# Patient Record
Sex: FEMALE | Race: WHITE | Employment: OTHER | URBAN - NONMETROPOLITAN AREA
[De-identification: names, ages, dates, MRNs, and addresses within clinical notes are randomized per-mention and may not be internally consistent; named-entity substitution may affect disease eponyms.]

---

## 2017-04-25 ENCOUNTER — OFFICE VISIT (OUTPATIENT)
Dept: CARDIOLOGY | Age: 67
End: 2017-04-25
Payer: MEDICARE

## 2017-04-25 VITALS
HEART RATE: 68 BPM | SYSTOLIC BLOOD PRESSURE: 138 MMHG | DIASTOLIC BLOOD PRESSURE: 88 MMHG | BODY MASS INDEX: 28.32 KG/M2 | WEIGHT: 170 LBS | HEIGHT: 65 IN

## 2017-04-25 DIAGNOSIS — F17.200 TOBACCO USE DISORDER: ICD-10-CM

## 2017-04-25 DIAGNOSIS — I25.10 ASHD (ARTERIOSCLEROTIC HEART DISEASE): Primary | ICD-10-CM

## 2017-04-25 DIAGNOSIS — I10 ESSENTIAL HYPERTENSION: ICD-10-CM

## 2017-04-25 PROCEDURE — 3014F SCREEN MAMMO DOC REV: CPT | Performed by: INTERNAL MEDICINE

## 2017-04-25 PROCEDURE — 1036F TOBACCO NON-USER: CPT | Performed by: INTERNAL MEDICINE

## 2017-04-25 PROCEDURE — 4040F PNEUMOC VAC/ADMIN/RCVD: CPT | Performed by: INTERNAL MEDICINE

## 2017-04-25 PROCEDURE — G8598 ASA/ANTIPLAT THER USED: HCPCS | Performed by: INTERNAL MEDICINE

## 2017-04-25 PROCEDURE — G8400 PT W/DXA NO RESULTS DOC: HCPCS | Performed by: INTERNAL MEDICINE

## 2017-04-25 PROCEDURE — 99213 OFFICE O/P EST LOW 20 MIN: CPT | Performed by: INTERNAL MEDICINE

## 2017-04-25 PROCEDURE — 1123F ACP DISCUSS/DSCN MKR DOCD: CPT | Performed by: INTERNAL MEDICINE

## 2017-04-25 PROCEDURE — G8420 CALC BMI NORM PARAMETERS: HCPCS | Performed by: INTERNAL MEDICINE

## 2017-04-25 PROCEDURE — 3017F COLORECTAL CA SCREEN DOC REV: CPT | Performed by: INTERNAL MEDICINE

## 2017-04-25 PROCEDURE — 1090F PRES/ABSN URINE INCON ASSESS: CPT | Performed by: INTERNAL MEDICINE

## 2017-04-25 PROCEDURE — G8427 DOCREV CUR MEDS BY ELIG CLIN: HCPCS | Performed by: INTERNAL MEDICINE

## 2017-04-25 RX ORDER — METOPROLOL SUCCINATE 50 MG/1
50 TABLET, EXTENDED RELEASE ORAL EVERY EVENING
Qty: 90 TABLET | Refills: 3 | Status: SHIPPED | OUTPATIENT
Start: 2017-04-25 | End: 2018-05-11 | Stop reason: SDUPTHER

## 2017-04-25 RX ORDER — RAMIPRIL 2.5 MG/1
2.5 CAPSULE ORAL EVERY MORNING
Qty: 90 CAPSULE | Refills: 3 | Status: SHIPPED | OUTPATIENT
Start: 2017-04-25 | End: 2018-06-04 | Stop reason: DRUGHIGH

## 2017-05-12 ENCOUNTER — HOSPITAL ENCOUNTER (OUTPATIENT)
Dept: MRI IMAGING | Age: 67
Discharge: HOME | End: 2017-05-12
Payer: MEDICARE

## 2017-05-12 DIAGNOSIS — M50.10 CERVICAL DISC DISORDER WITH RADICULOPATHY, UNSPECIFIED CERVICAL REGION: ICD-10-CM

## 2017-05-12 PROCEDURE — 72141 MRI NECK SPINE W/O DYE: CPT

## 2017-11-10 ENCOUNTER — TELEPHONE (OUTPATIENT)
Dept: CARDIOLOGY | Age: 67
End: 2017-11-10

## 2017-11-10 DIAGNOSIS — E78.00 HYPERCHOLESTEREMIA: ICD-10-CM

## 2017-11-10 DIAGNOSIS — I25.10 ASHD (ARTERIOSCLEROTIC HEART DISEASE): Primary | ICD-10-CM

## 2017-11-10 DIAGNOSIS — I25.10 CORONARY ARTERY DISEASE INVOLVING NATIVE CORONARY ARTERY OF NATIVE HEART WITHOUT ANGINA PECTORIS: ICD-10-CM

## 2017-11-10 DIAGNOSIS — I25.10 CORONARY ARTERY DISEASE INVOLVING NATIVE CORONARY ARTERY OF NATIVE HEART WITHOUT ANGINA PECTORIS: Primary | ICD-10-CM

## 2017-11-10 DIAGNOSIS — I25.10 ASHD (ARTERIOSCLEROTIC HEART DISEASE): ICD-10-CM

## 2017-11-10 NOTE — TELEPHONE ENCOUNTER
Patient called asking if she is needing to have any lab work prior to her appointment with Dr. Elena Man on 11/13. Advised patient that if labs are needed, they will be given at appointment. Patient requested to see if she could get them prior if needed. Can you ask if he would like any labs. Thanks.

## 2017-11-13 ENCOUNTER — OFFICE VISIT (OUTPATIENT)
Dept: CARDIOLOGY | Age: 67
End: 2017-11-13
Payer: MEDICARE

## 2017-11-13 VITALS
HEIGHT: 67 IN | RESPIRATION RATE: 16 BRPM | HEART RATE: 108 BPM | BODY MASS INDEX: 27.31 KG/M2 | DIASTOLIC BLOOD PRESSURE: 82 MMHG | WEIGHT: 174 LBS | SYSTOLIC BLOOD PRESSURE: 142 MMHG

## 2017-11-13 DIAGNOSIS — I25.10 ASHD (ARTERIOSCLEROTIC HEART DISEASE): ICD-10-CM

## 2017-11-13 DIAGNOSIS — I25.10 CORONARY ARTERY DISEASE INVOLVING NATIVE CORONARY ARTERY OF NATIVE HEART WITHOUT ANGINA PECTORIS: ICD-10-CM

## 2017-11-13 DIAGNOSIS — I10 ESSENTIAL HYPERTENSION: ICD-10-CM

## 2017-11-13 DIAGNOSIS — E78.2 MIXED HYPERLIPIDEMIA: Primary | ICD-10-CM

## 2017-11-13 DIAGNOSIS — E78.00 HYPERCHOLESTEREMIA: ICD-10-CM

## 2017-11-13 LAB
ALT SERPL-CCNC: 12 U/L (ref 5–33)
AST SERPL-CCNC: 11 U/L (ref 5–32)
CHOLESTEROL, TOTAL: 206 MG/DL (ref 160–199)
HDLC SERPL-MCNC: 51 MG/DL (ref 65–121)
LDL CHOLESTEROL CALCULATED: 115 MG/DL
TRIGL SERPL-MCNC: 201 MG/DL (ref 0–149)

## 2017-11-13 PROCEDURE — 99213 OFFICE O/P EST LOW 20 MIN: CPT | Performed by: INTERNAL MEDICINE

## 2017-11-13 PROCEDURE — 3017F COLORECTAL CA SCREEN DOC REV: CPT | Performed by: INTERNAL MEDICINE

## 2017-11-13 PROCEDURE — G8400 PT W/DXA NO RESULTS DOC: HCPCS | Performed by: INTERNAL MEDICINE

## 2017-11-13 PROCEDURE — G8598 ASA/ANTIPLAT THER USED: HCPCS | Performed by: INTERNAL MEDICINE

## 2017-11-13 PROCEDURE — 1090F PRES/ABSN URINE INCON ASSESS: CPT | Performed by: INTERNAL MEDICINE

## 2017-11-13 PROCEDURE — 3014F SCREEN MAMMO DOC REV: CPT | Performed by: INTERNAL MEDICINE

## 2017-11-13 PROCEDURE — 1123F ACP DISCUSS/DSCN MKR DOCD: CPT | Performed by: INTERNAL MEDICINE

## 2017-11-13 PROCEDURE — G8484 FLU IMMUNIZE NO ADMIN: HCPCS | Performed by: INTERNAL MEDICINE

## 2017-11-13 PROCEDURE — G8427 DOCREV CUR MEDS BY ELIG CLIN: HCPCS | Performed by: INTERNAL MEDICINE

## 2017-11-13 PROCEDURE — G8419 CALC BMI OUT NRM PARAM NOF/U: HCPCS | Performed by: INTERNAL MEDICINE

## 2017-11-13 PROCEDURE — 1036F TOBACCO NON-USER: CPT | Performed by: INTERNAL MEDICINE

## 2017-11-13 PROCEDURE — 4040F PNEUMOC VAC/ADMIN/RCVD: CPT | Performed by: INTERNAL MEDICINE

## 2017-11-13 RX ORDER — ROSUVASTATIN CALCIUM 10 MG/1
10 TABLET, COATED ORAL DAILY
COMMUNITY
End: 2017-11-13 | Stop reason: SDUPTHER

## 2017-11-15 RX ORDER — ROSUVASTATIN CALCIUM 10 MG/1
10 TABLET, COATED ORAL DAILY
Qty: 90 TABLET | Refills: 3 | Status: SHIPPED | OUTPATIENT
Start: 2017-11-15 | End: 2019-06-19 | Stop reason: SDUPTHER

## 2018-05-11 DIAGNOSIS — I25.10 ASHD (ARTERIOSCLEROTIC HEART DISEASE): ICD-10-CM

## 2018-05-11 DIAGNOSIS — I10 ESSENTIAL HYPERTENSION: ICD-10-CM

## 2018-05-11 RX ORDER — METOPROLOL SUCCINATE 50 MG/1
50 TABLET, EXTENDED RELEASE ORAL EVERY EVENING
Qty: 90 TABLET | Refills: 3 | Status: SHIPPED | OUTPATIENT
Start: 2018-05-11 | End: 2018-06-04 | Stop reason: DRUGHIGH

## 2018-06-04 ENCOUNTER — OFFICE VISIT (OUTPATIENT)
Dept: CARDIOLOGY | Age: 68
End: 2018-06-04
Payer: MEDICARE

## 2018-06-04 VITALS
SYSTOLIC BLOOD PRESSURE: 190 MMHG | BODY MASS INDEX: 27.64 KG/M2 | DIASTOLIC BLOOD PRESSURE: 110 MMHG | HEART RATE: 92 BPM | HEIGHT: 66 IN | WEIGHT: 172 LBS

## 2018-06-04 DIAGNOSIS — I25.10 ASHD (ARTERIOSCLEROTIC HEART DISEASE): ICD-10-CM

## 2018-06-04 DIAGNOSIS — I10 ESSENTIAL HYPERTENSION: Primary | ICD-10-CM

## 2018-06-04 PROCEDURE — G8598 ASA/ANTIPLAT THER USED: HCPCS | Performed by: INTERNAL MEDICINE

## 2018-06-04 PROCEDURE — 1090F PRES/ABSN URINE INCON ASSESS: CPT | Performed by: INTERNAL MEDICINE

## 2018-06-04 PROCEDURE — G8400 PT W/DXA NO RESULTS DOC: HCPCS | Performed by: INTERNAL MEDICINE

## 2018-06-04 PROCEDURE — G8427 DOCREV CUR MEDS BY ELIG CLIN: HCPCS | Performed by: INTERNAL MEDICINE

## 2018-06-04 PROCEDURE — 4004F PT TOBACCO SCREEN RCVD TLK: CPT | Performed by: INTERNAL MEDICINE

## 2018-06-04 PROCEDURE — 1123F ACP DISCUSS/DSCN MKR DOCD: CPT | Performed by: INTERNAL MEDICINE

## 2018-06-04 PROCEDURE — 4040F PNEUMOC VAC/ADMIN/RCVD: CPT | Performed by: INTERNAL MEDICINE

## 2018-06-04 PROCEDURE — 99214 OFFICE O/P EST MOD 30 MIN: CPT | Performed by: INTERNAL MEDICINE

## 2018-06-04 PROCEDURE — G8419 CALC BMI OUT NRM PARAM NOF/U: HCPCS | Performed by: INTERNAL MEDICINE

## 2018-06-04 PROCEDURE — 3017F COLORECTAL CA SCREEN DOC REV: CPT | Performed by: INTERNAL MEDICINE

## 2018-06-04 RX ORDER — RAMIPRIL 5 MG/1
5 CAPSULE ORAL DAILY
Qty: 30 CAPSULE | Refills: 3 | Status: SHIPPED | OUTPATIENT
Start: 2018-06-04 | End: 2018-06-25 | Stop reason: SDUPTHER

## 2018-06-04 RX ORDER — METOPROLOL SUCCINATE 100 MG/1
100 TABLET, EXTENDED RELEASE ORAL DAILY
Qty: 30 TABLET | Refills: 3 | Status: SHIPPED | OUTPATIENT
Start: 2018-06-04 | End: 2018-06-25 | Stop reason: SDUPTHER

## 2018-06-25 ENCOUNTER — OFFICE VISIT (OUTPATIENT)
Dept: CARDIOLOGY | Age: 68
End: 2018-06-25
Payer: MEDICARE

## 2018-06-25 VITALS
WEIGHT: 172.2 LBS | HEIGHT: 66 IN | SYSTOLIC BLOOD PRESSURE: 138 MMHG | DIASTOLIC BLOOD PRESSURE: 88 MMHG | BODY MASS INDEX: 27.68 KG/M2 | HEART RATE: 76 BPM

## 2018-06-25 DIAGNOSIS — Z95.5 HISTORY OF CORONARY ARTERY STENT PLACEMENT: ICD-10-CM

## 2018-06-25 DIAGNOSIS — I10 ESSENTIAL HYPERTENSION: Primary | ICD-10-CM

## 2018-06-25 DIAGNOSIS — I25.10 CORONARY ARTERY DISEASE INVOLVING NATIVE CORONARY ARTERY OF NATIVE HEART WITHOUT ANGINA PECTORIS: ICD-10-CM

## 2018-06-25 DIAGNOSIS — Z95.1 S/P CABG X 4: ICD-10-CM

## 2018-06-25 DIAGNOSIS — E78.00 HYPERCHOLESTEREMIA: ICD-10-CM

## 2018-06-25 PROCEDURE — 99214 OFFICE O/P EST MOD 30 MIN: CPT | Performed by: NURSE PRACTITIONER

## 2018-06-25 PROCEDURE — 1090F PRES/ABSN URINE INCON ASSESS: CPT | Performed by: NURSE PRACTITIONER

## 2018-06-25 PROCEDURE — 3017F COLORECTAL CA SCREEN DOC REV: CPT | Performed by: NURSE PRACTITIONER

## 2018-06-25 PROCEDURE — G8427 DOCREV CUR MEDS BY ELIG CLIN: HCPCS | Performed by: NURSE PRACTITIONER

## 2018-06-25 PROCEDURE — G8419 CALC BMI OUT NRM PARAM NOF/U: HCPCS | Performed by: NURSE PRACTITIONER

## 2018-06-25 RX ORDER — METOPROLOL SUCCINATE 100 MG/1
100 TABLET, EXTENDED RELEASE ORAL DAILY
Qty: 90 TABLET | Refills: 3 | Status: SHIPPED | OUTPATIENT
Start: 2018-06-25 | End: 2019-06-19 | Stop reason: SDUPTHER

## 2018-06-25 RX ORDER — RAMIPRIL 5 MG/1
5 CAPSULE ORAL DAILY
Qty: 90 CAPSULE | Refills: 3 | Status: SHIPPED | OUTPATIENT
Start: 2018-06-25 | End: 2019-06-19 | Stop reason: SDUPTHER

## 2018-12-19 ENCOUNTER — OFFICE VISIT (OUTPATIENT)
Dept: CARDIOLOGY | Age: 68
End: 2018-12-19
Payer: MEDICARE

## 2018-12-19 VITALS
BODY MASS INDEX: 27 KG/M2 | HEIGHT: 66 IN | SYSTOLIC BLOOD PRESSURE: 136 MMHG | WEIGHT: 168 LBS | HEART RATE: 93 BPM | DIASTOLIC BLOOD PRESSURE: 88 MMHG

## 2018-12-19 DIAGNOSIS — I25.10 CORONARY ARTERY DISEASE INVOLVING NATIVE CORONARY ARTERY OF NATIVE HEART WITHOUT ANGINA PECTORIS: Primary | ICD-10-CM

## 2018-12-19 DIAGNOSIS — Z95.1 S/P CABG X 4: ICD-10-CM

## 2018-12-19 DIAGNOSIS — I10 ESSENTIAL HYPERTENSION: ICD-10-CM

## 2018-12-19 DIAGNOSIS — E78.2 MIXED HYPERLIPIDEMIA: ICD-10-CM

## 2018-12-19 PROCEDURE — 1101F PT FALLS ASSESS-DOCD LE1/YR: CPT | Performed by: NURSE PRACTITIONER

## 2018-12-19 PROCEDURE — 3017F COLORECTAL CA SCREEN DOC REV: CPT | Performed by: NURSE PRACTITIONER

## 2018-12-19 PROCEDURE — 4040F PNEUMOC VAC/ADMIN/RCVD: CPT | Performed by: NURSE PRACTITIONER

## 2018-12-19 PROCEDURE — G8598 ASA/ANTIPLAT THER USED: HCPCS | Performed by: NURSE PRACTITIONER

## 2018-12-19 PROCEDURE — G8400 PT W/DXA NO RESULTS DOC: HCPCS | Performed by: NURSE PRACTITIONER

## 2018-12-19 PROCEDURE — 1090F PRES/ABSN URINE INCON ASSESS: CPT | Performed by: NURSE PRACTITIONER

## 2018-12-19 PROCEDURE — 99213 OFFICE O/P EST LOW 20 MIN: CPT | Performed by: NURSE PRACTITIONER

## 2018-12-19 PROCEDURE — 93000 ELECTROCARDIOGRAM COMPLETE: CPT | Performed by: NURSE PRACTITIONER

## 2018-12-19 PROCEDURE — G8484 FLU IMMUNIZE NO ADMIN: HCPCS | Performed by: NURSE PRACTITIONER

## 2018-12-19 PROCEDURE — 1123F ACP DISCUSS/DSCN MKR DOCD: CPT | Performed by: NURSE PRACTITIONER

## 2018-12-19 PROCEDURE — 4004F PT TOBACCO SCREEN RCVD TLK: CPT | Performed by: NURSE PRACTITIONER

## 2018-12-19 PROCEDURE — G8427 DOCREV CUR MEDS BY ELIG CLIN: HCPCS | Performed by: NURSE PRACTITIONER

## 2018-12-19 PROCEDURE — G8419 CALC BMI OUT NRM PARAM NOF/U: HCPCS | Performed by: NURSE PRACTITIONER

## 2018-12-19 RX ORDER — CYANOCOBALAMIN 1000 UG/ML
1000 INJECTION INTRAMUSCULAR; SUBCUTANEOUS
COMMUNITY
End: 2021-01-05

## 2018-12-19 NOTE — PATIENT INSTRUCTIONS
can you learn more? Go to [email or web address] and sign in to your WappZapp account. Enter T687 in the Binfire box to learn more about \"A Healthy Heart: Care Instructions. \"     If you do not have an account, please click on the \"Sign Up Now\" link. Current as of: December 6, 2017  Content Version: 11.8  © 7887-3146 [employer]. Care instructions adapted under license by [facility] ([facility]). If you have questions about a medical condition or this instruction, always ask your healthcare professional. [place] [address] any warranty or liability for your use of this information. Patient Education        A Healthy Heart: Care Instructions  Your Care Instructions    Heart disease occurs when a substance called plaque builds up in the vessels that supply oxygen-rich blood to your heart. This can narrow the blood vessels and reduce blood flow. A heart attack happens when blood flow is completely blocked. A high-fat diet, smoking, and other factors increase the risk of heart disease. Your doctor has found that you have a chance of having heart disease. You can do lots of things to keep your heart healthy. It may not be easy, but you can change your diet, exercise more, and quit smoking. These steps really work to lower your chance of heart disease. Follow-up care is a key part of your treatment and safety. Be sure to make and go to all appointments, and call your doctor if you are having problems. It's also a good idea to know your test results and keep a list of the medicines you take. How can you care for yourself at home? Diet    · Use less salt when you cook and eat. This helps lower your blood pressure. Taste food before salting. Add only a little salt when you think you need it.  With time, your taste buds will adjust to less salt.     · Eat fewer snack items, fast foods, canned soups, and other high-salt, high-fat, processed foods.     · Read food you take aspirin and not another kind of pain reliever, such as acetaminophen (Tylenol). If you take ibuprofen (such as Advil or Motrin) for other problems, take aspirin at least 2 hours before taking ibuprofen. When should you call for help? Call 911 if you have symptoms of a heart attack. These may include:    · Chest pain or pressure, or a strange feeling in the chest.     · Sweating.     · Shortness of breath.     · Pain, pressure, or a strange feeling in the back, neck, jaw, or upper belly or in one or both shoulders or arms.     · Lightheadedness or sudden weakness.     · A fast or irregular heartbeat.    After you call 911, the  may tell you to chew 1 adult-strength or 2 to 4 low-dose aspirin. Wait for an ambulance. Do not try to drive yourself.   Watch closely for changes in your health, and be sure to contact your doctor if you have any problems. Where can you learn more? Go to [email or web address] and sign in to your PharmAssistant account. Enter L955 in the Mallstreet box to learn more about \"A Healthy Heart: Care Instructions. \"     If you do not have an account, please click on the \"Sign Up Now\" link. Current as of: December 6, 2017  Content Version: 11.8  © 5811-6154 Healthwise, Incorporated. Care instructions adapted under license by [facility] ([facility]). If you have questions about a medical condition or this instruction, always ask your healthcare professional. [place] [address] any warranty or liability for your use of this information.

## 2018-12-19 NOTE — PROGRESS NOTES
CATHETERIZATION  12/10/00    selective left heart and coronary arteriography with left ventriculography     CARDIAC CATHETERIZATION  01/23/98    left heart cath, left ventriculography, slective coronary arteriography, direct infarct angioplasty and stent placement to proximal left anterior descending coronary artery    CARDIAC CATHETERIZATION  03/05/94    left heart cath, selective coronary arteriography, left ventriculography    CARDIAC CATHETERIZATION  8/27/2011    [place]    CHOLECYSTECTOMY      CORONARY ANGIOPLASTY WITH STENT PLACEMENT  12/12/00    PTCA and stent placement to the mid LAD/ptca and stent placement first circumflex marginal (intermediate)     CORONARY ARTERY BYPASS GRAFT  8/29/2011    PACABG X 4 LIMA-LAD, SVG-DIAG, SVG-PDA, RT EVH, LT OPEN VEIN HARVEST, DR Monterroso Every    HYSTERECTOMY      PARATHYROIDECTOMY       Family History   Problem Relation Age of Onset    Cancer Mother      Social History   Substance Use Topics    Smoking status: Current Some Day Smoker     Packs/day: 0.25     Types: Cigarettes    Smokeless tobacco: Never Used    Alcohol use No      Current Outpatient Prescriptions   Medication Sig Dispense Refill    cyanocobalamin 1000 MCG/ML injection Inject 1,000 mcg into the muscle every 30 days      ramipril (ALTACE) 5 MG capsule Take 1 capsule by mouth daily 90 capsule 3    metoprolol succinate (TOPROL XL) 100 MG extended release tablet Take 1 tablet by mouth daily 90 tablet 3    rosuvastatin (CRESTOR) 10 MG tablet Take 1 tablet by mouth daily 90 tablet 3    metFORMIN (GLUCOPHAGE) 500 MG tablet Take 1 tablet by mouth 2 times daily (with meals) 180 tablet 3    nitroGLYCERIN (NITROSTAT) 0.4 MG SL tablet Place 1 tablet under the tongue every 5 minutes as needed for Chest pain 25 tablet 3    aspirin 81 MG EC tablet Take 81 mg by mouth daily. No current facility-administered medications for this visit.       Allergies: Codeine    Review of Systems  Constitutional -

## 2019-03-01 ENCOUNTER — HOSPITAL ENCOUNTER (OUTPATIENT)
Dept: NON INVASIVE DIAGNOSTICS | Age: 69
Discharge: HOME | End: 2019-03-01
Payer: MEDICARE

## 2019-03-01 LAB
LV EF: 62 %
LVEF MODALITY: NORMAL

## 2019-03-01 PROCEDURE — 93306 TTE W/DOPPLER COMPLETE: CPT

## 2019-06-19 ENCOUNTER — OFFICE VISIT (OUTPATIENT)
Dept: CARDIOLOGY | Age: 69
End: 2019-06-19
Payer: MEDICARE

## 2019-06-19 VITALS
SYSTOLIC BLOOD PRESSURE: 142 MMHG | BODY MASS INDEX: 28.93 KG/M2 | HEART RATE: 92 BPM | WEIGHT: 180 LBS | HEIGHT: 66 IN | DIASTOLIC BLOOD PRESSURE: 90 MMHG

## 2019-06-19 DIAGNOSIS — E78.2 MIXED HYPERLIPIDEMIA: ICD-10-CM

## 2019-06-19 DIAGNOSIS — I10 ESSENTIAL HYPERTENSION: ICD-10-CM

## 2019-06-19 DIAGNOSIS — Z95.1 S/P CABG X 4: ICD-10-CM

## 2019-06-19 DIAGNOSIS — I25.10 CORONARY ARTERY DISEASE INVOLVING NATIVE CORONARY ARTERY OF NATIVE HEART WITHOUT ANGINA PECTORIS: Primary | ICD-10-CM

## 2019-06-19 PROCEDURE — 4040F PNEUMOC VAC/ADMIN/RCVD: CPT | Performed by: NURSE PRACTITIONER

## 2019-06-19 PROCEDURE — 1123F ACP DISCUSS/DSCN MKR DOCD: CPT | Performed by: NURSE PRACTITIONER

## 2019-06-19 PROCEDURE — 99213 OFFICE O/P EST LOW 20 MIN: CPT | Performed by: NURSE PRACTITIONER

## 2019-06-19 PROCEDURE — G8598 ASA/ANTIPLAT THER USED: HCPCS | Performed by: NURSE PRACTITIONER

## 2019-06-19 PROCEDURE — G8427 DOCREV CUR MEDS BY ELIG CLIN: HCPCS | Performed by: NURSE PRACTITIONER

## 2019-06-19 PROCEDURE — 4004F PT TOBACCO SCREEN RCVD TLK: CPT | Performed by: NURSE PRACTITIONER

## 2019-06-19 PROCEDURE — 3017F COLORECTAL CA SCREEN DOC REV: CPT | Performed by: NURSE PRACTITIONER

## 2019-06-19 PROCEDURE — G8419 CALC BMI OUT NRM PARAM NOF/U: HCPCS | Performed by: NURSE PRACTITIONER

## 2019-06-19 PROCEDURE — 1090F PRES/ABSN URINE INCON ASSESS: CPT | Performed by: NURSE PRACTITIONER

## 2019-06-19 PROCEDURE — G8400 PT W/DXA NO RESULTS DOC: HCPCS | Performed by: NURSE PRACTITIONER

## 2019-06-19 RX ORDER — ROSUVASTATIN CALCIUM 10 MG/1
10 TABLET, COATED ORAL DAILY
Qty: 90 TABLET | Refills: 3 | Status: SHIPPED | OUTPATIENT
Start: 2019-06-19 | End: 2020-09-23

## 2019-06-19 RX ORDER — RAMIPRIL 5 MG/1
5 CAPSULE ORAL DAILY
Qty: 90 CAPSULE | Refills: 3 | Status: SHIPPED | OUTPATIENT
Start: 2019-06-19 | End: 2020-09-23 | Stop reason: SDUPTHER

## 2019-06-19 RX ORDER — METOPROLOL SUCCINATE 100 MG/1
100 TABLET, EXTENDED RELEASE ORAL DAILY
Qty: 90 TABLET | Refills: 3 | Status: SHIPPED | OUTPATIENT
Start: 2019-06-19 | End: 2020-02-13 | Stop reason: SDUPTHER

## 2019-06-19 NOTE — PROGRESS NOTES
[facility] [place], [place]. [address] [facility]  [phone] office  [phone] fax      OFFICE VISIT:  2019    Jono Silva - : 1950    Reason For Visit:  Montey Goodell is a 78 y.o. female who is here for 6 Month Follow-Up (Patient denies any cardiac symtoms.); Coronary Artery Disease; and Hypertension  Patient followed for:  Coronary artery disease involving native coronary artery of native heart without angina pectoris    S/P CABG x 4    Essential hypertension    Mixed hyperlipidemia      The patient presents today for cardiology follow up. Overall, the patient is doing well from a cardiac standpoint without symptoms to suggest myocardial ischemia. BP is BP controlled on current regimen. The patient's PCP monitors cholesterol. Carolina Cardona denies exertional chest pain, shortness of breath, orthopnea, paroxysmal nocturnal dyspnea, syncope, presyncope, sustained arrythmia, edema and fatigue. The patient denies numbness or weakness to suggest cerebrovascular accident or transient ischemic attack.       Jono Silva has the following history as recorded in [facility]:    Patient Active Problem List   Diagnosis Code    ASHD (arteriosclerotic heart disease) I25.10    Deep vein thrombosis (HCC) I82.409    Hypertension I10    Diabetes mellitus ([address] E11.9    Hypercholesteremia E78.00    S/P CABG x 4 Z95.1    History of coronary artery stent placement Z95.5    Coronary artery disease involving native coronary artery of native heart without angina pectoris I25.10    Mixed hyperlipidemia E78.2     Past Medical History:   Diagnosis Date    ASHD (arteriosclerotic heart disease)     s/p PTCA and stent of circumflex, as well as intermediate and mid LAD     COPD (chronic obstructive pulmonary disease) ([address]     Coronary atherosclerosis     Deep vein thrombosis (HCC)     hx of     Diabetes mellitus ([address] diet controlled    Hypercholesteremia     Hypertension     Unstable angina [facility])      Past Surgical History:   Procedure Laterality Date    CARDIAC CATHETERIZATION  12/10/00    selective left heart and coronary arteriography with left ventriculography     CARDIAC CATHETERIZATION  01/23/98    left heart cath, left ventriculography, slective coronary arteriography, direct infarct angioplasty and stent placement to proximal left anterior descending coronary artery    CARDIAC CATHETERIZATION  03/05/94    left heart cath, selective coronary arteriography, left ventriculography    CARDIAC CATHETERIZATION  8/27/2011    [place]    CHOLECYSTECTOMY      CORONARY ANGIOPLASTY WITH STENT PLACEMENT  12/12/00    PTCA and stent placement to the mid LAD/ptca and stent placement first circumflex marginal (intermediate)     CORONARY ARTERY BYPASS GRAFT  8/29/2011    PACABG X 4 LIMA-LAD, SVG-DIAG, SVG-PDA, RT EVH, LT OPEN VEIN HARVEST, DR Gould Daniel    HYSTERECTOMY      PARATHYROIDECTOMY       Family History   Problem Relation Age of Onset    Cancer Mother     Coronary Art Dis Father      Social History     Tobacco Use    Smoking status: Current Every Day Smoker     Packs/day: 0.25     Types: Cigarettes    Smokeless tobacco: Never Used   Substance Use Topics    Alcohol use: No      Current Outpatient Medications   Medication Sig Dispense Refill    cyanocobalamin 1000 MCG/ML injection Inject 1,000 mcg into the muscle every 30 days      ramipril (ALTACE) 5 MG capsule Take 1 capsule by mouth daily 90 capsule 3    metoprolol succinate (TOPROL XL) 100 MG extended release tablet Take 1 tablet by mouth daily 90 tablet 3    rosuvastatin (CRESTOR) 10 MG tablet Take 1 tablet by mouth daily 90 tablet 3    metFORMIN (GLUCOPHAGE) 500 MG tablet Take 1 tablet by mouth 2 times daily (with meals) 180 tablet 3    nitroGLYCERIN (NITROSTAT) 0.4 MG SL tablet Place 1 tablet under the tongue every 5 minutes as needed for Chest pain 25 tablet 3    aspirin 81 MG EC tablet Take 81 mg by mouth daily. No current facility-administered medications for this visit. Allergies: Codeine    Review of Systems  Constitutional - no appetite change, or unexpected weight change. No fever, chills or diaphoresis. No significant change in activity level or new onset of fatigue. HEENT - no significant rhinorrhea or epistaxis. No tinnitus or significant hearing loss. Eyes - no sudden vision change or amaurosis. No corneal arcus, xantholasma, subconjunctival hemorrhage or discharge. Respiratory - no significant wheezing, stridor, apnea or cough. No dyspnea on exertion or shortness of air. Cardiovascular - no exertional chest pain to suggest myocardial ischemia. No orthopnea or PND. No sensation of sustained arrythmia. No occurrence of slow heart rate. No palpitations. No claudication. No leg edema. Gastrointestinal - no abdominal swelling or pain. No blood in stool. No severe constipation, diarrhea, nausea, or vomiting. Genitourinary - no dysuria, frequency, or urgency. No flank pain or hematuria. Musculoskeletal - no back pain or myalgia. No problems with gait. Extremities - no clubbing, cyanosis or edema. Skin - no color change or rash. No pallor. No new surgical incision. Neurologic - no speech difficulty, facial asymmetry or lateralizing weakness. No seizures, presyncope or syncope. No significant dizziness. Hematologic - no easy bruising or excessive bleeding. Psychiatric - no severe anxiety or insomnia. No confusion. All other review of systems are negative. Objective  Vital Signs - BP (!) 150/94   Pulse 92   Ht 5' 6\" (1.676 m)   Wt 180 lb (81.6 kg)   BMI 29.05 kg/m²   General - Marilee Hernandez is alert, cooperative, and pleasant. Well groomed. No acute distress. Body habitus - Body mass index is 29.05 kg/m². HEENT - Head is normocephalic. No circumoral cyanosis.   Dentition is normal.  EYES -   Lids normal without ptosis. No discharge, edema or subconjunctival hemorrhage. Neck - Symmetrical without apparent mass or lymphadenopathy. Respiratory - Normal respiratory effort without use of accessory muscles. Ausculatation reveals vesicular breath sounds without crackles, wheezes, rub or rhonchi. Cardiovascular - No jugular venous distention. Auscultation reveals regular rate and rhythm. No audible clicks, gallop or rub. No murmur. No lower extremity varicosities. No carotid bruits. Abdominal -  No visible distention, mass or pulsations. Extremities - No clubbing or cyanosis. No statis dermatitis or ulcers. No edema. Musculoskeletal -   No Osler's nodes. No kyphosis or scoliosis. Gait is even and regular without limp or shuffle. Ambulates without assistance. Skin -  Warm and dry; no rash or pallor. No new surgical wound. Neurological - No focal neurological deficits. Thought processes coherent. No apparent tremor. Oriented to person, place and time. Psychiatric -  Appropriate affect and mood. Assessment:     Diagnosis Orders   1. Coronary artery disease involving native coronary artery of native heart without angina pectoris     2. S/P CABG x 4     3. Essential hypertension     4. Mixed hyperlipidemia       Stable CV status without symptoms of overt heart failure, arrhythmia or angina. CAD medical management includes Toprol XL, Altace, Crestor and ASA. BP is well controlled on current regimen. PCP follows lipids - tolerating statin. Patient is compliant with medication regimen. BP Readings from Last 3 Encounters:   06/19/19 (!) 150/94   12/19/18 136/88   06/25/18 138/88    Pulse Readings from Last 3 Encounters:   06/19/19 92   12/19/18 93   06/25/18 76        Wt Readings from Last 3 Encounters:   06/19/19 180 lb (81.6 kg)   12/19/18 168 lb (76.2 kg)   06/25/18 172 lb 3.2 oz (78.1 kg)     Plan  Previous cardiac history and records reviewed.   Continue current medications as

## 2019-06-19 NOTE — PATIENT INSTRUCTIONS
increases your length and quality of life. 5)  Eat better - A healthy diet is one of your best weapons for fighting cardiovascular disease. When you eat a heart healthy diet, you improve your chances for feeling good and staying healthy for life. 6)  Lose weight - when you shed extra fat an unnecessary pounds, you reduce the burden on your hear, lungs, blood vessels and skeleton. You give yourself the gift of active living, you lower your blood pressure and help yourself feel better. 7) Stop smoking - cigarette smokers have a higher risk of developing cardiovascular disease. If  You smoke, quitting is the best thing you can do for your health. Check American Heart Association on line for more information on Life's Simple 7 and tips for healthy living.       Patient Education        A Healthy Heart: Care Instructions  Your Care Instructions    Heart disease occurs when a substance called plaque builds up in the vessels that supply oxygen-rich blood to your heart. This can narrow the blood vessels and reduce blood flow. A heart attack happens when blood flow is completely blocked. A high-fat diet, smoking, and other factors increase the risk of heart disease. Your doctor has found that you have a chance of having heart disease. You can do lots of things to keep your heart healthy. It may not be easy, but you can change your diet, exercise more, and quit smoking. These steps really work to lower your chance of heart disease. Follow-up care is a key part of your treatment and safety. Be sure to make and go to all appointments, and call your doctor if you are having problems. It's also a good idea to know your test results and keep a list of the medicines you take. How can you care for yourself at home? Diet    · Use less salt when you cook and eat. This helps lower your blood pressure. Taste food before salting. Add only a little salt when you think you need it.  With time, your taste buds will adjust to less salt.     · Eat fewer snack items, fast foods, canned soups, and other high-salt, high-fat, processed foods.     · Read food labels and try to avoid saturated and trans fats. They increase your risk of heart disease by raising cholesterol levels.     · Limit the amount of solid fat-butter, margarine, and shortening-you eat. Use olive, peanut, or canola oil when you cook. Bake, broil, and steam foods instead of frying them.     · Eating fish can lower your risk for heart disease. Eat at least 2 servings of fish a week. [place], mackerel, herring, sardines, and chunk light tuna are very good choices. These fish contain omega-3 fatty acids.     · Eat a variety of fruit and vegetables every day. Dark green, deep orange, red, or yellow fruits and vegetables are especially good for you. Examples include spinach, carrots, peaches, and berries.     · Foods high in fiber can reduce your cholesterol and provide important vitamins and minerals. High-fiber foods include whole-grain cereals and breads, oatmeal, beans, brown rice, citrus fruits, and apples.     · Limit drinks and foods with added sugar. These include candy, desserts, and soda pop.    Lifestyle changes    · If your doctor recommends it, get more exercise. Walking is a good choice. Bit by bit, increase the amount you walk every day. Try for at least 30 minutes on most days of the week. You also may want to swim, bike, or do other activities.     · Do not smoke. If you need help quitting, talk to your doctor about stop-smoking programs and medicines. These can increase your chances of quitting for good. Quitting smoking may be the most important step you can take to protect your heart. It is never too late to quit. You will get health benefits right away.     · Limit alcohol to 2 drinks a day for men and 1 drink a day for women. Too much alcohol can cause health problems. Medicines    · Take your medicines exactly as prescribed.  Call your doctor if you think you are having a problem with your medicine.     · If your doctor recommends aspirin, take the amount directed each day. Make sure you take aspirin and not another kind of pain reliever, such as acetaminophen (Tylenol). If you take ibuprofen (such as Advil or Motrin) for other problems, take aspirin at least 2 hours before taking ibuprofen. When should you call for help? Call 911 if you have symptoms of a heart attack. These may include:    · Chest pain or pressure, or a strange feeling in the chest.     · Sweating.     · Shortness of breath.     · Pain, pressure, or a strange feeling in the back, neck, jaw, or upper belly or in one or both shoulders or arms.     · Lightheadedness or sudden weakness.     · A fast or irregular heartbeat.    After you call 911, the  may tell you to chew 1 adult-strength or 2 to 4 low-dose aspirin. Wait for an ambulance. Do not try to drive yourself.   Watch closely for changes in your health, and be sure to contact your doctor if you have any problems. Where can you learn more? Go to https://Alter Eco.FirmPlay. org and sign in to your Skysheet account. Enter I554 in the ConnXus box to learn more about \"A Healthy Heart: Care Instructions. \"     If you do not have an account, please click on the \"Sign Up Now\" link. Current as of: July 22, 2018  Content Version: 12.0  © 9508-1339 Healthwise, [facility]. Care instructions adapted under license by [facility] ([facility]). If you have questions about a medical condition or this instruction, always ask your healthcare professional. [place] [address] any warranty or liability for your use of this information.

## 2020-01-08 ENCOUNTER — OFFICE VISIT (OUTPATIENT)
Dept: CARDIOLOGY | Age: 70
End: 2020-01-08
Payer: MEDICARE

## 2020-01-08 VITALS
BODY MASS INDEX: 27.8 KG/M2 | HEART RATE: 88 BPM | DIASTOLIC BLOOD PRESSURE: 90 MMHG | HEIGHT: 66 IN | WEIGHT: 173 LBS | SYSTOLIC BLOOD PRESSURE: 142 MMHG

## 2020-01-08 PROCEDURE — G8427 DOCREV CUR MEDS BY ELIG CLIN: HCPCS | Performed by: INTERNAL MEDICINE

## 2020-01-08 PROCEDURE — 4004F PT TOBACCO SCREEN RCVD TLK: CPT | Performed by: INTERNAL MEDICINE

## 2020-01-08 PROCEDURE — G8400 PT W/DXA NO RESULTS DOC: HCPCS | Performed by: INTERNAL MEDICINE

## 2020-01-08 PROCEDURE — 99213 OFFICE O/P EST LOW 20 MIN: CPT | Performed by: INTERNAL MEDICINE

## 2020-01-08 PROCEDURE — 3017F COLORECTAL CA SCREEN DOC REV: CPT | Performed by: INTERNAL MEDICINE

## 2020-01-08 PROCEDURE — G8484 FLU IMMUNIZE NO ADMIN: HCPCS | Performed by: INTERNAL MEDICINE

## 2020-01-08 PROCEDURE — 4040F PNEUMOC VAC/ADMIN/RCVD: CPT | Performed by: INTERNAL MEDICINE

## 2020-01-08 PROCEDURE — 1090F PRES/ABSN URINE INCON ASSESS: CPT | Performed by: INTERNAL MEDICINE

## 2020-01-08 PROCEDURE — G8419 CALC BMI OUT NRM PARAM NOF/U: HCPCS | Performed by: INTERNAL MEDICINE

## 2020-01-08 PROCEDURE — 1123F ACP DISCUSS/DSCN MKR DOCD: CPT | Performed by: INTERNAL MEDICINE

## 2020-01-08 RX ORDER — RAMIPRIL 5 MG/1
5 CAPSULE ORAL 2 TIMES DAILY
Qty: 180 CAPSULE | Refills: 3 | Status: SHIPPED | OUTPATIENT
Start: 2020-01-08 | End: 2020-07-27

## 2020-01-08 ASSESSMENT — ENCOUNTER SYMPTOMS
BLOOD IN STOOL: 0
DIARRHEA: 0
WHEEZING: 0
VOMITING: 0
COUGH: 0
CONSTIPATION: 0
ABDOMINAL DISTENTION: 0
EYE DISCHARGE: 0
BACK PAIN: 0
SHORTNESS OF BREATH: 1

## 2020-01-08 NOTE — PATIENT INSTRUCTIONS
[place] at the [facility] and [address] [facility] located on the first floor of [place] [address] through hospital main entrance and turn immediately to your left. Date:     Lexiscan Stress Test      Lexiscan (regadenoson injection) is a prescription drug given through an IV line that increases blood flow through the arteries of the heart during a cardiac nuclear stress test.     There are two parts to a Lexiscan stress test: the rest portion and the exercise portion. For the rest portion, a radioactive tracer is injected into your arm through the IV. After 30 to 60 minutes, the process of imaging will begin. A nuclear camera will be placed on your chest area and images are taken for the next 15 to 20 minutes. For the exercise portion, a nurse will attach EKG electrodes to your chest to monitor your heart rate. The drug Phoebe Mutton is administered to simulate stress on the heart. Your heart rhythm will then be monitored for the next few minutes. Your blood pressure will also be monitored throughout the exercise portion. [place] through the exercise portion, a second round of radioactive tracer is injected into your body. Your heart rate and EKG will be monitored for another few minutes after administering the drug. Test Preparation:     Bring a list of your current medications. Do not take any of your medications the morning of the test, but bring all morning medications with you as you will take them after the stress portion of the test is completed.  Do not eat Bananas 24 hours prior to test.     No caffeine 24 hours prior to the testing. This includes: coffee, pop/soda, chocolate, cold medications, etc.  Any product that might contain caffeine.  No nicotine or alcohol 12 hours prior to your test.    Nothing to eat or drink 6-8 hours prior to appointment time.   It is okay to drink small amounts of water during the four hours prior to the test.   Nitroglycerin patches must

## 2020-01-08 NOTE — PROGRESS NOTES
[facility]  Cardiology Office Note  [address] [id number]  Phone: [phone]  Fax: [phone]                            Date:  1/8/2020  Patient: Wilman Griggs  Age:  78 y.o., 1950    Referral: Kia Clancy MD      PROBLEM LIST:    Patient Active Problem List    Diagnosis Date Noted    Mixed hyperlipidemia 12/19/2018     Priority: Low    S/P CABG x 4 10/11/2016     Priority: Low     Overview Note:     8/29/11 EF 60%      History of coronary artery stent placement 10/11/2016     Priority: Low    Coronary artery disease involving native coronary artery of native heart without angina pectoris 10/11/2016     Priority: Low    Hypercholesteremia 10/10/2011     Priority: Low    ASHD (arteriosclerotic heart disease)      Priority: Low    Deep vein thrombosis [address]      Priority: Low    Hypertension      Priority: Low    Diabetes mellitus ([facility]      Priority: Low     Overview Note:     diet controlled       1. Coronary artery disease, prior multiple PCI's with bare-metal stents to LAD and circumflex, CABG 8/2011 with LIMA to LAD, SVG to diagonal, SVG to PDA, normal LV ejection fraction. 2.  Chronic ongoing tobacco use with COPD. 3.  Diabetes mellitus. 4.  Hypertension. PRESENTATION: Wilman Griggs is a 78y.o. year old female presenting for follow-up evaluation. She reports exertional shortness of breath which has been fairly stable. Working in the garden and sometimes bending down would elicit shortness of breath. She worked as an OR nurse for 40 years. Her initial presentation for CABG was with chest discomfort and shortness of breath. She does not feel as bad since then. She smokes about 1 pack every 2 days. REVIEW OF SYSTEMS:  Review of Systems   Constitutional: Negative for activity change, fatigue and fever. HENT: Negative for ear pain, hearing loss and tinnitus. Eyes: Negative for discharge and visual disturbance. complete     Orders Placed This Encounter   Medications    ramipril (ALTACE) 5 MG capsule     Sig: Take 1 capsule by mouth 2 times daily     Dispense:  180 capsule     Refill:  3             Return in about 6 months (around 7/8/2020). Electronically signed by Bertha Harrison MD on 1/8/2020 at 2:32 PM    [facility]      Thisdictation was generated by voice recognition computer software. Although all attempts are made to edit the dictation for accuracy, there may be errors in the transcription that are not intended.

## 2020-01-31 ENCOUNTER — APPOINTMENT (OUTPATIENT)
Dept: NON INVASIVE DIAGNOSTICS | Age: 70
End: 2020-01-31
Payer: MEDICARE

## 2020-01-31 ENCOUNTER — HOSPITAL ENCOUNTER (OUTPATIENT)
Dept: NON INVASIVE DIAGNOSTICS | Age: 70
Discharge: HOME | End: 2020-01-31
Payer: MEDICARE

## 2020-01-31 ENCOUNTER — HOSPITAL ENCOUNTER (OUTPATIENT)
Dept: NUCLEAR MEDICINE | Age: 70
Discharge: HOME | End: 2020-02-02
Payer: MEDICARE

## 2020-01-31 LAB
LV EF: 55 %
LVEF MODALITY: NORMAL

## 2020-01-31 PROCEDURE — 3430000000 HC RX DIAGNOSTIC RADIOPHARMACEUTICAL: Performed by: INTERNAL MEDICINE

## 2020-01-31 PROCEDURE — 6360000002 HC RX W HCPCS: Performed by: INTERNAL MEDICINE

## 2020-01-31 PROCEDURE — A9500 TC99M SESTAMIBI: HCPCS | Performed by: INTERNAL MEDICINE

## 2020-01-31 PROCEDURE — 93017 CV STRESS TEST TRACING ONLY: CPT

## 2020-01-31 PROCEDURE — 93306 TTE W/DOPPLER COMPLETE: CPT

## 2020-01-31 RX ADMIN — TETRAKIS(2-METHOXYISOBUTYLISOCYANIDE)COPPER(I) TETRAFLUOROBORATE 30 MILLICURIE: 1 INJECTION, POWDER, LYOPHILIZED, FOR SOLUTION INTRAVENOUS at 11:25

## 2020-01-31 RX ADMIN — REGADENOSON 0.4 MG: 0.08 INJECTION, SOLUTION INTRAVENOUS at 10:30

## 2020-01-31 RX ADMIN — TETRAKIS(2-METHOXYISOBUTYLISOCYANIDE)COPPER(I) TETRAFLUOROBORATE 10 MILLICURIE: 1 INJECTION, POWDER, LYOPHILIZED, FOR SOLUTION INTRAVENOUS at 11:25

## 2020-02-03 LAB
LV EF: 65 %
LVEF MODALITY: NORMAL

## 2020-02-13 RX ORDER — METOPROLOL SUCCINATE 100 MG/1
100 TABLET, EXTENDED RELEASE ORAL DAILY
Qty: 90 TABLET | Refills: 3 | Status: SHIPPED | OUTPATIENT
Start: 2020-02-13 | End: 2020-09-23 | Stop reason: SDUPTHER

## 2020-07-16 ENCOUNTER — OFFICE VISIT (OUTPATIENT)
Dept: CARDIOLOGY | Age: 70
End: 2020-07-16
Payer: MEDICARE

## 2020-07-16 ENCOUNTER — TELEPHONE (OUTPATIENT)
Dept: CARDIOLOGY | Age: 70
End: 2020-07-16

## 2020-07-16 VITALS
WEIGHT: 179.8 LBS | SYSTOLIC BLOOD PRESSURE: 132 MMHG | HEIGHT: 64 IN | DIASTOLIC BLOOD PRESSURE: 82 MMHG | BODY MASS INDEX: 30.7 KG/M2 | HEART RATE: 60 BPM

## 2020-07-16 PROBLEM — R06.09 DOE (DYSPNEA ON EXERTION): Status: ACTIVE | Noted: 2020-07-16

## 2020-07-16 PROBLEM — Z86.39 HISTORY OF DIABETES MELLITUS: Status: ACTIVE | Noted: 2020-07-16

## 2020-07-16 PROBLEM — J44.9 CHRONIC OBSTRUCTIVE PULMONARY DISEASE (HCC): Status: ACTIVE | Noted: 2020-07-16

## 2020-07-16 PROCEDURE — 3023F SPIROM DOC REV: CPT | Performed by: NURSE PRACTITIONER

## 2020-07-16 PROCEDURE — 99214 OFFICE O/P EST MOD 30 MIN: CPT | Performed by: NURSE PRACTITIONER

## 2020-07-16 PROCEDURE — 4004F PT TOBACCO SCREEN RCVD TLK: CPT | Performed by: NURSE PRACTITIONER

## 2020-07-16 PROCEDURE — G8417 CALC BMI ABV UP PARAM F/U: HCPCS | Performed by: NURSE PRACTITIONER

## 2020-07-16 PROCEDURE — G8427 DOCREV CUR MEDS BY ELIG CLIN: HCPCS | Performed by: NURSE PRACTITIONER

## 2020-07-16 PROCEDURE — 4040F PNEUMOC VAC/ADMIN/RCVD: CPT | Performed by: NURSE PRACTITIONER

## 2020-07-16 PROCEDURE — 1090F PRES/ABSN URINE INCON ASSESS: CPT | Performed by: NURSE PRACTITIONER

## 2020-07-16 PROCEDURE — 1123F ACP DISCUSS/DSCN MKR DOCD: CPT | Performed by: NURSE PRACTITIONER

## 2020-07-16 PROCEDURE — G8400 PT W/DXA NO RESULTS DOC: HCPCS | Performed by: NURSE PRACTITIONER

## 2020-07-16 PROCEDURE — G8926 SPIRO NO PERF OR DOC: HCPCS | Performed by: NURSE PRACTITIONER

## 2020-07-16 PROCEDURE — 3017F COLORECTAL CA SCREEN DOC REV: CPT | Performed by: NURSE PRACTITIONER

## 2020-07-16 RX ORDER — HYDROCHLOROTHIAZIDE 25 MG/1
TABLET ORAL
Qty: 90 TABLET | Refills: 1 | Status: SHIPPED | OUTPATIENT
Start: 2020-07-16 | End: 2021-01-05

## 2020-07-16 NOTE — TELEPHONE ENCOUNTER
Patient seen in the office today. She reported no angina but ongoing NUNEZ. She is smoker but has not done so the past months. Plan to consult Dr. Shakila Bolton about proceeding with cath. 1/31/20  Impression:    There is small area of apical and anteroapical infarct with mild    ischemia, with a calculated ejection fraction of 65 %. Suggest: Clinical Correlation and medical management if asymptomatic. Signed by Dr Rick Noyola on 1/31/2020 4:42 PM      Established with Dr. Shakila Bolton on 1/8/20   Hx of COPD, CAD with prior PCI to LAD and circumflex with bare-metal stents, CABG 8/2011 with LIMA to LAD, SVG to diagonal, SVG to PDA, normal LV ejection fraction, diabetes mellitus.     \"I have discussed with the patient the absolute need for tobacco cessation. All other preventative measures will be futile going forward without tobacco cessation. There is high likelihood that she has graft failure. Currently her symptoms are predominantly shortness of breath which has been fairly stable. I would obtain an echocardiogram as well as a stress nuclear study to evaluate this further. \"

## 2020-07-16 NOTE — PATIENT INSTRUCTIONS
New instructions for today:    Take HCTZ 25 mg 1/2-1 tab daily as needed for swelling. Limit dietary sodium and elevate legs to reduce swelling. How to take:  NITROGLYCERIN (Nitrostat) 0.4 mg tablets, sublingual.  Nitroglycerin is in a group of drugs called nitrates. Nitroglycerin dilates (widens) blood vessels, making it easier for blood to flow through them and easier for the heart to pump. Dosing Guidelines for Nitroglycerin Tablets  · At the start of an angina (chest pain) attack, place one tablet under the tongue or between the cheek and gum. Do not swallow or chew the tablet; let it dissolve on its own. If necessary, a second and third tablet may be used, with five minutes between using each tablet. If you use a third tablet and your chest pain continues, it is time to seek immediate medical attention. Call 911 immediately and have someone drive you to the emergency room. You may be having a heart attack or other serious heart problem. · To prevent angina from exercise or stress, use 1 tablet 5 to 10 minutes before the activity. Patient Instructions:  Continue current medications as prescribed. Always keep a current medication list. Bring your medications to every office visit. Continue to follow up with primary care provider for non cardiac medical problems. Call the office with any problems, questions or concerns at [phone]. If you have been asked to keep a blood pressure log, do so for 2 weeks. Call the office to report readings to the triage nurse at [phone]. Follow up with cardiologist as scheduled. The following educational material has been included in this after visit summary for your review: Life simple 7. Heart health. Smoking cessation health benefit. Life simple 7  1) Manage blood pressure - high blood pressure is a major risk factor for heart disease and stroke. Keeping blood pressure in health range reduces strain on your heart, arteries and kidneys.   Blood disease. Your doctor has found that you have a chance of having heart disease. You can do lots of things to keep your heart healthy. It may not be easy, but you can change your diet, exercise more, and quit smoking. These steps really work to lower your chance of heart disease. Follow-up care is a key part of your treatment and safety. Be sure to make and go to all appointments, and call your doctor if you are having problems. It's also a good idea to know your test results and keep a list of the medicines you take. How can you care for yourself at home? Diet  · Use less salt when you cook and eat. This helps lower your blood pressure. Taste food before salting. Add only a little salt when you think you need it. With time, your taste buds will adjust to less salt. · Eat fewer snack items, fast foods, canned soups, and other high-salt, high-fat, processed foods. · Read food labels and try to avoid saturated and trans fats. They increase your risk of heart disease by raising cholesterol levels. · Limit the amount of solid fat-butter, margarine, and shortening-you eat. Use olive, peanut, or canola oil when you cook. Bake, broil, and steam foods instead of frying them. · Eat a variety of fruit and vegetables every day. Dark green, deep orange, red, or yellow fruits and vegetables are especially good for you. Examples include spinach, carrots, peaches, and berries. · Foods high in fiber can reduce your cholesterol and provide important vitamins and minerals. High-fiber foods include whole-grain cereals and breads, oatmeal, beans, brown rice, citrus fruits, and apples. · Eat lean proteins. Heart-healthy proteins include seafood, lean meats and poultry, eggs, beans, peas, nuts, seeds, and soy products. · Limit drinks and foods with added sugar. These include candy, desserts, and soda pop. Lifestyle changes  · If your doctor recommends it, get more exercise. Walking is a good choice.  Bit by bit, increase the amount you walk every day. Try for at least 30 minutes on most days of the week. You also may want to swim, bike, or do other activities. · Do not smoke. If you need help quitting, talk to your doctor about stop-smoking programs and medicines. These can increase your chances of quitting for good. Quitting smoking may be the most important step you can take to protect your heart. It is never too late to quit. · Limit alcohol to 2 drinks a day for men and 1 drink a day for women. Too much alcohol can cause health problems. · Manage other health problems such as diabetes, high blood pressure, and high cholesterol. If you think you may have a problem with alcohol or drug use, talk to your doctor. Medicines  · Take your medicines exactly as prescribed. Call your doctor if you think you are having a problem with your medicine. · If your doctor recommends aspirin, take the amount directed each day. Make sure you take aspirin and not another kind of pain reliever, such as acetaminophen (Tylenol). When should you call for help? [id number] if you have symptoms of a heart attack. These may include:  · Chest pain or pressure, or a strange feeling in the chest.  · Sweating. · Shortness of breath. · Pain, pressure, or a strange feeling in the back, neck, jaw, or upper belly or in one or both shoulders or arms. · Lightheadedness or sudden weakness. · A fast or irregular heartbeat. After you call 911, the  may tell you to chew 1 adult-strength or 2 to 4 low-dose aspirin. Wait for an ambulance. Do not try to drive yourself. Watch closely for changes in your health, and be sure to contact your doctor if you have any problems. Where can you learn more? Go to https://BioregencypeAnser Innovation.SimpleDeal. org and sign in to your CureLauncher account. Enter Q249 in the BancABC box to learn more about \"A Healthy Heart: Care Instructions. \"     If you do not have an account, please click on the \"Sign Up Now\" link.  Current as of: December 16, 2019               Content Version: 12.5  © 2377-8101 [employer]. Care instructions adapted under license by [facility]. If you have questions about a medical condition or this instruction, always ask your healthcare professional. [address] any warranty or liability for your use of this information. Patient Education        Learning About Benefits From Quitting Smoking  How does quitting smoking make you healthier? If you're thinking about quitting smoking, you may have a few reasons to be smoke-free. Your health may be one of them. · When you quit smoking, you lower your risks for cancer, lung disease, heart attack, stroke, blood vessel disease, and blindness from macular degeneration. · When you're smoke-free, you get sick less often, and you heal faster. You are less likely to get colds, flu, bronchitis, and pneumonia. · As a nonsmoker, you may find that your mood is better and you are less stressed. When and how will you feel healthier? Quitting has real health benefits that start from day 1 of being smoke-free. And the longer you stay smoke-free, the healthier you get and the better you feel. The first hours  · After just 20 minutes, your blood pressure and heart rate go down. That means there's less stress on your heart and blood vessels. · Within 12 hours, the level of carbon monoxide in your blood drops back to normal. That makes room for more oxygen. With more oxygen in your body, you may notice that you have more energy than when you smoked. After 2 weeks  · Your lungs start to work better. · Your risk of heart attack starts to drop. After 1 month  · When your lungs are clear, you cough less and breathe deeper, so it's easier to be active. · Your sense of taste and smell return. That means you can enjoy food more than you have since you started smoking.   Over the years  · Over the years, your risks of heart disease, heart attack, and stroke are lower. · After 10 years, your risk of dying from lung cancer is cut by about half. And your risk for many other types of cancer is lower too. How would quitting help others in your life? When you quit smoking, you improve the health of everyone who now breathes in your smoke. · Their heart, lung, and cancer risks drop, much like yours. · They are sick less. For babies and small children, living smoke-free means they're less likely to have ear infections, pneumonia, and bronchitis. · If you're a woman who is or will be pregnant someday, quitting smoking means a healthier . · Children who are close to you are less likely to become adult smokers. Where can you learn more? Go to https://MusicPlay Analytics.mechatronic systemtechnik. org and sign in to your Baydin account. Enter [phone] in the KylesLeadformance box to learn more about \"Learning About Benefits From Quitting Smoking. \"     If you do not have an account, please click on the \"Sign Up Now\" link. Current as of: 2020               Content Version: 12.5  © 0385-7319 Healthwise, Incorporated. Care instructions adapted under license by [facility] ([facility]). If you have questions about a medical condition or this instruction, always ask your healthcare professional. [address] any warranty or liability for your use of this information.

## 2020-07-16 NOTE — PROGRESS NOTES
[facility], [place]. [address] [facility]  [phone] office  [phone] fax      OFFICE VISIT:  2020    Shilpi Kidney - : 1950  Reason For Visit:  Dawit Silverio is a 69 y.o. female who is here for 6 Month Follow-Up (no cardiac issues) and Follow-up    History:   Diagnosis Orders   1. Coronary artery disease involving native coronary artery of native heart without angina pectoris     2. S/P CABG x 4      4 vessel CABG by Dr. Amada Briceno on 11. LIMA to LAD, SVG to diagonal branch, obtuse marginal branch and posterior descending artery. 3. Essential hypertension     4. Mixed hyperlipidemia     5. History of coronary artery stent placement     6. Chronic obstructive pulmonary disease, unspecified COPD type ([address]     7. History of diabetes mellitus     8. NUNEZ (dyspnea on exertion)       The patient presents today for cardiology follow up. Dawit Silverio established with Dr. Angeles Allen this past January. Due report of dyspnea, Dr. Silverio Bustillo office noted indicated concern over possible graft failure. Subsequently, the patient had a [place] on 20 which showed a small area of apical and anteroapical infarct with mild ischemia. EF was 65%. A 2D echo showed EF at 25% with diastolic dysfunction and mild mitral stenosis. The patient continues to have activity limiting NUNEZ. She also reports some recent pedal edema. The patient had labs recently with PCP which are not available for review. She does report HgA1c was 6.3. The patient does report increased sodium intake and weight gain during pandemic. She has not smoked in 2 months now. BP is well controlled on current regimen. The patient's PCP monitors cholesterol. Starlett Kehr denies exertional chest pain, orthopnea, paroxysmal nocturnal dyspnea, syncope, presyncope, sensed arrhythmia and fatigue.   The patient denies numbness or weakness to suggest cerebrovascular accident or transient ischemic attack.  + NUNEZ.  + edema of feet pas week.     Lonnie Espinosa has the following history as recorded in [facility]:  Patient Active Problem List   Diagnosis Code    ASHD (arteriosclerotic heart disease) I25.10    Deep vein thrombosis (HCC) I82.409    Essential hypertension I10    Diabetes mellitus ([facility] E11.9    Hypercholesteremia E78.00    S/P CABG x 4 Z95.1    History of coronary artery stent placement Z95.5    Coronary artery disease involving native coronary artery of native heart without angina pectoris I25.10    Mixed hyperlipidemia E78.2     Past Medical History:   Diagnosis Date    ASHD (arteriosclerotic heart disease)     s/p PTCA and stent of circumflex, as well as intermediate and mid LAD     COPD (chronic obstructive pulmonary disease) ([facility]     Coronary atherosclerosis     Deep vein thrombosis (HCC)     hx of     Diabetes mellitus ([facility]     diet controlled    Hypercholesteremia     Hypertension     Unstable angina ([facility]      Past Surgical History:   Procedure Laterality Date    CARDIAC CATHETERIZATION  12/10/00    selective left heart and coronary arteriography with left ventriculography     CARDIAC CATHETERIZATION  01/23/98    left heart cath, left ventriculography, slective coronary arteriography, direct infarct angioplasty and stent placement to proximal left anterior descending coronary artery    CARDIAC CATHETERIZATION  03/05/94    left heart cath, selective coronary arteriography, left ventriculography    CARDIAC CATHETERIZATION  8/27/2011    [place]    CHOLECYSTECTOMY      CORONARY ANGIOPLASTY WITH STENT PLACEMENT  12/12/00    PTCA and stent placement to the mid LAD/ptca and stent placement first circumflex marginal (intermediate)     CORONARY ARTERY BYPASS GRAFT  8/29/2011    PACABG X 4 LIMA-LAD, SVG-DIAG, SVG-PDA, RT EVH, LT OPEN VEIN HARVEST, DR Ermias Padilla    HYSTERECTOMY      PARATHYROIDECTOMY       Family History   Problem Relation Age of Onset    Cancer Mother     Coronary Art Dis Father      Social History     Tobacco Use    Smoking status: Current Every Day Smoker     Packs/day: 0.25     Types: Cigarettes    Smokeless tobacco: Never Used   Substance Use Topics    Alcohol use: No      Current Outpatient Medications   Medication Sig Dispense Refill    metoprolol succinate (TOPROL XL) 100 MG extended release tablet Take 1 tablet by mouth daily 90 tablet 3    ramipril (ALTACE) 5 MG capsule Take 1 capsule by mouth daily 90 capsule 3    rosuvastatin (CRESTOR) 10 MG tablet Take 1 tablet by mouth daily 90 tablet 3    metFORMIN (GLUCOPHAGE) 500 MG tablet Take 1 tablet by mouth 2 times daily (with meals) 180 tablet 3    nitroGLYCERIN (NITROSTAT) 0.4 MG SL tablet Place 1 tablet under the tongue every 5 minutes as needed for Chest pain 25 tablet 3    aspirin 81 MG EC tablet Take 81 mg by mouth daily.  ramipril (ALTACE) 5 MG capsule Take 1 capsule by mouth 2 times daily (Patient not taking: Reported on 7/16/2020) 180 capsule 3    cyanocobalamin 1000 MCG/ML injection Inject 1,000 mcg into the muscle every 30 days       No current facility-administered medications for this visit. Allergies: Codeine    Review of Systems  Constitutional - no appetite change, or unexpected weight change. No fever, chills or diaphoresis. No significant change in activity level or new onset of fatigue. HEENT - no significant rhinorrhea or epistaxis. No tinnitus or significant hearing loss. Eyes - no sudden vision change or amaurosis. No corneal arcus, xantholasma, subconjunctival hemorrhage or discharge. Respiratory - no significant wheezing, stridor, apnea or cough.  + NUNEZ. Cardiovascular - no exertional chest pain to suggest myocardial ischemia. No orthopnea or PND. No sensation of sustained arrythmia. No occurrence of slow heart rate. No palpitations. No claudication. Gastrointestinal - no abdominal swelling or pain. No blood in stool.  No severe constipation, diarrhea, nausea, or vomiting. Genitourinary - no dysuria, frequency, or urgency. No flank pain or hematuria. Musculoskeletal - no back pain or myalgia. No problems with gait. Extremities - no clubbing or cyanosis. + edema both feet past week. Skin - no color change or rash. No pallor. No new surgical incision. Neurologic - no speech difficulty, facial asymmetry or lateralizing weakness. No seizures, presyncope or syncope. No significant dizziness. Hematologic - no easy bruising or excessive bleeding. Psychiatric - no severe anxiety or insomnia. No confusion. All other review of systems are negative. Objective  Vital Signs - /82   Pulse 60   Ht 5' 4\" (1.626 m)   Wt 179 lb 12.8 oz (81.6 kg)   BMI 30.86 kg/m²   General - Rae Salinas is alert, cooperative, and pleasant. Well groomed. No acute distress. Body habitus - Body mass index is 30.86 kg/m². HEENT - Head is normocephalic. No circumoral cyanosis. Dentition is normal.  EYES -   Lids normal without ptosis. No discharge, edema or subconjunctival hemorrhage. Neck - Symmetrical without apparent mass or lymphadenopathy. Respiratory - Normal respiratory effort without use of accessory muscles. Ausculatation reveals harsh breath sounds without crackles, wheezes or rub. Scattered expiratory rhonchi noted. Cardiovascular - No jugular venous distention. Auscultation reveals regular rate and rhythm. No audible clicks, gallop or rub. No murmur. No lower extremity varicosities. No carotid bruits. Abdominal -  No visible distention, mass or pulsations. Extremities - No clubbing or cyanosis. No statis dermatitis or ulcers. Mild bilateral pedal edema. Musculoskeletal -   No Osler's nodes. No kyphosis or scoliosis. Gait is even and regular without limp or shuffle. Ambulates without assistance. Skin -  Warm and dry; no rash or pallor. No new surgical wound.   Neurological - No focal neurological deficits. Thought processes coherent. No apparent tremor. Oriented to person, place and time. Psychiatric -  Appropriate affect and mood. Assessment:     Diagnosis Orders   1. Coronary artery disease involving native coronary artery of native heart without angina pectoris     2. S/P CABG x 4      4 vessel CABG by Dr. Cely Damon on 8/29/11. LIMA to LAD, SVG to diagonal branch, obtuse marginal branch and posterior descending artery. 3. Essential hypertension     4. Mixed hyperlipidemia     5. History of coronary artery stent placement     6. Chronic obstructive pulmonary disease, unspecified COPD type ([address]     7. History of diabetes mellitus     8. NUNEZ (dyspnea on exertion)       Data reviewed:  1/31/20 Lexiscan  Impression    Impression:    There is small area of apical and anteroapical infarct with mild    ischemia, with a calculated ejection fraction of 65 %. Suggest: Clinical Correlation and medical management if asymptomatic.     Signed by Dr Speedy Uribe on 1/31/2020 4:42 PM      1/31/20 echo  Summary   Technically difficult study with many images of poor quality   Small left ventricular cavity with preserved systolic function EF 52%   Mitral valve disease preclude standard assessment of diastolic function   but tissue Doppler velocities suggest diastolic dysfunction   Mild left atrial enlargement   Poor visualization of the aortic valve which appears to offer no   significant stenosis or insufficiency   Poor visualization of the mitral valve with significant calcification of   the mitral annulus which appears to involve the mitral leaflets   Peak gradient of 6.25 mmHg with a pressure halftime value of 1.48 cm^2   consistent with mild mitral stenosis   Trace mitral regurgitation   Pulmonic valve not well visualized   Poor visualization of the right-sided chambers   No tricuspid regurgitation captured   IVC dimensions within normal limits consistent with normal right atrial   filling pressures   No significant pericardial effusion and aortic dimensions are within   normal limits    Signature    ----------------------------------------------------------------   Electronically signed by Hilaria Curling MD(Interpreting physician)   on 01/31/2020 12:39 PM   ----------------------------------------------------------------    8/22/11 cath by Dr. Felicia Maddne  Normal LV function and hemodynamics. EF 60%. Extremely diffuse form of triple vessel disease with ectatic changes throughout coronary arteries and significant narrowing of PDB to RCA, intermediate branch and proximal LAD. 4 vessel CABG by Dr. Rocío Cohen on 8/29/11. LIMA to LAD, SVG to diagonal branch, obtuse marginal branch and posterior descending artery. Lab Results   Component Value Date     10/29/2014    K 4.3 10/29/2014     10/29/2014    CO2 31 (H) 10/29/2014    BUN 14 10/29/2014    CREATININE 0.6 10/29/2014    GLUCOSE 109 10/29/2014    CALCIUM 9.8 10/29/2014    PROT 7.6 06/02/2013    LABALBU 4.1 06/02/2013    ALKPHOS 79 06/02/2013    AST 11 11/13/2017    ALT 12 11/13/2017    LABGLOM 107 10/29/2014       Lab Results   Component Value Date    CHOL 206 (H) 11/13/2017    CHOL 149 03/30/2014    CHOL 194 01/18/2013     Lab Results   Component Value Date    TRIG 201 (H) 11/13/2017    TRIG 105 03/30/2014    TRIG 191 (H) 01/18/2013     Lab Results   Component Value Date    HDL 51 (L) 11/13/2017    HDL 56 03/30/2014    HDL 47 01/18/2013     Lab Results   Component Value Date    LDLCALC 115 11/13/2017    [address] 60 08/23/2011     CAD s/p CABG and stenting - ongoing dyspnea -   1/2020 Lexiscan showed small area of ischemia. Plan to consult Dr. Narcisa Muhammad about proceeding with cardiac cath. Discussed adding Imdur to medication regimen - patient declined for now. She has NTG sl. HTN - normotensive on current regimen. Hyperlipidemia - labs followed by PCP. Continues on Crestor. Pedal edema - obtain lab results from PCP.   HCTZ 25 mg 1/2-1 tab daily as needed. Advised to take next 3 days. Advised low sodium diet and fee elevation. COPD with hx of smoking - contributing to NUNEZ. Patient stopped smoking 2 months ago. Patient is compliant with medication regimen. BP Readings from Last 3 Encounters:   07/16/20 132/82   01/08/20 (!) 142/90   06/19/19 (!) 142/90    Pulse Readings from Last 3 Encounters:   07/16/20 60   01/08/20 88   06/19/19 92        Wt Readings from Last 3 Encounters:   07/16/20 179 lb 12.8 oz (81.6 kg)   01/08/20 173 lb (78.5 kg)   06/19/19 180 lb (81.6 kg)     Plan  Previous cardiac history and records reviewed. Continue current medical management. HCTZ 25 mg 1/2 - 1 tab daily prn fluid retention. Obtain recent labs from PCP. Consult Dr. Shon Fleming about proceeding with cardiac cath. Continue other current medications as directed. Continue to follow up with primary care provider for non cardiac medical problems. Call the office with any problems, questions or concerns at [phone]. Cardiology follow up: Dr. Shon Fleming 6 months. Educational included in patient instructions. Heart health. Edema.      JAKOB Hogan

## 2020-07-17 ENCOUNTER — TELEPHONE (OUTPATIENT)
Dept: CARDIOLOGY | Age: 70
End: 2020-07-17

## 2020-07-17 NOTE — TELEPHONE ENCOUNTER
Called and spoke with patient, have left heart cath w/graft scheduled for 08/10/2020 at 1100am with arrival of 0900. Patient is to be NPO after midnight. Patient instructed to arrive through front entrance of hospital and make immediate left. Patient advised they can have one person with them but they both must wear a mask. Patient advised may take morning medications with sip of water. Also advised patient must have COVID testing completed on 08/06/2020 anywhere from 800-1100 am at [facility]. Advised patient that they will be able to proceed with procedure as long as test results are negative. Patient made aware that if testing is not resulted evening prior to procedure that they may have to be rescheduled and possibly retested. Given instructions on where to go and to self quarantine between testing and procedure. Patient does not have IV dye allergy. Patient verbally understood.

## 2020-07-27 RX ORDER — RAMIPRIL 5 MG/1
CAPSULE ORAL
Qty: 90 CAPSULE | Refills: 3 | Status: SHIPPED | OUTPATIENT
Start: 2020-07-27 | End: 2020-09-23

## 2020-08-06 DIAGNOSIS — I25.10 CORONARY ARTERY DISEASE INVOLVING NATIVE CORONARY ARTERY OF NATIVE HEART WITHOUT ANGINA PECTORIS: ICD-10-CM

## 2020-08-06 LAB
ALBUMIN SERPL-MCNC: 4.2 G/DL (ref 3.5–5.2)
ALP BLD-CCNC: 88 U/L (ref 35–104)
ALT SERPL-CCNC: 13 U/L (ref 5–33)
ANION GAP SERPL CALCULATED.3IONS-SCNC: 16 MMOL/L (ref 7–19)
AST SERPL-CCNC: 15 U/L (ref 5–32)
BILIRUB SERPL-MCNC: <0.2 MG/DL (ref 0.2–1.2)
BUN BLDV-MCNC: 17 MG/DL (ref 8–23)
CALCIUM SERPL-MCNC: 10 MG/DL (ref 8.8–10.2)
CHLORIDE BLD-SCNC: 92 MMOL/L (ref 98–111)
CO2: 27 MMOL/L (ref 22–29)
CREAT SERPL-MCNC: 0.8 MG/DL (ref 0.5–0.9)
GFR AFRICAN AMERICAN: >59
GFR NON-AFRICAN AMERICAN: >60
GLUCOSE BLD-MCNC: 167 MG/DL (ref 74–109)
POTASSIUM SERPL-SCNC: 4.5 MMOL/L (ref 3.5–5)
SODIUM BLD-SCNC: 135 MMOL/L (ref 136–145)
TOTAL PROTEIN: 8.4 G/DL (ref 6.6–8.7)

## 2020-08-07 ENCOUNTER — TELEPHONE (OUTPATIENT)
Dept: CARDIOLOGY | Age: 70
End: 2020-08-07

## 2020-08-07 RX ORDER — ONDANSETRON 2 MG/ML
4 INJECTION INTRAMUSCULAR; INTRAVENOUS EVERY 6 HOURS PRN
Status: CANCELLED | OUTPATIENT
Start: 2020-08-08

## 2020-08-07 RX ORDER — ASPIRIN 325 MG
325 TABLET ORAL ONCE
Status: CANCELLED | OUTPATIENT
Start: 2020-08-08

## 2020-08-07 RX ORDER — SODIUM CHLORIDE 9 MG/ML
INJECTION, SOLUTION INTRAVENOUS CONTINUOUS
Status: CANCELLED | OUTPATIENT
Start: 2020-08-08

## 2020-08-07 RX ORDER — NITROGLYCERIN 0.4 MG/1
0.4 TABLET SUBLINGUAL EVERY 5 MIN PRN
Status: CANCELLED | OUTPATIENT
Start: 2020-08-08

## 2020-08-07 NOTE — TELEPHONE ENCOUNTER
Called and spoke with patient, have  [address] scheduled for 08/24/2020 at 10am with arrival of 0800. Patient is to be NPO after midnight. Patient instructed to arrive through front entrance of hospital and make immediate left. Patient advised they can have one person with them but they both must wear a mask. Patient advised may take morning medications with sip of water. Also advised patient must have COVID testing completed on 08/20/2020 anywhere from 800-1100 am at [facility]. Advised patient that they will be able to proceed with procedure as long as test results are negative. Patient made aware that if testing is not resulted evening prior to procedure that they may have to be rescheduled and possibly retested. Given instructions on where to go and to self quarantine between testing and procedure. Patient does not have IV dye allergy. Patient verbally understood. Called and spoke to tia in cath lab on 08/07/2020 and scheduled procedure.

## 2020-08-10 ENCOUNTER — HOSPITAL ENCOUNTER (OUTPATIENT)
Dept: CARDIAC CATH/INVASIVE PROCEDURES | Age: 70
Discharge: HOME | End: 2020-08-10
Payer: MEDICARE

## 2020-08-20 ENCOUNTER — OFFICE VISIT (OUTPATIENT)
Age: 70
End: 2020-08-20

## 2020-08-20 VITALS — HEART RATE: 68 BPM | OXYGEN SATURATION: 94 % | TEMPERATURE: 96.1 F

## 2020-08-20 NOTE — PROGRESS NOTES
2020    Tita Oh (:  1950) is a 69 y.o. female, here requesting COVID-19 testing    History of Present Illness  Patient here today for covid swab in vehicle. Not evaluated by provider in clinic. There were no vitals filed for this visit. ASSESSMENT  Screening for COVID-19/ Viral disease    PLAN    COVID-19 sample collected and submitted  Patient given detailed CDC instructions contained within After Visit Summary       An  electronic signature was used to authenticate this note.     --JAKOB Melgoza CNP on 2020 at 11:11 AM

## 2020-08-20 NOTE — PATIENT INSTRUCTIONS
Preventing the Spread of Coronavirus Disease 2019 in Homes and Residential Communities   For the most recent information go to [email or web address]    Prevention steps for People with confirmed or suspected COVID-19 (including persons under investigation) who do not need to be hospitalized  and   People with confirmed COVID-19 who were hospitalized and determined to be medically stable to go home    Your healthcare provider and public health staff will evaluate whether you can be cared for at home. If it is determined that you do not need to be hospitalized and can be isolated at home, you will be monitored by staff from your local or state health department. You should follow the prevention steps below until a healthcare provider or local or state health department says you can return to your normal activities. Stay home except to get medical care  People who are mildly ill with COVID-19 are able to isolate at home during their illness. You should restrict activities outside your home, except for getting medical care. Do not go to work, school, or public areas. Avoid using public transportation, ride-sharing, or taxis. Separate yourself from other people and animals in your home  People: As much as possible, you should stay in a specific room and away from other people in your home. Also, you should use a separate bathroom, if available. Animals: You should restrict contact with pets and other animals while you are sick with COVID-19, just like you would around other people. Although there have not been reports of pets or other animals becoming sick with COVID-19, it is still recommended that people sick with COVID-19 limit contact with animals until more information is known about the virus. When possible, have another member of your household care for your animals while you are sick.  If you are sick with COVID-19, avoid contact with your pet, including petting, snuggling, being kissed or licked, and sharing food. If you must care for your pet or be around animals while you are sick, wash your hands before and after you interact with pets and wear a facemask. Call ahead before visiting your doctor  If you have a medical appointment, call the healthcare provider and tell them that you have or may have COVID-19. This will help the healthcare providers office take steps to keep other people from getting infected or exposed. Wear a facemask  You should wear a facemask when you are around other people (e.g., sharing a room or vehicle) or pets and before you enter a healthcare providers office. If you are not able to wear a facemask (for example, because it causes trouble breathing), then people who live with you should not stay in the same room with you, or they should wear a facemask if they enter your room. Cover your coughs and sneezes  Cover your mouth and nose with a tissue when you cough or sneeze. Throw used tissues in a lined trash can. Immediately wash your hands with soap and water for at least 20 seconds or, if soap and water are not available, clean your hands with an alcohol-based hand  that contains at least 60% alcohol. Clean your hands often  Wash your hands often with soap and water for at least 20 seconds, especially after blowing your nose, coughing, or sneezing; going to the bathroom; and before eating or preparing food. If soap and water are not readily available, use an alcohol-based hand  with at least 60% alcohol, covering all surfaces of your hands and rubbing them together until they feel dry. Soap and water are the best option if hands are visibly dirty. Avoid touching your eyes, nose, and mouth with unwashed hands. Avoid sharing personal household items  You should not share dishes, drinking glasses, cups, eating utensils, towels, or bedding with other people or pets in your home.  After using these items, they should be washed thoroughly with soap and water. Clean all high-touch surfaces everyday  High touch surfaces include counters, tabletops, doorknobs, bathroom fixtures, toilets, phones, keyboards, tablets, and bedside tables. Also, clean any surfaces that may have blood, stool, or body fluids on them. Use a household cleaning spray or wipe, according to the label instructions. Labels contain instructions for safe and effective use of the cleaning product including precautions you should take when applying the product, such as wearing gloves and making sure you have good ventilation during use of the product. Monitor your symptoms  Seek prompt medical attention if your illness is worsening (e.g., difficulty breathing). Before seeking care, call your healthcare provider and tell them that you have, or are being evaluated for, COVID-19. Put on a facemask before you enter the facility. These steps will help the healthcare providers office to keep other people in the office or waiting room from getting infected or exposed. Ask your healthcare provider to call the local or [facility] health department. Persons who are placed under active monitoring or facilitated self-monitoring should follow instructions provided by their local health department or occupational health professionals, as appropriate. When working with your local health department check their available hours. If you have a medical emergency and need to call 911, notify the dispatch personnel that you have, or are being evaluated for COVID-19. If possible, put on a facemask before emergency medical services arrive. Discontinuing home isolation  Patients with confirmed COVID-19 should remain under home isolation precautions until the risk of secondary transmission to others is thought to be low.  The decision to discontinue home isolation precautions should be made on a case-by-case basis, in consultation with healthcare providers and state and [facility] health departments. [facility] allows you to send messages to your doctor, view your test results, renew your prescriptions, schedule appointments, view visit notes, and more. How Do I Sign Up? 1. In your Internet browser, go to [email or web address]  2. Click on the Sign Up Now link in the Sign In box. You will see the New Member Sign Up page. 3. Enter your [facility] Access Code exactly as it appears below. You will not need to use this code after youve completed the sign-up process. If you do not sign up before the expiration date, you must request a new code. [facility] Access Code: [email or web address]  Expires: 8/30/2020  1:57 PM    4. Enter your Social Security Number ([id number]) and Date of Birth (mm/dd/yyyy) as indicated and click Submit. You will be taken to the next sign-up page. 5. Create a [facility] ID. This will be your [facility] login ID and cannot be changed, so think of one that is secure and easy to remember. 6. Create a [facility] password. You can change your password at any time. 7. Enter your Password Reset Question and Answer. This can be used at a later time if you forget your password. 8. Enter your e-mail address. You will receive e-mail notification when new information is available in [address]. 9. Click Sign Up. You can now view your medical record. Additional Information  If you have questions, please contact the physician practice where you receive care. Remember, [facility] is NOT to be used for urgent needs. For medical emergencies, dial 911. For questions regarding your [facility] account call [phone]. If you have a clinical question, please call your doctor's office.

## 2020-08-21 LAB — SARS-COV-2, NAA: NOT DETECTED

## 2020-08-24 ENCOUNTER — HOSPITAL ENCOUNTER (OUTPATIENT)
Dept: CARDIAC CATH/INVASIVE PROCEDURES | Age: 70
Discharge: HOME | End: 2020-08-24
Attending: INTERNAL MEDICINE | Admitting: INTERNAL MEDICINE
Payer: MEDICARE

## 2020-08-24 VITALS
HEIGHT: 66 IN | HEART RATE: 52 BPM | RESPIRATION RATE: 23 BRPM | SYSTOLIC BLOOD PRESSURE: 133 MMHG | TEMPERATURE: 98.2 F | OXYGEN SATURATION: 97 % | WEIGHT: 171 LBS | BODY MASS INDEX: 27.48 KG/M2 | DIASTOLIC BLOOD PRESSURE: 80 MMHG

## 2020-08-24 LAB
CHOLESTEROL, TOTAL: 133 MG/DL (ref 160–199)
HCT VFR BLD CALC: 44 % (ref 37–47)
HDLC SERPL-MCNC: 43 MG/DL (ref 65–121)
HEMOGLOBIN: 14.4 G/DL (ref 12–16)
LDL CHOLESTEROL CALCULATED: 61 MG/DL
MCH RBC QN AUTO: 30.8 PG (ref 27–31)
MCHC RBC AUTO-ENTMCNC: 32.7 G/DL (ref 33–37)
MCV RBC AUTO: 94.2 FL (ref 81–99)
PDW BLD-RTO: 14 % (ref 11.5–14.5)
PLATELET # BLD: 246 K/UL (ref 130–400)
PMV BLD AUTO: 9.4 FL (ref 9.4–12.3)
RBC # BLD: 4.67 M/UL (ref 4.2–5.4)
TRIGL SERPL-MCNC: 145 MG/DL (ref 0–149)
WBC # BLD: 7.8 K/UL (ref 4.8–10.8)

## 2020-08-24 PROCEDURE — 6370000000 HC RX 637 (ALT 250 FOR IP): Performed by: INTERNAL MEDICINE

## 2020-08-24 PROCEDURE — 92928 PRQ TCAT PLMT NTRAC ST 1 LES: CPT

## 2020-08-24 PROCEDURE — 36415 COLL VENOUS BLD VENIPUNCTURE: CPT

## 2020-08-24 PROCEDURE — 99152 MOD SED SAME PHYS/QHP 5/>YRS: CPT | Performed by: INTERNAL MEDICINE

## 2020-08-24 PROCEDURE — 6360000002 HC RX W HCPCS

## 2020-08-24 PROCEDURE — 92928 PRQ TCAT PLMT NTRAC ST 1 LES: CPT | Performed by: INTERNAL MEDICINE

## 2020-08-24 PROCEDURE — 99153 MOD SED SAME PHYS/QHP EA: CPT

## 2020-08-24 PROCEDURE — C1725 CATH, TRANSLUMIN NON-LASER: HCPCS

## 2020-08-24 PROCEDURE — 2580000003 HC RX 258: Performed by: INTERNAL MEDICINE

## 2020-08-24 PROCEDURE — 6370000000 HC RX 637 (ALT 250 FOR IP)

## 2020-08-24 PROCEDURE — 2709999900 HC NON-CHARGEABLE SUPPLY

## 2020-08-24 PROCEDURE — C1874 STENT, COATED/COV W/DEL SYS: HCPCS

## 2020-08-24 PROCEDURE — C1887 CATHETER, GUIDING: HCPCS

## 2020-08-24 PROCEDURE — 80061 LIPID PANEL: CPT

## 2020-08-24 PROCEDURE — 93459 L HRT ART/GRFT ANGIO: CPT

## 2020-08-24 PROCEDURE — 93005 ELECTROCARDIOGRAM TRACING: CPT | Performed by: INTERNAL MEDICINE

## 2020-08-24 PROCEDURE — 93229 REMOTE 30 DAY ECG TECH SUPP: CPT

## 2020-08-24 PROCEDURE — 99152 MOD SED SAME PHYS/QHP 5/>YRS: CPT

## 2020-08-24 PROCEDURE — 85027 COMPLETE CBC AUTOMATED: CPT

## 2020-08-24 PROCEDURE — C1769 GUIDE WIRE: HCPCS

## 2020-08-24 PROCEDURE — C1894 INTRO/SHEATH, NON-LASER: HCPCS

## 2020-08-24 PROCEDURE — 93459 L HRT ART/GRFT ANGIO: CPT | Performed by: INTERNAL MEDICINE

## 2020-08-24 RX ORDER — SODIUM CHLORIDE 9 MG/ML
INJECTION, SOLUTION INTRAVENOUS CONTINUOUS
Status: DISCONTINUED | OUTPATIENT
Start: 2020-08-24 | End: 2020-08-24 | Stop reason: HOSPADM

## 2020-08-24 RX ORDER — ACETAMINOPHEN 325 MG/1
650 TABLET ORAL EVERY 4 HOURS PRN
Status: DISCONTINUED | OUTPATIENT
Start: 2020-08-24 | End: 2020-08-24 | Stop reason: HOSPADM

## 2020-08-24 RX ORDER — ATROPINE SULFATE 0.4 MG/ML
0.5 AMPUL (ML) INJECTION
Status: DISCONTINUED | OUTPATIENT
Start: 2020-08-24 | End: 2020-08-24 | Stop reason: HOSPADM

## 2020-08-24 RX ORDER — HYDROCODONE BITARTRATE AND ACETAMINOPHEN 5; 325 MG/1; MG/1
1 TABLET ORAL EVERY 4 HOURS PRN
Status: DISCONTINUED | OUTPATIENT
Start: 2020-08-24 | End: 2020-08-24 | Stop reason: HOSPADM

## 2020-08-24 RX ORDER — NITROGLYCERIN 0.4 MG/1
0.4 TABLET SUBLINGUAL EVERY 5 MIN PRN
Status: DISCONTINUED | OUTPATIENT
Start: 2020-08-24 | End: 2020-08-24 | Stop reason: HOSPADM

## 2020-08-24 RX ORDER — SODIUM CHLORIDE 0.9 % (FLUSH) 0.9 %
10 SYRINGE (ML) INJECTION PRN
Status: DISCONTINUED | OUTPATIENT
Start: 2020-08-24 | End: 2020-08-24 | Stop reason: HOSPADM

## 2020-08-24 RX ORDER — SODIUM CHLORIDE 9 MG/ML
INJECTION, SOLUTION INTRAVENOUS CONTINUOUS
Status: DISCONTINUED | OUTPATIENT
Start: 2020-08-24 | End: 2020-08-24

## 2020-08-24 RX ORDER — ASPIRIN 325 MG
325 TABLET ORAL ONCE
Status: DISCONTINUED | OUTPATIENT
Start: 2020-08-24 | End: 2020-08-24 | Stop reason: HOSPADM

## 2020-08-24 RX ORDER — FENTANYL CITRATE 50 UG/ML
25 INJECTION, SOLUTION INTRAMUSCULAR; INTRAVENOUS
Status: DISCONTINUED | OUTPATIENT
Start: 2020-08-24 | End: 2020-08-24 | Stop reason: HOSPADM

## 2020-08-24 RX ORDER — SODIUM CHLORIDE 0.9 % (FLUSH) 0.9 %
10 SYRINGE (ML) INJECTION EVERY 12 HOURS SCHEDULED
Status: DISCONTINUED | OUTPATIENT
Start: 2020-08-24 | End: 2020-08-24 | Stop reason: HOSPADM

## 2020-08-24 RX ORDER — CLOPIDOGREL BISULFATE 75 MG/1
75 TABLET ORAL DAILY
Qty: 90 TABLET | Refills: 3 | Status: SHIPPED | OUTPATIENT
Start: 2020-08-24 | End: 2020-08-27 | Stop reason: SDUPTHER

## 2020-08-24 RX ORDER — ACETAMINOPHEN 325 MG/1
325 TABLET ORAL EVERY 4 HOURS PRN
Status: DISCONTINUED | OUTPATIENT
Start: 2020-08-24 | End: 2020-08-24 | Stop reason: HOSPADM

## 2020-08-24 RX ORDER — HYDROCODONE BITARTRATE AND ACETAMINOPHEN 5; 325 MG/1; MG/1
2 TABLET ORAL EVERY 4 HOURS PRN
Status: DISCONTINUED | OUTPATIENT
Start: 2020-08-24 | End: 2020-08-24 | Stop reason: HOSPADM

## 2020-08-24 RX ORDER — ONDANSETRON 2 MG/ML
4 INJECTION INTRAMUSCULAR; INTRAVENOUS EVERY 6 HOURS PRN
Status: DISCONTINUED | OUTPATIENT
Start: 2020-08-24 | End: 2020-08-24 | Stop reason: HOSPADM

## 2020-08-24 RX ADMIN — SODIUM CHLORIDE: 9 INJECTION, SOLUTION INTRAVENOUS at 09:01

## 2020-08-24 RX ADMIN — ACETAMINOPHEN 650 MG: 325 TABLET, FILM COATED ORAL at 16:00

## 2020-08-24 ASSESSMENT — PAIN SCALES - GENERAL: PAINLEVEL_OUTOF10: 6

## 2020-08-24 NOTE — LETTER
[facility]  Cardiac [facility]hab Department  [address] [place]  [phone]  Toll Free [phone]          August 26, 2020    Dear Priscilla Martin,    Please find this informational packet that has been sent to you on heart disease and the guidelines you are to follow concerning your present cardiac condition and immediate recovery. Due to your recent cardiac diagnosis and intervention you now qualify for participation in a Phase II Outpatient Cardiac Rehab Program.  This elective service has been shown to significantly reduce cardiac mortality by 26-31% and increase longevity by as much as 5 years among patients such as yourself! A brochure has been included in this mailing for the purpose of providing you with a brief overview of program components. The [address] program is once again accepting patients on a 'first come, first served' basis in accordance with current COVID-19 guidelines. If you live locally you may take advantage of this opportunity by contacting us at [phone]. Should you live beyond a 25 mile radius of [facility], we recommend you contact the hospital nearest your residence to check on program availability and the opportunity to enroll at that site. In the meantime, feel free to reach out to us with questions or otherwise and our staff will be more than pleased to assist you. Thank you. To the betterment of your health,        [facility] Cardiac Rehab Staff    Estuardo Al, SEUN Abbott, BS, MA  Registered Nurse  Registered Nurse   Exercise Physiologist

## 2020-08-24 NOTE — H&P
1.  Coronary artery disease, prior multiple PCI's with bare-metal stents to LAD and circumflex, CABG 8/2011 with LIMA to LAD, SVG to diagonal, SVG to PDA, normal LV ejection fraction. 2.  Chronic ongoing tobacco use with COPD. 3.  Diabetes mellitus. 4.  Hypertension.     PRESENTATION: Priscilla Martin is a 78y.o. year old female presenting for follow-up evaluation. She reports exertional shortness of breath which has been fairly stable. Working in the garden and sometimes bending down would elicit shortness of breath. She worked as an OR nurse for 40 years. Her initial presentation for CABG was with chest discomfort and shortness of breath. She does not feel as bad since then. She smokes about 1 pack every 2 days.     REVIEW OF SYSTEMS:  Review of Systems   Constitutional: Negative for activity change, fatigue and fever. HENT: Negative for ear pain, hearing loss and tinnitus. Eyes: Negative for discharge and visual disturbance. Respiratory: Positive for shortness of breath. Negative for cough and wheezing. Cardiovascular: Negative for chest pain, palpitations and leg swelling. Gastrointestinal: Negative for abdominal distention, blood in stool, constipation, diarrhea and vomiting. Endocrine: Negative for cold intolerance, heat intolerance, polydipsia and polyuria. Genitourinary: Negative for dysuria and hematuria. Musculoskeletal: Negative for arthralgias, back pain and myalgias. Skin: Negative for pallor and rash. Neurological: Negative for seizures, syncope, weakness and headaches.    Psychiatric/Behavioral: Negative for behavioral problems and dysphoric mood.         Past Medical History:  Past Medical History             Diagnosis Date    ASHD (arteriosclerotic heart disease)       s/p PTCA and stent of circumflex, as well as intermediate and mid LAD     COPD (chronic obstructive pulmonary disease) ([facility]      Coronary atherosclerosis      Deep vein thrombosis (HCC)       hx of  Diabetes mellitus ([facility]       diet controlled    Hypercholesteremia      Hypertension      Unstable angina (HCC)             Past Surgical History:  Past Surgical History       Procedure Laterality Date    CARDIAC CATHETERIZATION   12/10/00     selective left heart and coronary arteriography with left ventriculography     CARDIAC CATHETERIZATION   01/23/98     left heart cath, left ventriculography, slective coronary arteriography, direct infarct angioplasty and stent placement to proximal left anterior descending coronary artery    CARDIAC CATHETERIZATION   03/05/94     left heart cath, selective coronary arteriography, left ventriculography    CARDIAC CATHETERIZATION   8/27/2011     [place]    CHOLECYSTECTOMY        CORONARY ANGIOPLASTY WITH STENT PLACEMENT   12/12/00     PTCA and stent placement to the mid LAD/ptca and stent placement first circumflex marginal (intermediate)     CORONARY ARTERY BYPASS GRAFT   8/29/2011     PACABG X 4 LIMA-LAD, SVG-DIAG, SVG-PDA, RT EVH, LT OPEN VEIN HARVEST, DR Vora Brochure    HYSTERECTOMY        PARATHYROIDECTOMY               Medications:  Current Facility-Administered Medications          Current Outpatient Medications   Medication Sig Dispense Refill    ramipril (ALTACE) 5 MG capsule Take 1 capsule by mouth 2 times daily 180 capsule 3    ramipril (ALTACE) 5 MG capsule Take 1 capsule by mouth daily 90 capsule 3    metoprolol succinate (TOPROL XL) 100 MG extended release tablet Take 1 tablet by mouth daily 90 tablet 3    rosuvastatin (CRESTOR) 10 MG tablet Take 1 tablet by mouth daily 90 tablet 3    cyanocobalamin 1000 MCG/ML injection Inject 1,000 mcg into the muscle every 30 days        metFORMIN (GLUCOPHAGE) 500 MG tablet Take 1 tablet by mouth 2 times daily (with meals) 180 tablet 3    nitroGLYCERIN (NITROSTAT) 0.4 MG SL tablet Place 1 tablet under the tongue every 5 minutes as needed for Chest pain 25 tablet 3    aspirin 81 MG EC tablet Take 81 mg by mouth daily.            No current facility-administered medications for this visit.            Allergies:  Codeine     Past Social History:  Social History               Socioeconomic History    Marital status:        Spouse name: Not on file    Number of children: Not on file    Years of education: Not on file    Highest education level: Not on file   Occupational History    Not on file   Social Needs    Financial resource strain: Not on file    Food insecurity:       Worry: Not on file       Inability: Not on file    Transportation needs:       Medical: Not on file       Non-medical: Not on file   Tobacco Use    Smoking status: Current Every Day Smoker       Packs/day: 0.25       Types: Cigarettes    Smokeless tobacco: Never Used   Substance and Sexual Activity    Alcohol use: No    Drug use: No    Sexual activity: Not on file   Lifestyle    Physical activity:       Days per week: Not on file       Minutes per session: Not on file    Stress: Not on file   Relationships    Social connections:       Talks on phone: Not on file       Gets together: Not on file       Attends Christianity service: Not on file       Active member of club or organization: Not on file       Attends meetings of clubs or organizations: Not on file       Relationship status: Not on file    Intimate partner violence:       Fear of current or ex partner: Not on file       Emotionally abused: Not on file       Physically abused: Not on file       Forced sexual activity: Not on file   Other Topics Concern    Not on file   Social History Narrative    Not on file           Family History:   Family History             Problem Relation Age of Onset    Cancer Mother      Coronary Art Dis Father                Physical Examination:  BP (!) 142/90   Pulse 88   Ht 5' 6\" (1.676 m)   Wt 173 lb (78.5 kg)   BMI 27.92 kg/m²   Physical Exam  Constitutional:       General: She is not in acute distress.      Appearance: She is not diaphoretic. Comments: Blood pressure right arm sitting 150/100 mmHg, pulse 96 bpm regular   HENT:      Mouth/Throat:      Pharynx: No oropharyngeal exudate. Eyes:      General: No scleral icterus. Right eye: No discharge. Left eye: No discharge. Neck:      Thyroid: No thyromegaly. Vascular: No JVD. Cardiovascular:      Rate and Rhythm: Normal rate and regular rhythm. No extrasystoles are present. Heart sounds: Normal heart sounds, S1 normal and S2 normal. No murmur. No systolic murmur. No diastolic murmur. No friction rub. No gallop. No S3 or S4 sounds. Pulmonary:      Effort: Pulmonary effort is normal. No respiratory distress. Breath sounds: Wheezing present. No rales. Comments: Bilateral decreased air entry with wheezing bilaterally  Chest:      Chest wall: No tenderness. Abdominal:      General: Bowel sounds are normal. There is no distension. Palpations: Abdomen is soft. There is no mass. Tenderness: There is no tenderness. There is no guarding or rebound. Hernia: No hernia is present. Musculoskeletal: Normal range of motion. Skin:     General: Skin is warm. Coloration: Skin is not pale. Findings: No rash. Neurological:      Mental Status: She is alert and oriented to person, place, and time. Cranial Nerves: No cranial nerve deficit. Deep Tendon Reflexes: Reflexes normal.             Labs:   CBC: No results for input(s): WBC, HGB, HCT, PLT in the last 72 hours. BMP:No results for input(s): NA, K, CO2, BUN, CREATININE, LABGLOM, GLUCOSE in the last 72 hours. BNP: No results for input(s): BNP in the last 72 hours. PT/INR: No results for input(s): PROTIME, INR in the last 72 hours. APTT:No results for input(s): APTT in the last 72 hours. CARDIAC ENZYMES:No results for input(s): CKTOTAL, CKMB, CKMBINDEX, TROPONINI in the last 72 hours.   FASTING LIPID PANEL:        Lab Results   Component Value Date     HDL 51 11/13/2017 heart rates were 73 and  118    respectively. Lexiscan/Cardiolyte Nuclear Medicine Report    Date of Procedure: 1/31/2020    The patient was injected with 33.8 millicuries (mCi) of Technetium    (Tc99m).  After an appropriate level of stress the patient was    re-injected with 31 millicuries (mCi) of Technetium (Tc99m).  Repeat    gated images were then performed per standard protocol. Findings:    1.  Analysis of the the stress and rest images reveals small apical    and anteroapical defect with mild reversibility. .    2.  Analysis of the gated images reveals grossly normal left    ventricular function with a calculated ejection fraction of 65 %.           Impression    Impression:    There is small area of apical and anteroapical infarct with mild    ischemia, with a calculated ejection fraction of 65 %. Suggest: Clinical Correlation and medical management if asymptomatic. Signed by Dr Charis Blizzard on 1/31/2020 4:42 PM      Patient reports increasing dyspnea on exertion with some development of pedal edema as well on her last office visit. Positive Lexiscan study for possible ischemia now being referred for cardiac catheterization. Risks, benefits, alternatives of cardiac catheterization/PCI discussed with the patient and full informed consent obtained.   Acceptable Mallampati score  Consent for moderate conscious sedation  ASA 3

## 2020-08-24 NOTE — FLOWSHEET NOTE
Pt has been urinating very frequently since returning from cath lab. Right groin is clear of bleeding or swelling. VS are noted and stable. IV patent to left wrist with NS infusing at 125 ml/hr. Pt has voided small amounts of urine every 10 minutes. Pt c/o being hungry also. Pt agreed to have a sylvester catheter inserted, which was done with sterile technique a # 16 Fr. Sylvester catheter with small amt of yellow urine return. Pt denies pain in abd, but feeling funny all afternoon. NSR per [facility]. Son in and out of room per bedpan use. Pt finally agreed to have a sandwich provided, but then did not eat. Continue monitoring patient.

## 2020-08-24 NOTE — FLOWSHEET NOTE
Pt assisted up to BR to void after sylvester catheter was removed from bladder with 350 ml yellow urine noted in bag. Pt sitting on commode attempting to empty her bladder.

## 2020-08-25 LAB
EKG P AXIS: 52 DEGREES
EKG P-R INTERVAL: 204 MS
EKG Q-T INTERVAL: 442 MS
EKG QRS DURATION: 94 MS
EKG QTC CALCULATION (BAZETT): 437 MS
EKG T AXIS: 121 DEGREES

## 2020-08-25 PROCEDURE — 93010 ELECTROCARDIOGRAM REPORT: CPT | Performed by: INTERNAL MEDICINE

## 2020-08-25 NOTE — FLOWSHEET NOTE
Pt given discharge instructions and a copy was given to her. Pt was reminded to hold her Glucophage, and to be started on Plavix which will arrive by mail order. Pt more comfortable, but still had to frequent the bathroom to void twice before leaving, and after the sylvester was d/c'd. Right groin clear of bleeding, and lower abd was not firm upon palpation, nor was the right groin site.

## 2020-08-26 NOTE — CONSULTS
Cardiac Rehab PTCA/Stent education packet was sent to the patient's address on record. Handouts included were titled; \"Home Instructions Following a Cardiac Event\", \"Cardiac Home Exercise Program - Phase I\", \"Risk Factors for Heart Disease and Stroke\" and \"Cardiac Diet/Low Cholesterol\". Patient was instructed to contact [facility] or the hospital nearest their residence for the opportunity to enroll in Phase II Outpatient Cardiac Rehab.

## 2020-08-27 RX ORDER — CLOPIDOGREL BISULFATE 75 MG/1
75 TABLET ORAL DAILY
Qty: 90 TABLET | Refills: 3 | Status: SHIPPED | OUTPATIENT
Start: 2020-08-27 | End: 2021-08-23 | Stop reason: SDUPTHER

## 2020-09-10 ENCOUNTER — OFFICE VISIT (OUTPATIENT)
Dept: VASCULAR SURGERY | Age: 70
End: 2020-09-10
Payer: MEDICARE

## 2020-09-10 VITALS
RESPIRATION RATE: 18 BRPM | HEART RATE: 96 BPM | OXYGEN SATURATION: 98 % | SYSTOLIC BLOOD PRESSURE: 138 MMHG | DIASTOLIC BLOOD PRESSURE: 86 MMHG

## 2020-09-10 PROBLEM — I71.40 AAA (ABDOMINAL AORTIC ANEURYSM) WITHOUT RUPTURE: Status: ACTIVE | Noted: 2020-09-10

## 2020-09-10 PROCEDURE — 99214 OFFICE O/P EST MOD 30 MIN: CPT | Performed by: NURSE PRACTITIONER

## 2020-09-10 PROCEDURE — 1123F ACP DISCUSS/DSCN MKR DOCD: CPT | Performed by: NURSE PRACTITIONER

## 2020-09-10 PROCEDURE — 1090F PRES/ABSN URINE INCON ASSESS: CPT | Performed by: NURSE PRACTITIONER

## 2020-09-10 PROCEDURE — 1036F TOBACCO NON-USER: CPT | Performed by: NURSE PRACTITIONER

## 2020-09-10 PROCEDURE — 3017F COLORECTAL CA SCREEN DOC REV: CPT | Performed by: NURSE PRACTITIONER

## 2020-09-10 PROCEDURE — 4040F PNEUMOC VAC/ADMIN/RCVD: CPT | Performed by: NURSE PRACTITIONER

## 2020-09-10 PROCEDURE — G8427 DOCREV CUR MEDS BY ELIG CLIN: HCPCS | Performed by: NURSE PRACTITIONER

## 2020-09-10 PROCEDURE — G8400 PT W/DXA NO RESULTS DOC: HCPCS | Performed by: NURSE PRACTITIONER

## 2020-09-10 PROCEDURE — G8417 CALC BMI ABV UP PARAM F/U: HCPCS | Performed by: NURSE PRACTITIONER

## 2020-09-10 NOTE — PROGRESS NOTES
Patient Care Team:  Colonel Suzie MD as PCP - General (Family Medicine)  Colonel Suzie MD as PCP - [facility]  Charis Blizzard, MD as Consulting Physician (Interventional Cardiology)      Subjective    She has a known history of abdominal aortic aneurysm for a few weeks. This was found during a heart cath. She has not had abdominal pain. She has not had back pain in the cervical spine, thoracic spine, lumbar spine and sacral spine region. This pain is unchanged since the last visit. Pain is rated as 0. Family history of AAA.     Rohan Olmstead is a 69 y.o. female with the following history [facility] and [facility]:  Patient Active Problem List    Diagnosis Date Noted    AAA (abdominal aortic aneurysm) without rupture ([facility] [address] 09/10/2020    History of diabetes mellitus 07/16/2020    Chronic obstructive pulmonary disease ([facility] [address] 07/16/2020    NUENZ (dyspnea on exertion) 07/16/2020    Mixed hyperlipidemia 12/19/2018    S/P CABG x 4 10/11/2016     8/29/11 EF 60%      History of coronary artery stent placement 10/11/2016    Coronary artery disease involving native coronary artery of native heart without angina pectoris 10/11/2016    Hypercholesteremia 10/10/2011    ASHD (arteriosclerotic heart disease)     Deep vein thrombosis (HCC)     Essential hypertension     Diabetes mellitus (HCC)      diet controlled       Current Outpatient Medications   Medication Sig Dispense Refill    clopidogrel (PLAVIX) 75 MG tablet Take 1 tablet by mouth daily 90 tablet 3    metFORMIN (GLUCOPHAGE) 500 MG tablet Take 1 tablet by mouth 2 times daily (with meals) Hold for 2 days and restart a 26th 2020 180 tablet 3    ramipril (ALTACE) 5 MG capsule TAKE 1 CAPSULE DAILY 90 capsule 3    hydroCHLOROthiazide (HYDRODIURIL) 25 MG tablet 1/2-1 tab daily as needed for swelling 90 tablet 1    metoprolol succinate (TOPROL XL) 100 MG extended release tablet Take 1 tablet by mouth daily 90 tablet 3    ramipril (ALTACE) 5 MG capsule Take 1 capsule by mouth daily 90 capsule 3    rosuvastatin (CRESTOR) 10 MG tablet Take 1 tablet by mouth daily 90 tablet 3    cyanocobalamin 1000 MCG/ML injection Inject 1,000 mcg into the muscle every 30 days      nitroGLYCERIN (NITROSTAT) 0.4 MG SL tablet Place 1 tablet under the tongue every 5 minutes as needed for Chest pain 25 tablet 3    aspirin 81 MG EC tablet Take 81 mg by mouth daily. No current facility-administered medications for this visit.       Allergies: Codeine  Past Medical History:   Diagnosis Date    ASHD (arteriosclerotic heart disease)     s/p PTCA and stent of circumflex, as well as intermediate and mid LAD     COPD (chronic obstructive pulmonary disease) (HCC)     Coronary atherosclerosis     Deep vein thrombosis (HCC)     hx of     Diabetes mellitus ([facility] [address]     diet controlled    Hypercholesteremia     Hypertension     Unstable angina ([facility])      Past Surgical History:   Procedure Laterality Date    CARDIAC CATHETERIZATION  12/10/00    selective left heart and coronary arteriography with left ventriculography     CARDIAC CATHETERIZATION  01/23/98    left heart cath, left ventriculography, slective coronary arteriography, direct infarct angioplasty and stent placement to proximal left anterior descending coronary artery    CARDIAC CATHETERIZATION  03/05/94    left heart cath, selective coronary arteriography, left ventriculography    CARDIAC CATHETERIZATION  8/27/2011    [place]    CHOLECYSTECTOMY      CORONARY ANGIOPLASTY WITH STENT PLACEMENT  12/12/00    PTCA and stent placement to the mid LAD/ptca and stent placement first circumflex marginal (intermediate)     CORONARY ARTERY BYPASS GRAFT  8/29/2011    PACABG X 4 LIMA-LAD, SVG-DIAG, SVG-PDA, RT EVH, LT OPEN VEIN HARVEST, DR Don Miguel    HYSTERECTOMY      PARATHYROIDECTOMY       Family History   Problem Relation Age of Onset    Cancer Mother     Coronary Art Dis Father      Social History     Tobacco Use    Smoking status: Former Smoker     Packs/day: 1.00     Types: Cigarettes     Last attempt to quit: 2020     Years since quittin.0    Smokeless tobacco: Never Used    Tobacco comment: Trying to quit   Substance Use Topics    Alcohol use: No         Old records have been obtained from the referring providers. These records have been reviewed and summarized. Review of Systems    Constitutional - no significant activity change, appetite change, or unexpected weight change. No fever or chills. No diaphoresis or significant fatigue. HENT - no significant rhinorrhea or epistaxis. No tinnitus or significant hearing loss. Eyes - no sudden vision change or amaurosis. Respiratory - no significant shortness of breath, wheezing, or stridor. No apnea, cough, or chest tightness associated with shortness of breath. Cardiovascular - no chest pain, syncope, or significant dizziness. No palpitations or significant leg swelling. No claudication. Gastrointestinal -  has not had abdominal swelling or pain. No blood in stool. No severe constipation, diarrhea, nausea, or vomiting. Genitourinary - No difficulty urinating, dysuria, frequency, or urgency. No flank pain or hematuria. Musculoskeletal - has not had back pain, gait disturbance, or myalgia. Skin - no color change, rash, pallor, or new wound. Neurologic - no dizziness, facial asymmetry, or light headedness. No seizures. No speech difficulty or lateralizing weakness. Hematologic - no easy bruising or excessive bleeding. Psychiatric - no severe anxiety or nervousness. No confusion. All other review of systems are negative. Physical Exam    /86 (Site: Left Upper Arm, Position: Sitting, Cuff Size: Medium Adult)   Pulse 96   Resp 18   SpO2 98%     Constitutional - well developed, well nourished. No diaphoresis or acute distress. HENT - head normocephalic.   Right external ear canal appears normal.  Left external ear canal appears normal.  Septum appears midline. Eyes - conjunctiva normal.  EOMS normal.  No exudate. No icterus. Neck- ROM appears normal, no tracheal deviation. Cardiovascular - Regular rate and rhythm. Heart sounds are normal.  No murmur, rub, or gallop. Carotid pulses are 2+ to palpation bilaterally without bruit. Extremities - Radial and brachial pulses are 2+ to palpation bilaterally. Right femoral pulse: present 2+; Right popliteal pulse: absent Right DP: absent; Right PT absent; Left femoral pulse: present 2+; Left popliteal pulse: absent; Left DP: absent; Left PT: absent No cyanosis, clubbing, or significant edema. No signs atheroembolic event. Pulmonary - effort appears normal.  No respiratory distress. Lungs - auditory wheeze    GI - Abdomen - soft, non tender, bowel sounds X 4 quadrants. No guarding or rebound tenderness. No distension or palpable mass. Genitourinary - deferred. Musculoskeletal - ROM appears normal.  No significant edema. Neurologic - alert and oriented X 3. Physiologic. Skin - warm, dry, and intact. No rash, erythema, or pallor. Psychiatric - mood, affect, and behavior appear normal.  Judgment and thought processes appear normal.    Risk factors for aneurysmal disease including tobacco abuse, hyperlipidemia, male gender, age >57, emphysema, obesity, HTN, atherosclerosis, family history, and diabetes mellitus were discussed with the patient. Cardiac cath revealed large AAA  Individual films reviewed: Yes. These results were reviewed with the patient. This is a new diagnosis             Assessment    1. AAA (abdominal aortic aneurysm) without rupture ([address]          Plan      Needs cta aorta -   1. Fusiform infrarenal abdominal aortic aneurysm measures 4.3 x 4.7 cm    for a length of 13 cm. Significant noncalcified atherosclerotic    plaque. 2. 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 Other    vascular findings as above.     3. Extensive coronary artery densities may represent calcified    atherosclerosis with or without stent. 4. Thickening of the bilateral adrenal glands suggests adrenal    hyperplasia. No discrete nodule. 5. Extensive colonic diverticuli. No evidence of acute diverticulitis   Individual images were reviewed by myself and Dr. Severa Landry. Results were reviewed with the patient. Options have been discussed with the patient including continued medical management, EVAR, open repair. Neck is a bit large. Will need 3D uptake on images to see if there is a landing zone. Patient has opted to proceed with continued medical management. Risks of surgery have been reviewed with the patient including but not limited to MI, death, CVA, bleeding, nerve injury, infection, paralysis, organ failure, impotency, ostomy, pain, extended recovery time, and need for further surgery. Will send films to [employer] rep. She has a large neck. We want to get there thoughts on this  Addendum - unfortunately this cannot be repaired here. We will refer to Dr. Noy Figueroa in [facility] for possible fenestrated graft  Strongly encouraged start/continue statin therapy  Recommended no smoking  Symptoms of rupture reviewed with the patient including sudden onset severe back pain or abdominal pain. This pain can sometimes radiate into the groin or leg. The patient may experience a feeling of impending doom or death. If this occurs they have been insturcted to call 911 and get to the emergency room telling them you have an aneurysm. Patient has voiced understanding.

## 2020-09-11 ENCOUNTER — HOSPITAL ENCOUNTER (OUTPATIENT)
Dept: CT IMAGING | Age: 70
Discharge: HOME | End: 2020-09-11
Payer: MEDICARE

## 2020-09-11 LAB
GFR AFRICAN AMERICAN: 49
GFR NON-AFRICAN AMERICAN: 40
PERFORMED ON: ABNORMAL
POC CREATININE: 1.3 MG/DL (ref 0.3–1.3)
POC SAMPLE TYPE: ABNORMAL

## 2020-09-11 PROCEDURE — 6360000004 HC RX CONTRAST MEDICATION: Performed by: NURSE PRACTITIONER

## 2020-09-11 PROCEDURE — 74174 CTA ABD&PLVS W/CONTRAST: CPT

## 2020-09-11 PROCEDURE — 82565 ASSAY OF CREATININE: CPT

## 2020-09-11 RX ADMIN — IOPAMIDOL 75 ML: 755 INJECTION, SOLUTION INTRAVENOUS at 14:04

## 2020-09-23 ENCOUNTER — TELEPHONE (OUTPATIENT)
Dept: VASCULAR SURGERY | Age: 70
End: 2020-09-23

## 2020-09-23 ENCOUNTER — OFFICE VISIT (OUTPATIENT)
Dept: CARDIOLOGY | Age: 70
End: 2020-09-23
Payer: MEDICARE

## 2020-09-23 VITALS
DIASTOLIC BLOOD PRESSURE: 70 MMHG | WEIGHT: 176 LBS | SYSTOLIC BLOOD PRESSURE: 132 MMHG | BODY MASS INDEX: 29.32 KG/M2 | OXYGEN SATURATION: 96 % | HEIGHT: 65 IN | HEART RATE: 84 BPM

## 2020-09-23 PROCEDURE — 1123F ACP DISCUSS/DSCN MKR DOCD: CPT | Performed by: NURSE PRACTITIONER

## 2020-09-23 PROCEDURE — G8400 PT W/DXA NO RESULTS DOC: HCPCS | Performed by: NURSE PRACTITIONER

## 2020-09-23 PROCEDURE — 4040F PNEUMOC VAC/ADMIN/RCVD: CPT | Performed by: NURSE PRACTITIONER

## 2020-09-23 PROCEDURE — 1036F TOBACCO NON-USER: CPT | Performed by: NURSE PRACTITIONER

## 2020-09-23 PROCEDURE — G8417 CALC BMI ABV UP PARAM F/U: HCPCS | Performed by: NURSE PRACTITIONER

## 2020-09-23 PROCEDURE — G8427 DOCREV CUR MEDS BY ELIG CLIN: HCPCS | Performed by: NURSE PRACTITIONER

## 2020-09-23 PROCEDURE — 3017F COLORECTAL CA SCREEN DOC REV: CPT | Performed by: NURSE PRACTITIONER

## 2020-09-23 PROCEDURE — 1090F PRES/ABSN URINE INCON ASSESS: CPT | Performed by: NURSE PRACTITIONER

## 2020-09-23 PROCEDURE — 99214 OFFICE O/P EST MOD 30 MIN: CPT | Performed by: NURSE PRACTITIONER

## 2020-09-23 RX ORDER — METOPROLOL SUCCINATE 100 MG/1
100 TABLET, EXTENDED RELEASE ORAL DAILY
Qty: 90 TABLET | Refills: 3 | Status: SHIPPED | OUTPATIENT
Start: 2020-09-23 | End: 2021-04-14 | Stop reason: DRUGHIGH

## 2020-09-23 RX ORDER — RAMIPRIL 5 MG/1
5 CAPSULE ORAL DAILY
Qty: 90 CAPSULE | Refills: 3 | Status: ON HOLD | OUTPATIENT
Start: 2020-09-23 | End: 2021-03-03 | Stop reason: SDUPTHER

## 2020-09-23 RX ORDER — ROSUVASTATIN CALCIUM 10 MG/1
10 TABLET, COATED ORAL DAILY
Qty: 90 TABLET | Refills: 3 | Status: SHIPPED | OUTPATIENT
Start: 2020-09-23 | End: 2021-08-23 | Stop reason: SDUPTHER

## 2020-09-23 NOTE — PATIENT INSTRUCTIONS
New instructions for today:  You will need to stay on Plavix and aspirin for one year after stent placed - through 8/24/21. After that date, you can stop Plavix and continue low dose enteric coated aspirin once daily. Do not schedule elective surgical procedures or invasive until after 8/24/21    How to take:  NITROGLYCERIN (Nitrostat) 0.4 mg tablets, sublingual.  Nitroglycerin is in a group of drugs called nitrates. Nitroglycerin dilates (widens) blood vessels, making it easier for blood to flow through them and easier for the heart to pump. Dosing Guidelines for Nitroglycerin Tablets  · At the start of an angina (chest pain) attack, place one tablet under the tongue or between the cheek and gum. Do not swallow or chew the tablet; let it dissolve on its own. If necessary, a second and third tablet may be used, with five minutes between using each tablet. If you use a third tablet and your chest pain continues, it is time to seek immediate medical attention. Call 911 immediately and have someone drive you to the emergency room. You may be having a heart attack or other serious heart problem. · To prevent angina from exercise or stress, use 1 tablet 5 to 10 minutes before the activity. Patient Instructions:  Continue current medications as prescribed. Always keep a current medication list. Bring your medications to every office visit. Continue to follow up with primary care provider for non cardiac medical problems. Call the office with any problems, questions or concerns at [phone]. If you have been asked to keep a blood pressure log, do so for 2 weeks. Call the office to report readings to the triage nurse at [phone]. Follow up with cardiologist as scheduled. The following educational material has been included in this after visit summary for your review: Life simple 7. Heart health.      Life simple 7  1) Manage blood pressure - high blood pressure is a major risk factor for heart disease and stroke. Keeping blood pressure in health range reduces strain on your heart, arteries and kidneys. Blood pressure goal is less than 130/80. 2) Control cholesterol - contributes to plaque, which can clog arteries and lead to heart disease and stroke. When you control your cholesterol you are giving your arteries their best chance to remain clear. It is recommended that you get cholesterol lab work done once a year. 3) Reduce blood sugar - most of the food we eat is turning into glucose or blood sugar that our body uses for energy. Over time, high levels of blood sugar can damage your heart, kidneys, eyes and nerves. 4) Get active - living an active life is one of the most rewarding gifts you can give yourself and those you love. Simply put, daily physical activity increases your length and quality of life. Strive to exercise 15 minutes most days of the week. 5)  Eat better - A healthy diet is one of your best weapons for fighting cardiovascular disease. When you eat a heart healthy diet, you improve your chances for feeling good and staying healthy for life. 6)  Lose weight - when you shed extra fat an unnecessary pounds, you reduce the burden on your hear, lungs, blood vessels and skeleton. You give yourself the gift of active living, you lower your blood pressure and help yourself feel better. 7) Stop smoking - cigarette smokers have a higher risk of developing cardiovascular disease. If  You smoke, quitting is the best thing you can do for your health. Check American Heart Association on line for more information on Life's Simple 7 and tips for healthy living. A Healthy Heart: Care Instructions  Your Care Instructions     Coronary artery disease, also called heart disease, occurs when a substance called plaque builds up in the vessels that supply oxygen-rich blood to your heart muscle. This can narrow the blood vessels and reduce blood flow.  A heart attack happens when blood flow is completely blocked. A high-fat diet, smoking, and other factors increase the risk of heart disease. Your doctor has found that you have a chance of having heart disease. You can do lots of things to keep your heart healthy. It may not be easy, but you can change your diet, exercise more, and quit smoking. These steps really work to lower your chance of heart disease. Follow-up care is a key part of your treatment and safety. Be sure to make and go to all appointments, and call your doctor if you are having problems. It's also a good idea to know your test results and keep a list of the medicines you take. How can you care for yourself at home? Diet  · Use less salt when you cook and eat. This helps lower your blood pressure. Taste food before salting. Add only a little salt when you think you need it. With time, your taste buds will adjust to less salt. · Eat fewer snack items, fast foods, canned soups, and other high-salt, high-fat, processed foods. · Read food labels and try to avoid saturated and trans fats. They increase your risk of heart disease by raising cholesterol levels. · Limit the amount of solid fat-butter, margarine, and shortening-you eat. Use olive, peanut, or canola oil when you cook. Bake, broil, and steam foods instead of frying them. · Eat a variety of fruit and vegetables every day. Dark green, deep orange, red, or yellow fruits and vegetables are especially good for you. Examples include spinach, carrots, peaches, and berries. · Foods high in fiber can reduce your cholesterol and provide important vitamins and minerals. High-fiber foods include whole-grain cereals and breads, oatmeal, beans, brown rice, citrus fruits, and apples. · Eat lean proteins. Heart-healthy proteins include seafood, lean meats and poultry, eggs, beans, peas, nuts, seeds, and soy products. · Limit drinks and foods with added sugar. These include candy, desserts, and soda pop.   Lifestyle changes  · If your doctor recommends it, get more exercise. Walking is a good choice. Bit by bit, increase the amount you walk every day. Try for at least 30 minutes on most days of the week. You also may want to swim, bike, or do other activities. · Do not smoke. If you need help quitting, talk to your doctor about stop-smoking programs and medicines. These can increase your chances of quitting for good. Quitting smoking may be the most important step you can take to protect your heart. It is never too late to quit. · Limit alcohol to 2 drinks a day for men and 1 drink a day for women. Too much alcohol can cause health problems. · Manage other health problems such as diabetes, high blood pressure, and high cholesterol. If you think you may have a problem with alcohol or drug use, talk to your doctor. Medicines  · Take your medicines exactly as prescribed. Call your doctor if you think you are having a problem with your medicine. · If your doctor recommends aspirin, take the amount directed each day. Make sure you take aspirin and not another kind of pain reliever, such as acetaminophen (Tylenol). When should you call for help? [id number] if you have symptoms of a heart attack. These may include:  · Chest pain or pressure, or a strange feeling in the chest.  · Sweating. · Shortness of breath. · Pain, pressure, or a strange feeling in the back, neck, jaw, or upper belly or in one or both shoulders or arms. · Lightheadedness or sudden weakness. · A fast or irregular heartbeat. After you call 911, the  may tell you to chew 1 adult-strength or 2 to 4 low-dose aspirin. Wait for an ambulance. Do not try to drive yourself. Watch closely for changes in your health, and be sure to contact your doctor if you have any problems. Where can you learn more? Go to https://chvieb.[employer]. org and sign in to your [place] account.  Enter B486 in the [employer] box to learn more about \"A Healthy Heart: Care Instructions. \"     If you do not have an account, please click on the \"Sign Up Now\" link. Current as of: December 16, 2019               Content Version: 12.5  © 3913-7166 [employer], [employer]. Care instructions adapted under license by [facility] ([facility]). If you have questions about a medical condition or this instruction, always ask your healthcare professional. [address] any warranty or liability for your use of this information.

## 2020-09-23 NOTE — PROGRESS NOTES
[facility], [place]. [address], [facility] [address] [facility]  [phone] office  [phone] fax      OFFICE VISIT:  2020    Bryan Hernandez - : 1950  Reason For Visit:  Chris Hope is a 69 y.o. female who is here for Follow-Up from Hospital (Post heart cath with stents); Coronary Artery Disease; and Hypertension    History:   Diagnosis Orders   1. Coronary artery disease involving native coronary artery of native heart without angina pectoris     2. S/P CABG x 4     3. Essential hypertension     4. Mixed hyperlipidemia  rosuvastatin (CRESTOR) 10 MG tablet   5. History of coronary artery stent placement      20 Successful PCI to proximal to mid LAD (4.0 x 15 mm resolute integrity)   utilizing GABI and successful PCI to mid LAD (2.75 x 26 mm resolute) -Dr. Narcisa Muhammad   6. Abdominal aortic aneurysm (AAA) without rupture [facility])       The patient presents today for cardiology follow up after cath on 20 with GABI placed to proximal to mid and mid LAD by Dr. Narcisa Muhammad. A large calcified abdominal aortic aneurysm with large mural thrombus was also identified. The patient has been referred to vascular surgeon, Dr. Franco Cardenas, in [place]. Patient reports feeling amazingly better after stents. The patient denies symptoms to suggest myocardial ischemia, heart failure or arrhythmia. BP is well controlled on current regimen. The patient's PCP monitors cholesterol. Jeff Maciel denies exertional chest pain, shortness of breath, orthopnea, paroxysmal nocturnal dyspnea, syncope, presyncope, sensed arrhythmia, edema and fatigue. The patient denies numbness or weakness to suggest cerebrovascular accident or transient ischemic attack.       Bryan Hernandez [facility] the [facility]:  Patient Active Problem List   Diagnosis Code    ASHD (arteriosclerotic heart disease) I25.10    Deep vein thrombosis ([facility] [address] I82.409    Essential hypertension I10    Diabetes mellitus ([facility] [address] E11.9    Hypercholesteremia E78.00    S/P CABG x 4 Z95.1    History of coronary artery stent placement Z95.5    Coronary artery disease involving native coronary artery of native heart without angina pectoris I25.10    Mixed hyperlipidemia E78.2    History of diabetes mellitus Z86.39    Chronic obstructive pulmonary disease (HCC) J44.9    NUNEZ (dyspnea on exertion) R06.09    AAA (abdominal aortic aneurysm) without rupture ([facility]) I71.4     Past Medical History:   Diagnosis Date    ASHD (arteriosclerotic heart disease)     s/p PTCA and stent of circumflex, as well as intermediate and mid LAD     COPD (chronic obstructive pulmonary disease) ([facility])     Coronary atherosclerosis     Deep vein thrombosis ([facility])     hx of     Diabetes mellitus ([facility] [address]     diet controlled    Hypercholesteremia     Hypertension     Unstable angina ([facility])      Past Surgical History:   Procedure Laterality Date    CARDIAC CATHETERIZATION  12/10/00    selective left heart and coronary arteriography with left ventriculography     CARDIAC CATHETERIZATION  01/23/98    left heart cath, left ventriculography, slective coronary arteriography, direct infarct angioplasty and stent placement to proximal left anterior descending coronary artery    CARDIAC CATHETERIZATION  03/05/94    left heart cath, selective coronary arteriography, left ventriculography    CARDIAC CATHETERIZATION  8/27/2011    [place]    CHOLECYSTECTOMY      CORONARY ANGIOPLASTY WITH STENT PLACEMENT  12/12/00    PTCA and stent placement to the mid LAD/ptca and stent placement first circumflex marginal (intermediate)     CORONARY ARTERY BYPASS GRAFT  8/29/2011    PACABG X 4 LIMA-LAD, SVG-DIAG, SVG-PDA, RT EVH, LT OPEN VEIN HARVEST, DR [employer]    HYSTERECTOMY      PARATHYROIDECTOMY       Family History   Problem Relation Age of Onset    Cancer Mother     Coronary Art Dis Father      Social History     Tobacco Use    Smoking status: Former Smoker     Packs/day: 1.00     Types: Cigarettes     Last attempt to quit: 2020     Years since quittin.0    Smokeless tobacco: Never Used    Tobacco comment: Trying to quit   Substance Use Topics    Alcohol use: No      Current Outpatient Medications   Medication Sig Dispense Refill    clopidogrel (PLAVIX) 75 MG tablet Take 1 tablet by mouth daily 90 tablet 3    metFORMIN (GLUCOPHAGE) 500 MG tablet Take 1 tablet by mouth 2 times daily (with meals) Hold for 2 days and restart a 2020 180 tablet 3    ramipril (ALTACE) 5 MG capsule TAKE 1 CAPSULE DAILY 90 capsule 3    hydroCHLOROthiazide (HYDRODIURIL) 25 MG tablet 1/2-1 tab daily as needed for swelling 90 tablet 1    metoprolol succinate (TOPROL XL) 100 MG extended release tablet Take 1 tablet by mouth daily 90 tablet 3    ramipril (ALTACE) 5 MG capsule Take 1 capsule by mouth daily 90 capsule 3    rosuvastatin (CRESTOR) 10 MG tablet Take 1 tablet by mouth daily 90 tablet 3    cyanocobalamin 1000 MCG/ML injection Inject 1,000 mcg into the muscle every 30 days      nitroGLYCERIN (NITROSTAT) 0.4 MG SL tablet Place 1 tablet under the tongue every 5 minutes as needed for Chest pain 25 tablet 3    aspirin 81 MG EC tablet Take 81 mg by mouth daily. No current facility-administered medications for this visit. Allergies: Codeine    Review of Systems  Constitutional - no appetite change, or unexpected weight change. No fever, chills or diaphoresis. No significant change in activity level or new onset of fatigue. HEENT - no significant rhinorrhea or epistaxis. No tinnitus or significant hearing loss. Eyes - no sudden vision change or amaurosis. No corneal arcus, xantholasma, subconjunctival hemorrhage or discharge. Respiratory - no significant wheezing, stridor, apnea or cough. No dyspnea on exertion or shortness of air. Cardiovascular - no exertional chest pain to suggest myocardial ischemia.   No or shuffle. Ambulates without assistance. Skin -  Warm and dry; no rash or pallor. No new surgical wound. Neurological - No focal neurological deficits. Thought processes coherent. No apparent tremor. Oriented to person, place and time. Psychiatric -  Appropriate affect and mood. Assessment:     Diagnosis Orders   1. Coronary artery disease involving native coronary artery of native heart without angina pectoris     2. S/P CABG x 4     3. Essential hypertension     4. Mixed hyperlipidemia  rosuvastatin (CRESTOR) 10 MG tablet   5. History of coronary artery stent placement      8/24/20 Successful PCI to proximal to mid LAD (4.0 x 15 mm resolute integrity)   utilizing GABI and successful PCI to mid LAD (2.75 x 26 mm resolute) -Dr. Ron Isabel   6. Abdominal aortic aneurysm (AAA) without rupture ([facility] [address]       Data reviewed:  9/11/20 CTA abdomen and pelvis  Impression    1. Fusiform infrarenal abdominal aortic aneurysm measures 4.3 x 4.7 cm    for a length of 13 cm. Significant noncalcified atherosclerotic    plaque. 2. 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 Other    vascular findings as above. 3. Extensive coronary artery densities may represent calcified    atherosclerosis with or without stent. 4. Thickening of the bilateral adrenal glands suggests adrenal    hyperplasia. No discrete nodule. 5. Extensive colonic diverticuli. No evidence of acute diverticulitis. Signed by Dr Zulma Sung on 9/11/2020 2:35 PM      8/25/20 cath   Triple-vessel disease with severe vessel ectasia in RCA, stenotic disease  in LAD. Severely stenotic stent in mid LAD. Occluded LIMA to mid LAD. Patent SVG to 1st diagonal.   Patent SVG to OM1. Patent SVG to RPDA. Normal LV ejection fraction. Large calcified abdominal aortic aneurysm with large mural thrombus. Successful PCI to proximal to mid LAD (4.0 x 15 mm resolute integrity)  utilizing drug-eluting stent.    Successful PCI to mid with medication regimen. BP Readings from Last 3 Encounters:   09/23/20 132/70   09/10/20 138/86   08/24/20 133/80    Pulse Readings from Last 3 Encounters:   09/23/20 84   09/10/20 96   08/24/20 52        Wt Readings from Last 3 Encounters:   09/23/20 176 lb (79.8 kg)   08/24/20 171 lb (77.6 kg)   07/16/20 179 lb 12.8 oz (81.6 kg)     Plan  Previous cardiac history and records reviewed. Continue current medical management. Continue other current medications as directed. Continue to follow up with primary care provider for non cardiac medical problems. Call the office with any problems, questions or concerns at [phone]. Cardiology follow up: 2 months. Educational included in patient instructions. Heart health. NTG sl.      JAKOB Acosta

## 2020-09-23 NOTE — TELEPHONE ENCOUNTER
Left voicemail for Sofiya with Dr Sharlene Cockayne to call me back to with a fax number so we can refer a patient.

## 2020-09-24 NOTE — TELEPHONE ENCOUNTER
Left another voicemail for Sofiya to call back at earliest convenience.  We are needing a fax number to send a referral

## 2020-09-28 ENCOUNTER — TELEPHONE (OUTPATIENT)
Dept: VASCULAR SURGERY | Age: 70
End: 2020-09-28

## 2020-09-28 NOTE — TELEPHONE ENCOUNTER
Spoke with Dr. Caryl Meza nurse. Fax number is [phone]. I have faxed records, demographics, note, and cta with the referral. Spoke with patient to give the address and appointment date which is next Tuesday October 6, 2020 at 1:30PM. Patient will come  disc from [facility] and take it with her to this appointment.  Pt voiced understanding and agreed

## 2020-11-03 PROBLEM — I25.10 ASHD (ARTERIOSCLEROTIC HEART DISEASE): Status: RESOLVED | Noted: 2020-11-03 | Resolved: 2020-11-03

## 2020-12-10 ENCOUNTER — OFFICE VISIT (OUTPATIENT)
Dept: CARDIOLOGY | Age: 70
End: 2020-12-10
Payer: MEDICARE

## 2020-12-10 VITALS
HEIGHT: 67 IN | BODY MASS INDEX: 27.78 KG/M2 | WEIGHT: 177 LBS | SYSTOLIC BLOOD PRESSURE: 132 MMHG | DIASTOLIC BLOOD PRESSURE: 72 MMHG | HEART RATE: 86 BPM

## 2020-12-10 PROCEDURE — G8484 FLU IMMUNIZE NO ADMIN: HCPCS | Performed by: NURSE PRACTITIONER

## 2020-12-10 PROCEDURE — G8427 DOCREV CUR MEDS BY ELIG CLIN: HCPCS | Performed by: NURSE PRACTITIONER

## 2020-12-10 PROCEDURE — 99214 OFFICE O/P EST MOD 30 MIN: CPT | Performed by: NURSE PRACTITIONER

## 2020-12-10 PROCEDURE — 1090F PRES/ABSN URINE INCON ASSESS: CPT | Performed by: NURSE PRACTITIONER

## 2020-12-10 PROCEDURE — 3017F COLORECTAL CA SCREEN DOC REV: CPT | Performed by: NURSE PRACTITIONER

## 2020-12-10 PROCEDURE — G8417 CALC BMI ABV UP PARAM F/U: HCPCS | Performed by: NURSE PRACTITIONER

## 2020-12-10 PROCEDURE — 1036F TOBACCO NON-USER: CPT | Performed by: NURSE PRACTITIONER

## 2020-12-10 PROCEDURE — 4040F PNEUMOC VAC/ADMIN/RCVD: CPT | Performed by: NURSE PRACTITIONER

## 2020-12-10 PROCEDURE — 1123F ACP DISCUSS/DSCN MKR DOCD: CPT | Performed by: NURSE PRACTITIONER

## 2020-12-10 PROCEDURE — 3023F SPIROM DOC REV: CPT | Performed by: NURSE PRACTITIONER

## 2020-12-10 PROCEDURE — G8926 SPIRO NO PERF OR DOC: HCPCS | Performed by: NURSE PRACTITIONER

## 2020-12-10 PROCEDURE — G8400 PT W/DXA NO RESULTS DOC: HCPCS | Performed by: NURSE PRACTITIONER

## 2020-12-10 NOTE — PATIENT INSTRUCTIONS
New instructions for today:  You will need to stay on Plavix and aspirin for one year after stent placed - through 8/24/21. After that date, you can stop Plavix and continue low dose enteric coated aspirin once daily. Do not schedule elective surgical procedures or invasive until after 8/24/21. Patient Instructions:  Continue current medications as prescribed. Always keep a current medication list. Bring your medications to every office visit. Continue to follow up with primary care provider for non cardiac medical problems. Call the office with any problems, questions or concerns at [phone]. If you have been asked to keep a blood pressure log, do so for 2 weeks. Call the office to report readings to the triage nurse at [phone]. Follow up with cardiologist as scheduled. The following educational material has been included in this after visit summary for your review: Life simple 7. Heart health. Life simple 7  1) Manage blood pressure - high blood pressure is a major risk factor for heart disease and stroke. Keeping blood pressure in health range reduces strain on your heart, arteries and kidneys. Blood pressure goal is less than 130/80. 2) Control cholesterol - contributes to plaque, which can clog arteries and lead to heart disease and stroke. When you control your cholesterol you are giving your arteries their best chance to remain clear. It is recommended that you get cholesterol lab work done once a year. 3) Reduce blood sugar - most of the food we eat is turning into glucose or blood sugar that our body uses for energy. Over time, high levels of blood sugar can damage your heart, kidneys, eyes and nerves. 4) Get active - living an active life is one of the most rewarding gifts you can give yourself and those you love. Simply put, daily physical activity increases your length and quality of life. Strive to exercise 15 minutes most days of the week.   5)  Eat better - A healthy diet is one of your best weapons for fighting cardiovascular disease. When you eat a heart healthy diet, you improve your chances for feeling good and staying healthy for life. 6)  Lose weight - when you shed extra fat an unnecessary pounds, you reduce the burden on your hear, lungs, blood vessels and skeleton. You give yourself the gift of active living, you lower your blood pressure and help yourself feel better. 7) Stop smoking - cigarette smokers have a higher risk of developing cardiovascular disease. If  You smoke, quitting is the best thing you can do for your health. Check American Heart Association on line for more information on Life's Simple 7 and tips for healthy living. A Healthy Heart: Care Instructions  Your Care Instructions     Coronary artery disease, also called heart disease, occurs when a substance called plaque builds up in the vessels that supply oxygen-rich blood to your heart muscle. This can narrow the blood vessels and reduce blood flow. A heart attack happens when blood flow is completely blocked. A high-fat diet, smoking, and other factors increase the risk of heart disease. Your doctor has found that you have a chance of having heart disease. You can do lots of things to keep your heart healthy. It may not be easy, but you can change your diet, exercise more, and quit smoking. These steps really work to lower your chance of heart disease. Follow-up care is a key part of your treatment and safety. Be sure to make and go to all appointments, and call your doctor if you are having problems. It's also a good idea to know your test results and keep a list of the medicines you take. How can you care for yourself at home? Diet  · Use less salt when you cook and eat. This helps lower your blood pressure. Taste food before salting. Add only a little salt when you think you need it. With time, your taste buds will adjust to less salt.   · Eat fewer snack items, fast foods, canned soups, and other high-salt, high-fat, processed foods. · Read food labels and try to avoid saturated and trans fats. They increase your risk of heart disease by raising cholesterol levels. · Limit the amount of solid fat-butter, margarine, and shortening-you eat. Use olive, peanut, or canola oil when you cook. Bake, broil, and steam foods instead of frying them. · Eat a variety of fruit and vegetables every day. Dark green, deep orange, red, or yellow fruits and vegetables are especially good for you. Examples include spinach, carrots, peaches, and berries. · Foods high in fiber can reduce your cholesterol and provide important vitamins and minerals. High-fiber foods include whole-grain cereals and breads, oatmeal, beans, brown rice, citrus fruits, and apples. · Eat lean proteins. Heart-healthy proteins include seafood, lean meats and poultry, eggs, beans, peas, nuts, seeds, and soy products. · Limit drinks and foods with added sugar. These include candy, desserts, and soda pop. Lifestyle changes  · If your doctor recommends it, get more exercise. Walking is a good choice. Bit by bit, increase the amount you walk every day. Try for at least 30 minutes on most days of the week. You also may want to swim, bike, or do other activities. · Do not smoke. If you need help quitting, talk to your doctor about stop-smoking programs and medicines. These can increase your chances of quitting for good. Quitting smoking may be the most important step you can take to protect your heart. It is never too late to quit. · Limit alcohol to 2 drinks a day for men and 1 drink a day for women. Too much alcohol can cause health problems. · Manage other health problems such as diabetes, high blood pressure, and high cholesterol. If you think you may have a problem with alcohol or drug use, talk to your doctor. Medicines  · Take your medicines exactly as prescribed.  Call your doctor if you think you are having a problem with your medicine. · If your doctor recommends aspirin, take the amount directed each day. Make sure you take aspirin and not another kind of pain reliever, such as acetaminophen (Tylenol). When should you call for help? [id number] if you have symptoms of a heart attack. These may include:  · Chest pain or pressure, or a strange feeling in the chest.  · Sweating. · Shortness of breath. · Pain, pressure, or a strange feeling in the back, neck, jaw, or upper belly or in one or both shoulders or arms. · Lightheadedness or sudden weakness. · A fast or irregular heartbeat. After you call 911, the  may tell you to chew 1 adult-strength or 2 to 4 low-dose aspirin. Wait for an ambulance. Do not try to drive yourself. Watch closely for changes in your health, and be sure to contact your doctor if you have any problems. Where can you learn more? Go to https://[employer]. org and sign in to your [employer] account. Enter K828 in the BeVocal box to learn more about \"A Healthy Heart: Care Instructions. \"     If you do not have an account, please click on the \"Sign Up Now\" link. Current as of: December 16, 2019               Content Version: 12.5  © 5861-8753 Healthwise, [employer]orporated. Care instructions adapted under license by [facility] ([facility]). If you have questions about a medical condition or this instruction, always ask your healthcare professional. [place] [address] any warranty or liability for your use of this information. How to take:  NITROGLYCERIN (Nitrostat) 0.4 mg tablets, sublingual.  Nitroglycerin is in a group of drugs called nitrates. Nitroglycerin dilates (widens) blood vessels, making it easier for blood to flow through them and easier for the heart to pump. Dosing Guidelines for Nitroglycerin Tablets  · At the start of an angina (chest pain) attack, place one tablet under the tongue or between the cheek and gum.   Do not swallow or chew the tablet; let it dissolve on its own. If necessary, a second and third tablet may be used, with five minutes between using each tablet. If you use a third tablet and your chest pain continues, it is time to seek immediate medical attention. Call 911 immediately and have someone drive you to the emergency room. You may be having a heart attack or other serious heart problem. · To prevent angina from exercise or stress, use 1 tablet 5 to 10 minutes before the activity.

## 2020-12-10 NOTE — PROGRESS NOTES
[facility], [place]. [address], Mildred [address], [address]  [phone] office  [phone] fax      OFFICE VISIT:  12/10/2020    Genia Camarillo - : 1950  Reason For Visit:  Tank Lin is a 69 y.o. female who is here for Follow-up (Patient states she is having no cardiac symptoms. ); Coronary Artery Disease; Hyperlipidemia; and Hypertension    History:   Diagnosis Orders   1. Coronary artery disease involving native coronary artery of native heart without angina pectoris     2. S/P CABG x 4     3. History of coronary artery stent placement     4. Abdominal aortic aneurysm (AAA) without rupture ([facility] [address]     5. Essential hypertension     6. Mixed hyperlipidemia     7. Chronic obstructive pulmonary disease, unspecified COPD type [facility])       The patient presents today for cardiology follow up. The patient is doing well from a cardiac standpoint. She reports falling about 7 weeks ago with blunt trauma to right side of face, thorax and left leg. She did follow up with her PCP after the fall for x-rays with no fractures. The patient denies symptoms to suggest myocardial ischemia, heart failure or arrhythmia. BP is well controlled on current regimen. The patient's PCP monitors cholesterol. The patient reports \"I have not smoked in 28 days. \"    Niya Holder denies exertional chest pain, shortness of breath, orthopnea, paroxysmal nocturnal dyspnea, syncope, presyncope, sensed arrhythmia, edema and fatigue. The patient denies numbness or weakness to suggest cerebrovascular accident or transient ischemic attack.       Genia Camarillo has the following history as recorded in [facility]:  Patient Active Problem List   Diagnosis Code    Deep vein thrombosis (HCC) I82.409    Essential hypertension I10    Diabetes mellitus ([facility] [address] E11.9    Hypercholesteremia E78.00    S/P CABG x 4 Z95.1    History of coronary artery stent placement Z95.5    Coronary artery disease involving native coronary artery of native heart without angina pectoris I25.10    Mixed hyperlipidemia E78.2    History of diabetes mellitus Z86.39    Chronic obstructive pulmonary disease (HCC) J44.9    NUNEZ (dyspnea on exertion) R06.00    Abdominal aortic aneurysm (AAA) without rupture ([facility]) I71.4     Past Medical History:   Diagnosis Date    ASHD (arteriosclerotic heart disease)     s/p PTCA and stent of circumflex, as well as intermediate and mid LAD     COPD (chronic obstructive pulmonary disease) ([facility])     Coronary atherosclerosis     Deep vein thrombosis ([facility])     hx of     Diabetes mellitus ([facility] [address]     diet controlled    Hypercholesteremia     Hypertension     Unstable angina ([facility])      Past Surgical History:   Procedure Laterality Date    CARDIAC CATHETERIZATION  12/10/00    selective left heart and coronary arteriography with left ventriculography     CARDIAC CATHETERIZATION  98    left heart cath, left ventriculography, slective coronary arteriography, direct infarct angioplasty and stent placement to proximal left anterior descending coronary artery    CARDIAC CATHETERIZATION  94    left heart cath, selective coronary arteriography, left ventriculography    CARDIAC CATHETERIZATION  2011    [place]    CHOLECYSTECTOMY      CORONARY ANGIOPLASTY WITH STENT PLACEMENT  00    PTCA and stent placement to the mid LAD/ptca and stent placement first circumflex marginal (intermediate)     CORONARY ARTERY BYPASS GRAFT  2011    PACABG X 4 LIMA-LAD, SVG-DIAG, SVG-PDA, RT EVH, LT OPEN VEIN HARVEST, DR Linda Ott    HYSTERECTOMY      PARATHYROIDECTOMY       Family History   Problem Relation Age of Onset    Cancer Mother     Coronary Art Dis Father      Social History     Tobacco Use    Smoking status: Former Smoker     Packs/day: 1.00     Types: Cigarettes     Last attempt to quit: 2020     Years since quittin.3    Smokeless tobacco: nausea, or vomiting. Genitourinary - no dysuria, frequency, or urgency. No flank pain or hematuria. Musculoskeletal - no back pain or myalgia. No problems with gait. Extremities - no clubbing, cyanosis or extremity edema. Skin - no color change or rash. No pallor. No new surgical incision. Neurologic - no speech difficulty, facial asymmetry or lateralizing weakness. No seizures, presyncope or syncope. No significant dizziness. Hematologic - no easy bruising or excessive bleeding. Psychiatric - no severe anxiety or insomnia. No confusion. All other review of systems are negative. Objective  Vital Signs - /72   Pulse 86   Ht 5' 6.5\" (1.689 m)   Wt 177 lb (80.3 kg)   BMI 28.14 kg/m²   General - [facility] is alert, cooperative, and pleasant. Well groomed. No acute distress. Body habitus - Body mass index is 28.14 kg/m². HEENT - Head is normocephalic. No circumoral cyanosis. Dentition is normal.  EYES -   Lids normal without ptosis. No discharge, edema or subconjunctival hemorrhage. Neck - Symmetrical without apparent mass or lymphadenopathy. Respiratory - Normal respiratory effort without use of accessory muscles. Ausculatation reveals vesicular breath sounds without crackles, wheezes, rub or rhonchi. Cardiovascular - No jugular venous distention. Auscultation reveals regular rate and rhythm. No audible clicks, gallop or rub. No  murmur. No lower extremity varicosities. No carotid bruits. Abdominal -  No visible distention, mass or pulsations. Extremities - No clubbing or cyanosis. No statis dermatitis or ulcers. No edema. Musculoskeletal -   No Osler's nodes. No kyphosis or scoliosis. Gait is even and regular without limp or shuffle. Ambulates without assistance. Skin -  Warm and dry; no rash or pallor. No new surgical wound. Neurological - No focal neurological deficits. Thought processes coherent. No apparent tremor.    Oriented to person, place and time.    Psychiatric -  Appropriate affect and mood. Assessment:     Diagnosis Orders   1. Coronary artery disease involving native coronary artery of native heart without angina pectoris     2. S/P CABG x 4     3. History of coronary artery stent placement     4. Abdominal aortic aneurysm (AAA) without rupture ([facility] [address]     5. Essential hypertension     6. Mixed hyperlipidemia     7. Chronic obstructive pulmonary disease, unspecified COPD type ([facility] [address]       Data reviewed:  8/24/20 cath   Conclusions    Triple-vessel disease with severe vessel ectasia in RCA, stenotic disease   in LAD. Severely stenotic stent in mid LAD. Occluded LIMA to mid LAD. Patent SVG to 1st diagonal.   Patent SVG to OM1. Patent SVG to RPDA. Normal LV ejection fraction. Large calcified abdominal aortic aneurysm with large mural thrombus. Successful PCI to proximal to mid LAD (4.0 x 15 mm resolute integrity)   utilizing drug-eluting stent. Successful PCI to mid LAD (2.75 x 26 mm resolute integrity taken to 3.0   mm) utilizing drug-eluting stent. Recommendations    Medical management. Smoking cessation. Vascular surgical consult for large abdominal aortic aneurysm.     Signatures    ----------------------------------------------------------------   Electronically signed by Jason Rivas MD(Performing Physician) on   08/25/2020 00:28   ----------------------------------------------------------------    Lab Results   Component Value Date    WBC 7.8 08/24/2020    HGB 14.4 08/24/2020    HCT 44.0 08/24/2020    MCV 94.2 08/24/2020     08/24/2020     Lab Results   Component Value Date     (L) 08/06/2020    K 4.5 08/06/2020    CL 92 (L) 08/06/2020    CO2 27 08/06/2020    BUN 17 08/06/2020    CREATININE 1.3 09/11/2020    GLUCOSE 167 (H) 08/06/2020    CALCIUM 10.0 08/06/2020    PROT 8.4 08/06/2020    LABALBU 4.2 08/06/2020    BILITOT <0.2 08/06/2020    ALKPHOS 88 08/06/2020    AST 15 08/06/2020    ALT 13 08/06/2020 LABGLOM 40 (A) 09/11/2020    GFRAA 49 (A) 09/11/2020       Lab Results   Component Value Date    LABA1C 6.4 (H) 10/29/2014     Lab Results   Component Value Date    CHOL 133 (L) 08/24/2020    CHOL 206 (H) 11/13/2017    CHOL 149 03/30/2014     Lab Results   Component Value Date    TRIG 145 08/24/2020    TRIG 201 (H) 11/13/2017    TRIG 105 03/30/2014     Lab Results   Component Value Date    HDL 43 (L) 08/24/2020    HDL 51 (L) 11/13/2017    HDL 56 03/30/2014     Lab Results   Component Value Date    LDLCALC 61 08/24/2020    [address] 115 11/13/2017    LDLCALC 60 08/23/2011     Stable CV status without overt heart failure, sensed arrhythmia or angina.       CAD - stable on current medical management. DAPT through 8/24/21.     HTN - normotensive on current regimen.     Hyperlipidemia - on Crestor - LDL 61.     AAA - patient has been referred to Dr. Ernestine Larsen in [place].       Patient is compliant with medication regimen. BP Readings from Last 3 Encounters:   12/10/20 132/72   09/23/20 132/70   09/10/20 138/86    Pulse Readings from Last 3 Encounters:   12/10/20 86   09/23/20 84   09/10/20 96        Wt Readings from Last 3 Encounters:   12/10/20 177 lb (80.3 kg)   09/23/20 176 lb (79.8 kg)   08/24/20 171 lb (77.6 kg)     Plan  Previous cardiac history and records reviewed. Continue current medical management. Continue other current medications as directed. Continue to follow up with primary care provider for non cardiac medical problems. Call the office with any problems, questions or concerns at [phone]. Cardiology follow up: Dr. Adrianne Marte as scheduled. Educational included in patient instructions. Heart health.      JAKOB Martinez

## 2021-01-05 ENCOUNTER — HOSPITAL ENCOUNTER (OUTPATIENT)
Dept: WOUND CARE | Age: 71
Discharge: HOME | End: 2021-01-05
Payer: MEDICARE

## 2021-01-05 VITALS
HEIGHT: 67 IN | HEART RATE: 72 BPM | BODY MASS INDEX: 27.78 KG/M2 | TEMPERATURE: 98.2 F | SYSTOLIC BLOOD PRESSURE: 141 MMHG | DIASTOLIC BLOOD PRESSURE: 96 MMHG | WEIGHT: 177 LBS

## 2021-01-05 DIAGNOSIS — T14.8XXA TRAUMATIC HEMATOMA: ICD-10-CM

## 2021-01-05 DIAGNOSIS — J44.9 CHRONIC OBSTRUCTIVE PULMONARY DISEASE, UNSPECIFIED COPD TYPE (HCC): ICD-10-CM

## 2021-01-05 DIAGNOSIS — F17.200 SMOKER: ICD-10-CM

## 2021-01-05 DIAGNOSIS — L97.922 DIABETIC ULCER OF LEFT LOWER LEG ASSOCIATED WITH TYPE 2 DIABETES MELLITUS, WITH FAT LAYER EXPOSED (HCC): Primary | ICD-10-CM

## 2021-01-05 DIAGNOSIS — E11.622 DIABETIC ULCER OF LEFT LOWER LEG ASSOCIATED WITH TYPE 2 DIABETES MELLITUS, WITH FAT LAYER EXPOSED (HCC): Primary | ICD-10-CM

## 2021-01-05 PROCEDURE — 97597 DBRDMT OPN WND 1ST 20 CM/<: CPT | Performed by: NURSE PRACTITIONER

## 2021-01-05 PROCEDURE — 97597 DBRDMT OPN WND 1ST 20 CM/<: CPT

## 2021-01-05 PROCEDURE — 99213 OFFICE O/P EST LOW 20 MIN: CPT

## 2021-01-05 RX ORDER — LIDOCAINE HYDROCHLORIDE 20 MG/ML
JELLY TOPICAL PRN
Status: DISCONTINUED | OUTPATIENT
Start: 2021-01-05 | End: 2021-01-07 | Stop reason: HOSPADM

## 2021-01-05 ASSESSMENT — VISUAL ACUITY: OU: 1

## 2021-01-05 NOTE — HOME CARE
[address] [facility] [address]:     [address], Rash[address] Altamir[address] p: [phone] f: [phone]     Ordering Center:     [address] [place] [address],[address]  [address] [facility], JEAN MARIE [address] [id number] [phone]  WOUND CARE Dept: [address] [facility] [phone]    Patient Information:      Lisseth Gonzalezy[address]olores[address]ose [id number]   [phone]   : 1950  AGE: 69 y.o. GENDER: female   EPISODE DATE: 2021    Insurance:      PRIMARY INSURANCE:  Plan: MEDICARE PART A AND B  Coverage: MEDICARE  Effective Date: 2020  Group Number: [unfilled]  Subscriber Number: [id number] - (Medicare)    Payor/Plan Subscr  Sex Relation Sub. Ins. ID Effective Group Num   1. [place] 1950 Female Self [id number]                                    [address] [address]   2. 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 2 diabetes mellitus, with fat layer exposed ([facility] [address] - Primary    Relevant Orders    VL LOWER EXTREMITY ARTERIAL SEGMENTAL PRESSURES W PPG    Chinmay Guzman MD, General Surgery, [place]    Smoker    Traumatic hematoma          WOUNDS REQUIRING DRESSING SUPPLIES:     Wound 21 Leg Left; Outer wound 1- left leg traumatic (Active)   Wound Image    21 1325   Wound Etiology Traumatic 21 1325   Dressing Status Old drainage noted 21 1325   Wound Cleansed Soap and water 21 1325   Dressing/Treatment Other (comment) 21 1400   Wound Length (cm) 3.9 cm 21 1325   Wound Width (cm) 3.5 cm 21 1325   Wound Depth (cm) 0.7 cm 21 1325   Wound Surface Area (cm^2) 13.65 cm^2 21 2748 Wound Volume (cm^3) 9.56 cm^3 01/05/21 1325   Post-Procedure Length (cm) 3.9 cm 01/05/21 1400   Post-Procedure Width (cm) 3.5 cm 01/05/21 1400   Post-Procedure Depth (cm) 0.9 cm 01/05/21 1400   Post-Procedure Surface Area (cm^2) 13.65 cm^2 01/05/21 1400   Post-Procedure Volume (cm^3) 12.28 cm^3 01/05/21 1400   Distance Tunneling (cm) 4.4 cm 01/05/21 1325   Tunneling Position ___ O'Clock 5 01/05/21 1325   Wound Assessment Slough;Pink/red 01/05/21 1325   Drainage Amount Moderate 01/05/21 1325   Drainage Description Serosanguinous 01/05/21 1325   Odor None 01/05/21 1325   Chaparrita-wound Assessment Hyperpigmented 01/05/21 1325   Margins Defined edges 01/05/21 1325   Wound Thickness Description not for Pressure Injury Full thickness 01/05/21 1325   Number of days: 0          Supplies Requested :      WOUND #: 1   PRIMARY DRESSING:  None   Cover and Secure with: 4X4 gauze pad  Bulky roll gauze     FREQUENCY OF DRESSING CHANGES:  Other twice daily       ADDITIONAL ITEMS:  [] Gloves Small  [x] Gloves Medium [] Gloves Large [] Gloves XLarge  [] Tape 1\" [] Tape 2\" [x] Tape 3\"  [x] Medipore Tape  [x] Saline  [] Skin Prep   [] Adhesive Remover   [] Cotton Tip Applicators   [] Other:    Patient Wound(s) Debrided: [x] Yes if yes please add date 1/5/21  [] No    Debribement Type: Excisional/Sharp    Patient currently being seen by Home Health: [] Yes   [x] No    Duration for needed supplies:  [x]15  []30  []60  []90 Days    Electronically signed by JAKOB Cage CNP on 1/5/2021 at 2:31 PM     Provider Information:      PROVIDER'S NAME: Kenia ROBERT    NPI: [id number]

## 2021-01-05 NOTE — PROGRESS NOTES
Patient Care Team:  Lindsay Ayala MD as PCP - General (Family Medicine)  Lindsay Ayala MD as PCP - [facility]  Batsheva Solorzano MD as Consulting Physician (Interventional Cardiology)    TODAY'S DATE:  1/5/2021     HISTORY of PRESENTILLNESS HPI   Linsey Adams is a 69 y.o. female who presents today for wound evaluation. Ms. Nati Venegas fell in her home 2 months ago. She is on plavix for her CAD. Her other wounds have healed however she cannot heal the wound on her left leg. It appears to be a tunneling hematoma. The blood has been evacuated however there is an open cavity which I think needs surgical debridement. Wound Type:traumatic  Wound Location:left leg  Modifying factors:edema, diabetes and smoking    Patient Active Problem List   Diagnosis Code    Deep vein thrombosis (HCC) I82.409    Essential hypertension I10    Diabetes mellitus ([facility] [address] E11.9    Hypercholesteremia E78.00    S/P CABG x 4 Z95.1    History of coronary artery stent placement Z95.5    Coronary artery disease involving native coronary artery of native heart without angina pectoris I25.10    Mixed hyperlipidemia E78.2    History of diabetes mellitus Z86.39    Chronic obstructive pulmonary disease (HCC) J44.9    NUNEZ (dyspnea on exertion) R06.00    Abdominal aortic aneurysm (AAA) without rupture ([facility] I71.4    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 8XXA       She reports she developed a wound on left leg. This started 2 month(s) ago. She believes this is not healing. She has been applying silvadene. She has not had  fever orchills. She has a history of diabetes mellitus.     Linsey Adams is a 69 y.o. female with the following history reviewed and recorded in [facility]:    Current Outpatient Medications   Medication Sig Dispense Refill    collagenase (SANTYL) 250 UNIT/GM ointment Apply topically daily. 3.9x3.5x0.7 wound size 1 Tube 3    metoprolol succinate (TOPROL XL) 100 MG extended release tablet Take 1 tablet by mouth daily 90 tablet 3    ramipril (ALTACE) 5 MG capsule Take 1 capsule by mouth daily 90 capsule 3    rosuvastatin (CRESTOR) 10 MG tablet Take 1 tablet by mouth daily 90 tablet 3    clopidogrel (PLAVIX) 75 MG tablet Take 1 tablet by mouth daily 90 tablet 3    metFORMIN (GLUCOPHAGE) 500 MG tablet Take 1 tablet by mouth 2 times daily (with meals) Hold for 2 days and restart a 26th 2020 180 tablet 3    nitroGLYCERIN (NITROSTAT) 0.4 MG SL tablet Place 1 tablet under the tongue every 5 minutes as needed for Chest pain 25 tablet 3    aspirin 81 MG EC tablet Take 81 mg by mouth daily.          Current Facility-Administered Medications   Medication Dose Route Frequency Provider Last Rate Last Admin    lidocaine (XYLOCAINE) 2 % jelly   Topical PRN Radha Fiddler, APRN - CNP         Allergies: Codeine  Past Medical History:   Diagnosis Date    ASHD (arteriosclerotic heart disease)     s/p PTCA and stent of circumflex, as well as intermediate and mid LAD     COPD (chronic obstructive pulmonary disease) ([facility] [address]     Coronary atherosclerosis     Diabetes mellitus ([facility] [address]     diet controlled    Hypercholesteremia     Hypertension     Unstable angina ([facility] [address]        Past Surgical History:   Procedure Laterality Date    CARDIAC CATHETERIZATION  12/10/00    selective left heart and coronary arteriography with left ventriculography     CARDIAC CATHETERIZATION  01/23/98    left heart cath, left ventriculography, slective coronary arteriography, direct infarct angioplasty and stent placement to proximal left anterior descending coronary artery    CARDIAC CATHETERIZATION  03/05/94    left heart cath, selective coronary arteriography, left ventriculography    CARDIAC CATHETERIZATION  8/27/2011    [facility]    CHOLECYSTECTOMY      CORONARY ANGIOPLASTY WITH STENT PLACEMENT  12/12/00    PTCA and stent placement to the mid LAD/ptca and stent placement first circumflex marginal (intermediate)     CORONARY ARTERY BYPASS GRAFT  2011    PACABG X 4 LIMA-LAD, SVG-DIAG, SVG-PDA, RT EVH, LT OPEN VEIN HARVEST, DR Cody Graham    HYSTERECTOMY      NECK SURGERY      parotid artery tumor removed bilaterally     Family History   Problem Relation Age of Onset    Cancer Mother     Coronary Art Dis Father      Social History     Tobacco Use    Smoking status: Current Every Day Smoker     Packs/day: 0.50     Types: Cigarettes     Last attempt to quit: 2020     Years since quittin.3    Smokeless tobacco: Never Used   Substance Use Topics    Alcohol use: No         Review of Systems    Review of Systems   Skin: Positive for wound. Hematological: Bruises/bleeds easily. All other systems reviewed and are negative. All other review of systems are negative. Physical Exam    BP (!) 141/96   Pulse 72   Temp 98.2 °F (36.8 °C) (Temporal)   Ht 5' 6.5\" (1.689 m)   Wt 177 lb (80.3 kg)   BMI 28.14 kg/m²     Physical Exam  Vitals signs reviewed. Constitutional:       Appearance: Normal appearance. She is normal weight. HENT:      Head: Normocephalic and atraumatic. Right Ear: External ear normal.      Left Ear: External ear normal.   Eyes:      General: Lids are normal. Lids are everted, no foreign bodies appreciated. Vision grossly intact. Gaze aligned appropriately. Cardiovascular:      Rate and Rhythm: Normal rate and regular rhythm. Pulses: Normal pulses. Heart sounds: Normal heart sounds. Pulmonary:      Effort: Pulmonary effort is normal.      Breath sounds: Wheezing present. Abdominal:      General: Bowel sounds are normal.   Musculoskeletal: Normal range of motion. General: Swelling present. Left lower leg: Edema present. Skin:     General: Skin is warm and dry. Capillary Refill: Capillary refill takes more than 3 seconds.       Findings: Erythema and wound present. Neurological:      Mental Status: She is alert and oriented to person, place, and time. Psychiatric:         Mood and Affect: Mood normal.         Behavior: Behavior normal.         Thought Content: Thought content normal.         Judgment: Judgment normal.             Post Debridement Measurements and Assessment:    The patientspain isPain Level: 0  . Wound is has improved. Please refer to nursing measurements and assessment regarding wound pre and postdebridement. Wound 01/05/21 Leg Left; Outer wound 1- left leg traumatic (Active)   Wound Image    01/05/21 1325   Wound Etiology Traumatic 01/05/21 1325   Dressing Status Old drainage noted 01/05/21 1325   Wound Cleansed Soap and water 01/05/21 1325   Dressing/Treatment Other (comment) 01/05/21 1400   Wound Length (cm) 3.9 cm 01/05/21 1325   Wound Width (cm) 3.5 cm 01/05/21 1325   Wound Depth (cm) 0.7 cm 01/05/21 1325   Wound Surface Area (cm^2) 13.65 cm^2 01/05/21 1325   Wound Volume (cm^3) 9.56 cm^3 01/05/21 1325   Post-Procedure Length (cm) 3.9 cm 01/05/21 1400   Post-Procedure Width (cm) 3.5 cm 01/05/21 1400   Post-Procedure Depth (cm) 0.9 cm 01/05/21 1400   Post-Procedure Surface Area (cm^2) 13.65 cm^2 01/05/21 1400   Post-Procedure Volume (cm^3) 12.28 cm^3 01/05/21 1400   Distance Tunneling (cm) 4.4 cm 01/05/21 1325   Tunneling Position ___ O'Clock 5 01/05/21 1325   Wound Assessment Slough;Pink/red 01/05/21 1325   Drainage Amount Moderate 01/05/21 1325   Drainage Description Serosanguinous 01/05/21 1325   Odor None 01/05/21 1325   Chaparrita-wound Assessment Hyperpigmented 01/05/21 1325   Margins Defined edges 01/05/21 1325   Wound Thickness Description not for Pressure Injury Full thickness 01/05/21 1325   Number of days: 0            Debridement: Selective Debridement/Non-Excisional Debridement    Using curette, scissors and forceps the wound(s)/ulcer(s) was/were sharply debrided down through and including the removal of epidermis and dermis. Devitalized Tissue Debrided:  fibrin, biofilm, slough and exudate    Pre Debridement Measurements:  Are located in the Wound/Ulcer Documentation Flow Sheet    Wound/Ulcer #: 1    Post Debridement Measurements:  Wound/Ulcer Descriptions are Pre Debridement except measurements:          Percent of Wound(s)/Ulcer(s) Debrided: 100%    Total Surface Area Debrided:  13.65 sq cm     Diabetic/Pressure/Non Pressure Ulcers only:  Ulcer: N/A     Estimated Blood Loss:  Minimal    Hemostasis Achieved:  by pressure    Procedural Pain:  6  / 10     Post Procedural Pain:  2 / 10     Response to treatment:  Well tolerated by patient., With complaints of pain. Assessment    1. 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 Smoker    3. Chronic obstructive pulmonary disease, unspecified COPD type ([facility] [address]    4. Traumatic hematoma          Plan    1. Surgical consult  2. LALIT    Planfor wound - Dress per physician order  Treatment:     Compression : Yes   Offloading : No  Dressing Orders:  Left leg wound: Soap and water wash; saline moistened gauze loosely over the wound bed, secure with dry gauze, and rolled gauze twice daily. Discussed importance of nutrition, smoking cessation, wound care, plan of care, and the need for a surgical consult. MS. Mendez agreed to the surgical consult. Patient understanding and questions answered. I spent a total of  60 minutes face to face with the patient. Over 75%of that time was spent on counseling and care coordination. Patient was told that if symptoms worsen or new symptoms develop they are to go to the emergency department immediately. Patient was educated on diagnosis and treatment plan. All of patient's questions were answered, and the patient understands the discharge plan. Discussed appropriate home care of this wound. Wound redressed. Patient instructions were given.   Recommend no smoking  Offloading instructions given

## 2021-01-14 ENCOUNTER — HOSPITAL ENCOUNTER (OUTPATIENT)
Dept: NON INVASIVE DIAGNOSTICS | Age: 71
Discharge: HOME | End: 2021-01-14
Payer: MEDICARE

## 2021-01-14 ENCOUNTER — HOSPITAL ENCOUNTER (OUTPATIENT)
Dept: WOUND CARE | Age: 71
Discharge: HOME | End: 2021-01-14
Payer: MEDICARE

## 2021-01-14 VITALS
RESPIRATION RATE: 22 BRPM | BODY MASS INDEX: 27 KG/M2 | SYSTOLIC BLOOD PRESSURE: 153 MMHG | WEIGHT: 172 LBS | DIASTOLIC BLOOD PRESSURE: 113 MMHG | TEMPERATURE: 97.6 F | HEIGHT: 67 IN | HEART RATE: 80 BPM

## 2021-01-14 DIAGNOSIS — T14.8XXA TRAUMATIC HEMATOMA: ICD-10-CM

## 2021-01-14 DIAGNOSIS — E11.622 DIABETIC ULCER OF LEFT LOWER LEG ASSOCIATED WITH TYPE 2 DIABETES MELLITUS, WITH FAT LAYER EXPOSED (HCC): Primary | ICD-10-CM

## 2021-01-14 DIAGNOSIS — F17.200 SMOKER: ICD-10-CM

## 2021-01-14 DIAGNOSIS — L97.922 DIABETIC ULCER OF LEFT LOWER LEG ASSOCIATED WITH TYPE 2 DIABETES MELLITUS, WITH FAT LAYER EXPOSED (HCC): ICD-10-CM

## 2021-01-14 DIAGNOSIS — L97.922 DIABETIC ULCER OF LEFT LOWER LEG ASSOCIATED WITH TYPE 2 DIABETES MELLITUS, WITH FAT LAYER EXPOSED (HCC): Primary | ICD-10-CM

## 2021-01-14 DIAGNOSIS — E11.622 DIABETIC ULCER OF LEFT LOWER LEG ASSOCIATED WITH TYPE 2 DIABETES MELLITUS, WITH FAT LAYER EXPOSED (HCC): ICD-10-CM

## 2021-01-14 PROCEDURE — 97597 DBRDMT OPN WND 1ST 20 CM/<: CPT

## 2021-01-14 PROCEDURE — 97597 DBRDMT OPN WND 1ST 20 CM/<: CPT | Performed by: NURSE PRACTITIONER

## 2021-01-14 PROCEDURE — 93923 UPR/LXTR ART STDY 3+ LVLS: CPT

## 2021-01-14 NOTE — PROGRESS NOTES
removed bilaterally       FAMILY HISTORY    Family History   Problem Relation Age of Onset    Cancer Mother     Coronary Art Dis Father        SOCIAL HISTORY    Social History     Tobacco Use    Smoking status: Current Every Day Smoker     Packs/day: 0.50     Types: Cigarettes     Last attempt to quit: 2020     Years since quittin.4    Smokeless tobacco: Never Used   Substance Use Topics    Alcohol use: No    Drug use: No       ALLERGIES    Allergies   Allergen Reactions    Codeine Other (See Comments)     SOB       MEDICATIONS    Current Outpatient Medications on File Prior to Encounter   Medication Sig Dispense Refill    collagenase (SANTYL) 250 UNIT/GM ointment Apply topically daily. 3.9x3.5x0.7 wound size 1 Tube 3    metoprolol succinate (TOPROL XL) 100 MG extended release tablet Take 1 tablet by mouth daily 90 tablet 3    ramipril (ALTACE) 5 MG capsule Take 1 capsule by mouth daily 90 capsule 3    rosuvastatin (CRESTOR) 10 MG tablet Take 1 tablet by mouth daily 90 tablet 3    clopidogrel (PLAVIX) 75 MG tablet Take 1 tablet by mouth daily 90 tablet 3    metFORMIN (GLUCOPHAGE) 500 MG tablet Take 1 tablet by mouth 2 times daily (with meals) Hold for 2 days and restart a 2020 180 tablet 3    nitroGLYCERIN (NITROSTAT) 0.4 MG SL tablet Place 1 tablet under the tongue every 5 minutes as needed for Chest pain 25 tablet 3    aspirin 81 MG EC tablet Take 81 mg by mouth daily. No current facility-administered medications on file prior to encounter. REVIEW OF SYSTEMS    A comprehensive review of systems was negative.     Objective:      BP (!) 153/113   Pulse 80   Temp 97.6 °F (36.4 °C) (Temporal)   Resp 22   Ht 5' 6.5\" (1.689 m)   Wt 172 lb (78 kg)   BMI 27.35 kg/m²     Wt Readings from Last 3 Encounters:   21 172 lb (78 kg)   21 177 lb (80.3 kg)   12/10/20 177 lb (80.3 kg)       PHYSICAL EXAM    General Appearance: alert and oriented to person, place and time, well developed and well- nourished, in no acute distress  Skin: warm and dry, no rash or erythema  Head: normocephalic and atraumatic  Eyes: pupils equal, round, and reactive to light, extraocular eye movements intact, conjunctivae normal  ENT: tympanic membrane, external ear and ear canal normal bilaterally, nose without deformity, nasal mucosa and turbinates normal without polyps  Neck: supple and non-tender without mass, no thyromegaly or thyroid nodules, no cervical lymphadenopathy  Pulmonary/Chest: clear to auscultation bilaterally- no wheezes, rales or rhonchi, normal air movement, no respiratory distress  Extremities: no cyanosis, clubbing or edema  Musculoskeletal: normal range of motion, no joint swelling, deformity or tenderness  Neurologic: reflexes normal and symmetric, no cranial nerve deficit, gait, coordination and speech normal      Assessment:      Patient Active Problem List   Diagnosis Code    Deep vein thrombosis (HCC) I82.409    Essential hypertension I10    Diabetes mellitus ([facility] [address] E11.9    Hypercholesteremia E78.00    S/P CABG x 4 Z95.1    History of coronary artery stent placement Z95.5    Coronary artery disease involving native coronary artery of native heart without angina pectoris I25.10    Mixed hyperlipidemia E78.2    History of diabetes mellitus Z86.39    Chronic obstructive pulmonary disease (HCC) J44.9    NUNEZ (dyspnea on exertion) R06.00    Abdominal aortic aneurysm (AAA) without rupture ([facility]) I71.4    Diabetic ulcer of left lower leg associated with type 2 diabetes mellitus, with fat layer exposed ([facility]) E11.622, L97.922    Smoker F17.200    Traumatic hematoma T14. 8XXA        Procedure Note  Indications:  Based on my examination of this patient's wound(s)/ulcer(s) today, debridement is required to promote healing and evaluate the wound base.     Performed by: JAKOB Sandoval - CNP    Consent obtained:  Yes    Time out taken:  Yes    Pain Control: Anesthetic  Anesthetic: 2% Lidocaine Gel Topical       Debridement:Non-excisional Debridement    Using curette the wound(s)/ulcer(s) was/were sharply debrided down through and including the removal of epidermis and dermis. Devitalized Tissue Debrided:  fibrin, biofilm, slough and exudate    Pre Debridement Measurements:  Are located in the Wound/Ulcer Documentation Flow Sheet    Wound/Ulcer #: 1    Post Debridement Measurements:  Wound/Ulcer Descriptions are Pre Debridement except measurements:      Percent of Wound/Ulcer Debrided: 100%    Total Surface Area Debrided:  10.5 sq cm     Wound 01/05/21 Leg Left; Outer wound 1- left leg traumatic (Active)   Wound Image   01/14/21 1020   Wound Etiology Traumatic 01/14/21 1020   Dressing Status Old drainage noted 01/14/21 1020   Wound Cleansed Soap and water 01/14/21 1020   Dressing/Treatment Moist to moist 01/14/21 1317   Wound Length (cm) 3.5 cm 01/14/21 1020   Wound Width (cm) 3 cm 01/14/21 1020   Wound Depth (cm) 0.5 cm 01/14/21 1020   Wound Surface Area (cm^2) 10.5 cm^2 01/14/21 1020   Change in Wound Size % (l*w) 23.08 01/14/21 1020   Wound Volume (cm^3) 5.25 cm^3 01/14/21 1020   Wound Healing % 45 01/14/21 1020   Post-Procedure Length (cm) 3.5 cm 01/14/21 1317   Post-Procedure Width (cm) 3 cm 01/14/21 1317   Post-Procedure Depth (cm) 0.5 cm 01/14/21 1317   Post-Procedure Surface Area (cm^2) 10.5 cm^2 01/14/21 1317   Post-Procedure Volume (cm^3) 5.25 cm^3 01/14/21 1317   Distance Tunneling (cm) 3 cm 01/14/21 1020   Tunneling Position ___ O'Clock 5 01/14/21 1020   Undermining Starts ___ O'Clock 0 01/14/21 1020   Undermining Ends___ O'Clock 0 01/14/21 1020   Undermining Maxium Distance (cm) 0 01/14/21 1020   Wound Assessment Slough;Pink/red 01/14/21 1020   Drainage Amount Moderate 01/14/21 1020   Drainage Description Serosanguinous 01/14/21 1020   Odor None 01/14/21 1020   Chaparrita-wound Assessment Hyperpigmented 01/14/21 1020   Margins Defined edges 01/14/21 1020 Wound Thickness Description not for Pressure Injury Full thickness 01/14/21 1020   Number of days: 8            Diabetic/Pressure/Non Pressure Ulcers only:  Ulcer: N/A      Estimated Blood Loss:  Minimal    Hemostasis Achieved:  by pressure    Procedural Pain:  6  / 10     Post Procedural Pain:  3 / 10     Response to treatment:  Well tolerated by patient., With complaints of pain. Plan:     Problem List Items Addressed This Visit     * (Principal) Diabetic ulcer of left lower leg associated with type 2 diabetes mellitus, with fat layer exposed ([address] - Primary    Smoker    Traumatic hematoma          Treatment Note please see attached Discharge Instructions    In my professional opinion this patient would benefit from HBO Therapy: No    Written patient dismissal instructions given to patient and signed by patient or POA. Ms. Serge Sena is having a hard time working her phone therefore she did not answer the surgical consult phone call. Today the wound looks much better, I think we can heal this wound without surgical debridement.   Electronically signed by JAKOB Owusu CNP on 1/14/2021 at 1:18 PM

## 2021-01-28 ENCOUNTER — HOSPITAL ENCOUNTER (OUTPATIENT)
Dept: WOUND CARE | Age: 71
Discharge: HOME | End: 2021-01-28
Payer: MEDICARE

## 2021-01-28 VITALS
BODY MASS INDEX: 27 KG/M2 | RESPIRATION RATE: 18 BRPM | HEART RATE: 86 BPM | TEMPERATURE: 97.9 F | DIASTOLIC BLOOD PRESSURE: 88 MMHG | WEIGHT: 172 LBS | HEIGHT: 67 IN | SYSTOLIC BLOOD PRESSURE: 138 MMHG

## 2021-01-28 DIAGNOSIS — F17.200 SMOKER: Primary | ICD-10-CM

## 2021-01-28 DIAGNOSIS — T14.8XXA TRAUMATIC HEMATOMA: ICD-10-CM

## 2021-01-28 DIAGNOSIS — L97.922 DIABETIC ULCER OF LEFT LOWER LEG ASSOCIATED WITH TYPE 2 DIABETES MELLITUS, WITH FAT LAYER EXPOSED (HCC): ICD-10-CM

## 2021-01-28 DIAGNOSIS — E11.622 DIABETIC ULCER OF LEFT LOWER LEG ASSOCIATED WITH TYPE 2 DIABETES MELLITUS, WITH FAT LAYER EXPOSED (HCC): ICD-10-CM

## 2021-01-28 PROCEDURE — 97597 DBRDMT OPN WND 1ST 20 CM/<: CPT

## 2021-01-28 PROCEDURE — 97597 DBRDMT OPN WND 1ST 20 CM/<: CPT | Performed by: NURSE PRACTITIONER

## 2021-01-28 ASSESSMENT — PAIN SCALES - GENERAL: PAINLEVEL_OUTOF10: 0

## 2021-01-28 ASSESSMENT — PAIN DESCRIPTION - PROGRESSION: CLINICAL_PROGRESSION: NOT CHANGED

## 2021-01-28 NOTE — PROGRESS NOTES
[address]   Progress Note and Procedure Note      [address] [facility] RECORD NUMBER:  [id number]  AGE: 69 y.o. GENDER: female  : 1950  EPISODE DATE:  2021    Subjective:     Chief Complaint   Patient presents with    Wound Check     follow up         HISTORY of PRESENT ILLNESS HPI     Suellen Medina is a 69 y.o. female who presents today for wound/ulcer evaluation.    History of Wound Context:left leg  Ulcer Identification:  Ulcer Type: venous  Contributing Factors: edema and venous stasis    Wound: N/A        PAST MEDICAL HISTORY        Diagnosis Date    ASHD (arteriosclerotic heart disease)     s/p PTCA and stent of circumflex, as well as intermediate and mid LAD     [facility] disease) [facility] [address]     Coronary atherosclerosis     Diabetes mellitus ([facility] [address]     diet controlled    Hypercholesteremia     Hypertension     Unstable angina ([facility] [address]        PAST SURGICAL HISTORY    Past Surgical History:   Procedure Laterality Date    CARDIAC CATHETERIZATION  12/10/00    selective left heart and coronary arteriography with left ventriculography     CARDIAC CATHETERIZATION  98    left heart cath, left ventriculography, slective coronary arteriography, direct infarct angioplasty and stent placement to proximal left anterior descending coronary artery    CARDIAC CATHETERIZATION  94    left heart cath, selective coronary arteriography, left ventriculography    CARDIAC CATHETERIZATION  2011    [place]    CHOLECYSTECTOMY      CORONARY ANGIOPLASTY WITH STENT PLACEMENT  00    PTCA and stent placement to the mid LAD/ptca and stent placement first circumflex marginal (intermediate)     CORONARY ARTERY BYPASS GRAFT  2011    PACABG X 4 LIMA-LAD, SVG-DIAG, SVG-PDA, RT EVH, LT OPEN VEIN HARVEST, DR Navarro Owen    HYSTERECTOMY      NECK SURGERY      parotid artery tumor removed bilaterally       FAMILY HISTORY    Family History   Problem Relation Age of Onset    Cancer Mother     Coronary Art Dis Father        SOCIAL HISTORY    Social History     Tobacco Use    Smoking status: Current Every Day Smoker     Packs/day: 0.50     Types: Cigarettes     Last attempt to quit: 2020     Years since quittin.4    Smokeless tobacco: Never Used   Substance Use Topics    Alcohol use: No    Drug use: No       ALLERGIES    Allergies   Allergen Reactions    Codeine Other (See Comments)     SOB       MEDICATIONS    Current Outpatient Medications on File Prior to Encounter   Medication Sig Dispense Refill    metoprolol succinate (TOPROL XL) 100 MG extended release tablet Take 1 tablet by mouth daily 90 tablet 3    ramipril (ALTACE) 5 MG capsule Take 1 capsule by mouth daily 90 capsule 3    rosuvastatin (CRESTOR) 10 MG tablet Take 1 tablet by mouth daily 90 tablet 3    clopidogrel (PLAVIX) 75 MG tablet Take 1 tablet by mouth daily 90 tablet 3    metFORMIN (GLUCOPHAGE) 500 MG tablet Take 1 tablet by mouth 2 times daily (with meals) Hold for 2 days and restart a 2020 180 tablet 3    aspirin 81 MG EC tablet Take 81 mg by mouth daily.  nitroGLYCERIN (NITROSTAT) 0.4 MG SL tablet Place 1 tablet under the tongue every 5 minutes as needed for Chest pain 25 tablet 3     No current facility-administered medications on file prior to encounter. REVIEW OF SYSTEMS    A comprehensive review of systems was negative.     Objective:      /88   Pulse 86   Temp 97.9 °F (36.6 °C) (Temporal)   Resp 18   Ht 5' 6.5\" (1.689 m)   Wt 172 lb (78 kg)   BMI 27.35 kg/m²     Wt Readings from Last 3 Encounters:   21 172 lb (78 kg)   21 172 lb (78 kg)   21 177 lb (80.3 kg)       PHYSICAL EXAM    General Appearance: alert and oriented to person, place and time, well developed and well- nourished, in no acute distress  Skin: warm and dry, no rash or erythema  Head: normocephalic and atraumatic  Eyes: pupils equal, round, and reactive to light, extraocular eye movements intact, conjunctivae normal  ENT: tympanic membrane, external ear and ear canal normal bilaterally, nose without deformity, nasal mucosa and turbinates normal without polyps  Neck: supple and non-tender without mass, no thyromegaly or thyroid nodules, no cervical lymphadenopathy  Musculoskeletal: normal range of motion, no joint swelling, deformity or tenderness  Neurologic: reflexes normal and symmetric, no cranial nerve deficit, gait, coordination and speech normal      Assessment:      Patient Active Problem List   Diagnosis Code    Deep vein thrombosis ([facility]) I82.409    Essential hypertension I10    Diabetes mellitus ([facility] [address] E11.9    Hypercholesteremia E78.00    S/P CABG x 4 Z95.1    History of coronary artery stent placement Z95.5    Coronary artery disease involving native coronary artery of native heart without angina pectoris I25.10    Mixed hyperlipidemia E78.2    History of diabetes mellitus Z86.39    Chronic obstructive pulmonary disease ([facility]) J44.9    NUNEZ (dyspnea on exertion) R06.00    Abdominal aortic aneurysm (AAA) without rupture ([facility]) I71.4    Diabetic ulcer of left lower leg associated with type 2 diabetes mellitus, with fat layer exposed ([facility] [address] E11.622, L97.922    Smoker F17.200    Traumatic hematoma T14. 8XXA        Procedure Note  Indications:  Based on my examination of this patient's wound(s)/ulcer(s) today, debridement is required to promote healing and evaluate the wound base. Performed by: JAKOB Ashraf CNP    Consent obtained:  Yes    Time out taken:  Yes    Pain Control: Anesthetic  Anesthetic: 2% Lidocaine Gel Topical       Debridement:Non-excisional Debridement    Using curette the wound(s)/ulcer(s) was/were sharply debrided down through and including the removal of epidermis and dermis.         Devitalized Tissue Debrided:  fibrin, biofilm, slough and exudate    Pre Debridement Measurements:  Are located in the Wound/Ulcer Documentation Flow Sheet    Wound/Ulcer #: 1    Post Debridement Measurements:  Wound/Ulcer Descriptions are Pre Debridement except measurements:      Percent of Wound/Ulcer Debrided: 100%    Total Surface Area Debrided:  9.45 sq cm     Wound 01/05/21 Leg Left; Outer wound 1- left leg traumatic (Active)   Wound Image   01/28/21 1041   Wound Etiology Traumatic 01/28/21 1041   Dressing Status New drainage noted 01/28/21 1041   Wound Cleansed Soap and water 01/28/21 1041   Dressing/Treatment Moist to moist 01/28/21 1047   Wound Length (cm) 3.5 cm 01/28/21 1041   Wound Width (cm) 2.7 cm 01/28/21 1041   Wound Depth (cm) 0.3 cm 01/28/21 1041   Wound Surface Area (cm^2) 9.45 cm^2 01/28/21 1041   Change in Wound Size % (l*w) 30.77 01/28/21 1041   Wound Volume (cm^3) 2.84 cm^3 01/28/21 1041   Wound Healing % 70 01/28/21 1041   Post-Procedure Length (cm) 3.5 cm 01/28/21 1047   Post-Procedure Width (cm) 2.7 cm 01/28/21 1047   Post-Procedure Depth (cm) 0.5 cm 01/28/21 1047   Post-Procedure Surface Area (cm^2) 9.45 cm^2 01/28/21 1047   Post-Procedure Volume (cm^3) 4.72 cm^3 01/28/21 1047   Distance Tunneling (cm) 1.2 cm 01/28/21 1041   Tunneling Position ___ O'Clock 5 01/28/21 1041   Undermining Starts ___ O'Clock 0 01/28/21 1041   Undermining Ends___ O'Clock 0 01/28/21 1041   Undermining Maxium Distance (cm) 0 01/28/21 1041   Wound Assessment Pink/red 01/28/21 1041   Drainage Amount Moderate 01/28/21 1041   Drainage Description Serosanguinous 01/28/21 1041   Odor None 01/28/21 1041   Chaparrita-wound Assessment Hyperpigmented 01/28/21 1041   Margins Defined edges 01/28/21 1041   Wound Thickness Description not for Pressure Injury Full thickness 01/28/21 1041   Number of days: 22            Diabetic/Pressure/Non Pressure Ulcers only:  Ulcer: N/A      Estimated Blood Loss:  Minimal    Hemostasis Achieved:  by pressure    Procedural Pain:  0  / 10     Post Procedural Pain:  0 / 10     Response to treatment:  Well tolerated by patient. Plan:     Problem List Items Addressed This Visit     * (Principal) Diabetic ulcer of left lower leg associated with type 2 diabetes mellitus, with fat layer exposed ([address]    Relevant Orders    Supply: Wound Cleanser    Supply: Chaparrita Wound    Supply: Wound Dressings    Supply: Pack Wound    Supply: Cover and Secure    Supply: Edema Control    Smoker - Primary    Relevant Orders    Supply: Wound Cleanser    Supply: Chaparrita Wound    Supply: Wound Dressings    Supply: Pack Wound    Supply: Cover and Secure    Supply: Edema Control    Traumatic hematoma    Relevant Orders    Supply: Wound Cleanser    Supply: Chaparrita Wound    Supply: Wound Dressings    Supply: Pack Wound    Supply: Cover and Secure    Supply: Edema Control          Treatment Note please see attached Discharge Instructions    In my professional opinion this patient would benefit from HBO Therapy: No    Written patient dismissal instructions given to patient and signed by patient or POA. Ms. Lex Bowen is healing well!   Electronically signed by JAKOB Marquez CNP on 1/28/2021 at 12:06 PM

## 2021-01-28 NOTE — HOME CARE
[address] [facility]:     [address], [address] p: [phone] [phone]     Ordering Center:     [address] [facility],[address]  [address], [place] [address] [id number] [phone]  WOUND CARE Dept: [address] [facility] [phone]    Patient Information:      [id number]   [phone]   : 1950  AGE: 69 y.o. GENDER: female   EPISODE DATE: 2021    Insurance:      PRIMARY INSURANCE:  Plan: MEDICARE PART A AND B  Coverage: MEDICARE  Effective Date: 2020  Group Number: [unfilled]  Subscriber Number: [id number] - (Medicare)    Payor/Plan Subscr  Sex Relation Sub. Ins. ID Effective Group Num   1. [place] 1950 Female Self [id number]                                    [address] [address]   2. [id number] 1950 Female Self [id number]                                   [address]       Patient Wound Information:      Problem List Items Addressed This Visit     Diabetic ulcer of left lower leg associated with type 2 diabetes mellitus, with fat layer exposed ([address]    Relevant Orders    Supply: Wound Cleanser    Supply: Chaparrita Wound    Supply: Wound Dressings    Supply: Pack Wound    Supply: Cover and Secure    Supply: Edema Control    Smoker - Primary    Relevant Orders    Supply: Wound Cleanser    Supply: Chaparrita Wound    Supply: Wound Dressings    Supply: Pack Wound    Supply: Cover and Secure    Supply: Edema Control    Traumatic hematoma    Relevant Orders    Supply: Wound Cleanser    Supply: Chaparrita Wound    Supply: Wound Dressings    Supply: Pack Wound    Supply: Cover and Secure    Supply: Edema Control          WOUNDS REQUIRING DRESSING SUPPLIES:     Wound 21 Leg Left; Outer wound 1- left leg traumatic (Active)   Wound Image   21 1041   Wound Etiology Traumatic 21 1041   Dressing Status New drainage noted 01/28/21 1041   Wound Cleansed Soap and water 01/28/21 1041   Dressing/Treatment Moist to moist 01/14/21 1317   Wound Length (cm) 3.5 cm 01/28/21 1041   Wound Width (cm) 2.7 cm 01/28/21 1041   Wound Depth (cm) 0.3 cm 01/28/21 1041   Wound Surface Area (cm^2) 9.45 cm^2 01/28/21 1041   Change in Wound Size % (l*w) 30.77 01/28/21 1041   Wound Volume (cm^3) 2.84 cm^3 01/28/21 1041   Wound Healing % 70 01/28/21 1041   Post-Procedure Length (cm) 3.5 cm 01/28/21 1047   Post-Procedure Width (cm) 2.7 cm 01/28/21 1047   Post-Procedure Depth (cm) 0.5 cm 01/28/21 1047   Post-Procedure Surface Area (cm^2) 9.45 cm^2 01/28/21 1047   Post-Procedure Volume (cm^3) 4.72 cm^3 01/28/21 1047   Distance Tunneling (cm) 1.2 cm 01/28/21 1041   Tunneling Position ___ O'Clock 5 01/28/21 1041   Undermining Starts ___ O'Clock 0 01/28/21 1041   Undermining Ends___ O'Clock 0 01/28/21 1041   Undermining Maxium Distance (cm) 0 01/28/21 1041   Wound Assessment Pink/red 01/28/21 1041   Drainage Amount Moderate 01/28/21 1041   Drainage Description Serosanguinous 01/28/21 1041   Odor None 01/28/21 1041   Chaparrita-wound Assessment Hyperpigmented 01/28/21 1041   Margins Defined edges 01/28/21 1041   Wound Thickness Description not for Pressure Injury Full thickness 01/28/21 1041   Number of days: 22          Supplies Requested :      WOUND #: 1   PRIMARY DRESSING:  Other: promogran   Cover and Secure with: 4X4 gauze pad  Bulky roll gauze     FREQUENCY OF DRESSING CHANGES:  Daily         ADDITIONAL ITEMS:  [] Gloves Small  [x] Gloves Medium [] Gloves Large [] Gloves XLarge  [] Tape 1\" [] Tape 2\" [x] Tape 3\"  [x] Medipore Tape  [x] Saline  [] Skin Prep   [] Adhesive Remover   [x] Cotton Tip Applicators   [] Other:    Patient Wound(s) Debrided: [x] Yes if yes please add date 1/28/21   [] No    Debribement Type: Excisional/Sharp    Patient currently being seen by Home Health: [] Yes   [x] No    Duration for needed supplies:  []15  [x]30 []60  []90 Days    Electronically signed by Claudean Mew, APRN - CNP on 1/28/2021 at 11:24 AM     Provider Information:      PROVIDER'S NAME: Fidel ROBERT    NPI: [id number]

## 2021-02-10 ENCOUNTER — TELEPHONE (OUTPATIENT)
Dept: CARDIOLOGY CLINIC | Age: 71
End: 2021-02-10

## 2021-02-10 ENCOUNTER — OFFICE VISIT (OUTPATIENT)
Dept: CARDIOLOGY CLINIC | Age: 71
End: 2021-02-10
Payer: MEDICARE

## 2021-02-10 VITALS
WEIGHT: 172 LBS | BODY MASS INDEX: 27.64 KG/M2 | SYSTOLIC BLOOD PRESSURE: 120 MMHG | HEART RATE: 68 BPM | HEIGHT: 66 IN | DIASTOLIC BLOOD PRESSURE: 96 MMHG

## 2021-02-10 DIAGNOSIS — I25.10 CORONARY ARTERY DISEASE INVOLVING NATIVE CORONARY ARTERY OF NATIVE HEART WITHOUT ANGINA PECTORIS: Primary | ICD-10-CM

## 2021-02-10 PROCEDURE — 99214 OFFICE O/P EST MOD 30 MIN: CPT | Performed by: INTERNAL MEDICINE

## 2021-02-10 PROCEDURE — G8400 PT W/DXA NO RESULTS DOC: HCPCS | Performed by: INTERNAL MEDICINE

## 2021-02-10 PROCEDURE — 3017F COLORECTAL CA SCREEN DOC REV: CPT | Performed by: INTERNAL MEDICINE

## 2021-02-10 PROCEDURE — 1123F ACP DISCUSS/DSCN MKR DOCD: CPT | Performed by: INTERNAL MEDICINE

## 2021-02-10 PROCEDURE — 4004F PT TOBACCO SCREEN RCVD TLK: CPT | Performed by: INTERNAL MEDICINE

## 2021-02-10 PROCEDURE — 1090F PRES/ABSN URINE INCON ASSESS: CPT | Performed by: INTERNAL MEDICINE

## 2021-02-10 PROCEDURE — 93000 ELECTROCARDIOGRAM COMPLETE: CPT | Performed by: INTERNAL MEDICINE

## 2021-02-10 PROCEDURE — G8484 FLU IMMUNIZE NO ADMIN: HCPCS | Performed by: INTERNAL MEDICINE

## 2021-02-10 PROCEDURE — G8417 CALC BMI ABV UP PARAM F/U: HCPCS | Performed by: INTERNAL MEDICINE

## 2021-02-10 PROCEDURE — 4040F PNEUMOC VAC/ADMIN/RCVD: CPT | Performed by: INTERNAL MEDICINE

## 2021-02-10 PROCEDURE — G8427 DOCREV CUR MEDS BY ELIG CLIN: HCPCS | Performed by: INTERNAL MEDICINE

## 2021-02-10 ASSESSMENT — ENCOUNTER SYMPTOMS
BLOOD IN STOOL: 0
COUGH: 0
SHORTNESS OF BREATH: 1
ABDOMINAL DISTENTION: 0
VOMITING: 0
CONSTIPATION: 0
DIARRHEA: 0
WHEEZING: 0
EYE DISCHARGE: 0
BACK PAIN: 0

## 2021-02-10 NOTE — TELEPHONE ENCOUNTER
Called and spoke with patient, have PACER scheduled for 03/02/2021 at 0730 with arrival of 0630. Patient is to be NPO after midnight. Patient instructed to arrive through front entrance of hospital and make immediate left. Patient advised they can have one person with them but they both must wear a mask. Patient advised may take morning medications with sip of water. Also advised patient must have COVID testing completed on 02/26/2021 anywhere from 800-1100 am at [facility]. Advised patient that they will be able to proceed with procedure as long as test results are negative. Patient made aware that if testing is not resulted evening prior to procedure that they may have to be rescheduled and possibly retested. Given instructions on where to go and to self quarantine between testing and procedure. Patient does not have IV dye allergy. Patient verbally understood. Called and spoke to June Vincent in cath lab on 02/10/2021 and scheduled procedure.

## 2021-02-10 NOTE — PROGRESS NOTES
6.  ZIO monitor with 3.8-second pause with high-grade second-degree AV block and possible syncopal episode with fall and injury 10/2020. PRESENTATION: Barnabas Harada is a 69y.o. year old female returns for follow-up. She does report significant improvement in her energy levels after his stent. She has also made progress in terms of smoking cessation and has cut down significantly. During catheterization she was found to have a moderate to large abdominal aortic aneurysm. [facility] did go [facility] for further evaluation in this regard and this is being monitored. A ZIO monitor was placed in August 2020 and interpreted 9/17/2020. No follow-up was noted on this monitor result. She did have bouts of SVT that lasted up to 46 seconds but more concerning was a 3.8-second pause which occurred at 5:30 PM.  She also had significant high-grade Mobitz type I heart block at 8 AM all during wakeful hours. In October she got up to go to the bathroom and suddenly collapsed to the floor without any clear etiology. She did not lose consciousness. She did strike her head and did have a significant hematoma on her left leg. This wound is still healing and she dresses it daily. REVIEW OF SYSTEMS:  Review of Systems   Constitutional: Negative for activity change, fatigue and fever. HENT: Negative for ear pain, hearing loss and tinnitus. Eyes: Negative for discharge and visual disturbance. Respiratory: Positive for shortness of breath. Negative for cough and wheezing. Cardiovascular: Negative for chest pain, palpitations and leg swelling. Gastrointestinal: Negative for abdominal distention, blood in stool, constipation, diarrhea and vomiting. Endocrine: Negative for cold intolerance, heat intolerance, polydipsia and polyuria. Genitourinary: Negative for dysuria and hematuria. Musculoskeletal: Negative for arthralgias, back pain and myalgias. Skin: Negative for pallor and rash. Neurological: Positive for syncope. Negative for seizures, weakness and headaches. Psychiatric/Behavioral: Negative for behavioral problems and dysphoric mood.        Past Medical History:      Diagnosis Date    ASHD (arteriosclerotic heart disease)     s/p PTCA and stent of circumflex, as well as intermediate and mid LAD     COPD (chronic obstructive pulmonary disease) ([address]     Coronary atherosclerosis     Diabetes mellitus ([address]     diet controlled    Hypercholesteremia     Hypertension     Unstable angina ([address]        Past Surgical History:      Procedure Laterality Date    CARDIAC CATHETERIZATION  12/10/00    selective left heart and coronary arteriography with left ventriculography     CARDIAC CATHETERIZATION  01/23/98    left heart cath, left ventriculography, slective coronary arteriography, direct infarct angioplasty and stent placement to proximal left anterior descending coronary artery    CARDIAC CATHETERIZATION  03/05/94    left heart cath, selective coronary arteriography, left ventriculography    CARDIAC CATHETERIZATION  8/27/2011    [place]    CHOLECYSTECTOMY      CORONARY ANGIOPLASTY WITH STENT PLACEMENT  12/12/00    PTCA and stent placement to the mid LAD/ptca and stent placement first circumflex marginal (intermediate)     CORONARY ARTERY BYPASS GRAFT  8/29/2011    PACABG X 4 LIMA-LAD, SVG-DIAG, SVG-PDA, RT EVH, LT OPEN VEIN HARVEST, DR Millan [place]    HYSTERECTOMY      NECK SURGERY      parotid artery tumor removed bilaterally       Medications:  Current Outpatient Medications   Medication Sig Dispense Refill    metoprolol succinate (TOPROL XL) 100 MG extended release tablet Take 1 tablet by mouth daily 90 tablet 3    ramipril (ALTACE) 5 MG capsule Take 1 capsule by mouth daily 90 capsule 3    rosuvastatin (CRESTOR) 10 MG tablet Take 1 tablet by mouth daily 90 tablet 3    clopidogrel (PLAVIX) 75 MG tablet Take 1 tablet by mouth daily 90 tablet 3  metFORMIN (GLUCOPHAGE) 500 MG tablet Take 1 tablet by mouth 2 times daily (with meals) Hold for 2 days and restart a 2020 180 tablet 3    nitroGLYCERIN (NITROSTAT) 0.4 MG SL tablet Place 1 tablet under the tongue every 5 minutes as needed for Chest pain 25 tablet 3     No current facility-administered medications for this visit.         Allergies:  Codeine    Past Social History:  Social History     Socioeconomic History    Marital status:      Spouse name: Not on file    Number of children: Not on file    Years of education: Not on file    Highest education level: Not on file   Occupational History    Not on file   Social Needs    Financial resource strain: Not on file    Food insecurity     Worry: Not on file     Inability: Not on file    Transportation needs     Medical: Not on file     Non-medical: Not on file   Tobacco Use    Smoking status: Current Every Day Smoker     Packs/day: 0.50     Types: Cigarettes     Last attempt to quit: 2020     Years since quittin.4    Smokeless tobacco: Never Used   Substance and Sexual Activity    Alcohol use: No    Drug use: No    Sexual activity: Not on file   Lifestyle    Physical activity     Days per week: Not on file     Minutes per session: Not on file    Stress: Not on file   Relationships    Social connections     Talks on phone: Not on file     Gets together: Not on file     Attends Confucianism service: Not on file     Active member of club or organization: Not on file     Attends meetings of clubs or organizations: Not on file     Relationship status: Not on file    Intimate partner violence     Fear of current or ex partner: Not on file     Emotionally abused: Not on file     Physically abused: Not on file     Forced sexual activity: Not on file   Other Topics Concern    Not on file   Social History Narrative    Not on file       Family History:       Problem Relation Age of Onset    Cancer Mother  Coronary Art Dis Father          Physical Examination:  BP (!) 120/96   Pulse 68   Ht 5' 6\" (1.676 m)   Wt 172 lb (78 kg)   BMI 27.76 kg/m²   Physical Exam  Constitutional:       General: She is not in acute distress. Appearance: She is not diaphoretic. Comments: Mild truncal obesity  Blood pressure 150/90 mmHg. Patient reports she has whitecoat hypertension and her pressures are better at home. HENT:      Mouth/Throat:      Pharynx: No oropharyngeal exudate. Eyes:      General: No scleral icterus. Right eye: No discharge. Left eye: No discharge. Neck:      Thyroid: No thyromegaly. Vascular: No JVD. Cardiovascular:      Rate and Rhythm: Normal rate. Rhythm irregular. No extrasystoles are present. Heart sounds: Normal heart sounds, S1 normal and S2 normal. No murmur. No systolic murmur. No diastolic murmur. No friction rub. No gallop. No S3 or S4 sounds. Comments: No JVD  No edema  No significant systolic or diastolic murmurs appreciated  No gallop noted  Pulmonary:      Effort: Pulmonary effort is normal. No respiratory distress. Breath sounds: Normal breath sounds. No wheezing or rales. Comments: Markedly diminished air entry bilaterally with expiratory mild wheezing  Chest:      Chest wall: No tenderness. Abdominal:      General: Bowel sounds are normal. There is no distension. Palpations: Abdomen is soft. There is no mass. Tenderness: There is no abdominal tenderness. There is no guarding or rebound. Hernia: No hernia is present. Comments: No palpable organomegaly   Musculoskeletal: Normal range of motion. Skin:     General: Skin is warm. Coloration: Skin is not pale. Findings: No rash. Neurological:      Mental Status: She is alert and oriented to person, place, and time. Cranial Nerves: No cranial nerve deficit.       Deep Tendon Reflexes: Reflexes normal.           Labs: CBC: No results for input(s): WBC, HGB, HCT, PLT in the last 72 hours. BMP:No results for input(s): NA, K, CO2, BUN, CREATININE, LABGLOM, GLUCOSE in the last 72 hours. BNP: No results for input(s): BNP in the last 72 hours. PT/INR: No results for input(s): PROTIME, INR in the last 72 hours. APTT:No results for input(s): APTT in the last 72 hours. CARDIAC ENZYMES:No results for input(s): CKTOTAL, CKMB, CKMBINDEX, TROPONINI in the last 72 hours. FASTING LIPID PANEL:  Lab Results   Component Value Date    HDL 43 08/24/2020    LDLDIRECT 83 03/30/2014    LDLCALC 61 08/24/2020    TRIG 145 08/24/2020     LIVER PROFILE:No results for input(s): AST, ALT, LABALBU in the last 72 hours.         Imaging:  Indications:Coronary artery disease.      Conclusions      Summary   Technically difficult study with many images of poor quality   Small left ventricular cavity with preserved systolic function EF 82%   Mitral valve disease preclude standard assessment of diastolic function   but tissue Doppler velocities suggest diastolic dysfunction   Mild left atrial enlargement   Poor visualization of the aortic valve which appears to offer no   significant stenosis or insufficiency   Poor visualization of the mitral valve with significant calcification of   the mitral annulus which appears to involve the mitral leaflets   Peak gradient of 6.25 mmHg with a pressure halftime value of 1.48 cm^2   consistent with mild mitral stenosis   Trace mitral regurgitation   Pulmonic valve not well visualized   Poor visualization of the right-sided chambers   No tricuspid regurgitation captured   IVC dimensions within normal limits consistent with normal right atrial   filling pressures   No significant pericardial effusion and aortic dimensions are within   normal limits      Signature      ----------------------------------------------------------------   Electronically signed by Renaye Carrel MD(Interpreting physician)   on 01/31/2020 12:39 PM [facility] Nuclear Stress Test Report   Procedure date: January 31, 2020   Indications: dyspnea   Procedure: Stress was performed with injection of 0.4 mg Lexiscan. Vital signs and EKG were monitored. Technetium-99 sestamibi was   injected in divided doses, approximately 10 mCi and 30 mCi   respectively for rest and stress imaging. The patient was discharged   in stable condition. Results: Patient had symptoms of dyspnea during infusion that resolved   in recovery. Baseline EKG showed baseline T-wave inversions in I   lateral leads. During stress, there was no significant   changes. Baseline and peak blood pressures were 152/99, and 163/82   respectively.  Baseline and peak heart rates were 73 and  118   respectively. Lexiscan/Cardiolyte Nuclear Medicine Report   Date of Procedure: 1/31/2020   The patient was injected with 75.9 millicuries (mCi) of Technetium   (Tc99m).  After an appropriate level of stress the patient was   re-injected with 31 millicuries (mCi) of Technetium (Tc99m).  Repeat   gated images were then performed per standard protocol. Findings:   1.  Analysis of the the stress and rest images reveals small apical   and anteroapical defect with mild reversibility. .   2.  Analysis of the gated images reveals grossly normal left   ventricular function with a calculated ejection fraction of 65 %.         Impression   Impression:   There is small area of apical and anteroapical infarct with mild   ischemia, with a calculated ejection fraction of 65 %. Suggest: Clinical Correlation and medical management if asymptomatic. Signed by Dr Mark Hicks on 1/31/2020 4:42 PM      PCI procedure:LAD, LAD, GABI      Conclusions      Triple-vessel disease with severe vessel ectasia in RCA, stenotic disease   in LAD. Severely stenotic stent in mid LAD. Occluded LIMA to mid LAD. Patent SVG to 1st diagonal.   Patent SVG to OM1. Patent SVG to RPDA. Normal LV ejection fraction. Large calcified abdominal aortic aneurysm with large mural thrombus. Successful PCI to proximal to mid LAD (4.0 x 15 mm resolute integrity)   utilizing drug-eluting stent. Successful PCI to mid LAD (2.75 x 26 mm resolute integrity taken to 3.0   mm) utilizing drug-eluting stent. Recommendations      Medical management. Smoking cessation. Vascular surgical consult for large abdominal aortic aneurysm. Signatures      ----------------------------------------------------------------   Electronically signed by Phillis Curling Mani,MD(Performing Physician) on   08/25/2020 00:28   ----------------------------------------------------------------      Angiographic Findings      Cardiac Arteries and Lesion Findings     LMCA: Left Main widely patent.     LAD: LAD proximal to mid severe vessel ectasia. LAD proximal to mid 80% kink-like lesion. LAD mid in-stent 75% restenosis. 1st diagonal ostial 100% occluded in stent MCC. LIMA to the mid % occluded. SVG to 1st diagonal widely patent. Lesion on Mid LAD: 75% stenosis reduced to 0%. Pre procedure ALTA III    flow was noted. Post Procedure ALTA III flow was present. The guidewire    cross was successful. The lesion was diagnosed as Low Risk (A). Treatment results:Interventional treatment was successful. Comments:Mid LAD in-stent (B-stent BMS) with 75% in-stent restenosis. Mid LAD stented with 2.75 x 26 mm resolute integrity stent. Taken to 3.0    mm. Devices used       - Balanced Middleweight Universal. Number of passes: 1.       - Medtronic Euphora 2.0mm x12mm RX Balloon Catheter. 2 inflation(s) to    a max pressure of: 12 kiko. - Resolute Integrity 2.75 x 26 GABI. 1 inflation(s) to a max pressure    of: 16 kiko. - Medtronic 3.0mm X 12mm NC Euphora RX Balloon Catheter. 2 inflation(s)    to a max pressure of: 21 kiko. - Abbott 2.8moC21aa NC Trek RX Balloon Catheter. Diameter: 2.5 mm. Length: 12 mm. 2 inflation(s) to a max pressure of: 21 kiko. Lesion on Prox LAD:       Lesion on Prox LAD: 80% stenosis 7 mm length reduced to 0%. Pre procedure    ALTA 0 flow was noted. Post Procedure ALTA III flow was present. The    guidewire cross was successful. The lesion was diagnosed as Low Risk (A). Treatment results:Interventional treatment was successful. Comments:Proximal to mid LAD with severe vessel ectasia. Proximal to mid LAD with 80% stenosis. Proximal to mid LAD stented with 4.0 x 15 mm resolute integrity stent. Devices used       - Balloon of Resolute Integrity 4.0 x 15 GABI. Lesion on 1st Dia% stenosis 5 mm length. LCx: Circumflex proximal 80% diffuse stenosis. SVG to OM1 widely patent. Lesion on Prox CX: 80% stenosis 9 mm length. RCA: RCA is a dominant vessel with severe vessel ectasia throughout its  body. RPDA ostia 100% occluded. SVG to RPDA widely patent. Lesion on R PDA: 100% stenosis 8 mm length. Graft Lesions       Lesion on LIMA to Dist LAD: Proximal body. 100% stenosis 46 mm length. Cardiac Grafts      -  There is a graft that originates at the Aorta Right and attaches to the      R PDA (SVG to RPDA widely patent. ). -  There is a graft that originates at the Aorta Left and attaches to the      1st Diag (SVG to 1st diagnosed widely patent. ). -  There is a graft that originates at the Aorta Left and attaches to the      1st Ob Rea (SVG to OM1 widely patent. ). -  There is a graft that originates at the LIMA and attaches to the Dist      LAD (LIMA to mid % occluded. ).       [facility]     LV function assessed as:Normal.  Ejection Fraction    - Method: LV gram. EF%: 65.     EKG 2/10/2021: Sinus rhythm with first-degree AV block with prolonged AR interval at 230 ms  Biatrial enlargement  Inferior Q waves consistent with prior inferior infarct  Nonspecific T wave abnormality    ASSESSMENT and PLAN: 71-year-old female patient with past medical history of COPD with chronic ongoing tobacco use, CAD with prior PCI to LAD and circumflex with bare-metal stents, CABG 8/2011 with LIMA to LAD, SVG to diagonal, SVG to PDA, normal LV ejection fraction, diabetes mellitus, found on catheterization 8/24/2020 to have an occluded LIMA with obstructive LAD which was treated with GABI, new finding of moderately large abdominal aortic aneurysm which is being monitored, ZIO monitor with high-grade AV block and 3.8-second pauses with traumatic fall without clear etiology, here for follow-up evaluation. 1.  Post PCI she has had significant improvement in energy levels. Hopefully she will stop tobacco use completely. Her blood pressure is elevated on this visit. I have advised her that good blood pressure control is essential especially in light of her abdominal aortic aneurysm. She reports her pressures are better at home. I have asked her to monitor this. If her blood pressures are elevated she will need increase in medications. She benefits from being on a beta-blocker especially in light of their abdominal aortic aneurysm. I have taken her off aspirin. She will remain on Plavix at this time as the sole antiplatelet agent. 2.  She does have a prolonged first-degree AV block and her ZIO monitor did show high-grade AV block in September 2020 with 3.8-second pauses. The etiology of her fall was not clear. No mechanical tripping was noted. She did not lose consciousness but did hit her head and have significant hematoma from Plavix related bleeding. Given this information I would recommend that she proceed with pacemaker placement as she does have SVT as well and benefits from AV delio agents. I have discussed this in detail with her and she is in agreement. We will proceed with pacemaker placement. This is being arranged. 3.  Routine follow-up has been scheduled for 6 months though I will be seeing her earlier during pacemaker placement. Orders:  Orders Placed This Encounter   Procedures    EKG 12 lead     No orders of the defined types were placed in this encounter. Return in about 6 months (around 8/10/2021). Electronically signed by Iona Lopez MD on 2/10/2021 at 12:41 PM    [facility]      Thisdictation was generated by voice recognition computer software. Although all attempts are made to edit the dictation for accuracy, there may be errors in the transcription that are not intended.

## 2021-02-25 ENCOUNTER — HOSPITAL ENCOUNTER (OUTPATIENT)
Dept: WOUND CARE | Age: 71
Discharge: HOME | End: 2021-02-25
Payer: MEDICARE

## 2021-02-25 ENCOUNTER — IMMUNIZATION (OUTPATIENT)
Age: 71
End: 2021-02-25
Payer: MEDICARE

## 2021-02-25 VITALS
HEIGHT: 66 IN | DIASTOLIC BLOOD PRESSURE: 88 MMHG | RESPIRATION RATE: 18 BRPM | TEMPERATURE: 97 F | WEIGHT: 172 LBS | HEART RATE: 70 BPM | BODY MASS INDEX: 27.64 KG/M2 | SYSTOLIC BLOOD PRESSURE: 120 MMHG

## 2021-02-25 DIAGNOSIS — E11.622 DIABETIC ULCER OF LEFT LOWER LEG ASSOCIATED WITH TYPE 2 DIABETES MELLITUS, WITH FAT LAYER EXPOSED (HCC): ICD-10-CM

## 2021-02-25 DIAGNOSIS — L97.922 DIABETIC ULCER OF LEFT LOWER LEG ASSOCIATED WITH TYPE 2 DIABETES MELLITUS, WITH FAT LAYER EXPOSED (HCC): ICD-10-CM

## 2021-02-25 DIAGNOSIS — T14.8XXA TRAUMATIC HEMATOMA: ICD-10-CM

## 2021-02-25 DIAGNOSIS — F17.200 SMOKER: Primary | ICD-10-CM

## 2021-02-25 PROCEDURE — 91300 COVID-19, PFIZER VACCINE 30MCG/0.3ML DOSE: CPT | Performed by: FAMILY MEDICINE

## 2021-02-25 PROCEDURE — 97597 DBRDMT OPN WND 1ST 20 CM/<: CPT | Performed by: NURSE PRACTITIONER

## 2021-02-25 PROCEDURE — 0001A COVID-19, PFIZER VACCINE 30MCG/0.3ML DOSE: CPT | Performed by: FAMILY MEDICINE

## 2021-02-25 PROCEDURE — 97597 DBRDMT OPN WND 1ST 20 CM/<: CPT

## 2021-02-25 NOTE — PROGRESS NOTES
[address]   Progress Note and Procedure Note      [address] [facility] RECORD NUMBER:  [id number]  AGE: 66 y.o. GENDER: female  : 1950  EPISODE DATE:  2021    Subjective:     Chief Complaint   Patient presents with    Wound Check     follow up         HISTORY of PRESENT ILLNESS HPI     Rm Mayer is a 66 y.o. female who presents today for wound/ulcer evaluation.    History of Wound Context: left leg wound    Ulcer Identification:  Ulcer Type: venous and traumatic  Contributing Factors: diabetes and smoking    Wound: N/A        PAST MEDICAL HISTORY        Diagnosis Date    ASHD (arteriosclerotic heart disease)     s/p PTCA and stent of circumflex, as well as intermediate and mid LAD     [facility]) [facility]     Coronary atherosclerosis     Diabetes mellitus ([facility] [facility]     diet controlled    Hypercholesteremia     Hypertension     Unstable angina ([facility] [facility]        PAST SURGICAL HISTORY    Past Surgical History:   Procedure Laterality Date    CARDIAC CATHETERIZATION  12/10/00    selective left heart and coronary arteriography with left ventriculography     CARDIAC CATHETERIZATION  98    left heart cath, left ventriculography, slective coronary arteriography, direct infarct angioplasty and stent placement to proximal left anterior descending coronary artery    CARDIAC CATHETERIZATION  94    left heart cath, selective coronary arteriography, left ventriculography    CARDIAC CATHETERIZATION  2011    [place]    CHOLECYSTECTOMY      CORONARY ANGIOPLASTY WITH STENT PLACEMENT  00    PTCA and stent placement to the mid LAD/ptca and stent placement first circumflex marginal (intermediate)     CORONARY ARTERY BYPASS GRAFT  2011    PACABG X 4 LIMA-LAD, SVG-DIAG, SVG-PDA, RT EVH, LT OPEN VEIN HARVEST, DR Cali Cooper    HYSTERECTOMY      NECK SURGERY      parotid artery tumor removed bilaterally       FAMILY HISTORY    Family History   Problem Relation Age of Onset    Cancer Mother     Coronary Art Dis Father        SOCIAL HISTORY    Social History     Tobacco Use    Smoking status: Current Every Day Smoker     Packs/day: 0.50     Types: Cigarettes     Last attempt to quit: 2020     Years since quittin.5    Smokeless tobacco: Never Used   Substance Use Topics    Alcohol use: No    Drug use: No       ALLERGIES    Allergies   Allergen Reactions    Codeine Other (See Comments)     SOB       MEDICATIONS    Current Outpatient Medications on File Prior to Encounter   Medication Sig Dispense Refill    metoprolol succinate (TOPROL XL) 100 MG extended release tablet Take 1 tablet by mouth daily 90 tablet 3    ramipril (ALTACE) 5 MG capsule Take 1 capsule by mouth daily 90 capsule 3    rosuvastatin (CRESTOR) 10 MG tablet Take 1 tablet by mouth daily 90 tablet 3    clopidogrel (PLAVIX) 75 MG tablet Take 1 tablet by mouth daily 90 tablet 3    metFORMIN (GLUCOPHAGE) 500 MG tablet Take 1 tablet by mouth 2 times daily (with meals) Hold for 2 days and restart a 2020 180 tablet 3    nitroGLYCERIN (NITROSTAT) 0.4 MG SL tablet Place 1 tablet under the tongue every 5 minutes as needed for Chest pain 25 tablet 3     No current facility-administered medications on file prior to encounter. REVIEW OF SYSTEMS    A comprehensive review of systems was negative.     Objective:      /88   Pulse 70   Temp 97 °F (36.1 °C) (Temporal)   Resp 18   Ht 5' 6\" (1.676 m)   Wt 172 lb (78 kg)   BMI 27.76 kg/m²     Wt Readings from Last 3 Encounters:   21 172 lb (78 kg)   02/10/21 172 lb (78 kg)   21 172 lb (78 kg)       PHYSICAL EXAM    General Appearance: alert and oriented to person, place and time, well developed and well- nourished, in no acute distress  Skin: warm and dry, no rash or erythema  Head: normocephalic and atraumatic  Eyes: pupils equal, round, and reactive to light, extraocular eye movements intact, conjunctivae normal  ENT: tympanic membrane, external ear and ear canal normal bilaterally, nose without deformity, nasal mucosa and turbinates normal without polyps  Neck: supple and non-tender without mass, no thyromegaly or thyroid nodules, no cervical lymphadenopathy  Pulmonary/Chest: clear to auscultation bilaterally- no wheezes, rales or rhonchi, normal air movement, no respiratory distress  Extremities: no cyanosis, clubbing or edema  Musculoskeletal: normal range of motion, no joint swelling, deformity or tenderness  Neurologic: reflexes normal and symmetric, no cranial nerve deficit, gait, coordination and speech normal      Assessment:      Patient Active Problem List   Diagnosis Code    Deep vein thrombosis ([facility]) I82.409    Essential hypertension I10    Diabetes mellitus ([facility] [address] E11.9    Hypercholesteremia E78.00    S/P CABG x 4 Z95.1    History of coronary artery stent placement Z95.5    Coronary artery disease involving native coronary artery of native heart without angina pectoris I25.10    Mixed hyperlipidemia E78.2    History of diabetes mellitus Z86.39    Chronic obstructive pulmonary disease ([facility]) J44.9    NUNEZ (dyspnea on exertion) R06.00    Abdominal aortic aneurysm (AAA) without rupture ([facility]) I71.4    Diabetic ulcer of left lower leg associated with type 2 diabetes mellitus, with fat layer exposed ([facility]) E11.622, L97.922    Smoker F17.200    Traumatic hematoma T14. 8XXA        Procedure Note  Indications:  Based on my examination of this patient's wound(s)/ulcer(s) today, debridement is required to promote healing and evaluate the wound base.     Performed by: JAKOB Goff - CNP    Consent obtained:  Yes    Time out taken:  Yes    Pain Control: Anesthetic  Anesthetic: 2% Lidocaine Gel Topical       Debridement:Non-excisional Debridement    Using curette the wound(s)/ulcer(s) was/were sharply debrided down through and including the removal of epidermis and dermis. Devitalized Tissue Debrided:  fibrin, biofilm, slough and exudate    Pre Debridement Measurements:  Are located in the Wound/Ulcer Documentation Flow Sheet    Wound/Ulcer #: 1    Post Debridement Measurements:  Wound/Ulcer Descriptions are Pre Debridement except measurements:      Percent of Wound/Ulcer Debrided: 100%    Total Surface Area Debrided:  3.5 sq cm     Wound 01/05/21 Leg Left; Outer wound 1- left leg traumatic (Active)   Wound Image   02/25/21 0923   Wound Etiology Traumatic 02/25/21 0923   Dressing Status New drainage noted 02/25/21 0923   Wound Cleansed Cleansed with saline 02/25/21 0923   Dressing/Treatment Moist to moist 01/28/21 1047   Wound Length (cm) 3.5 cm 02/25/21 0923   Wound Width (cm) 1 cm 02/25/21 0923   Wound Depth (cm) 0.1 cm 02/25/21 0923   Wound Surface Area (cm^2) 3.5 cm^2 02/25/21 0923   Change in Wound Size % (l*w) 74.36 02/25/21 0923   Wound Volume (cm^3) 0.35 cm^3 02/25/21 0923   Wound Healing % 96 02/25/21 0923   Post-Procedure Length (cm) 3.5 cm 02/25/21 1007   Post-Procedure Width (cm) 1 cm 02/25/21 1007   Post-Procedure Depth (cm) 0.1 cm 02/25/21 1007   Post-Procedure Surface Area (cm^2) 3.5 cm^2 02/25/21 1007   Post-Procedure Volume (cm^3) 0.35 cm^3 02/25/21 1007   Distance Tunneling (cm) 0 cm 02/25/21 0923   Tunneling Position ___ O'Clock 0 02/25/21 0923   Undermining Starts ___ O'Clock 0 02/25/21 0923   Undermining Ends___ O'Clock 0 02/25/21 0923   Undermining Maxium Distance (cm) 0 02/25/21 0923   Wound Assessment Pink/red 02/25/21 0923   Drainage Amount Moderate 02/25/21 0923   Drainage Description Serosanguinous 02/25/21 0923   Odor None 02/25/21 0923   Chaparrita-wound Assessment Hyperpigmented 02/25/21 0923   Margins Defined edges 02/25/21 0923   Wound Thickness Description not for Pressure Injury Full thickness 02/25/21 0923   Number of days: 50            Diabetic/Pressure/Non Pressure Ulcers only:  Ulcer: N/A      Estimated Blood Loss:  Minimal    Hemostasis Achieved:  by pressure    Procedural Pain:  0  / 10     Post Procedural Pain:  0 / 10     Response to treatment:  Well tolerated by patient. Plan:     Problem List Items Addressed This Visit     * (Principal) Diabetic ulcer of left lower leg associated with type 2 diabetes mellitus, with fat layer exposed ([address]    Relevant Orders    Supply: Wound Cleanser    Supply: Chaparrita Wound    Supply: Pack Wound    Supply: Cover and Secure    Supply: Edema Control    Smoker - Primary    Relevant Orders    Supply: Wound Cleanser    Supply: Chaparrita Wound    Supply: Pack Wound    Supply: Cover and Secure    Supply: Edema Control    Traumatic hematoma    Relevant Orders    Supply: Wound Cleanser    Supply: Chaparrita Wound    Supply: Pack Wound    Supply: Cover and Secure    Supply: Edema Control          Treatment Note please see attached Discharge Instructions    In my professional opinion this patient would benefit from HBO Therapy: No    Written patient dismissal instructions given to patient and signed by patient or POA. Ms. Cheng Jefferson is healing well.     Electronically signed by JAKOB Cespedes CNP on 2/25/2021 at 1:13 PM

## 2021-02-26 ENCOUNTER — OFFICE VISIT (OUTPATIENT)
Age: 71
End: 2021-02-26

## 2021-02-26 VITALS — TEMPERATURE: 97.2 F

## 2021-02-26 DIAGNOSIS — Z11.59 SCREENING FOR VIRAL DISEASE: Primary | ICD-10-CM

## 2021-02-26 LAB — SARS-COV-2, PCR: NOT DETECTED

## 2021-02-26 PROCEDURE — 99999 PR OFFICE/OUTPT VISIT,PROCEDURE ONLY: CPT | Performed by: NURSE PRACTITIONER

## 2021-03-02 ENCOUNTER — HOSPITAL ENCOUNTER (OUTPATIENT)
Dept: CARDIAC CATH/INVASIVE PROCEDURES | Age: 71
Setting detail: OBSERVATION
Discharge: HOME | End: 2021-03-03
Attending: INTERNAL MEDICINE | Admitting: INTERNAL MEDICINE
Payer: MEDICARE

## 2021-03-02 ENCOUNTER — APPOINTMENT (OUTPATIENT)
Dept: GENERAL RADIOLOGY | Age: 71
End: 2021-03-02
Attending: INTERNAL MEDICINE
Payer: MEDICARE

## 2021-03-02 PROBLEM — I42.8 NICM (NONISCHEMIC CARDIOMYOPATHY) (HCC): Status: ACTIVE | Noted: 2021-03-02

## 2021-03-02 LAB
ANION GAP SERPL CALCULATED.3IONS-SCNC: 9 MMOL/L (ref 7–19)
BUN BLDV-MCNC: 12 MG/DL (ref 8–23)
CALCIUM SERPL-MCNC: 9.5 MG/DL (ref 8.8–10.2)
CHLORIDE BLD-SCNC: 99 MMOL/L (ref 98–111)
CO2: 32 MMOL/L (ref 22–29)
CREAT SERPL-MCNC: 0.8 MG/DL (ref 0.5–0.9)
EKG P AXIS: 65 DEGREES
EKG P-R INTERVAL: 198 MS
EKG Q-T INTERVAL: 412 MS
EKG QRS DURATION: 94 MS
EKG QTC CALCULATION (BAZETT): 438 MS
EKG T AXIS: 103 DEGREES
GFR AFRICAN AMERICAN: >59
GFR NON-AFRICAN AMERICAN: >60
GLUCOSE BLD-MCNC: 139 MG/DL (ref 74–109)
HCT VFR BLD CALC: 47.5 % (ref 37–47)
HEMOGLOBIN: 14.7 G/DL (ref 12–16)
MCH RBC QN AUTO: 28.6 PG (ref 27–31)
MCHC RBC AUTO-ENTMCNC: 30.9 G/DL (ref 33–37)
MCV RBC AUTO: 92.4 FL (ref 81–99)
PDW BLD-RTO: 15.2 % (ref 11.5–14.5)
PLATELET # BLD: 227 K/UL (ref 130–400)
PMV BLD AUTO: 9.4 FL (ref 9.4–12.3)
POTASSIUM SERPL-SCNC: 4 MMOL/L (ref 3.5–5)
RBC # BLD: 5.14 M/UL (ref 4.2–5.4)
SODIUM BLD-SCNC: 140 MMOL/L (ref 136–145)
WBC # BLD: 7.3 K/UL (ref 4.8–10.8)

## 2021-03-02 PROCEDURE — 71046 X-RAY EXAM CHEST 2 VIEWS: CPT

## 2021-03-02 PROCEDURE — 2709999900 HC NON-CHARGEABLE SUPPLY

## 2021-03-02 PROCEDURE — 99152 MOD SED SAME PHYS/QHP 5/>YRS: CPT | Performed by: INTERNAL MEDICINE

## 2021-03-02 PROCEDURE — 2780000010 HC IMPLANT OTHER

## 2021-03-02 PROCEDURE — 2580000003 HC RX 258: Performed by: INTERNAL MEDICINE

## 2021-03-02 PROCEDURE — 33208 INSRT HEART PM ATRIAL & VENT: CPT | Performed by: INTERNAL MEDICINE

## 2021-03-02 PROCEDURE — 6360000002 HC RX W HCPCS: Performed by: INTERNAL MEDICINE

## 2021-03-02 PROCEDURE — C1785 PMKR, DUAL, RATE-RESP: HCPCS

## 2021-03-02 PROCEDURE — 93005 ELECTROCARDIOGRAM TRACING: CPT | Performed by: INTERNAL MEDICINE

## 2021-03-02 PROCEDURE — G0378 HOSPITAL OBSERVATION PER HR: HCPCS

## 2021-03-02 PROCEDURE — 80048 BASIC METABOLIC PNL TOTAL CA: CPT

## 2021-03-02 PROCEDURE — C1898 LEAD, PMKR, OTHER THAN TRANS: HCPCS

## 2021-03-02 PROCEDURE — 2700000000 HC OXYGEN THERAPY PER DAY

## 2021-03-02 PROCEDURE — 99152 MOD SED SAME PHYS/QHP 5/>YRS: CPT

## 2021-03-02 PROCEDURE — 99153 MOD SED SAME PHYS/QHP EA: CPT

## 2021-03-02 PROCEDURE — 33208 INSRT HEART PM ATRIAL & VENT: CPT

## 2021-03-02 PROCEDURE — 85027 COMPLETE CBC AUTOMATED: CPT

## 2021-03-02 PROCEDURE — 93010 ELECTROCARDIOGRAM REPORT: CPT | Performed by: INTERNAL MEDICINE

## 2021-03-02 PROCEDURE — 6370000000 HC RX 637 (ALT 250 FOR IP): Performed by: INTERNAL MEDICINE

## 2021-03-02 PROCEDURE — 36415 COLL VENOUS BLD VENIPUNCTURE: CPT

## 2021-03-02 PROCEDURE — 2500000003 HC RX 250 WO HCPCS

## 2021-03-02 PROCEDURE — 6360000002 HC RX W HCPCS

## 2021-03-02 PROCEDURE — 51798 US URINE CAPACITY MEASURE: CPT

## 2021-03-02 PROCEDURE — 71045 X-RAY EXAM CHEST 1 VIEW: CPT

## 2021-03-02 RX ORDER — AMLODIPINE BESYLATE 5 MG/1
5 TABLET ORAL DAILY
Status: DISCONTINUED | OUTPATIENT
Start: 2021-03-02 | End: 2021-03-02

## 2021-03-02 RX ORDER — CHLORHEXIDINE GLUCONATE 4 G/100ML
SOLUTION TOPICAL ONCE
Status: DISCONTINUED | OUTPATIENT
Start: 2021-03-02 | End: 2021-03-03 | Stop reason: HOSPADM

## 2021-03-02 RX ORDER — KETOROLAC TROMETHAMINE 30 MG/ML
15 INJECTION, SOLUTION INTRAMUSCULAR; INTRAVENOUS EVERY 6 HOURS PRN
Status: DISCONTINUED | OUTPATIENT
Start: 2021-03-02 | End: 2021-03-03 | Stop reason: HOSPADM

## 2021-03-02 RX ORDER — SODIUM CHLORIDE 0.9 % (FLUSH) 0.9 %
10 SYRINGE (ML) INJECTION PRN
Status: DISCONTINUED | OUTPATIENT
Start: 2021-03-02 | End: 2021-03-03 | Stop reason: HOSPADM

## 2021-03-02 RX ORDER — RAMIPRIL 5 MG/1
5 CAPSULE ORAL DAILY
Status: DISCONTINUED | OUTPATIENT
Start: 2021-03-03 | End: 2021-03-03 | Stop reason: HOSPADM

## 2021-03-02 RX ORDER — METOPROLOL SUCCINATE 50 MG/1
100 TABLET, EXTENDED RELEASE ORAL DAILY
Status: DISCONTINUED | OUTPATIENT
Start: 2021-03-03 | End: 2021-03-03 | Stop reason: HOSPADM

## 2021-03-02 RX ORDER — SODIUM CHLORIDE 9 MG/ML
INJECTION, SOLUTION INTRAVENOUS CONTINUOUS
Status: DISCONTINUED | OUTPATIENT
Start: 2021-03-02 | End: 2021-03-02

## 2021-03-02 RX ORDER — ROSUVASTATIN CALCIUM 10 MG/1
10 TABLET, COATED ORAL NIGHTLY
Status: DISCONTINUED | OUTPATIENT
Start: 2021-03-02 | End: 2021-03-03 | Stop reason: HOSPADM

## 2021-03-02 RX ORDER — CLOPIDOGREL BISULFATE 75 MG/1
75 TABLET ORAL DAILY
Status: DISCONTINUED | OUTPATIENT
Start: 2021-03-03 | End: 2021-03-03 | Stop reason: HOSPADM

## 2021-03-02 RX ORDER — SODIUM CHLORIDE 0.9 % (FLUSH) 0.9 %
10 SYRINGE (ML) INJECTION EVERY 12 HOURS SCHEDULED
Status: DISCONTINUED | OUTPATIENT
Start: 2021-03-02 | End: 2021-03-03 | Stop reason: HOSPADM

## 2021-03-02 RX ORDER — NITROGLYCERIN 0.4 MG/1
0.4 TABLET SUBLINGUAL EVERY 5 MIN PRN
Status: DISCONTINUED | OUTPATIENT
Start: 2021-03-02 | End: 2021-03-03 | Stop reason: HOSPADM

## 2021-03-02 RX ORDER — AMLODIPINE BESYLATE 5 MG/1
5 TABLET ORAL ONCE
Status: COMPLETED | OUTPATIENT
Start: 2021-03-02 | End: 2021-03-02

## 2021-03-02 RX ORDER — ACETAMINOPHEN 325 MG/1
650 TABLET ORAL EVERY 6 HOURS PRN
Status: DISCONTINUED | OUTPATIENT
Start: 2021-03-02 | End: 2021-03-03 | Stop reason: HOSPADM

## 2021-03-02 RX ORDER — SODIUM CHLORIDE 9 MG/ML
INJECTION, SOLUTION INTRAVENOUS CONTINUOUS
Status: DISCONTINUED | OUTPATIENT
Start: 2021-03-02 | End: 2021-03-03 | Stop reason: HOSPADM

## 2021-03-02 RX ADMIN — KETOROLAC TROMETHAMINE 15 MG: 30 INJECTION, SOLUTION INTRAMUSCULAR at 16:47

## 2021-03-02 RX ADMIN — Medication 2000 MG: at 11:30

## 2021-03-02 RX ADMIN — SODIUM CHLORIDE: 9 INJECTION, SOLUTION INTRAVENOUS at 07:26

## 2021-03-02 RX ADMIN — ACETAMINOPHEN 650 MG: 325 TABLET ORAL at 14:14

## 2021-03-02 RX ADMIN — CEFAZOLIN SODIUM 2000 MG: 10 INJECTION, POWDER, FOR SOLUTION INTRAVENOUS at 16:40

## 2021-03-02 RX ADMIN — AMLODIPINE BESYLATE 5 MG: 5 TABLET ORAL at 17:50

## 2021-03-02 RX ADMIN — METFORMIN HYDROCHLORIDE 500 MG: 500 TABLET ORAL at 16:40

## 2021-03-02 RX ADMIN — SODIUM CHLORIDE: 9 INJECTION, SOLUTION INTRAVENOUS at 12:48

## 2021-03-02 ASSESSMENT — PAIN DESCRIPTION - PAIN TYPE: TYPE: ACUTE PAIN

## 2021-03-02 ASSESSMENT — PAIN SCALES - GENERAL
PAINLEVEL_OUTOF10: 5
PAINLEVEL_OUTOF10: 0

## 2021-03-02 NOTE — OP NOTE
Operative Note      Patient: Saman Morales  YOB: 1950  MRN: [id number]    Date of Procedure: 3/2/2021    Pre-Op Diagnosis: Sinoatrial node dysfunction with sinus pauses and syncope    Post-Op Diagnosis: Same       Procedure: Dual-chamber pacemaker placement    : Dipesh Min MD    Anesthesia: Moderate conscious sedation  Anesthesia: Lidocaine LA  Sedation: Versed  1mg; Fentanyl  0mg  Start time: 9:10 AM  Stop time: 10:11 AM  ASA Class: 3  Estimated blood loss: Less than 20 mL  An independent trained observer pushed medications at my direction. The patient was monitored continuously with the ECG, pulse oximetry, blood pressure monitoring, and direct observation for level of consciousness. Complications: None    Detailed Description of Procedure:     After obtaining informed written consent, the patient was brought to the catheterization laboratory where the left chest area was prepared in the usual sterile fashion. The patient was monitored continuously with ECG, pulse oximetry, blood pressure monitoring and direct observation. Additionally, please review the \"[facility] Hemodynamic Procedure Report\", which is generated electronically through the [employer]. This report includes additional details regarding this procedure including, but not limited to:     1. Patient Data,   2. Admission,   3. Procedure,   4. Hemodynamics,   5. Vital Signs,   6. Medications, including conscious sedation medications given during the procedure (as note above),   7. Procedure Log,   8. Device Usage,   9. Signature Audit [place], and,   10. Signatures. It should be noted, that I sign the \"Signatures\" line electronically through the \"HPF Def Portals\" tab on my computer. The 's hands were scrubbed in a betadine solution for 5 minutes. Moderate conscious sedation was administered at my direction.   Utilizing local anesthesia and a percutaneous technique, a single puncture was made in the left subclavian vein. Utilizing a combination of sharp and blunt dissection, a pacemaker pocket was created. A second puncture was then made in the left subclavian vein. The right ventricular and right atrial leads were inserted without difficulty and appropriate thresholds were obtained. The lead anchors were secured to the floor of the pacemaker pocket. The pacemaker pocket was copiously irrigated with antibiotic solution. The leads were attached to the generator. An antibiotic pouch was placed around the generator and leads and placed in the pocket. The subcutaneous and cutaneous tissues were approximated, and a sterile dressing applied. She was then taken to her room in stable and satisfactory condition. Complications:  none    Technical Information:       Model # Serial #        Right Atrial Lead (Medtronic) 5076 -52 PJN [id number]   Right Ventricular Lead (Medtronic) 5076 - 58 PJN [id number]          Pulse Width (ms) Voltage (V) Current (mA) Impedance (Ohms) P / R Wave (mV)            Right Atrial Lead 0.5  0.9   589  4.1   Right Ventricular Lead 0.5  1.1   760  0.9         Generator Product Name Model #: Serial #:        YVES XT DR HUNT  [id number]  RNB [id number]             IMPRESSION:    1. Successful insertion of a dual chamber rate response pacemaker for sino - atrial node dysfunction with significant pauses and syncope. 2.  Successful insertion of a right atrial lead  3. Successful insertion of a right ventricular lead  4. Successful pacemaker pocket creation  5. Successful placement of an antibiotic pouch  6.   Supervision of the administration of moderate conscious sedation      Electronically signed by Anna Trujillo MD on 3/2/21      Electronically signed by Anna Trujillo MD on 3/2/2021 at 10:32 AM

## 2021-03-02 NOTE — PROGRESS NOTES
4 Eyes Skin Assessment    Deneen Dubose is being assessed upon: Admission    I agree that I, Curt Leger, along with jules loja rn (either 2 RN's or 1 LPN and 1 RN) have performed a thorough Head to Toe Skin Assessment on the patient. ALL assessment sites listed below have been assessed. Areas assessed by both nurses:     [x]   Head, Face, and Ears   [x]   Shoulders, Back, and Chest  [x]   Arms, Elbows, and Hands   [x]   Coccyx, Sacrum, and Ischium  [x]   Legs, Feet, and Heels    Does the Patient have Skin Breakdown? Yes, present on admission     Pratik Prevention initiated: yes  Wound Care Orders initiated: No    WOC nurse consulted for Pressure Injury (Stage 3,4, Unstageable, DTI, NWPT, and Complex wounds) and New or Established Ostomies: No  Fell on Oct 29 and skinned left knee and had hematoma due to being on plavix. Pt has been treating with wet to dry dressing changes bid. She had also been seeing wound care as an outpatient.        Primary Nurse eSignature: Curt Leger RN on 3/2/2021 at 10:56 AM      Co-Signer eSignature: Elana Olguin RN aware

## 2021-03-02 NOTE — H&P
[id number] [facility]   Cardiology Office Note   [facility] [id number]   Phone: [phone] Fax: [phone]   Date: 2/10/2021   Patient: Rm Mayer   Age: 69 y.o., 1950   Referral: Christa Valenzuela MD   PROBLEM LIST:         Patient Active Problem List    Diagnosis Date Noted    Diabetic ulcer of left lower leg associated with type 2 diabetes mellitus, with fat layer exposed ([facility] [address] 01/05/2021     Priority: Low    Smoker 01/05/2021     Priority: Low    Traumatic hematoma 01/05/2021     Priority: Low    Abdominal aortic aneurysm (AAA) without rupture ([facility] [address] 09/10/2020     Priority: Low    History of diabetes mellitus 07/16/2020     Priority: Low    Chronic obstructive pulmonary disease ([facility] [address] 07/16/2020     Priority: Low    NUNEZ (dyspnea on exertion) 07/16/2020     Priority: Low    Mixed hyperlipidemia 12/19/2018     Priority: Low    S/P CABG x 4 10/11/2016     Priority: Low     Overview Note:     8/29/11 EF 60%     History of coronary artery stent placement 10/11/2016     Priority: Low    Coronary artery disease involving native coronary artery of native heart without angina pectoris 10/11/2016     Priority: Low    Hypercholesteremia 10/10/2011     Priority: Low    Deep vein thrombosis (HCC)      Priority: Low    Essential hypertension      Priority: Low    Diabetes mellitus ([facility] [address]      Priority: Low     Overview Note:     diet controlled      1. Coronary artery disease, prior multiple PCI's with bare-metal stents to LAD and circumflex, CABG 8/2011 with LIMA to LAD (occluded), SVG to diagonal (patent), SVG to PDA (patent), normal LV ejection fraction, status post PCI 8/24/2020 to proximal (4.0 x 15 mm resolute) and mid LAD (2.75 x 26 mm resolute) with GABI. 2. Chronic ongoing tobacco use with COPD. 3. Diabetes mellitus. 4. Hypertension. 5. Moderate to large abdominal aortic aneurysm 4.3 x 4.7 cm (noted at 8/24/2020)   6.  ZIO monitor with 3.8-second pause with high-grade second-degree AV block and possible syncopal episode with fall and injury 10/2020. PRESENTATION: Ellen Mcclendon is a 69y.o. year old female returns for follow-up. She does report significant improvement in her energy levels after his stent. She has also made progress in terms of smoking cessation and has cut down significantly. During catheterization she was found to have a moderate to large abdominal aortic aneurysm. [facility] for further evaluation in this regard and this is being monitored. A ZIO monitor was placed in August 2020 and interpreted 9/17/2020. No follow-up was noted on this monitor result. She did have bouts of SVT that lasted up to 46 seconds but more concerning was a 3.8-second pause which occurred at 5:30 PM. She also had significant high-grade Mobitz type I heart block at 8 AM all during wakeful hours. In October she got up to go to the bathroom and suddenly collapsed to the floor without any clear etiology. She did not lose consciousness. She did strike her head and did have a significant hematoma on her left leg. This wound is still healing and she dresses it daily. REVIEW OF SYSTEMS:   Review of Systems   Constitutional: Negative for activity change, fatigue and fever. HENT: Negative for ear pain, hearing loss and tinnitus. Eyes: Negative for discharge and visual disturbance. Respiratory: Positive for shortness of breath. Negative for cough and wheezing. Cardiovascular: Negative for chest pain, palpitations and leg swelling. Gastrointestinal: Negative for abdominal distention, blood in stool, constipation, diarrhea and vomiting. Endocrine: Negative for cold intolerance, heat intolerance, polydipsia and polyuria. Genitourinary: Negative for dysuria and hematuria. Musculoskeletal: Negative for arthralgias, back pain and myalgias. Skin: Negative for pallor and rash. Neurological: Positive for syncope.  Negative for seizures, weakness and headaches. Psychiatric/Behavioral: Negative for behavioral problems and dysphoric mood.      Past Medical History:   Past Medical History            Diagnosis Date    ASHD (arteriosclerotic heart disease)     s/p PTCA and stent of circumflex, as well as intermediate and mid LAD     COPD (chronic obstructive pulmonary disease) ([facility] [address]     Coronary atherosclerosis     Diabetes mellitus ([facility] [address]     diet controlled    Hypercholesteremia     Hypertension     Unstable angina ([facility] [address]    Past Surgical History:   Past Surgical History            Procedure Laterality Date    CARDIAC CATHETERIZATION  12/10/00    selective left heart and coronary arteriography with left ventriculography     CARDIAC CATHETERIZATION  01/23/98    left heart cath, left ventriculography, slective coronary arteriography, direct infarct angioplasty and stent placement to proximal left anterior descending coronary artery    CARDIAC CATHETERIZATION  03/05/94    left heart cath, selective coronary arteriography, left ventriculography    CARDIAC CATHETERIZATION  8/27/2011    [place]    CHOLECYSTECTOMY      CORONARY ANGIOPLASTY WITH STENT PLACEMENT  12/12/00    PTCA and stent placement to the mid LAD/ptca and stent placement first circumflex marginal (intermediate)     CORONARY ARTERY BYPASS GRAFT  8/29/2011    PACABG X 4 LIMA-LAD, SVG-DIAG, SVG-PDA, RT EVH, LT OPEN VEIN HARVEST, DR Khadijah Marcus    HYSTERECTOMY      NECK SURGERY      parotid artery tumor removed bilaterally   Medications:   Current Facility-Administered Medications          Current Outpatient Medications   Medication Sig Dispense Refill    metoprolol succinate (TOPROL XL) 100 MG extended release tablet Take 1 tablet by mouth daily 90 tablet 3    ramipril (ALTACE) 5 MG capsule Take 1 capsule by mouth daily 90 capsule 3    rosuvastatin (CRESTOR) 10 MG tablet Take 1 tablet by mouth daily 90 tablet 3    clopidogrel (PLAVIX) 75 MG tablet Take 1 tablet by mouth daily 90 tablet 3    metFORMIN (GLUCOPHAGE) 500 MG tablet Take 1 tablet by mouth 2 times daily (with meals) Hold for 2 days and restart a 2020 180 tablet 3    nitroGLYCERIN (NITROSTAT) 0.4 MG SL tablet Place 1 tablet under the tongue every 5 minutes as needed for Chest pain 25 tablet 3   No current facility-administered medications for this visit.      Allergies: Codeine   Past Social History:   Social History         Socioeconomic History    Marital status:      Spouse name: Not on file    Number of children: Not on file    Years of education: Not on file    Highest education level: Not on file   Occupational History    Not on file   Social Needs    Financial resource strain: Not on file    Food insecurity     Worry: Not on file     Inability: Not on file    Transportation needs     Medical: Not on file     Non-medical: Not on file   Tobacco Use    Smoking status: Current Every Day Smoker     Packs/day: 0.50     Types: Cigarettes     Last attempt to quit: 2020     Years since quittin.4    Smokeless tobacco: Never Used   Substance and Sexual Activity    Alcohol use: No    Drug use: No    Sexual activity: Not on file   Lifestyle    Physical activity     Days per week: Not on file     Minutes per session: Not on file    Stress: Not on file   Relationships    Social connections     Talks on phone: Not on file     Gets together: Not on file     Attends Christianity service: Not on file     Active member of club or organization: Not on file     Attends meetings of clubs or organizations: Not on file     Relationship status: Not on file    Intimate partner violence     Fear of current or ex partner: Not on file     Emotionally abused: Not on file     Physically abused: Not on file     Forced sexual activity: Not on file   Other Topics Concern    Not on file   Social History Narrative    Not on file   Family History:   Family History            Problem Relation Age of Onset    Cancer Mother  Coronary Art Dis Father    Physical Examination:   BP (!) 120/96  Pulse 68  Ht 5' 6\" (1.676 m)  Wt 172 lb (78 kg)  BMI 27.76 kg/m²   Physical Exam   Constitutional:   General: She is not in acute distress. Appearance: She is not diaphoretic. Comments: Mild truncal obesity  Blood pressure 150/90 mmHg. Patient reports she has whitecoat hypertension and her pressures are better at home. HENT:   Mouth/Throat:   Pharynx: No oropharyngeal exudate. Eyes:   General: No scleral icterus. Right eye: No discharge. Left eye: No discharge. Neck:   Thyroid: No thyromegaly. Vascular: No JVD. Cardiovascular:   Rate and Rhythm: Normal rate. Rhythm irregular. No extrasystoles are present. Heart sounds: Normal heart sounds, S1 normal and S2 normal. No murmur. No systolic murmur. No diastolic murmur. No friction rub. No gallop. No S3 or S4 sounds. Comments: No JVD  No edema  No significant systolic or diastolic murmurs appreciated  No gallop noted  Pulmonary:   Effort: Pulmonary effort is normal. No respiratory distress. Breath sounds: Normal breath sounds. No wheezing or rales. Comments: Markedly diminished air entry bilaterally with expiratory mild wheezing  Chest:   Chest wall: No tenderness. Abdominal:   General: Bowel sounds are normal. There is no distension. Palpations: Abdomen is soft. There is no mass. Tenderness: There is no abdominal tenderness. There is no guarding or rebound. Hernia: No hernia is present. Comments: No palpable organomegaly   Musculoskeletal: Normal range of motion. Skin:   General: Skin is warm. Coloration: Skin is not pale. Findings: No rash. Neurological:   Mental Status: She is alert and oriented to person, place, and time. Cranial Nerves: No cranial nerve deficit. Deep Tendon Reflexes: Reflexes normal.     Labs:   CBC: No results for input(s): WBC, HGB, HCT, PLT in the last 72 hours.    BMP:No results for input(s): NA, K, CO2, BUN, CREATININE, LABGLOM, GLUCOSE in the last 72 hours. BNP: No results for input(s): BNP in the last 72 hours. PT/INR: No results for input(s): PROTIME, INR in the last 72 hours. APTT:No results for input(s): APTT in the last 72 hours. CARDIAC ENZYMES:No results for input(s): CKTOTAL, CKMB, CKMBINDEX, TROPONINI in the last 72 hours. FASTING LIPID PANEL:         Lab Results   Component Value Date    HDL 43 08/24/2020    LDLDIRECT 83 03/30/2014    LDLCALC 61 08/24/2020    TRIG 145 08/24/2020     LIVER PROFILE:No results for input(s): AST, ALT, LABALBU in the last 72 hours. Imaging:   Indications:Coronary artery disease.    Conclusions   Summary   Technically difficult study with many images of poor quality   Small left ventricular cavity with preserved systolic function EF 52%   Mitral valve disease preclude standard assessment of diastolic function   but tissue Doppler velocities suggest diastolic dysfunction   Mild left atrial enlargement   Poor visualization of the aortic valve which appears to offer no   significant stenosis or insufficiency   Poor visualization of the mitral valve with significant calcification of   the mitral annulus which appears to involve the mitral leaflets   Peak gradient of 6.25 mmHg with a pressure halftime value of 1.48 cm^2   consistent with mild mitral stenosis   Trace mitral regurgitation   Pulmonic valve not well visualized   Poor visualization of the right-sided chambers   No tricuspid regurgitation captured   IVC dimensions within normal limits consistent with normal right atrial   filling pressures   No significant pericardial effusion and aortic dimensions are within   normal limits   Signature   ----------------------------------------------------------------   Electronically signed by Damaris Ghotra MD(Interpreting physician)   on 01/31/2020 12:39 PM   Lexiscan Nuclear Stress Test Report   Procedure date: January 31, 2020   Indications: dyspnea   Procedure: Stress was performed with injection of 0.4 mg Lexiscan. Vital signs and EKG were monitored. Technetium-99 sestamibi was   injected in divided doses, approximately 10 mCi and 30 mCi   respectively for rest and stress imaging. The patient was discharged   in stable condition. Results: Patient had symptoms of dyspnea during infusion that resolved   in recovery. Baseline EKG showed baseline T-wave inversions in I   lateral leads. During stress, there was no significant   changes. Baseline and peak blood pressures were 152/99, and 163/82   respectively. Baseline and peak heart rates were 73 and 118   respectively. Lexiscan/Cardiolyte Nuclear Medicine Report   Date of Procedure: 1/31/2020   The patient was injected with 03.5 millicuries (mCi) of Technetium   (Tc99m). After an appropriate level of stress the patient was   re-injected with 31 millicuries (mCi) of Technetium (Tc99m). Repeat   gated images were then performed per standard protocol. Findings:   1. Analysis of the the stress and rest images reveals small apical   and anteroapical defect with mild reversibility. .   2. Analysis of the gated images reveals grossly normal left   ventricular function with a calculated ejection fraction of 65 %. Impression   Impression:   There is small area of apical and anteroapical infarct with mild   ischemia, with a calculated ejection fraction of 65 %. Suggest: Clinical Correlation and medical management if asymptomatic. Signed by Dr Sweta Restrepo on 1/31/2020 4:42 PM   PCI procedure:LAD, LAD, GABI   Conclusions   Triple-vessel disease with severe vessel ectasia in RCA, stenotic disease   in LAD. Severely stenotic stent in mid LAD. Occluded LIMA to mid LAD. Patent SVG to 1st diagonal.   Patent SVG to OM1. Patent SVG to RPDA. Normal LV ejection fraction. Large calcified abdominal aortic aneurysm with large mural thrombus. Successful PCI to proximal to mid LAD (4.0 x 15 mm resolute integrity)   utilizing drug-eluting stent. Successful PCI to mid LAD (2.75 x 26 mm resolute integrity taken to 3.0   mm) utilizing drug-eluting stent. Recommendations   Medical management. Smoking cessation. Vascular surgical consult for large abdominal aortic aneurysm. Signatures   ----------------------------------------------------------------   Electronically signed by Joanie Rivas MD(Performing Physician) on   08/25/2020 00:28   ----------------------------------------------------------------   Angiographic Findings   Cardiac Arteries and Lesion Findings   LMCA: Left Main widely patent. LAD: LAD proximal to mid severe vessel ectasia. LAD proximal to mid 80% kink-like lesion. LAD mid in-stent 75% restenosis. 1st diagonal ostial 100% occluded in stent senior living. LIMA to the mid % occluded. SVG to 1st diagonal widely patent. Lesion on Mid LAD: 75% stenosis reduced to 0%. Pre procedure ALTA III   flow was noted. Post Procedure ALTA III flow was present. The guidewire   cross was successful. The lesion was diagnosed as Low Risk (A). Treatment results:Interventional treatment was successful. Comments:Mid LAD in-stent (B-stent BMS) with 75% in-stent restenosis. Mid LAD stented with 2.75 x 26 mm resolute integrity stent. Taken to 3.0   mm. Devices used   - Balanced Middleweight Universal. Number of passes: 1.   - Medtronic Euphora 2.0mm x12mm RX Balloon Catheter. 2 inflation(s) to   a max pressure of: 12 kiko. - Resolute Integrity 2.75 x 26 GABI. 1 inflation(s) to a max pressure   of: 16 kiko. - Medtronic 3.0mm X 12mm NC Euphora RX Balloon Catheter. 2 inflation(s)   to a max pressure of: 21 kiko. - Abbott [id number] NC Trek RX Balloon Catheter. Diameter: 2.5 mm. Length: 12 mm. 2 inflation(s) to a max pressure of: 21 kiko. Lesion on Prox LAD:   Lesion on Prox LAD: 80% stenosis 7 mm length reduced to 0%. Pre procedure   ALTA 0 flow was noted. Post Procedure ALTA III flow was present. The   guidewire cross was successful.  The lesion was diagnosed as Low Risk (A). Treatment results:Interventional treatment was successful. Comments:Proximal to mid LAD with severe vessel ectasia. Proximal to mid LAD with 80% stenosis. Proximal to mid LAD stented with 4.0 x 15 mm resolute integrity stent. Devices used   - Balloon of Resolute Integrity 4.0 x 15 GABI. Lesion on 1st Dia% stenosis 5 mm length. LCx: Circumflex proximal 80% diffuse stenosis. SVG to OM1 widely patent. Lesion on Prox CX: 80% stenosis 9 mm length. RCA: RCA is a dominant vessel with severe vessel ectasia throughout its   body. RPDA ostia 100% occluded. SVG to RPDA widely patent. Lesion on R PDA: 100% stenosis 8 mm length. Graft Lesions   Lesion on LIMA to Dist LAD: Proximal body. 100% stenosis 46 mm length. Cardiac Grafts   - There is a graft that originates at the Aorta Right and attaches to the   R PDA (SVG to RPDA widely patent. ). - There is a graft that originates at the Aorta Left and attaches to the   1st Diag (SVG to 1st diagnosed widely patent. ). - There is a graft that originates at the Aorta Left and attaches to the   1st Ob Rea (SVG to OM1 widely patent. ). - There is a graft that originates at the LIMA and attaches to the Dist   LAD (LIMA to mid % occluded. ). VA   LV function assessed as:Normal.   Ejection Fraction   - Method: LV gram. EF%: 65.    EKG 2/10/2021: Sinus rhythm with first-degree AV block with prolonged KY interval at 230 ms   Biatrial enlargement   Inferior Q waves consistent with prior inferior infarct   Nonspecific T wave abnormality   ASSESSMENT and PLAN:   65-year-old female patient with past medical history of COPD with chronic ongoing tobacco use, CAD with prior PCI to LAD and circumflex with bare-metal stents, CABG 2011 with LIMA to LAD, SVG to diagonal, SVG to PDA, normal LV ejection fraction, diabetes mellitus, found on catheterization 2020 to have an occluded LIMA with obstructive LAD which was treated with GABI, new finding of moderately large abdominal aortic aneurysm which is being monitored, ZIO monitor with high-grade AV block and 3.8-second pauses with traumatic fall without clear etiology, here for follow-up evaluation. 1. Post PCI she has had significant improvement in energy levels. Hopefully she will stop tobacco use completely. Her blood pressure is elevated on this visit. I have advised her that good blood pressure control is essential especially in light of her abdominal aortic aneurysm. She reports her pressures are better at home. I have asked her to monitor this. If her blood pressures are elevated she will need increase in medications. She benefits from being on a beta-blocker especially in light of their abdominal aortic aneurysm. I have taken her off aspirin. She will remain on Plavix at this time as the sole antiplatelet agent. 2. She does have a prolonged first-degree AV block and her ZIO monitor did show high-grade AV block in September 2020 with 3.8-second pauses. The etiology of her fall was not clear. No mechanical tripping was noted. She did not lose consciousness but did hit her head and have significant hematoma from Plavix related bleeding. Given this information I would recommend that she proceed with pacemaker placement as she does have SVT as well and benefits from AV delio agents. I have discussed this in detail with her and she is in agreement. We will proceed with pacemaker placement. This is being arranged. 3. Routine follow-up has been scheduled for 6 months though I will be seeing her earlier during pacemaker placement. Risks, benefits, alternatives of dual-chamber pacemaker placement discussed with the patient and full informed consent obtained.   Acceptable Mallampati score  Consent for moderate conscious sedation  ASA 3

## 2021-03-03 VITALS
SYSTOLIC BLOOD PRESSURE: 152 MMHG | OXYGEN SATURATION: 91 % | WEIGHT: 172 LBS | HEIGHT: 66 IN | RESPIRATION RATE: 16 BRPM | BODY MASS INDEX: 27.64 KG/M2 | DIASTOLIC BLOOD PRESSURE: 98 MMHG | TEMPERATURE: 97.2 F | HEART RATE: 82 BPM

## 2021-03-03 PROCEDURE — 6360000002 HC RX W HCPCS: Performed by: INTERNAL MEDICINE

## 2021-03-03 PROCEDURE — G0378 HOSPITAL OBSERVATION PER HR: HCPCS

## 2021-03-03 PROCEDURE — 6370000000 HC RX 637 (ALT 250 FOR IP): Performed by: INTERNAL MEDICINE

## 2021-03-03 PROCEDURE — 2580000003 HC RX 258: Performed by: INTERNAL MEDICINE

## 2021-03-03 RX ORDER — RAMIPRIL 5 MG/1
5 CAPSULE ORAL 2 TIMES DAILY
Qty: 180 CAPSULE | Refills: 3 | Status: SHIPPED | OUTPATIENT
Start: 2021-03-03 | End: 2021-04-14 | Stop reason: DRUGHIGH

## 2021-03-03 RX ADMIN — CEFAZOLIN SODIUM 2000 MG: 10 INJECTION, POWDER, FOR SOLUTION INTRAVENOUS at 01:35

## 2021-03-03 RX ADMIN — ACETAMINOPHEN 650 MG: 325 TABLET ORAL at 08:01

## 2021-03-03 NOTE — PROGRESS NOTES
Primary care courtesy note:    Patient brought in for pacemaker placement. Saw her last night and this morning and has done well. Anticipating home today. Anticipate follow-up in the office upon her discharge. We will have them call to schedule. Appreciate the excellent care she is receiving. Should any questions or concerns arise please not hesitate to call. Don Mustafa MD  3/3/2021

## 2021-03-03 NOTE — PLAN OF CARE
Problem: Falls - Risk of:  Goal: Will remain free from falls  Description: Will remain free from falls  3/3/2021 0058 by Susan Zimmerman RN  Outcome: Ongoing  3/2/2021 1107 by Florentino Judd RN  Outcome: Ongoing  Goal: Absence of physical injury  Description: Absence of physical injury  3/3/2021 0058 by Susan Zimmerman RN  Outcome: Ongoing  3/2/2021 1107 by Florentino Judd RN  Outcome: Ongoing     Problem: SAFETY  Goal: Free from accidental physical injury  3/3/2021 0058 by Susan Zimmerman RN  Outcome: Ongoing  3/2/2021 1107 by Florentino Judd RN  Outcome: Ongoing  Goal: Free from intentional harm  3/3/2021 0058 by Susan Zimmerman RN  Outcome: Ongoing  3/2/2021 1107 by Florentino Judd RN  Outcome: Ongoing     Problem: PAIN  Goal: Patient's pain/discomfort is manageable  3/3/2021 0058 by Susan Zimmerman RN  Outcome: Ongoing  3/2/2021 1107 by Florentino Judd RN  Outcome: Ongoing     Problem: SKIN INTEGRITY  Goal: Skin integrity is maintained or improved  3/3/2021 0058 by Susan Zimmerman RN  Outcome: Ongoing  3/2/2021 1107 by Florentino Judd RN  Outcome: Ongoing     Problem: KNOWLEDGE DEFICIT  Goal: Patient/S.O. demonstrates understanding of disease process, treatment plan, medications, and discharge instructions.   3/3/2021 0058 by Shae Young RN  Outcome: Ongoing  3/2/2021 1107 by Florentino Judd RN  Outcome: Ongoing     Problem: DISCHARGE BARRIERS  Goal: Patient's continuum of care needs are met  3/3/2021 0058 by Susan Zimmerman RN  Outcome: Ongoing  3/2/2021 1107 by Florentino Judd RN  Outcome: Ongoing

## 2021-03-03 NOTE — PROGRESS NOTES
Patient is educated regarding change of blood pressure medication and follow up appointments. Patient is provided pamphlet with pacemaker information. Patient verbalized understanding.

## 2021-03-03 NOTE — DISCHARGE SUMMARY
Discharge Summary    Clive Torres  :  1950  MRN:  [id number]    Admit date:  3/2/2021  Discharge date:      Admitting Physician:  Sepideh Mak MD    Advance Directive: Full Code    Consults: none    Primary Care Physician:  Xin Mendes MD    Discharge Diagnoses: Active Problems:    NICM (nonischemic cardiomyopathy) (HCC)    Sinoatrial node dysfunction (HCC)  Resolved Problems:    * No resolved hospital problems.  *      Problem List:   Patient Active Problem List    Diagnosis Date Noted    NICM (nonischemic cardiomyopathy) ([address] 2021     Priority: High    Sinoatrial node dysfunction (HCC)      Priority: High    Diabetic ulcer of left lower leg associated with type 2 diabetes mellitus, with fat layer exposed ([address] 2021     Priority: Low    Smoker 2021     Priority: Low    Traumatic hematoma 2021     Priority: Low    Abdominal aortic aneurysm (AAA) without rupture ([address] 09/10/2020     Priority: Low    History of diabetes mellitus 2020     Priority: Low    Chronic obstructive pulmonary disease ([address] 2020     Priority: Low    NUNEZ (dyspnea on exertion) 2020     Priority: Low    Mixed hyperlipidemia 2018     Priority: Low    S/P CABG x 4 10/11/2016     Priority: Low     Overview Note:     11 EF 60%      History of coronary artery stent placement 10/11/2016     Priority: Low    Coronary artery disease involving native coronary artery of native heart without angina pectoris 10/11/2016     Priority: Low    Hypercholesteremia 10/10/2011     Priority: Low    Deep vein thrombosis (HCC)      Priority: Low    Essential hypertension      Priority: Low    Diabetes mellitus ([address]      Priority: Low     Overview Note:     diet controlled         Cardiology Specific Data:  Specialty Problems        Cardiology Problems    NICM (nonischemic cardiomyopathy) ([address]        Sinoatrial node dysfunction ([address]        Deep vein thrombosis ([address]        Essential hypertension        Hypercholesteremia        Coronary artery disease involving native coronary artery of native heart without angina pectoris        Mixed hyperlipidemia        Abdominal aortic aneurysm (AAA) without rupture ([address]              Significant Diagnostic Studies:   Xr Chest (2 Vw)    Result Date: 3/2/2021  EXAMINATION:  XR CHEST (2 VW)  3/2/2021 7:29 AM HISTORY: Preoperative risk factors COMPARISON: Chest x-rays 5/10/2013. FINDINGS:  PA and lateral views of the chest were obtained. The lungs are clear and mildly hypoexpanded. Heart size is normal. Previous median sternotomy is noted. Coronary artery stent is visible. No pneumothorax or pleural effusion is identified. The osseous structures are unremarkable. No acute cardiopulmonary abnormality. Findings compatible with coronary artery disease and previous median sternotomy, coronary artery stent placement. Signed by Dr Tasia Deleon on 3/2/2021 7:31 AM    Xr Chest Portable    Result Date: 3/2/2021  Examination. XR CHEST PORTABLE 3/2/2021 10:01 AM History: Pacemaker placement. A single frontal portable upright view of the chest is compared with the previous study obtained earlier today. There is interval placement of a dual lead cardiac pacer through the left subclavian vein. The distal end of the leads are in the right atrium and right ventricle. The battery is projected over the left upper chest laterally. There is no pneumothorax in the left upper chest. The lungs are moderately well-expanded. There is no evidence of recent infiltrate, pleural effusion, pulmonary congestion or pneumothorax. The persistent moderate fullness of the right hilum which may represent prominent right pulmonary artery. There is evidence of a previous cardiac surgery. No acute bony abnormality. A left subclavian approach dual-chamber cardiac pacer in place. No pneumothorax.  Signed by Dr Stevo Miles on 3/2/2021 11:38 AM      Pertinent Labs:   CBC:   Recent Labs 03/02/21  0650   WBC 7.3   HGB 14.7        BMP:    Recent Labs     03/02/21  0650      K 4.0   CL 99   CO2 32*   BUN 12   CREATININE 0.8   GLUCOSE 139*     INR: No results for input(s): INR in the last 72 hours. Lipids: No results for input(s): CHOL, HDL in the last 72 hours. Invalid input(s): LDLCALCU  ABGs:No results for input(s): PHART, WZD2PME, PO2ART, LEX7LMY, BEART, HGBAE, A0LBMFRJ, CARBOXHGBART, 02THERAPY in the last 72 hours. HgBA1c:  No results for input(s): LABA1C in the last 72 hours. Procedures: Dual-chamber pacemaker placement    Operative Note        Patient: Kelly Mancilla  YOB: 1950  MRN: [id number]     Date of Procedure: 3/2/2021     Pre-Op Diagnosis: Sinoatrial node dysfunction with sinus pauses and syncope     Post-Op Diagnosis: Same       Procedure: Dual-chamber pacemaker placement     : Suellen Recio MD     Anesthesia: Moderate conscious sedation  Anesthesia: Lidocaine LA  Sedation: Versed  1mg; Fentanyl  0mg  Start time: 9:10 AM  Stop time: 10:11 AM  ASA Class: 3  Estimated blood loss: Less than 20 mL  An independent trained observer pushed medications at my direction. The patient was monitored continuously with the ECG, pulse oximetry, blood pressure monitoring, and direct observation for level of consciousness. Complications: None     Detailed Description of Procedure:      After obtaining informed written consent, the patient was brought to the catheterization laboratory where the left chest area was prepared in the usual sterile fashion. The patient was monitored continuously with ECG, pulse oximetry, blood pressure monitoring and direct observation.       Additionally, please review the \"[facility] Procedure Report\", which is generated electronically through the [facility].   This report includes additional details regarding this procedure including, but not limited to:                   1.          Patient Data,                2. Serial #:           YVES XT  MRI  [id number]  RNB [id number]                  IMPRESSION:     1. Successful insertion of a dual chamber rate response pacemaker for sino - atrial node dysfunction with significant pauses and syncope. 2.  Successful insertion of a right atrial lead  3. Successful insertion of a right ventricular lead  4. Successful pacemaker pocket creation  5. Successful placement of an antibiotic pouch  6. Supervision of the administration of moderate conscious sedation        Electronically signed by Batsheva Solorzano MD on 3/2/21         Hospital Course: Uneventful postoperative course. Chest x-ray with no pneumothorax. Site appears clean with no discharge or hematoma. Blood pressure is noted to be elevated. Will discharge on increased dose of Altace at 5 mg twice daily. Physical Exam:    Vital Signs: BP (!) 152/98   Pulse 82   Temp 97.2 °F (36.2 °C) (Temporal)   Resp 16   Ht 5' 6\" (1.676 m)   Wt 172 lb (78 kg)   SpO2 96%   BMI 27.76 kg/m²     Physical Exam      Discharge Medications:       Princess Code   Home Medication Instructions EMP:[id number]    Printed on:03/03/21 0299   Medication Information                      clopidogrel (PLAVIX) 75 MG tablet  Take 1 tablet by mouth daily             metFORMIN (GLUCOPHAGE) 500 MG tablet  Take 1 tablet by mouth 2 times daily (with meals) Hold for 2 days and restart a 26th 2020             metoprolol succinate (TOPROL XL) 100 MG extended release tablet  Take 1 tablet by mouth daily             nitroGLYCERIN (NITROSTAT) 0.4 MG SL tablet  Place 1 tablet under the tongue every 5 minutes as needed for Chest pain             ramipril (ALTACE) 5 MG capsule  Take 1 capsule by mouth 2 times daily             rosuvastatin (CRESTOR) 10 MG tablet  Take 1 tablet by mouth daily                 Discharge Instructions: Follow-up in pacemaker clinic in 7 to 10 days.     [facility]  [address] [facility] [address]  [phone]  On 3/12/2021  For wound re-check at 10:00 [address] , [address] [facility] [address]  [phone]    On 4/14/2021  3:15 PM        Take medications as directed. Resume activity as tolerated. Diet: DIET CARDIAC;      Disposition: Patient is medically stable and will be discharged *    Electronically signed by Jeffy Roberto MD on 3/3/2021 at 7:40 AM

## 2021-03-04 ENCOUNTER — CARE COORDINATION (OUTPATIENT)
Dept: CASE MANAGEMENT | Age: 71
End: 2021-03-04

## 2021-03-04 NOTE — CARE COORDINATION
[facility] Transitions Initial Follow Up Call    Call within 2 business days of discharge: Yes    Patient: Ellen Mcclendon Patient : 1950   MRN: [id number]  Reason for Admission: NICM, SA node dysfunction, Pacemaker placement  Discharge Date: 3/3/21 RARS: No data recorded    Last Discharge [employer]       Complaint Diagnosis Description Type Department Provider    3/2/21   Admission (Discharged) from [address], MD           Spoke with: [id number] [place] [address] Avenue: [facility]     Non-face-to-face services provided:  Reviewed encounter information for continuity of care prior to follow up Care Transitions phone call - chart notes, consults, progress notes, test results, med list, appointments, AVS, other information. Care Transitions 24 Hour Call    Schedule Follow Up Appointment with PCP: Travis Estrada you have a copy of your discharge instructions?: Yes  Do you have all of your prescriptions and are they filled?: Yes  Have you been contacted by a [address]?: No  Have you scheduled your follow up appointment?: No  Were you discharged with any Home Care or Post Acute Services: No  Do you feel like you have everything you need to keep you well at home?: Yes  Care Transitions Interventions         Follow Up  Future Appointments   Date Time Provider Bela Han   3/11/2021 10:15 AM JAKOB Prater CNP [address]   3/12/2021 10:00 AM SCHEDULE, [facility]TW[facility] CARDIAC DEVICE N [facility]   2021  3:15 PM JAKOB Winter [facility] [place]     Placed a call to the number listed for patient and spoke with her for a COVID 19 Risk Monitoring follow up call. She reported that she is doing very well. She said that she is doing well. She said she is a little sore across her chest and through her neck and shoulder on the left from the procedure. She said she has some extensive bruising in this area as well.   She said she has a light medical condition    Care Transition Nurse (CTN) contacted the patient by telephone to perform post hospital discharge assessment. Verified name and  with patient as identifiers. Provided introduction to self, and explanation of the CTN role. CTN reviewed discharge instructions, medical action plan and red flags with patient who verbalized understanding. Patient given an opportunity to ask questions and does not have any further questions or concerns at this time. Were discharge instructions available to patient? Yes. Reviewed appropriate site of care based on symptoms and resources available to patient including: PCP, Specialist, Urgent care clinics, Home health, When to call 911 and [employer]. The patient agrees to contact the PCP office for questions related to their healthcare. Medication reconciliation was performed with patient, who verbalizes understanding of administration of home medications. Advised obtaining a 90-day supply of all daily and as-needed medications. Covid Risk Education    Patient has following risk factors of: COPD and diabetes. Education provided regarding infection prevention, and signs and symptoms of COVID-19 and when to seek medical attention with patient who verbalized understanding. Discussed exposure protocols and quarantine From CDC: Are you at higher risk for severe illness?   and given an opportunity for questions and concerns. The patient agrees to contact the COVID-19 hotline [phone] or PCP office for questions related to COVID-19. For more information on steps you can take to protect yourself, see CDC's How to Protect Yourself     Discussed follow-up appointments. If no appointment was previously scheduled, appointment scheduling offered: Yes. Is follow up appointment scheduled within 7 days of discharge? Patient has called to get one scheduled. Plan for follow-up call in 5-7 days based on severity of symptoms and risk factors.   Plan for next call: - disease process mgmt, symptom mgmt, diet/hydration, pain control needs, medication mgmt, activity level, home safety needs, infection control, fall precautions, seeking medical attention, who/when to call prn any needs, etc.    CTN provided contact information for future needs.         Joy Sigala RN

## 2021-03-10 ENCOUNTER — CARE COORDINATION (OUTPATIENT)
Dept: CASE MANAGEMENT | Age: 71
End: 2021-03-10

## 2021-03-10 NOTE — CARE COORDINATION
[facility] Transitions Follow Up Call    3/10/2021    Patient: Nell Tesfaye  Patient : 1950   MRN: [id number]  Reason for Admission:   Discharge Date: 3/3/21 RARS: No data recorded       Spoke with: Vinicio Almonte Transitions Subsequent and Final Call    Subsequent and Final Calls  Do you have any ongoing symptoms?: No  Have your medications changed?: No  Do you have any questions related to your medications?: No  Do you currently have any active services?: No  Do you have any needs or concerns that I can assist you with?: No  Identified Barriers: None  Care Transitions Interventions  Other Interventions: Follow Up : Spoke with patient today for a follow up Covid call. She said her blood sugar this morning was 109. She says she feels tired. Says her appetite has decreased. She says she was eating breakfast, a mid morning snack, lunch at 1230pm and supper around 630pm and a snack at bedtime, but since the procedure that has decreased. She says her Verapamil was increased to BID and she is wondering if that could have a bearing on it. She is going to discuss with PCP and Dr. Patricio Bumpers she says. She follows up with DR. JOSE LI [place] tomorrow and cardiology on 3/18. She says it is just taking a bit to get over, and hopes to feel better soon. Encouraged to call with prn issues or problems. Will follow up at a later time.    Future Appointments   Date Time Provider Bela Han   3/11/2021  8:45 AM JAKOB Sanz - CNP [facility] [address] [facility]   3/12/2021 10:00 AM SCHEDULE, LPS CARDIAC DEVICE N [facility]   2021  3:15 PM JAKOB Kemp N [facility]       Marianela Bejarano RN

## 2021-03-11 ENCOUNTER — TELEPHONE (OUTPATIENT)
Dept: CARDIOLOGY CLINIC | Age: 71
End: 2021-03-11

## 2021-03-11 ENCOUNTER — HOSPITAL ENCOUNTER (OUTPATIENT)
Dept: WOUND CARE | Age: 71
Discharge: HOME | End: 2021-03-11
Payer: MEDICARE

## 2021-03-11 VITALS
SYSTOLIC BLOOD PRESSURE: 158 MMHG | TEMPERATURE: 98.1 F | WEIGHT: 172 LBS | HEART RATE: 86 BPM | RESPIRATION RATE: 16 BRPM | HEIGHT: 66 IN | BODY MASS INDEX: 27.64 KG/M2 | DIASTOLIC BLOOD PRESSURE: 89 MMHG

## 2021-03-11 DIAGNOSIS — L97.922 DIABETIC ULCER OF LEFT LOWER LEG ASSOCIATED WITH TYPE 2 DIABETES MELLITUS, WITH FAT LAYER EXPOSED (HCC): ICD-10-CM

## 2021-03-11 DIAGNOSIS — E11.622 DIABETIC ULCER OF LEFT LOWER LEG ASSOCIATED WITH TYPE 2 DIABETES MELLITUS, WITH FAT LAYER EXPOSED (HCC): ICD-10-CM

## 2021-03-11 DIAGNOSIS — T14.8XXA TRAUMATIC HEMATOMA: ICD-10-CM

## 2021-03-11 DIAGNOSIS — F17.200 SMOKER: Primary | ICD-10-CM

## 2021-03-11 PROCEDURE — 97597 DBRDMT OPN WND 1ST 20 CM/<: CPT

## 2021-03-11 PROCEDURE — 97597 DBRDMT OPN WND 1ST 20 CM/<: CPT | Performed by: NURSE PRACTITIONER

## 2021-03-11 ASSESSMENT — PAIN DESCRIPTION - FREQUENCY: FREQUENCY: INTERMITTENT

## 2021-03-11 ASSESSMENT — PAIN SCALES - GENERAL: PAINLEVEL_OUTOF10: 2

## 2021-03-11 NOTE — PROGRESS NOTES
[address]   Progress Note and Procedure Note      [address] [facility] RECORD NUMBER:  [id number]  AGE: 66 y.o. GENDER: female  : 1950  EPISODE DATE:  3/11/2021    Subjective:     Chief Complaint   Patient presents with    Wound Check     follow up         HISTORY of PRESENT ILLNESS HPI     Zane Gibbs is a 66 y.o. female who presents today for wound/ulcer evaluation.    History of Wound Context:left leg wound  Ulcer Identification:  Ulcer Type: venous  Contributing Factors: edema, diabetes and smoking    Wound: N/A        PAST MEDICAL HISTORY        Diagnosis Date    ASHD (arteriosclerotic heart disease)     s/p PTCA and stent of circumflex, as well as intermediate and mid LAD     COPD (chronic obstructive pulmonary disease) ([address]     Coronary atherosclerosis     Diabetes mellitus ([address]     diet controlled    DVT (deep vein thrombosis) in pregnancy     Encounter for wound care     FOR LLL WOUND    Fall 10/29/2020    Ganglion cyst     RT HAND    Hematoma 10/2020    hematoma LLL from fall injury    Hx of blood clots     Hypercholesteremia     Hypertension     Unstable angina ([address]        PAST SURGICAL HISTORY    Past Surgical History:   Procedure Laterality Date    CARDIAC CATHETERIZATION  12/10/00    selective left heart and coronary arteriography with left ventriculography     CARDIAC CATHETERIZATION  98    left heart cath, left ventriculography, slective coronary arteriography, direct infarct angioplasty and stent placement to proximal left anterior descending coronary artery    CARDIAC CATHETERIZATION  94    left heart cath, selective coronary arteriography, left ventriculography    CARDIAC CATHETERIZATION  2011    [place]    CHOLECYSTECTOMY      CORONARY ANGIOPLASTY WITH STENT PLACEMENT  00    PTCA and stent placement to the mid LAD/ptca and stent placement first circumflex marginal (intermediate)     CORONARY ANGIOPLASTY WITH STENT PLACEMENT  08/2021    CORONARY ARTERY BYPASS GRAFT  8/29/2011    PACABG X 4 LIMA-LAD, SVG-DIAG, SVG-PDA, RT EVH, LT OPEN VEIN HARVEST, DR Ontiveros Rico    CYST REMOVAL      GANGLION CYST REMOVED RT HAND    HYSTERECTOMY      NECK SURGERY      parotid artery tumor removed bilaterally    PAROTIDECTOMY Bilateral        FAMILY HISTORY    Family History   Problem Relation Age of Onset    Cancer Mother     Coronary Art Dis Father        SOCIAL HISTORY    Social History     Tobacco Use    Smoking status: Current Every Day Smoker     Types: Cigarettes    Smokeless tobacco: Never Used    Tobacco comment: 1/2 PACKS EVERY 2 WEEKS   Substance Use Topics    Alcohol use: No    Drug use: No       ALLERGIES    Allergies   Allergen Reactions    Codeine Other (See Comments)     SOB       MEDICATIONS    Current Outpatient Medications on File Prior to Encounter   Medication Sig Dispense Refill    ramipril (ALTACE) 5 MG capsule Take 1 capsule by mouth 2 times daily 180 capsule 3    rosuvastatin (CRESTOR) 10 MG tablet Take 1 tablet by mouth daily (Patient taking differently: Take 10 mg by mouth nightly PT TAKES AT NIGHT) 90 tablet 3    clopidogrel (PLAVIX) 75 MG tablet Take 1 tablet by mouth daily 90 tablet 3    metFORMIN (GLUCOPHAGE) 500 MG tablet Take 1 tablet by mouth 2 times daily (with meals) Hold for 2 days and restart a 26th 2020 180 tablet 3    metoprolol succinate (TOPROL XL) 100 MG extended release tablet Take 1 tablet by mouth daily (Patient taking differently: Take 100 mg by mouth 2 times daily ) 90 tablet 3    nitroGLYCERIN (NITROSTAT) 0.4 MG SL tablet Place 1 tablet under the tongue every 5 minutes as needed for Chest pain 25 tablet 3     No current facility-administered medications on file prior to encounter. REVIEW OF SYSTEMS    A comprehensive review of systems was negative.     Objective:      BP (!) 158/89   Pulse 86   Temp 98.1 °F (36.7 °C) (Temporal)   Resp 16   Ht 5' 6\" (1.676 m)   Wt 172 lb (78 kg)   BMI 27.76 kg/m²     Wt Readings from Last 3 Encounters:   03/11/21 172 lb (78 kg)   03/02/21 172 lb (78 kg)   02/25/21 172 lb (78 kg)       PHYSICAL EXAM    General Appearance: alert and oriented to person, place and time, well developed and well- nourished, in no acute distress  Skin: warm and dry, no rash or erythema  Head: normocephalic and atraumatic  Eyes: pupils equal, round, and reactive to light, extraocular eye movements intact, conjunctivae normal  ENT: tympanic membrane, external ear and ear canal normal bilaterally, nose without deformity, nasal mucosa and turbinates normal without polyps  Neck: supple and non-tender without mass, no thyromegaly or thyroid nodules, no cervical lymphadenopathy  Pulmonary/Chest: clear to auscultation bilaterally- no wheezes, rales or rhonchi, normal air movement, no respiratory distress  Extremities: no cyanosis, clubbing or edema  Musculoskeletal: normal range of motion, no joint swelling, deformity or tenderness  Neurologic: reflexes normal and symmetric, no cranial nerve deficit, gait, coordination and speech normal      Assessment:      Patient Active Problem List   Diagnosis Code    Deep vein thrombosis (HCC) I82.409    Essential hypertension I10    Diabetes mellitus ([address] E11.9    Hypercholesteremia E78.00    S/P CABG x 4 Z95.1    History of coronary artery stent placement Z95.5    Coronary artery disease involving native coronary artery of native heart without angina pectoris I25.10    Mixed hyperlipidemia E78.2    History of diabetes mellitus Z86.39    Chronic obstructive pulmonary disease (HCC) J44.9    NUNEZ (dyspnea on exertion) R06.00    Abdominal aortic aneurysm (AAA) without rupture ([facility]) I71.4    Diabetic ulcer of left lower leg associated with type 2 diabetes mellitus, with fat layer exposed ([address] E11.622, L97.922    Smoker F17.200    Traumatic hematoma T14. 8XXA    NICM (nonischemic cardiomyopathy) ([address] I42.8  Sinoatrial node dysfunction (HCC) I49.5        Procedure Note  Indications:  Based on my examination of this patient's wound(s)/ulcer(s) today, debridement is required to promote healing and evaluate the wound base. Performed by: JAKOB Ashraf CNP    Consent obtained:  Yes    Time out taken:  Yes    Pain Control: Anesthetic  Anesthetic: 2% Lidocaine Gel Topical       Debridement:Non-excisional Debridement    Using curette the wound(s)/ulcer(s) was/were sharply debrided down through and including the removal of epidermis and dermis. Devitalized Tissue Debrided:  fibrin, biofilm, slough and exudate    Pre Debridement Measurements:  Are located in the Wound/Ulcer Documentation Flow Sheet    Wound/Ulcer #: 1    Post Debridement Measurements:  Wound/Ulcer Descriptions are Pre Debridement except measurements:      Percent of Wound/Ulcer Debrided: 100%    Total Surface Area Debrided:  0.85 sq cm     Wound 01/05/21 Leg Left; Outer wound 1- left leg traumatic (Active)   Wound Image   03/11/21 0901   Wound Etiology Traumatic 03/11/21 0901   Dressing Status Clean;Dry; Intact; Old drainage noted 03/11/21 0901   Wound Cleansed Cleansed with saline 03/11/21 0901   Dressing/Treatment Xeroform 03/11/21 0904   Wound Length (cm) 1.7 cm 03/11/21 0901   Wound Width (cm) 0.5 cm 03/11/21 0901   Wound Depth (cm) 0.1 cm 03/11/21 0901   Wound Surface Area (cm^2) 0.85 cm^2 03/11/21 0901   Change in Wound Size % (l*w) 93.77 03/11/21 0901   Wound Volume (cm^3) 0.08 cm^3 03/11/21 0901   Wound Healing % 99 03/11/21 0901   Post-Procedure Length (cm) 1.7 cm 03/11/21 0904   Post-Procedure Width (cm) 0.5 cm 03/11/21 0904   Post-Procedure Depth (cm) 0.1 cm 03/11/21 0904   Post-Procedure Surface Area (cm^2) 0.85 cm^2 03/11/21 0904   Post-Procedure Volume (cm^3) 0.08 cm^3 03/11/21 0904   Distance Tunneling (cm) 0 cm 03/11/21 0901   Tunneling Position ___ O'Clock 0 03/11/21 0901   Undermining Starts ___ O'Clock 0 03/11/21 0901 Undermining Ends___ O'Clock 0 03/11/21 0901   Undermining Maxium Distance (cm) 0 03/11/21 0901   Wound Assessment Pink/red 03/11/21 0901   Drainage Amount Moderate 03/11/21 0901   Drainage Description Serosanguinous 03/11/21 0901   Odor None 03/11/21 0901   Chaparrita-wound Assessment Hyperpigmented 03/11/21 0901   Margins Defined edges 03/11/21 0901   Wound Thickness Description not for Pressure Injury Full thickness 03/11/21 0901   Number of days: 64            Diabetic/Pressure/Non Pressure Ulcers only:  Ulcer: N/A      Estimated Blood Loss:  None    Hemostasis Achieved:  not needed    Procedural Pain:  0  / 10     Post Procedural Pain:  0 / 10     Response to treatment:  Well tolerated by patient. Plan:     Problem List Items Addressed This Visit     * (Principal) Diabetic ulcer of left lower leg associated with type 2 diabetes mellitus, with fat layer exposed ([address]    Relevant Orders    Supply: Wound Cleanser    Supply: Chaparrita Wound    Supply: Cover and Secure    Supply: Edema Control    Smoker - Primary    Relevant Orders    Supply: Wound Cleanser    Supply: Chaparrita Wound    Supply: Cover and Secure    Supply: Edema Control    Traumatic hematoma    Relevant Orders    Supply: Wound Cleanser    Supply: Chaparrita Wound    Supply: Cover and Secure    Supply: Edema Control          Treatment Note please see attached Discharge Instructions    In my professional opinion this patient would benefit from HBO Therapy: No    Written patient dismissal instructions given to patient and signed by patient or POA.            Electronically signed by JAKOB Willson CNP on 3/11/2021 at 9:33 AM

## 2021-03-12 ENCOUNTER — TELEPHONE (OUTPATIENT)
Dept: CARDIOLOGY CLINIC | Age: 71
End: 2021-03-12

## 2021-03-17 ENCOUNTER — CARE COORDINATION (OUTPATIENT)
Dept: CASE MANAGEMENT | Age: 71
End: 2021-03-17

## 2021-03-18 ENCOUNTER — IMMUNIZATION (OUTPATIENT)
Age: 71
End: 2021-03-18
Payer: MEDICARE

## 2021-03-18 PROCEDURE — 91300 COVID-19, PFIZER VACCINE 30MCG/0.3ML DOSE: CPT | Performed by: FAMILY MEDICINE

## 2021-03-18 PROCEDURE — 0002A PR IMM ADMN SARSCOV2 30MCG/0.3ML DIL RECON 2ND DOSE: CPT | Performed by: FAMILY MEDICINE

## 2021-03-22 DIAGNOSIS — I49.5 SINOATRIAL NODE DYSFUNCTION (HCC): ICD-10-CM

## 2021-03-22 DIAGNOSIS — Z95.0 PACEMAKER: Primary | ICD-10-CM

## 2021-03-25 ENCOUNTER — HOSPITAL ENCOUNTER (OUTPATIENT)
Dept: WOUND CARE | Age: 71
Discharge: HOME | End: 2021-03-25

## 2021-04-01 ENCOUNTER — HOSPITAL ENCOUNTER (OUTPATIENT)
Dept: WOUND CARE | Age: 71
Discharge: HOME | End: 2021-04-01
Payer: MEDICARE

## 2021-04-01 VITALS
DIASTOLIC BLOOD PRESSURE: 82 MMHG | HEART RATE: 60 BPM | TEMPERATURE: 96.7 F | RESPIRATION RATE: 18 BRPM | SYSTOLIC BLOOD PRESSURE: 150 MMHG

## 2021-04-01 DIAGNOSIS — F17.200 SMOKER: Primary | ICD-10-CM

## 2021-04-01 DIAGNOSIS — E11.622 DIABETIC ULCER OF LEFT LOWER LEG ASSOCIATED WITH TYPE 2 DIABETES MELLITUS, WITH FAT LAYER EXPOSED (HCC): ICD-10-CM

## 2021-04-01 DIAGNOSIS — L97.922 DIABETIC ULCER OF LEFT LOWER LEG ASSOCIATED WITH TYPE 2 DIABETES MELLITUS, WITH FAT LAYER EXPOSED (HCC): ICD-10-CM

## 2021-04-01 DIAGNOSIS — T14.8XXA TRAUMATIC HEMATOMA: ICD-10-CM

## 2021-04-01 PROCEDURE — 99212 OFFICE O/P EST SF 10 MIN: CPT | Performed by: NURSE PRACTITIONER

## 2021-04-01 PROCEDURE — 99212 OFFICE O/P EST SF 10 MIN: CPT

## 2021-04-01 ASSESSMENT — PAIN SCALES - GENERAL: PAINLEVEL_OUTOF10: 0

## 2021-04-01 NOTE — PLAN OF CARE
Problem: Wound:  Goal: Will show signs of wound healing; wound closure and no evidence of infection  Description: Will show signs of wound healing; wound closure and no evidence of infection  Outcome: Completed     Problem: Falls - Risk of:  Goal: Will remain free from falls  Description: Will remain free from falls  Outcome: Completed     Problem: Blood Glucose:  Goal: Ability to maintain appropriate glucose levels will improve  Description: Ability to maintain appropriate glucose levels will improve  Outcome: Completed

## 2021-04-01 NOTE — PROGRESS NOTES
[address]   Progress Note and Procedure Note      [address] [facility] RECORD NUMBER:  [id number]  AGE: 66 y.o. GENDER: female  : 1950  EPISODE DATE:  2021    Subjective:     Chief Complaint   Patient presents with    Wound Check     wound, feels wound is healed         HISTORY of PRESENT ILLNESS HPI     Katy Latham is a 66 y.o. female who presents today for wound/ulcer evaluation.    History of Wound Context:left leg wound    Ulcer Identification:  Ulcer Type: traumatic  Contributing Factors: smoking    Wound: N/A        PAST MEDICAL HISTORY        Diagnosis Date    ASHD (arteriosclerotic heart disease)     s/p PTCA and stent of circumflex, as well as intermediate and mid LAD     COPD (chronic obstructive pulmonary disease) ([address]     Coronary atherosclerosis     Diabetes mellitus ([address]     diet controlled    DVT (deep vein thrombosis) in pregnancy     Encounter for wound care     FOR LLL WOUND    Fall 10/29/2020    Ganglion cyst     RT HAND    Hematoma 10/2020    hematoma LLL from fall injury    Hx of blood clots     Hypercholesteremia     Hypertension     Unstable angina ([address]        PAST SURGICAL HISTORY    Past Surgical History:   Procedure Laterality Date    CARDIAC CATHETERIZATION  12/10/00    selective left heart and coronary arteriography with left ventriculography     CARDIAC CATHETERIZATION  98    left heart cath, left ventriculography, slective coronary arteriography, direct infarct angioplasty and stent placement to proximal left anterior descending coronary artery    CARDIAC CATHETERIZATION  94    left heart cath, selective coronary arteriography, left ventriculography    CARDIAC CATHETERIZATION  2011    [place]    CHOLECYSTECTOMY      CORONARY ANGIOPLASTY WITH STENT PLACEMENT  00    PTCA and stent placement to the mid LAD/ptca and stent placement first circumflex marginal (intermediate)     CORONARY ANGIOPLASTY Wt Readings from Last 3 Encounters:   03/11/21 172 lb (78 kg)   03/02/21 172 lb (78 kg)   02/25/21 172 lb (78 kg)       PHYSICAL EXAM    General Appearance: alert and oriented to person, place and time, well developed and well- nourished, in no acute distress  Skin: warm and dry, no rash or erythema  Head: normocephalic and atraumatic  Eyes: pupils equal, round, and reactive to light, extraocular eye movements intact, conjunctivae normal  ENT: tympanic membrane, external ear and ear canal normal bilaterally, nose without deformity, nasal mucosa and turbinates normal without polyps  Neck: supple and non-tender without mass, no thyromegaly or thyroid nodules, no cervical lymphadenopathy  Pulmonary/Chest: clear to auscultation bilaterally- no wheezes, rales or rhonchi, normal air movement, no respiratory distress  Extremities: no cyanosis, clubbing or edema  Musculoskeletal: normal range of motion, no joint swelling, deformity or tenderness  Neurologic: reflexes normal and symmetric, no cranial nerve deficit, gait, coordination and speech normal      Assessment:      Patient Active Problem List   Diagnosis Code    Deep vein thrombosis (HCC) I82.409    Essential hypertension I10    Diabetes mellitus ([address] E11.9    Hypercholesteremia E78.00    S/P CABG x 4 Z95.1    History of coronary artery stent placement Z95.5    Coronary artery disease involving native coronary artery of native heart without angina pectoris I25.10    Mixed hyperlipidemia E78.2    History of diabetes mellitus Z86.39    Chronic obstructive pulmonary disease (HCC) J44.9    NUNEZ (dyspnea on exertion) R06.00    Abdominal aortic aneurysm (AAA) without rupture ([facility]) I71.4    Diabetic ulcer of left lower leg associated with type 2 diabetes mellitus, with fat layer exposed ([address] E11.622, L97.922    Smoker F17.200    Traumatic hematoma T14. 8XXA    NICM (nonischemic cardiomyopathy) ([address] I42.8    Sinoatrial node dysfunction (HCC) I49.5 Wound 01/05/21 Leg Left; Outer wound 1- left leg traumatic (Active)   Wound Image   04/01/21 1044   Wound Etiology Traumatic 04/01/21 1044   Dressing Status Clean;Dry; Intact 04/01/21 1044   Wound Cleansed Soap and water 04/01/21 1044   Dressing/Treatment Gauze dressing/dressing sponge;Xeroform 04/01/21 1044   Wound Length (cm) 0 cm 04/01/21 1044   Wound Width (cm) 0 cm 04/01/21 1044   Wound Depth (cm) 0 cm 04/01/21 1044   Wound Surface Area (cm^2) 0 cm^2 04/01/21 1044   Change in Wound Size % (l*w) 100 04/01/21 1044   Wound Volume (cm^3) 0 cm^3 04/01/21 1044   Wound Healing % 100 04/01/21 1044   Post-Procedure Length (cm) 0 cm 04/01/21 1044   Post-Procedure Width (cm) 0 cm 04/01/21 1044   Post-Procedure Depth (cm) 0 cm 04/01/21 1044   Post-Procedure Surface Area (cm^2) 0 cm^2 04/01/21 1044   Post-Procedure Volume (cm^3) 0 cm^3 04/01/21 1044   Distance Tunneling (cm) 0 cm 04/01/21 1044   Tunneling Position ___ O'Clock 0 04/01/21 1044   Undermining Starts ___ O'Clock 0 04/01/21 1044   Undermining Ends___ O'Clock 0 04/01/21 1044   Undermining Maxium Distance (cm) 0 04/01/21 1044   Wound Assessment Epithelialization 04/01/21 1044   Drainage Amount None 04/01/21 1044   Drainage Description Serosanguinous 03/11/21 0901   Odor None 04/01/21 1044   Chaparrita-wound Assessment Dry/flaky 04/01/21 1044   Margins Attached edges 04/01/21 1044   Wound Thickness Description not for Pressure Injury Full thickness 04/01/21 1044   Number of days: 85       Plan:     Problem List Items Addressed This Visit     * (Principal) Diabetic ulcer of left lower leg associated with type 2 diabetes mellitus, with fat layer exposed ([address]    Relevant Orders    Supply: Wound Cleanser    Smoker - Primary    Relevant Orders    Supply: Wound Cleanser    Traumatic hematoma    Relevant Orders    Supply: Wound Cleanser              Treatment Note please see attached Discharge Instructions    In my professional opinion this patient would benefit from

## 2021-04-14 ENCOUNTER — TELEPHONE (OUTPATIENT)
Dept: CARDIOLOGY CLINIC | Age: 71
End: 2021-04-14

## 2021-04-14 ENCOUNTER — OFFICE VISIT (OUTPATIENT)
Dept: CARDIOLOGY CLINIC | Age: 71
End: 2021-04-14
Payer: MEDICARE

## 2021-04-14 VITALS
HEART RATE: 98 BPM | BODY MASS INDEX: 27.48 KG/M2 | HEIGHT: 66 IN | WEIGHT: 171 LBS | DIASTOLIC BLOOD PRESSURE: 84 MMHG | SYSTOLIC BLOOD PRESSURE: 138 MMHG

## 2021-04-14 DIAGNOSIS — Z95.0 CARDIAC PACEMAKER: ICD-10-CM

## 2021-04-14 DIAGNOSIS — I49.5 SA NODE DYSFUNCTION (HCC): ICD-10-CM

## 2021-04-14 DIAGNOSIS — Z95.1 S/P CABG X 4: ICD-10-CM

## 2021-04-14 DIAGNOSIS — I71.40 ABDOMINAL AORTIC ANEURYSM (AAA) WITHOUT RUPTURE: ICD-10-CM

## 2021-04-14 DIAGNOSIS — E78.2 MIXED HYPERLIPIDEMIA: ICD-10-CM

## 2021-04-14 DIAGNOSIS — I25.10 CORONARY ARTERY DISEASE INVOLVING NATIVE CORONARY ARTERY OF NATIVE HEART WITHOUT ANGINA PECTORIS: Primary | ICD-10-CM

## 2021-04-14 DIAGNOSIS — Z95.5 HISTORY OF CORONARY ARTERY STENT PLACEMENT: ICD-10-CM

## 2021-04-14 DIAGNOSIS — I42.8 NICM (NONISCHEMIC CARDIOMYOPATHY) (HCC): ICD-10-CM

## 2021-04-14 PROCEDURE — 93280 PM DEVICE PROGR EVAL DUAL: CPT | Performed by: NURSE PRACTITIONER

## 2021-04-14 PROCEDURE — 99024 POSTOP FOLLOW-UP VISIT: CPT | Performed by: NURSE PRACTITIONER

## 2021-04-14 RX ORDER — RAMIPRIL 5 MG/1
5 CAPSULE ORAL 2 TIMES DAILY
COMMUNITY
End: 2021-08-23 | Stop reason: SDUPTHER

## 2021-04-14 RX ORDER — METOPROLOL SUCCINATE 100 MG/1
100 TABLET, EXTENDED RELEASE ORAL 2 TIMES DAILY
COMMUNITY
End: 2021-06-01 | Stop reason: SDUPTHER

## 2021-04-14 NOTE — PATIENT INSTRUCTIONS
New instructions for today:  Start taking ramipril 5 mg (1) tab twice daily. Monitor your blood pressure twice daily and keep a log. Your blood pressure goal is 130/80 or less. We will discuss in 2 weeks by either scheduled telephone encounter or patient call back. Office phone number [phone]. Patient Instructions:  Continue current medications as prescribed. Always keep a current medication list. Bring your medications to every office visit. Continue to follow up with primary care provider for non cardiac medical problems. Call the office with any problems, questions or concerns at [phone]. If you have been asked to keep a blood pressure log, do so for 2 weeks. Call the office to report readings to the triage nurse at [phone]. Follow up with cardiologist as scheduled. The following educational material has been included in this after visit summary for your review: Life simple 7. Heart health. Life simple 7  1) Manage blood pressure - high blood pressure is a major risk factor for heart disease and stroke. Keeping blood pressure in health range reduces strain on your heart, arteries and kidneys. Blood pressure goal is less than 130/80. 2) Control cholesterol - contributes to plaque, which can clog arteries and lead to heart disease and stroke. When you control your cholesterol you are giving your arteries their best chance to remain clear. It is recommended that you get cholesterol lab work done once a year. 3) Reduce blood sugar - most of the food we eat is turning into glucose or blood sugar that our body uses for energy. Over time, high levels of blood sugar can damage your heart, kidneys, eyes and nerves. 4) Get active - living an active life is one of the most rewarding gifts you can give yourself and those you love. Simply put, daily physical activity increases your length and quality of life. Strive to exercise 15 minutes most days of the week.   5)  Eat better - A healthy diet is one of your best weapons for fighting cardiovascular disease. When you eat a heart healthy diet, you improve your chances for feeling good and staying healthy for life. 6)  Lose weight - when you shed extra fat an unnecessary pounds, you reduce the burden on your hear, lungs, blood vessels and skeleton. You give yourself the gift of active living, you lower your blood pressure and help yourself feel better. 7) Stop smoking - cigarette smokers have a higher risk of developing cardiovascular disease. If  You smoke, quitting is the best thing you can do for your health. Check American Heart Association on line for more information on Life's Simple 7 and tips for healthy living. A Healthy Heart: Care Instructions  Your Care Instructions     Coronary artery disease, also called heart disease, occurs when a substance called plaque builds up in the vessels that supply oxygen-rich blood to your heart muscle. This can narrow the blood vessels and reduce blood flow. A heart attack happens when blood flow is completely blocked. A high-fat diet, smoking, and other factors increase the risk of heart disease. Your doctor has found that you have a chance of having heart disease. You can do lots of things to keep your heart healthy. It may not be easy, but you can change your diet, exercise more, and quit smoking. These steps really work to lower your chance of heart disease. Follow-up care is a key part of your treatment and safety. Be sure to make and go to all appointments, and call your doctor if you are having problems. It's also a good idea to know your test results and keep a list of the medicines you take. How can you care for yourself at home? Diet  · Use less salt when you cook and eat. This helps lower your blood pressure. Taste food before salting. Add only a little salt when you think you need it. With time, your taste buds will adjust to less salt.   · Eat fewer snack items, fast foods, with your medicine. · If your doctor recommends aspirin, take the amount directed each day. Make sure you take aspirin and not another kind of pain reliever, such as acetaminophen (Tylenol). When should you call for help? [id number] if you have symptoms of a heart attack. These may include:  · Chest pain or pressure, or a strange feeling in the chest.  · Sweating. · Shortness of breath. · Pain, pressure, or a strange feeling in the back, neck, jaw, or upper belly or in one or both shoulders or arms. · Lightheadedness or sudden weakness. · A fast or irregular heartbeat. After you call 911, the  may tell you to chew 1 adult-strength or 2 to 4 low-dose aspirin. Wait for an ambulance. Do not try to drive yourself. Watch closely for changes in your health, and be sure to contact your doctor if you have any problems. Where can you learn more? Go to https://[employer]. org and sign in to your [employer] account. Enter C953 in the [employer] box to learn more about \"A Healthy Heart: Care Instructions. \"     If you do not have an account, please click on the \"Sign Up Now\" link. Current as of: December 16, 2019               Content Version: 12.5  © 6201-3564 Healthwise, Incorporated. Care instructions adapted under license by [facility] ([facility]). If you have questions about a medical condition or this instruction, always ask your healthcare professional. [place] [address] any warranty or liability for your use of this information. New instructions for today:      Patient Instructions:  Continue current medications as prescribed. Always keep a current medication list. Bring your medications to every office visit. Continue to follow up with primary care provider for non cardiac medical problems. Call the office with any problems, questions or concerns at [phone]. If you have been asked to keep a blood pressure log, do so for 2 weeks.  Call the office to report readings to the triage nurse at [phone]. Follow up with cardiologist as scheduled. The following educational material has been included in this after visit summary for your review: Life simple 7. Heart health. Life simple 7  1) Manage blood pressure - high blood pressure is a major risk factor for heart disease and stroke. Keeping blood pressure in health range reduces strain on your heart, arteries and kidneys. Blood pressure goal is less than 130/80. 2) Control cholesterol - contributes to plaque, which can clog arteries and lead to heart disease and stroke. When you control your cholesterol you are giving your arteries their best chance to remain clear. It is recommended that you get cholesterol lab work done once a year. 3) Reduce blood sugar - most of the food we eat is turning into glucose or blood sugar that our body uses for energy. Over time, high levels of blood sugar can damage your heart, kidneys, eyes and nerves. 4) Get active - living an active life is one of the most rewarding gifts you can give yourself and those you love. Simply put, daily physical activity increases your length and quality of life. Strive to exercise 15 minutes most days of the week. 5)  Eat better - A healthy diet is one of your best weapons for fighting cardiovascular disease. When you eat a heart healthy diet, you improve your chances for feeling good and staying healthy for life. 6)  Lose weight - when you shed extra fat an unnecessary pounds, you reduce the burden on your hear, lungs, blood vessels and skeleton. You give yourself the gift of active living, you lower your blood pressure and help yourself feel better. 7) Stop smoking - cigarette smokers have a higher risk of developing cardiovascular disease. If  You smoke, quitting is the best thing you can do for your health. Check American Heart Association on line for more information on Life's Simple 7 and tips for healthy living.      A Healthy breads, oatmeal, beans, brown rice, citrus fruits, and apples. · Eat lean proteins. Heart-healthy proteins include seafood, lean meats and poultry, eggs, beans, peas, nuts, seeds, and soy products. · Limit drinks and foods with added sugar. These include candy, desserts, and soda pop. Lifestyle changes  · If your doctor recommends it, get more exercise. Walking is a good choice. Bit by bit, increase the amount you walk every day. Try for at least 30 minutes on most days of the week. You also may want to swim, bike, or do other activities. · Do not smoke. If you need help quitting, talk to your doctor about stop-smoking programs and medicines. These can increase your chances of quitting for good. Quitting smoking may be the most important step you can take to protect your heart. It is never too late to quit. · Limit alcohol to 2 drinks a day for men and 1 drink a day for women. Too much alcohol can cause health problems. · Manage other health problems such as diabetes, high blood pressure, and high cholesterol. If you think you may have a problem with alcohol or drug use, talk to your doctor. Medicines  · Take your medicines exactly as prescribed. Call your doctor if you think you are having a problem with your medicine. · If your doctor recommends aspirin, take the amount directed each day. Make sure you take aspirin and not another kind of pain reliever, such as acetaminophen (Tylenol). When should you call for help? [id number] if you have symptoms of a heart attack. These may include:  · Chest pain or pressure, or a strange feeling in the chest.  · Sweating. · Shortness of breath. · Pain, pressure, or a strange feeling in the back, neck, jaw, or upper belly or in one or both shoulders or arms. · Lightheadedness or sudden weakness. · A fast or irregular heartbeat. After you call 911, the  may tell you to chew 1 adult-strength or 2 to 4 low-dose aspirin. Wait for an ambulance.  Do not try to drive yourself. Watch closely for changes in your health, and be sure to contact your doctor if you have any problems. Where can you learn more? Go to https://[employer]. org and sign in to your [employer] account. Enter B080 in the [employer] box to learn more about \"A Healthy Heart: Care Instructions. \"     If you do not have an account, please click on the \"Sign Up Now\" link. Current as of: December 16, 2019               Content Version: 12.5  © 9814-3848 Healthwise, Incorporated. Care instructions adapted under license by [facility]. If you have questions about a medical condition or this instruction, always ask your healthcare professional. [address] any warranty or liability for your use of this information.

## 2021-04-14 NOTE — PROGRESS NOTES
[facility], [place]. [address], [place] [address] [facility], [facility]  [phone] office  [phone] fax      OFFICE VISIT:  2021    Elda Delarosa - : 1950  Reason For Visit:  Jocy Torres is a 66 y.o. female who is here for Follow-up (no cardiac symptoms), Coronary Artery Disease, and Hypertension    History:   Diagnosis Orders   1. Coronary artery disease involving native coronary artery of native heart without angina pectoris     2. NICM (nonischemic cardiomyopathy) ([address]     3. Abdominal aortic aneurysm (AAA) without rupture ([address]     4. S/P CABG x 4     5. History of coronary artery stent placement     6. Mixed hyperlipidemia     7. SA node dysfunction ([address]     8. Cardiac pacemaker       The patient presents today for cardiology hospital follow up after pacemaker implant by Dr. Adolph Magana on 3/2/21. The patient had a hx of 3.8 second pause noted on Z[employer] cardiac monitor. The patient states \"I went without enough oxygen to my brain I think and I think I had a stroke on the left side before I got this pacemaker. The patient reports feeling better since the pacer implant. She reports no syncope, near syncope or falls. The patient has completed wound care for area on LLE which occurred secondary to a fall prior to the pacer implant. The patient had a cardiac cath by Dr. Adolph Magana on 20 with successful PCI to proximal to mid LAD (4.0 x 15 mm resolute integrity)  utilizing drug-eluting stent and successful PCI to mid LAD (2.75 x 26 mm resolute integrity taken to 3.0   mm) utilizing drug-eluting stent. The patient continues on Plavix. She has followed with a vascular specialist in [address] [place] [address] for AAA. The patient reports \"he said would just monitor it for now. \"  The patient has reduced smoking to 4 per day. The patient denies symptoms to suggest myocardial ischemia, heart failure or arrhythmia. The patient reports BP running a little high.  The patient's PCP monitors cholesterol. Vicenta Torres denies exertional chest pain, shortness of breath, orthopnea, paroxysmal nocturnal dyspnea, syncope, presyncope, sensed arrhythmia and edema. The patient reports left hand weakness with some left facial weakness as well. Iglesia Kimball has the following history as recorded in [facility]:  Patient Active Problem List   Diagnosis Code    Deep vein thrombosis (HCC) I82.409    Essential hypertension I10    Diabetes mellitus ([address] E11.9    Hypercholesteremia E78.00    S/P CABG x 4 Z95.1    History of coronary artery stent placement Z95.5    Coronary artery disease involving native coronary artery of native heart without angina pectoris I25.10    Mixed hyperlipidemia E78.2    History of diabetes mellitus Z86.39    Chronic obstructive pulmonary disease (HCC) J44.9    NUNEZ (dyspnea on exertion) R06.00    Abdominal aortic aneurysm (AAA) without rupture ([facility]) I71.4    Diabetic ulcer of left lower leg associated with type 2 diabetes mellitus, with fat layer exposed ([address] E11.622, L97.922    Smoker F17.200    Traumatic hematoma T14. Ermalene Brighter NICM (nonischemic cardiomyopathy) ([address] I42.8    Sinoatrial node dysfunction ([facility]) I49.5     Past Medical History:   Diagnosis Date    ASHD (arteriosclerotic heart disease)     s/p PTCA and stent of circumflex, as well as intermediate and mid LAD     COPD (chronic obstructive pulmonary disease) ([address]     Coronary atherosclerosis     Diabetes mellitus ([address]     diet controlled    DVT (deep vein thrombosis) in pregnancy     Encounter for wound care     FOR LLL WOUND    Fall 10/29/2020    Ganglion cyst     RT HAND    Hematoma 10/2020    hematoma LLL from fall injury    Hx of blood clots     Hypercholesteremia     Hypertension     Unstable angina [facility])      Past Surgical History:   Procedure Laterality Date    CARDIAC CATHETERIZATION  12/10/00    selective left heart and coronary arteriography with left ventriculography     CARDIAC CATHETERIZATION  01/23/98    left heart cath, left ventriculography, slective coronary arteriography, direct infarct angioplasty and stent placement to proximal left anterior descending coronary artery    CARDIAC CATHETERIZATION  03/05/94    left heart cath, selective coronary arteriography, left ventriculography    CARDIAC CATHETERIZATION  8/27/2011    [place]    CHOLECYSTECTOMY      CORONARY ANGIOPLASTY WITH STENT PLACEMENT  12/12/00    PTCA and stent placement to the mid LAD/ptca and stent placement first circumflex marginal (intermediate)     CORONARY ANGIOPLASTY WITH STENT PLACEMENT  08/2021    CORONARY ARTERY BYPASS GRAFT  8/29/2011    PACABG X 4 LIMA-LAD, SVG-DIAG, SVG-PDA, RT EVH, LT OPEN VEIN HARVEST, DR Sheeba Petersen    CYST REMOVAL      GANGLION CYST REMOVED RT HAND    HYSTERECTOMY      NECK SURGERY      parotid artery tumor removed bilaterally    PAROTIDECTOMY Bilateral      Family History   Problem Relation Age of Onset    Cancer Mother     Coronary Art Dis Father      Social History     Tobacco Use    Smoking status: Current Every Day Smoker     Types: Cigarettes    Smokeless tobacco: Never Used    Tobacco comment: 1/2 PACKS EVERY 2 WEEKS, states has been decreasing amount    Substance Use Topics    Alcohol use: No      Current Outpatient Medications   Medication Sig Dispense Refill    ramipril (ALTACE) 5 MG capsule Take 1 capsule by mouth 2 times daily (Patient taking differently: Take 5 mg by mouth 2 times daily Is only taking 1 pill a day) 180 capsule 3    metoprolol succinate (TOPROL XL) 100 MG extended release tablet Take 1 tablet by mouth daily (Patient taking differently: Take 100 mg by mouth 2 times daily ) 90 tablet 3    rosuvastatin (CRESTOR) 10 MG tablet Take 1 tablet by mouth daily (Patient taking differently: Take 10 mg by mouth nightly PT TAKES AT NIGHT) 90 tablet 3    clopidogrel (PLAVIX) 75 MG tablet Take 1 tablet by mouth daily 90 tablet 3  metFORMIN (GLUCOPHAGE) 500 MG tablet Take 1 tablet by mouth 2 times daily (with meals) Hold for 2 days and restart a 26th 2020 180 tablet 3    nitroGLYCERIN (NITROSTAT) 0.4 MG SL tablet Place 1 tablet under the tongue every 5 minutes as needed for Chest pain 25 tablet 3     No current facility-administered medications for this visit. Allergies: Codeine    Review of Systems  Constitutional  no appetite change, or unexpected weight change. No fever, chills or diaphoresis. + fatigue. HEENT  no significant rhinorrhea or epistaxis. No tinnitus or significant hearing loss. Eyes  no sudden vision change or amaurosis. No corneal arcus, xantholasma, subconjunctival hemorrhage or discharge. Respiratory  no significant wheezing, stridor, apnea or cough. No dyspnea on exertion or shortness of air. Cardiovascular  no exertional chest pain to suggest myocardial ischemia. No orthopnea or PND. No sensation of sustained arrythmia. No occurrence of slow heart rate. No palpitations. No claudication. Gastrointestinal  no abdominal swelling or pain. No blood in stool. No severe constipation, diarrhea, nausea, or vomiting. Genitourinary  no dysuria, frequency, or urgency. No flank pain or hematuria. Musculoskeletal  no back pain or myalgia. No problems with gait. Extremities - no clubbing, cyanosis or extremity edema. Skin  no color change or rash. No pallor. No new surgical incision. Healed wound noted per left lower leg. Neurologic  no speech difficulty. No seizures. No recurrent presyncope or syncope. No significant dizziness. + left hand weakness. Some left eyelid drooping and minimal drooping left mouth corner. Hematologic  no easy bruising or excessive bleeding. Psychiatric  no severe anxiety or insomnia. No confusion. All other review of systems are negative.     Objective  Vital Signs - /84   Pulse 98   Ht 5' 6\" (1.676 m)   Wt 171 lb (77.6 kg)   BMI 27.60 kg/m² Abhinav Mac is alert, cooperative, and pleasant. Well groomed. No acute distress. Body habitus - Body mass index is 27.6 kg/m². HEENT  Head is normocephalic. No circumoral cyanosis. Dentition is normal.  EYES -   Lids normal with ptosis left lid. No discharge, edema or subconjunctival hemorrhage. Neck - Symmetrical without apparent mass or lymphadenopathy. Respiratory - Normal respiratory effort without use of accessory muscles. Ausculatation reveals vesicular breath sounds without crackles, wheezes, rub or rhonchi. Cardiovascular  No jugular venous distention. Auscultation reveals regular rate and rhythm. No audible clicks, gallop or rub. No murmur. No lower extremity varicosities. No carotid bruits. Abdominal -  No visible distention, mass or pulsations. Extremities - No clubbing or cyanosis. No statis dermatitis or ulcers. No edema. Musculoskeletal -   No Osler's nodes. No kyphosis or scoliosis. Gait is even and regular without limp or shuffle. Ambulates without assistance. Skin -  Warm and dry; no rash or pallor. No new surgical wound. Neurological - No focal neurological deficits. Thought processes coherent. No apparent tremor. Oriented to person, place and time. Left hand weakness. Left face with some minimal drooping left lid and left mouth corner. Psychiatric -  Appropriate affect and mood. Data reviewed:  8/24/20 cath   Triple-vessel disease with severe vessel ectasia in RCA, stenotic disease   in LAD. Severely stenotic stent in mid LAD. Occluded LIMA to mid LAD. Patent SVG to 1st diagonal.   Patent SVG to OM1. Patent SVG to RPDA. Normal LV ejection fraction. Large calcified abdominal aortic aneurysm with large mural thrombus. Successful PCI to proximal to mid LAD (4.0 x 15 mm resolute integrity)  utilizing drug-eluting stent. Successful PCI to mid LAD (2.75 x 26 mm resolute integrity taken to 3.0  mm) utilizing drug-eluting stent. Recommendations    Medical management. Smoking cessation. Vascular surgical consult for large abdominal aortic aneurysm.     Signatures    Electronically signed by Cathi Rivas MD(Performing Physician) on  08/25/2020 00:28    1/31/20 echo  Technically difficult study with many images of poor quality   Small left ventricular cavity with preserved systolic function EF 69%   Mitral valve disease preclude standard assessment of diastolic function  but tissue Doppler velocities suggest diastolic dysfunction   Mild left atrial enlargement   Poor visualization of the aortic valve which appears to offer no   significant stenosis or insufficiency   Poor visualization of the mitral valve with significant calcification of  the mitral annulus which appears to involve the mitral leaflets  Peak gradient of 6.25 mmHg with a pressure halftime value of 1.48 cm^2   consistent with mild mitral stenosis   Trace mitral regurgitation   Pulmonic valve not well visualized   Poor visualization of the right-sided chambers   No tricuspid regurgitation captured   IVC dimensions within normal limits consistent with normal right atrial  filling pressures   No significant pericardial effusion and aortic dimensions are within  normal limits    Signature   Electronically signed by Alda Hernandez MD(Interpreting physician)   on 01/31/2020 12:39 PM    Lab Results   Component Value Date    WBC 7.3 03/02/2021    HGB 14.7 03/02/2021    HCT 47.5 (H) 03/02/2021    MCV 92.4 03/02/2021     03/02/2021     Lab Results   Component Value Date     03/02/2021    K 4.0 03/02/2021    CL 99 03/02/2021    CO2 32 (H) 03/02/2021    BUN 12 03/02/2021    CREATININE 0.8 03/02/2021    GLUCOSE 139 (H) 03/02/2021    CALCIUM 9.5 03/02/2021    PROT 8.4 08/06/2020    LABALBU 4.2 08/06/2020    BILITOT <0.2 08/06/2020    ALKPHOS 88 08/06/2020    AST 15 08/06/2020    ALT 13 08/06/2020    LABGLOM >60 03/02/2021    GFRAA >59 03/02/2021     Lab Results   Component Value Date CHOL 133 (L) 08/24/2020    CHOL 206 (H) 11/13/2017    CHOL 149 03/30/2014     Lab Results   Component Value Date    TRIG 145 08/24/2020    TRIG 201 (H) 11/13/2017    TRIG 105 03/30/2014     Lab Results   Component Value Date    HDL 43 (L) 08/24/2020    HDL 51 (L) 11/13/2017    HDL 56 03/30/2014     Lab Results   Component Value Date    LDLCALC 61 08/24/2020    LDLCALC 115 11/13/2017    LDLCALC 60 08/23/2011       BP Readings from Last 3 Encounters:   04/14/21 138/84   04/01/21 (!) 150/82   03/11/21 (!) 158/89    Pulse Readings from Last 3 Encounters:   04/14/21 98   04/01/21 60   03/11/21 86        Wt Readings from Last 3 Encounters:   04/14/21 171 lb (77.6 kg)   03/11/21 172 lb (78 kg)   03/02/21 172 lb (78 kg)     Assessment/Plan:   Diagnosis Orders   1. Coronary artery disease involving native coronary artery of native heart without angina pectoris     2. NICM (nonischemic cardiomyopathy) ([facility]     3. Abdominal aortic aneurysm (AAA) without rupture ([facility]     4. S/P CABG x 4     5. History of coronary artery stent placement     6. Mixed hyperlipidemia     7. SA node dysfunction ([facility]     8. Cardiac pacemaker       Pacer check:  Pacemaker check showed adequate battery status @ 12.7 years. .  Mode: AAIR-DDDR. Lead impedances are stable. Pacing:  AP 64%;  2.5%. Reprogramming for sensitivity and threshold testing. Appropriate diagnostics and safety margins noted. A output 1.00 V at 0.40 ms; P wave 5.3 mV. V output 1.25 V at 0.40 ms, R wave >20 mV. Sustained arrythmia:  None. 2 monitored AF episodes. Reprogramming:  A output programmed at 2.00 V at 0.40ms based on threshold testing; V output programmed at 2.50 V at 0.40 ms based on threshold testing. Next [employer] remote transmission: 3 months. Stable CV status without overt heart failure, sensed arrhythmia or angina. CAD - stable on current medical management. HTN - BP borderline elevated. Ramipril increased to 5 mg (1) tab BID.   Home BP log

## 2021-04-15 PROBLEM — Z95.0 PACEMAKER: Status: ACTIVE | Noted: 2021-03-02

## 2021-04-15 NOTE — TELEPHONE ENCOUNTER
Joselin Seen,    I called the patient, she is taking plavix 75 mg but Dr. Rinku Petty she said took her off of the asa.

## 2021-04-28 ENCOUNTER — VIRTUAL VISIT (OUTPATIENT)
Dept: CARDIOLOGY CLINIC | Age: 71
End: 2021-04-28
Payer: MEDICARE

## 2021-04-28 DIAGNOSIS — I10 ESSENTIAL HYPERTENSION: Primary | ICD-10-CM

## 2021-04-28 DIAGNOSIS — I25.10 CORONARY ARTERY DISEASE INVOLVING NATIVE CORONARY ARTERY OF NATIVE HEART WITHOUT ANGINA PECTORIS: ICD-10-CM

## 2021-04-28 DIAGNOSIS — Z95.1 S/P CABG X 4: ICD-10-CM

## 2021-04-28 DIAGNOSIS — Z95.5 HISTORY OF CORONARY ARTERY STENT PLACEMENT: ICD-10-CM

## 2021-04-28 DIAGNOSIS — E78.2 MIXED HYPERLIPIDEMIA: ICD-10-CM

## 2021-04-28 PROBLEM — Z95.0 PACEMAKER: Status: RESOLVED | Noted: 2021-03-02 | Resolved: 2021-04-28

## 2021-04-28 PROCEDURE — 99441 PR PHYS/QHP TELEPHONE EVALUATION 5-10 MIN: CPT | Performed by: NURSE PRACTITIONER

## 2021-04-28 RX ORDER — AMLODIPINE BESYLATE 5 MG/1
5 TABLET ORAL DAILY
Qty: 90 TABLET | Refills: 1 | Status: SHIPPED | OUTPATIENT
Start: 2021-04-28 | End: 2021-08-23 | Stop reason: SDUPTHER

## 2021-04-28 NOTE — PROGRESS NOTES
Andra Kaur is a 66 y.o. female evaluated via telephone on 4/28/2021. Consent:  She and/or health care decision maker is aware that that she may receive a bill for this telephone service, depending on her insurance coverage, and has provided verbal consent to proceed: Yes    Documentation:  I communicated with the patient and/or health care decision maker about HTN  Details of this discussion including any medical advice provided: heart health. Med changes. I affirm this is a Patient Initiated Episode with an Established Patient who has not had a related appointment within my department in the past 7 days or scheduled within the next 24 hours. Total Time: minutes: 5-10 minutes    Note: not billable if this call serves to triage the patient into an appointment for the relevant concern    Jacquelin Palomino      4/28/2021    Audio Patient Encouter([facility])  The telephone encourter was conducted with patient in their residence from [address] with Arma Schlatter, APRN; assistance by Moon Mars MA.    HPI:  Gary Mendez   Diagnosis Orders   1. Coronary artery disease involving native coronary artery of native heart without angina pectoris     2. S/P CABG x 4     3. Essential hypertension     4. History of coronary artery stent placement     5. Mixed hyperlipidemia        179/87. BP today 179/87. The patient presents today for audio evaluation regarding uncontrolled BP. Altace was increased to 5 mg BID at last visit. The patient reports BP reading today at 179/87. The patient denies symptoms to suggest myocardial ischemia, heart failure or arrhythmia. BP is well controlled on current regimen. The patient's PCP monitors cholesterol. SUBJECTIVE:  Sri De Leon denies exertional chest pain, shortness of breath, orthopnea, paroxysmal nocturnal dyspnea, syncope, presyncope, sensed arrhythmia, edema and fatigue.   The patient denies numbness or weakness to suggest cerebrovascular accident or transient ischemic attack. Review of Systems    Prior to Visit Medications    Medication Sig Taking? Authorizing Provider   ramipril (ALTACE) 5 MG capsule Take 5 mg by mouth 2 times daily Yes Historical Provider, MD   metoprolol succinate (TOPROL XL) 100 MG extended release tablet Take 100 mg by mouth 2 times daily Yes Historical Provider, MD   rosuvastatin (CRESTOR) 10 MG tablet Take 1 tablet by mouth daily  Patient taking differently: Take 10 mg by mouth nightly PT TAKES AT NIGHT Yes JAKOB Fisher   clopidogrel (PLAVIX) 75 MG tablet Take 1 tablet by mouth daily Yes JAKOB Jones   metFORMIN (GLUCOPHAGE) 500 MG tablet Take 1 tablet by mouth 2 times daily (with meals) Hold for 2 days and restart a 26th 2020 Yes Shari Askew MD   nitroGLYCERIN (NITROSTAT) 0.4 MG SL tablet Place 1 tablet under the tongue every 5 minutes as needed for Chest pain Yes JAKOB Beavers - CNP       Social History     Tobacco Use    Smoking status: Current Every Day Smoker     Types: Cigarettes    Smokeless tobacco: Never Used    Tobacco comment: 1/2 PACKS EVERY 2 WEEKS, states has been decreasing amount    Substance Use Topics    Alcohol use: No    Drug use: No        REVIEW OF SYSTEMS:  Constitutional  no appetite change, or unexpected weight change. No fever, chills or diaphoresis. No significant change in activity level or new onset of fatigue. HEENT  no significant rhinorrhea or epistaxis. No tinnitus or significant hearing loss. Eyes  no sudden vision change or amaurosis. No corneal arcus, xantholasma, subconjunctival hemorrhage or discharge. Respiratory  no significant wheezing, stridor, apnea or cough. No dyspnea on exertion or shortness of air. Cardiovascular  no exertional chest pain to suggest myocardial ischemia. No orthopnea or PND. No sensation of sustained arrythmia. No occurrence of slow heart rate. No palpitations. No claudication. Gastrointestinal  no abdominal swelling or pain. No blood in stool. No severe constipation, diarrhea, nausea, or vomiting. Genitourinary  no dysuria, frequency, or urgency. No flank pain or hematuria. Musculoskeletal  no back pain or myalgia. No problems with gait. Extremities - no clubbing, cyanosis or extremity edema. Skin  no color change or rash. No pallor. No new surgical incision. Neurologic  no speech difficulty, facial asymmetry or lateralizing weakness. No seizures, presyncope or syncope. No significant dizziness. Hematologic  no easy bruising or excessive bleeding. Psychiatric  no severe anxiety or insomnia. No confusion. All other review of systems are negative. DATA REVIEWED:    ASSESSMENT/PlAN:   Diagnosis Orders   1. Coronary artery disease involving native coronary artery of native heart without angina pectoris     2. S/P CABG x 4     3. Essential hypertension     4. History of coronary artery stent placement     5. Mixed hyperlipidemia       Stable CV status without overt heart failure, sensed arrhythmia or angina.       CAD - stable on current medical management.     HTN - uncontrolled. Add amlodipine 5 mg (1) daily. Home BP log with follow up in one week.     Hyperlipidemia - LDL 61 on Crestor 10 mg daily.     AAA - patient following with vascular specialist in [address] [place] [address].     Patient compliant with medication regimen. Continue current medical management for cardiac condition. Continue current medications as prescribed. BP goal is 130/80 or less. If your primary care provider is outside of the [facility], please request your labs be faxed to this office at [phone]. Continue to follow up with primary care provider for non cardiac medical problems. Call the office with any problems, questions or concerns at [phone]. Follow up with cardiologist as scheduled in [address] [place] [address] Rd.   The following educational material has been included in this after visit summary for patient review:  Heart health. Temo Rob is a 66 y.o. female being evaluated by a telephone encounter to address concerns as mentioned above. A caregiver was present when appropriate. Due to this being a TeleHealth encounter (During [facility] public health emergency). Pursuant to the emergency declaration under the [address] [place] [address], [address] [place] [address] and the [employer] and [employer] Act, this Virtual Visit was conducted with patient's (and/or legal guardian's) consent, to reduce the patient's risk of exposure to COVID-19 and provide necessary medical care. The patient (and/or legal guardian) has also been advised to contact this office for worsening conditions or problems, and seek emergency medical treatment and/or call 911 if deemed necessary. Services were provided through a telephone discussion virtually to substitute for in-person clinic visit. Patient and provider were located at their individual homes. --JAKOB Ceballos on 4/28/2021 at 2:00 PM    An electronic signature was used to authenticate this note.

## 2021-04-28 NOTE — PATIENT INSTRUCTIONS
problem with your medicine. · If your doctor recommends aspirin, take the amount directed each day. Make sure you take aspirin and not another kind of pain reliever, such as acetaminophen (Tylenol). When should you call for help? [id number] if you have symptoms of a heart attack. These may include:  · Chest pain or pressure, or a strange feeling in the chest.  · Sweating. · Shortness of breath. · Pain, pressure, or a strange feeling in the back, neck, jaw, or upper belly or in one or both shoulders or arms. · Lightheadedness or sudden weakness. · A fast or irregular heartbeat. After you call 911, the  may tell you to chew 1 adult-strength or 2 to 4 low-dose aspirin. Wait for an ambulance. Do not try to drive yourself. Watch closely for changes in your health, and be sure to contact your doctor if you have any problems. Where can you learn more? Go to [email or web address]. org and sign in to your [facility] [facility] account. Enter A806 in the [employer] box to learn more about \"A Healthy Heart: Care Instructions. \"     If you do not have an account, please click on the \"Sign Up Now\" link. Current as of: December 16, 2019               Content Version: 12.5  © 1346-2903 Healthwise, Incorporated. Care instructions adapted under license by [facility] [employer] [facility] If you have questions about a medical condition or this instruction, always ask your healthcare professional. [place] [address] any warranty or liability for your use of this information.

## 2021-05-05 ENCOUNTER — VIRTUAL VISIT (OUTPATIENT)
Dept: CARDIOLOGY CLINIC | Age: 71
End: 2021-05-05
Payer: MEDICARE

## 2021-05-05 DIAGNOSIS — E78.2 MIXED HYPERLIPIDEMIA: ICD-10-CM

## 2021-05-05 DIAGNOSIS — I10 ESSENTIAL HYPERTENSION: Primary | ICD-10-CM

## 2021-05-05 DIAGNOSIS — I25.10 CORONARY ARTERY DISEASE INVOLVING NATIVE CORONARY ARTERY OF NATIVE HEART WITHOUT ANGINA PECTORIS: ICD-10-CM

## 2021-05-05 DIAGNOSIS — Z95.1 S/P CABG X 4: ICD-10-CM

## 2021-05-05 DIAGNOSIS — I49.5 SA NODE DYSFUNCTION (HCC): ICD-10-CM

## 2021-05-05 PROCEDURE — 99441 PR PHYS/QHP TELEPHONE EVALUATION 5-10 MIN: CPT | Performed by: NURSE PRACTITIONER

## 2021-05-05 NOTE — PROGRESS NOTES
Rolando Lewis is a 66 y.o. female evaluated via telephone on 5/5/2021. Consent:  She and/or health care decision maker is aware that that she may receive a bill for this telephone service, depending on her insurance coverage, and has provided verbal consent to proceed: Yes    Documentation:  I communicated with the patient and/or health care decision maker about HTN. Details of this discussion including any medical advice provided: heart health. HTN. I affirm this is a Patient Initiated Episode with an Established Patient who has not had a related appointment within my department in the past 7 days or scheduled within the next 24 hours. Total Time: minutes: 5-10 minutes    Note: not billable if this call serves to triage the patient into an appointment for the relevant concern    Lesli Ramos      5/5/2021    Audio Patient Encouter(During PCOXP-13 public health emergency)  The telephone encourter was conducted with patient in their residence from [address] with JAKOB Huntley; assistance by Bruno Connors MA.    HPI:  Pegshantae Francisco Wautoma   Diagnosis Orders   1. Essential hypertension     2. Coronary artery disease involving native coronary artery of native heart without angina pectoris     3. S/P CABG x 4     4. Mixed hyperlipidemia     5. SA node dysfunction ([address]       The patient presents today for audio evaluation regarding uncontrolled HTN. Amlodipine 5 mg (1) tab daily was added add last telephone visit. Today, BP reported at 131/76 and heart rate 75. The patient reports no headache the past 3 days. Elysia White had a cardiac cath by Dr. Sidra Stiles on 8/24/20 with successful PCI to proximal to mid LAD (4.0 x 15 mm resolute integrity)  utilizing drug-eluting stent and successful PCI to mid LAD (2.75 x 26 mm resolute integrity taken to 3.0  mm) utilizing drug-eluting stent. The patient denies symptoms to suggest myocardial ischemia, heart failure or arrhythmia.   The patient's PCP monitors cholesterol. SUBJECTIVE:  Celso Castillo denies exertional chest pain, shortness of breath, orthopnea, paroxysmal nocturnal dyspnea, syncope, presyncope, sensed arrhythmia, edema and fatigue. The patient denies numbness or weakness to suggest cerebrovascular accident or transient ischemic attack. Review of Systems    Prior to Visit Medications    Medication Sig Taking? Authorizing Provider   amLODIPine (NORVASC) 5 MG tablet Take 1 tablet by mouth daily Yes JAKOB Bhakta   ramipril (ALTACE) 5 MG capsule Take 5 mg by mouth 2 times daily Yes Historical Provider, MD   metoprolol succinate (TOPROL XL) 100 MG extended release tablet Take 100 mg by mouth 2 times daily Yes Historical Provider, MD   rosuvastatin (CRESTOR) 10 MG tablet Take 1 tablet by mouth daily  Patient taking differently: Take 10 mg by mouth nightly PT TAKES AT NIGHT Yes JAKOB Bhakta   clopidogrel (PLAVIX) 75 MG tablet Take 1 tablet by mouth daily Yes JAKOB Griffin   metFORMIN (GLUCOPHAGE) 500 MG tablet Take 1 tablet by mouth 2 times daily (with meals) Hold for 2 days and restart a 26th 2020 Yes Sang Sanders MD   nitroGLYCERIN (NITROSTAT) 0.4 MG SL tablet Place 1 tablet under the tongue every 5 minutes as needed for Chest pain Yes JAKOB Becker - CNP       Social History     Tobacco Use    Smoking status: Current Every Day Smoker     Types: Cigarettes    Smokeless tobacco: Never Used    Tobacco comment: 1/2 PACKS EVERY 2 WEEKS, states has been decreasing amount    Substance Use Topics    Alcohol use: No    Drug use: No        REVIEW OF SYSTEMS:  Constitutional  no appetite change, or unexpected weight change. No fever, chills or diaphoresis. No significant change in activity level or new onset of fatigue. HEENT  no significant rhinorrhea or epistaxis. No tinnitus or significant hearing loss. Eyes  no sudden vision change or amaurosis. No corneal arcus, xantholasma, subconjunctival hemorrhage or discharge. Respiratory  no significant wheezing, stridor, apnea or cough. No dyspnea on exertion or shortness of air. Cardiovascular  no exertional chest pain to suggest myocardial ischemia. No orthopnea or PND. No sensation of sustained arrythmia. No occurrence of slow heart rate. No palpitations. No claudication. Gastrointestinal  no abdominal swelling or pain. No blood in stool. No severe constipation, diarrhea, nausea, or vomiting. Genitourinary  no dysuria, frequency, or urgency. No flank pain or hematuria. Musculoskeletal  no back pain or myalgia. No problems with gait. Extremities - no clubbing, cyanosis or extremity edema. Skin  no color change or rash. No pallor. No new surgical incision. Neurologic  no speech difficulty, facial asymmetry or lateralizing weakness. No seizures, presyncope or syncope. No significant dizziness. Hematologic  no easy bruising or excessive bleeding. Psychiatric  no severe anxiety or insomnia. No confusion. All other review of systems are negative. DATA REVIEWED:  8/24/20 cath - Dr. Breezy Moore disease with severe vessel ectasia in RCA, stenotic disease   in LAD. Severely stenotic stent in mid LAD. Occluded LIMA to mid LAD. Patent SVG to 1st diagonal.   Patent SVG to OM1. Patent SVG to RPDA. Normal LV ejection fraction. Large calcified abdominal aortic aneurysm with large mural thrombus. Successful PCI to proximal to mid LAD (4.0 x 15 mm resolute integrity)   utilizing drug-eluting stent. Successful PCI to mid LAD (2.75 x 26 mm resolute integrity taken to 3.0   mm) utilizing drug-eluting stent. Recommendations    Medical management. Smoking cessation. Vascular surgical consult for large abdominal aortic aneurysm.     Signatures    Electronically signed by Leighton Rivas MD(Performing Physician) on   08/25/2020 00:28    Lab Results   Component Value Date    WBC 7.3 03/02/2021    HGB 14.7 03/02/2021    HCT 47.5 (H) 03/02/2021    MCV 92.4 03/02/2021     03/02/2021     Lab Results   Component Value Date     03/02/2021    K 4.0 03/02/2021    CL 99 03/02/2021    CO2 32 (H) 03/02/2021    BUN 12 03/02/2021    CREATININE 0.8 03/02/2021    GLUCOSE 139 (H) 03/02/2021    CALCIUM 9.5 03/02/2021    PROT 8.4 08/06/2020    LABALBU 4.2 08/06/2020    BILITOT <0.2 08/06/2020    ALKPHOS 88 08/06/2020    AST 15 08/06/2020    ALT 13 08/06/2020    LABGLOM >60 03/02/2021    GFRAA >59 03/02/2021       Lab Results   Component Value Date    CHOL 133 (L) 08/24/2020    CHOL 206 (H) 11/13/2017    CHOL 149 03/30/2014     Lab Results   Component Value Date    TRIG 145 08/24/2020    TRIG 201 (H) 11/13/2017    TRIG 105 03/30/2014     Lab Results   Component Value Date    HDL 43 (L) 08/24/2020    HDL 51 (L) 11/13/2017    HDL 56 03/30/2014     Lab Results   Component Value Date    LDLCALC 61 08/24/2020    LDLCALC 115 11/13/2017    [address] 60 08/23/2011     ASSESSMENT/PlAN:   Diagnosis Orders   1. Essential hypertension     2. Coronary artery disease involving native coronary artery of native heart without angina pectoris     3. S/P CABG x 4     4. Mixed hyperlipidemia     5. SA node dysfunction (HCC)        Stable CV status without overt heart failure, sensed arrhythmia or angina.       CAD - stable on current medical management. Continue same.     HTN -  BP today 131/76. Tolerating addition of amlodipine 5 mg daily. Home BP log with follow up in 2 weeks.     Hyperlipidemia - LDL 61 on Crestor 10 mg daily.     AAA - patient following with vascular specialist in [address] [place] [address].     Patient compliant with medication regimen. Continue current medical management for cardiac condition. Continue current medications as prescribed. BP goal is 130/80 or less. If your primary care provider is outside of the [facility], please request your labs be faxed to this office at [phone].   Continue to follow up with primary care provider for non cardiac medical problems. Call the office with any problems, questions or concerns at [phone]. Follow up with cardiologist as scheduled in [address] The following educational material has been included in this after visit summary for patient review:  Heart health. HTN. Stephani Ladd is a 66 y.o. female being evaluated by a telephone encounter to address concerns as mentioned above. A caregiver was present when appropriate. Due to this being a TeleHealth encounter (During [facility]- public health emergency). Pursuant to the emergency declaration under the [address] [place] [address], [address] [place] [address] authority and the [employer] Act, this Virtual Visit was conducted with patient's (and/or legal guardian's) consent, to reduce the patient's risk of exposure to COVID-19 and provide necessary medical care. The patient (and/or legal guardian) has also been advised to contact this office for worsening conditions or problems, and seek emergency medical treatment and/or call 911 if deemed necessary. Services were provided through a telephone discussion virtually to substitute for in-person clinic visit. Patient and provider were located at their individual homes. --JAKOB Yoo on 5/5/2021 at 1:49 PM    An electronic signature was used to authenticate this note.

## 2021-05-05 NOTE — PATIENT INSTRUCTIONS
fighting cardiovascular disease. When you eat a heart healthy diet, you improve your chances for feeling good and staying healthy for life. 6)  Lose weight - when you shed extra fat an unnecessary pounds, you reduce the burden on your hear, lungs, blood vessels and skeleton. You give yourself the gift of active living, you lower your blood pressure and help yourself feel better. 7) Stop smoking - cigarette smokers have a higher risk of developing cardiovascular disease. If  You smoke, quitting is the best thing you can do for your health. Check American Heart Association on line for more information on Life's Simple 7 and tips for healthy living. A Healthy Heart: Care Instructions  Your Care Instructions     Coronary artery disease, also called heart disease, occurs when a substance called plaque builds up in the vessels that supply oxygen-rich blood to your heart muscle. This can narrow the blood vessels and reduce blood flow. A heart attack happens when blood flow is completely blocked. A high-fat diet, smoking, and other factors increase the risk of heart disease. Your doctor has found that you have a chance of having heart disease. You can do lots of things to keep your heart healthy. It may not be easy, but you can change your diet, exercise more, and quit smoking. These steps really work to lower your chance of heart disease. Follow-up care is a key part of your treatment and safety. Be sure to make and go to all appointments, and call your doctor if you are having problems. It's also a good idea to know your test results and keep a list of the medicines you take. How can you care for yourself at home? Diet  · Use less salt when you cook and eat. This helps lower your blood pressure. Taste food before salting. Add only a little salt when you think you need it. With time, your taste buds will adjust to less salt.   · Eat fewer snack items, fast foods, canned soups, and other high-salt, high-fat, processed foods. · Read food labels and try to avoid saturated and trans fats. They increase your risk of heart disease by raising cholesterol levels. · Limit the amount of solid fat-butter, margarine, and shortening-you eat. Use olive, peanut, or canola oil when you cook. Bake, broil, and steam foods instead of frying them. · Eat a variety of fruit and vegetables every day. Dark green, deep orange, red, or yellow fruits and vegetables are especially good for you. Examples include spinach, carrots, peaches, and berries. · Foods high in fiber can reduce your cholesterol and provide important vitamins and minerals. High-fiber foods include whole-grain cereals and breads, oatmeal, beans, brown rice, citrus fruits, and apples. · Eat lean proteins. Heart-healthy proteins include seafood, lean meats and poultry, eggs, beans, peas, nuts, seeds, and soy products. · Limit drinks and foods with added sugar. These include candy, desserts, and soda pop. Lifestyle changes  · If your doctor recommends it, get more exercise. Walking is a good choice. Bit by bit, increase the amount you walk every day. Try for at least 30 minutes on most days of the week. You also may want to swim, bike, or do other activities. · Do not smoke. If you need help quitting, talk to your doctor about stop-smoking programs and medicines. These can increase your chances of quitting for good. Quitting smoking may be the most important step you can take to protect your heart. It is never too late to quit. · Limit alcohol to 2 drinks a day for men and 1 drink a day for women. Too much alcohol can cause health problems. · Manage other health problems such as diabetes, high blood pressure, and high cholesterol. If you think you may have a problem with alcohol or drug use, talk to your doctor. Medicines  · Take your medicines exactly as prescribed. Call your doctor if you think you are having a problem with your medicine.   · If your doctor recommends pushes when your heart is pumping. The second number is the diastolic pressure (bottom number). It shows how hard the blood pushes between heartbeats, when your heart is relaxed and filling with blood. Your doctor will give you a goal for your blood pressure based on your health and your age. High blood pressure (hypertension) means that the top number stays high, or the bottom number stays high, or both. High blood pressure increases the risk of stroke, heart attack, and other problems. What happens when you have high blood pressure? · Blood flows through your arteries with too much force. Over time, this can damage the heart and the walls of your arteries. But you can't feel it. High blood pressure usually doesn't cause symptoms. · High blood pressure makes your heart work harder. And that can lead to heart failure, which means your heart doesn't pump as much blood as your body needs. · Fat and calcium start to build up in your arteries. This buildup is called hardening of the arteries. It can cause many problems including a heart attack and stroke. · Arteries also carry blood and oxygen to organs like your eyes, kidneys, and brain. If high blood pressure damages those arteries, it can lead to vision loss, kidney disease, stroke, and a higher risk of dementia. How can you prevent high blood pressure? · Stay at a healthy weight. · Try to limit how much sodium you eat to less than 2,300 milligrams (mg) a day. If you limit your sodium to 1,500 mg a day, you can lower your blood pressure even more. ? Buy foods that are labeled \"unsalted,\" \"sodium-free,\" or \"low-sodium. \" Foods labeled \"reduced-sodium\" and \"light sodium\" may still have too much sodium. ? Flavor your food with garlic, lemon juice, onion, vinegar, herbs, and spices instead of salt. Do not use soy sauce, steak sauce, onion salt, garlic salt, mustard, or ketchup on your food. ? Use less salt (or none) when recipes call for it.  You can often use

## 2021-06-01 RX ORDER — METOPROLOL SUCCINATE 100 MG/1
100 TABLET, EXTENDED RELEASE ORAL 2 TIMES DAILY
Qty: 180 TABLET | Refills: 3 | Status: SHIPPED | OUTPATIENT
Start: 2021-06-01 | End: 2021-06-02 | Stop reason: SDUPTHER

## 2021-06-02 RX ORDER — METOPROLOL SUCCINATE 100 MG/1
100 TABLET, EXTENDED RELEASE ORAL 2 TIMES DAILY
Qty: 14 TABLET | Refills: 0 | Status: SHIPPED | OUTPATIENT
Start: 2021-06-02 | End: 2021-06-07

## 2021-06-07 RX ORDER — METOPROLOL SUCCINATE 100 MG/1
TABLET, EXTENDED RELEASE ORAL
Qty: 60 TABLET | Refills: 1 | Status: ON HOLD | OUTPATIENT
Start: 2021-06-07 | End: 2021-06-28 | Stop reason: HOSPADM

## 2021-06-22 ENCOUNTER — APPOINTMENT (OUTPATIENT)
Dept: GENERAL RADIOLOGY | Age: 71
DRG: 291 | End: 2021-06-22
Payer: MEDICARE

## 2021-06-22 ENCOUNTER — HOSPITAL ENCOUNTER (INPATIENT)
Age: 71
LOS: 6 days | Discharge: HOME | DRG: 291 | End: 2021-06-28
Attending: EMERGENCY MEDICINE | Admitting: FAMILY MEDICINE
Payer: MEDICARE

## 2021-06-22 DIAGNOSIS — J96.01 ACUTE HYPOXEMIC RESPIRATORY FAILURE (HCC): Primary | ICD-10-CM

## 2021-06-22 DIAGNOSIS — J44.1 COPD EXACERBATION (HCC): ICD-10-CM

## 2021-06-22 DIAGNOSIS — E87.1 HYPONATREMIA: ICD-10-CM

## 2021-06-22 LAB
ADENOVIRUS BY PCR: NOT DETECTED
ALBUMIN SERPL-MCNC: 4.5 G/DL (ref 3.5–5.2)
ALP BLD-CCNC: 121 U/L (ref 35–104)
ALT SERPL-CCNC: 27 U/L (ref 5–33)
ANION GAP SERPL CALCULATED.3IONS-SCNC: 7 MMOL/L (ref 7–19)
AST SERPL-CCNC: 22 U/L (ref 5–32)
BASE EXCESS ARTERIAL: 2.6 MMOL/L (ref -2–2)
BASOPHILS ABSOLUTE: 0 K/UL (ref 0–0.2)
BASOPHILS RELATIVE PERCENT: 0.2 % (ref 0–1)
BILIRUB SERPL-MCNC: 0.6 MG/DL (ref 0.2–1.2)
BORDETELLA PARAPERTUSSIS BY PCR: NOT DETECTED
BORDETELLA PERTUSSIS BY PCR: NOT DETECTED
BUN BLDV-MCNC: 23 MG/DL (ref 8–23)
CALCIUM SERPL-MCNC: 9.7 MG/DL (ref 8.8–10.2)
CARBOXYHEMOGLOBIN ARTERIAL: 3.9 % (ref 0–5)
CHLAMYDOPHILIA PNEUMONIAE BY PCR: NOT DETECTED
CHLORIDE BLD-SCNC: 83 MMOL/L (ref 98–111)
CO2: 33 MMOL/L (ref 22–29)
CORONAVIRUS 229E BY PCR: NOT DETECTED
CORONAVIRUS HKU1 BY PCR: NOT DETECTED
CORONAVIRUS NL63 BY PCR: NOT DETECTED
CORONAVIRUS OC43 BY PCR: NOT DETECTED
CREAT SERPL-MCNC: 1 MG/DL (ref 0.5–0.9)
EKG P AXIS: NORMAL DEGREES
EKG P-R INTERVAL: NORMAL MS
EKG Q-T INTERVAL: 362 MS
EKG QRS DURATION: 104 MS
EKG QTC CALCULATION (BAZETT): 384 MS
EKG T AXIS: -153 DEGREES
EOSINOPHILS ABSOLUTE: 0 K/UL (ref 0–0.6)
EOSINOPHILS RELATIVE PERCENT: 0.2 % (ref 0–5)
GFR AFRICAN AMERICAN: >59
GFR NON-AFRICAN AMERICAN: 54
GLUCOSE BLD-MCNC: 208 MG/DL (ref 70–99)
GLUCOSE BLD-MCNC: 230 MG/DL (ref 74–109)
GLUCOSE BLD-MCNC: 380 MG/DL (ref 70–99)
HCO3 ARTERIAL: 29.7 MMOL/L (ref 22–26)
HCT VFR BLD CALC: 50.4 % (ref 37–47)
HEMOGLOBIN, ART, EXTENDED: 14.3 G/DL (ref 12–16)
HEMOGLOBIN: 16.2 G/DL (ref 12–16)
HUMAN METAPNEUMOVIRUS BY PCR: NOT DETECTED
HUMAN RHINOVIRUS/ENTEROVIRUS BY PCR: NOT DETECTED
IMMATURE GRANULOCYTES #: 0 K/UL
INFLUENZA A BY PCR: NOT DETECTED
INFLUENZA B BY PCR: NOT DETECTED
LYMPHOCYTES ABSOLUTE: 1 K/UL (ref 1.1–4.5)
LYMPHOCYTES RELATIVE PERCENT: 11.7 % (ref 20–40)
MCH RBC QN AUTO: 29 PG (ref 27–31)
MCHC RBC AUTO-ENTMCNC: 32.1 G/DL (ref 33–37)
MCV RBC AUTO: 90.3 FL (ref 81–99)
METHEMOGLOBIN ARTERIAL: 0.9 %
MONOCYTES ABSOLUTE: 0.9 K/UL (ref 0–0.9)
MONOCYTES RELATIVE PERCENT: 11.2 % (ref 0–10)
MYCOPLASMA PNEUMONIAE BY PCR: NOT DETECTED
NEUTROPHILS ABSOLUTE: 6.3 K/UL (ref 1.5–7.5)
NEUTROPHILS RELATIVE PERCENT: 76.2 % (ref 50–65)
O2 CONTENT ARTERIAL: 16.2 ML/DL
O2 SAT, ARTERIAL: 80.8 %
O2 THERAPY: ABNORMAL
PARAINFLUENZA VIRUS 1 BY PCR: NOT DETECTED
PARAINFLUENZA VIRUS 2 BY PCR: NOT DETECTED
PARAINFLUENZA VIRUS 3 BY PCR: NOT DETECTED
PARAINFLUENZA VIRUS 4 BY PCR: NOT DETECTED
PCO2 ARTERIAL: 55 MMHG (ref 35–45)
PDW BLD-RTO: 14.2 % (ref 11.5–14.5)
PERFORMED ON: ABNORMAL
PERFORMED ON: ABNORMAL
PH ARTERIAL: 7.34 (ref 7.35–7.45)
PLATELET # BLD: 237 K/UL (ref 130–400)
PMV BLD AUTO: 8.9 FL (ref 9.4–12.3)
PO2 ARTERIAL: 47 MMHG (ref 80–100)
POTASSIUM SERPL-SCNC: 5.1 MMOL/L (ref 3.5–5)
POTASSIUM, WHOLE BLOOD: 4.7
PRO-BNP: 4778 PG/ML (ref 0–900)
RBC # BLD: 5.58 M/UL (ref 4.2–5.4)
RESPIRATORY SYNCYTIAL VIRUS BY PCR: NOT DETECTED
SARS-COV-2, PCR: NOT DETECTED
SODIUM BLD-SCNC: 123 MMOL/L (ref 136–145)
TOTAL PROTEIN: 8.9 G/DL (ref 6.6–8.7)
WBC # BLD: 8.2 K/UL (ref 4.8–10.8)

## 2021-06-22 PROCEDURE — 6370000000 HC RX 637 (ALT 250 FOR IP): Performed by: FAMILY MEDICINE

## 2021-06-22 PROCEDURE — 5A2204Z RESTORATION OF CARDIAC RHYTHM, SINGLE: ICD-10-PCS | Performed by: PSYCHIATRY & NEUROLOGY

## 2021-06-22 PROCEDURE — 6370000000 HC RX 637 (ALT 250 FOR IP): Performed by: EMERGENCY MEDICINE

## 2021-06-22 PROCEDURE — 94660 CPAP INITIATION&MGMT: CPT

## 2021-06-22 PROCEDURE — 6360000002 HC RX W HCPCS: Performed by: FAMILY MEDICINE

## 2021-06-22 PROCEDURE — 71045 X-RAY EXAM CHEST 1 VIEW: CPT

## 2021-06-22 PROCEDURE — 84132 ASSAY OF SERUM POTASSIUM: CPT

## 2021-06-22 PROCEDURE — 85025 COMPLETE CBC W/AUTO DIFF WBC: CPT

## 2021-06-22 PROCEDURE — 2700000000 HC OXYGEN THERAPY PER DAY

## 2021-06-22 PROCEDURE — 2580000003 HC RX 258: Performed by: FAMILY MEDICINE

## 2021-06-22 PROCEDURE — 83880 ASSAY OF NATRIURETIC PEPTIDE: CPT

## 2021-06-22 PROCEDURE — 94640 AIRWAY INHALATION TREATMENT: CPT

## 2021-06-22 PROCEDURE — 36415 COLL VENOUS BLD VENIPUNCTURE: CPT

## 2021-06-22 PROCEDURE — 6360000002 HC RX W HCPCS: Performed by: EMERGENCY MEDICINE

## 2021-06-22 PROCEDURE — 93010 ELECTROCARDIOGRAM REPORT: CPT | Performed by: INTERNAL MEDICINE

## 2021-06-22 PROCEDURE — 36600 WITHDRAWAL OF ARTERIAL BLOOD: CPT

## 2021-06-22 PROCEDURE — 2580000003 HC RX 258: Performed by: EMERGENCY MEDICINE

## 2021-06-22 PROCEDURE — 82803 BLOOD GASES ANY COMBINATION: CPT

## 2021-06-22 PROCEDURE — 96375 TX/PRO/DX INJ NEW DRUG ADDON: CPT

## 2021-06-22 PROCEDURE — 93005 ELECTROCARDIOGRAM TRACING: CPT | Performed by: EMERGENCY MEDICINE

## 2021-06-22 PROCEDURE — 0202U NFCT DS 22 TRGT SARS-COV-2: CPT

## 2021-06-22 PROCEDURE — 82947 ASSAY GLUCOSE BLOOD QUANT: CPT

## 2021-06-22 PROCEDURE — 99285 EMERGENCY DEPT VISIT HI MDM: CPT

## 2021-06-22 PROCEDURE — 2140000000 HC CCU INTERMEDIATE R&B

## 2021-06-22 PROCEDURE — 80053 COMPREHEN METABOLIC PANEL: CPT

## 2021-06-22 PROCEDURE — 96374 THER/PROPH/DIAG INJ IV PUSH: CPT

## 2021-06-22 RX ORDER — METHYLPREDNISOLONE SODIUM SUCCINATE 40 MG/ML
40 INJECTION, POWDER, LYOPHILIZED, FOR SOLUTION INTRAMUSCULAR; INTRAVENOUS EVERY 8 HOURS
Status: DISCONTINUED | OUTPATIENT
Start: 2021-06-22 | End: 2021-06-23

## 2021-06-22 RX ORDER — AMLODIPINE BESYLATE 5 MG/1
5 TABLET ORAL DAILY
Status: DISCONTINUED | OUTPATIENT
Start: 2021-06-22 | End: 2021-06-28 | Stop reason: HOSPADM

## 2021-06-22 RX ORDER — POTASSIUM CHLORIDE 1.5 G/1.77G
20 POWDER, FOR SOLUTION ORAL DAILY
Status: ON HOLD | COMMUNITY
End: 2021-08-13

## 2021-06-22 RX ORDER — IPRATROPIUM BROMIDE AND ALBUTEROL SULFATE 2.5; .5 MG/3ML; MG/3ML
1 SOLUTION RESPIRATORY (INHALATION) ONCE
Status: COMPLETED | OUTPATIENT
Start: 2021-06-22 | End: 2021-06-22

## 2021-06-22 RX ORDER — METOPROLOL SUCCINATE 50 MG/1
100 TABLET, EXTENDED RELEASE ORAL 2 TIMES DAILY
Status: DISCONTINUED | OUTPATIENT
Start: 2021-06-22 | End: 2021-06-24

## 2021-06-22 RX ORDER — ONDANSETRON 4 MG/1
4 TABLET, ORALLY DISINTEGRATING ORAL EVERY 8 HOURS PRN
Status: DISCONTINUED | OUTPATIENT
Start: 2021-06-22 | End: 2021-06-28 | Stop reason: HOSPADM

## 2021-06-22 RX ORDER — NICOTINE POLACRILEX 4 MG
15 LOZENGE BUCCAL PRN
Status: DISCONTINUED | OUTPATIENT
Start: 2021-06-22 | End: 2021-06-28 | Stop reason: HOSPADM

## 2021-06-22 RX ORDER — DEXTROSE MONOHYDRATE 50 MG/ML
100 INJECTION, SOLUTION INTRAVENOUS PRN
Status: DISCONTINUED | OUTPATIENT
Start: 2021-06-22 | End: 2021-06-28 | Stop reason: HOSPADM

## 2021-06-22 RX ORDER — FUROSEMIDE 10 MG/ML
20 INJECTION INTRAMUSCULAR; INTRAVENOUS ONCE
Status: COMPLETED | OUTPATIENT
Start: 2021-06-22 | End: 2021-06-22

## 2021-06-22 RX ORDER — METHYLPREDNISOLONE SODIUM SUCCINATE 125 MG/2ML
125 INJECTION, POWDER, LYOPHILIZED, FOR SOLUTION INTRAMUSCULAR; INTRAVENOUS ONCE
Status: COMPLETED | OUTPATIENT
Start: 2021-06-22 | End: 2021-06-22

## 2021-06-22 RX ORDER — ONDANSETRON 2 MG/ML
4 INJECTION INTRAMUSCULAR; INTRAVENOUS EVERY 6 HOURS PRN
Status: DISCONTINUED | OUTPATIENT
Start: 2021-06-22 | End: 2021-06-28 | Stop reason: HOSPADM

## 2021-06-22 RX ORDER — ROSUVASTATIN CALCIUM 10 MG/1
10 TABLET, COATED ORAL NIGHTLY
Status: DISCONTINUED | OUTPATIENT
Start: 2021-06-22 | End: 2021-06-28 | Stop reason: HOSPADM

## 2021-06-22 RX ORDER — 0.9 % SODIUM CHLORIDE 0.9 %
500 INTRAVENOUS SOLUTION INTRAVENOUS ONCE
Status: COMPLETED | OUTPATIENT
Start: 2021-06-22 | End: 2021-06-22

## 2021-06-22 RX ORDER — SODIUM CHLORIDE 9 MG/ML
INJECTION, SOLUTION INTRAVENOUS CONTINUOUS
Status: DISCONTINUED | OUTPATIENT
Start: 2021-06-22 | End: 2021-06-23

## 2021-06-22 RX ORDER — DEXTROSE MONOHYDRATE 25 G/50ML
12.5 INJECTION, SOLUTION INTRAVENOUS PRN
Status: DISCONTINUED | OUTPATIENT
Start: 2021-06-22 | End: 2021-06-28 | Stop reason: HOSPADM

## 2021-06-22 RX ORDER — POTASSIUM CHLORIDE 20 MEQ/1
20 TABLET, EXTENDED RELEASE ORAL DAILY
Status: DISCONTINUED | OUTPATIENT
Start: 2021-06-22 | End: 2021-06-22

## 2021-06-22 RX ORDER — CLOPIDOGREL BISULFATE 75 MG/1
75 TABLET ORAL DAILY
Status: DISCONTINUED | OUTPATIENT
Start: 2021-06-22 | End: 2021-06-28 | Stop reason: HOSPADM

## 2021-06-22 RX ORDER — IPRATROPIUM BROMIDE AND ALBUTEROL SULFATE 2.5; .5 MG/3ML; MG/3ML
1 SOLUTION RESPIRATORY (INHALATION)
Status: DISCONTINUED | OUTPATIENT
Start: 2021-06-22 | End: 2021-06-28 | Stop reason: HOSPADM

## 2021-06-22 RX ORDER — SODIUM CHLORIDE 0.9 % (FLUSH) 0.9 %
5-40 SYRINGE (ML) INJECTION EVERY 12 HOURS SCHEDULED
Status: DISCONTINUED | OUTPATIENT
Start: 2021-06-22 | End: 2021-06-28 | Stop reason: HOSPADM

## 2021-06-22 RX ORDER — RAMIPRIL 5 MG/1
5 CAPSULE ORAL 2 TIMES DAILY
Status: DISCONTINUED | OUTPATIENT
Start: 2021-06-22 | End: 2021-06-28 | Stop reason: HOSPADM

## 2021-06-22 RX ORDER — SODIUM CHLORIDE 9 MG/ML
25 INJECTION, SOLUTION INTRAVENOUS PRN
Status: DISCONTINUED | OUTPATIENT
Start: 2021-06-22 | End: 2021-06-28 | Stop reason: HOSPADM

## 2021-06-22 RX ORDER — HYDROCHLOROTHIAZIDE 25 MG/1
25 TABLET ORAL DAILY
Status: ON HOLD | COMMUNITY
End: 2021-06-28 | Stop reason: HOSPADM

## 2021-06-22 RX ORDER — SODIUM CHLORIDE 0.9 % (FLUSH) 0.9 %
5-40 SYRINGE (ML) INJECTION PRN
Status: DISCONTINUED | OUTPATIENT
Start: 2021-06-22 | End: 2021-06-28 | Stop reason: HOSPADM

## 2021-06-22 RX ORDER — IPRATROPIUM BROMIDE AND ALBUTEROL SULFATE 2.5; .5 MG/3ML; MG/3ML
SOLUTION RESPIRATORY (INHALATION)
Status: DISPENSED
Start: 2021-06-22 | End: 2021-06-22

## 2021-06-22 RX ADMIN — INSULIN LISPRO 2 UNITS: 100 INJECTION, SOLUTION INTRAVENOUS; SUBCUTANEOUS at 18:26

## 2021-06-22 RX ADMIN — ENOXAPARIN SODIUM 40 MG: 40 INJECTION SUBCUTANEOUS at 16:09

## 2021-06-22 RX ADMIN — SODIUM CHLORIDE: 9 INJECTION, SOLUTION INTRAVENOUS at 12:56

## 2021-06-22 RX ADMIN — SODIUM CHLORIDE 500 ML: 9 INJECTION, SOLUTION INTRAVENOUS at 11:38

## 2021-06-22 RX ADMIN — IPRATROPIUM BROMIDE AND ALBUTEROL SULFATE 1 AMPULE: 2.5; .5 SOLUTION RESPIRATORY (INHALATION) at 19:21

## 2021-06-22 RX ADMIN — ROSUVASTATIN CALCIUM 10 MG: 10 TABLET, FILM COATED ORAL at 20:49

## 2021-06-22 RX ADMIN — METOPROLOL SUCCINATE 100 MG: 50 TABLET, EXTENDED RELEASE ORAL at 20:49

## 2021-06-22 RX ADMIN — IPRATROPIUM BROMIDE AND ALBUTEROL SULFATE 1 AMPULE: .5; 3 SOLUTION RESPIRATORY (INHALATION) at 09:00

## 2021-06-22 RX ADMIN — RAMIPRIL 5 MG: 5 CAPSULE ORAL at 20:49

## 2021-06-22 RX ADMIN — AMLODIPINE BESYLATE 5 MG: 5 TABLET ORAL at 18:25

## 2021-06-22 RX ADMIN — CLOPIDOGREL BISULFATE 75 MG: 75 TABLET ORAL at 18:26

## 2021-06-22 RX ADMIN — METHYLPREDNISOLONE SODIUM SUCCINATE 40 MG: 40 INJECTION, POWDER, FOR SOLUTION INTRAMUSCULAR; INTRAVENOUS at 18:31

## 2021-06-22 RX ADMIN — METHYLPREDNISOLONE SODIUM SUCCINATE 125 MG: 125 INJECTION, POWDER, FOR SOLUTION INTRAMUSCULAR; INTRAVENOUS at 09:34

## 2021-06-22 RX ADMIN — CEFTRIAXONE 1000 MG: 1 INJECTION, POWDER, FOR SOLUTION INTRAMUSCULAR; INTRAVENOUS at 11:38

## 2021-06-22 RX ADMIN — SODIUM CHLORIDE, PRESERVATIVE FREE 10 ML: 5 INJECTION INTRAVENOUS at 21:00

## 2021-06-22 RX ADMIN — METFORMIN HYDROCHLORIDE 500 MG: 500 TABLET ORAL at 18:25

## 2021-06-22 RX ADMIN — FUROSEMIDE 20 MG: 10 INJECTION, SOLUTION INTRAMUSCULAR; INTRAVENOUS at 18:25

## 2021-06-22 ASSESSMENT — ENCOUNTER SYMPTOMS
DIARRHEA: 0
ABDOMINAL PAIN: 0
VOMITING: 0
WHEEZING: 1
SHORTNESS OF BREATH: 1
NAUSEA: 0

## 2021-06-22 ASSESSMENT — PAIN DESCRIPTION - LOCATION: LOCATION: CHEST

## 2021-06-22 ASSESSMENT — PAIN DESCRIPTION - DESCRIPTORS: DESCRIPTORS: TIGHTNESS

## 2021-06-22 ASSESSMENT — PAIN SCALES - GENERAL
PAINLEVEL_OUTOF10: 0
PAINLEVEL_OUTOF10: 4
PAINLEVEL_OUTOF10: 0

## 2021-06-22 NOTE — ED NOTES
Ventricular pulse rate has remained 70's/80's while in the ED. Dr Dominga Coates has also verified.      Edel Lóepz RN  06/22/21 [id number]

## 2021-06-22 NOTE — ED PROVIDER NOTES
[facility] EMERGENCY DEPT  eMERGENCY dEPARTMENT eNCOUnter      Pt Name: Kathern Goldmann  MRN: [id number]  [place] 1950  Date of evaluation: 6/22/2021  Provider: Charlene Maxwell MD    [address] [place] [address]       Chief Complaint   Patient presents with    Shortness of Breath         HISTORY OF PRESENT ILLNESS   (Location/Symptom, Timing/Onset,Context/Setting, Quality, Duration, Modifying Factors, Severity)  Note limiting factors. Kathern Goldmann is a 66 y.o. female who presents to the emergency department for evaluation regarding increasing shortness of breath. Patient states the symptoms been ongoing for the past several days. States that she has been utilizing her nebulizer treatments at home with minimal improvement in symptoms. Patient has a prior history of SA node dysfunction, cardiomyopathy and pacemaker implantation. She recently underwent cardiac catheterization in August 2020 and underwent PCI stent to the mid LAD at that time. Patient reports that she is not had any fevers or chills. She does relate that she has had a mild cough that is been productive of some whitish sputum. She denies chest pain at this time. Reports that she is not noticed any increased swelling in her lower extremities bilaterally. The symptoms are described as moderate to severe in nature and without relieving factors. HPI    NursingNotes were reviewed. REVIEW OF SYSTEMS    (2-9 systems for level 4, 10 or more for level 5)     Review of Systems   Constitutional: Negative for chills and fever. Respiratory: Positive for shortness of breath and wheezing. Cardiovascular: Negative for chest pain and leg swelling. Gastrointestinal: Negative for abdominal pain, diarrhea, nausea and vomiting. Neurological: Negative for dizziness and syncope. All other systems reviewed and are negative.            PAST MEDICALHISTORY     Past Medical History:   Diagnosis Date    ASHD (arteriosclerotic heart disease)     s/p PTCA and stent of circumflex, as well as intermediate and mid LAD     COPD (chronic obstructive pulmonary disease) (HCC)     Coronary atherosclerosis     Diabetes mellitus ([facility] [address]     diet controlled    DVT (deep vein thrombosis) in pregnancy     Encounter for wound care     FOR LLL WOUND    Fall 10/29/2020    Ganglion cyst     RT HAND    Hematoma 10/2020    hematoma LLL from fall injury    Hx of blood clots     Hypercholesteremia     Hypertension     Pacemaker 03/02/2021    Unstable angina [facility])          SURGICAL HISTORY       Past Surgical History:   Procedure Laterality Date    CARDIAC CATHETERIZATION  12/10/00    selective left heart and coronary arteriography with left ventriculography     CARDIAC CATHETERIZATION  01/23/98    left heart cath, left ventriculography, slective coronary arteriography, direct infarct angioplasty and stent placement to proximal left anterior descending coronary artery    CARDIAC CATHETERIZATION  03/05/94    left heart cath, selective coronary arteriography, left ventriculography    CARDIAC CATHETERIZATION  8/27/2011    [place]    CHOLECYSTECTOMY      CORONARY ANGIOPLASTY WITH STENT PLACEMENT  12/12/00    PTCA and stent placement to the mid LAD/ptca and stent placement first circumflex marginal (intermediate)     CORONARY ANGIOPLASTY WITH STENT PLACEMENT  08/2021    CORONARY ARTERY BYPASS GRAFT  8/29/2011    PACABG X 4 LIMA-LAD, SVG-DIAG, SVG-PDA, RT EVH, LT OPEN VEIN HARVEST, DR Tico Kothari    CYST REMOVAL      GANGLION CYST REMOVED RT HAND    HYSTERECTOMY      NECK SURGERY      parotid artery tumor removed bilaterally    PAROTIDECTOMY Bilateral          CURRENT MEDICATIONS     Previous Medications    AMLODIPINE (NORVASC) 5 MG TABLET    Take 1 tablet by mouth daily    CLOPIDOGREL (PLAVIX) 75 MG TABLET    Take 1 tablet by mouth daily    HYDROCHLOROTHIAZIDE (HYDRODIURIL) 25 MG TABLET    Take 25 mg by mouth daily    METFORMIN (GLUCOPHAGE) 500 MG TABLET    Take 1 tablet by mouth 2 [employer]:     Active Member of Clubs or Organizations:     Attends Club or Organization Meetings:     Marital Status:    Intimate Partner Violence:     Fear of Current or Ex-Partner:     Emotionally Abused:     Physically Abused:     Sexually Abused:        SCREENINGS    [place] Coma Scale  Eye Opening: Spontaneous  Best Verbal Response: Oriented  Best Motor Response: Obeys commands  [place] Coma Scale Score: 15        PHYSICAL EXAM    (up to 7 for level 4, 8 or more for level 5)     ED Triage Vitals   BP Temp Temp src Pulse Resp SpO2 Height Weight   06/22/21 0827 -- -- 06/22/21 0845 06/22/21 0827 06/22/21 0841 06/22/21 0916 06/22/21 0916   (!) 130/102   145 26 (!) 85 % 5' 5\" (1.651 m) 172 lb (78 kg)       Physical Exam  Vitals and nursing note reviewed. HENT:      Head: Atraumatic. Mouth/Throat:      Mouth: Mucous membranes are moist. Mucous membranes are not dry. Eyes:      General: No scleral icterus. Pupils: Pupils are equal, round, and reactive to light. Neck:      Trachea: No tracheal deviation. Cardiovascular:      Rate and Rhythm: Normal rate. Rhythm irregular. Heart sounds: Normal heart sounds. No murmur heard. Pulmonary:      Effort: Respiratory distress present. Breath sounds: No stridor. Wheezing (moderate, expiratory) present. Abdominal:      General: There is no distension. Palpations: Abdomen is soft. Tenderness: There is no abdominal tenderness. There is no guarding. Musculoskeletal:      Right lower leg: No edema. Left lower leg: No edema. Skin:     Capillary Refill: Capillary refill takes less than 2 seconds. Coloration: Skin is not pale. Findings: No rash. Neurological:      General: No focal deficit present. Mental Status: She is alert and oriented to person, place, and time. Cranial Nerves: No cranial nerve deficit.    Psychiatric:         Mood and Affect: Mood normal.         Behavior: Behavior is cooperative. DIAGNOSTIC RESULTS     EKG: All EKG's areinterpreted by the Emergency Department Physician who either signs or Co-signs this chart in the absence of a cardiologist.    5852: Atrial flutter with a ventricular rate at 77. RADIOLOGY:  Non-plain film images such as CT, Ultrasound and MRI are read by the radiologist. Plain radiographic images are visualized and preliminarily interpreted bythe emergency physician with the below findings:        XR CHEST PORTABLE   Final Result   No evidence of acute cardiopulmonary process.    Signed by Dr Ellena Rubinstein:  Caity Bedolla - Abnormal; Notable for the following components:       Result Value    Pro-BNP 4,778 (*)     All other components within normal limits   CBC WITH AUTO DIFFERENTIAL - Abnormal; Notable for the following components:    RBC 5.58 (*)     Hemoglobin 16.2 (*)     Hematocrit 50.4 (*)     MCHC 32.1 (*)     MPV 8.9 (*)     Neutrophils % 76.2 (*)     Lymphocytes % 11.7 (*)     Monocytes % 11.2 (*)     Lymphocytes Absolute 1.0 (*)     All other components within normal limits   COMPREHENSIVE METABOLIC PANEL - Abnormal; Notable for the following components:    Sodium 123 (*)     Potassium 5.1 (*)     Chloride 83 (*)     CO2 33 (*)     Glucose 230 (*)     CREATININE 1.0 (*)     GFR Non- 54 (*)     Total Protein 8.9 (*)     Alkaline Phosphatase 121 (*)     All other components within normal limits   BLOOD GAS, ARTERIAL - Abnormal; Notable for the following components:    pH, Arterial 7.340 (*)     pCO2, Arterial 55.0 (*)     pO2, Arterial 47.0 (*)     HCO3, Arterial 29.7 (*)     Base Excess, Arterial 2.6 (*)     O2 Sat, Arterial 80.8 (*)     All other components within normal limits    Narrative:     CALL  [place]  Cory Payne MD ER, 06/22/2021 08:51, by Salvatore Gavin   RESPIRATORY PANEL, MOLECULAR, WITH COVID-19   POTASSIUM, WHOLE BLOOD       All other labs were within normal range or not returned as of this dictation. EMERGENCY DEPARTMENT COURSE and DIFFERENTIAL DIAGNOSIS/MDM:   Vitals:    Vitals:    06/22/21 0916 06/22/21 0925 06/22/21 0950 06/22/21 1025   BP: (!) 128/95   (!) 144/79   Pulse: 149 167  154   Resp:   15 18   SpO2:    99%   Weight: 172 lb (78 kg)      Height: 5' 5\" (1.651 m)          MDM    Reassessment    Patient noted to be hypoxic and tachypneic here in the emergency department upon arrival.  She was given nebulizer treatment along with IV steroids. Chest radiograph does not reveal evidence of acute pneumonia. Her ABG is consistent with hypoxemic respiratory failure. Reexamination after nebulizer treatment reveals continued increased work of breathing. I have started her on BiPAP therapy at this time. She is resting much more comfortably with decreased work of breathing and decreased tachypnea. Patient's pulse is running in the 70s and 80s. She has documented heart rates in the 150s to 160s however this is her atrial rate and her ventricular rate is only in the 70s and 80s. Patient has received IV antibiotics along with IV steroids for treatment of COPD exacerbation. Her BNP is significantly elevated. Last echocardiogram performed was January 2020 which revealed an EF of about 50% with mildly decreased LV function. No evidence of significant volume overload noted on chest radiograph. Pacemaker interrogation revealed atrial rates of 180-200. Her ventricular conducted rhythm was only at a rate of about 70. [employer] reported that she had been in this rhythm since the end of May. CONSULTS:    Case was discussed with Dr. Meggan Mendez regarding inpatient admission to the progressive care unit for further evaluation and treatment. PROCEDURES:  Unless otherwise noted below, none     Procedures     CRITICAL CARE TIME   Total Critical Care time was 42 minutes, excluding separately reportable procedures.   There was a high probability of clinically significant/life

## 2021-06-22 NOTE — PROGRESS NOTES
Kevin Paz arrived to room # 242  Presented with: Exacerbation of COPD and hyponatremia. Mental Status: Patient is oriented, alert, coherent, logical, thought processes intact and able to concentrate and follow conversation. Vitals:    06/22/21 1519   BP:    Pulse:    Resp: 18   Temp:    SpO2: 95%     Patient safety contract and falls prevention contract reviewed with patient Yes. Oriented Patient to room. Call light within reach. Yes.   Needs, issues or concerns expressed at this time: no.      Electronically signed by Lillian Man RN on 6/22/2021 at 4:57 PM

## 2021-06-22 NOTE — PROGRESS NOTES
Contains critical data BLOOD GAS, ARTERIAL  Status:  Final result   Ref Range & Units 06/22/21 0850   pH, Arterial 7.350 - 7.450 7.340Low     pCO2, Arterial 35.0 - 45.0 mmHg 55. 0High     pO2, Arterial 80.0 - 100.0 mmHg 47. 0Low Panic     HCO3, Arterial 22.0 - 26.0 mmol/L 29.7High     Base Excess, Arterial -2.0 - 2.0 mmol/L 2.6High     Hemoglobin, Art, Extended 12.0 - 16.0 g/dL 14.3    O2 Sat, Arterial >92 % 80.8Low Panic     Carboxyhgb, Arterial 0.0 - 5.0 % 3.9    Comment:      0.0-1.5   (Smokers 1.5-5.0)    Methemoglobin, Arterial <1.5 % 0.9    O2 Content, Arterial Not Established mL/dL 16.2    O2 Therapy  Unknown    Resulting Agency  [facility]   Narrative    CALL  [place] Melvin Moore MD ER, 06/22/2021 08:51, by DREW        Specimen Collected: 06/22/21 0850 Last Resulted: 06/22/21 0851 View Other Order Details        Pt on room air, RR 26 site RR at+

## 2021-06-22 NOTE — H&P
CHIEF COMPLAINT: Shortness of breath    History Obtained From:  patient     HISTORY OF PRESENT ILLNESS:      The patient is a 66 y.o. female with significant past medical history of coronary artery disease and COPD who presents with increasing shortness of breath over the past 3 days. I actually saw her in the office about 4 days ago at which time she was doing reasonably well. She states shortly after our office visit she began getting more shortness of air. She was using nebulizer at home without much relief. Describes symptoms of dyspnea just walking around her home, orthopnea, tripoding, as well as increase in her baseline wheezing without relief from nebulizer. She also noticed significant swelling in her lower extremities. She attempted to treat at home however her symptoms got to the point where she could not manage and came to the emergency room this morning. She is found to be hypoxic on both pulse ox as well as ABG. Relatively normal chest x-ray but elevated BNP. Patient is now been admitted for further work-up and treatment.     Past Medical History:   Diagnosis Date    ASHD (arteriosclerotic heart disease)     s/p PTCA and stent of circumflex, as well as intermediate and mid LAD     COPD (chronic obstructive pulmonary disease) ([facility] [address]     Coronary atherosclerosis     Diabetes mellitus ([facility] [address]     diet controlled, type 2    Encounter for wound care     FOR LLL WOUND    Fall 10/29/2020    Ganglion cyst     RT HAND    Hematoma 10/2020    hematoma LLL from fall injury    Hx of blood clots     Hypercholesteremia     Hypertension     Pacemaker 03/02/2021    Unstable angina [facility])        Past Surgical History:   Procedure Laterality Date    CARDIAC CATHETERIZATION  12/10/00    selective left heart and coronary arteriography with left ventriculography     CARDIAC CATHETERIZATION  01/23/98    left heart cath, left ventriculography, slective coronary arteriography, direct infarct angioplasty and drugs. Family History:   family history includes Cancer in her brother and mother; Coronary Art Dis in her father; Mult Sclerosis in her sister. REVIEW OF SYSTEMS:  As above in the HPI, otherwise negative    PHYSICAL EXAM:    Vitals:  /76   Pulse 101   Temp 98.1 °F (36.7 °C) (Temporal)   Resp 18   Ht 5' 5\" (1.651 m)   Wt 172 lb (78 kg)   SpO2 95%   BMI 28.62 kg/m²     General Appearance:  in mild distress, alert and cooperative  Skin:  negatives: mobility and turgor normal  Eyes:  No gross abnormalities. Neck:  neck- supple, no mass, non-tender  Lungs:  Breathing Pattern: use of accessory muscles and audible wheezes, Breath sounds: wheezing- throughout  Heart:  Heart sounds are normal.  Regular rate and rhythm without murmur, gallop or rub. Abdomen: Auscultation: Normal bowel sounds. No bruits. Palpation: No masses, tenderness or organomegally.   Extremities: 1+ edema of  bilateral lower extremities   Musculoskeletal:  negative  Neurologic:  negative    DATA:  CBC with Differential:    Lab Results   Component Value Date    WBC 8.2 06/22/2021    RBC 5.58 06/22/2021    HGB 16.2 06/22/2021    HCT 50.4 06/22/2021    HCT 32.1 09/02/2011     06/22/2021     09/02/2011    MCV 90.3 06/22/2021    MCH 29.0 06/22/2021    MCHC 32.1 06/22/2021    RDW 14.2 06/22/2021    LYMPHOPCT 11.7 06/22/2021    MONOPCT 11.2 06/22/2021    EOSPCT 0.5 09/02/2011    BASOPCT 0.2 06/22/2021    MONOSABS 0.90 06/22/2021    LYMPHSABS 1.0 06/22/2021    EOSABS 0.00 06/22/2021    BASOSABS 0.00 06/22/2021     CMP:    Lab Results   Component Value Date     06/22/2021     09/02/2011    K 4.7 06/22/2021    K 5.1 06/22/2021    K 4.1 09/02/2011    CL 83 06/22/2021     09/02/2011    CO2 33 06/22/2021    BUN 23 06/22/2021    CREATININE 1.0 06/22/2021    CREATININE 0.6 09/02/2011    GFRAA >59 06/22/2021    LABGLOM 54 06/22/2021    GLUCOSE 230 06/22/2021    PROT 8.9 06/22/2021    PROT 7.5 01/18/2013    LABALBU 4.5 06/22/2021    LABALBU 3.3 09/02/2011    CALCIUM 9.7 06/22/2021    BILITOT 0.6 06/22/2021    ALKPHOS 121 06/22/2021    ALKPHOS 57 09/02/2011    AST 22 06/22/2021    ALT 27 06/22/2021       ASSESSMENT:      Patient Active Problem List   Diagnosis    Deep vein thrombosis ([address]    Essential hypertension    Diabetes mellitus ([address]    Hypercholesteremia    S/P CABG x 4    History of coronary artery stent placement    Coronary artery disease involving native coronary artery of native heart without angina pectoris    Mixed hyperlipidemia    History of diabetes mellitus    Chronic obstructive pulmonary disease ([address]    NUNEZ (dyspnea on exertion)    Abdominal aortic aneurysm (AAA) without rupture ([address]    Diabetic ulcer of left lower leg associated with type 2 diabetes mellitus, with fat layer exposed ([address]    Smoker    Traumatic hematoma    NICM (nonischemic cardiomyopathy) ([facility] [address]    Acute hypoxemic respiratory failure (HCC)    Hyponatremia       PLAN:    Admit for further work-up. Clinically looks like needs some diuresis. Electrolyte abnormalities present. I think the hyponatremia is likely due to the hydrochlorothiazide. That has been discontinued. Last cardiology to see as there was also some abnormal pickup on EKG between the atrial and ventricular rhythm. Last echo in January we will repeat that while here to see if any significant change. Sliding scale insulin to cover blood sugar is anticipate that probably high since given steroids in the emergency room. We will taper those down as tolerated. Continue with breathing treatment given the amount of wheezing she has. Likely when more stable will need pulmonary evaluation but will for now plan to do that as outpatient.     Electronically signed by Ninoska Cartwright MD on 6/22/21 at 6:14 PM CDT

## 2021-06-23 LAB
ANION GAP SERPL CALCULATED.3IONS-SCNC: 11 MMOL/L (ref 7–19)
BUN BLDV-MCNC: 26 MG/DL (ref 8–23)
CALCIUM SERPL-MCNC: 8.9 MG/DL (ref 8.8–10.2)
CHLORIDE BLD-SCNC: 89 MMOL/L (ref 98–111)
CO2: 26 MMOL/L (ref 22–29)
CREAT SERPL-MCNC: 1 MG/DL (ref 0.5–0.9)
GFR AFRICAN AMERICAN: >59
GFR NON-AFRICAN AMERICAN: 54
GLUCOSE BLD-MCNC: 217 MG/DL (ref 70–99)
GLUCOSE BLD-MCNC: 239 MG/DL (ref 70–99)
GLUCOSE BLD-MCNC: 267 MG/DL (ref 74–109)
GLUCOSE BLD-MCNC: 294 MG/DL (ref 70–99)
GLUCOSE BLD-MCNC: 354 MG/DL (ref 70–99)
HBA1C MFR BLD: 8.9 % (ref 4–6)
HCT VFR BLD CALC: 46.5 % (ref 37–47)
HEMOGLOBIN: 14.4 G/DL (ref 12–16)
LV EF: 68 %
LVEF MODALITY: NORMAL
MAGNESIUM: 2.5 MG/DL (ref 1.6–2.4)
MCH RBC QN AUTO: 29 PG (ref 27–31)
MCHC RBC AUTO-ENTMCNC: 31 G/DL (ref 33–37)
MCV RBC AUTO: 93.8 FL (ref 81–99)
PDW BLD-RTO: 14.3 % (ref 11.5–14.5)
PERFORMED ON: ABNORMAL
PLATELET # BLD: 204 K/UL (ref 130–400)
PMV BLD AUTO: 9.3 FL (ref 9.4–12.3)
POTASSIUM SERPL-SCNC: 5.4 MMOL/L (ref 3.5–5)
RBC # BLD: 4.96 M/UL (ref 4.2–5.4)
SODIUM BLD-SCNC: 126 MMOL/L (ref 136–145)
TROPONIN: <0.01 NG/ML (ref 0–0.03)
TROPONIN: <0.01 NG/ML (ref 0–0.03)
WBC # BLD: 6.6 K/UL (ref 4.8–10.8)

## 2021-06-23 PROCEDURE — 2580000003 HC RX 258: Performed by: FAMILY MEDICINE

## 2021-06-23 PROCEDURE — 2140000000 HC CCU INTERMEDIATE R&B

## 2021-06-23 PROCEDURE — 99222 1ST HOSP IP/OBS MODERATE 55: CPT | Performed by: INTERNAL MEDICINE

## 2021-06-23 PROCEDURE — 84484 ASSAY OF TROPONIN QUANT: CPT

## 2021-06-23 PROCEDURE — 6360000002 HC RX W HCPCS: Performed by: INTERNAL MEDICINE

## 2021-06-23 PROCEDURE — 85027 COMPLETE CBC AUTOMATED: CPT

## 2021-06-23 PROCEDURE — 83036 HEMOGLOBIN GLYCOSYLATED A1C: CPT

## 2021-06-23 PROCEDURE — 82947 ASSAY GLUCOSE BLOOD QUANT: CPT

## 2021-06-23 PROCEDURE — 6370000000 HC RX 637 (ALT 250 FOR IP): Performed by: INTERNAL MEDICINE

## 2021-06-23 PROCEDURE — C8929 TTE W OR WO FOL WCON,DOPPLER: HCPCS

## 2021-06-23 PROCEDURE — 2700000000 HC OXYGEN THERAPY PER DAY

## 2021-06-23 PROCEDURE — 36415 COLL VENOUS BLD VENIPUNCTURE: CPT

## 2021-06-23 PROCEDURE — 6370000000 HC RX 637 (ALT 250 FOR IP): Performed by: FAMILY MEDICINE

## 2021-06-23 PROCEDURE — 6360000002 HC RX W HCPCS: Performed by: FAMILY MEDICINE

## 2021-06-23 PROCEDURE — 80048 BASIC METABOLIC PNL TOTAL CA: CPT

## 2021-06-23 PROCEDURE — 94660 CPAP INITIATION&MGMT: CPT

## 2021-06-23 PROCEDURE — 94640 AIRWAY INHALATION TREATMENT: CPT

## 2021-06-23 PROCEDURE — 83735 ASSAY OF MAGNESIUM: CPT

## 2021-06-23 RX ORDER — FUROSEMIDE 10 MG/ML
40 INJECTION INTRAMUSCULAR; INTRAVENOUS 2 TIMES DAILY
Status: DISCONTINUED | OUTPATIENT
Start: 2021-06-23 | End: 2021-06-25

## 2021-06-23 RX ORDER — PREDNISONE 20 MG/1
20 TABLET ORAL DAILY
Status: DISCONTINUED | OUTPATIENT
Start: 2021-06-23 | End: 2021-06-28 | Stop reason: HOSPADM

## 2021-06-23 RX ORDER — FUROSEMIDE 10 MG/ML
40 INJECTION INTRAMUSCULAR; INTRAVENOUS ONCE
Status: COMPLETED | OUTPATIENT
Start: 2021-06-23 | End: 2021-06-23

## 2021-06-23 RX ORDER — FUROSEMIDE 10 MG/ML
20 INJECTION INTRAMUSCULAR; INTRAVENOUS DAILY
Status: DISCONTINUED | OUTPATIENT
Start: 2021-06-23 | End: 2021-06-23

## 2021-06-23 RX ADMIN — METOPROLOL SUCCINATE 100 MG: 50 TABLET, EXTENDED RELEASE ORAL at 13:20

## 2021-06-23 RX ADMIN — PREDNISONE 20 MG: 20 TABLET ORAL at 09:03

## 2021-06-23 RX ADMIN — INSULIN LISPRO 5 UNITS: 100 INJECTION, SOLUTION INTRAVENOUS; SUBCUTANEOUS at 13:20

## 2021-06-23 RX ADMIN — METHYLPREDNISOLONE SODIUM SUCCINATE 40 MG: 40 INJECTION, POWDER, FOR SOLUTION INTRAMUSCULAR; INTRAVENOUS at 02:35

## 2021-06-23 RX ADMIN — APIXABAN 5 MG: 5 TABLET, FILM COATED ORAL at 13:24

## 2021-06-23 RX ADMIN — METFORMIN HYDROCHLORIDE 500 MG: 500 TABLET ORAL at 09:00

## 2021-06-23 RX ADMIN — IPRATROPIUM BROMIDE AND ALBUTEROL SULFATE 1 AMPULE: 2.5; .5 SOLUTION RESPIRATORY (INHALATION) at 06:41

## 2021-06-23 RX ADMIN — IPRATROPIUM BROMIDE AND ALBUTEROL SULFATE 1 AMPULE: 2.5; .5 SOLUTION RESPIRATORY (INHALATION) at 04:07

## 2021-06-23 RX ADMIN — IPRATROPIUM BROMIDE AND ALBUTEROL SULFATE 1 AMPULE: 2.5; .5 SOLUTION RESPIRATORY (INHALATION) at 15:07

## 2021-06-23 RX ADMIN — RAMIPRIL 5 MG: 5 CAPSULE ORAL at 21:41

## 2021-06-23 RX ADMIN — RAMIPRIL 5 MG: 5 CAPSULE ORAL at 09:03

## 2021-06-23 RX ADMIN — METOPROLOL SUCCINATE 100 MG: 50 TABLET, EXTENDED RELEASE ORAL at 21:40

## 2021-06-23 RX ADMIN — FUROSEMIDE 40 MG: 10 INJECTION, SOLUTION INTRAMUSCULAR; INTRAVENOUS at 09:03

## 2021-06-23 RX ADMIN — ROSUVASTATIN CALCIUM 10 MG: 10 TABLET, FILM COATED ORAL at 21:40

## 2021-06-23 RX ADMIN — FUROSEMIDE 40 MG: 10 INJECTION, SOLUTION INTRAMUSCULAR; INTRAVENOUS at 17:36

## 2021-06-23 RX ADMIN — SODIUM CHLORIDE, PRESERVATIVE FREE 10 ML: 5 INJECTION INTRAVENOUS at 09:17

## 2021-06-23 RX ADMIN — INSULIN LISPRO 3 UNITS: 100 INJECTION, SOLUTION INTRAVENOUS; SUBCUTANEOUS at 17:36

## 2021-06-23 RX ADMIN — FUROSEMIDE 40 MG: 10 INJECTION, SOLUTION INTRAMUSCULAR; INTRAVENOUS at 04:48

## 2021-06-23 RX ADMIN — APIXABAN 5 MG: 5 TABLET, FILM COATED ORAL at 21:40

## 2021-06-23 RX ADMIN — IPRATROPIUM BROMIDE AND ALBUTEROL SULFATE 1 AMPULE: 2.5; .5 SOLUTION RESPIRATORY (INHALATION) at 10:32

## 2021-06-23 RX ADMIN — METFORMIN HYDROCHLORIDE 500 MG: 500 TABLET ORAL at 17:36

## 2021-06-23 RX ADMIN — SODIUM CHLORIDE, PRESERVATIVE FREE 10 ML: 5 INJECTION INTRAVENOUS at 21:41

## 2021-06-23 RX ADMIN — CLOPIDOGREL BISULFATE 75 MG: 75 TABLET ORAL at 09:03

## 2021-06-23 RX ADMIN — INSULIN LISPRO 2 UNITS: 100 INJECTION, SOLUTION INTRAVENOUS; SUBCUTANEOUS at 09:18

## 2021-06-23 RX ADMIN — IPRATROPIUM BROMIDE AND ALBUTEROL SULFATE 1 AMPULE: 2.5; .5 SOLUTION RESPIRATORY (INHALATION) at 19:52

## 2021-06-23 ASSESSMENT — ENCOUNTER SYMPTOMS
WHEEZING: 0
DIARRHEA: 0
SHORTNESS OF BREATH: 1
BACK PAIN: 0
CONSTIPATION: 0
BLOOD IN STOOL: 0
EYE DISCHARGE: 0
VOMITING: 0
COUGH: 0
ABDOMINAL DISTENTION: 0

## 2021-06-23 NOTE — CONSULTS
[facility]  Cardiology Consult      Requesting MD:  Adis Kirk MD   Admit Status:  Inpatient [101]       History obtained from:   ? Patient  ? Other (specify):     PROBLEM LIST:    Patient Active Problem List    Diagnosis Date Noted    NICM (nonischemic cardiomyopathy) ([facility] [address] 03/02/2021     Priority: High    Acute hypoxemic respiratory failure ([facility] [address] 06/22/2021     Priority: Low    Hyponatremia 06/22/2021     Priority: Low    Diabetic ulcer of left lower leg associated with type 2 diabetes mellitus, with [facility] layer exposed [facility] 01/05/2021     Priority: Low    Smoker 01/05/2021     Priority: Low    Traumatic hematoma 01/05/2021     Priority: Low    Abdominal aortic aneurysm (AAA) without rupture ([facility] 09/10/2020     Priority: Low    History of diabetes mellitus 07/16/2020     Priority: Low    Chronic obstructive pulmonary disease ([facility] 07/16/2020     Priority: Low    NUNEZ (dyspnea on exertion) 07/16/2020     Priority: Low    Mixed hyperlipidemia 12/19/2018     Priority: Low    S/P CABG x 4 10/11/2016     Priority: Low     Overview Note:     8/29/11 EF 60%      History of coronary artery stent placement 10/11/2016     Priority: Low    Coronary artery disease involving native coronary artery of native heart without angina pectoris 10/11/2016     Priority: Low    Hypercholesteremia 10/10/2011     Priority: Low    Deep vein thrombosis (HCC)      Priority: Low    Essential hypertension      Priority: Low    Diabetes mellitus ([facility]      Priority: Low     Overview Note:     diet controlled       1. Coronary artery disease, severe ectatic coronary disease, prior multiple PCI's with bare-metal stents to LAD and circumflex, CABG 8/2011 with LIMA to LAD (occluded), SVG to diagonal (patent), SVG to PDA (patent), normal LV ejection fraction, status post PCI 8/24/2020 to proximal (4.0 x 15 mm resolute) and mid LAD (2.75 x 26 mm resolute) with GABI.    2. Chronic ongoing tobacco use with COPD.   3. Diabetes mellitus not well controlled (HbA1c 8.9). 4. Hypertension. 5. Moderate to large abdominal aortic aneurysm 4.3 x 4.7 cm (noted at 8/24/2020)   6. ZIO monitor with 3.8-second pause with high-grade second-degree AV block and possible syncopal episode with fall and injury 10/2020, status post pacemaker placement 3/2/2021.  7.  Acute on chronic diastolic heart failure with new onset atrial flutter. PRESENTATION: Fabián Dhaliwal is a 66y.o. year old female who presents with complaints of shortness of breath with increased leg swelling over the last 4 days. She has been increasingly exhausted with exertional shortness of breath. No chest pain. EKG shows her to be in atrial flutter which is new. proBNP is elevated at 4778. Troponins negative. Pacemaker interrogation shows that she has been in atrial flutter for the last 23 days. This is a new finding and she was not on anticoagulation before. She did feel her heart beating fast at 1 point. Rates have been well controlled and she is on Lopressor. REVIEW OF SYSTEMS:  Review of Systems   Constitutional: Negative for activity change, fatigue and fever. HENT: Negative for ear pain, hearing loss and tinnitus. Eyes: Negative for discharge and visual disturbance. Respiratory: Positive for shortness of breath. Negative for cough and wheezing. Cardiovascular: Positive for leg swelling. Negative for chest pain and palpitations. Gastrointestinal: Negative for abdominal distention, blood in stool, constipation, diarrhea and vomiting. Endocrine: Negative for cold intolerance, heat intolerance, polydipsia and polyuria. Genitourinary: Negative for dysuria and hematuria. Musculoskeletal: Negative for arthralgias, back pain and myalgias. Skin: Negative for pallor and rash. Neurological: Negative for seizures, syncope, weakness and headaches. Psychiatric/Behavioral: Negative for behavioral problems and dysphoric mood.        Past Medical History:      Diagnosis Date    ASHD (arteriosclerotic heart disease)     s/p PTCA and stent of circumflex, as well as intermediate and mid LAD     COPD (chronic obstructive pulmonary disease) ([facility] [address]     Coronary atherosclerosis     Diabetes mellitus ([facility] [address]     diet controlled, type 2    Encounter for wound care     FOR LLL WOUND    Fall 10/29/2020    Ganglion cyst     RT HAND    Hematoma 10/2020    hematoma LLL from fall injury    Hx of blood clots     Hypercholesteremia     Hypertension     Pacemaker 03/02/2021    Unstable angina [facility])        Past Surgical History:      Procedure Laterality Date    CARDIAC CATHETERIZATION  12/10/00    selective left heart and coronary arteriography with left ventriculography     CARDIAC CATHETERIZATION  01/23/98    left heart cath, left ventriculography, slective coronary arteriography, direct infarct angioplasty and stent placement to proximal left anterior descending coronary artery    CARDIAC CATHETERIZATION  03/05/94    left heart cath, selective coronary arteriography, left ventriculography    CARDIAC CATHETERIZATION  8/27/2011    [place]    CHOLECYSTECTOMY      CORONARY ANGIOPLASTY WITH STENT PLACEMENT  12/12/00    PTCA and stent placement to the mid LAD/ptca and stent placement first circumflex marginal (intermediate)     CORONARY ANGIOPLASTY WITH STENT PLACEMENT  08/2021    CORONARY ARTERY BYPASS GRAFT  8/29/2011    PACABG X 4 LIMA-LAD, SVG-DIAG, SVG-PDA, RT EVH, LT OPEN VEIN HARVEST, DR Andrew Oliver    CYST REMOVAL      GANGLION CYST REMOVED RT HAND    HYSTERECTOMY      NECK SURGERY      parotid artery tumor removed bilaterally    PAROTIDECTOMY Bilateral        Allergies:  Codeine    Past Social History:  Social History     Socioeconomic History    Marital status:      Spouse name: Not on file    Number of children: Not on file    Years of education: Not on file    Highest education level: Not on file   Occupational History    Not on file   Tobacco Use    Smoking status: Former Smoker     Packs/day: 0.50     Types: Cigarettes     Quit date: 3/29/2021     Years since quittin.2    Smokeless tobacco: Never Used    Tobacco comment: 1/2 PACKS EVERY 2 WEEKS, states has been decreasing amount    Vaping Use    Vaping Use: Never used   Substance and Sexual Activity    Alcohol use: Never    Drug use: Never    Sexual activity: Not Currently     Partners: Male   Other Topics Concern    Not on file   Social History Narrative    Not on file     Social Determinants of Health     Financial Resource Strain:     Difficulty of Paying Living Expenses:    Food Insecurity:     Worried About Running Out of Food in the Last Year:     Ran Out of Food in the Last Year:    Transportation Needs:     Lack of Transportation (Medical):  Lack of Transportation (Non-Medical):    Physical Activity:     Days of Exercise per Week:     Minutes of Exercise per Session:    Stress:     Feeling of Stress :    Social Connections:     Frequency of Communication with Friends and Family:     Frequency of Social Gatherings with Friends and Family:     Attends Christianity Services:     Active Member of Clubs or Organizations:     Attends Club or Organization Meetings:     Marital Status:    Intimate Partner Violence:     Fear of Current or Ex-Partner:     Emotionally Abused:     Physically Abused:     Sexually Abused:        Family History:       Problem Relation Age of Onset    Cancer Mother     Coronary Art Dis Father     Mult Sclerosis Sister     Cancer Brother        Home Meds:  Prior to Admission medications    Medication Sig Start Date End Date Taking?  Authorizing Provider   potassium chloride (KLOR-CON) 20 MEQ packet Take 20 mEq by mouth daily   Yes Historical Provider, MD   hydroCHLOROthiazide (HYDRODIURIL) 25 MG tablet Take 25 mg by mouth daily   Yes Historical Provider, MD   metoprolol succinate (TOPROL XL) 100 MG extended release tablet TAKE 1 TABLET BY MOUTH TWICE DAILY 6/7/21  Yes Thomas Sebastian APRN - CNP   amLODIPine (NORVASC) 5 MG tablet Take 1 tablet by mouth daily 4/28/21  Yes JAKOB Caraballo   ramipril (ALTACE) 5 MG capsule Take 5 mg by mouth 2 times daily   Yes Historical Provider, MD   rosuvastatin (CRESTOR) 10 MG tablet Take 1 tablet by mouth daily  Patient taking differently: Take 10 mg by mouth nightly PT TAKES AT NIGHT 9/23/20  Yes JAKOB Caraballo   clopidogrel (PLAVIX) 75 MG tablet Take 1 tablet by mouth daily 8/27/20  Yes JAKOB Spain   metFORMIN (GLUCOPHAGE) 500 MG tablet Take 1 tablet by mouth 2 times daily (with meals) Hold for 2 days and restart a 26th 2020 8/24/20  Yes Hermann Hoyt MD   nitroGLYCERIN (NITROSTAT) 0.4 MG SL tablet Place 1 tablet under the tongue every 5 minutes as needed for Chest pain 4/30/15   JAKOB Odonnell - CNP       Current Meds:   predniSONE  20 mg Oral Daily    furosemide  40 mg Intravenous BID    sodium chloride flush  5-40 mL Intravenous 2 times per day    clopidogrel  75 mg Oral Daily    amLODIPine  5 mg Oral Daily    metFORMIN  500 mg Oral BID     metoprolol succinate  100 mg Oral BID    ramipril  5 mg Oral BID    rosuvastatin  10 mg Oral Nightly    insulin lispro  0-6 Units Subcutaneous TID WC    insulin lispro  0-3 Units Subcutaneous Nightly    ipratropium-albuterol  1 ampule Inhalation Q4H WA       Current Infused Meds:   sodium chloride      dextrose         Physical Exam:  Vitals:    06/23/21 1131   BP: 106/63   Pulse: 64   Resp: 20   Temp: 96.9 °F (36.1 °C)   SpO2: 95%       Intake/Output Summary (Last 24 hours) at 6/23/2021 1256  Last data filed at 6/23/2021 1135  Gross per 24 hour   Intake 1919.22 ml   Output 900 ml   Net 1019.22 ml     Estimated body mass index is 28.62 kg/m² as calculated from the following:    Height as of this encounter: 5' 5\" (1.651 m). Weight as of this encounter: 172 lb (78 kg).         Physical Exam  Constitutional:       General: She is not in acute distress. Appearance: She is obese. She is not diaphoretic. HENT:      Mouth/Throat:      Pharynx: No oropharyngeal exudate. Eyes:      General: No scleral icterus. Right eye: No discharge. Left eye: No discharge. Neck:      Thyroid: No thyromegaly. Vascular: No carotid bruit or JVD. Cardiovascular:      Rate and Rhythm: Normal rate. Rhythm irregular. No extrasystoles are present. Heart sounds: Normal heart sounds, S1 normal and S2 normal. No murmur heard. No systolic murmur is present. No diastolic murmur is present. No friction rub. No gallop. No S3 or S4 sounds. Comments: Heart sounds are slightly diminished  No significant systolic or diastolic murmurs noted  Pulmonary:      Effort: Pulmonary effort is normal. No respiratory distress. Breath sounds: Wheezing present. No rales. Comments: Significant wheezing bilaterally  Chest:      Chest wall: No tenderness. Abdominal:      General: Bowel sounds are normal. There is no distension. Palpations: Abdomen is soft. There is no mass. Tenderness: There is no abdominal tenderness. There is no guarding or rebound. Hernia: No hernia is present. Comments: No palpable organomegaly   Musculoskeletal:         General: Normal range of motion. Skin:     General: Skin is warm. Coloration: Skin is not pale. Findings: No rash. Neurological:      Mental Status: She is alert and oriented to person, place, and time. Cranial Nerves: No cranial nerve deficit.       Deep Tendon Reflexes: Reflexes normal.           Labs:  Recent Labs     06/22/21  0839 06/23/21  0356   WBC 8.2 6.6   HGB 16.2* 14.4    204       Recent Labs     06/22/21  0839 06/22/21  0850 06/23/21  0356   *  --  126*   K 5.1* 4.7 5.4*   CL 83*  --  89*   CO2 33*  --  26   BUN 23  --  26*   CREATININE 1.0*  --  1.0*   LABGLOM 54*  --  54*   MG  --   --  2.5*   CALCIUM 9.7  --  8.9       CK, CKMB, Troponin: @LABRCNT (CKTOTAL:3, CKMB:3, TROPONINI:3)@    Last 3 BNP:          IMAGING:  XR CHEST PORTABLE    Result Date: 6/22/2021  EXAMINATION: XR CHEST PORTABLE 6/22/2021 10:51 AM HISTORY: Shortness of breath COMPARISON: 3/2/2021 FINDINGS: The heart is magnified but felt to be mildly enlarged. There has been prior median sternotomy. A left subclavian approach dual lead cardiac pacing device is in place with tips projecting over the right atrium and right ventricle. There is no appreciable pneumothorax or pleural effusion. The pulmonary vasculature appears grossly normal.    No evidence of acute cardiopulmonary process. Signed by Dr Violet Ballard and Plan: This is a 66y.o. year old female with past medical history of COPD with chronic ongoing tobacco use, CAD with prior PCI to LAD and circumflex with bare-metal stents, CABG 8/2011 with LIMA to LAD, SVG to diagonal, SVG to PDA, normal LV ejection fraction, diabetes mellitus, occluded LIMA with PCI to proximal and mid LAD 8/24/2020, moderate size abdominal aortic aneurysm, pacemaker placement for significant pauses 3/2/2021 now presenting with acute on chronic diastolic heart failure in the setting of new onset atrial flutter. 1.  Repeat echo to evaluate for any drop in ejection fraction as she has been in [place] for the last 23 days by pacemaker interrogation. Rates have been fairly well controlled on Toprol- mg twice daily. Initiate patient on Eliquis 5 mg twice daily. We will plan on JAME cardioversion tomorrow. 2.  Patient understands that device will not alert atrial flutter unless interrogated. If she has change in symptoms, transmission will need to be sent and evaluated. 3.  Increase Lasix to 40 mg IV twice daily. 4.  Significant wheezing noted. Ongoing tobacco use with severe COPD. Bronchodilator treatment as appropriate. Smoking cessation stressed.     Risks, benefits, alternatives of JAME/DC cardioversion discussed with the patient and full informed consent obtained.   Acceptable Mallampati score  Consent for moderate conscious sedation  ASA 3    Electronically signed by Sathish Noe MD on 6/23/2021 at 12:56 PM

## 2021-06-23 NOTE — PROGRESS NOTES
Family Medicine Progress Note    Patient:  Arturo Thomason  YOB: 1950    MRN: [id number]     Acct: [de-identified]     Admit date: 6/22/2021    Patient Seen, Chart, Consults notes, Labs, Radiology studies reviewed. Subjective: Day 1 of stay with acute on chronic respiratory failure with hypoxia and most recent (in last 24 hours) has had episode in the early a.m. hour with became very dyspneic. Required a dose of Lasix. It also appears she was on IV fluid ordered through the emergency room still and that was discontinued. Feeling better this morning. Still with swollen extremities. Past, Family, Social History unchanged from admission. Diet:  ADULT DIET; Regular; 4 carb choices (60 gm/meal); No Added Salt (3-4 gm); 1500 ml    Medications:  Scheduled Meds:   furosemide  20 mg Intravenous Daily    predniSONE  20 mg Oral Daily    sodium chloride flush  5-40 mL Intravenous 2 times per day    clopidogrel  75 mg Oral Daily    amLODIPine  5 mg Oral Daily    metFORMIN  500 mg Oral BID WC    metoprolol succinate  100 mg Oral BID    ramipril  5 mg Oral BID    rosuvastatin  10 mg Oral Nightly    insulin lispro  0-6 Units Subcutaneous TID WC    insulin lispro  0-3 Units Subcutaneous Nightly    ipratropium-albuterol  1 ampule Inhalation Q4H WA     Continuous Infusions:   sodium chloride      dextrose       PRN Meds:sodium chloride flush, sodium chloride, ondansetron **OR** ondansetron, glucose, dextrose, glucagon (rDNA), dextrose    Objective:    Vitals: /67   Pulse 92   Temp 96.3 °F (35.7 °C) (Temporal)   Resp 20   Ht 5' 5\" (1.651 m)   Wt 172 lb (78 kg)   SpO2 91%   BMI 28.62 kg/m²   24 hour intake/output:    Intake/Output Summary (Last 24 hours) at 6/23/2021 0828  Last data filed at 6/23/2021 0431  Gross per 24 hour   Intake 1439.22 ml   Output 500 ml   Net 939.22 ml     Last 3 weights:   Wt Readings from Last 3 Encounters:   06/22/21 172 lb (78 kg)   04/14/21 171 lb (77.6 kg) 03/11/21 172 lb (78 kg)       Physical Exam:    General Appearance:  awake, alert, oriented, in no acute distress, in mild distress and cooperative  Skin:  negatives: mobility and turgor normal  Eyes:  No gross abnormalities. Neck:  neck- supple, no mass, non-tender  Lungs:  Breathing Pattern: rapid, shallow and audible wheezes, Breath sounds: wheezing- throughout  Heart:  Heart regular rate and rhythm  Abdomen: Auscultation: Normal bowel sounds. No bruits.   Extremities: 1+ edema of  bilateral lower extremities (worse on the left)  Musculoskeletal:  negative  Neurologic:  negative    CBC with Differential:    Lab Results   Component Value Date    WBC 6.6 06/23/2021    RBC 4.96 06/23/2021    HGB 14.4 06/23/2021    HCT 46.5 06/23/2021    HCT 32.1 09/02/2011     06/23/2021     09/02/2011    MCV 93.8 06/23/2021    MCH 29.0 06/23/2021    MCHC 31.0 06/23/2021    RDW 14.3 06/23/2021    LYMPHOPCT 11.7 06/22/2021    MONOPCT 11.2 06/22/2021    EOSPCT 0.5 09/02/2011    BASOPCT 0.2 06/22/2021    MONOSABS 0.90 06/22/2021    LYMPHSABS 1.0 06/22/2021    EOSABS 0.00 06/22/2021    BASOSABS 0.00 06/22/2021     CMP:    Lab Results   Component Value Date     06/23/2021     09/02/2011    K 5.4 06/23/2021    K 4.1 09/02/2011    CL 89 06/23/2021     09/02/2011    CO2 26 06/23/2021    BUN 26 06/23/2021    CREATININE 1.0 06/23/2021    CREATININE 0.6 09/02/2011    GFRAA >59 06/23/2021    LABGLOM 54 06/23/2021    GLUCOSE 267 06/23/2021    PROT 8.9 06/22/2021    PROT 7.5 01/18/2013    LABALBU 4.5 06/22/2021    LABALBU 3.3 09/02/2011    CALCIUM 8.9 06/23/2021    BILITOT 0.6 06/22/2021    ALKPHOS 121 06/22/2021    ALKPHOS 57 09/02/2011    AST 22 06/22/2021    ALT 27 06/22/2021     Last 3 Troponin:    Lab Results   Component Value Date    TROPONINI 0.06 08/25/2011    TROPONINI 0.09 08/24/2011    TROPONINI 0.16 08/23/2011     Urine Culture:  No components found for: JOSAFAT  Blood Culture:  No components found for: Dale Melendez  Stool Culture:  No components found for: CSTOOL    Assessment:    Principal Problem:    Acute hypoxemic respiratory failure (HCC)  Active Problems:    NICM (nonischemic cardiomyopathy) ([facility] [address]    Essential hypertension    Diabetes mellitus ([facility] [address]    Coronary artery disease involving native coronary artery of native heart without angina pectoris    Chronic obstructive pulmonary disease (HCC)    Abdominal aortic aneurysm (AAA) without rupture (HCC)    Hyponatremia  Resolved Problems:    * No resolved hospital problems. *          Plan:  Continues to exhibit symptoms more of volume overload. Scheduled diuretic. Cardiology consultation pending. Echocardiogram has been ordered. Continue nebulized breathing treatments. Transition steroids to oral.  Continue on fluid restriction while work-up in progress. Already improved from yesterday. Likely related to thiazide diuretic. Oxygen support to maintain sats above 90. Greater than 25 minutes spent seeing patient reviewing chart.       Electronically signed by Jhony Chávez MD on 6/23/2021 at 8:28 AM

## 2021-06-24 LAB
ALBUMIN SERPL-MCNC: 3.4 G/DL (ref 3.5–5.2)
ALP BLD-CCNC: 80 U/L (ref 35–104)
ALT SERPL-CCNC: 25 U/L (ref 5–33)
ANION GAP SERPL CALCULATED.3IONS-SCNC: 6 MMOL/L (ref 7–19)
AST SERPL-CCNC: 17 U/L (ref 5–32)
BILIRUB SERPL-MCNC: 0.3 MG/DL (ref 0.2–1.2)
BUN BLDV-MCNC: 33 MG/DL (ref 8–23)
CALCIUM SERPL-MCNC: 8.8 MG/DL (ref 8.8–10.2)
CHLORIDE BLD-SCNC: 92 MMOL/L (ref 98–111)
CO2: 34 MMOL/L (ref 22–29)
CREAT SERPL-MCNC: 1.1 MG/DL (ref 0.5–0.9)
EKG P AXIS: NORMAL DEGREES
EKG P-R INTERVAL: NORMAL MS
EKG Q-T INTERVAL: 430 MS
EKG QRS DURATION: 100 MS
EKG QTC CALCULATION (BAZETT): 456 MS
EKG T AXIS: -100 DEGREES
GFR AFRICAN AMERICAN: >59
GFR NON-AFRICAN AMERICAN: 49
GLUCOSE BLD-MCNC: 107 MG/DL (ref 74–109)
GLUCOSE BLD-MCNC: 112 MG/DL (ref 70–99)
GLUCOSE BLD-MCNC: 154 MG/DL (ref 70–99)
GLUCOSE BLD-MCNC: 163 MG/DL (ref 70–99)
GLUCOSE BLD-MCNC: 193 MG/DL (ref 70–99)
GLUCOSE BLD-MCNC: 67 MG/DL (ref 70–99)
LV EF: 60 %
LVEF MODALITY: NORMAL
PERFORMED ON: ABNORMAL
POTASSIUM REFLEX MAGNESIUM: 4.9 MMOL/L (ref 3.5–5)
SODIUM BLD-SCNC: 132 MMOL/L (ref 136–145)
TOTAL PROTEIN: 6.6 G/DL (ref 6.6–8.7)

## 2021-06-24 PROCEDURE — 93325 DOPPLER ECHO COLOR FLOW MAPG: CPT

## 2021-06-24 PROCEDURE — 92960 CARDIOVERSION ELECTRIC EXT: CPT | Performed by: INTERNAL MEDICINE

## 2021-06-24 PROCEDURE — 6360000002 HC RX W HCPCS

## 2021-06-24 PROCEDURE — 93321 DOPPLER ECHO F-UP/LMTD STD: CPT

## 2021-06-24 PROCEDURE — 6360000002 HC RX W HCPCS: Performed by: INTERNAL MEDICINE

## 2021-06-24 PROCEDURE — 99152 MOD SED SAME PHYS/QHP 5/>YRS: CPT

## 2021-06-24 PROCEDURE — 94640 AIRWAY INHALATION TREATMENT: CPT

## 2021-06-24 PROCEDURE — 6370000000 HC RX 637 (ALT 250 FOR IP): Performed by: FAMILY MEDICINE

## 2021-06-24 PROCEDURE — 2580000003 HC RX 258: Performed by: FAMILY MEDICINE

## 2021-06-24 PROCEDURE — 93312 ECHO TRANSESOPHAGEAL: CPT

## 2021-06-24 PROCEDURE — 99152 MOD SED SAME PHYS/QHP 5/>YRS: CPT | Performed by: INTERNAL MEDICINE

## 2021-06-24 PROCEDURE — 2140000000 HC CCU INTERMEDIATE R&B

## 2021-06-24 PROCEDURE — 93005 ELECTROCARDIOGRAM TRACING: CPT | Performed by: INTERNAL MEDICINE

## 2021-06-24 PROCEDURE — 80053 COMPREHEN METABOLIC PANEL: CPT

## 2021-06-24 PROCEDURE — 2700000000 HC OXYGEN THERAPY PER DAY

## 2021-06-24 PROCEDURE — 36415 COLL VENOUS BLD VENIPUNCTURE: CPT

## 2021-06-24 PROCEDURE — 2500000003 HC RX 250 WO HCPCS: Performed by: FAMILY MEDICINE

## 2021-06-24 PROCEDURE — 82947 ASSAY GLUCOSE BLOOD QUANT: CPT

## 2021-06-24 PROCEDURE — 6370000000 HC RX 637 (ALT 250 FOR IP): Performed by: INTERNAL MEDICINE

## 2021-06-24 PROCEDURE — 6370000000 HC RX 637 (ALT 250 FOR IP)

## 2021-06-24 PROCEDURE — 92960 CARDIOVERSION ELECTRIC EXT: CPT

## 2021-06-24 PROCEDURE — 94660 CPAP INITIATION&MGMT: CPT

## 2021-06-24 RX ORDER — SOTALOL HYDROCHLORIDE 80 MG/1
80 TABLET ORAL 2 TIMES DAILY
Status: DISCONTINUED | OUTPATIENT
Start: 2021-06-24 | End: 2021-06-28 | Stop reason: HOSPADM

## 2021-06-24 RX ORDER — ACETAMINOPHEN 325 MG/1
650 TABLET ORAL EVERY 4 HOURS PRN
Status: DISCONTINUED | OUTPATIENT
Start: 2021-06-24 | End: 2021-06-28 | Stop reason: HOSPADM

## 2021-06-24 RX ORDER — METOPROLOL SUCCINATE 50 MG/1
50 TABLET, EXTENDED RELEASE ORAL 2 TIMES DAILY
Status: DISCONTINUED | OUTPATIENT
Start: 2021-06-24 | End: 2021-06-25

## 2021-06-24 RX ADMIN — SODIUM CHLORIDE, PRESERVATIVE FREE 10 ML: 5 INJECTION INTRAVENOUS at 09:50

## 2021-06-24 RX ADMIN — CLOPIDOGREL BISULFATE 75 MG: 75 TABLET ORAL at 18:33

## 2021-06-24 RX ADMIN — SODIUM CHLORIDE, PRESERVATIVE FREE 10 ML: 5 INJECTION INTRAVENOUS at 20:15

## 2021-06-24 RX ADMIN — METOPROLOL SUCCINATE 100 MG: 50 TABLET, EXTENDED RELEASE ORAL at 14:04

## 2021-06-24 RX ADMIN — INSULIN LISPRO 1 UNITS: 100 INJECTION, SOLUTION INTRAVENOUS; SUBCUTANEOUS at 18:36

## 2021-06-24 RX ADMIN — ACETAMINOPHEN 650 MG: 325 TABLET ORAL at 20:14

## 2021-06-24 RX ADMIN — DEXTROSE MONOHYDRATE 12.5 G: 25 INJECTION, SOLUTION INTRAVENOUS at 11:59

## 2021-06-24 RX ADMIN — FUROSEMIDE 40 MG: 10 INJECTION, SOLUTION INTRAMUSCULAR; INTRAVENOUS at 18:34

## 2021-06-24 RX ADMIN — ROSUVASTATIN CALCIUM 10 MG: 10 TABLET, FILM COATED ORAL at 20:14

## 2021-06-24 RX ADMIN — RAMIPRIL 5 MG: 5 CAPSULE ORAL at 20:14

## 2021-06-24 RX ADMIN — METFORMIN HYDROCHLORIDE 500 MG: 500 TABLET ORAL at 18:33

## 2021-06-24 RX ADMIN — PREDNISONE 20 MG: 20 TABLET ORAL at 09:51

## 2021-06-24 RX ADMIN — FUROSEMIDE 40 MG: 10 INJECTION, SOLUTION INTRAMUSCULAR; INTRAVENOUS at 09:51

## 2021-06-24 RX ADMIN — IPRATROPIUM BROMIDE AND ALBUTEROL SULFATE 1 AMPULE: 2.5; .5 SOLUTION RESPIRATORY (INHALATION) at 17:30

## 2021-06-24 RX ADMIN — IPRATROPIUM BROMIDE AND ALBUTEROL SULFATE 1 AMPULE: 2.5; .5 SOLUTION RESPIRATORY (INHALATION) at 06:33

## 2021-06-24 RX ADMIN — RAMIPRIL 5 MG: 5 CAPSULE ORAL at 09:51

## 2021-06-24 RX ADMIN — IPRATROPIUM BROMIDE AND ALBUTEROL SULFATE 1 AMPULE: 2.5; .5 SOLUTION RESPIRATORY (INHALATION) at 10:00

## 2021-06-24 RX ADMIN — AMLODIPINE BESYLATE 5 MG: 5 TABLET ORAL at 09:54

## 2021-06-24 RX ADMIN — SOTALOL HYDROCHLORIDE 80 MG: 80 TABLET ORAL at 18:34

## 2021-06-24 RX ADMIN — APIXABAN 5 MG: 5 TABLET, FILM COATED ORAL at 20:14

## 2021-06-24 RX ADMIN — IPRATROPIUM BROMIDE AND ALBUTEROL SULFATE 1 AMPULE: 2.5; .5 SOLUTION RESPIRATORY (INHALATION) at 14:02

## 2021-06-24 ASSESSMENT — PAIN SCALES - GENERAL
PAINLEVEL_OUTOF10: 0
PAINLEVEL_OUTOF10: 4
PAINLEVEL_OUTOF10: 0

## 2021-06-24 ASSESSMENT — PAIN DESCRIPTION - LOCATION: LOCATION: HEAD

## 2021-06-24 ASSESSMENT — PAIN SCALES - WONG BAKER: WONGBAKER_NUMERICALRESPONSE: 0

## 2021-06-24 NOTE — PROGRESS NOTES
PCA notified this nurse that POC glucose was 67, patient was asymptomatic.  pt is NPO awaiting cardioversion. Administered D50 IV 12.5g as ordered for low blood glucose. Rechecked blood glucose after 15 minutes and it was 154.  Electronically signed by Giuliano Varela RN on 6/24/2021 at 12:36 PM

## 2021-06-24 NOTE — PROGRESS NOTES
Cardiology progress note:    JAME with normal LV systolic function, dilated right atrium with noted aneurysmal atrial septum with PFO and evidence of right-to-left shunting. No thrombus in cardiac chambers. Mild mitral regurgitation with posterior leaflet appearing fixed. Successful DC cardioversion to sinus rhythm. Continue Eliquis  We will start patient on sotalol 80 mg twice daily. Reduce Toprol-XL to 50 mg twice daily. EKG daily to monitor QT interval.  Will need to stay in hospital for 5 doses.

## 2021-06-24 NOTE — PROGRESS NOTES
Family Medicine Progress Note    Patient:  Belgica Dunlap  YOB: 1950    MRN: [id number]     Acct: [de-identified]     Admit date: 6/22/2021    Patient Seen, Chart, Consults notes, Labs, Radiology studies reviewed. Subjective: Day 2 of stay with acute on chronic respiratory failure and symptoms consistent with congestive heart failure and most recent (in last 24 hours) has had not a good night overall. Breathing and swelling are somewhat better. Had some issues with blood sugar as it dropped after giving insulin. Prior to that it was in the 300s due to the steroids. Is currently hungry, however is n.p.o. for cardioversion this afternoon. Past, Family, Social History unchanged from admission. Diet:  Diet NPO Exceptions are: Sips of Water with Meds    Medications:  Scheduled Meds:   predniSONE  20 mg Oral Daily    furosemide  40 mg Intravenous BID    apixaban  5 mg Oral BID    sodium chloride flush  5-40 mL Intravenous 2 times per day    clopidogrel  75 mg Oral Daily    amLODIPine  5 mg Oral Daily    metFORMIN  500 mg Oral BID WC    metoprolol succinate  100 mg Oral BID    ramipril  5 mg Oral BID    rosuvastatin  10 mg Oral Nightly    insulin lispro  0-6 Units Subcutaneous TID WC    insulin lispro  0-3 Units Subcutaneous Nightly    ipratropium-albuterol  1 ampule Inhalation Q4H WA     Continuous Infusions:   sodium chloride      dextrose       PRN Meds:sodium chloride flush, sodium chloride, ondansetron **OR** ondansetron, glucose, dextrose, glucagon (rDNA), dextrose    Objective:    Vitals: BP 99/67   Pulse 69   Temp 97.5 °F (36.4 °C) (Temporal)   Resp 20   Ht 5' 5\" (1.651 m)   Wt 172 lb (78 kg)   SpO2 90%   BMI 28.62 kg/m²   24 hour intake/output:    Intake/Output Summary (Last 24 hours) at 6/24/2021 1338  Last data filed at 6/24/2021 1118  Gross per 24 hour   Intake 240 ml   Output 2600 ml   Net -2360 ml     Last 3 weights:   Wt Readings from Last 3 Encounters:   06/22/21 172 lb (78 kg)   04/14/21 171 lb (77.6 kg)   03/11/21 172 lb (78 kg)       Physical Exam:    General Appearance:  in no acute distress, alert and cooperative  Skin:  negatives: mobility and turgor normal  Eyes:  No gross abnormalities. Neck:  neck- supple, no mass, non-tender  Lungs:  Breathing Pattern: regular, no distress, Breath sounds: wheezing- throughout  Heart:  Heart regular rate and rhythm  Abdomen: Auscultation: Normal bowel sounds. No bruits.   Extremities: pulses present in all extremities and trace pedal edema  Musculoskeletal:  negative  Neurologic:  negative    CBC with Differential:    Lab Results   Component Value Date    WBC 6.6 06/23/2021    RBC 4.96 06/23/2021    HGB 14.4 06/23/2021    HCT 46.5 06/23/2021    HCT 32.1 09/02/2011     06/23/2021     09/02/2011    MCV 93.8 06/23/2021    MCH 29.0 06/23/2021    MCHC 31.0 06/23/2021    RDW 14.3 06/23/2021    LYMPHOPCT 11.7 06/22/2021    MONOPCT 11.2 06/22/2021    EOSPCT 0.5 09/02/2011    BASOPCT 0.2 06/22/2021    MONOSABS 0.90 06/22/2021    LYMPHSABS 1.0 06/22/2021    EOSABS 0.00 06/22/2021    BASOSABS 0.00 06/22/2021     CMP:    Lab Results   Component Value Date     06/24/2021     09/02/2011    K 4.9 06/24/2021    K 4.1 09/02/2011    CL 92 06/24/2021     09/02/2011    CO2 34 06/24/2021    BUN 33 06/24/2021    CREATININE 1.1 06/24/2021    CREATININE 0.6 09/02/2011    GFRAA >59 06/24/2021    LABGLOM 49 06/24/2021    GLUCOSE 107 06/24/2021    PROT 6.6 06/24/2021    PROT 7.5 01/18/2013    LABALBU 3.4 06/24/2021    LABALBU 3.3 09/02/2011    CALCIUM 8.8 06/24/2021    BILITOT 0.3 06/24/2021    ALKPHOS 80 06/24/2021    ALKPHOS 57 09/02/2011    AST 17 06/24/2021    ALT 25 06/24/2021     Last 3 Troponin:    Lab Results   Component Value Date    TROPONINI <0.01 06/23/2021    TROPONINI <0.01 06/23/2021    TROPONINI 0.06 08/25/2011    TROPONINI 0.09 08/24/2011    TROPONINI 0.16 08/23/2011     Urine Culture:  No components found for: JOSAFAT  Blood Culture:  No components found for: CBLOOD, CFUNGUSBL  Stool Culture:  No components found for: CSTOOL    Assessment:    Principal Problem:    Acute hypoxemic respiratory failure (HCC)  Active Problems:    NICM (nonischemic cardiomyopathy) ([facility] [address]    Essential hypertension    Diabetes mellitus ([facility] [address]    Coronary artery disease involving native coronary artery of native heart without angina pectoris    Chronic obstructive pulmonary disease (HCC)    Abdominal aortic aneurysm (AAA) without rupture (HCC)    Hyponatremia  Resolved Problems:    * No resolved hospital problems. *          Plan:  Appreciate cardiology evaluation. Fortunately echocardiogram shows normal LV function. More than likely some diastolic dysfunction secondary to her chronic lung disease. Also atrial flutter on pacemaker interrogation and EKG. Plan cardioversion this afternoon. Continue diuresis as kidney function allows. Hyponatremia now for the most part resolved. Most likely secondary to hydrochlorothiazide which has been discontinued. Once she resumes diet I think we can do away with the oral fluid restriction. Greater than 25 minutes total spent in delivering patient care today.       Electronically signed by Chitra Xiao MD on 6/24/2021 at 1:38 PM

## 2021-06-25 ENCOUNTER — APPOINTMENT (OUTPATIENT)
Dept: GENERAL RADIOLOGY | Age: 71
DRG: 291 | End: 2021-06-25
Payer: MEDICARE

## 2021-06-25 LAB
ALBUMIN SERPL-MCNC: 3.9 G/DL (ref 3.5–5.2)
ALP BLD-CCNC: 83 U/L (ref 35–104)
ALT SERPL-CCNC: 27 U/L (ref 5–33)
ANION GAP SERPL CALCULATED.3IONS-SCNC: 9 MMOL/L (ref 7–19)
AST SERPL-CCNC: 18 U/L (ref 5–32)
BILIRUB SERPL-MCNC: 0.3 MG/DL (ref 0.2–1.2)
BUN BLDV-MCNC: 34 MG/DL (ref 8–23)
CALCIUM SERPL-MCNC: 9.1 MG/DL (ref 8.8–10.2)
CHLORIDE BLD-SCNC: 90 MMOL/L (ref 98–111)
CO2: 36 MMOL/L (ref 22–29)
CREAT SERPL-MCNC: 1.2 MG/DL (ref 0.5–0.9)
GFR AFRICAN AMERICAN: 53
GFR NON-AFRICAN AMERICAN: 44
GLUCOSE BLD-MCNC: 106 MG/DL (ref 74–109)
GLUCOSE BLD-MCNC: 174 MG/DL (ref 70–99)
GLUCOSE BLD-MCNC: 273 MG/DL (ref 70–99)
GLUCOSE BLD-MCNC: 287 MG/DL (ref 70–99)
GLUCOSE BLD-MCNC: 88 MG/DL (ref 70–99)
MAGNESIUM: 2.4 MG/DL (ref 1.6–2.4)
PERFORMED ON: ABNORMAL
PERFORMED ON: NORMAL
POTASSIUM REFLEX MAGNESIUM: 4.7 MMOL/L (ref 3.5–5)
SODIUM BLD-SCNC: 135 MMOL/L (ref 136–145)
TOTAL PROTEIN: 6.8 G/DL (ref 6.6–8.7)

## 2021-06-25 PROCEDURE — 82947 ASSAY GLUCOSE BLOOD QUANT: CPT

## 2021-06-25 PROCEDURE — 2140000000 HC CCU INTERMEDIATE R&B

## 2021-06-25 PROCEDURE — 2580000003 HC RX 258: Performed by: FAMILY MEDICINE

## 2021-06-25 PROCEDURE — 93005 ELECTROCARDIOGRAM TRACING: CPT | Performed by: INTERNAL MEDICINE

## 2021-06-25 PROCEDURE — 6370000000 HC RX 637 (ALT 250 FOR IP): Performed by: FAMILY MEDICINE

## 2021-06-25 PROCEDURE — 99232 SBSQ HOSP IP/OBS MODERATE 35: CPT | Performed by: INTERNAL MEDICINE

## 2021-06-25 PROCEDURE — 83735 ASSAY OF MAGNESIUM: CPT

## 2021-06-25 PROCEDURE — 94660 CPAP INITIATION&MGMT: CPT

## 2021-06-25 PROCEDURE — 71046 X-RAY EXAM CHEST 2 VIEWS: CPT

## 2021-06-25 PROCEDURE — 80053 COMPREHEN METABOLIC PANEL: CPT

## 2021-06-25 PROCEDURE — 2700000000 HC OXYGEN THERAPY PER DAY

## 2021-06-25 PROCEDURE — 6360000002 HC RX W HCPCS: Performed by: INTERNAL MEDICINE

## 2021-06-25 PROCEDURE — 6370000000 HC RX 637 (ALT 250 FOR IP): Performed by: INTERNAL MEDICINE

## 2021-06-25 PROCEDURE — 36415 COLL VENOUS BLD VENIPUNCTURE: CPT

## 2021-06-25 PROCEDURE — 94640 AIRWAY INHALATION TREATMENT: CPT

## 2021-06-25 RX ORDER — METOPROLOL SUCCINATE 25 MG/1
25 TABLET, EXTENDED RELEASE ORAL 2 TIMES DAILY
Status: DISCONTINUED | OUTPATIENT
Start: 2021-06-25 | End: 2021-06-28 | Stop reason: HOSPADM

## 2021-06-25 RX ORDER — FUROSEMIDE 40 MG/1
40 TABLET ORAL DAILY
Status: DISCONTINUED | OUTPATIENT
Start: 2021-06-25 | End: 2021-06-28 | Stop reason: HOSPADM

## 2021-06-25 RX ADMIN — METOPROLOL SUCCINATE 25 MG: 25 TABLET, EXTENDED RELEASE ORAL at 09:46

## 2021-06-25 RX ADMIN — AMLODIPINE BESYLATE 5 MG: 5 TABLET ORAL at 09:35

## 2021-06-25 RX ADMIN — IPRATROPIUM BROMIDE AND ALBUTEROL SULFATE 1 AMPULE: 2.5; .5 SOLUTION RESPIRATORY (INHALATION) at 14:29

## 2021-06-25 RX ADMIN — METFORMIN HYDROCHLORIDE 500 MG: 500 TABLET ORAL at 17:51

## 2021-06-25 RX ADMIN — FUROSEMIDE 40 MG: 10 INJECTION, SOLUTION INTRAMUSCULAR; INTRAVENOUS at 09:35

## 2021-06-25 RX ADMIN — SOTALOL HYDROCHLORIDE 80 MG: 80 TABLET ORAL at 20:20

## 2021-06-25 RX ADMIN — SOTALOL HYDROCHLORIDE 80 MG: 80 TABLET ORAL at 09:35

## 2021-06-25 RX ADMIN — METOPROLOL SUCCINATE 25 MG: 25 TABLET, EXTENDED RELEASE ORAL at 20:21

## 2021-06-25 RX ADMIN — CLOPIDOGREL BISULFATE 75 MG: 75 TABLET ORAL at 09:35

## 2021-06-25 RX ADMIN — FUROSEMIDE 40 MG: 40 TABLET ORAL at 13:00

## 2021-06-25 RX ADMIN — APIXABAN 5 MG: 5 TABLET, FILM COATED ORAL at 20:20

## 2021-06-25 RX ADMIN — ROSUVASTATIN CALCIUM 10 MG: 10 TABLET, FILM COATED ORAL at 20:21

## 2021-06-25 RX ADMIN — SODIUM CHLORIDE, PRESERVATIVE FREE 10 ML: 5 INJECTION INTRAVENOUS at 09:35

## 2021-06-25 RX ADMIN — IPRATROPIUM BROMIDE AND ALBUTEROL SULFATE 1 AMPULE: 2.5; .5 SOLUTION RESPIRATORY (INHALATION) at 18:15

## 2021-06-25 RX ADMIN — METFORMIN HYDROCHLORIDE 500 MG: 500 TABLET ORAL at 09:25

## 2021-06-25 RX ADMIN — APIXABAN 5 MG: 5 TABLET, FILM COATED ORAL at 09:25

## 2021-06-25 RX ADMIN — PREDNISONE 20 MG: 20 TABLET ORAL at 09:35

## 2021-06-25 RX ADMIN — RAMIPRIL 5 MG: 5 CAPSULE ORAL at 20:21

## 2021-06-25 RX ADMIN — SODIUM CHLORIDE, PRESERVATIVE FREE 5 ML: 5 INJECTION INTRAVENOUS at 20:22

## 2021-06-25 RX ADMIN — IPRATROPIUM BROMIDE AND ALBUTEROL SULFATE 1 AMPULE: 2.5; .5 SOLUTION RESPIRATORY (INHALATION) at 10:30

## 2021-06-25 RX ADMIN — IPRATROPIUM BROMIDE AND ALBUTEROL SULFATE 1 AMPULE: 2.5; .5 SOLUTION RESPIRATORY (INHALATION) at 06:24

## 2021-06-25 RX ADMIN — RAMIPRIL 5 MG: 5 CAPSULE ORAL at 09:35

## 2021-06-25 ASSESSMENT — PAIN SCALES - GENERAL
PAINLEVEL_OUTOF10: 0

## 2021-06-25 NOTE — PROGRESS NOTES
Cardiology Progress Note Simone Traylor MD      Patient:  Jaki Guerrero  [id number]    Patient Active Problem List    Diagnosis Date Noted    NICM (nonischemic cardiomyopathy) ([address] 03/02/2021     Priority: High    Acute hypoxemic respiratory failure ([address] 06/22/2021     Priority: Low    Hyponatremia 06/22/2021     Priority: Low    Diabetic ulcer of left lower leg associated with type 2 diabetes mellitus, with fat layer exposed ([address] 01/05/2021     Priority: Low    Smoker 01/05/2021     Priority: Low    Traumatic hematoma 01/05/2021     Priority: Low    Abdominal aortic aneurysm (AAA) without rupture ([address] 09/10/2020     Priority: Low    History of diabetes mellitus 07/16/2020     Priority: Low    Chronic obstructive pulmonary disease ([address] 07/16/2020     Priority: Low    NUNEZ (dyspnea on exertion) 07/16/2020     Priority: Low    Mixed hyperlipidemia 12/19/2018     Priority: Low    S/P CABG x 4 10/11/2016     Priority: Low     Overview Note:     8/29/11 EF 60%      History of coronary artery stent placement 10/11/2016     Priority: Low    Coronary artery disease involving native coronary artery of native heart without angina pectoris 10/11/2016     Priority: Low    Hypercholesteremia 10/10/2011     Priority: Low    Deep vein thrombosis (HCC)      Priority: Low    Essential hypertension      Priority: Low    Diabetes mellitus ([address]      Priority: Low     Overview Note:     diet controlled         Admit Date:  6/22/2021    Admission Problem List: Present on Admission:   Acute hypoxemic respiratory failure (HCC)   Abdominal aortic aneurysm (AAA) without rupture ([address]   Chronic obstructive pulmonary disease ([address]   Coronary artery disease involving native coronary artery of native heart without angina pectoris   Essential hypertension   Diabetes mellitus ([address]   NICM (nonischemic cardiomyopathy) ([address]   Hyponatremia      Cardiac Specific Data:  Specialty Problems        Cardiology Problems    NICM (nonischemic cardiomyopathy) ([facility] [address]        Deep vein thrombosis (HCC)        Essential hypertension        Hypercholesteremia        Coronary artery disease involving native coronary artery of native heart without angina pectoris        Mixed hyperlipidemia        Abdominal aortic aneurysm (AAA) without rupture ([facility] [address]            1. Coronary artery disease, severe ectatic coronary disease, prior multiple PCI's with bare-metal stents to LAD and circumflex, CABG 8/2011 with LIMA to LAD (occluded), SVG to diagonal (patent), SVG to PDA (patent), normal LV ejection fraction, status post PCI 8/24/2020 to proximal (4.0 x 15 mm resolute) and mid LAD (2.75 x 26 mm resolute) with GABI. 2. Chronic ongoing tobacco use with COPD. 3. Diabetes mellitus not well controlled (HbA1c 8.9). 4. Hypertension. 5. Moderate to large abdominal aortic aneurysm 4.3 x 4.7 cm (noted at 8/24/2020)   6. ZIO monitor with 3.8-second pause with high-grade second-degree AV block and possible syncopal episode with fall and injury 10/2020, status post pacemaker placement 3/2/2021.  7.  Acute on chronic diastolic heart failure with new onset atrial flutter, status post JAME with DC cardioversion 6/24/2021 with noted atrial septal aneurysm with PFO. Subjective:  Ms. Stephanie Alvarez feels better today. Leg swelling is less. No shortness of breath at rest.  Remains in sinus rhythm. Objective:   BP (!) 100/54   Pulse 94   Temp 97.3 °F (36.3 °C) (Temporal)   Resp 16   Ht 5' 5\" (1.651 m)   Wt 172 lb (78 kg)   SpO2 92%   BMI 28.62 kg/m²       Intake/Output Summary (Last 24 hours) at 6/25/2021 1212  Last data filed at 6/25/2021 0932  Gross per 24 hour   Intake 120 ml   Output 1700 ml   Net -1580 ml       Prior to Admission medications    Medication Sig Start Date End Date Taking?  Authorizing Provider   potassium chloride (KLOR-CON) 20 MEQ packet Take 20 mEq by mouth daily   Yes Historical Provider, MD   hydroCHLOROthiazide (HYDRODIURIL) 25 MG tablet Take 25 mg by mouth daily   Yes Historical Provider, MD   metoprolol succinate (TOPROL XL) 100 MG extended release tablet TAKE 1 TABLET BY MOUTH TWICE DAILY 6/7/21  Yes JAKOB Rock CNP   amLODIPine (NORVASC) 5 MG tablet Take 1 tablet by mouth daily 4/28/21  Yes JAKOB Muñiz   ramipril (ALTACE) 5 MG capsule Take 5 mg by mouth 2 times daily   Yes Historical Provider, MD   rosuvastatin (CRESTOR) 10 MG tablet Take 1 tablet by mouth daily  Patient taking differently: Take 10 mg by mouth nightly PT TAKES AT NIGHT 9/23/20  Yes JAKOB Muñiz   clopidogrel (PLAVIX) 75 MG tablet Take 1 tablet by mouth daily 8/27/20  Yes AJKOB Ruelas   metFORMIN (GLUCOPHAGE) 500 MG tablet Take 1 tablet by mouth 2 times daily (with meals) Hold for 2 days and restart a 26th 2020 8/24/20  Yes Yamila Sarkar MD   nitroGLYCERIN (NITROSTAT) 0.4 MG SL tablet Place 1 tablet under the tongue every 5 minutes as needed for Chest pain 4/30/15   JAKOB Perrin - MAURA        metoprolol succinate  25 mg Oral BID    sotalol  80 mg Oral BID    predniSONE  20 mg Oral Daily    furosemide  40 mg Intravenous BID    apixaban  5 mg Oral BID    sodium chloride flush  5-40 mL Intravenous 2 times per day    clopidogrel  75 mg Oral Daily    amLODIPine  5 mg Oral Daily    metFORMIN  500 mg Oral BID     ramipril  5 mg Oral BID    rosuvastatin  10 mg Oral Nightly    insulin lispro  0-6 Units Subcutaneous TID WC    insulin lispro  0-3 Units Subcutaneous Nightly    ipratropium-albuterol  1 ampule Inhalation Q4H WA       TELEMETRY: Sinus     Physical Exam:      Physical Exam  Constitutional:       General: She is not in acute distress. Appearance: She is not diaphoretic. HENT:      Mouth/Throat:      Pharynx: No oropharyngeal exudate. Eyes:      General: No scleral icterus. Right eye: No discharge. Left eye: No discharge. Neck:      Thyroid: No thyromegaly. Vascular: No JVD.    Cardiovascular:      Rate and Rhythm: Normal rate and regular rhythm. No extrasystoles are present. Heart sounds: Normal heart sounds, S1 normal and S2 normal. No murmur heard. No systolic murmur is present. No diastolic murmur is present. No friction rub. No gallop. No S3 or S4 sounds. Comments: Trace edema  No JVD    Pulmonary:      Effort: Pulmonary effort is normal. No respiratory distress. Breath sounds: Normal breath sounds. No wheezing or rales. Comments: Diminished air entry bilaterally with mild wheezing  Chest:      Chest wall: No tenderness. Abdominal:      General: Bowel sounds are normal. There is no distension. Palpations: Abdomen is soft. There is no mass. Tenderness: There is no abdominal tenderness. There is no guarding or rebound. Hernia: No hernia is present. Comments: No palpable organomegaly   Musculoskeletal:         General: Normal range of motion. Skin:     General: Skin is warm. Coloration: Skin is not pale. Findings: No rash. Neurological:      Mental Status: She is alert and oriented to person, place, and time. Cranial Nerves: No cranial nerve deficit. Deep Tendon Reflexes: Reflexes normal.                 Lab Data:  CBC:   Recent Labs     06/23/21  0356   WBC 6.6   HGB 14.4   HCT 46.5   MCV 93.8        BMP:   Recent Labs     06/23/21  0356 06/24/21  0936 06/25/21  0332   * 132* 135*   K 5.4* 4.9 4.7   CL 89* 92* 90*   CO2 26 34* 36*   BUN 26* 33* 34*   CREATININE 1.0* 1.1* 1.2*     LIVER PROFILE:   Recent Labs     06/24/21  0936 06/25/21  0332   AST 17 18   ALT 25 27   BILITOT 0.3 0.3   ALKPHOS 80 83     PT/INR: No results for input(s): PROTIME, INR in the last 72 hours. APTT: No results for input(s): APTT in the last 72 hours. BNP:  No results for input(s): BNP in the last 72 hours.   CK, CKMB, Troponin: @LABRCNT (CKTOTAL:3, CKMB:3, TROPONINI:3)@    IMAGING:  Echocardiogram transesophageal    Result Date: 6/25/2021  Transesophageal Echocardiography Report (JAME)   Demographics   Patient Name    Ming Bhardwaj   Date of Study             06/24/2021   MRN             [id number]            Gender                    Female   Date of Birth   1950        Room Number               MHL-0717   Age             66 year(s)   Height:         65 inches         Referring Physician   Weight:         172 pounds        Sonographer   BSA:            1.86 m^2          Interpreting Physician    Miller Rivas MD   BMI:            28.62 kg/m^2  Procedure Type of Study   JAME procedure:ECHOCARDIOGRAM TRANSESOPHAGEAL. Conclusions   Summary  Anesthesia: Lidocaine LA  Sedation: Versed 2 mg; Fentanyl 50 mg  Start time: 3:08 PM  Stop time: 3:30 PM  ASA Class: 3  An independent trained observer pushed medications at my direction. Estimated blood loss : N/A   The patient was monitored continuously with the ECG, pulse oximetry, blood  pressure monitoring, and direct observation for level of consciousness. Transesophageal echocardiogram report:   LV is normal in size with normal systolic function. LV ejection fraction  estimated at 60%. No mass or thrombus in left ventricle. RV appears mildly dilated with preserved RV systolic function. No mass or  thrombus noted in right ventricle. Left atrium appears mildly dilated. No mass or thrombus noted in the left  atrium. Left atrial appendage with no mass or thrombus noted. Good velocity noted  in appendage. Right atrium appears moderate to severely dilated. No mass or thrombus  noted. Coronary sinus appears dilated. Aortic valve is trileaflet with mild leaflet thickening but preserved  mobility. No significant stenosis or regurgitation. Mitral valve with posterior leaflet thickened and calcified and appears  fixed. Normal anterior leaflet mobility. Probable mild mitral stenosis  (mean gradient 3 mmHg). Mild mitral regurgitation.   Tricuspid leaflets appear structurally normal. Moderate tricuspid regurgitation. Pulmonic valve not well visualized but no significant regurgitation  appreciated  Intra-atrial septum is aneurysmal and thinned. A slitlike patent foramen  ovale is present. Right to left shunting is noted on bubble study. Pacer wires noted in right-sided chambers. Ascending aorta is mildly dilated at 3.3 cm. Mild to moderate descending thoracic aortic atheromatous changes. Signature   ----------------------------------------------------------------  Electronically signed by Geronimo Rivas MD(Interpreting physician)  on 06/25/2021 12:22 AM  ----------------------------------------------------------------  Allergies   - Codeine. Doppler Measurements:   TR Velocity:220 cm/s  TR Gradient:19.36 mmHg      ECHO Complete 2D W Doppler W Color    Result Date: 6/23/2021  Transthoracic Echocardiography Report (TTE)  Demographics   Patient Name  Haley Paz Date of Study         06/23/2021   MRN           [id number]          Gender                Female   Date of Birth 1950      Room Number           MHL-0717   Age           66 year(s)   Height:       65 inches       Referring Physician   Arturo Roblero   Weight:       172 pounds      Sonographer           Frank Roberto   BSA:          1.86 m^2        Interpreting          Belle Rehman DO                                Physician   BMI:          28.62 kg/m^2  Procedure Type of Study   TTE procedure:ECHO NO CONTRAST WITH DOP/COLR. Study Location: Echo Lab Technical Quality: Poor visualization due to poor acoustical window. Patient Status: Inpatient Contrast Medium: Definity. BP: 122/70 mmHg Indications:Congestive heart failure. Conclusions   Summary  Rhythm is noted to be atrial flutter  Suboptimal study, fair quality  Normal left ventricular chamber size and systolic function. Moderate concentric left ventricular hypertrophy. Left ventricular ejection fraction is visually estimated at 65-70%. The left atrium is moderately dilated.    Signature ----------------------------------------------------------------  Electronically signed by Mono Coronado DO(Interpreting  physician) on 06/23/2021 03:34 PM  ----------------------------------------------------------------   Findings   Mitral Valve  The mitral valve is not well seen. Mitral annular calcification is present. There is no mitral regurgitation. Aortic Valve  Aortic valve not well seen. No aortic regurgitation . Tricuspid Valve  Normal tricuspid valve leaflet thickness and excursion. No tricuspid regurgitation. Pulmonic Valve  No significant pulmonic regurgitation. Left Atrium  The left atrium is moderately dilated. Left Ventricle  Normal left ventricular chamber size and systolic function. Moderate concentric left ventricular hypertrophy. Left ventricular ejection fraction is visually estimated at 65-70%. Diastolic function is indeterminate. Right Atrium  Normal right atrial size. Right Ventricle  Normal right ventricular chamber size and function. Device catheter (pacemaker or defibrillator) noted in the RV. Pericardial Effusion  No pericardial effusion. Miscellaneous  IVC diameter is normal, suggesting normal right atrial pressure. Allergies   - Codeine.  M-Mode Measurements (cm)   LVIDd: 3.81 cm                         LVIDs: 3.56 cm  IVSd: 1.58 cm  LVPWd: 0.99 cm                         AO Root Dimension: 1.8 cm  % Ejection Fraction: 14.9 %            LA: 4.6 cm                                         LVOT: 1.9 cm  Doppler Measurements:   AV Peak Velocity:176 cm/s           MV Peak E-Wave: 164 cm/s  AV Peak Gradient: 12.39 mmHg        MV Peak A-Wave: 138 cm/s                                      MV E/A Ratio: 1.19 %  TR Velocity:236 cm/s                MV Peak Gradient: 10.76 mmHg  TR Gradient:22.28 mmHg              MV P1/2t: 107 msec  Estimated RAP:3 mmHg                MVA by PHT2.06 cm^2  RVSP:25 mmHg      XR CHEST PORTABLE    Result Date: 6/22/2021  EXAMINATION: XR CHEST PORTABLE 6/22/2021 10:51 AM HISTORY: Shortness of breath COMPARISON: 3/2/2021 FINDINGS: The heart is magnified but felt to be mildly enlarged. There has been prior median sternotomy. A left subclavian approach dual lead cardiac pacing device is in place with tips projecting over the right atrium and right ventricle. There is no appreciable pneumothorax or pleural effusion. The pulmonary vasculature appears grossly normal.    No evidence of acute cardiopulmonary process. Signed by Dr Cyrus Cleary and Plan: This is a 66y.o. year old female with past medical history of COPD with chronic ongoing tobacco use, CAD with prior PCI to LAD and circumflex with bare-metal stents, CABG 8/2011 with LIMA to LAD, SVG to diagonal, SVG to PDA, normal LV ejection fraction, diabetes mellitus, occluded LIMA with PCI to proximal and mid LAD 8/24/2020, moderate size abdominal aortic aneurysm, pacemaker placement for significant pauses 3/2/2021 now presenting with acute on chronic diastolic heart failure in the setting of new onset atrial flutter, status post JAME/cardioversion 6/24/2021, initiated on Betapace. 1.  Clinically improved. On Betapace with QT intervals in the normal range. Continue Betapace (5 doses in hospital) prior to discharge which would be tomorrow evening. Maintain on Eliquis. Reduce Toprol-XL to 25 mg p.o. twice daily. 2.  Heart failure appears improved and now in sinus rhythm. Change to Lasix 40 mg p.o. daily. 3.  Can follow-up as an outpatient with cardiology. Will be off this weekend. Coverage available if needed.     Hermann Hoyt MD, MD 6/25/2021 12:12 PM

## 2021-06-25 NOTE — PROGRESS NOTES
CFUNGUSBL  Stool Culture:  No components found for: CSTOOL    Assessment:    Principal Problem:    Acute hypoxemic respiratory failure (HCC)  Active Problems:    NICM (nonischemic cardiomyopathy) ([facility] [address]    Essential hypertension    Diabetes mellitus ([facility] [address]    Coronary artery disease involving native coronary artery of native heart without angina pectoris    Chronic obstructive pulmonary disease (HCC)    Abdominal aortic aneurysm (AAA) without rupture (HCC)    Hyponatremia  Resolved Problems:    * No resolved hospital problems. *          Plan:  Continue with medication treatment. Now on sotalol with plan for at least 5 doses prior to patient discharge. Appreciate cardiology assistance. Sodium back to normal.  Likely hydrochloride thiazide, therefore we will not plan to discharge her home on that. Continue diuresis as kidney function and electrolyte balance allows. Increase activity as tolerated. Elevate feet and legs when not ambulating. Greater than 25 minutes total spent direct patient care.       Electronically signed by Joce Dixon MD on 6/25/2021 at 8:46 AM

## 2021-06-25 NOTE — PROCEDURES
Date of Procedure:  6/25/2021     Indications:  Atrial flutter with JAME precardioversion    Conscious Sedation Protocol Used During this Procedure -        Anesthesia: Moderate   Sedation:   2 mg Midazolam (Versed)    50 mcg Fentanyl   Start time:  3:08 PM   Stop time:  3:30 PM   ASA Class:  3   EBL  not applicable     A trained medical personnel administered medications at my direction. The patient was monitored continuously with ECG, pulse oximetry, blood pressure monitoring and direct observation. After obtaining informed written consent and an appropriate level of conscious sedation, DCCV with 360 J was successful in restoring sinus rhythm. Complications:  none      Impression:  Successful DC cardioversion of atrial flutter to normal sinus rhythm on Eliquis 5 mg twice a day has been used for anticoagulation.   Initiate patient on sotalol 80 mg twice daily    Electronically signed by Nunu Delatorre MD on 6/25/21

## 2021-06-26 LAB
ALBUMIN SERPL-MCNC: 3.2 G/DL (ref 3.5–5.2)
ALP BLD-CCNC: 70 U/L (ref 35–104)
ALT SERPL-CCNC: 20 U/L (ref 5–33)
ANION GAP SERPL CALCULATED.3IONS-SCNC: 8 MMOL/L (ref 7–19)
AST SERPL-CCNC: 12 U/L (ref 5–32)
BILIRUB SERPL-MCNC: 0.4 MG/DL (ref 0.2–1.2)
BUN BLDV-MCNC: 24 MG/DL (ref 8–23)
CALCIUM SERPL-MCNC: 8.5 MG/DL (ref 8.8–10.2)
CHLORIDE BLD-SCNC: 89 MMOL/L (ref 98–111)
CO2: 36 MMOL/L (ref 22–29)
CREAT SERPL-MCNC: 0.9 MG/DL (ref 0.5–0.9)
GFR AFRICAN AMERICAN: >59
GFR NON-AFRICAN AMERICAN: >60
GLUCOSE BLD-MCNC: 125 MG/DL (ref 74–109)
GLUCOSE BLD-MCNC: 167 MG/DL (ref 70–99)
GLUCOSE BLD-MCNC: 212 MG/DL (ref 70–99)
GLUCOSE BLD-MCNC: 212 MG/DL (ref 70–99)
GLUCOSE BLD-MCNC: 298 MG/DL (ref 70–99)
GLUCOSE BLD-MCNC: 97 MG/DL (ref 70–99)
OSMOLALITY URINE: 225 MOSM/KG (ref 250–1200)
PERFORMED ON: ABNORMAL
PERFORMED ON: NORMAL
POTASSIUM REFLEX MAGNESIUM: 4.1 MMOL/L (ref 3.5–5)
SODIUM BLD-SCNC: 133 MMOL/L (ref 136–145)
SODIUM URINE: 32 MMOL/L
TOTAL PROTEIN: 5.9 G/DL (ref 6.6–8.7)

## 2021-06-26 PROCEDURE — 82947 ASSAY GLUCOSE BLOOD QUANT: CPT

## 2021-06-26 PROCEDURE — 2580000003 HC RX 258: Performed by: FAMILY MEDICINE

## 2021-06-26 PROCEDURE — 83935 ASSAY OF URINE OSMOLALITY: CPT

## 2021-06-26 PROCEDURE — 94640 AIRWAY INHALATION TREATMENT: CPT

## 2021-06-26 PROCEDURE — 84300 ASSAY OF URINE SODIUM: CPT

## 2021-06-26 PROCEDURE — 94660 CPAP INITIATION&MGMT: CPT

## 2021-06-26 PROCEDURE — 2700000000 HC OXYGEN THERAPY PER DAY

## 2021-06-26 PROCEDURE — 36415 COLL VENOUS BLD VENIPUNCTURE: CPT

## 2021-06-26 PROCEDURE — 2140000000 HC CCU INTERMEDIATE R&B

## 2021-06-26 PROCEDURE — 80053 COMPREHEN METABOLIC PANEL: CPT

## 2021-06-26 PROCEDURE — 6370000000 HC RX 637 (ALT 250 FOR IP): Performed by: INTERNAL MEDICINE

## 2021-06-26 PROCEDURE — 6370000000 HC RX 637 (ALT 250 FOR IP): Performed by: FAMILY MEDICINE

## 2021-06-26 RX ADMIN — IPRATROPIUM BROMIDE AND ALBUTEROL SULFATE 1 AMPULE: 2.5; .5 SOLUTION RESPIRATORY (INHALATION) at 15:32

## 2021-06-26 RX ADMIN — METOPROLOL SUCCINATE 25 MG: 25 TABLET, EXTENDED RELEASE ORAL at 21:17

## 2021-06-26 RX ADMIN — SOTALOL HYDROCHLORIDE 80 MG: 80 TABLET ORAL at 21:17

## 2021-06-26 RX ADMIN — SODIUM CHLORIDE, PRESERVATIVE FREE 10 ML: 5 INJECTION INTRAVENOUS at 21:17

## 2021-06-26 RX ADMIN — METFORMIN HYDROCHLORIDE 500 MG: 500 TABLET ORAL at 11:01

## 2021-06-26 RX ADMIN — IPRATROPIUM BROMIDE AND ALBUTEROL SULFATE 1 AMPULE: 2.5; .5 SOLUTION RESPIRATORY (INHALATION) at 06:38

## 2021-06-26 RX ADMIN — METOPROLOL SUCCINATE 25 MG: 25 TABLET, EXTENDED RELEASE ORAL at 11:00

## 2021-06-26 RX ADMIN — INSULIN LISPRO 1 UNITS: 100 INJECTION, SOLUTION INTRAVENOUS; SUBCUTANEOUS at 12:21

## 2021-06-26 RX ADMIN — IPRATROPIUM BROMIDE AND ALBUTEROL SULFATE 1 AMPULE: 2.5; .5 SOLUTION RESPIRATORY (INHALATION) at 11:09

## 2021-06-26 RX ADMIN — APIXABAN 5 MG: 5 TABLET, FILM COATED ORAL at 21:17

## 2021-06-26 RX ADMIN — IPRATROPIUM BROMIDE AND ALBUTEROL SULFATE 1 AMPULE: 2.5; .5 SOLUTION RESPIRATORY (INHALATION) at 19:38

## 2021-06-26 RX ADMIN — METFORMIN HYDROCHLORIDE 500 MG: 500 TABLET ORAL at 18:06

## 2021-06-26 RX ADMIN — PREDNISONE 20 MG: 20 TABLET ORAL at 11:01

## 2021-06-26 RX ADMIN — RAMIPRIL 5 MG: 5 CAPSULE ORAL at 11:00

## 2021-06-26 RX ADMIN — SOTALOL HYDROCHLORIDE 80 MG: 80 TABLET ORAL at 11:01

## 2021-06-26 RX ADMIN — RAMIPRIL 5 MG: 5 CAPSULE ORAL at 21:17

## 2021-06-26 RX ADMIN — ROSUVASTATIN CALCIUM 10 MG: 10 TABLET, FILM COATED ORAL at 21:17

## 2021-06-26 RX ADMIN — FUROSEMIDE 40 MG: 40 TABLET ORAL at 11:01

## 2021-06-26 RX ADMIN — AMLODIPINE BESYLATE 5 MG: 5 TABLET ORAL at 11:02

## 2021-06-26 RX ADMIN — INSULIN LISPRO 2 UNITS: 100 INJECTION, SOLUTION INTRAVENOUS; SUBCUTANEOUS at 18:06

## 2021-06-26 RX ADMIN — SODIUM CHLORIDE, PRESERVATIVE FREE 10 ML: 5 INJECTION INTRAVENOUS at 11:08

## 2021-06-26 RX ADMIN — CLOPIDOGREL BISULFATE 75 MG: 75 TABLET ORAL at 11:01

## 2021-06-26 RX ADMIN — APIXABAN 5 MG: 5 TABLET, FILM COATED ORAL at 11:01

## 2021-06-26 NOTE — PLAN OF CARE
Problem: Falls - Risk of:  Goal: Will remain free from falls  Description: Will remain free from falls  6/25/2021 2350 by Liat Ruiz RN  Outcome: Ongoing  6/25/2021 1055 by Rick Bueno RN  Outcome: Ongoing  Goal: Absence of physical injury  Description: Absence of physical injury  6/25/2021 2350 by Liat Ruiz RN  Outcome: Ongoing  6/25/2021 1055 by Rick Bueno RN  Outcome: Ongoing     Problem: Infection:  Goal: Will remain free from infection  Description: Will remain free from infection  6/25/2021 2350 by Liat Ruiz RN  Outcome: Ongoing  6/25/2021 1055 by Rick Bueno RN  Outcome: Ongoing     Problem: Safety:  Goal: Free from accidental physical injury  Description: Free from accidental physical injury  6/25/2021 2350 by Liat Ruiz RN  Outcome: Ongoing  6/25/2021 1055 by Rick Bueno RN  Outcome: Ongoing  Goal: Free from intentional harm  Description: Free from intentional harm  6/25/2021 2350 by Liat Ruiz RN  Outcome: Ongoing  6/25/2021 1055 by Rick Bueno RN  Outcome: Ongoing     Problem: Daily Care:  Goal: Daily care needs are met  Description: Daily care needs are met  6/25/2021 2350 by Liat Ruiz RN  Outcome: Ongoing  6/25/2021 1055 by Rick Bueno RN  Outcome: Ongoing     Problem: Pain:  Goal: Patient's pain/discomfort is manageable  Description: Patient's pain/discomfort is manageable  6/25/2021 2350 by Liat Ruiz RN  Outcome: Ongoing  6/25/2021 1055 by Rick Bueno RN  Outcome: Ongoing     Problem: Skin Integrity:  Goal: Skin integrity will stabilize  Description: Skin integrity will stabilize  6/25/2021 2350 by Liat Ruiz RN  Outcome: Ongoing  6/25/2021 1055 by Rick Bueno RN  Outcome: Ongoing     Problem: Discharge Planning:  Goal: Patients continuum of care needs are met  Description: Patients continuum of care needs are met  6/25/2021 2350 by Liat Ruiz RN  Outcome: Ongoing  6/25/2021 1055 by Rick Bueno

## 2021-06-26 NOTE — PROGRESS NOTES
Chief Complaint: Shortness of breath      Interval History:     Patient states she feels better. Denies shortness of breath currently. She has not been up and ambulating. She is still on oxygen. Review of Systems:   General ROS: no chills or fever  Respiratory ROS: no cough, shortness of breath, or wheezing  Cardiovascular ROS: no chest pain or dyspnea on exertion  Gastrointestinal ROS: no abdominal pain, diarrhea or nausea/vomiting       Vitals: BP (!) 144/84   Pulse 79   Temp 97.9 °F (36.6 °C) (Temporal)   Resp 20   Ht 5' 5\" (1.651 m)   Wt 178 lb (80.7 kg)   SpO2 90%   BMI 29.62 kg/m²   24 hour intake/output:    Intake/Output Summary (Last 24 hours) at 6/26/2021 1556  Last data filed at 6/26/2021 1226  Gross per 24 hour   Intake 545 ml   Output 2250 ml   Net -1705 ml     Last 3 weights:   Wt Readings from Last 3 Encounters:   06/26/21 178 lb (80.7 kg)   04/14/21 171 lb (77.6 kg)   03/11/21 172 lb (78 kg)         Physical Exam:     General Appearance:    Alert, cooperative, in no acute distress  Head:    N/A  Throat:   N/A  Neck:   N/A  Lungs:   Clear to auscultation,respirations regular, even and unlabored  Heart:    Regular rhythm and normal rate, normal S1 and S2, no murmur, no gallop  Abdomen:     Normal bowel sounds, no masses, no organomegaly, soft, non-tender,   non-distended, no guarding, no rebound      Extremities:   No edema, no cyanosis, no clubbing  Pulses:   N/A  Skin:   N/A  Lymph nodes:   N/A  Neurologic:   N/A         Results Review:      Lab:  Recent Results (from the past 24 hour(s))   POCT Glucose    Collection Time: 06/25/21  4:40 PM   Result Value Ref Range    POC Glucose 273 (H) 70 - 99 mg/dl    Performed on AccuChek    POCT Glucose    Collection Time: 06/25/21  9:18 PM   Result Value Ref Range    POC Glucose 287 (H) 70 - 99 mg/dl    Performed on AccuChek    Comprehensive Metabolic Panel w/ Reflex to MG    Collection Time: 06/26/21  2:10 AM   Result Value Ref Range    Sodium 133 (L) 136 - 145 mmol/L    Potassium reflex Magnesium 4.1 3.5 - 5.0 mmol/L    Chloride 89 (L) 98 - 111 mmol/L    CO2 36 (H) 22 - 29 mmol/L    Anion Gap 8 7 - 19 mmol/L    Glucose 125 (H) 74 - 109 mg/dL    BUN 24 (H) 8 - 23 mg/dL    CREATININE 0.9 0.5 - 0.9 mg/dL    GFR Non-African American >60 >60    GFR African American >59 >59    Calcium 8.5 (L) 8.8 - 10.2 mg/dL    Total Protein 5.9 (L) 6.6 - 8.7 g/dL    Albumin 3.2 (L) 3.5 - 5.2 g/dL    Total Bilirubin 0.4 0.2 - 1.2 mg/dL    Alkaline Phosphatase 70 35 - 104 U/L    ALT 20 5 - 33 U/L    AST 12 5 - 32 U/L   POCT Glucose    Collection Time: 06/26/21  7:29 AM   Result Value Ref Range    POC Glucose 97 70 - 99 mg/dl    Performed on AccuChek    Osmolality, Urine    Collection Time: 06/26/21 11:11 AM   Result Value Ref Range    Osmolality, Ur 225 (L) 250 - 1200 mOsm/kg   Sodium, Urine, Random    Collection Time: 06/26/21 11:11 AM   Result Value Ref Range    Sodium, Ur 32.0 mmol/L   POCT Glucose    Collection Time: 06/26/21 11:28 AM   Result Value Ref Range    POC Glucose 167 (H) 70 - 99 mg/dl    Performed on AccuChek         Imaging:  Imaging study reports reviewed      ASSESSMENT:    Principal Problem:    Acute hypoxemic respiratory failure (HCC)  Active Problems:    NICM (nonischemic cardiomyopathy) (HCC)    Essential hypertension    Diabetes mellitus (HCC)    Coronary artery disease involving native coronary artery of native heart without angina pectoris    Chronic obstructive pulmonary disease (HCC)    Abdominal aortic aneurysm (AAA) without rupture (HCC)    Hyponatremia  Resolved Problems:    * No resolved hospital problems. *      PLAN:    1. Attempt to wean off of oxygen  2. Out of bed and ambulating  3. Discussed with nursing who states that her fifth dose of Betapace will not be due until 9:00 tonight. We will plan discharge home tomorrow. 4.  Noted social work note of cost of Eliquis.   We will need to assure that she can get an anticoagulant at discharge      Jade Rader MD  06/26/21  3:56 PM

## 2021-06-27 LAB
ALBUMIN SERPL-MCNC: 3.3 G/DL (ref 3.5–5.2)
ALP BLD-CCNC: 69 U/L (ref 35–104)
ALT SERPL-CCNC: 19 U/L (ref 5–33)
ANION GAP SERPL CALCULATED.3IONS-SCNC: 7 MMOL/L (ref 7–19)
AST SERPL-CCNC: 12 U/L (ref 5–32)
BILIRUB SERPL-MCNC: 0.7 MG/DL (ref 0.2–1.2)
BUN BLDV-MCNC: 20 MG/DL (ref 8–23)
CALCIUM SERPL-MCNC: 8.6 MG/DL (ref 8.8–10.2)
CHLORIDE BLD-SCNC: 90 MMOL/L (ref 98–111)
CO2: 37 MMOL/L (ref 22–29)
CREAT SERPL-MCNC: 0.9 MG/DL (ref 0.5–0.9)
GFR AFRICAN AMERICAN: >59
GFR NON-AFRICAN AMERICAN: >60
GLUCOSE BLD-MCNC: 173 MG/DL (ref 70–99)
GLUCOSE BLD-MCNC: 209 MG/DL (ref 70–99)
GLUCOSE BLD-MCNC: 251 MG/DL (ref 70–99)
GLUCOSE BLD-MCNC: 88 MG/DL (ref 74–109)
GLUCOSE BLD-MCNC: 89 MG/DL (ref 70–99)
GLUCOSE BLD-MCNC: 95 MG/DL (ref 70–99)
PERFORMED ON: ABNORMAL
PERFORMED ON: NORMAL
PERFORMED ON: NORMAL
POTASSIUM REFLEX MAGNESIUM: 4 MMOL/L (ref 3.5–5)
SODIUM BLD-SCNC: 134 MMOL/L (ref 136–145)
TOTAL PROTEIN: 5.9 G/DL (ref 6.6–8.7)

## 2021-06-27 PROCEDURE — 6370000000 HC RX 637 (ALT 250 FOR IP): Performed by: FAMILY MEDICINE

## 2021-06-27 PROCEDURE — 2700000000 HC OXYGEN THERAPY PER DAY

## 2021-06-27 PROCEDURE — 2140000000 HC CCU INTERMEDIATE R&B

## 2021-06-27 PROCEDURE — 2580000003 HC RX 258: Performed by: FAMILY MEDICINE

## 2021-06-27 PROCEDURE — 94640 AIRWAY INHALATION TREATMENT: CPT

## 2021-06-27 PROCEDURE — 6370000000 HC RX 637 (ALT 250 FOR IP): Performed by: INTERNAL MEDICINE

## 2021-06-27 PROCEDURE — 94660 CPAP INITIATION&MGMT: CPT

## 2021-06-27 PROCEDURE — 80053 COMPREHEN METABOLIC PANEL: CPT

## 2021-06-27 PROCEDURE — 82947 ASSAY GLUCOSE BLOOD QUANT: CPT

## 2021-06-27 PROCEDURE — 36415 COLL VENOUS BLD VENIPUNCTURE: CPT

## 2021-06-27 RX ADMIN — SOTALOL HYDROCHLORIDE 80 MG: 80 TABLET ORAL at 21:15

## 2021-06-27 RX ADMIN — INSULIN LISPRO 3 UNITS: 100 INJECTION, SOLUTION INTRAVENOUS; SUBCUTANEOUS at 17:44

## 2021-06-27 RX ADMIN — AMLODIPINE BESYLATE 5 MG: 5 TABLET ORAL at 08:56

## 2021-06-27 RX ADMIN — INSULIN LISPRO 1 UNITS: 100 INJECTION, SOLUTION INTRAVENOUS; SUBCUTANEOUS at 12:16

## 2021-06-27 RX ADMIN — PREDNISONE 20 MG: 20 TABLET ORAL at 08:56

## 2021-06-27 RX ADMIN — ROSUVASTATIN CALCIUM 10 MG: 10 TABLET, FILM COATED ORAL at 21:15

## 2021-06-27 RX ADMIN — IPRATROPIUM BROMIDE AND ALBUTEROL SULFATE 1 AMPULE: 2.5; .5 SOLUTION RESPIRATORY (INHALATION) at 07:03

## 2021-06-27 RX ADMIN — METFORMIN HYDROCHLORIDE 500 MG: 500 TABLET ORAL at 08:56

## 2021-06-27 RX ADMIN — RAMIPRIL 5 MG: 5 CAPSULE ORAL at 08:57

## 2021-06-27 RX ADMIN — RAMIPRIL 5 MG: 5 CAPSULE ORAL at 21:15

## 2021-06-27 RX ADMIN — APIXABAN 5 MG: 5 TABLET, FILM COATED ORAL at 08:57

## 2021-06-27 RX ADMIN — SODIUM CHLORIDE, PRESERVATIVE FREE 10 ML: 5 INJECTION INTRAVENOUS at 08:57

## 2021-06-27 RX ADMIN — IPRATROPIUM BROMIDE AND ALBUTEROL SULFATE 1 AMPULE: 2.5; .5 SOLUTION RESPIRATORY (INHALATION) at 11:00

## 2021-06-27 RX ADMIN — METOPROLOL SUCCINATE 25 MG: 25 TABLET, EXTENDED RELEASE ORAL at 08:57

## 2021-06-27 RX ADMIN — SODIUM CHLORIDE, PRESERVATIVE FREE 10 ML: 5 INJECTION INTRAVENOUS at 21:15

## 2021-06-27 RX ADMIN — APIXABAN 5 MG: 5 TABLET, FILM COATED ORAL at 21:15

## 2021-06-27 RX ADMIN — SOTALOL HYDROCHLORIDE 80 MG: 80 TABLET ORAL at 08:56

## 2021-06-27 RX ADMIN — METFORMIN HYDROCHLORIDE 500 MG: 500 TABLET ORAL at 17:44

## 2021-06-27 RX ADMIN — CLOPIDOGREL BISULFATE 75 MG: 75 TABLET ORAL at 08:56

## 2021-06-27 RX ADMIN — IPRATROPIUM BROMIDE AND ALBUTEROL SULFATE 1 AMPULE: 2.5; .5 SOLUTION RESPIRATORY (INHALATION) at 19:28

## 2021-06-27 RX ADMIN — METOPROLOL SUCCINATE 25 MG: 25 TABLET, EXTENDED RELEASE ORAL at 21:15

## 2021-06-27 RX ADMIN — FUROSEMIDE 40 MG: 40 TABLET ORAL at 08:56

## 2021-06-27 RX ADMIN — IPRATROPIUM BROMIDE AND ALBUTEROL SULFATE 1 AMPULE: 2.5; .5 SOLUTION RESPIRATORY (INHALATION) at 16:30

## 2021-06-27 NOTE — PROGRESS NOTES
Chief Complaint: Shortness of breath      Interval History:     She was not discharged last night because O2 sats dropped to 82% with ambulation. Also case management found that her Eliquis was going to cost $300 per month which she states she cannot afford. She states she has been walking more today. She denies feeling short of breath. Review of Systems:   General ROS: no chills or fever  Respiratory ROS: no cough, shortness of breath, or wheezing  Cardiovascular ROS: no chest pain or dyspnea on exertion  Gastrointestinal ROS: no abdominal pain, diarrhea or nausea/vomiting       Vitals: /76   Pulse 76   Temp 98.5 °F (36.9 °C) (Temporal)   Resp 18   Ht 5' 5\" (1.651 m)   Wt 171 lb (77.6 kg)   SpO2 91%   BMI 28.46 kg/m²   24 hour intake/output:    Intake/Output Summary (Last 24 hours) at 6/27/2021 1537  Last data filed at 6/27/2021 1428  Gross per 24 hour   Intake 330 ml   Output 6650 ml   Net -6320 ml     Last 3 weights:   Wt Readings from Last 3 Encounters:   06/27/21 171 lb (77.6 kg)   04/14/21 171 lb (77.6 kg)   03/11/21 172 lb (78 kg)         Physical Exam:     General Appearance:    Alert, cooperative, in no acute distress  Head:    N/A  Throat:   N/A  Neck:   N/A  Lungs:   Clear to auscultation,respirations regular, even and unlabored  Heart:    Regular rhythm and normal rate, normal S1 and S2, no murmur, no gallop  Abdomen:     Normal bowel sounds, no masses, no organomegaly, soft, non-tender,   non-distended, no guarding, no rebound      Extremities:   No edema, no cyanosis, no clubbing  Pulses:   N/A  Skin:   N/A  Lymph nodes:   N/A  Neurologic:   N/A         Results Review:      Lab:  Recent Results (from the past 24 hour(s))   POCT Glucose    Collection Time: 06/26/21  4:54 PM   Result Value Ref Range    POC Glucose 212 (H) 70 - 99 mg/dl    Performed on AccuChek    POCT Glucose    Collection Time: 06/26/21  7:04 PM   Result Value Ref Range    POC Glucose 298 (H) 70 - 99 mg/dl place of Eliquis. Given atrial flutter and recent cardioversion she will need a reliable source of anticoagulation for discharge  3.   Check repeat chest x-ray given persistent hypoxemia      Jean-Claude Vo MD  06/27/21  3:37 PM

## 2021-06-27 NOTE — PROGRESS NOTES
Patient states that she feels shaky and nauseated. accu 89. [place] provided. Will continue to monitor, call light within reach.

## 2021-06-27 NOTE — PROGRESS NOTES
Oxygen saturation on room air at rest: 86%. Patient adamant that she does not need supplemental oxygen, and insisted on ambulating without it. Oxygen saturation after ambulating approximately 200 feet on room air: 82%. Patient asymptomatic. Patient then ambulated with 2 L supplemental oxygen per nasal canula. Ambulated approximately 400 feet; asymptomatic. Upon returning to room, oxygen saturation 92% on 2 L. Patient states that she has Raynaud's disease in her fingers, and that is why the saturation is reading low. Patients fingers were warm and pink during oxygen evaluation. Will continue to monitor patient.   Electronically signed by Kathrin Villegas RN on 6/26/2021 at 7:45 PM

## 2021-06-28 VITALS
TEMPERATURE: 97.8 F | HEIGHT: 65 IN | DIASTOLIC BLOOD PRESSURE: 79 MMHG | OXYGEN SATURATION: 96 % | BODY MASS INDEX: 28.49 KG/M2 | HEART RATE: 73 BPM | RESPIRATION RATE: 18 BRPM | WEIGHT: 171 LBS | SYSTOLIC BLOOD PRESSURE: 127 MMHG

## 2021-06-28 PROBLEM — J96.01 ACUTE HYPOXEMIC RESPIRATORY FAILURE (HCC): Status: RESOLVED | Noted: 2021-06-22 | Resolved: 2021-06-28

## 2021-06-28 LAB
ALBUMIN SERPL-MCNC: 3.4 G/DL (ref 3.5–5.2)
ALP BLD-CCNC: 69 U/L (ref 35–104)
ALT SERPL-CCNC: 18 U/L (ref 5–33)
ANION GAP SERPL CALCULATED.3IONS-SCNC: 11 MMOL/L (ref 7–19)
AST SERPL-CCNC: 12 U/L (ref 5–32)
BILIRUB SERPL-MCNC: 0.8 MG/DL (ref 0.2–1.2)
BUN BLDV-MCNC: 19 MG/DL (ref 8–23)
CALCIUM SERPL-MCNC: 8.9 MG/DL (ref 8.8–10.2)
CHLORIDE BLD-SCNC: 89 MMOL/L (ref 98–111)
CO2: 33 MMOL/L (ref 22–29)
CREAT SERPL-MCNC: 0.9 MG/DL (ref 0.5–0.9)
EKG P AXIS: 53 DEGREES
EKG P AXIS: 58 DEGREES
EKG P AXIS: NORMAL DEGREES
EKG P-R INTERVAL: 224 MS
EKG P-R INTERVAL: 238 MS
EKG P-R INTERVAL: NORMAL MS
EKG Q-T INTERVAL: 354 MS
EKG Q-T INTERVAL: 358 MS
EKG Q-T INTERVAL: 364 MS
EKG QRS DURATION: 86 MS
EKG QRS DURATION: 90 MS
EKG QRS DURATION: 90 MS
EKG QTC CALCULATION (BAZETT): 389 MS
EKG QTC CALCULATION (BAZETT): 389 MS
EKG QTC CALCULATION (BAZETT): 399 MS
EKG T AXIS: 102 DEGREES
EKG T AXIS: 102 DEGREES
EKG T AXIS: 145 DEGREES
GFR AFRICAN AMERICAN: >59
GFR NON-AFRICAN AMERICAN: >60
GLUCOSE BLD-MCNC: 146 MG/DL (ref 74–109)
GLUCOSE BLD-MCNC: 203 MG/DL (ref 70–99)
GLUCOSE BLD-MCNC: 97 MG/DL (ref 70–99)
MAGNESIUM: 2.1 MG/DL (ref 1.6–2.4)
PERFORMED ON: ABNORMAL
PERFORMED ON: NORMAL
POTASSIUM REFLEX MAGNESIUM: 3.5 MMOL/L (ref 3.5–5)
SODIUM BLD-SCNC: 133 MMOL/L (ref 136–145)
TOTAL PROTEIN: 6 G/DL (ref 6.6–8.7)

## 2021-06-28 PROCEDURE — 2580000003 HC RX 258: Performed by: FAMILY MEDICINE

## 2021-06-28 PROCEDURE — 2700000000 HC OXYGEN THERAPY PER DAY

## 2021-06-28 PROCEDURE — 94761 N-INVAS EAR/PLS OXIMETRY MLT: CPT

## 2021-06-28 PROCEDURE — 93005 ELECTROCARDIOGRAM TRACING: CPT | Performed by: INTERNAL MEDICINE

## 2021-06-28 PROCEDURE — 82947 ASSAY GLUCOSE BLOOD QUANT: CPT

## 2021-06-28 PROCEDURE — 6370000000 HC RX 637 (ALT 250 FOR IP): Performed by: FAMILY MEDICINE

## 2021-06-28 PROCEDURE — 94660 CPAP INITIATION&MGMT: CPT

## 2021-06-28 PROCEDURE — 80053 COMPREHEN METABOLIC PANEL: CPT

## 2021-06-28 PROCEDURE — 83735 ASSAY OF MAGNESIUM: CPT

## 2021-06-28 PROCEDURE — 36415 COLL VENOUS BLD VENIPUNCTURE: CPT

## 2021-06-28 PROCEDURE — 94640 AIRWAY INHALATION TREATMENT: CPT

## 2021-06-28 PROCEDURE — 6370000000 HC RX 637 (ALT 250 FOR IP): Performed by: INTERNAL MEDICINE

## 2021-06-28 RX ORDER — SOTALOL HYDROCHLORIDE 80 MG/1
80 TABLET ORAL 2 TIMES DAILY
Qty: 60 TABLET | Refills: 3 | Status: SHIPPED | OUTPATIENT
Start: 2021-06-28 | End: 2021-07-14

## 2021-06-28 RX ORDER — METOPROLOL SUCCINATE 25 MG/1
25 TABLET, EXTENDED RELEASE ORAL 2 TIMES DAILY
Qty: 30 TABLET | Refills: 3 | Status: SHIPPED | OUTPATIENT
Start: 2021-06-28 | End: 2021-07-14

## 2021-06-28 RX ORDER — FUROSEMIDE 40 MG/1
40 TABLET ORAL DAILY
Qty: 60 TABLET | Refills: 3 | Status: SHIPPED | OUTPATIENT
Start: 2021-06-29

## 2021-06-28 RX ORDER — IPRATROPIUM BROMIDE AND ALBUTEROL SULFATE 2.5; .5 MG/3ML; MG/3ML
3 SOLUTION RESPIRATORY (INHALATION)
Qty: 360 ML | Refills: 0 | Status: SHIPPED | OUTPATIENT
Start: 2021-06-28

## 2021-06-28 RX ADMIN — IPRATROPIUM BROMIDE AND ALBUTEROL SULFATE 1 AMPULE: 2.5; .5 SOLUTION RESPIRATORY (INHALATION) at 11:42

## 2021-06-28 RX ADMIN — APIXABAN 5 MG: 5 TABLET, FILM COATED ORAL at 09:09

## 2021-06-28 RX ADMIN — RAMIPRIL 5 MG: 5 CAPSULE ORAL at 09:08

## 2021-06-28 RX ADMIN — IPRATROPIUM BROMIDE AND ALBUTEROL SULFATE 1 AMPULE: 2.5; .5 SOLUTION RESPIRATORY (INHALATION) at 07:25

## 2021-06-28 RX ADMIN — PREDNISONE 20 MG: 20 TABLET ORAL at 09:09

## 2021-06-28 RX ADMIN — CLOPIDOGREL BISULFATE 75 MG: 75 TABLET ORAL at 09:09

## 2021-06-28 RX ADMIN — SOTALOL HYDROCHLORIDE 80 MG: 80 TABLET ORAL at 09:09

## 2021-06-28 RX ADMIN — METOPROLOL SUCCINATE 25 MG: 25 TABLET, EXTENDED RELEASE ORAL at 09:09

## 2021-06-28 RX ADMIN — SODIUM CHLORIDE, PRESERVATIVE FREE 10 ML: 5 INJECTION INTRAVENOUS at 09:10

## 2021-06-28 RX ADMIN — FUROSEMIDE 40 MG: 40 TABLET ORAL at 09:09

## 2021-06-28 RX ADMIN — AMLODIPINE BESYLATE 5 MG: 5 TABLET ORAL at 09:09

## 2021-06-28 RX ADMIN — METFORMIN HYDROCHLORIDE 500 MG: 500 TABLET ORAL at 09:09

## 2021-06-28 ASSESSMENT — PAIN SCALES - GENERAL: PAINLEVEL_OUTOF10: 0

## 2021-06-28 NOTE — CARE COORDINATION
Spoke with pt's pharmacy [facility]) who reports pt has approximately $350 remaining towards her deductible which would be the costs for any of the requested meds (eliquis, xarelto, pradaxa) and then the coverage would apply to the following refill. ROSS discussed this with the pt who is agreeable to the one-time costs for the deductible amount and SW provided the 30day first fill free coupon for eliquis. RSOS notified pt's cardiologist RN navigator [address] denies further dc needs at this time and reports her family or friends can assist with her dc if cleared today.

## 2021-06-28 NOTE — DISCHARGE SUMMARY
test was performed. On this test she did not qualify for home oxygen therefore will not be sent home with it. She will be on chronic anticoagulation at least in the short-term. At this point is felt she maximized her acute inpatient care and she will be discharged home with close follow-up. All questions answered to the best my ability. Consultants:   IP CONSULT TO CARDIOLOGY  IP CONSULT TO SOCIAL WORK  IP CONSULT TO CASE MANAGEMENT    Time Spent on Discharge:  35 minutes were spent in patient examination, evaluation, counseling as well as medication reconciliation, prescriptions for required medications, discharge plan and follow up. Surgeries/Procedures Performed:        Treatments:   cardiac meds: ramipril (Altace), sotalol and furosemide    Significant Diagnostic Studies:   Recent Labs:  CBC:   Lab Results   Component Value Date    WBC 6.6 06/23/2021    RBC 4.96 06/23/2021    HGB 14.4 06/23/2021    HCT 46.5 06/23/2021    HCT 32.1 09/02/2011    MCV 93.8 06/23/2021    MCH 29.0 06/23/2021    MCHC 31.0 06/23/2021    RDW 14.3 06/23/2021     06/23/2021     09/02/2011     CMP:    Lab Results   Component Value Date    GLUCOSE 146 06/28/2021     06/28/2021     09/02/2011    K 3.5 06/28/2021    K 4.1 09/02/2011    CL 89 06/28/2021     09/02/2011    CO2 33 06/28/2021    BUN 19 06/28/2021    CREATININE 0.9 06/28/2021    CREATININE 0.6 09/02/2011    ANIONGAP 11 06/28/2021    ALKPHOS 69 06/28/2021    ALKPHOS 57 09/02/2011    ALT 18 06/28/2021    AST 12 06/28/2021    BILITOT 0.8 06/28/2021    LABALBU 3.4 06/28/2021    LABALBU 3.3 09/02/2011    LABGLOM >60 06/28/2021    GFRAA >59 06/28/2021    PROT 6.0 06/28/2021    PROT 7.5 01/18/2013    CALCIUM 8.9 06/28/2021       Radiology Last 7 Days:  XR CHEST (2 VW)    Result Date: 6/25/2021  Airspace opacities in the left lung base favored for atelectasis, though developing infectious or inflammatory process could have this appearance.  Signed by Dr Anni Posada    XR CHEST PORTABLE    Result Date: 6/22/2021  No evidence of acute cardiopulmonary process. Signed by Dr Anni Posada      Discharge Plan   Disposition: Home    Provider Follow-Up:   JAKOB Sands , [address]  [phone]    On 7/14/2021  2:30 PM    Jhony Chávez, [address] [address] [phone]    In 2 weeks         Hospital/Incidental Findings Requiring Follow-Up:  Hyponatremia    Patient Instructions   Diet: cardiac diet and diabetic diet    Activity: activity as tolerated    Other Instructions:   Monitor blood sugar regularly at home    Discharge Medications         Medication List      START taking these medications    apixaban 5 MG Tabs tablet  Commonly known as: ELIQUIS  Take 1 tablet by mouth 2 times daily     furosemide 40 MG tablet  Commonly known as: LASIX  Take 1 tablet by mouth daily  Start taking on: June 29, 2021     ipratropium-albuterol 0.5-2.5 (3) MG/3ML Soln nebulizer solution  Commonly known as: DUONEB  Inhale 3 mLs into the lungs every 4 hours (while awake)     sotalol 80 MG tablet  Commonly known as: BETAPACE  Take 1 tablet by mouth 2 times daily        CHANGE how you take these medications    metoprolol succinate 25 MG extended release tablet  Commonly known as: TOPROL XL  Take 1 tablet by mouth 2 times daily  What changed:   · medication strength  · See the new instructions.      rosuvastatin 10 MG tablet  Commonly known as: Crestor  Take 1 tablet by mouth daily  What changed:   · when to take this  · additional instructions        CONTINUE taking these medications    Altace 5 MG capsule  Generic drug: ramipril     amLODIPine 5 MG tablet  Commonly known as: NORVASC  Take 1 tablet by mouth daily     clopidogrel 75 MG tablet  Commonly known as: Plavix  Take 1 tablet by mouth daily     metFORMIN 500 MG tablet  Commonly known as: GLUCOPHAGE  Take 1 tablet by mouth 2 times daily (with meals) Hold for 2 days and restart a 26th 2020     nitroGLYCERIN 0.4 MG SL tablet  Commonly known as: Nitrostat  Place 1 tablet under the tongue every 5 minutes as needed for Chest pain     potassium chloride 20 MEQ packet  Commonly known as: KLOR-CON        STOP taking these medications    hydroCHLOROthiazide 25 MG tablet  Commonly known as: HYDRODIURIL           Where to Get Your Medications      These medications were sent to [place] [id number] [id number] [facility], [address] [facility] [address]  [address] [facility], [address] [place] [id number]    Phone: [phone]   · apixaban 5 MG Tabs tablet  · furosemide 40 MG tablet  · ipratropium-albuterol 0.5-2.5 (3) MG/3ML Soln nebulizer solution  · metoprolol succinate 25 MG extended release tablet  · sotalol 80 MG tablet         Electronically signed by Ninoska Cartwright MD on 6/28/21 at 12:12 PM CDT

## 2021-06-28 NOTE — PROGRESS NOTES
Home Oxygen Evaluation    Oxygen saturation on room air at rest is 96%. Oxygen saturation on room air with exercise greater than 100 feet is 92%.     Patient talked the entire walk and did not has any shortness of breath

## 2021-06-28 NOTE — PROGRESS NOTES
Family Medicine Progress Note    Patient:  Lorie Wolff  YOB: 1950    MRN: [id number]     Acct: [de-identified]     Admit date: 6/22/2021    Patient Seen, Chart, Consults notes, Labs, Radiology studies reviewed. Subjective: Day 6 of stay with acute on chronic respiratory failure with hypoxia and most recent (in last 24 hours) has had significant improvement. Plan was for discharge yesterday however ran into some complications as her oxygen saturation dropped into the low 80s on ambulation and there was some issues with her insurance covering the Eliquis and it was going to be  $300 a month which is more than she could afford. Past, Family, Social History unchanged from admission. Diet:  ADULT DIET; Regular; Low Fat/Low Chol/High Fiber/2 gm Na    Medications:  Scheduled Meds:   metoprolol succinate  25 mg Oral BID    furosemide  40 mg Oral Daily    sotalol  80 mg Oral BID    predniSONE  20 mg Oral Daily    apixaban  5 mg Oral BID    sodium chloride flush  5-40 mL Intravenous 2 times per day    clopidogrel  75 mg Oral Daily    amLODIPine  5 mg Oral Daily    metFORMIN  500 mg Oral BID WC    ramipril  5 mg Oral BID    rosuvastatin  10 mg Oral Nightly    insulin lispro  0-6 Units Subcutaneous TID WC    insulin lispro  0-3 Units Subcutaneous Nightly    ipratropium-albuterol  1 ampule Inhalation Q4H WA     Continuous Infusions:   sodium chloride      dextrose       PRN Meds:acetaminophen, sodium chloride flush, sodium chloride, ondansetron **OR** ondansetron, glucose, dextrose, glucagon (rDNA), dextrose    Objective:    Vitals: /75   Pulse 78   Temp 97.3 °F (36.3 °C) (Temporal)   Resp 18   Ht 5' 5\" (1.651 m)   Wt 171 lb (77.6 kg)   SpO2 97%   BMI 28.46 kg/m²   24 hour intake/output:    Intake/Output Summary (Last 24 hours) at 6/28/2021 0842  Last data filed at 6/28/2021 0230  Gross per 24 hour   Intake 870 ml   Output 7100 ml   Net -6230 ml     Last 3 weights:   Wt Readings from Last 3 Encounters:   06/27/21 171 lb (77.6 kg)   04/14/21 171 lb (77.6 kg)   03/11/21 172 lb (78 kg)       Physical Exam:    General Appearance:  awake, alert, oriented, in no acute distress  Skin:  negatives: mobility and turgor normal  Eyes:  No gross abnormalities. Neck:  neck- supple, no mass, non-tender  Lungs:  Breathing Pattern: regular, no distress, Breath sounds: wheezing- scattered  Heart:  Heart regular rate and rhythm  Abdomen: Auscultation: Normal bowel sounds. No bruits. Extremities: Extremities warm to touch, pink, with no edema.   Musculoskeletal:  negative  Neurologic:  negative    CBC with Differential:    Lab Results   Component Value Date    WBC 6.6 06/23/2021    RBC 4.96 06/23/2021    HGB 14.4 06/23/2021    HCT 46.5 06/23/2021    HCT 32.1 09/02/2011     06/23/2021     09/02/2011    MCV 93.8 06/23/2021    MCH 29.0 06/23/2021    MCHC 31.0 06/23/2021    RDW 14.3 06/23/2021    LYMPHOPCT 11.7 06/22/2021    MONOPCT 11.2 06/22/2021    EOSPCT 0.5 09/02/2011    BASOPCT 0.2 06/22/2021    MONOSABS 0.90 06/22/2021    LYMPHSABS 1.0 06/22/2021    EOSABS 0.00 06/22/2021    BASOSABS 0.00 06/22/2021     CMP:    Lab Results   Component Value Date     06/28/2021     09/02/2011    K 3.5 06/28/2021    K 4.1 09/02/2011    CL 89 06/28/2021     09/02/2011    CO2 33 06/28/2021    BUN 19 06/28/2021    CREATININE 0.9 06/28/2021    CREATININE 0.6 09/02/2011    GFRAA >59 06/28/2021    LABGLOM >60 06/28/2021    GLUCOSE 146 06/28/2021    PROT 6.0 06/28/2021    PROT 7.5 01/18/2013    LABALBU 3.4 06/28/2021    LABALBU 3.3 09/02/2011    CALCIUM 8.9 06/28/2021    BILITOT 0.8 06/28/2021    ALKPHOS 69 06/28/2021    ALKPHOS 57 09/02/2011    AST 12 06/28/2021    ALT 18 06/28/2021     Last 3 Troponin:    Lab Results   Component Value Date    TROPONINI <0.01 06/23/2021    TROPONINI <0.01 06/23/2021    TROPONINI 0.06 08/25/2011    TROPONINI 0.09 08/24/2011    TROPONINI 0.16 08/23/2011     Urine Culture:  No components found for: CURINE  Blood Culture:  No components found for: CBLOOD, CFUNGUSBL  Stool Culture:  No components found for: CSTOOL    Assessment:    Principal Problem:    Acute hypoxemic respiratory failure (HCC)  Active Problems:    NICM (nonischemic cardiomyopathy) ([facility]    Essential hypertension    Diabetes mellitus ([facility]    Coronary artery disease involving native coronary artery of native heart without angina pectoris    Chronic obstructive pulmonary disease (HCC)    Abdominal aortic aneurysm (AAA) without rupture (HCC)    Hyponatremia  Resolved Problems:    * No resolved hospital problems. *          Plan:  Home oxygen desaturation evaluation. If qualifies will plan for home oxygen while exercising or during periods of dyspnea.  to evaluate for alternative to Eliquis given the expense. Hopefully will find either patient assistance program or covered medication such as Xarelto or Pradaxa. Once that is complete we will plan to send her home with close outpatient follow-up.       Electronically signed by Nhi Jaquez MD on 6/28/2021 at 8:42 AM

## 2021-07-03 LAB
EKG P AXIS: 69 DEGREES
EKG P AXIS: NORMAL DEGREES
EKG P-R INTERVAL: 234 MS
EKG P-R INTERVAL: NORMAL MS
EKG Q-T INTERVAL: 358 MS
EKG Q-T INTERVAL: 398 MS
EKG QRS DURATION: 86 MS
EKG QRS DURATION: 90 MS
EKG QTC CALCULATION (BAZETT): 391 MS
EKG QTC CALCULATION (BAZETT): 422 MS
EKG T AXIS: 108 DEGREES
EKG T AXIS: 149 DEGREES

## 2021-07-14 ENCOUNTER — OFFICE VISIT (OUTPATIENT)
Dept: CARDIOLOGY CLINIC | Age: 71
End: 2021-07-14
Payer: MEDICARE

## 2021-07-14 VITALS
HEIGHT: 66 IN | BODY MASS INDEX: 27.64 KG/M2 | HEART RATE: 57 BPM | DIASTOLIC BLOOD PRESSURE: 62 MMHG | WEIGHT: 172 LBS | SYSTOLIC BLOOD PRESSURE: 110 MMHG

## 2021-07-14 DIAGNOSIS — I25.10 CORONARY ARTERY DISEASE INVOLVING NATIVE CORONARY ARTERY OF NATIVE HEART WITHOUT ANGINA PECTORIS: Primary | ICD-10-CM

## 2021-07-14 DIAGNOSIS — J44.9 CHRONIC OBSTRUCTIVE PULMONARY DISEASE, UNSPECIFIED COPD TYPE (HCC): ICD-10-CM

## 2021-07-14 DIAGNOSIS — Z95.1 S/P CABG X 4: ICD-10-CM

## 2021-07-14 DIAGNOSIS — I49.5 SINOATRIAL NODE DYSFUNCTION (HCC): ICD-10-CM

## 2021-07-14 DIAGNOSIS — E78.2 MIXED HYPERLIPIDEMIA: ICD-10-CM

## 2021-07-14 DIAGNOSIS — I10 ESSENTIAL HYPERTENSION: ICD-10-CM

## 2021-07-14 DIAGNOSIS — Z95.0 PACEMAKER: ICD-10-CM

## 2021-07-14 DIAGNOSIS — Z95.5 HISTORY OF CORONARY ARTERY STENT PLACEMENT: ICD-10-CM

## 2021-07-14 DIAGNOSIS — I48.92 ATRIAL FLUTTER, UNSPECIFIED TYPE (HCC): ICD-10-CM

## 2021-07-14 PROCEDURE — G8926 SPIRO NO PERF OR DOC: HCPCS | Performed by: NURSE PRACTITIONER

## 2021-07-14 PROCEDURE — 3023F SPIROM DOC REV: CPT | Performed by: NURSE PRACTITIONER

## 2021-07-14 PROCEDURE — 3017F COLORECTAL CA SCREEN DOC REV: CPT | Performed by: NURSE PRACTITIONER

## 2021-07-14 PROCEDURE — 99214 OFFICE O/P EST MOD 30 MIN: CPT | Performed by: NURSE PRACTITIONER

## 2021-07-14 PROCEDURE — 4040F PNEUMOC VAC/ADMIN/RCVD: CPT | Performed by: NURSE PRACTITIONER

## 2021-07-14 PROCEDURE — G8417 CALC BMI ABV UP PARAM F/U: HCPCS | Performed by: NURSE PRACTITIONER

## 2021-07-14 PROCEDURE — 1123F ACP DISCUSS/DSCN MKR DOCD: CPT | Performed by: NURSE PRACTITIONER

## 2021-07-14 PROCEDURE — 4004F PT TOBACCO SCREEN RCVD TLK: CPT | Performed by: NURSE PRACTITIONER

## 2021-07-14 PROCEDURE — 1090F PRES/ABSN URINE INCON ASSESS: CPT | Performed by: NURSE PRACTITIONER

## 2021-07-14 PROCEDURE — G8400 PT W/DXA NO RESULTS DOC: HCPCS | Performed by: NURSE PRACTITIONER

## 2021-07-14 PROCEDURE — 93280 PM DEVICE PROGR EVAL DUAL: CPT | Performed by: NURSE PRACTITIONER

## 2021-07-14 PROCEDURE — 1111F DSCHRG MED/CURRENT MED MERGE: CPT | Performed by: NURSE PRACTITIONER

## 2021-07-14 PROCEDURE — G8427 DOCREV CUR MEDS BY ELIG CLIN: HCPCS | Performed by: NURSE PRACTITIONER

## 2021-07-14 RX ORDER — METOPROLOL SUCCINATE 25 MG/1
25 TABLET, EXTENDED RELEASE ORAL DAILY
Qty: 1 TABLET | Refills: 0 | Status: ON HOLD
Start: 2021-07-14 | End: 2021-08-16 | Stop reason: HOSPADM

## 2021-07-14 RX ORDER — SOTALOL HYDROCHLORIDE 120 MG/1
120 TABLET ORAL 2 TIMES DAILY
Qty: 180 TABLET | Refills: 1 | Status: SHIPPED | OUTPATIENT
Start: 2021-07-14 | End: 2021-08-23 | Stop reason: SDUPTHER

## 2021-07-14 NOTE — PATIENT INSTRUCTIONS
New instructions for today:  Start taking Sotalol 80 mg 1-1/2 tab twice daily. The new prescription is for 120 mg (1) tab twice daily. Reduce Toprol XL 25 mg to (1) tab daily. Eliquis can increase your risk of bleeding. If you notice blood in urine or stool, bleeding gums, excessive bruising or cough productive of bloody sputum, notify the office. Information on this blood thinner has been included in your after visit summary. Patient Instructions:  Continue current medications as prescribed. Always keep a current medication list. Bring your medications to every office visit. Continue to follow up with primary care provider for non cardiac medical problems. Call the office with any problems, questions or concerns at [phone]. If you have been asked to keep a blood pressure log, do so for 2 weeks. Call the office to report readings to the triage nurse at [phone]. Follow up with cardiologist as scheduled. The following educational material has been included in this after visit summary for your review: Life simple 7. Heart health. Life simple 7  1) Manage blood pressure - high blood pressure is a major risk factor for heart disease and stroke. Keeping blood pressure in health range reduces strain on your heart, arteries and kidneys. Blood pressure goal is less than 130/80. 2) Control cholesterol - contributes to plaque, which can clog arteries and lead to heart disease and stroke. When you control your cholesterol you are giving your arteries their best chance to remain clear. It is recommended that you get cholesterol lab work done once a year. 3) Reduce blood sugar - most of the food we eat is turning into glucose or blood sugar that our body uses for energy. Over time, high levels of blood sugar can damage your heart, kidneys, eyes and nerves. 4) Get active - living an active life is one of the most rewarding gifts you can give yourself and those you love.   Simply put, daily physical activity increases your length and quality of life. Strive to exercise 15 minutes most days of the week. 5)  Eat better - A healthy diet is one of your best weapons for fighting cardiovascular disease. When you eat a heart healthy diet, you improve your chances for feeling good and staying healthy for life. 6)  Lose weight - when you shed extra fat an unnecessary pounds, you reduce the burden on your hear, lungs, blood vessels and skeleton. You give yourself the gift of active living, you lower your blood pressure and help yourself feel better. 7) Stop smoking - cigarette smokers have a higher risk of developing cardiovascular disease. If  You smoke, quitting is the best thing you can do for your health. Check American Heart Association on line for more information on Life's Simple 7 and tips for healthy living. A Healthy Heart: Care Instructions  Your Care Instructions     Coronary artery disease, also called heart disease, occurs when a substance called plaque builds up in the vessels that supply oxygen-rich blood to your heart muscle. This can narrow the blood vessels and reduce blood flow. A heart attack happens when blood flow is completely blocked. A high-fat diet, smoking, and other factors increase the risk of heart disease. Your doctor has found that you have a chance of having heart disease. You can do lots of things to keep your heart healthy. It may not be easy, but you can change your diet, exercise more, and quit smoking. These steps really work to lower your chance of heart disease. Follow-up care is a key part of your treatment and safety. Be sure to make and go to all appointments, and call your doctor if you are having problems. It's also a good idea to know your test results and keep a list of the medicines you take. How can you care for yourself at home? Diet  · Use less salt when you cook and eat. This helps lower your blood pressure. Taste food before salting.  Add only a little salt when you think you need it. With time, your taste buds will adjust to less salt. · Eat fewer snack items, fast foods, canned soups, and other high-salt, high-fat, processed foods. · Read food labels and try to avoid saturated and trans fats. They increase your risk of heart disease by raising cholesterol levels. · Limit the amount of solid fat-butter, margarine, and shortening-you eat. Use olive, peanut, or canola oil when you cook. Bake, broil, and steam foods instead of frying them. · Eat a variety of fruit and vegetables every day. Dark green, deep orange, red, or yellow fruits and vegetables are especially good for you. Examples include spinach, carrots, peaches, and berries. · Foods high in fiber can reduce your cholesterol and provide important vitamins and minerals. High-fiber foods include whole-grain cereals and breads, oatmeal, beans, brown rice, citrus fruits, and apples. · Eat lean proteins. Heart-healthy proteins include seafood, lean meats and poultry, eggs, beans, peas, nuts, seeds, and soy products. · Limit drinks and foods with added sugar. These include candy, desserts, and soda pop. Lifestyle changes  · If your doctor recommends it, get more exercise. Walking is a good choice. Bit by bit, increase the amount you walk every day. Try for at least 30 minutes on most days of the week. You also may want to swim, bike, or do other activities. · Do not smoke. If you need help quitting, talk to your doctor about stop-smoking programs and medicines. These can increase your chances of quitting for good. Quitting smoking may be the most important step you can take to protect your heart. It is never too late to quit. · Limit alcohol to 2 drinks a day for men and 1 drink a day for women. Too much alcohol can cause health problems. · Manage other health problems such as diabetes, high blood pressure, and high cholesterol.  If you think you may have a problem with alcohol or drug use, talk to your doctor. Medicines  · Take your medicines exactly as prescribed. Call your doctor if you think you are having a problem with your medicine. · If your doctor recommends aspirin, take the amount directed each day. Make sure you take aspirin and not another kind of pain reliever, such as acetaminophen (Tylenol). When should you call for help? [id number] if you have symptoms of a heart attack. These may include:  · Chest pain or pressure, or a strange feeling in the chest.  · Sweating. · Shortness of breath. · Pain, pressure, or a strange feeling in the back, neck, jaw, or upper belly or in one or both shoulders or arms. · Lightheadedness or sudden weakness. · A fast or irregular heartbeat. After you call 911, the  may tell you to chew 1 adult-strength or 2 to 4 low-dose aspirin. Wait for an ambulance. Do not try to drive yourself. Watch closely for changes in your health, and be sure to contact your doctor if you have any problems. Where can you learn more? Go to https://[employer]. org and sign in to your [employer] account. Enter N881 in the [employer] box to learn more about \"A Healthy Heart: Care Instructions. \"     If you do not have an account, please click on the \"Sign Up Now\" link. Current as of: December 16, 2019               Content Version: 12.5  © 7378-3196 Healthwise, Incorporated. Care instructions adapted under license by [facility] ([facility]).  If you have questions about a medical condition or this instruction, always ask your healthcare professional. [place] [address] any warranty or liability for your use of this information.]

## 2021-07-14 NOTE — PROGRESS NOTES
[facility], [place]. [address] [facility] [facility]  [phone] office  [phone] fax      OFFICE VISIT:  2021    Winter Salcedo - : 1950  Reason For Visit:  Hakan Bhatia is a 66 y.o. female who is here for 3 Month Follow-Up (and pacemaker recheck. ), Coronary Artery Disease, and Hypertension    History:   Diagnosis Orders   1. Coronary artery disease involving native coronary artery of native heart without angina pectoris     2. S/P CABG x 4     3. History of coronary artery stent placement     4. Mixed hyperlipidemia     5. Essential hypertension  EKG 12 lead   6. Chronic obstructive pulmonary disease, unspecified COPD type ([facility] [address]     7. Pacemaker     8. Sinoatrial node dysfunction (HCC)     9. Atrial flutter, unspecified type [facility])       The patient presents today for cardiology hospital follow up. She was admitted on 21 for increasing SOA. Pacer interrogation showed atrial flutter in progress. Cardiology was consulted. The patient had JAME followed by successful DCCV. She was discharged on Sotalol 80 mg BID. The patient continues on Eliquis with no bleeding issues. She reports no angina or fluid retention. The patient denies symptoms to suggest myocardial ischemia, heart failure or arrhythmia. BP is well controlled on current regimen. The patient's PCP monitors cholesterol. Reagan Joseph denies exertional chest pain, orthopnea, paroxysmal nocturnal dyspnea, syncope, presyncope, sensed arrhythmia and edema. The patient denies numbness or weakness to suggest cerebrovascular accident or transient ischemic attack. + fatigue.  + NUNEZ - stable.   Winter Salcedo has the following history as recorded in [facility]:  Patient Active Problem List   Diagnosis Code    Deep vein thrombosis (HCC) I82.409    Essential hypertension I10    Diabetes mellitus ([address] E11.9    Hypercholesteremia E78.00    S/P CABG x 4 (intermediate)     CORONARY ANGIOPLASTY WITH STENT PLACEMENT  2021    CORONARY ARTERY BYPASS GRAFT  2011    PACABG X 4 LIMA-LAD, SVG-DIAG, SVG-PDA, RT EVH, LT OPEN VEIN HARVEST, DR Robbie Maria    CYST REMOVAL      GANGLION CYST REMOVED RT HAND    HYSTERECTOMY      NECK SURGERY      parotid artery tumor removed bilaterally    PAROTIDECTOMY Bilateral      Family History   Problem Relation Age of Onset    Cancer Mother     Coronary Art Dis Father     Mult Sclerosis Sister     Cancer Brother      Social History     Tobacco Use    Smoking status: Current Every Day Smoker     Packs/day: 0.50     Types: Cigarettes     Last attempt to quit: 3/29/2021     Years since quittin.2    Smokeless tobacco: Never Used    Tobacco comment: 1/2 PACKS EVERY 2 WEEKS, states has been decreasing amount    Substance Use Topics    Alcohol use: Never      Current Outpatient Medications   Medication Sig Dispense Refill    ipratropium-albuterol (DUONEB) 0.5-2.5 (3) MG/3ML SOLN nebulizer solution Inhale 3 mLs into the lungs every 4 hours (while awake) 360 mL 0    apixaban (ELIQUIS) 5 MG TABS tablet Take 1 tablet by mouth 2 times daily 60 tablet 1    metoprolol succinate (TOPROL XL) 25 MG extended release tablet Take 1 tablet by mouth 2 times daily 30 tablet 3    sotalol (BETAPACE) 80 MG tablet Take 1 tablet by mouth 2 times daily 60 tablet 3    furosemide (LASIX) 40 MG tablet Take 1 tablet by mouth daily 60 tablet 3    potassium chloride (KLOR-CON) 20 MEQ packet Take 20 mEq by mouth daily      ramipril (ALTACE) 5 MG capsule Take 5 mg by mouth 2 times daily      rosuvastatin (CRESTOR) 10 MG tablet Take 1 tablet by mouth daily (Patient taking differently: Take 10 mg by mouth nightly PT TAKES AT NIGHT) 90 tablet 3    clopidogrel (PLAVIX) 75 MG tablet Take 1 tablet by mouth daily 90 tablet 3    metFORMIN (GLUCOPHAGE) 500 MG tablet Take 1 tablet by mouth 2 times daily (with meals) Hold for 2 days and restart a  2020 180 tablet 3    nitroGLYCERIN (NITROSTAT) 0.4 MG SL tablet Place 1 tablet under the tongue every 5 minutes as needed for Chest pain 25 tablet 3    amLODIPine (NORVASC) 5 MG tablet Take 1 tablet by mouth daily 90 tablet 1     No current facility-administered medications for this visit. Allergies: Codeine    Review of Systems  Constitutional  no appetite change, or unexpected weight change. No fever, chills or diaphoresis. + fatigue. HEENT  no significant rhinorrhea or epistaxis. No tinnitus or significant hearing loss. Eyes  no sudden vision change or amaurosis. No corneal arcus, xantholasma, subconjunctival hemorrhage or discharge. Respiratory  no significant wheezing, stridor, apnea or cough. + stable NUNEZ. Cardiovascular  no exertional chest pain to suggest myocardial ischemia. No orthopnea or PND. No sensation of sustained arrythmia. No occurrence of slow heart rate. No palpitations. No claudication. Gastrointestinal  no abdominal swelling or pain. No blood in stool. No severe constipation, diarrhea, nausea, or vomiting. Genitourinary  no dysuria, frequency, or urgency. No flank pain or hematuria. Musculoskeletal  no back pain or myalgia. No problems with gait. Extremities - no clubbing, cyanosis or extremity edema. Skin  no color change or rash. No pallor. No new surgical incision. Neurologic  no speech difficulty, facial asymmetry or lateralizing weakness. No seizures, presyncope or syncope. No significant dizziness. Hematologic  no easy bruising or excessive bleeding. Psychiatric  no severe anxiety or insomnia. No confusion. All other review of systems are negative. Objective  Vital Signs - /62   Pulse 57   Ht 5' 6\" (1.676 m)   Wt 172 lb (78 kg)   BMI 27.76 kg/m²   General - [place]on Caba is alert, cooperative, and pleasant. Well groomed. No acute distress. Body habitus - Body mass index is 27.76 kg/m². HEENT  Head is normocephalic.  No circumoral cyanosis. Dentition is normal.  EYES -   Lids normal without ptosis. No discharge, edema or subconjunctival hemorrhage. Neck - Symmetrical without apparent mass or lymphadenopathy. Respiratory - Normal respiratory effort without use of accessory muscles. Ausculatation reveals vesicular breath sounds without crackles, wheezes, rub or rhonchi. Cardiovascular  No jugular venous distention. Auscultation reveals regular rate and rhythm. No audible clicks, gallop or rub. No murmur. No lower extremity varicosities. No carotid bruits. Abdominal -  No visible distention, mass or pulsations. Extremities - No clubbing or cyanosis. No statis dermatitis or ulcers. No edema. Musculoskeletal -   No Osler's nodes. No kyphosis or scoliosis. Gait is even and regular without limp or shuffle. Ambulates without assistance. Skin -  Warm and dry; no rash or pallor. No new surgical wound. Neurological - No focal neurological deficits. Thought processes coherent. No apparent tremor. Oriented to person, place and time. Psychiatric -  Appropriate affect and mood. Data reviewed:  6/22/21   Transesophageal echocardiogram report:    LV is normal in size with normal systolic function. LV ejection fraction   estimated at 60%. No mass or thrombus in left ventricle. RV appears mildly dilated with preserved RV systolic function. No mass or  thrombus noted in right ventricle. Left atrium appears mildly dilated. No mass or thrombus noted in the left  atrium. Left atrial appendage with no mass or thrombus noted. Good velocity noted in appendage. Right atrium appears moderate to severely dilated. No mass or thrombus  noted. Coronary sinus appears dilated. Aortic valve is trileaflet with mild leaflet thickening but preserved   mobility. No significant stenosis or regurgitation. Mitral valve with posterior leaflet thickened and calcified and appears   fixed.  Normal anterior leaflet mobility. Probable mild mitral stenosis   (mean gradient 3 mmHg). Mild mitral regurgitation. Tricuspid leaflets appear structurally normal. Moderate tricuspid   regurgitation. Pulmonic valve not well visualized but no significant regurgitation   appreciated  Intra-atrial septum is aneurysmal and thinned. A slitlike patent foramen   ovale is present. Right to left shunting is noted on bubble study. Pacer wires noted in right-sided chambers. Ascending aorta is mildly dilated at 3.3 cm. Mild to moderate descending thoracic aortic atheromatous changes.     Signature   Electronically signed by Sosa Rivas MD(Interpreting physician)   on 06/25/2021 12:22 AM    Lab Results   Component Value Date    WBC 6.6 06/23/2021    HGB 14.4 06/23/2021    HCT 46.5 06/23/2021    MCV 93.8 06/23/2021     06/23/2021     Lab Results   Component Value Date     (L) 06/28/2021    K 3.5 06/28/2021    CL 89 (L) 06/28/2021    CO2 33 (H) 06/28/2021    BUN 19 06/28/2021    CREATININE 0.9 06/28/2021    GLUCOSE 146 (H) 06/28/2021    CALCIUM 8.9 06/28/2021    PROT 6.0 (L) 06/28/2021    LABALBU 3.4 (L) 06/28/2021    BILITOT 0.8 06/28/2021    ALKPHOS 69 06/28/2021    AST 12 06/28/2021    ALT 18 06/28/2021    LABGLOM >60 06/28/2021    GFRAA >59 06/28/2021     Lab Results   Component Value Date    CHOL 133 (L) 08/24/2020    CHOL 206 (H) 11/13/2017    CHOL 149 03/30/2014     Lab Results   Component Value Date    TRIG 145 08/24/2020    TRIG 201 (H) 11/13/2017    TRIG 105 03/30/2014     Lab Results   Component Value Date    HDL 43 (L) 08/24/2020    HDL 51 (L) 11/13/2017    HDL 56 03/30/2014     Lab Results   Component Value Date    LDLCALC 61 08/24/2020    LDLCALC 115 11/13/2017    LDLCALC 60 08/23/2011       BP Readings from Last 3 Encounters:   07/14/21 110/62   06/28/21 127/79   04/14/21 138/84    Pulse Readings from Last 3 Encounters:   07/14/21 57   06/28/21 73   04/14/21 98        Wt Readings from Last 3 Encounters:   07/14/21 172 lb (78 kg)   06/27/21 171 lb (77.6 kg)   04/14/21 171 lb (77.6 kg)     Assessment/Plan:   Diagnosis Orders   1. Coronary artery disease involving native coronary artery of native heart without angina pectoris     2. S/P CABG x 4     3. History of coronary artery stent placement     4. Mixed hyperlipidemia     5. Essential hypertension     6. Chronic obstructive pulmonary disease, unspecified COPD type ([facility] [address]     7. Pacemaker     8. Sinoatrial node dysfunction (HCC)     [id number] Score: 4  Disclaimer: Risk Score calculation is dependent on accuracy of patient problem list and past encounter diagnosis. Pacer interrogation:  Pacemaker check showed adequate battery status. 12.3 years longevity. Mode: AAIR-DDDR. Lead impedances are stable. Pacing:  AP 74%;  17.6%. Reprogramming for sensitivity and threshold testing. Appropriate diagnostics and safety margins noted. A output 0.750 V at 0.4 ms; P wave 4.0 mV. V output 1.250 V at 0.40 ms, R wave >20 mV. Sustained arrythmia:  Intermittent atrial flutter yesterday and today. 1 not sustained VT - see device chart. Reprogramming:  AF and VT alerts programmed on today. Next Carelink remote transmission:  One month. Office check in one week. CAD - stable on current medical management. Continue same. HTN - normotensive on current regimen. Continue same. Atrial flutter - pacer interrogation today showed intermittent atrial flutter onset yesterday and today. Patient asymptomatic. Currently on Sotalol 80 mg BID. EKG today showed NSR 73 bpm; QTc .436. Old inferior infarct. Inverted T waves - stable. No acute ischemic changes or ectopy. After EKG, placed pacer program head back on patient - showed intermittent atrial flutter. No change in comparison to previous EKG. Increased Sotalol to 120 mg BID. Decreased Toprol XL to 25 mg daily. AF and VT alerts programmed on pacer today. Hyperlipidemia - LDL 61 on Crestor. Continue same.     No change in comparison to previous EKG. Patient is compliant with medication regimen. Previous cardiac history and records reviewed. Continue current medical management for cardiac related condition. Continue other current medications as directed. Continue to follow up with primary care provider for non cardiac medical problems. If your [place] care provider is outside [place] [facility] please request that your labs be faxed to this office at [phone]. BP goal 130/80 or less. Call the office with any problems, questions or concerns at [phone]. Cardiology follow up as scheduled in [address] appointments. Follow up in one week. Educational included in patient instructions. Heart health.       Iris Gaming, APRN

## 2021-07-19 DIAGNOSIS — I49.5 SINOATRIAL NODE DYSFUNCTION (HCC): ICD-10-CM

## 2021-07-19 DIAGNOSIS — I48.92 ATRIAL FLUTTER, UNSPECIFIED TYPE (HCC): ICD-10-CM

## 2021-07-19 DIAGNOSIS — Z95.0 PACEMAKER: Primary | ICD-10-CM

## 2021-07-22 DIAGNOSIS — I49.5 SINOATRIAL NODE DYSFUNCTION (HCC): ICD-10-CM

## 2021-07-22 DIAGNOSIS — Z95.0 PACEMAKER: Primary | ICD-10-CM

## 2021-07-27 ENCOUNTER — OFFICE VISIT (OUTPATIENT)
Dept: CARDIOLOGY CLINIC | Age: 71
End: 2021-07-27
Payer: MEDICARE

## 2021-07-27 DIAGNOSIS — R06.09 DOE (DYSPNEA ON EXERTION): ICD-10-CM

## 2021-07-27 DIAGNOSIS — I48.0 PAF (PAROXYSMAL ATRIAL FIBRILLATION) (HCC): ICD-10-CM

## 2021-07-27 DIAGNOSIS — J44.9 CHRONIC OBSTRUCTIVE PULMONARY DISEASE, UNSPECIFIED COPD TYPE (HCC): ICD-10-CM

## 2021-07-27 DIAGNOSIS — I25.10 CORONARY ARTERY DISEASE INVOLVING NATIVE CORONARY ARTERY OF NATIVE HEART WITHOUT ANGINA PECTORIS: Primary | ICD-10-CM

## 2021-07-27 DIAGNOSIS — Z79.01 CHRONIC ANTICOAGULATION: ICD-10-CM

## 2021-07-27 DIAGNOSIS — I49.5 SINOATRIAL NODE DYSFUNCTION (HCC): ICD-10-CM

## 2021-07-27 DIAGNOSIS — E78.2 MIXED HYPERLIPIDEMIA: ICD-10-CM

## 2021-07-27 DIAGNOSIS — Z95.5 HISTORY OF CORONARY ARTERY STENT PLACEMENT: ICD-10-CM

## 2021-07-27 DIAGNOSIS — I10 ESSENTIAL HYPERTENSION: ICD-10-CM

## 2021-07-27 DIAGNOSIS — Z95.0 PACEMAKER: ICD-10-CM

## 2021-07-27 DIAGNOSIS — Z95.1 S/P CABG X 4: ICD-10-CM

## 2021-07-27 PROCEDURE — G8400 PT W/DXA NO RESULTS DOC: HCPCS | Performed by: NURSE PRACTITIONER

## 2021-07-27 PROCEDURE — 4040F PNEUMOC VAC/ADMIN/RCVD: CPT | Performed by: NURSE PRACTITIONER

## 2021-07-27 PROCEDURE — 1090F PRES/ABSN URINE INCON ASSESS: CPT | Performed by: NURSE PRACTITIONER

## 2021-07-27 PROCEDURE — 93280 PM DEVICE PROGR EVAL DUAL: CPT | Performed by: NURSE PRACTITIONER

## 2021-07-27 PROCEDURE — 4004F PT TOBACCO SCREEN RCVD TLK: CPT | Performed by: NURSE PRACTITIONER

## 2021-07-27 PROCEDURE — 1111F DSCHRG MED/CURRENT MED MERGE: CPT | Performed by: NURSE PRACTITIONER

## 2021-07-27 PROCEDURE — 3023F SPIROM DOC REV: CPT | Performed by: NURSE PRACTITIONER

## 2021-07-27 PROCEDURE — 3017F COLORECTAL CA SCREEN DOC REV: CPT | Performed by: NURSE PRACTITIONER

## 2021-07-27 PROCEDURE — G8417 CALC BMI ABV UP PARAM F/U: HCPCS | Performed by: NURSE PRACTITIONER

## 2021-07-27 PROCEDURE — G8926 SPIRO NO PERF OR DOC: HCPCS | Performed by: NURSE PRACTITIONER

## 2021-07-27 PROCEDURE — G8427 DOCREV CUR MEDS BY ELIG CLIN: HCPCS | Performed by: NURSE PRACTITIONER

## 2021-07-27 PROCEDURE — 1123F ACP DISCUSS/DSCN MKR DOCD: CPT | Performed by: NURSE PRACTITIONER

## 2021-07-27 PROCEDURE — 99214 OFFICE O/P EST MOD 30 MIN: CPT | Performed by: NURSE PRACTITIONER

## 2021-07-27 NOTE — PATIENT INSTRUCTIONS
New instructions for today:  Eiquis can increase your risk of bleeding. If you notice blood in urine or stool, bleeding gums, excessive bruising or cough productive of bloody sputum, notify the office. Information on this blood thinner has been included in your after visit summary. Patient Instructions:  Continue current medications as prescribed. Always keep a current medication list. Bring your medications to every office visit. Continue to follow up with primary care provider for non cardiac medical problems. Call the office with any problems, questions or concerns at [phone]. If you have been asked to keep a blood pressure log, do so for 2 weeks. Call the office to report readings to the triage nurse at [phone]. Follow up with cardiologist as scheduled. The following educational material has been included in this after visit summary for your review: Life simple 7. Heart health. Life simple 7  1) Manage blood pressure - high blood pressure is a major risk factor for heart disease and stroke. Keeping blood pressure in health range reduces strain on your heart, arteries and kidneys. Blood pressure goal is less than 130/80. 2) Control cholesterol - contributes to plaque, which can clog arteries and lead to heart disease and stroke. When you control your cholesterol you are giving your arteries their best chance to remain clear. It is recommended that you get cholesterol lab work done once a year. 3) Reduce blood sugar - most of the food we eat is turning into glucose or blood sugar that our body uses for energy. Over time, high levels of blood sugar can damage your heart, kidneys, eyes and nerves. 4) Get active - living an active life is one of the most rewarding gifts you can give yourself and those you love. Simply put, daily physical activity increases your length and quality of life. Strive to exercise 15 minutes most days of the week.   5)  Eat better - A healthy diet is one of your best weapons for fighting cardiovascular disease. When you eat a heart healthy diet, you improve your chances for feeling good and staying healthy for life. 6)  Lose weight - when you shed extra fat an unnecessary pounds, you reduce the burden on your hear, lungs, blood vessels and skeleton. You give yourself the gift of active living, you lower your blood pressure and help yourself feel better. 7) Stop smoking - cigarette smokers have a higher risk of developing cardiovascular disease. If  You smoke, quitting is the best thing you can do for your health. Check American Heart Association on line for more information on Life's Simple 7 and tips for healthy living. A Healthy Heart: Care Instructions  Your Care Instructions     Coronary artery disease, also called heart disease, occurs when a substance called plaque builds up in the vessels that supply oxygen-rich blood to your heart muscle. This can narrow the blood vessels and reduce blood flow. A heart attack happens when blood flow is completely blocked. A high-fat diet, smoking, and other factors increase the risk of heart disease. Your doctor has found that you have a chance of having heart disease. You can do lots of things to keep your heart healthy. It may not be easy, but you can change your diet, exercise more, and quit smoking. These steps really work to lower your chance of heart disease. Follow-up care is a key part of your treatment and safety. Be sure to make and go to all appointments, and call your doctor if you are having problems. It's also a good idea to know your test results and keep a list of the medicines you take. How can you care for yourself at home? Diet  · Use less salt when you cook and eat. This helps lower your blood pressure. Taste food before salting. Add only a little salt when you think you need it. With time, your taste buds will adjust to less salt.   · Eat fewer snack items, fast foods, canned soups, and other high-salt, high-fat, processed foods. · Read food labels and try to avoid saturated and trans fats. They increase your risk of heart disease by raising cholesterol levels. · Limit the amount of solid fat-butter, margarine, and shortening-you eat. Use olive, peanut, or canola oil when you cook. Bake, broil, and steam foods instead of frying them. · Eat a variety of fruit and vegetables every day. Dark green, deep orange, red, or yellow fruits and vegetables are especially good for you. Examples include spinach, carrots, peaches, and berries. · Foods high in fiber can reduce your cholesterol and provide important vitamins and minerals. High-fiber foods include whole-grain cereals and breads, oatmeal, beans, brown rice, citrus fruits, and apples. · Eat lean proteins. Heart-healthy proteins include seafood, lean meats and poultry, eggs, beans, peas, nuts, seeds, and soy products. · Limit drinks and foods with added sugar. These include candy, desserts, and soda pop. Lifestyle changes  · If your doctor recommends it, get more exercise. Walking is a good choice. Bit by bit, increase the amount you walk every day. Try for at least 30 minutes on most days of the week. You also may want to swim, bike, or do other activities. · Do not smoke. If you need help quitting, talk to your doctor about stop-smoking programs and medicines. These can increase your chances of quitting for good. Quitting smoking may be the most important step you can take to protect your heart. It is never too late to quit. · Limit alcohol to 2 drinks a day for men and 1 drink a day for women. Too much alcohol can cause health problems. · Manage other health problems such as diabetes, high blood pressure, and high cholesterol. If you think you may have a problem with alcohol or drug use, talk to your doctor. Medicines  · Take your medicines exactly as prescribed.  Call your doctor if you think you are having a problem with your medicine. · If your doctor recommends aspirin, take the amount directed each day. Make sure you take aspirin and not another kind of pain reliever, such as acetaminophen (Tylenol). When should you call for help? [id number] if you have symptoms of a heart attack. These may include:  · Chest pain or pressure, or a strange feeling in the chest.  · Sweating. · Shortness of breath. · Pain, pressure, or a strange feeling in the back, neck, jaw, or upper belly or in one or both shoulders or arms. · Lightheadedness or sudden weakness. · A fast or irregular heartbeat. After you call 911, the  may tell you to chew 1 adult-strength or 2 to 4 low-dose aspirin. Wait for an ambulance. Do not try to drive yourself. Watch closely for changes in your health, and be sure to contact your doctor if you have any problems. Where can you learn more? Go to [email or web address]. org and sign in to your [employer] account. Enter G293 in the [employer] box to learn more about \"A Healthy Heart: Care Instructions. \"     If you do not have an account, please click on the \"Sign Up Now\" link. Current as of: December 16, 2019               Content Version: 12.5  © 1984-4356 Healthwise, Incorporated. Care instructions adapted under license by [facility] ([facility]). If you have questions about a medical condition or this instruction, always ask your healthcare professional. [place] [address] any warranty or liability for your use of this information.

## 2021-07-27 NOTE — PROGRESS NOTES
[facility], [place]. [address], [place] [address], [address] [facility]  [phone] office  [phone] fax      OFFICE VISIT:  2021    Kaiden Fail - : 1950  Reason For Visit:  [facility] is a 66 y.o. female who is here for Follow-up (patient is having problems breathing. But no cardiac symptoms. ) and Coronary Artery Disease    History:   Diagnosis Orders   1. Coronary artery disease involving native coronary artery of native heart without angina pectoris     2. S/P CABG x 4     3. History of coronary artery stent placement     4. Essential hypertension     5. NUNEZ (dyspnea on exertion)     6. Mixed hyperlipidemia     7. Chronic obstructive pulmonary disease, unspecified COPD type ([facility]     8. PAF (paroxysmal atrial fibrillation) ([facility]     9. Chronic anticoagulation      Eliquis   10. Pacemaker     11. Sinoatrial node dysfunction [facility])       The patient presents today for cardiology follow up. The patient walked up an incline in the heat to the [facility] as her  could not drive under the pavilion due to a transport van blocking the entrance. As a result, [facility] become very SOA. She was assisted quickly to the exam room  She was dyspneic at rest - nose appeared cyanotic. Pulse ox was 82%. Oxygen was immediately started via nasal cannula at 2L/min. The patient quickly responded with follow up pulse ox at 94%. [facility] reports doing well at home from respiratory standpoint. She reports \"just walking up that incline in the heat go to me. \"  Initial pacer check showed in rhythm. BP is well controlled on current regimen. The patient's PCP monitors cholesterol. Matilda Reveal denies exertional chest pain, orthopnea, paroxysmal nocturnal dyspnea, syncope, presyncope, sensed arrhythmia, edema and fatigue. The patient denies numbness or weakness to suggest cerebrovascular accident or transient ischemic attack.   + Wendy Degroot has the following history as recorded in [facility]:  Patient Active Problem List   Diagnosis Code    Deep vein thrombosis (HCC) I82.409    Essential hypertension I10    Diabetes mellitus ([address] E11.9    Hypercholesteremia E78.00    S/P CABG x 4 Z95.1    History of coronary artery stent placement Z95.5    Coronary artery disease involving native coronary artery of native heart without angina pectoris I25.10    Mixed hyperlipidemia E78.2    History of diabetes mellitus Z86.39    Chronic obstructive pulmonary disease (HCC) J44.9    NUNEZ (dyspnea on exertion) R06.00    Abdominal aortic aneurysm (AAA) without rupture ([facility]) I71.4    Diabetic ulcer of left lower leg associated with type 2 diabetes mellitus, with fat layer exposed ([address] E11.622, L97.922    Smoker F17.200    Traumatic hematoma T14. 8XXA    NICM (nonischemic cardiomyopathy) ([address] I42.8    Hyponatremia E87.1     Past Medical History:   Diagnosis Date    ASHD (arteriosclerotic heart disease)     s/p PTCA and stent of circumflex, as well as intermediate and mid LAD     COPD (chronic obstructive pulmonary disease) ([address]     Coronary atherosclerosis     Diabetes mellitus ([address]     diet controlled, type 2    Encounter for wound care     FOR LLL WOUND    Fall 10/29/2020    Ganglion cyst     RT HAND    Hematoma 10/2020    hematoma LLL from fall injury    Hx of blood clots     Hypercholesteremia     Hypertension     Pacemaker 03/02/2021    Unstable angina [facility])      Past Surgical History:   Procedure Laterality Date    CARDIAC CATHETERIZATION  12/10/00    selective left heart and coronary arteriography with left ventriculography     CARDIAC CATHETERIZATION  01/23/98    left heart cath, left ventriculography, slective coronary arteriography, direct infarct angioplasty and stent placement to proximal left anterior descending coronary artery    CARDIAC CATHETERIZATION  03/05/94    left heart cath, selective coronary arteriography, left ventriculography    CARDIAC CATHETERIZATION  2011    [place]    CHOLECYSTECTOMY      CORONARY ANGIOPLASTY WITH STENT PLACEMENT  00    PTCA and stent placement to the mid LAD/ptca and stent placement first circumflex marginal (intermediate)     CORONARY ANGIOPLASTY WITH STENT PLACEMENT  2021    CORONARY ARTERY BYPASS GRAFT  2011    PACABG X 4 LIMA-LAD, SVG-DIAG, SVG-PDA, RT EVH, LT OPEN VEIN HARVEST, DR Everett Ley    CYST REMOVAL      GANGLION CYST REMOVED RT HAND    HYSTERECTOMY      NECK SURGERY      parotid artery tumor removed bilaterally    PAROTIDECTOMY Bilateral      Family History   Problem Relation Age of Onset    Cancer Mother     Coronary Art Dis Father     Mult Sclerosis Sister     Cancer Brother      Social History     Tobacco Use    Smoking status: Current Every Day Smoker     Packs/day: 0.50     Types: Cigarettes     Last attempt to quit: 3/29/2021     Years since quittin.3    Smokeless tobacco: Never Used    Tobacco comment: 1/2 PACKS EVERY 2 WEEKS, states has been decreasing amount    Substance Use Topics    Alcohol use: Never      Current Outpatient Medications   Medication Sig Dispense Refill    metoprolol succinate (TOPROL XL) 25 MG extended release tablet Take 1 tablet by mouth daily 1 tablet 0    sotalol (BETAPACE) 120 MG tablet Take 1 tablet by mouth 2 times daily 180 tablet 1    ipratropium-albuterol (DUONEB) 0.5-2.5 (3) MG/3ML SOLN nebulizer solution Inhale 3 mLs into the lungs every 4 hours (while awake) 360 mL 0    apixaban (ELIQUIS) 5 MG TABS tablet Take 1 tablet by mouth 2 times daily 60 tablet 1    furosemide (LASIX) 40 MG tablet Take 1 tablet by mouth daily 60 tablet 3    potassium chloride (KLOR-CON) 20 MEQ packet Take 20 mEq by mouth daily      amLODIPine (NORVASC) 5 MG tablet Take 1 tablet by mouth daily 90 tablet 1    ramipril (ALTACE) 5 MG capsule Take 5 mg by mouth 2 times daily      rosuvastatin (CRESTOR) 10 MG tablet Take 1 tablet by mouth daily (Patient taking differently: Take 10 mg by mouth nightly PT TAKES AT NIGHT) 90 tablet 3    clopidogrel (PLAVIX) 75 MG tablet Take 1 tablet by mouth daily 90 tablet 3    metFORMIN (GLUCOPHAGE) 500 MG tablet Take 1 tablet by mouth 2 times daily (with meals) Hold for 2 days and restart a 26th 2020 180 tablet 3    nitroGLYCERIN (NITROSTAT) 0.4 MG SL tablet Place 1 tablet under the tongue every 5 minutes as needed for Chest pain 25 tablet 3     No current facility-administered medications for this visit. Allergies: Codeine    Review of Systems  Constitutional  no appetite change, or unexpected weight change. No fever, chills or diaphoresis. No significant change in activity level or new onset of fatigue. HEENT  no significant rhinorrhea or epistaxis. No tinnitus or significant hearing loss. Eyes  no sudden vision change or amaurosis. No corneal arcus, xantholasma, subconjunctival hemorrhage or discharge. Respiratory  no significant wheezing, stridor, apnea or cough. + severe NUNEZ today after walking up an incline in the heat entering the [facility]. Cardiovascular  no exertional chest pain to suggest myocardial ischemia. No orthopnea or PND. No sensation of sustained arrythmia. No occurrence of slow heart rate. No palpitations. No claudication. Gastrointestinal  no abdominal swelling or pain. No blood in stool. No severe constipation, diarrhea, nausea, or vomiting. Genitourinary  no dysuria, frequency, or urgency. No flank pain or hematuria. Musculoskeletal  no back pain or myalgia. No problems with gait. Extremities - no clubbing, cyanosis or extremity edema. Skin  no color change or rash. No pallor. No new surgical incision. Neurologic  no speech difficulty, facial asymmetry or lateralizing weakness. No seizures, presyncope or syncope. No significant dizziness. Hematologic  no easy bruising or excessive bleeding. Psychiatric  no severe anxiety or insomnia. No confusion. All other review of systems are negative. Objective  Vital Signs - BP 98/60   Pulse 74   SpO2 (!) 86%   General - [place] is alert, cooperative, and pleasant. Well groomed. No acute distress. Body habitus - There is no height or weight on file to calculate BMI. HEENT  Head is normocephalic. No circumoral cyanosis. Dentition is normal.  EYES -   Lids normal without ptosis. No discharge, edema or subconjunctival hemorrhage. Neck - Symmetrical without apparent mass or lymphadenopathy. Respiratory - Normal respiratory effort without use of accessory muscles. Ausculatation reveals vesicular breath sounds without crackles, wheezes, rub or rhonchi. Cardiovascular  No jugular venous distention. Auscultation reveals regular rate and rhythm. No audible clicks, gallop or rub. No murmur. No lower extremity varicosities. No carotid bruits. Abdominal -  No visible distention, mass or pulsations. Extremities - No clubbing or cyanosis. No statis dermatitis or ulcers. No edema. Musculoskeletal -   No Osler's nodes. No kyphosis or scoliosis. Gait is even and regular without limp or shuffle. Ambulates without assistance. Skin -  Warm and dry; no rash or pallor. No new surgical wound. Neurological - No focal neurological deficits. Thought processes coherent. No apparent tremor. Oriented to person, place and time. Psychiatric -  Appropriate affect and mood. Data reviewed:  6/22/21 echo  LV is normal in size with normal systolic function. LV ejection fraction   estimated at 60%. No mass or thrombus in left ventricle. RV appears mildly dilated with preserved RV systolic function. No mass or  thrombus noted in right ventricle. Left atrium appears mildly dilated. No mass or thrombus noted in the left  atrium. Left atrial appendage with no mass or thrombus noted. Good velocity noted  in appendage.    Right atrium appears moderate to severely dilated. No mass or thrombus  noted. Coronary sinus appears dilated. Aortic valve is trileaflet with mild leaflet thickening but preserved   mobility. No significant stenosis or regurgitation. Mitral valve with posterior leaflet thickened and calcified and appears   fixed. Normal anterior leaflet mobility. Probable mild mitral stenosis   (mean gradient 3 mmHg). Mild mitral regurgitation. Tricuspid leaflets appear structurally normal. Moderate tricuspid   regurgitation. Pulmonic valve not well visualized but no significant regurgitation   appreciated   Intra-atrial septum is aneurysmal and thinned. A slitlike patent foramen   ovale is present. Right to left shunting is noted on bubble study. Pacer wires noted in right-sided chambers. Ascending aorta is mildly dilated at 3.3 cm. Mild to moderate descending thoracic aortic atheromatous changes. Signature    Electronically signed by Erica Rivas MD(Interpreting physician)   on 06/25/2021 12:22 AM    8/24/20 cath - Dr. Maude Santos disease with severe vessel ectasia in RCA, stenotic disease   in LAD. Severely stenotic stent in mid LAD. Occluded LIMA to mid LAD. Patent SVG to 1st diagonal.   Patent SVG to OM1. Patent SVG to RPDA. Normal LV ejection fraction. Large calcified abdominal aortic aneurysm with large mural thrombus. Successful PCI to proximal to mid LAD (4.0 x 15 mm resolute integrity)   utilizing drug-eluting stent. Successful PCI to mid LAD (2.75 x 26 mm resolute integrity taken to 3.0  mm) utilizing drug-eluting stent. Recommendations    Medical management. Smoking cessation. Vascular surgical consult for large abdominal aortic aneurysm.     Signatures   Electronically signed by Erica Rivas MD(Performing Physician) on   08/25/2020 00:28    Lab Results   Component Value Date    WBC 6.6 06/23/2021    HGB 14.4 06/23/2021    HCT 46.5 06/23/2021    MCV 93.8 06/23/2021     06/23/2021     Lab Results   Component Value Date     (L) 06/28/2021    K 3.5 06/28/2021    CL 89 (L) 06/28/2021    CO2 33 (H) 06/28/2021    BUN 19 06/28/2021    CREATININE 0.9 06/28/2021    GLUCOSE 146 (H) 06/28/2021    CALCIUM 8.9 06/28/2021    PROT 6.0 (L) 06/28/2021    LABALBU 3.4 (L) 06/28/2021    BILITOT 0.8 06/28/2021    ALKPHOS 69 06/28/2021    AST 12 06/28/2021    ALT 18 06/28/2021    LABGLOM >60 06/28/2021    GFRAA >59 06/28/2021     Lab Results   Component Value Date    CHOL 133 (L) 08/24/2020    CHOL 206 (H) 11/13/2017    CHOL 149 03/30/2014     Lab Results   Component Value Date    TRIG 145 08/24/2020    TRIG 201 (H) 11/13/2017    TRIG 105 03/30/2014     Lab Results   Component Value Date    HDL 43 (L) 08/24/2020    HDL 51 (L) 11/13/2017    HDL 56 03/30/2014     Lab Results   Component Value Date    LDLCALC 61 08/24/2020    LDLCALC 115 11/13/2017    LDLCALC 60 08/23/2011       BP Readings from Last 3 Encounters:   07/27/21 98/60   07/14/21 110/62   06/28/21 127/79    Pulse Readings from Last 3 Encounters:   07/27/21 74   07/14/21 57   06/28/21 73        Wt Readings from Last 3 Encounters:   07/14/21 172 lb (78 kg)   06/27/21 171 lb (77.6 kg)   04/14/21 171 lb (77.6 kg)     Assessment/Plan:   Diagnosis Orders   1. Coronary artery disease involving native coronary artery of native heart without angina pectoris     2. S/P CABG x 4     3. History of coronary artery stent placement     4. Essential hypertension     5. NUNEZ (dyspnea on exertion)     6. Mixed hyperlipidemia     7. Chronic obstructive pulmonary disease, unspecified COPD type ([address]     8. PAF (paroxysmal atrial fibrillation) ([address]     9. Chronic anticoagulation      Eliquis   10. Pacemaker     11. Sinoatrial node dysfunction (HCC)     [id number] Score: 4  Disclaimer: Risk Score calculation is dependent on accuracy of patient problem list and past encounter diagnosis. Pacer check:  Pacemaker check showed adequate battery status.  13.1 years longevity. Mode: AAIR-DDDR. Lead impedances are stable. Pacing:  AP 90/7%;  7.9%. Reprogramming for sensitivity and threshold testing. Appropriate diagnostics and safety margins noted. A output 0.750 V at 0.4 ms; P wave 5.3 mV. V output 1.250 V at 0.4 ms, R wave >20 mV. Sustained arrythmia:  78 of 132 AF episodes pace terminated - 59.1%  AF burden 5.6%. Patient asymptomatic with AF. Next Carelink remote transmission: 3 months. Stable CV status without overt heart failure, sensed arrhythmia or angina. CAD - stable on current medical management. Continue same. PAF - continues on Sotalol 120 mg BID. AF burden on pacer 5.6%. Patient asymptomatic. No bleeding issue on Eliquis. HTN - normotensive on current regimen. Continue same. Hyperlipidemia - LDL 61. Continue Crestor 10 mg daily. Labs followed by PCP. COPD - discussed doing overnight pulse ox with patient. She declined for now. Reports she does well with activities in her home. She feels the hypoxic episode today was related to exerting in the heat walking in to this building. The patient was stable at time of discharge. Pulse ox 94%. Patient was taken to entrance of the [facility] via wheelchair where her  picked her up under the canopy. Patient is compliant with medication regimen. Previous cardiac history and records reviewed. Continue current medical management for cardiac related condition. Continue other current medications as directed. Continue to follow up with primary care provider for non cardiac medical problems. If your primary care provider is outside of the [facility], please request that your labs be faxed to this office at [phone]. BP goal 130/80 or less. Call the office with any problems, questions or concerns at [phone]. Cardiology follow up as scheduled in [address] appointments. Educational included in patient instructions. Heart health.      Dami Briones, JAKOB

## 2021-07-28 VITALS — HEART RATE: 74 BPM | DIASTOLIC BLOOD PRESSURE: 60 MMHG | SYSTOLIC BLOOD PRESSURE: 98 MMHG | OXYGEN SATURATION: 94 %

## 2021-07-30 ENCOUNTER — TELEPHONE (OUTPATIENT)
Dept: CARDIOLOGY CLINIC | Age: 71
End: 2021-07-30

## 2021-07-30 NOTE — TELEPHONE ENCOUNTER
Patient left message that she needed a refill transferred to [facility]. Called patient back but she did not answer.

## 2021-08-02 DIAGNOSIS — I48.92 ATRIAL FLUTTER, UNSPECIFIED TYPE (HCC): ICD-10-CM

## 2021-08-02 DIAGNOSIS — Z95.0 PACEMAKER: Primary | ICD-10-CM

## 2021-08-02 DIAGNOSIS — I49.5 SINOATRIAL NODE DYSFUNCTION (HCC): ICD-10-CM

## 2021-08-12 ENCOUNTER — HOSPITAL ENCOUNTER (INPATIENT)
Age: 71
LOS: 3 days | Discharge: HOME | DRG: 189 | End: 2021-08-16
Attending: EMERGENCY MEDICINE | Admitting: FAMILY MEDICINE
Payer: MEDICARE

## 2021-08-12 ENCOUNTER — APPOINTMENT (OUTPATIENT)
Dept: GENERAL RADIOLOGY | Age: 71
DRG: 189 | End: 2021-08-12
Payer: MEDICARE

## 2021-08-12 DIAGNOSIS — J44.1 COPD WITH ACUTE EXACERBATION (HCC): ICD-10-CM

## 2021-08-12 DIAGNOSIS — J96.01 ACUTE RESPIRATORY FAILURE WITH HYPOXIA (HCC): Primary | ICD-10-CM

## 2021-08-12 DIAGNOSIS — I42.8 NONISCHEMIC CARDIOMYOPATHY (HCC): ICD-10-CM

## 2021-08-12 DIAGNOSIS — Z79.01 ON CONTINUOUS ORAL ANTICOAGULATION: ICD-10-CM

## 2021-08-12 LAB
ADENOVIRUS BY PCR: NOT DETECTED
ALBUMIN SERPL-MCNC: 3.8 G/DL (ref 3.5–5.2)
ALP BLD-CCNC: 87 U/L (ref 35–104)
ALT SERPL-CCNC: 12 U/L (ref 5–33)
ANION GAP SERPL CALCULATED.3IONS-SCNC: 12 MMOL/L (ref 7–19)
AST SERPL-CCNC: 19 U/L (ref 5–32)
BASE EXCESS ARTERIAL: 4.9 MMOL/L (ref -2–2)
BASOPHILS ABSOLUTE: 0 K/UL (ref 0–0.2)
BASOPHILS RELATIVE PERCENT: 0.4 % (ref 0–1)
BILIRUB SERPL-MCNC: 0.3 MG/DL (ref 0.2–1.2)
BORDETELLA PARAPERTUSSIS BY PCR: NOT DETECTED
BORDETELLA PERTUSSIS BY PCR: NOT DETECTED
BUN BLDV-MCNC: 17 MG/DL (ref 8–23)
CALCIUM SERPL-MCNC: 9.4 MG/DL (ref 8.8–10.2)
CARBOXYHEMOGLOBIN ARTERIAL: 5.2 % (ref 0–5)
CHLAMYDOPHILIA PNEUMONIAE BY PCR: NOT DETECTED
CHLORIDE BLD-SCNC: 82 MMOL/L (ref 98–111)
CO2: 27 MMOL/L (ref 22–29)
CORONAVIRUS 229E BY PCR: NOT DETECTED
CORONAVIRUS HKU1 BY PCR: NOT DETECTED
CORONAVIRUS NL63 BY PCR: NOT DETECTED
CORONAVIRUS OC43 BY PCR: NOT DETECTED
CREAT SERPL-MCNC: 0.8 MG/DL (ref 0.5–0.9)
EOSINOPHILS ABSOLUTE: 0.3 K/UL (ref 0–0.6)
EOSINOPHILS RELATIVE PERCENT: 2.7 % (ref 0–5)
GFR AFRICAN AMERICAN: >59
GFR NON-AFRICAN AMERICAN: >60
GLUCOSE BLD-MCNC: 226 MG/DL (ref 74–109)
HCO3 ARTERIAL: 32.2 MMOL/L (ref 22–26)
HCT VFR BLD CALC: 46.3 % (ref 37–47)
HEMOGLOBIN, ART, EXTENDED: 14.9 G/DL (ref 12–16)
HEMOGLOBIN: 14.4 G/DL (ref 12–16)
HUMAN METAPNEUMOVIRUS BY PCR: NOT DETECTED
HUMAN RHINOVIRUS/ENTEROVIRUS BY PCR: NOT DETECTED
IMMATURE GRANULOCYTES #: 0 K/UL
INFLUENZA A BY PCR: NOT DETECTED
INFLUENZA B BY PCR: NOT DETECTED
LYMPHOCYTES ABSOLUTE: 1.5 K/UL (ref 1.1–4.5)
LYMPHOCYTES RELATIVE PERCENT: 16.4 % (ref 20–40)
MCH RBC QN AUTO: 29 PG (ref 27–31)
MCHC RBC AUTO-ENTMCNC: 31.1 G/DL (ref 33–37)
MCV RBC AUTO: 93.3 FL (ref 81–99)
METHEMOGLOBIN ARTERIAL: 0.8 %
MONOCYTES ABSOLUTE: 1.1 K/UL (ref 0–0.9)
MONOCYTES RELATIVE PERCENT: 12.1 % (ref 0–10)
MYCOPLASMA PNEUMONIAE BY PCR: NOT DETECTED
NEUTROPHILS ABSOLUTE: 6.3 K/UL (ref 1.5–7.5)
NEUTROPHILS RELATIVE PERCENT: 68.1 % (ref 50–65)
O2 CONTENT ARTERIAL: 15.1 ML/DL
O2 SAT, ARTERIAL: 72.6 %
O2 THERAPY: ABNORMAL
PARAINFLUENZA VIRUS 1 BY PCR: NOT DETECTED
PARAINFLUENZA VIRUS 2 BY PCR: NOT DETECTED
PARAINFLUENZA VIRUS 3 BY PCR: NOT DETECTED
PARAINFLUENZA VIRUS 4 BY PCR: NOT DETECTED
PCO2 ARTERIAL: 57 MMHG (ref 35–45)
PDW BLD-RTO: 16.2 % (ref 11.5–14.5)
PH ARTERIAL: 7.36 (ref 7.35–7.45)
PLATELET # BLD: 202 K/UL (ref 130–400)
PMV BLD AUTO: 8.8 FL (ref 9.4–12.3)
PO2 ARTERIAL: 36 MMHG (ref 80–100)
POTASSIUM SERPL-SCNC: 4.6 MMOL/L (ref 3.5–5)
POTASSIUM, WHOLE BLOOD: 4
PRO-BNP: 1394 PG/ML (ref 0–900)
RBC # BLD: 4.96 M/UL (ref 4.2–5.4)
REASON FOR REJECTION: NORMAL
REJECTED TEST: NORMAL
RESPIRATORY SYNCYTIAL VIRUS BY PCR: NOT DETECTED
SARS-COV-2, PCR: NOT DETECTED
SODIUM BLD-SCNC: 121 MMOL/L (ref 136–145)
TOTAL PROTEIN: 7.7 G/DL (ref 6.6–8.7)
TROPONIN: <0.01 NG/ML (ref 0–0.03)
WBC # BLD: 9.3 K/UL (ref 4.8–10.8)

## 2021-08-12 PROCEDURE — 94640 AIRWAY INHALATION TREATMENT: CPT | Performed by: EMERGENCY MEDICINE

## 2021-08-12 PROCEDURE — 85025 COMPLETE CBC W/AUTO DIFF WBC: CPT

## 2021-08-12 PROCEDURE — 0202U NFCT DS 22 TRGT SARS-COV-2: CPT

## 2021-08-12 PROCEDURE — 84132 ASSAY OF SERUM POTASSIUM: CPT

## 2021-08-12 PROCEDURE — 94640 AIRWAY INHALATION TREATMENT: CPT

## 2021-08-12 PROCEDURE — 71045 X-RAY EXAM CHEST 1 VIEW: CPT

## 2021-08-12 PROCEDURE — 6370000000 HC RX 637 (ALT 250 FOR IP)

## 2021-08-12 PROCEDURE — 83036 HEMOGLOBIN GLYCOSYLATED A1C: CPT

## 2021-08-12 PROCEDURE — 96374 THER/PROPH/DIAG INJ IV PUSH: CPT

## 2021-08-12 PROCEDURE — 36600 WITHDRAWAL OF ARTERIAL BLOOD: CPT

## 2021-08-12 PROCEDURE — 93005 ELECTROCARDIOGRAM TRACING: CPT | Performed by: EMERGENCY MEDICINE

## 2021-08-12 PROCEDURE — 99283 EMERGENCY DEPT VISIT LOW MDM: CPT

## 2021-08-12 PROCEDURE — 84484 ASSAY OF TROPONIN QUANT: CPT

## 2021-08-12 PROCEDURE — 36415 COLL VENOUS BLD VENIPUNCTURE: CPT

## 2021-08-12 PROCEDURE — 6360000002 HC RX W HCPCS: Performed by: EMERGENCY MEDICINE

## 2021-08-12 PROCEDURE — 83880 ASSAY OF NATRIURETIC PEPTIDE: CPT

## 2021-08-12 PROCEDURE — 82803 BLOOD GASES ANY COMBINATION: CPT

## 2021-08-12 PROCEDURE — 80053 COMPREHEN METABOLIC PANEL: CPT

## 2021-08-12 RX ORDER — IPRATROPIUM BROMIDE AND ALBUTEROL SULFATE 2.5; .5 MG/3ML; MG/3ML
SOLUTION RESPIRATORY (INHALATION)
Status: COMPLETED
Start: 2021-08-12 | End: 2021-08-12

## 2021-08-12 RX ORDER — IPRATROPIUM BROMIDE AND ALBUTEROL SULFATE 2.5; .5 MG/3ML; MG/3ML
1 SOLUTION RESPIRATORY (INHALATION) ONCE
Status: COMPLETED | OUTPATIENT
Start: 2021-08-12 | End: 2021-08-12

## 2021-08-12 RX ORDER — METHYLPREDNISOLONE SODIUM SUCCINATE 125 MG/2ML
80 INJECTION, POWDER, LYOPHILIZED, FOR SOLUTION INTRAMUSCULAR; INTRAVENOUS ONCE
Status: COMPLETED | OUTPATIENT
Start: 2021-08-12 | End: 2021-08-12

## 2021-08-12 RX ADMIN — METHYLPREDNISOLONE SODIUM SUCCINATE 80 MG: 125 INJECTION, POWDER, FOR SOLUTION INTRAMUSCULAR; INTRAVENOUS at 23:00

## 2021-08-12 RX ADMIN — IPRATROPIUM BROMIDE AND ALBUTEROL SULFATE 1 AMPULE: .5; 3 SOLUTION RESPIRATORY (INHALATION) at 22:22

## 2021-08-12 RX ADMIN — IPRATROPIUM BROMIDE AND ALBUTEROL SULFATE 1 AMPULE: 2.5; .5 SOLUTION RESPIRATORY (INHALATION) at 22:22

## 2021-08-12 ASSESSMENT — ENCOUNTER SYMPTOMS
EYE REDNESS: 0
VOICE CHANGE: 0
COUGH: 1
SHORTNESS OF BREATH: 1
VOMITING: 0
WHEEZING: 1
EYE PAIN: 0
DIARRHEA: 0
RHINORRHEA: 0
ABDOMINAL PAIN: 0

## 2021-08-13 PROBLEM — J96.01 ACUTE RESPIRATORY FAILURE WITH HYPOXEMIA (HCC): Status: ACTIVE | Noted: 2021-08-13

## 2021-08-13 LAB
GLUCOSE BLD-MCNC: 233 MG/DL (ref 70–99)
GLUCOSE BLD-MCNC: 311 MG/DL (ref 70–99)
GLUCOSE BLD-MCNC: 367 MG/DL (ref 70–99)
HBA1C MFR BLD: 8.6 % (ref 4–6)
PERFORMED ON: ABNORMAL

## 2021-08-13 PROCEDURE — 6370000000 HC RX 637 (ALT 250 FOR IP): Performed by: FAMILY MEDICINE

## 2021-08-13 PROCEDURE — 2580000003 HC RX 258: Performed by: FAMILY MEDICINE

## 2021-08-13 PROCEDURE — 1210000000 HC MED SURG R&B

## 2021-08-13 PROCEDURE — 6360000002 HC RX W HCPCS: Performed by: EMERGENCY MEDICINE

## 2021-08-13 PROCEDURE — 2700000000 HC OXYGEN THERAPY PER DAY

## 2021-08-13 PROCEDURE — 94640 AIRWAY INHALATION TREATMENT: CPT

## 2021-08-13 PROCEDURE — 6360000002 HC RX W HCPCS: Performed by: FAMILY MEDICINE

## 2021-08-13 PROCEDURE — 82947 ASSAY GLUCOSE BLOOD QUANT: CPT

## 2021-08-13 RX ORDER — ONDANSETRON 2 MG/ML
4 INJECTION INTRAMUSCULAR; INTRAVENOUS EVERY 6 HOURS PRN
Status: DISCONTINUED | OUTPATIENT
Start: 2021-08-13 | End: 2021-08-16 | Stop reason: HOSPADM

## 2021-08-13 RX ORDER — METHYLPREDNISOLONE SODIUM SUCCINATE 125 MG/2ML
60 INJECTION, POWDER, LYOPHILIZED, FOR SOLUTION INTRAMUSCULAR; INTRAVENOUS EVERY 8 HOURS
Status: DISCONTINUED | OUTPATIENT
Start: 2021-08-13 | End: 2021-08-16

## 2021-08-13 RX ORDER — IPRATROPIUM BROMIDE AND ALBUTEROL SULFATE 2.5; .5 MG/3ML; MG/3ML
3 SOLUTION RESPIRATORY (INHALATION)
Status: DISCONTINUED | OUTPATIENT
Start: 2021-08-13 | End: 2021-08-13

## 2021-08-13 RX ORDER — SODIUM CHLORIDE 0.9 % (FLUSH) 0.9 %
5-40 SYRINGE (ML) INJECTION EVERY 12 HOURS SCHEDULED
Status: DISCONTINUED | OUTPATIENT
Start: 2021-08-13 | End: 2021-08-16 | Stop reason: HOSPADM

## 2021-08-13 RX ORDER — ACETAMINOPHEN 325 MG/1
650 TABLET ORAL EVERY 4 HOURS PRN
Status: DISCONTINUED | OUTPATIENT
Start: 2021-08-13 | End: 2021-08-16 | Stop reason: HOSPADM

## 2021-08-13 RX ORDER — NICOTINE POLACRILEX 4 MG
15 LOZENGE BUCCAL PRN
Status: DISCONTINUED | OUTPATIENT
Start: 2021-08-13 | End: 2021-08-16 | Stop reason: HOSPADM

## 2021-08-13 RX ORDER — CLOPIDOGREL BISULFATE 75 MG/1
75 TABLET ORAL DAILY
Status: DISCONTINUED | OUTPATIENT
Start: 2021-08-13 | End: 2021-08-16 | Stop reason: HOSPADM

## 2021-08-13 RX ORDER — DEXTROSE MONOHYDRATE 50 MG/ML
100 INJECTION, SOLUTION INTRAVENOUS PRN
Status: DISCONTINUED | OUTPATIENT
Start: 2021-08-13 | End: 2021-08-16 | Stop reason: HOSPADM

## 2021-08-13 RX ORDER — RAMIPRIL 2.5 MG/1
5 CAPSULE ORAL 2 TIMES DAILY
Status: DISCONTINUED | OUTPATIENT
Start: 2021-08-13 | End: 2021-08-16 | Stop reason: HOSPADM

## 2021-08-13 RX ORDER — SODIUM CHLORIDE 0.9 % (FLUSH) 0.9 %
5-40 SYRINGE (ML) INJECTION PRN
Status: DISCONTINUED | OUTPATIENT
Start: 2021-08-13 | End: 2021-08-16 | Stop reason: HOSPADM

## 2021-08-13 RX ORDER — DEXTROSE MONOHYDRATE 25 G/50ML
12.5 INJECTION, SOLUTION INTRAVENOUS PRN
Status: DISCONTINUED | OUTPATIENT
Start: 2021-08-13 | End: 2021-08-16 | Stop reason: HOSPADM

## 2021-08-13 RX ORDER — IPRATROPIUM BROMIDE AND ALBUTEROL SULFATE 2.5; .5 MG/3ML; MG/3ML
3 SOLUTION RESPIRATORY (INHALATION)
Status: DISCONTINUED | OUTPATIENT
Start: 2021-08-13 | End: 2021-08-16 | Stop reason: HOSPADM

## 2021-08-13 RX ORDER — AMLODIPINE BESYLATE 5 MG/1
5 TABLET ORAL DAILY
Status: DISCONTINUED | OUTPATIENT
Start: 2021-08-13 | End: 2021-08-16 | Stop reason: HOSPADM

## 2021-08-13 RX ORDER — POTASSIUM CHLORIDE 20 MEQ/1
20 TABLET, EXTENDED RELEASE ORAL DAILY
Status: DISCONTINUED | OUTPATIENT
Start: 2021-08-13 | End: 2021-08-16 | Stop reason: HOSPADM

## 2021-08-13 RX ORDER — ROSUVASTATIN CALCIUM 10 MG/1
10 TABLET, COATED ORAL NIGHTLY
Status: DISCONTINUED | OUTPATIENT
Start: 2021-08-13 | End: 2021-08-16 | Stop reason: HOSPADM

## 2021-08-13 RX ORDER — POTASSIUM CHLORIDE 20 MEQ/1
20 TABLET, EXTENDED RELEASE ORAL DAILY
COMMUNITY
End: 2021-12-01

## 2021-08-13 RX ORDER — IPRATROPIUM BROMIDE AND ALBUTEROL SULFATE 2.5; .5 MG/3ML; MG/3ML
1 SOLUTION RESPIRATORY (INHALATION)
Status: DISCONTINUED | OUTPATIENT
Start: 2021-08-13 | End: 2021-08-13 | Stop reason: ALTCHOICE

## 2021-08-13 RX ORDER — FUROSEMIDE 40 MG/1
40 TABLET ORAL DAILY
Status: DISCONTINUED | OUTPATIENT
Start: 2021-08-13 | End: 2021-08-16 | Stop reason: HOSPADM

## 2021-08-13 RX ORDER — SOTALOL HYDROCHLORIDE 80 MG/1
80 TABLET ORAL 2 TIMES DAILY
Status: DISCONTINUED | OUTPATIENT
Start: 2021-08-13 | End: 2021-08-16 | Stop reason: HOSPADM

## 2021-08-13 RX ORDER — ONDANSETRON 4 MG/1
4 TABLET, ORALLY DISINTEGRATING ORAL EVERY 8 HOURS PRN
Status: DISCONTINUED | OUTPATIENT
Start: 2021-08-13 | End: 2021-08-16 | Stop reason: HOSPADM

## 2021-08-13 RX ORDER — SODIUM CHLORIDE 9 MG/ML
25 INJECTION, SOLUTION INTRAVENOUS PRN
Status: DISCONTINUED | OUTPATIENT
Start: 2021-08-13 | End: 2021-08-16 | Stop reason: HOSPADM

## 2021-08-13 RX ADMIN — IPRATROPIUM BROMIDE AND ALBUTEROL SULFATE 1 AMPULE: .5; 3 SOLUTION RESPIRATORY (INHALATION) at 06:11

## 2021-08-13 RX ADMIN — APIXABAN 5 MG: 5 TABLET, FILM COATED ORAL at 20:28

## 2021-08-13 RX ADMIN — FUROSEMIDE 40 MG: 40 TABLET ORAL at 10:24

## 2021-08-13 RX ADMIN — METFORMIN HYDROCHLORIDE 500 MG: 500 TABLET ORAL at 18:30

## 2021-08-13 RX ADMIN — Medication 10 ML: at 20:29

## 2021-08-13 RX ADMIN — SOTALOL HYDROCHLORIDE 80 MG: 80 TABLET ORAL at 10:24

## 2021-08-13 RX ADMIN — METHYLPREDNISOLONE SODIUM SUCCINATE 60 MG: 125 INJECTION, POWDER, FOR SOLUTION INTRAMUSCULAR; INTRAVENOUS at 10:23

## 2021-08-13 RX ADMIN — INSULIN LISPRO 4 UNITS: 100 INJECTION, SOLUTION INTRAVENOUS; SUBCUTANEOUS at 12:20

## 2021-08-13 RX ADMIN — RAMIPRIL 5 MG: 2.5 CAPSULE ORAL at 18:30

## 2021-08-13 RX ADMIN — METFORMIN HYDROCHLORIDE 500 MG: 500 TABLET ORAL at 10:24

## 2021-08-13 RX ADMIN — Medication 10 ML: at 10:25

## 2021-08-13 RX ADMIN — RAMIPRIL 5 MG: 2.5 CAPSULE ORAL at 10:24

## 2021-08-13 RX ADMIN — CLOPIDOGREL BISULFATE 75 MG: 75 TABLET ORAL at 10:24

## 2021-08-13 RX ADMIN — AMLODIPINE BESYLATE 5 MG: 5 TABLET ORAL at 10:24

## 2021-08-13 RX ADMIN — ACETAMINOPHEN 650 MG: 325 TABLET ORAL at 18:30

## 2021-08-13 RX ADMIN — ROSUVASTATIN CALCIUM 10 MG: 10 TABLET, FILM COATED ORAL at 20:29

## 2021-08-13 RX ADMIN — POTASSIUM CHLORIDE 20 MEQ: 1500 TABLET, EXTENDED RELEASE ORAL at 12:20

## 2021-08-13 RX ADMIN — METHYLPREDNISOLONE SODIUM SUCCINATE 60 MG: 125 INJECTION, POWDER, FOR SOLUTION INTRAMUSCULAR; INTRAVENOUS at 18:31

## 2021-08-13 RX ADMIN — IPRATROPIUM BROMIDE AND ALBUTEROL SULFATE 3 ML: .5; 3 SOLUTION RESPIRATORY (INHALATION) at 20:12

## 2021-08-13 RX ADMIN — INSULIN LISPRO 2 UNITS: 100 INJECTION, SOLUTION INTRAVENOUS; SUBCUTANEOUS at 18:32

## 2021-08-13 RX ADMIN — SOTALOL HYDROCHLORIDE 80 MG: 80 TABLET ORAL at 20:29

## 2021-08-13 RX ADMIN — METHYLPREDNISOLONE SODIUM SUCCINATE 60 MG: 125 INJECTION, POWDER, FOR SOLUTION INTRAMUSCULAR; INTRAVENOUS at 20:28

## 2021-08-13 RX ADMIN — ALBUTEROL SULFATE 0.5 MG/KG/HR: 2.5 SOLUTION RESPIRATORY (INHALATION) at 01:13

## 2021-08-13 RX ADMIN — APIXABAN 5 MG: 5 TABLET, FILM COATED ORAL at 10:24

## 2021-08-13 RX ADMIN — IPRATROPIUM BROMIDE AND ALBUTEROL SULFATE 3 ML: .5; 3 SOLUTION RESPIRATORY (INHALATION) at 16:01

## 2021-08-13 ASSESSMENT — PAIN SCALES - GENERAL
PAINLEVEL_OUTOF10: 2
PAINLEVEL_OUTOF10: 8
PAINLEVEL_OUTOF10: 0

## 2021-08-13 NOTE — PROGRESS NOTES
Call placed to Dr Jc Estrada answering service to obtain med orders for admission.     Electronically signed by Jose Luis Gray RN on 8/13/2021 at 9:15 AM

## 2021-08-13 NOTE — ED PROVIDER NOTES
[facility] EMERGENCY DEPT  EMERGENCY DEPARTMENT ENCOUNTER      Pt Name: Magno Farfan  MRN: [id number]  [place] 1950  Date of evaluation: 8/12/2021  Provider: Ana Paula Jarquin MD    CHIEF COMPLAINT       Chief Complaint   Patient presents with    Shortness of Breath     since 1700, worsening since. Does not wear home O2    Cough         HISTORY OF PRESENT ILLNESS   (Location/Symptom, Timing/Onset,Context/Setting, Quality, Duration, Modifying Factors, Severity)  Note limiting factors. Magno Farfan is a 66 y.o. female who presents to the emergency department for evaluation after she had a sudden onset of severe shortness of breath, coughing, and wheezing that started around 5:00 this evening. States she coughed up several clumps of yellow mucus. Denies any preceding symptoms such as cough, runny nose, fever, sore throat, or other symptoms of illness. Has a history of COPD. Tried to use her inhalers at home with minimal improvement. States her oxygen saturation normally runs in the mid 90s but got as low as 70s at home before coming here. Patient does not have any oxygen at home. HPI    NursingNotes were reviewed. REVIEW OF SYSTEMS    (2-9 systems for level 4, 10 or more for level 5)     Review of Systems   Constitutional: Negative for fever. HENT: Negative for congestion, rhinorrhea and voice change. Eyes: Negative for pain and redness. Respiratory: Positive for cough, shortness of breath and wheezing. Cardiovascular: Negative for chest pain. Gastrointestinal: Negative for abdominal pain, diarrhea and vomiting. Endocrine: Negative. Genitourinary: Negative. Musculoskeletal: Negative for arthralgias and gait problem. Skin: Negative for rash and wound. Neurological: Negative for weakness and headaches. Hematological: Negative. Psychiatric/Behavioral: Negative. All other systems reviewed and are negative.       A complete review of systems was performed and is negative except as noted above in the HPI.        PAST MEDICAL HISTORY     Past Medical History:   Diagnosis Date    ASHD (arteriosclerotic heart disease)     s/p PTCA and stent of circumflex, as well as intermediate and mid LAD     COPD (chronic obstructive pulmonary disease) ([address]     Coronary atherosclerosis     Diabetes mellitus ([facility]     diet controlled, type 2    Encounter for wound care     FOR LLL WOUND    Fall 10/29/2020    Ganglion cyst     RT HAND    Hematoma 10/2020    hematoma LLL from fall injury    Hx of blood clots     Hypercholesteremia     Hypertension     Pacemaker 03/02/2021    Unstable angina ([facility] [address]          SURGICAL HISTORY       Past Surgical History:   Procedure Laterality Date    CARDIAC CATHETERIZATION  12/10/00    selective left heart and coronary arteriography with left ventriculography     CARDIAC CATHETERIZATION  01/23/98    left heart cath, left ventriculography, slective coronary arteriography, direct infarct angioplasty and stent placement to proximal left anterior descending coronary artery    CARDIAC CATHETERIZATION  03/05/94    left heart cath, selective coronary arteriography, left ventriculography    CARDIAC CATHETERIZATION  8/27/2011    [place]    CHOLECYSTECTOMY      CORONARY ANGIOPLASTY WITH STENT PLACEMENT  12/12/00    PTCA and stent placement to the mid LAD/ptca and stent placement first circumflex marginal (intermediate)     CORONARY ANGIOPLASTY WITH STENT PLACEMENT  08/2021    CORONARY ARTERY BYPASS GRAFT  8/29/2011    PACABG X 4 LIMA-LAD, SVG-DIAG, SVG-PDA, RT EVH, LT OPEN VEIN HARVEST, DR Elena Aaron    CYST REMOVAL      GANGLION CYST REMOVED RT HAND    HYSTERECTOMY      NECK SURGERY      parotid artery tumor removed bilaterally    PAROTIDECTOMY Bilateral          CURRENT MEDICATIONS       Previous Medications    AMLODIPINE (NORVASC) 5 MG TABLET    Take 1 tablet by mouth daily    APIXABAN (ELIQUIS) 5 MG TABS TABLET    Take 1 tablet by mouth 2 times daily CLOPIDOGREL (PLAVIX) 75 MG TABLET    Take 1 tablet by mouth daily    FUROSEMIDE (LASIX) 40 MG TABLET    Take 1 tablet by mouth daily    IPRATROPIUM-ALBUTEROL (DUONEB) 0.5-2.5 (3) MG/3ML SOLN NEBULIZER SOLUTION    Inhale 3 mLs into the lungs every 4 hours (while awake)    METFORMIN (GLUCOPHAGE) 500 MG TABLET    Take 1 tablet by mouth 2 times daily (with meals) Hold for 2 days and restart a 2020    METOPROLOL SUCCINATE (TOPROL XL) 25 MG EXTENDED RELEASE TABLET    Take 1 tablet by mouth daily    NITROGLYCERIN (NITROSTAT) 0.4 MG SL TABLET    Place 1 tablet under the tongue every 5 minutes as needed for Chest pain    POTASSIUM CHLORIDE (KLOR-CON) 20 MEQ PACKET    Take 20 mEq by mouth daily    RAMIPRIL (ALTACE) 5 MG CAPSULE    Take 5 mg by mouth 2 times daily    ROSUVASTATIN (CRESTOR) 10 MG TABLET    Take 1 tablet by mouth daily    SOTALOL (BETAPACE) 120 MG TABLET    Take 1 tablet by mouth 2 times daily       ALLERGIES     Codeine    FAMILY HISTORY       Family History   Problem Relation Age of Onset    Cancer Mother     Coronary Art Dis Father     Mult Sclerosis Sister     Cancer Brother           SOCIAL HISTORY       Social History     Socioeconomic History    Marital status:      Spouse name: None    Number of children: None    Years of education: None    Highest education level: None   Occupational History    None   Tobacco Use    Smoking status: Current Every Day Smoker     Packs/day: 0.50     Types: Cigarettes     Last attempt to quit: 3/29/2021     Years since quittin.3    Smokeless tobacco: Never Used    Tobacco comment: 1/2 PACKS EVERY 2 WEEKS, states has been decreasing amount    Vaping Use    Vaping Use: Never used   Substance and Sexual Activity    Alcohol use: Never    Drug use: Never    Sexual activity: Not Currently     Partners: Male   Other Topics Concern    None   Social History Narrative    None     Social Determinants of Health     Financial Resource Strain:     Difficulty of Paying Living Expenses:    Food Insecurity:     Worried About Running Out of Food in the Last Year:     [address] in the Last Year:    Transportation Needs:     Lack of Transportation (Medical):  Lack of Transportation (Non-Medical):    Physical Activity:     Days of Exercise per Week:     Minutes of Exercise per Session:    Stress:     Feeling of Stress :    Social Connections:     Frequency of Communication with Friends and Family:     Frequency of Social Gatherings with Friends and Family:     Attends Spiritism Services:     Active Member of Clubs or Organizations:     Attends Club or Organization Meetings:     Marital Status:    Intimate Partner Violence:     Fear of Current or Ex-Partner:     Emotionally Abused:     Physically Abused:     Sexually Abused:        SCREENINGS             PHYSICAL EXAM    (up to 7 for level 4, 8 or more for level 5)     ED Triage Vitals [08/12/21 2207]   BP Temp Temp src Pulse Resp SpO2 Height Weight   117/74 97.7 °F (36.5 °C) -- 77 24 (!) 71 % 5' 5\" (1.651 m) 172 lb (78 kg)       Physical Exam  Vitals and nursing note reviewed. Constitutional:       General: She is in acute distress. Appearance: She is well-developed. She is ill-appearing. She is not diaphoretic. HENT:      Head: Normocephalic and atraumatic. Eyes:      General: No scleral icterus. Neck:      Vascular: No JVD. Cardiovascular:      Rate and Rhythm: Normal rate and regular rhythm. Pulses:           Radial pulses are 2+ on the right side and 2+ on the left side. Dorsalis pedis pulses are 2+ on the right side and 2+ on the left side. Heart sounds: Normal heart sounds. No murmur heard. No friction rub. No gallop. Pulmonary:      Effort: Tachypnea, accessory muscle usage and respiratory distress present. Breath sounds: No stridor. Wheezing present. No decreased breath sounds or rales.       Comments: Diffusely coarse breath sounds with wheezing. Chest:      Chest wall: No tenderness. Abdominal:      General: There is no distension. Palpations: Abdomen is soft. Tenderness: There is no abdominal tenderness. There is no guarding or rebound. Musculoskeletal:         General: No deformity. Normal range of motion. Right lower leg: No edema. Left lower leg: No edema. Skin:     General: Skin is warm and dry. Coloration: Skin is not pale. Findings: No erythema. Neurological:      Mental Status: She is alert and oriented to person, place, and time. GCS: GCS eye subscore is 4. GCS verbal subscore is 5. GCS motor subscore is 6. Cranial Nerves: No cranial nerve deficit. Motor: No abnormal muscle tone.       Coordination: Coordination normal.   Psychiatric:         Behavior: Behavior normal.         Judgment: Judgment normal.         DIAGNOSTIC RESULTS     EKG: All EKG's are interpreted by the Emergency Department Physician who either signs or Co-signs this chart in the absence of a cardiologist.        RADIOLOGY:   Non-plain film images such as CT, Ultrasound and MRI are read by the radiologist. Jefferson Gall images are visualized and preliminarily interpreted by the emergency physician with the below findings:      Interpretation per the Radiologist below, if available at the time of this note:    XR CHEST PORTABLE    (Results Pending)         ED BEDSIDE ULTRASOUND:   Performed by ED Physician - none    LABS:  Labs Reviewed   BRAIN NATRIURETIC PEPTIDE - Abnormal; Notable for the following components:       Result Value    Pro-BNP 1,394 (*)     All other components within normal limits   CBC WITH AUTO DIFFERENTIAL - Abnormal; Notable for the following components:    MCHC 31.1 (*)     RDW 16.2 (*)     MPV 8.8 (*)     Neutrophils % 68.1 (*)     Lymphocytes % 16.4 (*)     Monocytes % 12.1 (*)     Monocytes Absolute 1.10 (*)     All other components within normal limits   BLOOD GAS, ARTERIAL - Abnormal; Notable for the following components:    pCO2, Arterial 57.0 (*)     pO2, Arterial 36.0 (*)     HCO3, Arterial 32.2 (*)     Base Excess, Arterial 4.9 (*)     O2 Sat, Arterial 72.6 (*)     Carboxyhgb, Arterial 5.2 (*)     All other components within normal limits    Narrative:     CALL  Gore  KLED tel. , vale castillo rn, 08/12/2021 22:19, by 14 Concha  Président Beau - Abnormal; Notable for the following components:    Sodium 121 (*)     Chloride 82 (*)     Glucose 226 (*)     All other components within normal limits   RESPIRATORY PANEL, MOLECULAR, WITH COVID-19   TROPONIN   POTASSIUM, WHOLE BLOOD   SPECIMEN REJECTION       All other labs were within normal range or not returned as of this dictation.     Medications   albuterol (PROVENTIL) nebulizer solution (0.5 mg/kg/hr × 78 kg Nebulization New Bag 8/13/21 0113)   ipratropium-albuterol (DUONEB) nebulizer solution 1 ampule (1 ampule Inhalation Given 8/12/21 2222)   methylPREDNISolone sodium (SOLU-MEDROL) injection 80 mg (80 mg Intravenous Given 8/12/21 2300)       EMERGENCY DEPARTMENT COURSE and DIFFERENTIALDIAGNOSIS/MDM:   Vitals:    Vitals:    08/12/21 2207 08/12/21 2250 08/12/21 2307 08/12/21 2331   BP: 117/74 111/75 136/85 126/78   Pulse: 77 80 75 72   Resp: 24 17 20 14   Temp: 97.7 °F (36.5 °C)      SpO2: (!) 71% 93% 92% 95%   Weight: 172 lb (78 kg)      Height: 5' 5\" (1.651 m)          MDM  Number of Diagnoses or Management Options  Acute respiratory failure with hypoxia ([address]): new and requires workup  COPD with acute exacerbation ([address]): new and requires workup  Nonischemic cardiomyopathy ([address]): established and worsening  On continuous oral anticoagulation: established and worsening     Amount and/or Complexity of Data Reviewed  Clinical lab tests: ordered and reviewed  Tests in the radiology section of CPT®: ordered and reviewed  Tests in the medicine section of CPT®: ordered and reviewed  Decide to obtain previous medical specified.     DISCHARGE MEDICATIONS:  New Prescriptions    No medications on file          (Please note that portions of this note were completed with a voice recognition program.  Efforts were made to edit the dictations butoccasionally words are mis-transcribed.)    Pilar Pierre MD (electronically signed)  AttendingEmergency Physician          Pilar Pierre., MD  08/13/21 7716

## 2021-08-13 NOTE — PROGRESS NOTES
Return call received from Dr Concetta Hernandez, notified no current home meds reordered and that patients med list was up to date. Stated he would be coming to see patient shortly. Updated patient of this as she was concerned of taking her home meds on time.     Electronically signed by Cachorro Andrade RN on 8/13/2021 at 9:43 AM

## 2021-08-13 NOTE — H&P
CHIEF COMPLAINT: Shortness of breath    History Obtained From:  patient     HISTORY OF PRESENT ILLNESS:      The patient is a 66 y.o. female with significant past medical history of COPD and ischemic cardiomyopathy who presents with abrupt onset shortness of breath yesterday. Patient said yesterday that she overall felt tired with some mild shortness of air. Worsened abruptly in the late afternoon where she was having difficulty ambulating about her home and doing her activities of daily living. She had no fever and cough that was minimally productive. No chest pain. No swelling in her extremities. Her most recent admission to the hospital was for volume overload related to her heart condition. She has not been doing any strenuous activities outside. The only thing that she can attribute it to was doing some laundry with a detergent that she states had a very strong smell. She was seen and evaluated in the emergency room with a relatively negative work-up other than hypoxia and increased work of breathing. She has been admitted with an acute exacerbation of chronic obstructive pulmonary disease.     Past Medical History:   Diagnosis Date    ASHD (arteriosclerotic heart disease)     s/p PTCA and stent of circumflex, as well as intermediate and mid LAD     COPD [facility]chronic obstructive [facility] disease) [facility] [address]     Coronary atherosclerosis     Diabetes mellitus ([facility] [address]     diet controlled, type 2    Encounter for wound care     FOR LLL WOUND    Fall 10/29/2020    Ganglion cyst     RT HAND    Hematoma 10/2020    hematoma LLL from fall injury    Hx of blood clots     Hypercholesteremia     Hypertension     Pacemaker 03/02/2021    Unstable angina [facility])        Past Surgical History:   Procedure Laterality Date    CARDIAC CATHETERIZATION  12/10/00    selective left heart and coronary arteriography with left ventriculography     CARDIAC CATHETERIZATION  01/23/98    left heart cath, left ventriculography, slective coronary arteriography, direct infarct angioplasty and stent placement to proximal left anterior descending coronary artery    CARDIAC CATHETERIZATION  03/05/94    left heart cath, selective coronary arteriography, left ventriculography    CARDIAC CATHETERIZATION  8/27/2011    [place]    CHOLECYSTECTOMY      CORONARY ANGIOPLASTY WITH STENT PLACEMENT  12/12/00    PTCA and stent placement to the mid LAD/ptca and stent placement first circumflex marginal (intermediate)     CORONARY ANGIOPLASTY WITH STENT PLACEMENT  08/2021    CORONARY ARTERY BYPASS GRAFT  8/29/2011    PACABG X 4 LIMA-LAD, SVG-DIAG, SVG-PDA, RT EVH, LT OPEN VEIN HARVEST, DR Yanet Fletcher    CYST REMOVAL      GANGLION CYST REMOVED RT HAND    HYSTERECTOMY      NECK SURGERY      parotid artery tumor removed bilaterally    PAROTIDECTOMY Bilateral        Medications Prior to Admission:    Medications Prior to Admission: apixaban (ELIQUIS) 5 MG TABS tablet, Take 1 tablet by mouth 2 times daily  metoprolol succinate (TOPROL XL) 25 MG extended release tablet, Take 1 tablet by mouth daily  sotalol (BETAPACE) 120 MG tablet, Take 1 tablet by mouth 2 times daily (Patient taking differently: Take 80 mg by mouth 2 times daily )  ipratropium-albuterol (DUONEB) 0.5-2.5 (3) MG/3ML SOLN nebulizer solution, Inhale 3 mLs into the lungs every 4 hours (while awake)  furosemide (LASIX) 40 MG tablet, Take 1 tablet by mouth daily  potassium chloride (KLOR-CON) 20 MEQ packet, Take 20 mEq by mouth daily  amLODIPine (NORVASC) 5 MG tablet, Take 1 tablet by mouth daily  ramipril (ALTACE) 5 MG capsule, Take 5 mg by mouth 2 times daily  rosuvastatin (CRESTOR) 10 MG tablet, Take 1 tablet by mouth daily (Patient taking differently: Take 10 mg by mouth nightly PT TAKES AT NIGHT)  clopidogrel (PLAVIX) 75 MG tablet, Take 1 tablet by mouth daily  metFORMIN (GLUCOPHAGE) 500 MG tablet, Take 1 tablet by mouth 2 times daily (with meals) Hold for 2 days and restart a 26th EOSABS 0.30 08/12/2021    BASOSABS 0.00 08/12/2021     CMP:    Lab Results   Component Value Date     08/12/2021     09/02/2011    K 4.6 08/12/2021    K 4.0 08/12/2021    K 3.5 06/28/2021    K 4.1 09/02/2011    CL 82 08/12/2021     09/02/2011    CO2 27 08/12/2021    BUN 17 08/12/2021    CREATININE 0.8 08/12/2021    CREATININE 0.6 09/02/2011    GFRAA >59 08/12/2021    LABGLOM >60 08/12/2021    GLUCOSE 226 08/12/2021    PROT 7.7 08/12/2021    PROT 7.5 01/18/2013    LABALBU 3.8 08/12/2021    LABALBU 3.3 09/02/2011    CALCIUM 9.4 08/12/2021    BILITOT 0.3 08/12/2021    ALKPHOS 87 08/12/2021    ALKPHOS 57 09/02/2011    AST 19 08/12/2021    ALT 12 08/12/2021     ABG:    Lab Results   Component Value Date    PH 7.45 08/31/2011    PCO2 42 08/31/2011    PO2 67 08/31/2011    HCO3 29.2 08/31/2011    O2SAT 14.5 08/31/2011     Radiology Review: Chest x-ray shows no acute cardiopulmonary disease    ASSESSMENT:      Patient Active Problem List   Diagnosis    Deep vein thrombosis (HCC)    Essential hypertension    Diabetes mellitus (HCC)    Hypercholesteremia    S/P CABG x 4    History of coronary artery stent placement    Coronary artery disease involving native coronary artery of native heart without angina pectoris    Mixed hyperlipidemia    History of diabetes mellitus    Chronic obstructive pulmonary disease (HCC)    NUNEZ (dyspnea on exertion)    Abdominal aortic aneurysm (AAA) without rupture ([address]    Diabetic ulcer of left lower leg associated with type 2 diabetes mellitus, with fat layer exposed ([address]    Smoker    Traumatic hematoma    NICM (nonischemic cardiomyopathy) ([address]    Hyponatremia    Acute respiratory failure with hypoxemia (HCC)       PLAN:    Admit for oxygen support, medications, and pulmonary toilet measures. No evidence of infectious etiology. Currently on high-dose steroids. Rajwinder as scheduled. We will see how she responds.   Plan for weaning oxygen as she tolerates it given she is on no oxygen at home. Also wean steroids and transition to oral dose soon as possible.     Electronically signed by Toy Walker MD on 8/13/2021 at 10:35 AM

## 2021-08-13 NOTE — PROGRESS NOTES
Component Value Ref Range & Units Status Collected Lab   pH, Arterial 7.360  7.350 - 7.450 Final 08/12/2021 10:18 PM [facility]   pCO2, Arterial 57. 0High   35.0 - 45.0 mmHg Final 08/12/2021 10:18 PM [facility]   pO2, Arterial 36. 0Low Panic   80.0 - 100.0 mmHg Final 08/12/2021 10:18 PM [facility]   HCO3, Arterial 32. 2High   22.0 - 26.0 mmol/L Final 08/12/2021 10:18 PM [facility] Excess, Arterial 4.9High   -2.0 - 2.0 mmol/L Final 08/12/2021 10:18 PM [facility]   Hemoglobin, Art, Extended 14.9  12.0 - 16.0 g/dL Final 08/12/2021 10:18 PM [facility]   O2 Sat, Arterial 72.6Low Panic   >92 % Final 08/12/2021 10:18 PM [facility]   Carboxyhgb, Arterial 5. 2High   0.0 - 5.0 % Final 08/12/2021 10:18 PM [facility]        0.0-1.5   (Smokers 1.5-5.0)    Methemoglobin, Arterial 0.8  <1.5 % Final 08/12/2021 10:18 PM [facility]   O2 Content, Arterial 15.1  N        ra @ rest  r-28bpm  Left radial  +at

## 2021-08-13 NOTE — PLAN OF CARE
Problem: Falls - Risk of:  Goal: Will remain free from falls  Description: Will remain free from falls  Outcome: Ongoing  Goal: Absence of physical injury  Description: Absence of physical injury  Outcome: Ongoing     Problem:  Activity:  Goal: Fatigue will decrease  Description: Fatigue will decrease  Outcome: Ongoing     Problem: Cardiac:  Goal: Hemodynamic stability will improve  Description: Hemodynamic stability will improve  Outcome: Ongoing     Problem: Coping:  Goal: Level of anxiety will decrease  Description: Level of anxiety will decrease  Outcome: Ongoing  Goal: Ability to cope will improve  Description: Ability to cope will improve  Outcome: Ongoing  Goal: Ability to establish a method of communication will improve  Description: Ability to establish a method of communication will improve  Outcome: Ongoing     Problem: Nutritional:  Goal: Consumption of the prescribed amount of daily calories will improve  Description: Consumption of the prescribed amount of daily calories will improve  Outcome: Ongoing     Problem: Respiratory:  Goal: Ability to maintain a clear airway will improve  Description: Ability to maintain a clear airway will improve  Outcome: Ongoing  Goal: Complications related to the disease process, condition or treatment will be avoided or minimized  Description: Complications related to the disease process, condition or treatment will be avoided or minimized  Outcome: Ongoing     Problem: Skin Integrity:  Goal: Risk for impaired skin integrity will decrease  Description: Risk for impaired skin integrity will decrease  Outcome: Ongoing

## 2021-08-14 LAB
EKG P AXIS: NORMAL DEGREES
EKG P-R INTERVAL: NORMAL MS
EKG Q-T INTERVAL: 450 MS
EKG QRS DURATION: 134 MS
EKG QTC CALCULATION (BAZETT): 469 MS
EKG T AXIS: 83 DEGREES
GLUCOSE BLD-MCNC: 267 MG/DL (ref 70–99)
GLUCOSE BLD-MCNC: 271 MG/DL (ref 70–99)
GLUCOSE BLD-MCNC: 288 MG/DL (ref 70–99)
GLUCOSE BLD-MCNC: 354 MG/DL (ref 70–99)
PERFORMED ON: ABNORMAL

## 2021-08-14 PROCEDURE — 2700000000 HC OXYGEN THERAPY PER DAY

## 2021-08-14 PROCEDURE — 1210000000 HC MED SURG R&B

## 2021-08-14 PROCEDURE — 6370000000 HC RX 637 (ALT 250 FOR IP): Performed by: INTERNAL MEDICINE

## 2021-08-14 PROCEDURE — 6360000002 HC RX W HCPCS: Performed by: FAMILY MEDICINE

## 2021-08-14 PROCEDURE — 82947 ASSAY GLUCOSE BLOOD QUANT: CPT

## 2021-08-14 PROCEDURE — 6370000000 HC RX 637 (ALT 250 FOR IP): Performed by: FAMILY MEDICINE

## 2021-08-14 PROCEDURE — 94640 AIRWAY INHALATION TREATMENT: CPT

## 2021-08-14 PROCEDURE — 2580000003 HC RX 258: Performed by: FAMILY MEDICINE

## 2021-08-14 PROCEDURE — 93010 ELECTROCARDIOGRAM REPORT: CPT | Performed by: INTERNAL MEDICINE

## 2021-08-14 RX ORDER — NICOTINE 21 MG/24HR
1 PATCH, TRANSDERMAL 24 HOURS TRANSDERMAL DAILY
Status: DISCONTINUED | OUTPATIENT
Start: 2021-08-14 | End: 2021-08-16 | Stop reason: HOSPADM

## 2021-08-14 RX ORDER — GUAIFENESIN 600 MG/1
600 TABLET, EXTENDED RELEASE ORAL 2 TIMES DAILY
Status: DISCONTINUED | OUTPATIENT
Start: 2021-08-14 | End: 2021-08-16 | Stop reason: HOSPADM

## 2021-08-14 RX ADMIN — ROSUVASTATIN CALCIUM 10 MG: 10 TABLET, FILM COATED ORAL at 20:38

## 2021-08-14 RX ADMIN — METFORMIN HYDROCHLORIDE 500 MG: 500 TABLET ORAL at 09:44

## 2021-08-14 RX ADMIN — INSULIN LISPRO 3 UNITS: 100 INJECTION, SOLUTION INTRAVENOUS; SUBCUTANEOUS at 09:53

## 2021-08-14 RX ADMIN — METHYLPREDNISOLONE SODIUM SUCCINATE 60 MG: 125 INJECTION, POWDER, FOR SOLUTION INTRAMUSCULAR; INTRAVENOUS at 01:51

## 2021-08-14 RX ADMIN — RAMIPRIL 5 MG: 2.5 CAPSULE ORAL at 17:34

## 2021-08-14 RX ADMIN — SOTALOL HYDROCHLORIDE 80 MG: 80 TABLET ORAL at 09:43

## 2021-08-14 RX ADMIN — GUAIFENESIN 600 MG: 600 TABLET, EXTENDED RELEASE ORAL at 20:38

## 2021-08-14 RX ADMIN — AMLODIPINE BESYLATE 5 MG: 5 TABLET ORAL at 09:44

## 2021-08-14 RX ADMIN — IPRATROPIUM BROMIDE AND ALBUTEROL SULFATE 3 ML: .5; 3 SOLUTION RESPIRATORY (INHALATION) at 15:04

## 2021-08-14 RX ADMIN — METHYLPREDNISOLONE SODIUM SUCCINATE 60 MG: 125 INJECTION, POWDER, FOR SOLUTION INTRAMUSCULAR; INTRAVENOUS at 09:50

## 2021-08-14 RX ADMIN — METHYLPREDNISOLONE SODIUM SUCCINATE 60 MG: 125 INJECTION, POWDER, FOR SOLUTION INTRAMUSCULAR; INTRAVENOUS at 17:34

## 2021-08-14 RX ADMIN — IPRATROPIUM BROMIDE AND ALBUTEROL SULFATE 3 ML: .5; 3 SOLUTION RESPIRATORY (INHALATION) at 19:08

## 2021-08-14 RX ADMIN — METFORMIN HYDROCHLORIDE 500 MG: 500 TABLET ORAL at 17:34

## 2021-08-14 RX ADMIN — SOTALOL HYDROCHLORIDE 80 MG: 80 TABLET ORAL at 20:38

## 2021-08-14 RX ADMIN — Medication 10 ML: at 09:47

## 2021-08-14 RX ADMIN — POTASSIUM CHLORIDE 20 MEQ: 1500 TABLET, EXTENDED RELEASE ORAL at 09:44

## 2021-08-14 RX ADMIN — APIXABAN 5 MG: 5 TABLET, FILM COATED ORAL at 09:48

## 2021-08-14 RX ADMIN — APIXABAN 5 MG: 5 TABLET, FILM COATED ORAL at 20:38

## 2021-08-14 RX ADMIN — Medication 10 ML: at 20:38

## 2021-08-14 RX ADMIN — CLOPIDOGREL BISULFATE 75 MG: 75 TABLET ORAL at 09:44

## 2021-08-14 RX ADMIN — IPRATROPIUM BROMIDE AND ALBUTEROL SULFATE 3 ML: .5; 3 SOLUTION RESPIRATORY (INHALATION) at 08:22

## 2021-08-14 RX ADMIN — INSULIN LISPRO 5 UNITS: 100 INJECTION, SOLUTION INTRAVENOUS; SUBCUTANEOUS at 17:40

## 2021-08-14 RX ADMIN — FUROSEMIDE 40 MG: 40 TABLET ORAL at 09:44

## 2021-08-14 RX ADMIN — RAMIPRIL 5 MG: 2.5 CAPSULE ORAL at 09:44

## 2021-08-14 RX ADMIN — INSULIN LISPRO 3 UNITS: 100 INJECTION, SOLUTION INTRAVENOUS; SUBCUTANEOUS at 12:37

## 2021-08-14 ASSESSMENT — PAIN SCALES - GENERAL: PAINLEVEL_OUTOF10: 0

## 2021-08-14 NOTE — PROGRESS NOTES
Medicine Progress Note 8/14/2021    Patient:  Lorraine Pan  YOB: 1950  MRN: [id number]     Acct: [de-identified]   Admit date: 8/12/2021    Patient Seen, Chart, Consults notes, Labs, Radiology studies reviewed. Subjective:   No acute issues overnight. No chest pain. Shortness of breath stable. No fever or chills. Medications:   Scheduled Meds:   sodium chloride flush  5-40 mL Intravenous 2 times per day    amLODIPine  5 mg Oral Daily    apixaban  5 mg Oral BID    clopidogrel  75 mg Oral Daily    furosemide  40 mg Oral Daily    metFORMIN  500 mg Oral BID WC    ramipril  5 mg Oral BID    rosuvastatin  10 mg Oral Nightly    sotalol  80 mg Oral BID    methylPREDNISolone  60 mg Intravenous Q8H    insulin lispro  0-6 Units Subcutaneous TID WC    insulin lispro  0-3 Units Subcutaneous Nightly    potassium chloride  20 mEq Oral Daily    ipratropium-albuterol  3 mL Inhalation Q6H WA     Continuous Infusions:   sodium chloride      dextrose       PRN Meds:sodium chloride flush, sodium chloride, acetaminophen, ondansetron **OR** ondansetron, glucose, dextrose, glucagon (rDNA), dextrose    Objective:   Vitals:   Patient Vitals for the past 24 hrs:   BP Temp Temp src Pulse Resp SpO2   08/14/21 1506 -- -- -- -- -- 93 %   08/14/21 0900 (!) 104/59 98.1 °F (36.7 °C) -- 73 16 96 %   08/14/21 0033 (!) 108/58 96.6 °F (35.9 °C) Temporal 54 18 95 %   08/13/21 1840 104/64 96.7 °F (35.9 °C) Temporal 109 18 92 %       GENERAL: Awake, alert, in no acute distress. CARDIAC: Regular rate and rhythm. No murmurs, rubs, or gallops. RESPIRATORY: Chest is clear to auscultation bilaterally. No wheezes, rales, or rhonchi. ABDOMEN: Normoactive bowel sounds. Abdomen soft, nontender, and nondistended. EXTREMITIES: No cyanosis, clubbing, or edema.       24 hour intake/output:    Intake/Output Summary (Last 24 hours) at 8/14/2021 1523  Last data filed at 8/14/2021 1235  Gross per 24 hour   Intake 1120 ml Output 1800 ml   Net -680 ml     Last 3 weights: Wt Readings from Last 3 Encounters:   08/13/21 174 lb 8 oz (79.2 kg)   07/14/21 172 lb (78 kg)   06/27/21 171 lb (77.6 kg)       Labs:  Hematology:  Recent Labs     08/12/21 2211   WBC 9.3   HGB 14.4   HCT 46.3        Chemistry:  Recent Labs     08/12/21 2211 08/12/21 2218 08/12/21  2254   NA  --   --  121*   K  --  4.0 4.6   CL  --   --  82*   CO2  --   --  27   GLUCOSE  --   --  226*   BUN  --   --  17   CREATININE  --   --  0.8   ANIONGAP  --   --  12   LABGLOM  --   --  >60   GFRAA  --   --  >59   CALCIUM  --   --  9.4   TROPONINI <0.01  --   --      Recent Labs     08/12/21 2211 08/12/21  2254   PROT  --  7.7   LABALBU  --  3.8   LABA1C 8.6*  --    AST  --  19   ALT  --  12   ALKPHOS  --  87   BILITOT  --  0.3       Recent Labs     08/13/21  1159 08/13/21  1650 08/13/21  1946 08/14/21  0834 08/14/21  1233   POCGLU 311* 233* 367* 271* 267*         Assessment/Plan:   Principal Problem: Acute respiratory failure with hypoxemia [facility])  Active Hospital Problems    Diagnosis Date Noted    NICM (nonischemic cardiomyopathy) ([facility] [I42.8] 03/02/2021     Priority: High    Acute respiratory failure with hypoxemia ([facility] [J96.01] 08/13/2021    Diabetic ulcer of left lower leg associated with type 2 diabetes mellitus, with fat layer exposed ([facility] [W01.014, L97.922] 01/05/2021    Smoker [F17.200] 01/05/2021    Abdominal aortic aneurysm (AAA) without rupture ([facility] [I71.4] 09/10/2020    Chronic obstructive pulmonary disease ([facility] [J44.9] 07/16/2020    Coronary artery disease involving native coronary artery of native heart without angina pectoris [I25.10] 10/11/2016    S/P CABG x 4 [Z95.1] 10/11/2016       Tx with steroids & serial bronchodilator treatments. Wean O2 as tolerated. Poor glucose control last 24h likely 2/2 steroids. Monitor glc & cover with SSI. May need to escalate to medium SSI. Encourage tobacco cessation.   Discussed that tobacco use is hazardous to general health and also wound healing. Nicotine patch made available. Chronic medical conditions otherwise stable. Resume home medications & management.     Electronically signed by Davidson Broussard MD on 8/14/2021 at 3:23 PM

## 2021-08-15 LAB
GLUCOSE BLD-MCNC: 230 MG/DL (ref 70–99)
GLUCOSE BLD-MCNC: 294 MG/DL (ref 70–99)
GLUCOSE BLD-MCNC: 325 MG/DL (ref 70–99)
PERFORMED ON: ABNORMAL

## 2021-08-15 PROCEDURE — 6360000002 HC RX W HCPCS: Performed by: FAMILY MEDICINE

## 2021-08-15 PROCEDURE — 6370000000 HC RX 637 (ALT 250 FOR IP): Performed by: FAMILY MEDICINE

## 2021-08-15 PROCEDURE — 1210000000 HC MED SURG R&B

## 2021-08-15 PROCEDURE — 6370000000 HC RX 637 (ALT 250 FOR IP): Performed by: INTERNAL MEDICINE

## 2021-08-15 PROCEDURE — 82947 ASSAY GLUCOSE BLOOD QUANT: CPT

## 2021-08-15 PROCEDURE — 2580000003 HC RX 258: Performed by: FAMILY MEDICINE

## 2021-08-15 PROCEDURE — 94640 AIRWAY INHALATION TREATMENT: CPT

## 2021-08-15 PROCEDURE — 2700000000 HC OXYGEN THERAPY PER DAY

## 2021-08-15 RX ADMIN — FUROSEMIDE 40 MG: 40 TABLET ORAL at 10:37

## 2021-08-15 RX ADMIN — AMLODIPINE BESYLATE 5 MG: 5 TABLET ORAL at 10:36

## 2021-08-15 RX ADMIN — RAMIPRIL 5 MG: 2.5 CAPSULE ORAL at 16:48

## 2021-08-15 RX ADMIN — METFORMIN HYDROCHLORIDE 500 MG: 500 TABLET ORAL at 10:37

## 2021-08-15 RX ADMIN — Medication 10 ML: at 19:46

## 2021-08-15 RX ADMIN — SOTALOL HYDROCHLORIDE 80 MG: 80 TABLET ORAL at 19:45

## 2021-08-15 RX ADMIN — ROSUVASTATIN CALCIUM 10 MG: 10 TABLET, FILM COATED ORAL at 19:45

## 2021-08-15 RX ADMIN — IPRATROPIUM BROMIDE AND ALBUTEROL SULFATE 3 ML: .5; 3 SOLUTION RESPIRATORY (INHALATION) at 14:31

## 2021-08-15 RX ADMIN — METHYLPREDNISOLONE SODIUM SUCCINATE 60 MG: 125 INJECTION, POWDER, FOR SOLUTION INTRAMUSCULAR; INTRAVENOUS at 17:39

## 2021-08-15 RX ADMIN — METFORMIN HYDROCHLORIDE 500 MG: 500 TABLET ORAL at 17:39

## 2021-08-15 RX ADMIN — CLOPIDOGREL BISULFATE 75 MG: 75 TABLET ORAL at 10:37

## 2021-08-15 RX ADMIN — RAMIPRIL 5 MG: 2.5 CAPSULE ORAL at 10:36

## 2021-08-15 RX ADMIN — SOTALOL HYDROCHLORIDE 80 MG: 80 TABLET ORAL at 10:37

## 2021-08-15 RX ADMIN — Medication 10 ML: at 10:39

## 2021-08-15 RX ADMIN — GUAIFENESIN 600 MG: 600 TABLET, EXTENDED RELEASE ORAL at 19:45

## 2021-08-15 RX ADMIN — METHYLPREDNISOLONE SODIUM SUCCINATE 60 MG: 125 INJECTION, POWDER, FOR SOLUTION INTRAMUSCULAR; INTRAVENOUS at 03:51

## 2021-08-15 RX ADMIN — IPRATROPIUM BROMIDE AND ALBUTEROL SULFATE 3 ML: .5; 3 SOLUTION RESPIRATORY (INHALATION) at 19:05

## 2021-08-15 RX ADMIN — APIXABAN 5 MG: 5 TABLET, FILM COATED ORAL at 10:37

## 2021-08-15 RX ADMIN — METHYLPREDNISOLONE SODIUM SUCCINATE 60 MG: 125 INJECTION, POWDER, FOR SOLUTION INTRAMUSCULAR; INTRAVENOUS at 10:37

## 2021-08-15 RX ADMIN — APIXABAN 5 MG: 5 TABLET, FILM COATED ORAL at 19:46

## 2021-08-15 RX ADMIN — POTASSIUM CHLORIDE 20 MEQ: 1500 TABLET, EXTENDED RELEASE ORAL at 10:37

## 2021-08-15 RX ADMIN — IPRATROPIUM BROMIDE AND ALBUTEROL SULFATE 3 ML: .5; 3 SOLUTION RESPIRATORY (INHALATION) at 06:25

## 2021-08-15 RX ADMIN — GUAIFENESIN 600 MG: 600 TABLET, EXTENDED RELEASE ORAL at 10:37

## 2021-08-15 NOTE — PROGRESS NOTES
Medicine Progress Note 8/15/2021    Patient:  Bi Garcia  YOB: 1950  MRN: [id number]     Acct: [de-identified]   Admit date: 8/12/2021    Patient Seen, Chart, Consults notes, Labs, Radiology studies reviewed. Subjective:   No acute issues overnight. No chest pain. Shortness of breath stable. No fever or chills. Still on supp O2.  at bedside. Medications:   Scheduled Meds:   nicotine  1 patch Transdermal Daily    guaiFENesin  600 mg Oral BID    insulin lispro  0-12 Units Subcutaneous TID     insulin lispro  0-6 Units Subcutaneous Nightly    sodium chloride flush  5-40 mL Intravenous 2 times per day    amLODIPine  5 mg Oral Daily    apixaban  5 mg Oral BID    clopidogrel  75 mg Oral Daily    furosemide  40 mg Oral Daily    metFORMIN  500 mg Oral BID WC    ramipril  5 mg Oral BID    rosuvastatin  10 mg Oral Nightly    sotalol  80 mg Oral BID    methylPREDNISolone  60 mg Intravenous Q8H    potassium chloride  20 mEq Oral Daily    ipratropium-albuterol  3 mL Inhalation Q6H WA     Continuous Infusions:   sodium chloride      dextrose       PRN Meds:sodium chloride flush, sodium chloride, acetaminophen, ondansetron **OR** ondansetron, glucose, dextrose, glucagon (rDNA), dextrose    Objective:   Vitals:   Patient Vitals for the past 24 hrs:   BP Temp Temp src Pulse Resp SpO2   08/15/21 1203 116/71 97.3 °F (36.3 °C) Temporal 78 16 93 %   08/15/21 0800 117/61 96.8 °F (36 °C) -- 77 16 97 %   08/14/21 1948 92/73 97.3 °F (36.3 °C) Temporal 78 16 93 %   08/14/21 1506 -- -- -- -- -- 93 %       GENERAL: Awake, alert, in no acute distress. CARDIAC: Regular rate and rhythm. No murmurs, rubs, or gallops. RESPIRATORY: Chest is clear to auscultation bilaterally with B wheezes. No rales or rhonchi. ABDOMEN: Normoactive bowel sounds. Abdomen soft, nontender, and nondistended. EXTREMITIES: No cyanosis, clubbing, or edema.       24 hour intake/output:    Intake/Output Summary (Last 24 hours) at 8/15/2021 1449  Last data filed at 8/15/2021 0900  Gross per 24 hour   Intake 1580 ml   Output 1800 ml   Net -220 ml     Last 3 weights: Wt Readings from Last 3 Encounters:   08/13/21 174 lb 8 oz (79.2 kg)   07/14/21 172 lb (78 kg)   06/27/21 171 lb (77.6 kg)       Labs:  Hematology:  Recent Labs     08/12/21 2211   WBC 9.3   HGB 14.4   HCT 46.3        Chemistry:  Recent Labs     08/12/21 2211 08/12/21 2218 08/12/21  2254   NA  --   --  121*   K  --  4.0 4.6   CL  --   --  82*   CO2  --   --  27   GLUCOSE  --   --  226*   BUN  --   --  17   CREATININE  --   --  0.8   ANIONGAP  --   --  12   LABGLOM  --   --  >60   GFRAA  --   --  >59   CALCIUM  --   --  9.4   TROPONINI <0.01  --   --      Recent Labs     08/12/21 2211 08/12/21  2254   PROT  --  7.7   LABALBU  --  3.8   LABA1C 8.6*  --    AST  --  19   ALT  --  12   ALKPHOS  --  87   BILITOT  --  0.3       Recent Labs     08/13/21  1946 08/14/21  0834 08/14/21  1233 08/14/21  1733 08/14/21  2043 08/15/21  0900   POCGLU 367* 271* 267* 354* 288* 230*         Assessment/Plan:   Principal Problem: Acute respiratory failure with hypoxemia [facility])  Active Hospital Problems    Diagnosis Date Noted    NICM (nonischemic cardiomyopathy) ([facility] [I42.8] 03/02/2021     Priority: High    Acute respiratory failure with hypoxemia (HCC) [J96.01] 08/13/2021    Diabetic ulcer of left lower leg associated with type 2 diabetes mellitus, with fat layer exposed [facility] [G07.427, L97.922] 01/05/2021    Smoker [F17.200] 01/05/2021    Abdominal aortic aneurysm (AAA) without rupture ([facility] [I71.4] 09/10/2020    Chronic obstructive pulmonary disease ([facility] [J44.9] 07/16/2020    Coronary artery disease involving native coronary artery of native heart without angina pectoris [I25.10] 10/11/2016    S/P CABG x 4 [Z95.1] 10/11/2016       Tx with steroids & serial bronchodilator treatments. Wean O2 as tolerated. Poor glucose control last 24h likely 2/2 steroids.

## 2021-08-16 VITALS
TEMPERATURE: 96.8 F | HEART RATE: 75 BPM | BODY MASS INDEX: 29.07 KG/M2 | SYSTOLIC BLOOD PRESSURE: 139 MMHG | RESPIRATION RATE: 16 BRPM | HEIGHT: 65 IN | OXYGEN SATURATION: 98 % | DIASTOLIC BLOOD PRESSURE: 84 MMHG | WEIGHT: 174.5 LBS

## 2021-08-16 PROBLEM — J96.01 ACUTE RESPIRATORY FAILURE WITH HYPOXEMIA (HCC): Status: RESOLVED | Noted: 2021-08-13 | Resolved: 2021-08-16

## 2021-08-16 LAB
GLUCOSE BLD-MCNC: 197 MG/DL (ref 70–99)
GLUCOSE BLD-MCNC: 283 MG/DL (ref 70–99)
PERFORMED ON: ABNORMAL
PERFORMED ON: ABNORMAL

## 2021-08-16 PROCEDURE — 6360000002 HC RX W HCPCS: Performed by: FAMILY MEDICINE

## 2021-08-16 PROCEDURE — 94640 AIRWAY INHALATION TREATMENT: CPT

## 2021-08-16 PROCEDURE — 6370000000 HC RX 637 (ALT 250 FOR IP): Performed by: INTERNAL MEDICINE

## 2021-08-16 PROCEDURE — 82947 ASSAY GLUCOSE BLOOD QUANT: CPT

## 2021-08-16 PROCEDURE — 2700000000 HC OXYGEN THERAPY PER DAY

## 2021-08-16 PROCEDURE — 94761 N-INVAS EAR/PLS OXIMETRY MLT: CPT

## 2021-08-16 PROCEDURE — 6370000000 HC RX 637 (ALT 250 FOR IP): Performed by: FAMILY MEDICINE

## 2021-08-16 PROCEDURE — 2580000003 HC RX 258: Performed by: FAMILY MEDICINE

## 2021-08-16 RX ORDER — PREDNISONE 10 MG/1
10 TABLET ORAL DAILY
Qty: 5 TABLET | Refills: 0 | Status: SHIPPED | OUTPATIENT
Start: 2021-08-16 | End: 2021-08-21

## 2021-08-16 RX ORDER — PREDNISONE 10 MG/1
10 TABLET ORAL DAILY
Status: DISCONTINUED | OUTPATIENT
Start: 2021-08-16 | End: 2021-08-16 | Stop reason: HOSPADM

## 2021-08-16 RX ORDER — GUAIFENESIN 600 MG/1
600 TABLET, EXTENDED RELEASE ORAL 2 TIMES DAILY
Qty: 60 TABLET | Refills: 0 | Status: SHIPPED | OUTPATIENT
Start: 2021-08-16 | End: 2021-12-01

## 2021-08-16 RX ADMIN — PREDNISONE 10 MG: 10 TABLET ORAL at 10:23

## 2021-08-16 RX ADMIN — METFORMIN HYDROCHLORIDE 500 MG: 500 TABLET ORAL at 08:11

## 2021-08-16 RX ADMIN — IPRATROPIUM BROMIDE AND ALBUTEROL SULFATE 3 ML: .5; 3 SOLUTION RESPIRATORY (INHALATION) at 06:28

## 2021-08-16 RX ADMIN — AMLODIPINE BESYLATE 5 MG: 5 TABLET ORAL at 08:11

## 2021-08-16 RX ADMIN — FUROSEMIDE 40 MG: 40 TABLET ORAL at 08:11

## 2021-08-16 RX ADMIN — CLOPIDOGREL BISULFATE 75 MG: 75 TABLET ORAL at 08:11

## 2021-08-16 RX ADMIN — RAMIPRIL 5 MG: 2.5 CAPSULE ORAL at 08:11

## 2021-08-16 RX ADMIN — APIXABAN 5 MG: 5 TABLET, FILM COATED ORAL at 08:11

## 2021-08-16 RX ADMIN — Medication 10 ML: at 08:13

## 2021-08-16 RX ADMIN — GUAIFENESIN 600 MG: 600 TABLET, EXTENDED RELEASE ORAL at 08:11

## 2021-08-16 RX ADMIN — METHYLPREDNISOLONE SODIUM SUCCINATE 60 MG: 125 INJECTION, POWDER, FOR SOLUTION INTRAMUSCULAR; INTRAVENOUS at 05:49

## 2021-08-16 RX ADMIN — POTASSIUM CHLORIDE 20 MEQ: 1500 TABLET, EXTENDED RELEASE ORAL at 08:11

## 2021-08-16 RX ADMIN — SOTALOL HYDROCHLORIDE 80 MG: 80 TABLET ORAL at 08:11

## 2021-08-16 ASSESSMENT — PAIN SCALES - GENERAL: PAINLEVEL_OUTOF10: 0

## 2021-08-16 NOTE — PLAN OF CARE
Problem: Falls - Risk of:  Goal: Will remain free from falls  Description: Will remain free from falls  Outcome: Ongoing  Goal: Absence of physical injury  Description: Absence of physical injury  Outcome: Ongoing     Problem:  Activity:  Goal: Fatigue will decrease  Description: Fatigue will decrease  Outcome: Ongoing  Goal: Amount of time patient spends in regular exercise will increase  Description: Amount of time patient spends in regular exercise will increase  Outcome: Ongoing  Goal: Sleep-wake cycle will improve  Description: Sleep-wake cycle will improve  Outcome: Ongoing  Goal: Risk for activity intolerance will decrease  Description: Risk for activity intolerance will decrease  Outcome: Ongoing     Problem: Cardiac:  Goal: Hemodynamic stability will improve  Description: Hemodynamic stability will improve  Outcome: Ongoing     Problem: Coping:  Goal: Level of anxiety will decrease  Description: Level of anxiety will decrease  Outcome: Ongoing  Goal: Ability to cope will improve  Description: Ability to cope will improve  Outcome: Ongoing  Goal: Ability to establish a method of communication will improve  Description: Ability to establish a method of communication will improve  Outcome: Ongoing  Goal: Ability to adjust to condition or change in health will improve  Description: Ability to adjust to condition or change in health will improve  Outcome: Ongoing     Problem: Nutritional:  Goal: Consumption of the prescribed amount of daily calories will improve  Description: Consumption of the prescribed amount of daily calories will improve  Outcome: Ongoing  Goal: Ability to make healthy dietary choices will improve  Description: Ability to make healthy dietary choices will improve  Outcome: Ongoing  Goal: Progress toward achieving an optimal weight will improve  Description: Progress toward achieving an optimal weight will improve  Outcome: Ongoing  Goal: Maintenance of adequate nutrition will improve  Description: Maintenance of adequate nutrition will improve  Outcome: Ongoing     Problem: Respiratory:  Goal: Ability to maintain a clear airway will improve  Description: Ability to maintain a clear airway will improve  8/15/2021 2355 by Kareem Samano RN  Outcome: Ongoing  8/15/2021 2355 by Kareem Samano RN  Outcome: Ongoing  Goal: Complications related to the disease process, condition or treatment will be avoided or minimized  Description: Complications related to the disease process, condition or treatment will be avoided or minimized  Outcome: Ongoing     Problem: Skin Integrity:  Goal: Risk for impaired skin integrity will decrease  Description: Risk for impaired skin integrity will decrease  Outcome: Ongoing     Problem: ABCDS Injury Assessment  Goal: Absence of physical injury  Outcome: Ongoing     Problem: Discharge Planning:  Goal: Discharged to appropriate level of care  Description: Discharged to appropriate level of care  8/15/2021 2355 by Kareem Samano RN  Outcome: Ongoing  8/15/2021 2355 by Kareem Samano RN  Outcome: Ongoing     Problem: Bleeding:  Goal: Will show no signs and symptoms of excessive bleeding  Description: Will show no signs and symptoms of excessive bleeding  Outcome: Ongoing     Problem: Infection:  Goal: Will remain free from infection  Description: Will remain free from infection  Outcome: Ongoing     Problem: Safety:  Goal: Free from accidental physical injury  Description: Free from accidental physical injury  Outcome: Ongoing  Goal: Free from intentional harm  Description: Free from intentional harm  Outcome: Ongoing     Problem: Daily Care:  Goal: Daily care needs are met  Description: Daily care needs are met  Outcome: Ongoing     Problem: Pain:  Goal: Patient's pain/discomfort is manageable  Description: Patient's pain/discomfort is manageable  Outcome: Ongoing     Problem: Skin Integrity:  Goal: Skin integrity will stabilize  Description: Skin integrity will stabilize  Outcome: Ongoing     Problem: Discharge Planning:  Goal: Patients continuum of care needs are met  Description: Patients continuum of care needs are met  Outcome: Ongoing     Problem: SAFETY  Goal: Free from accidental physical injury  Outcome: Ongoing  Goal: Free from intentional harm  Outcome: Ongoing     Problem: DAILY CARE  Goal: Daily care needs are met  Outcome: Ongoing     Problem: PAIN  Goal: Patient's pain/discomfort is manageable  Outcome: Ongoing     Problem: SKIN INTEGRITY  Goal: Skin integrity is maintained or improved  Outcome: Ongoing     Problem: KNOWLEDGE DEFICIT  Goal: Patient/S.O. demonstrates understanding of disease process, treatment plan, medications, and discharge instructions.   Outcome: Ongoing     Problem: DISCHARGE BARRIERS  Goal: Patient's continuum of care needs are met  Outcome: Ongoing     Problem: Fluid Volume:  Goal: Ability to maintain a balanced intake and output will improve  Description: Ability to maintain a balanced intake and output will improve  Outcome: Ongoing     Problem: Health Behavior:  Goal: Ability to identify and utilize available resources and services will improve  Description: Ability to identify and utilize available resources and services will improve  Outcome: Ongoing  Goal: Ability to manage health-related needs will improve  Description: Ability to manage health-related needs will improve  Outcome: Ongoing     Problem: Metabolic:  Goal: Ability to maintain appropriate glucose levels will improve  Description: Ability to maintain appropriate glucose levels will improve  Outcome: Ongoing     Problem: Physical Regulation:  Goal: Complications related to the disease process, condition or treatment will be avoided or minimized  Description: Complications related to the disease process, condition or treatment will be avoided or minimized  Outcome: Ongoing  Goal: Diagnostic test results will improve  Description: Diagnostic test results will improve  Outcome: Ongoing     Problem: Tissue Perfusion:  Goal: Adequacy of tissue perfusion will improve  Description: Adequacy of tissue perfusion will improve  Outcome: Ongoing     Problem: Serum Glucose Level - Abnormal:  Goal: Ability to maintain appropriate glucose levels will improve  Description: Ability to maintain appropriate glucose levels will improve  8/15/2021 2355 by Sree Yip RN  Outcome: Ongoing  8/15/2021 2355 by Sree Yip RN  Outcome: Ongoing     Problem: Sensory Perception - Impaired:  Goal: Ability to maintain a stable neurologic state will improve  Description: Ability to maintain a stable neurologic state will improve  8/15/2021 2355 by Sree Yip RN  Outcome: Ongoing  8/15/2021 2355 by Sree Yip RN  Outcome: Ongoing     Problem: Breathing Pattern - Ineffective:  Goal: Ability to achieve and maintain a regular respiratory rate will improve  Description: Ability to achieve and maintain a regular respiratory rate will improve  8/15/2021 2355 by Sree Yip RN  Outcome: Ongoing  8/15/2021 2355 by Sree Yip RN  Outcome: Ongoing     Problem:  Activity Intolerance:  Goal: Ability to tolerate increased activity will improve  Description: Ability to tolerate increased activity will improve  8/15/2021 2355 by Sree Yip RN  Outcome: Ongoing  8/15/2021 2355 by Sree Yip RN  Outcome: Ongoing     Problem: Airway Clearance - Ineffective:  Goal: Ability to maintain a clear airway will improve  Description: Ability to maintain a clear airway will improve  8/15/2021 2355 by Sree Yip RN  Outcome: Ongoing  8/15/2021 2355 by Sree Yip RN  Outcome: Ongoing     Problem: Breathing Pattern - Ineffective:  Goal: Ability to achieve and maintain a regular respiratory rate will improve  Description: Ability to achieve and maintain a regular respiratory rate will improve  8/15/2021 2355 by Sree Yip RN  Outcome: Ongoing  8/15/2021 2355 by Sree Yip RN  Outcome: Ongoing Problem: Gas Exchange - Impaired:  Goal: Levels of oxygenation will improve  Description: Levels of oxygenation will improve  8/15/2021 2355 by Sanjuana Motley RN  Outcome: Ongoing  8/15/2021 2355 by Sanjuana Motley RN  Outcome: Ongoing     Problem: Tobacco Use:  Goal: Inpatient tobacco use cessation counseling participation  Description: Inpatient tobacco use cessation counseling participation  Outcome: Ongoing

## 2021-08-16 NOTE — CARE COORDINATION
Oxygen orders faxed to [facility] for delivery  [facility]   Z   F  Electronically signed by WAI Santiago on 8/16/2021 at 11:42 AM

## 2021-08-16 NOTE — DISCHARGE SUMMARY
Discharge Summary     Date:8/16/2021        Patient Name:Renata Mendez     YOB: 1950     Age:71 y.o. Admit Date:8/12/2021   Admission Condition:fair   Discharged Condition:stable  Discharge Date: 08/16/21     Discharge Diagnoses   Principal Problem (Resolved):    Acute respiratory failure with hypoxemia (HCC)  Active Problems:    NICM (nonischemic cardiomyopathy) (HCC)    S/P CABG x 4    Coronary artery disease involving native coronary artery of native heart without angina pectoris    Chronic obstructive pulmonary disease (HCC)    Abdominal aortic aneurysm (AAA) without rupture ([facility]    Diabetic ulcer of left lower leg associated with type 2 diabetes mellitus, with fat layer exposed [facility])    Smoker      Hospital Stay   Narrative of Hospital Course: 69-year-old patient well-known to me presented with increasing shortness of air and dyspnea on minimal exertion. Symptoms are progressive over the 24 hours prior to admission. Before that patient states she felt her normal state of health. The only aggravating factor she can think of is she is some very strong detergent recently that did irritate her breathing. Within a 24-hour period she had significant dyspnea at rest.  Upon arrival in the emergency room her oxygen saturations were now in the 70s. Chest x-ray was clear and she was afebrile. Admitted with acute respiratory failure with hypoxia related to COPD exacerbation. During her stay she responded to steroids and to pulmonary interventions including nebulized breathing treatments. Unfortunately the steroids have caused her blood sugar to go significantly up requiring intermittent insulin dosing. She is still on oxygen by nasal cannula but satting in the high 90s. Will do at home oxygen desaturation test to see if home oxygen is required prior to discharge. Otherwise she is much more in her normal state of health and it is felt she is maximized acute inpatient stay.   All questions answered best my ability prior to patient discharge    Consultants:   None    Time Spent on Discharge:  33 minutes were spent in patient examination, evaluation, counseling as well as medication reconciliation, prescriptions for required medications, discharge plan and follow up. Surgeries/Procedures Performed:        Treatments:   steroids: solu-medrol and respiratory therapy: O2 and albuterol/atropine nebulizer    Significant Diagnostic Studies:   Recent Labs:  CBC:   Lab Results   Component Value Date    WBC 9.3 08/12/2021    RBC 4.96 08/12/2021    HGB 14.4 08/12/2021    HCT 46.3 08/12/2021    HCT 32.1 09/02/2011    MCV 93.3 08/12/2021    MCH 29.0 08/12/2021    MCHC 31.1 08/12/2021    RDW 16.2 08/12/2021     08/12/2021     09/02/2011     CMP:    Lab Results   Component Value Date    GLUCOSE 226 08/12/2021     08/12/2021     09/02/2011    K 4.6 08/12/2021    K 4.0 08/12/2021    K 3.5 06/28/2021    K 4.1 09/02/2011    CL 82 08/12/2021     09/02/2011    CO2 27 08/12/2021    BUN 17 08/12/2021    CREATININE 0.8 08/12/2021    CREATININE 0.6 09/02/2011    ANIONGAP 12 08/12/2021    ALKPHOS 87 08/12/2021    ALKPHOS 57 09/02/2011    ALT 12 08/12/2021    AST 19 08/12/2021    BILITOT 0.3 08/12/2021    LABALBU 3.8 08/12/2021    LABALBU 3.3 09/02/2011    LABGLOM >60 08/12/2021    GFRAA >59 08/12/2021    PROT 7.7 08/12/2021    PROT 7.5 01/18/2013    CALCIUM 9.4 08/12/2021       Radiology Last 7 Days:  XR CHEST PORTABLE    Result Date: 8/13/2021  1. No radiographic evidence of acute cardiopulmonary process. Signed by Dr Zabrina Garcia      Discharge Plan   Disposition: Home    Provider Follow-Up:   No follow-up provider specified.      Hospital/Incidental Findings Requiring Follow-Up:  None    Patient Instructions   Diet: diabetic diet    Activity: activity as tolerated    Other Instructions:   Keep scheduled follow-up appointment at discharge    Discharge Medications         Medication List START taking these medications    guaiFENesin 600 MG extended release tablet  Commonly known as: MUCINEX  Take 1 tablet by mouth 2 times daily     predniSONE 10 MG tablet  Commonly known as: DELTASONE  Take 1 tablet by mouth daily for 5 days        CHANGE how you take these medications    rosuvastatin 10 MG tablet  Commonly known as: Crestor  Take 1 tablet by mouth daily  What changed:   · when to take this  · additional instructions     sotalol 120 MG tablet  Commonly known as: BETAPACE  Take 1 tablet by mouth 2 times daily  What changed: how much to take        CONTINUE taking these medications    Altace 5 MG capsule  Generic drug: ramipril     amLODIPine 5 MG tablet  Commonly known as: NORVASC  Take 1 tablet by mouth daily     apixaban 5 MG Tabs tablet  Commonly known as: ELIQUIS  Take 1 tablet by mouth 2 times daily     clopidogrel 75 MG tablet  Commonly known as: Plavix  Take 1 tablet by mouth daily     furosemide 40 MG tablet  Commonly known as: LASIX  Take 1 tablet by mouth daily     ipratropium-albuterol 0.5-2.5 (3) MG/3ML Soln nebulizer solution  Commonly known as: DUONEB  Inhale 3 mLs into the lungs every 4 hours (while awake)     metFORMIN 500 MG tablet  Commonly known as: GLUCOPHAGE  Take 1 tablet by mouth 2 times daily (with meals) Hold for 2 days and restart a 26th 2020     nitroGLYCERIN 0.4 MG SL tablet  Commonly known as: Nitrostat  Place 1 tablet under the tongue every 5 minutes as needed for Chest pain     potassium chloride 20 MEQ extended release tablet  Commonly known as: KLOR-CON M        STOP taking these medications    metoprolol succinate 25 MG extended release tablet  Commonly known as: TOPROL XL           Where to Get Your Medications      These medications were sent to [id number], [address] [facility] -  [phone] [address] [address] [address]    Phone: [phone]   · guaiFENesin 600 MG extended release tablet  · predniSONE 10 MG tablet         Electronically signed by Toy Walker MD on 8/16/21 at 8:44 AM CDT

## 2021-08-16 NOTE — PROGRESS NOTES
Home oxygen evaluation performed and results are as follows: SaO2 = 85% on R/A at rest, SaO2 = 90% on O2 @ 2 lpm(NC), SaO2 = 82% on R/A while ambulating, SaO2 = 85% on O2 @ 2 lpm (NC) while ambulating. (Recovery Sao2)( Ear probe used).  TS RRT/RCP

## 2021-08-23 DIAGNOSIS — E78.2 MIXED HYPERLIPIDEMIA: ICD-10-CM

## 2021-08-23 DIAGNOSIS — I10 ESSENTIAL HYPERTENSION: ICD-10-CM

## 2021-08-23 RX ORDER — AMLODIPINE BESYLATE 5 MG/1
5 TABLET ORAL DAILY
Qty: 90 TABLET | Refills: 1 | Status: SHIPPED | OUTPATIENT
Start: 2021-08-23 | End: 2022-05-20

## 2021-08-23 RX ORDER — CLOPIDOGREL BISULFATE 75 MG/1
75 TABLET ORAL DAILY
Qty: 90 TABLET | Refills: 3 | Status: SHIPPED | OUTPATIENT
Start: 2021-08-23

## 2021-08-23 RX ORDER — RAMIPRIL 5 MG/1
5 CAPSULE ORAL 2 TIMES DAILY
Qty: 180 CAPSULE | Refills: 3 | Status: SHIPPED | OUTPATIENT
Start: 2021-08-23 | End: 2022-10-18

## 2021-08-23 RX ORDER — SOTALOL HYDROCHLORIDE 120 MG/1
120 TABLET ORAL 2 TIMES DAILY
Qty: 180 TABLET | Refills: 1 | Status: SHIPPED | OUTPATIENT
Start: 2021-08-23 | End: 2022-05-20

## 2021-08-23 RX ORDER — ROSUVASTATIN CALCIUM 10 MG/1
10 TABLET, COATED ORAL DAILY
Qty: 90 TABLET | Refills: 3 | Status: SHIPPED | OUTPATIENT
Start: 2021-08-23 | End: 2021-11-30

## 2021-08-27 DIAGNOSIS — I48.92 ATRIAL FLUTTER, UNSPECIFIED TYPE (HCC): ICD-10-CM

## 2021-08-27 DIAGNOSIS — I49.5 SINOATRIAL NODE DYSFUNCTION (HCC): ICD-10-CM

## 2021-08-27 DIAGNOSIS — Z95.0 PACEMAKER: Primary | ICD-10-CM

## 2021-09-13 RX ORDER — HYDROCHLOROTHIAZIDE 25 MG/1
TABLET ORAL
Qty: 90 TABLET | Refills: 1 | OUTPATIENT
Start: 2021-09-13

## 2021-10-28 DIAGNOSIS — I49.5 SINOATRIAL NODE DYSFUNCTION (HCC): ICD-10-CM

## 2021-10-28 DIAGNOSIS — Z95.0 PACEMAKER: Primary | ICD-10-CM

## 2021-11-29 DIAGNOSIS — E78.2 MIXED HYPERLIPIDEMIA: ICD-10-CM

## 2021-11-30 DIAGNOSIS — Z95.0 PACEMAKER: Primary | ICD-10-CM

## 2021-11-30 DIAGNOSIS — I48.92 ATRIAL FLUTTER, UNSPECIFIED TYPE (HCC): ICD-10-CM

## 2021-11-30 DIAGNOSIS — I49.5 SINOATRIAL NODE DYSFUNCTION (HCC): ICD-10-CM

## 2021-11-30 PROCEDURE — 93294 REM INTERROG EVL PM/LDLS PM: CPT | Performed by: NURSE PRACTITIONER

## 2021-11-30 PROCEDURE — 93296 REM INTERROG EVL PM/IDS: CPT | Performed by: NURSE PRACTITIONER

## 2021-11-30 RX ORDER — ROSUVASTATIN CALCIUM 10 MG/1
TABLET, COATED ORAL
Qty: 90 TABLET | Refills: 1 | Status: SHIPPED | OUTPATIENT
Start: 2021-11-30

## 2021-12-01 ENCOUNTER — OFFICE VISIT (OUTPATIENT)
Dept: CARDIOLOGY CLINIC | Age: 71
End: 2021-12-01
Payer: MEDICARE

## 2021-12-01 VITALS
WEIGHT: 157 LBS | HEIGHT: 66 IN | BODY MASS INDEX: 25.23 KG/M2 | DIASTOLIC BLOOD PRESSURE: 60 MMHG | HEART RATE: 80 BPM | SYSTOLIC BLOOD PRESSURE: 104 MMHG

## 2021-12-01 DIAGNOSIS — E78.2 MIXED HYPERLIPIDEMIA: ICD-10-CM

## 2021-12-01 DIAGNOSIS — I25.10 CORONARY ARTERY DISEASE INVOLVING NATIVE CORONARY ARTERY OF NATIVE HEART WITHOUT ANGINA PECTORIS: Primary | ICD-10-CM

## 2021-12-01 DIAGNOSIS — Z95.5 HISTORY OF CORONARY ARTERY STENT PLACEMENT: ICD-10-CM

## 2021-12-01 DIAGNOSIS — Z95.1 S/P CABG X 4: ICD-10-CM

## 2021-12-01 DIAGNOSIS — Z79.01 CHRONIC ANTICOAGULATION: ICD-10-CM

## 2021-12-01 DIAGNOSIS — I48.0 PAF (PAROXYSMAL ATRIAL FIBRILLATION) (HCC): ICD-10-CM

## 2021-12-01 DIAGNOSIS — I10 ESSENTIAL HYPERTENSION: ICD-10-CM

## 2021-12-01 DIAGNOSIS — I49.5 SINOATRIAL NODE DYSFUNCTION (HCC): ICD-10-CM

## 2021-12-01 DIAGNOSIS — Z95.0 PACEMAKER: ICD-10-CM

## 2021-12-01 PROCEDURE — G8417 CALC BMI ABV UP PARAM F/U: HCPCS | Performed by: NURSE PRACTITIONER

## 2021-12-01 PROCEDURE — 4004F PT TOBACCO SCREEN RCVD TLK: CPT | Performed by: NURSE PRACTITIONER

## 2021-12-01 PROCEDURE — 1123F ACP DISCUSS/DSCN MKR DOCD: CPT | Performed by: NURSE PRACTITIONER

## 2021-12-01 PROCEDURE — 1090F PRES/ABSN URINE INCON ASSESS: CPT | Performed by: NURSE PRACTITIONER

## 2021-12-01 PROCEDURE — 4040F PNEUMOC VAC/ADMIN/RCVD: CPT | Performed by: NURSE PRACTITIONER

## 2021-12-01 PROCEDURE — G8484 FLU IMMUNIZE NO ADMIN: HCPCS | Performed by: NURSE PRACTITIONER

## 2021-12-01 PROCEDURE — G8400 PT W/DXA NO RESULTS DOC: HCPCS | Performed by: NURSE PRACTITIONER

## 2021-12-01 PROCEDURE — 99214 OFFICE O/P EST MOD 30 MIN: CPT | Performed by: NURSE PRACTITIONER

## 2021-12-01 PROCEDURE — G8427 DOCREV CUR MEDS BY ELIG CLIN: HCPCS | Performed by: NURSE PRACTITIONER

## 2021-12-01 PROCEDURE — 93280 PM DEVICE PROGR EVAL DUAL: CPT | Performed by: NURSE PRACTITIONER

## 2021-12-01 PROCEDURE — 3017F COLORECTAL CA SCREEN DOC REV: CPT | Performed by: NURSE PRACTITIONER

## 2021-12-01 NOTE — PROGRESS NOTES
[facility], [place]. [address], [facility] [address] [facility]  [phone] office  [phone] fax      OFFICE VISIT:  2021    Doris Shetty - : 1950  Reason For Visit:  Rancho Ramires is a 66 y.o. female who is here for Follow-up (and pacemaker check. BP is good. ), Coronary Artery Disease, and Hypertension    History:   Diagnosis Orders   1. Coronary artery disease involving native coronary artery of native heart without angina pectoris     2. History of coronary artery stent placement     3. S/P CABG x 4     4. Essential hypertension     5. Mixed hyperlipidemia     6. PAF (paroxysmal atrial fibrillation) ([facility]     7. Chronic anticoagulation      Eliquis   8. Pacemaker     9. Sinoatrial node dysfunction [facility])       The patient presents today for cardiology follow up and pacer check. The patient denies symptoms to suggest myocardial ischemia, heart failure or sensed arrhythmia. BP is well controlled on current regimen. The patient's PCP monitors cholesterol. Calculated creatinine cl 74 ml/min. Patient reports BP at home today. 123/70. Pulse ox has been running %. Jes Settle denies exertional chest pain, orthopnea, paroxysmal nocturnal dyspnea, syncope, presyncope, sensed arrhythmia, edema and fatigue. The patient denies numbness or weakness to suggest cerebrovascular accident or transient ischemic attack. + stable NUNEZ.     Doris Shetty has the following history as [facility] in [facility]:  Patient Active Problem List   Diagnosis Code    Deep vein thrombosis ([address] I82.409    Essential hypertension I10    Diabetes mellitus ([address] E11.9    Hypercholesteremia E78.00    S/P CABG x 4 Z95.1    History of coronary artery stent placement Z95.5    Coronary artery disease involving native coronary artery of native heart without angina pectoris I25.10    Mixed hyperlipidemia E78.2    History of diabetes mellitus Z86.39    Chronic obstructive pulmonary disease (HCC) J44.9    NUNEZ (dyspnea on exertion) R06.00    Abdominal aortic aneurysm (AAA) without rupture ([facility]) I71.4    Diabetic ulcer of left lower leg associated with type 2 diabetes mellitus, with fat layer exposed ([facility] [address] E11.622, L97.922    Smoker F17.200    Traumatic hematoma T14. 8XXA    NICM (nonischemic cardiomyopathy) ([facility] [address] I42.8    Hyponatremia E87.1     Past Medical History:   Diagnosis Date    ASHD (arteriosclerotic heart disease)     s/p PTCA and stent of circumflex, as well as intermediate and mid LAD     COPD (chronic obstructive pulmonary disease) ([facility] [address]     Coronary atherosclerosis     Diabetes mellitus ([facility] [address]     diet controlled, type 2    Encounter for wound care     FOR LLL WOUND    Fall 10/29/2020    Ganglion cyst     RT HAND    Hematoma 10/2020    hematoma LLL from fall injury    Hx of blood clots     Hypercholesteremia     Hypertension     Pacemaker 03/02/2021    Unstable angina [facility])      Past Surgical History:   Procedure Laterality Date    CARDIAC CATHETERIZATION  12/10/00    selective left heart and coronary arteriography with left ventriculography     CARDIAC CATHETERIZATION  01/23/98    left heart cath, left ventriculography, slective coronary arteriography, direct infarct angioplasty and stent placement to proximal left anterior descending coronary artery    CARDIAC CATHETERIZATION  03/05/94    left heart cath, selective coronary arteriography, left ventriculography    CARDIAC CATHETERIZATION  8/27/2011    [place]    CHOLECYSTECTOMY      CORONARY ANGIOPLASTY WITH STENT PLACEMENT  12/12/00    PTCA and stent placement to the mid LAD/ptca and stent placement first circumflex marginal (intermediate)     CORONARY ANGIOPLASTY WITH STENT PLACEMENT  08/2021    CORONARY ARTERY BYPASS GRAFT  8/29/2011    PACABG X 4 LIMA-LAD, SVG-DIAG, SVG-PDA, RT EVH, LT OPEN VEIN HARVEST, DR Bharti Lovell    CYST REMOVAL      GANGLION CYST REMOVED RT HAND    HYSTERECTOMY      NECK SURGERY      parotid artery tumor removed bilaterally    PAROTIDECTOMY Bilateral      Family History   Problem Relation Age of Onset    Cancer Mother     Coronary Art Dis Father     Mult Sclerosis Sister     Cancer Brother      Social History     Tobacco Use    Smoking status: Current Every Day Smoker     Packs/day: 0.50     Types: Cigarettes     Last attempt to quit: 3/29/2021     Years since quittin.6    Smokeless tobacco: Never Used    Tobacco comment: 1/2 PACKS EVERY 2 WEEKS, states has been decreasing amount    Substance Use Topics    Alcohol use: Never      Current Outpatient Medications   Medication Sig Dispense Refill    rosuvastatin (CRESTOR) 10 MG tablet TAKE 1 TABLET DAILY 90 tablet 1    apixaban (ELIQUIS) 5 MG TABS tablet Take 1 tablet by mouth 2 times daily 180 tablet 3    amLODIPine (NORVASC) 5 MG tablet Take 1 tablet by mouth daily 90 tablet 1    clopidogrel (PLAVIX) 75 MG tablet Take 1 tablet by mouth daily 90 tablet 3    ramipril (ALTACE) 5 MG capsule Take 1 capsule by mouth 2 times daily 180 capsule 3    sotalol (BETAPACE) 120 MG tablet Take 1 tablet by mouth 2 times daily 180 tablet 1    guaiFENesin (MUCINEX) 600 MG extended release tablet Take 1 tablet by mouth 2 times daily 60 tablet 0    potassium chloride (KLOR-CON M) 20 MEQ extended release tablet Take 20 mEq by mouth daily      ipratropium-albuterol (DUONEB) 0.5-2.5 (3) MG/3ML SOLN nebulizer solution Inhale 3 mLs into the lungs every 4 hours (while awake) 360 mL 0    furosemide (LASIX) 40 MG tablet Take 1 tablet by mouth daily 60 tablet 3    metFORMIN (GLUCOPHAGE) 500 MG tablet Take 1 tablet by mouth 2 times daily (with meals) Hold for 2 days and restart a 2020 180 tablet 3    nitroGLYCERIN (NITROSTAT) 0.4 MG SL tablet Place 1 tablet under the tongue every 5 minutes as needed for Chest pain 25 tablet 3     No current facility-administered medications for this visit. Allergies: Codeine    Review of Systems  Constitutional  no appetite change, or unexpected weight change. No fever, chills or diaphoresis. No significant change in activity level or new onset of fatigue. HEENT  no significant rhinorrhea or epistaxis. No tinnitus or significant hearing loss. Eyes  no sudden vision change or amaurosis. No corneal arcus, xantholasma, subconjunctival hemorrhage or discharge. Respiratory  no significant wheezing, stridor, apnea or cough. + stable NUNEZ. Cardiovascular  no exertional chest pain to suggest myocardial ischemia. No orthopnea or PND. No sensation of sustained arrythmia. No occurrence of slow heart rate. No palpitations. No claudication. Gastrointestinal  no abdominal swelling or pain. No blood in stool. No severe constipation, diarrhea, nausea, or vomiting. Genitourinary  no dysuria, frequency, or urgency. No flank pain or hematuria. Musculoskeletal  no back pain or myalgia. No problems with gait. Extremities - no clubbing, cyanosis or extremity edema. Skin  no color change or rash. No pallor. No new surgical incision. Neurologic  no speech difficulty, facial asymmetry or lateralizing weakness. No seizures, presyncope or syncope. No significant dizziness. Hematologic  no easy bruising or excessive bleeding. Psychiatric  no severe anxiety or insomnia. No confusion. All other review of systems are negative. Objective  Vital Signs - /60   Ht 5' 6\" (1.676 m)   Wt 157 lb (71.2 kg)   BMI 25.34 kg/m²   General - Tristian Bunn is alert, cooperative, and pleasant. Well groomed. No acute distress. Body habitus - Body mass index is 25.34 kg/m². HEENT  Head is normocephalic. No circumoral cyanosis. Dentition is normal.  EYES -   Lids normal without ptosis. No discharge, edema or subconjunctival hemorrhage. Neck - Symmetrical without apparent mass or lymphadenopathy.    Respiratory - Normal respiratory effort without use of accessory muscles. Ausculatation reveals vesicular breath sounds without crackles, wheezes, rub or rhonchi. Cardiovascular  No jugular venous distention. Auscultation reveals regular rate and rhythm. No audible clicks, gallop or rub. No murmur. No lower extremity varicosities. No carotid bruits. Abdominal -  No visible distention, mass or pulsations. Extremities - No clubbing or cyanosis. No statis dermatitis or ulcers. No edema. Musculoskeletal -   No Osler's nodes. No kyphosis or scoliosis. Gait is even and regular without limp or shuffle. Ambulates without assistance. Skin -  Warm and dry; no rash or pallor. No new surgical wound. Neurological - No focal neurological deficits. Thought processes coherent. No apparent tremor. Oriented to person, place and time. Psychiatric -  Appropriate affect and mood. Data reviewed:  6/24/21 JAME   LV is normal in size with normal systolic function. LV ejection fraction  estimated at 60%. No mass or thrombus in left ventricle. RV appears mildly dilated with preserved RV systolic function. No mass or  thrombus noted in right ventricle. Left atrium appears mildly dilated. No mass or thrombus noted in the left  atrium. Left atrial appendage with no mass or thrombus noted. Good velocity noted  in appendage. Right atrium appears moderate to severely dilated. No mass or thrombus  noted. Coronary sinus appears dilated. Aortic valve is trileaflet with mild leaflet thickening but preserved   mobility. No significant stenosis or regurgitation. Mitral valve with posterior leaflet thickened and calcified and appears   fixed. Normal anterior leaflet mobility. Probable mild mitral stenosis   (mean gradient 3 mmHg). Mild mitral regurgitation. Tricuspid leaflets appear structurally normal. Moderate tricuspid   regurgitation.    Pulmonic valve not well visualized but no significant regurgitation   appreciated  Intra-atrial septum is aneurysmal and thinned. A slitlike patent foramen  ovale is present. Right to left shunting is noted on bubble study. Pacer wires noted in right-sided chambers. Ascending aorta is mildly dilated at 3.3 cm. Mild to moderate descending thoracic aortic atheromatous changes. Signature   Electronically signed by Jolie Rivas MD(Interpreting physician)   on 06/25/2021 12:22 AM    8/24/20 ECHO  Triple-vessel disease with severe vessel ectasia in RCA, stenotic disease  in LAD. Severely stenotic stent in mid LAD. Occluded LIMA to mid LAD. Patent SVG to 1st diagonal.   Patent SVG to OM1. Patent SVG to RPDA. Normal LV ejection fraction. Large calcified abdominal aortic aneurysm with large mural thrombus. Successful PCI to proximal to mid LAD (4.0 x 15 mm resolute integrity)  utilizing drug-eluting stent. Successful PCI to mid LAD (2.75 x 26 mm resolute integrity taken to 3.0  mm) utilizing drug-eluting stent. Recommendations    Medical management. Smoking cessation. Vascular surgical consult for large abdominal aortic aneurysm.     Signatures   Electronically signed by Jolie Rivas MD(Performing Physician) on   08/25/2020 00:28    Lab Results   Component Value Date    WBC 9.3 08/12/2021    HGB 14.4 08/12/2021    HCT 46.3 08/12/2021    MCV 93.3 08/12/2021     08/12/2021     Lab Results   Component Value Date     (L) 08/12/2021    K 4.6 08/12/2021    CL 82 (L) 08/12/2021    CO2 27 08/12/2021    BUN 17 08/12/2021    CREATININE 0.8 08/12/2021    GLUCOSE 226 (H) 08/12/2021    CALCIUM 9.4 08/12/2021    PROT 7.7 08/12/2021    LABALBU 3.8 08/12/2021    BILITOT 0.3 08/12/2021    ALKPHOS 87 08/12/2021    AST 19 08/12/2021    ALT 12 08/12/2021    LABGLOM >60 08/12/2021    GFRAA >59 08/12/2021       Lab Results   Component Value Date    CHOL 133 (L) 08/24/2020    CHOL 206 (H) 11/13/2017    CHOL 149 03/30/2014     Lab Results   Component Value Date    TRIG 145 08/24/2020    TRIG 201 (H) 11/13/2017    TRIG 105 03/30/2014     Lab Results   Component Value Date    HDL 43 (L) 08/24/2020    HDL 51 (L) 11/13/2017    HDL 56 03/30/2014     Lab Results   Component Value Date    LDLCALC 61 08/24/2020    LDLCALC 115 11/13/2017    LDLCALC 60 08/23/2011       BP Readings from Last 3 Encounters:   12/01/21 104/60   08/16/21 139/84   07/27/21 98/60    Pulse Readings from Last 3 Encounters:   08/16/21 75   07/27/21 74   07/14/21 57        Wt Readings from Last 3 Encounters:   12/01/21 157 lb (71.2 kg)   08/13/21 174 lb 8 oz (79.2 kg)   07/14/21 172 lb (78 kg)     Assessment/Plan:   Diagnosis Orders   1. Coronary artery disease involving native coronary artery of native heart without angina pectoris     2. History of coronary artery stent placement     3. S/P CABG x 4     4. Essential hypertension     5. Mixed hyperlipidemia     6. PAF (paroxysmal atrial fibrillation) ([address]     7. Chronic anticoagulation      Eliquis   8. Pacemaker     9. Sinoatrial node dysfunction (HCC)       Calculated creatinine clearance 74 ml/min    [id number] Score: 4  Disclaimer: Risk Score calculation is dependent on accuracy of patient problem list and past encounter diagnosis. Pacer interrogationa  Pacemaker check showed adequate battery status. 11.7 years longevity. Mode: AAIR-DDDR. Lead impedances are stable. Pacing:  AP 75.3%;  5.3%. Reprogramming for sensitivity and threshold testing. Appropriate diagnostics and safety margins noted. A output 1.00 V at 0.40 ms; P wave 6.1 mV. V output 1.75 V at 0.40 ms, R wave >20 mV. Sustained arrythmia:  837 treated AF episodes. AF burden 4.5%. Next Carelink remote transmission:  3 months. Stable CV status without overt heart failure, sensed arrhythmia or angina. CAD s/p CABG and stenting - stable on current medical management. Continue same. EF - 60% 6/22/21 JAME     PAF - AF burden 4.5% on Sotalol 120 mg BID. Continue same. Chronic anticoag - patient reports cost of Eliquis a concern. Change to Xarelto 20 mg (1) daily with food (calculated creatinine cl 74 ml/min.)  No bleeding issues reported on Eliquis. HTN - normotensive on current regimen. Goal less than 130/80. Continue same. Hyperlipidemia - LDL 61. Continue Crestor 10 mg daily. Patient is compliant with medication regimen. Previous cardiac history and records reviewed. Continue current medical management for cardiac related condition. Continue other current medications as directed. Continue to follow up with primary care provider for non cardiac medical problems. If your primary care provider is outside of the [facility], please request that your labs be faxed to this office at [phone]. BP goal 130/80 or less. Call the office with any problems, questions or concerns at [phone]. Cardiology follow up as scheduled in [address] appointments. Educational included in patient instructions. Heart health.       Roseann Arias, APRN

## 2021-12-01 NOTE — PATIENT INSTRUCTIONS
New instructions for today:  After today, stop Eliquis due to possible rash. Tomorrow, start Xarelto 20 mg (1) daily with food. Xarelto can increase your risk of bleeding. If you notice blood in urine or stool, bleeding gums, excessive bruising or cough productive of bloody sputum, notify the office. Information on this blood thinner has been included in your after visit summary. Patient Instructions:  Continue current medications as prescribed. Always keep a current medication list. Bring your medications to every office visit. Continue to follow up with primary care provider for non cardiac medical problems. Call the office with any problems, questions or concerns at [phone]. If you have been asked to keep a blood pressure log, do so for 2 weeks. Call the office to report readings to the triage nurse at [phone]. Follow up with cardiologist as scheduled. The following educational material has been included in this after visit summary for your review: Life simple 7. Heart health. Life simple 7  1) Manage blood pressure - high blood pressure is a major risk factor for heart disease and stroke. Keeping blood pressure in health range reduces strain on your heart, arteries and kidneys. Blood pressure goal is less than 130/80. 2) Control cholesterol - contributes to plaque, which can clog arteries and lead to heart disease and stroke. When you control your cholesterol you are giving your arteries their best chance to remain clear. It is recommended that you get cholesterol lab work done once a year. 3) Reduce blood sugar - most of the food we eat is turning into glucose or blood sugar that our body uses for energy. Over time, high levels of blood sugar can damage your heart, kidneys, eyes and nerves. 4) Get active - living an active life is one of the most rewarding gifts you can give yourself and those you love.   Simply put, daily physical activity increases your length and quality of life. Strive to exercise 15 minutes most days of the week. 5)  Eat better - A healthy diet is one of your best weapons for fighting cardiovascular disease. When you eat a heart healthy diet, you improve your chances for feeling good and staying healthy for life. 6)  Lose weight - when you shed extra fat an unnecessary pounds, you reduce the burden on your hear, lungs, blood vessels and skeleton. You give yourself the gift of active living, you lower your blood pressure and help yourself feel better. 7) Stop smoking - cigarette smokers have a higher risk of developing cardiovascular disease. If  You smoke, quitting is the best thing you can do for your health. Check American Heart Association on line for more information on Life's Simple 7 and tips for healthy living. A Healthy Heart: Care Instructions  Your Care Instructions     Coronary artery disease, also called heart disease, occurs when a substance called plaque builds up in the vessels that supply oxygen-rich blood to your heart muscle. This can narrow the blood vessels and reduce blood flow. A heart attack happens when blood flow is completely blocked. A high-fat diet, smoking, and other factors increase the risk of heart disease. Your doctor has found that you have a chance of having heart disease. You can do lots of things to keep your heart healthy. It may not be easy, but you can change your diet, exercise more, and quit smoking. These steps really work to lower your chance of heart disease. Follow-up care is a key part of your treatment and safety. Be sure to make and go to all appointments, and call your doctor if you are having problems. It's also a good idea to know your test results and keep a list of the medicines you take. How can you care for yourself at home? Diet  · Use less salt when you cook and eat. This helps lower your blood pressure. Taste food before salting. Add only a little salt when you think you need it.  With time, your taste buds will adjust to less salt. · Eat fewer snack items, fast foods, canned soups, and other high-salt, high-fat, processed foods. · Read food labels and try to avoid saturated and trans fats. They increase your risk of heart disease by raising cholesterol levels. · Limit the amount of solid fat-butter, margarine, and shortening-you eat. Use olive, peanut, or canola oil when you cook. Bake, broil, and steam foods instead of frying them. · Eat a variety of fruit and vegetables every day. Dark green, deep orange, red, or yellow fruits and vegetables are especially good for you. Examples include spinach, carrots, peaches, and berries. · Foods high in fiber can reduce your cholesterol and provide important vitamins and minerals. High-fiber foods include whole-grain cereals and breads, oatmeal, beans, brown rice, citrus fruits, and apples. · Eat lean proteins. Heart-healthy proteins include seafood, lean meats and poultry, eggs, beans, peas, nuts, seeds, and soy products. · Limit drinks and foods with added sugar. These include candy, desserts, and soda pop. Lifestyle changes  · If your doctor recommends it, get more exercise. Walking is a good choice. Bit by bit, increase the amount you walk every day. Try for at least 30 minutes on most days of the week. You also may want to swim, bike, or do other activities. · Do not smoke. If you need help quitting, talk to your doctor about stop-smoking programs and medicines. These can increase your chances of quitting for good. Quitting smoking may be the most important step you can take to protect your heart. It is never too late to quit. · Limit alcohol to 2 drinks a day for men and 1 drink a day for women. Too much alcohol can cause health problems. · Manage other health problems such as diabetes, high blood pressure, and high cholesterol. If you think you may have a problem with alcohol or drug use, talk to your doctor.   Medicines  · Take your medicines exactly as prescribed. Call your doctor if you think you are having a problem with your medicine. · If your doctor recommends aspirin, take the amount directed each day. Make sure you take aspirin and not another kind of pain reliever, such as acetaminophen (Tylenol). When should you call for help? [id number] if you have symptoms of a heart attack. These may include:  · Chest pain or pressure, or a strange feeling in the chest.  · Sweating. · Shortness of breath. · Pain, pressure, or a strange feeling in the back, neck, jaw, or upper belly or in one or both shoulders or arms. · Lightheadedness or sudden weakness. · A fast or irregular heartbeat. After you call 911, the  may tell you to chew 1 adult-strength or 2 to 4 low-dose aspirin. Wait for an ambulance. Do not try to drive yourself. Watch closely for changes in your health, and be sure to contact your doctor if you have any problems. Where can you learn more? Go to https://Paxer.Humanoid. org and sign in to your [facility] account. Enter R518 in the [facility] box to learn more about \"A Healthy Heart: Care Instructions. \"     If you do not have an account, please click on the \"Sign Up Now\" link. Current as of: December 16, 2019               Content Version: 12.5  © 8782-0774 [employer], [employer]. Care instructions adapted under license by [facility] ([facility]). If you have questions about a medical condition or this instruction, always ask your healthcare professional. [place] [address] any warranty or liability for your use of this information.

## 2022-03-02 DIAGNOSIS — I49.5 SINOATRIAL NODE DYSFUNCTION (HCC): ICD-10-CM

## 2022-03-02 DIAGNOSIS — Z95.0 PACEMAKER: Primary | ICD-10-CM

## 2022-03-02 DIAGNOSIS — I48.0 PAF (PAROXYSMAL ATRIAL FIBRILLATION) (HCC): ICD-10-CM

## 2022-03-02 DIAGNOSIS — I47.29 NSVT (NONSUSTAINED VENTRICULAR TACHYCARDIA): ICD-10-CM

## 2022-03-02 PROCEDURE — 93294 REM INTERROG EVL PM/LDLS PM: CPT | Performed by: NURSE PRACTITIONER

## 2022-03-02 PROCEDURE — 93296 REM INTERROG EVL PM/IDS: CPT | Performed by: NURSE PRACTITIONER

## 2022-03-15 ENCOUNTER — HOSPITAL ENCOUNTER (INPATIENT)
Age: 72
LOS: 12 days | Discharge: NURSING, REHAB OR LONG-TERM CARE | DRG: 207 | End: 2022-03-28
Attending: EMERGENCY MEDICINE | Admitting: FAMILY MEDICINE
Payer: MEDICARE

## 2022-03-15 ENCOUNTER — APPOINTMENT (OUTPATIENT)
Dept: CT IMAGING | Age: 72
DRG: 207 | End: 2022-03-15
Payer: MEDICARE

## 2022-03-15 ENCOUNTER — APPOINTMENT (OUTPATIENT)
Dept: GENERAL RADIOLOGY | Age: 72
DRG: 207 | End: 2022-03-15
Payer: MEDICARE

## 2022-03-15 DIAGNOSIS — J96.02 ACUTE HYPERCAPNIC RESPIRATORY FAILURE (HCC): Primary | ICD-10-CM

## 2022-03-15 DIAGNOSIS — J44.1 ACUTE EXACERBATION OF CHRONIC OBSTRUCTIVE PULMONARY DISEASE (COPD) (HCC): ICD-10-CM

## 2022-03-15 LAB
ALBUMIN SERPL-MCNC: 4.3 G/DL (ref 3.5–5.2)
ALP BLD-CCNC: 107 U/L (ref 35–104)
ALT SERPL-CCNC: 16 U/L (ref 5–33)
ANION GAP SERPL CALCULATED.3IONS-SCNC: 8 MMOL/L (ref 7–19)
AST SERPL-CCNC: 17 U/L (ref 5–32)
BASE EXCESS ARTERIAL: 7.6 MMOL/L (ref -2–2)
BASOPHILS ABSOLUTE: 0.1 K/UL (ref 0–0.2)
BASOPHILS RELATIVE PERCENT: 0.4 % (ref 0–1)
BILIRUB SERPL-MCNC: 0.3 MG/DL (ref 0.2–1.2)
BUN BLDV-MCNC: 17 MG/DL (ref 8–23)
CALCIUM SERPL-MCNC: 9.1 MG/DL (ref 8.8–10.2)
CARBOXYHEMOGLOBIN ARTERIAL: 2.9 % (ref 0–5)
CHLORIDE BLD-SCNC: 94 MMOL/L (ref 98–111)
CO2: 34 MMOL/L (ref 22–29)
CREAT SERPL-MCNC: 0.9 MG/DL (ref 0.5–0.9)
EOSINOPHILS ABSOLUTE: 0.4 K/UL (ref 0–0.6)
EOSINOPHILS RELATIVE PERCENT: 3.6 % (ref 0–5)
GFR AFRICAN AMERICAN: >59
GFR NON-AFRICAN AMERICAN: >60
GLUCOSE BLD-MCNC: 203 MG/DL (ref 70–99)
GLUCOSE BLD-MCNC: 206 MG/DL
GLUCOSE BLD-MCNC: 239 MG/DL (ref 74–109)
HCO3 ARTERIAL: 38.6 MMOL/L (ref 22–26)
HCT VFR BLD CALC: 50.6 % (ref 37–47)
HEMOGLOBIN, ART, EXTENDED: 15.3 G/DL (ref 12–16)
HEMOGLOBIN: 15.3 G/DL (ref 12–16)
IMMATURE GRANULOCYTES #: 0 K/UL
LACTIC ACID: 0.7 MMOL/L (ref 0.5–1.9)
LYMPHOCYTES ABSOLUTE: 3.7 K/UL (ref 1.1–4.5)
LYMPHOCYTES RELATIVE PERCENT: 31.3 % (ref 20–40)
MCH RBC QN AUTO: 30.5 PG (ref 27–31)
MCHC RBC AUTO-ENTMCNC: 30.2 G/DL (ref 33–37)
MCV RBC AUTO: 100.8 FL (ref 81–99)
METHEMOGLOBIN ARTERIAL: 1.1 %
MONOCYTES ABSOLUTE: 1.4 K/UL (ref 0–0.9)
MONOCYTES RELATIVE PERCENT: 11.9 % (ref 0–10)
NEUTROPHILS ABSOLUTE: 6.2 K/UL (ref 1.5–7.5)
NEUTROPHILS RELATIVE PERCENT: 52.5 % (ref 50–65)
O2 CONTENT ARTERIAL: 21.1 ML/DL
O2 SAT, ARTERIAL: 95.8 %
O2 THERAPY: ABNORMAL
PCO2 ARTERIAL: 86 MMHG (ref 35–45)
PDW BLD-RTO: 14.6 % (ref 11.5–14.5)
PERFORMED ON: ABNORMAL
PH ARTERIAL: 7.26 (ref 7.35–7.45)
PLATELET # BLD: 243 K/UL (ref 130–400)
PMV BLD AUTO: 9.1 FL (ref 9.4–12.3)
PO2 ARTERIAL: 245 MMHG (ref 80–100)
POTASSIUM SERPL-SCNC: 4.7 MMOL/L (ref 3.5–5)
POTASSIUM, WHOLE BLOOD: 5.4
PRO-BNP: 1872 PG/ML (ref 0–900)
RBC # BLD: 5.02 M/UL (ref 4.2–5.4)
SARS-COV-2, NAAT: NOT DETECTED
SODIUM BLD-SCNC: 136 MMOL/L (ref 136–145)
TOTAL PROTEIN: 8.3 G/DL (ref 6.6–8.7)
TROPONIN: <0.01 NG/ML (ref 0–0.03)
WBC # BLD: 11.7 K/UL (ref 4.8–10.8)

## 2022-03-15 PROCEDURE — 36600 WITHDRAWAL OF ARTERIAL BLOOD: CPT

## 2022-03-15 PROCEDURE — 36415 COLL VENOUS BLD VENIPUNCTURE: CPT

## 2022-03-15 PROCEDURE — 85025 COMPLETE CBC W/AUTO DIFF WBC: CPT

## 2022-03-15 PROCEDURE — 2700000000 HC OXYGEN THERAPY PER DAY

## 2022-03-15 PROCEDURE — 99285 EMERGENCY DEPT VISIT HI MDM: CPT

## 2022-03-15 PROCEDURE — 6360000002 HC RX W HCPCS

## 2022-03-15 PROCEDURE — 96375 TX/PRO/DX INJ NEW DRUG ADDON: CPT

## 2022-03-15 PROCEDURE — 71045 X-RAY EXAM CHEST 1 VIEW: CPT

## 2022-03-15 PROCEDURE — 31500 INSERT EMERGENCY AIRWAY: CPT

## 2022-03-15 PROCEDURE — 6360000002 HC RX W HCPCS: Performed by: EMERGENCY MEDICINE

## 2022-03-15 PROCEDURE — 83880 ASSAY OF NATRIURETIC PEPTIDE: CPT

## 2022-03-15 PROCEDURE — 84484 ASSAY OF TROPONIN QUANT: CPT

## 2022-03-15 PROCEDURE — 80053 COMPREHEN METABOLIC PANEL: CPT

## 2022-03-15 PROCEDURE — 87635 SARS-COV-2 COVID-19 AMP PRB: CPT

## 2022-03-15 PROCEDURE — 82947 ASSAY GLUCOSE BLOOD QUANT: CPT

## 2022-03-15 PROCEDURE — 93005 ELECTROCARDIOGRAM TRACING: CPT | Performed by: EMERGENCY MEDICINE

## 2022-03-15 PROCEDURE — 83036 HEMOGLOBIN GLYCOSYLATED A1C: CPT

## 2022-03-15 RX ORDER — FENTANYL CITRATE 50 UG/ML
100 INJECTION, SOLUTION INTRAMUSCULAR; INTRAVENOUS ONCE
Status: COMPLETED | OUTPATIENT
Start: 2022-03-15 | End: 2022-03-15

## 2022-03-15 RX ORDER — SUCCINYLCHOLINE CHLORIDE 20 MG/ML
100 INJECTION INTRAMUSCULAR; INTRAVENOUS ONCE
Status: COMPLETED | OUTPATIENT
Start: 2022-03-15 | End: 2022-03-15

## 2022-03-15 RX ORDER — PROPOFOL 10 MG/ML
INJECTION, EMULSION INTRAVENOUS
Status: DISPENSED
Start: 2022-03-15 | End: 2022-03-16

## 2022-03-15 RX ORDER — FENTANYL CITRATE 50 UG/ML
INJECTION, SOLUTION INTRAMUSCULAR; INTRAVENOUS
Status: COMPLETED
Start: 2022-03-15 | End: 2022-03-15

## 2022-03-15 RX ORDER — LORAZEPAM 2 MG/ML
INJECTION INTRAMUSCULAR
Status: COMPLETED
Start: 2022-03-15 | End: 2022-03-16

## 2022-03-15 RX ORDER — PROPOFOL 10 MG/ML
5-50 INJECTION, EMULSION INTRAVENOUS ONCE
Status: COMPLETED | OUTPATIENT
Start: 2022-03-15 | End: 2022-03-15

## 2022-03-15 RX ORDER — LORAZEPAM 2 MG/ML
1 INJECTION INTRAMUSCULAR ONCE
Status: COMPLETED | OUTPATIENT
Start: 2022-03-15 | End: 2022-03-15

## 2022-03-15 RX ORDER — METHYLPREDNISOLONE SODIUM SUCCINATE 125 MG/2ML
125 INJECTION, POWDER, LYOPHILIZED, FOR SOLUTION INTRAMUSCULAR; INTRAVENOUS ONCE
Status: COMPLETED | OUTPATIENT
Start: 2022-03-15 | End: 2022-03-15

## 2022-03-15 RX ADMIN — FENTANYL CITRATE 100 MCG: 50 INJECTION, SOLUTION INTRAMUSCULAR; INTRAVENOUS at 23:30

## 2022-03-15 RX ADMIN — LORAZEPAM 1 MG: 2 INJECTION INTRAMUSCULAR; INTRAVENOUS at 23:43

## 2022-03-15 RX ADMIN — PROPOFOL 30 MCG/KG/MIN: 10 INJECTION, EMULSION INTRAVENOUS at 23:15

## 2022-03-15 RX ADMIN — METHYLPREDNISOLONE SODIUM SUCCINATE 125 MG: 125 INJECTION, POWDER, FOR SOLUTION INTRAMUSCULAR; INTRAVENOUS at 23:35

## 2022-03-15 RX ADMIN — SUCCINYLCHOLINE CHLORIDE 100 MG: 20 INJECTION, SOLUTION INTRAMUSCULAR; INTRAVENOUS at 22:54

## 2022-03-15 ASSESSMENT — PAIN SCALES - GENERAL: PAINLEVEL_OUTOF10: 0

## 2022-03-15 ASSESSMENT — PULMONARY FUNCTION TESTS
PIF_VALUE: 62
PIF_VALUE: 0
PIF_VALUE: 44
PIF_VALUE: 59
PIF_VALUE: 57
PIF_VALUE: 54
PIF_VALUE: 53
PIF_VALUE: 57

## 2022-03-16 ENCOUNTER — APPOINTMENT (OUTPATIENT)
Dept: CT IMAGING | Age: 72
DRG: 207 | End: 2022-03-16
Payer: MEDICARE

## 2022-03-16 PROBLEM — Z51.5 PALLIATIVE CARE PATIENT: Status: ACTIVE | Noted: 2022-03-16

## 2022-03-16 PROBLEM — J96.02 ACUTE HYPERCAPNIC RESPIRATORY FAILURE (HCC): Status: ACTIVE | Noted: 2022-03-16

## 2022-03-16 LAB
BASE EXCESS ARTERIAL: 4.7 MMOL/L (ref -2–2)
CARBOXYHEMOGLOBIN ARTERIAL: 2.7 % (ref 0–5)
EKG P AXIS: 75 DEGREES
EKG P-R INTERVAL: 188 MS
EKG Q-T INTERVAL: 368 MS
EKG QRS DURATION: 98 MS
EKG QTC CALCULATION (BAZETT): 404 MS
EKG T AXIS: 80 DEGREES
GLUCOSE BLD-MCNC: 274 MG/DL (ref 70–99)
GLUCOSE BLD-MCNC: 291 MG/DL (ref 70–99)
GLUCOSE BLD-MCNC: 322 MG/DL (ref 70–99)
HBA1C MFR BLD: 8.4 % (ref 4–6)
HCO3 ARTERIAL: 33.2 MMOL/L (ref 22–26)
HEMOGLOBIN, ART, EXTENDED: 14.5 G/DL (ref 12–16)
METHEMOGLOBIN ARTERIAL: 1.2 %
O2 CONTENT ARTERIAL: 18.7 ML/DL
O2 SAT, ARTERIAL: 91.8 %
O2 THERAPY: ABNORMAL
PCO2 ARTERIAL: 66 MMHG (ref 35–45)
PERFORMED ON: ABNORMAL
PH ARTERIAL: 7.31 (ref 7.35–7.45)
PO2 ARTERIAL: 68 MMHG (ref 80–100)
POTASSIUM, WHOLE BLOOD: 5.7

## 2022-03-16 PROCEDURE — 2500000003 HC RX 250 WO HCPCS: Performed by: FAMILY MEDICINE

## 2022-03-16 PROCEDURE — 6370000000 HC RX 637 (ALT 250 FOR IP): Performed by: INTERNAL MEDICINE

## 2022-03-16 PROCEDURE — 96375 TX/PRO/DX INJ NEW DRUG ADDON: CPT

## 2022-03-16 PROCEDURE — 6360000002 HC RX W HCPCS: Performed by: FAMILY MEDICINE

## 2022-03-16 PROCEDURE — 96366 THER/PROPH/DIAG IV INF ADDON: CPT

## 2022-03-16 PROCEDURE — 84132 ASSAY OF SERUM POTASSIUM: CPT

## 2022-03-16 PROCEDURE — 82947 ASSAY GLUCOSE BLOOD QUANT: CPT

## 2022-03-16 PROCEDURE — 2500000003 HC RX 250 WO HCPCS: Performed by: INTERNAL MEDICINE

## 2022-03-16 PROCEDURE — 96365 THER/PROPH/DIAG IV INF INIT: CPT

## 2022-03-16 PROCEDURE — 5A1955Z RESPIRATORY VENTILATION, GREATER THAN 96 CONSECUTIVE HOURS: ICD-10-PCS | Performed by: FAMILY MEDICINE

## 2022-03-16 PROCEDURE — 96374 THER/PROPH/DIAG INJ IV PUSH: CPT

## 2022-03-16 PROCEDURE — 36600 WITHDRAWAL OF ARTERIAL BLOOD: CPT

## 2022-03-16 PROCEDURE — 94640 AIRWAY INHALATION TREATMENT: CPT

## 2022-03-16 PROCEDURE — 70450 CT HEAD/BRAIN W/O DYE: CPT

## 2022-03-16 PROCEDURE — 99291 CRITICAL CARE FIRST HOUR: CPT | Performed by: INTERNAL MEDICINE

## 2022-03-16 PROCEDURE — 6370000000 HC RX 637 (ALT 250 FOR IP): Performed by: FAMILY MEDICINE

## 2022-03-16 PROCEDURE — 6360000002 HC RX W HCPCS

## 2022-03-16 PROCEDURE — 2100000000 HC CCU R&B

## 2022-03-16 PROCEDURE — 96372 THER/PROPH/DIAG INJ SC/IM: CPT

## 2022-03-16 PROCEDURE — 2700000000 HC OXYGEN THERAPY PER DAY

## 2022-03-16 PROCEDURE — 0BH17EZ INSERTION OF ENDOTRACHEAL AIRWAY INTO TRACHEA, VIA NATURAL OR ARTIFICIAL OPENING: ICD-10-PCS | Performed by: FAMILY MEDICINE

## 2022-03-16 PROCEDURE — 93010 ELECTROCARDIOGRAM REPORT: CPT | Performed by: INTERNAL MEDICINE

## 2022-03-16 PROCEDURE — 83605 ASSAY OF LACTIC ACID: CPT

## 2022-03-16 PROCEDURE — 2580000003 HC RX 258: Performed by: FAMILY MEDICINE

## 2022-03-16 PROCEDURE — 82803 BLOOD GASES ANY COMBINATION: CPT

## 2022-03-16 PROCEDURE — 94002 VENT MGMT INPAT INIT DAY: CPT

## 2022-03-16 PROCEDURE — 2580000003 HC RX 258: Performed by: EMERGENCY MEDICINE

## 2022-03-16 PROCEDURE — 6360000002 HC RX W HCPCS: Performed by: EMERGENCY MEDICINE

## 2022-03-16 PROCEDURE — 36415 COLL VENOUS BLD VENIPUNCTURE: CPT

## 2022-03-16 RX ORDER — NICOTINE POLACRILEX 4 MG
15 LOZENGE BUCCAL PRN
Status: DISCONTINUED | OUTPATIENT
Start: 2022-03-16 | End: 2022-03-16

## 2022-03-16 RX ORDER — ONDANSETRON 4 MG/1
4 TABLET, ORALLY DISINTEGRATING ORAL EVERY 8 HOURS PRN
Status: DISCONTINUED | OUTPATIENT
Start: 2022-03-16 | End: 2022-03-28 | Stop reason: HOSPADM

## 2022-03-16 RX ORDER — LORAZEPAM 2 MG/ML
1 INJECTION INTRAMUSCULAR ONCE
Status: COMPLETED | OUTPATIENT
Start: 2022-03-16 | End: 2022-03-16

## 2022-03-16 RX ORDER — SODIUM CHLORIDE 9 MG/ML
25 INJECTION, SOLUTION INTRAVENOUS PRN
Status: DISCONTINUED | OUTPATIENT
Start: 2022-03-16 | End: 2022-03-28 | Stop reason: HOSPADM

## 2022-03-16 RX ORDER — SODIUM CHLORIDE 0.9 % (FLUSH) 0.9 %
5-40 SYRINGE (ML) INJECTION PRN
Status: DISCONTINUED | OUTPATIENT
Start: 2022-03-16 | End: 2022-03-28 | Stop reason: HOSPADM

## 2022-03-16 RX ORDER — METHYLPREDNISOLONE SODIUM SUCCINATE 40 MG/ML
40 INJECTION, POWDER, LYOPHILIZED, FOR SOLUTION INTRAMUSCULAR; INTRAVENOUS EVERY 6 HOURS
Status: DISCONTINUED | OUTPATIENT
Start: 2022-03-16 | End: 2022-03-23

## 2022-03-16 RX ORDER — DEXMEDETOMIDINE HYDROCHLORIDE 4 UG/ML
.1-1.5 INJECTION, SOLUTION INTRAVENOUS CONTINUOUS
Status: DISCONTINUED | OUTPATIENT
Start: 2022-03-16 | End: 2022-03-28 | Stop reason: HOSPADM

## 2022-03-16 RX ORDER — NOREPINEPHRINE BIT/0.9 % NACL 16MG/250ML
1-100 INFUSION BOTTLE (ML) INTRAVENOUS CONTINUOUS
Status: DISCONTINUED | OUTPATIENT
Start: 2022-03-16 | End: 2022-03-28 | Stop reason: HOSPADM

## 2022-03-16 RX ORDER — DEXTROSE MONOHYDRATE 25 G/50ML
12.5 INJECTION, SOLUTION INTRAVENOUS PRN
Status: DISCONTINUED | OUTPATIENT
Start: 2022-03-16 | End: 2022-03-16

## 2022-03-16 RX ORDER — FENTANYL CITRATE 50 UG/ML
INJECTION, SOLUTION INTRAMUSCULAR; INTRAVENOUS
Status: COMPLETED
Start: 2022-03-16 | End: 2022-03-16

## 2022-03-16 RX ORDER — ONDANSETRON 2 MG/ML
4 INJECTION INTRAMUSCULAR; INTRAVENOUS EVERY 6 HOURS PRN
Status: DISCONTINUED | OUTPATIENT
Start: 2022-03-16 | End: 2022-03-28 | Stop reason: HOSPADM

## 2022-03-16 RX ORDER — FENTANYL CITRATE-0.9 % NACL/PF 10 MCG/ML
25-200 PLASTIC BAG, INJECTION (ML) INTRAVENOUS CONTINUOUS
Status: DISCONTINUED | OUTPATIENT
Start: 2022-03-16 | End: 2022-03-28 | Stop reason: HOSPADM

## 2022-03-16 RX ORDER — DEXTROSE MONOHYDRATE 50 MG/ML
100 INJECTION, SOLUTION INTRAVENOUS PRN
Status: DISCONTINUED | OUTPATIENT
Start: 2022-03-16 | End: 2022-03-28 | Stop reason: HOSPADM

## 2022-03-16 RX ORDER — IPRATROPIUM BROMIDE AND ALBUTEROL SULFATE 2.5; .5 MG/3ML; MG/3ML
1 SOLUTION RESPIRATORY (INHALATION) EVERY 4 HOURS
Status: DISCONTINUED | OUTPATIENT
Start: 2022-03-16 | End: 2022-03-28 | Stop reason: HOSPADM

## 2022-03-16 RX ORDER — IPRATROPIUM BROMIDE AND ALBUTEROL SULFATE 2.5; .5 MG/3ML; MG/3ML
3 SOLUTION RESPIRATORY (INHALATION)
Status: DISCONTINUED | OUTPATIENT
Start: 2022-03-16 | End: 2022-03-16

## 2022-03-16 RX ORDER — CHLORHEXIDINE GLUCONATE 0.12 MG/ML
15 RINSE ORAL 2 TIMES DAILY
Status: DISCONTINUED | OUTPATIENT
Start: 2022-03-16 | End: 2022-03-28 | Stop reason: HOSPADM

## 2022-03-16 RX ORDER — SODIUM CHLORIDE 0.9 % (FLUSH) 0.9 %
5-40 SYRINGE (ML) INJECTION EVERY 12 HOURS SCHEDULED
Status: DISCONTINUED | OUTPATIENT
Start: 2022-03-16 | End: 2022-03-28 | Stop reason: HOSPADM

## 2022-03-16 RX ORDER — DEXTROSE AND SODIUM CHLORIDE 5; .45 G/100ML; G/100ML
INJECTION, SOLUTION INTRAVENOUS CONTINUOUS
Status: DISCONTINUED | OUTPATIENT
Start: 2022-03-16 | End: 2022-03-17

## 2022-03-16 RX ADMIN — SODIUM CHLORIDE, PRESERVATIVE FREE 20 MG: 5 INJECTION INTRAVENOUS at 09:03

## 2022-03-16 RX ADMIN — IPRATROPIUM BROMIDE AND ALBUTEROL SULFATE 1 AMPULE: .5; 2.5 SOLUTION RESPIRATORY (INHALATION) at 18:24

## 2022-03-16 RX ADMIN — METHYLPREDNISOLONE SODIUM SUCCINATE 40 MG: 40 INJECTION, POWDER, FOR SOLUTION INTRAMUSCULAR; INTRAVENOUS at 12:07

## 2022-03-16 RX ADMIN — DEXTROSE AND SODIUM CHLORIDE: 5; 450 INJECTION, SOLUTION INTRAVENOUS at 09:04

## 2022-03-16 RX ADMIN — INSULIN LISPRO 2 UNITS: 100 INJECTION, SOLUTION INTRAVENOUS; SUBCUTANEOUS at 20:22

## 2022-03-16 RX ADMIN — ENOXAPARIN SODIUM 80 MG: 100 INJECTION SUBCUTANEOUS at 03:58

## 2022-03-16 RX ADMIN — FENTANYL CITRATE 100 MCG: 50 INJECTION INTRAMUSCULAR; INTRAVENOUS at 00:04

## 2022-03-16 RX ADMIN — METHYLPREDNISOLONE SODIUM SUCCINATE 40 MG: 40 INJECTION, POWDER, FOR SOLUTION INTRAMUSCULAR; INTRAVENOUS at 06:20

## 2022-03-16 RX ADMIN — DEXMEDETOMIDINE HYDROCHLORIDE 0.2 MCG/KG/HR: 400 INJECTION INTRAVENOUS at 22:12

## 2022-03-16 RX ADMIN — Medication 5 MCG/MIN: at 06:02

## 2022-03-16 RX ADMIN — IPRATROPIUM BROMIDE AND ALBUTEROL SULFATE 1 AMPULE: .5; 2.5 SOLUTION RESPIRATORY (INHALATION) at 10:30

## 2022-03-16 RX ADMIN — DEXTROSE AND SODIUM CHLORIDE: 5; 450 INJECTION, SOLUTION INTRAVENOUS at 19:47

## 2022-03-16 RX ADMIN — IPRATROPIUM BROMIDE AND ALBUTEROL SULFATE 1 AMPULE: .5; 2.5 SOLUTION RESPIRATORY (INHALATION) at 22:18

## 2022-03-16 RX ADMIN — METHYLPREDNISOLONE SODIUM SUCCINATE 40 MG: 40 INJECTION, POWDER, FOR SOLUTION INTRAMUSCULAR; INTRAVENOUS at 19:02

## 2022-03-16 RX ADMIN — LORAZEPAM 1 MG: 2 INJECTION INTRAMUSCULAR at 00:38

## 2022-03-16 RX ADMIN — INSULIN LISPRO 3 UNITS: 100 INJECTION, SOLUTION INTRAVENOUS; SUBCUTANEOUS at 08:18

## 2022-03-16 RX ADMIN — CHLORHEXIDINE GLUCONATE 15 ML: 1.2 RINSE ORAL at 12:06

## 2022-03-16 RX ADMIN — SODIUM CHLORIDE, PRESERVATIVE FREE 20 MG: 5 INJECTION INTRAVENOUS at 20:21

## 2022-03-16 RX ADMIN — IPRATROPIUM BROMIDE AND ALBUTEROL SULFATE 1 AMPULE: .5; 2.5 SOLUTION RESPIRATORY (INHALATION) at 14:20

## 2022-03-16 RX ADMIN — CHLORHEXIDINE GLUCONATE 15 ML: 1.2 RINSE ORAL at 20:22

## 2022-03-16 RX ADMIN — INSULIN LISPRO 3 UNITS: 100 INJECTION, SOLUTION INTRAVENOUS; SUBCUTANEOUS at 12:07

## 2022-03-16 RX ADMIN — CEFTRIAXONE 1000 MG: 1 INJECTION, POWDER, FOR SOLUTION INTRAMUSCULAR; INTRAVENOUS at 00:37

## 2022-03-16 RX ADMIN — LORAZEPAM 1 MG: 2 INJECTION INTRAMUSCULAR; INTRAVENOUS at 00:38

## 2022-03-16 RX ADMIN — Medication 200 MCG/HR: at 19:08

## 2022-03-16 RX ADMIN — Medication 100 MCG/HR: at 00:30

## 2022-03-16 ASSESSMENT — PULMONARY FUNCTION TESTS
PIF_VALUE: 28
PIF_VALUE: 52
PIF_VALUE: 36
PIF_VALUE: 54
PIF_VALUE: 36
PIF_VALUE: 53
PIF_VALUE: 46
PIF_VALUE: 68
PIF_VALUE: 33
PIF_VALUE: 36
PIF_VALUE: 45
PIF_VALUE: 42
PIF_VALUE: 54
PIF_VALUE: 50
PIF_VALUE: 58
PIF_VALUE: 30
PIF_VALUE: 68
PIF_VALUE: 46
PIF_VALUE: 35
PIF_VALUE: 47
PIF_VALUE: 47

## 2022-03-16 ASSESSMENT — PAIN SCALES - GENERAL
PAINLEVEL_OUTOF10: 0
PAINLEVEL_OUTOF10: 1

## 2022-03-16 NOTE — ACP (ADVANCE CARE PLANNING)
Advance Care Planning     Advance Care Planning Activator (Inpatient)  Conversation Note      Date of ACP Conversation: 3/16/2022     Conversation Conducted with: Elizabeth Gee, pt son. ACP Activator: Gilma Sher RN    . Health Care Decision Maker:     Current Designated Health Care Decision Maker:     Primary Decision Maker: Shlomo Tamayo [facility] - [phone]      Care Preferences    Ventilation: \"If you were in your present state of health and suddenly became very ill and were unable to breathe on your own, what would your preference be about the use of a ventilator (breathing machine) if it were available to you? \"      Would the patient desire the use of ventilator (breathing machine)?: Yes        Resuscitation  \"In the event your heart stopped as a result of an underlying serious health condition, would you want attempts to be made to restart your heart (answer \"yes\" for attempt to resuscitate) or would you prefer a natural death (answer \"no\" for do not attempt to resuscitate)? \" Yes        Conversation Outcomes:  [x] ACP discussion completed  [] Existing advance directive reviewed with patient; no changes to patient's previously recorded wishes  [] New Advance Directive completed  [] Portable Do Not Rescitate prepared for Provider review and signature  [] POLST/POST/MOLST/MOST prepared for Provider review and signature      Follow-up plan:    [] Schedule follow-up conversation to continue planning  [] Referred individual to Provider for additional questions/concerns   [] Advised patient/agent/surrogate to review completed ACP document and update if needed with changes in condition, patient preferences or care setting    [] This note routed to one or more involved healthcare providers    Electronically signed by Gilma Sher RN on 3/16/2022 at 11:49 AM

## 2022-03-16 NOTE — CONSULTS
Pulmonary and Critical Care Consult Note    [facility]    MRN# [id number]    Acct# [de-identified]  3/16/2022   10:37 AM CDT    Referring Sotero Gracia MD      Chief Complaint: Respiratory failure on mechanical ventilation    Requesting physician: Dr. Jumana Ibrahim    Reason for consult: Respiratory failure on mechanical ventilation      HPI: We have been consulted to see this 67y.o. year old female born on 1950. The patient presented to the hospital via EMS. She apparently had altered mental status and shortness of breath at home. In the emergency room the patient was in agonal breathing. She had a blood gas that showed a pH of 7.2 with a PCO2 of 86 and her PO2 was 245. The patient was admitted to the ICU after being intubated and started on mechanical ventilation. Currently she is intubated sedated on mechanical ventilation in the intensive care unit. History obtained from the records and the nursing staff. I was asked to see her regarding the above. On exam she is wheezing.       Past Medical History      Past Medical History:   Diagnosis Date    ASHD (arteriosclerotic heart disease)     s/p PTCA and stent of circumflex, as well as intermediate and mid LAD     COPD (chronic obstructive pulmonary disease) ([address]     Coronary atherosclerosis     Diabetes mellitus ([facility]     diet controlled, type 2    Encounter for wound care     FOR LLL WOUND    Fall 10/29/2020    Ganglion cyst     RT HAND    Hematoma 10/2020    hematoma LLL from fall injury    Hx of blood clots     Hypercholesteremia     Hypertension     Pacemaker 03/02/2021    Unstable angina [facility])      SurgicalHistory  Past Surgical History:   Procedure Laterality Date    CARDIAC CATHETERIZATION  12/10/00    selective left heart and coronary arteriography with left ventriculography    [place] CARDIAC CATHETERIZATION  01/23/98    left heart cath, left ventriculography, slective coronary arteriography, direct infarct angioplasty and stent placement to proximal left anterior descending coronary artery    CARDIAC CATHETERIZATION  03/05/94    left heart cath, selective coronary arteriography, left ventriculography    CARDIAC CATHETERIZATION  8/27/2011    [place]    CHOLECYSTECTOMY      CORONARY ANGIOPLASTY WITH STENT PLACEMENT  12/12/00    PTCA and stent placement to the mid LAD/ptca and stent placement first circumflex marginal (intermediate)     CORONARY ANGIOPLASTY WITH STENT PLACEMENT  08/2021    CORONARY ARTERY BYPASS GRAFT  8/29/2011    PACABG X 4 LIMA-LAD, SVG-DIAG, SVG-PDA, RT EVH, LT OPEN VEIN HARVEST, DR Cooper Urrutia    CYST REMOVAL      GANGLION CYST REMOVED RT HAND    HYSTERECTOMY      NECK SURGERY      parotid artery tumor removed bilaterally    PAROTIDECTOMY Bilateral      Allergies  Allergies   Allergen Reactions    Codeine Other (See Comments)     SOB     Medications  sodium chloride flush, 5-40 mL, IntraVENous, 2 times per day    [START ON 3/17/2022] cefTRIAXone (ROCEPHIN) IV, 1,000 mg, IntraVENous, Q24H    methylPREDNISolone, 40 mg, IntraVENous, Q6H    insulin lispro, 0-6 Units, SubCUTAneous, TID WC    insulin lispro, 0-3 Units, SubCUTAneous, Nightly    [START ON 3/17/2022] enoxaparin, 80 mg, SubCUTAneous, Daily    chlorhexidine, 15 mL, Mouth/Throat, BID    famotidine (PEPCID) injection, 20 mg, IntraVENous, BID    ipratropium-albuterol, 1 ampule, Inhalation, Q4H    propofol, , ,    Social History   reports that she has been smoking cigarettes. She has been smoking about 0.50 packs per day. She has never used smokeless tobacco. She reports that she does not drink alcohol and does not use drugs. Family History  family history includes Cancer in her brother and mother; Coronary Art Dis in her father; Mult Sclerosis in her sister.     Review of Systems:  12 point review of systems is negative except as below:  Review of systems not obtained due to patient factors - intubation    Physical Exam:  BP 99/81   Pulse 84   Temp 98 °F (36.7 °C) (Temporal)   Resp 15   Wt 180 lb (81.6 kg)   SpO2 93%   BMI 29.05 kg/m²     Intake/Output Summary (Last 24 hours) at 3/16/2022 1037  Last data filed at 3/16/2022 0856  Gross per 24 hour   Intake 18.2 ml   Output 85 ml   Net -66.8 ml       General appearance: Elderly white female who appears to be sedated intubated on mechanical ventilation   HEENT: Normocephalic atraumatic endotracheal tube in place  Heart: S1-S2 distant sounds no murmurs  Lungs: Diminished bilaterally. Bilateral wheezing. Abdomen: Soft nontender no organomegalies normal bowel sounds  Extremities: No clubbing cyanosis or edema  Neuro: Sedated on mechanical ventilation  Skin: Intact        Labs:  Recent Labs     03/15/22  2249   WBC 11.7*   RBC 5.02   HGB 15.3   HCT 50.6*      .8*   MCH 30.5   MCHC 30.2*   RDW 14.6*      Recent Labs     03/15/22  2249 03/15/22  2315 03/15/22  2321 03/16/22  0805     --   --   --    K 4.7  --  5.4 5.7   CL 94*  --   --   --    CO2 34*  --   --   --    BUN 17  --   --   --    CREATININE 0.9  --   --   --    CALCIUM 9.1  --   --   --    GLUCOSE 239* 206  --   --       Recent Labs     03/15/22  2321 03/16/22  0805   PHART 7.260* 7.310*   DOT5BBC 86.0* 66.0*   PO2ART 245.0* 68.0*   LLX7ZJE 38.6* 33.2*   G0FCWSFT 95.8 91.8   BEART 7.6* 4.7*     Recent Labs     03/15/22  2249 03/15/22  2319   AST 17  --    ALT 16  --    ALKPHOS 107*  --    BILITOT 0.3  --    CALCIUM 9.1  --    PROBNP 1,872*  --    TROPONINI <0.01  --    LACTA  --  0.7     No results for input(s): BC, LABGRAM, CULTRESP, BFCX in the last 72 hours. Radiograph: CT HEAD WO CONTRAST    Result Date: 3/16/2022  1. No acute intracranial abnormality. 2. No skull fracture. 3. Chronic white matter ischemic changes.  4. Moderate soft tissue thickening of the posterior wall of the nasopharynx with narrowing of the nasopharyngeal airway is probably due to recent intubation. This may be clinically correlated. The above study was initially reviewed and reported by [facility]. I do not find any discrepancies. Signed by Dr Luis Fernando Stewart    Result Date: 3/16/2022  1. No active cardiopulmonary disease. 2. Endotracheal tube in place. The nasogastric tube is not evaluated. 3. Dual-chamber cardiac pacer in place. Signed by Dr Verner Spell       My radiograph interpretation/independent review of imaging: Reviewed    Problem list generated by [facility]3]:  Hospital Problems           Last Modified POA    * (Principal) Acute hypercapnic respiratory failure ([address] 3/16/2022 Yes    NICM (nonischemic cardiomyopathy) ([address] 3/16/2022 Yes    Diabetes mellitus ([address] 3/16/2022 Yes    Overview Signed 6/2/2011  1:19 PM by Jas muñoz controlled         S/P CABG x 4 3/16/2022 Yes    Overview Signed 10/11/2016 11:15 AM by JAKOB Huynh     8/29/11 EF 60%         Coronary artery disease involving native coronary artery of native heart without angina pectoris 3/16/2022 Yes    Chronic obstructive pulmonary disease ([address] 3/16/2022 Yes    Abdominal aortic aneurysm (AAA) without rupture ([address] 3/16/2022 Yes    Smoker 3/16/2022 Yes             Pulmonary Assessment/plan:    1. Acute on chronic hypoxic hypercapnic respiratory failure. Currently intubated on mechanical ventilation. Continue mechanical support as needed to assure adequate gas exchange. Wean mechanical settings as feasible. 2. Underlying severe COPD clinically with wheezing continue pulmonary toilet continue bronchodilators. 3. Possible committee acquired pneumonia. Empirical antibiotic therapy. De-escalate antibiotics as feasible. Sputum culture. 4. Dyspnea due to the above supportive care. 5. Altered mental status most likely secondary to hypercapnia. Reevaluate. CT of the head was unremarkable.   6. DVT prophylaxis. Critical care time 40 min       Wes Vega MD, FCCP, [facility]    The above note was generated using voice recognition software. Inadvertent typographical errors in transcription may have occurred.     Electronically signed by Amelia Molina MD on 03/16/22 at 10:37 AM

## 2022-03-16 NOTE — ED NOTES
PT arrived being bagged by EMS, verbal order from Dr. Baudilio Elias for 100 mg succ for intubation      Jone Manner, [address] [facility]  03/15/22 7983

## 2022-03-16 NOTE — CONSULTS
Comprehensive Nutrition Assessment    Type and Reason for Visit:  Initial,Consult    Nutrition Recommendations/Plan: start Vital High Protein at 20ml/hr and advance by 5 ml every 6 hours until goal rate of 58ml is reached. No free water flush as IV's providing 2400ml    Nutrition Assessment:  Pt appears adequately nourished AEB fat and muscle mass. Currently pt is NPO on vent. No Propofol. To start EN-Glcerna 1.2 goal rate 58ml/hr    Malnutrition Assessment:  Malnutrition Status: At risk for malnutrition (Comment)    Context:  Acute Illness     Findings of the 6 clinical characteristics of malnutrition:  Energy Intake:  Unable to assess  Weight Loss:  No significant weight loss     Body Fat Loss:  No significant body fat loss     Muscle Mass Loss:  No significant muscle mass loss    Fluid Accumulation:  No significant fluid accumulation Extremities   Strength:  Not Performed    Estimated Daily Nutrient Needs:  Energy (kcal):  3582-0201 kcals (17-25 kcals/kg);  Weight Used for Energy Requirements:  Current     Protein (g):  71-122g; Weight Used for Protein Requirements:  Ideal        Fluid (ml/day):  8493-0595 ml; Method Used for Fluid Requirements:  1 ml/kcal      Nutrition Related Findings:  on vent      Wounds:  None       Current Nutrition Therapies:    Current Tube Feeding (TF) Orders:  · Feeding Route: Orogastric  · Formula: Peptide Based High Protein  · Schedule: Continuous  · Additives/Modulars:   None  · Water Flushes: 0  · Current TF & Flush Orders Provides: 0  · Goal TF & Flush Orders Provides: Vital High Protein goal rate 58ml/hr = 1392 kcals with 121g protein, 154g CHO and 1163ml free water from formula      Anthropometric Measures:  · Height: 5' 6\" (167.6 cm)  · Current Body Weight: 180 lb (81.6 kg)   · Admission Body Weight: 180 lb (81.6 kg)    · Usual Body Weight: 157 lb (71.2 kg) (12/2021)     · Ideal Body Weight: 130 lbs; % Ideal Body Weight 138.5 %   · BMI: 29.1  · Adjusted Body Weight: ; No Adjustment   · BMI Categories: Overweight (BMI 25.0-29. 9)       Nutrition Diagnosis:   · Inadequate oral intake related to acute injury/trauma,impaired respiratory function as evidenced by NPO or clear liquid status due to medical condition,intubation,nutrition support - enteral nutrition      Nutrition Interventions:   Food and/or Nutrient Delivery:  Start Tube Feeding  Nutrition Education/Counseling:  No recommendation at this time   Coordination of Nutrition Care:  Continue to monitor while inpatient    Goals:  Meet nutritional needs through EN       Nutrition Monitoring and Evaluation:   Behavioral-Environmental Outcomes:  None Identified   Food/Nutrient Intake Outcomes:  Enteral Nutrition Intake/Tolerance  Physical Signs/Symptoms Outcomes:  Biochemical Data,Weight,Skin,Nutrition Focused Physical Findings,Fluid Status or Edema     Discharge Planning:     Too soon to determine     Electronically signed by Pebbles Souza, MS, RD, LD on 3/16/22 at 2:33 PM CDT    Contact: [phone]

## 2022-03-16 NOTE — ED PROVIDER NOTES
[facility]  eMERGENCY dEPARTMENT eNCOUnter      Pt Name: Jimenez Estrada  MRN: [id number]  Jonathongfmyrtle 1950  Date of evaluation: 3/15/2022  Provider: Tiana Bateman MD    [address] [place] [address]       Chief Complaint   Patient presents with    Shortness of Breath    Altered Mental Status         HISTORY OF PRESENT ILLNESS   (Location/Symptom, Timing/Onset,Context/Setting, Quality, Duration, Modifying Factors, Severity)  Note limiting factors. Jimenez Estrada is a 67 y.o. female who presents to the emergency department via EMS secondary to altered mental status and difficulty breathing. EMS reported upon their arrival patient appeared significantly short of breath and was somewhat confused. Reportedly her family had spoken with her earlier in the evening around 530 and she seemed to be doing just fine. On arrival to the ED patient is markedly altered with agonal respirations. Bag-valve-mask ventilations are in progress upon arrival.  I am not able to obtain any additional history from the patient. She does have a prior history of COPD, cardiomyopathy with previous CABG. Unsure if she is maintained on home oxygen therapy at this time. HPI    NursingNotes were reviewed.     REVIEW OF SYSTEMS    (2-9 systems for level 4, 10 or more for level 5)     Review of Systems   Unable to perform ROS: Mental status change            PAST MEDICALHISTORY     Past Medical History:   Diagnosis Date    ASHD (arteriosclerotic heart disease)     s/p PTCA and stent of circumflex, as well as intermediate and mid LAD     COPD (chronic obstructive pulmonary disease) ([address]     Coronary atherosclerosis     Diabetes mellitus ([address]     diet controlled, type 2    Encounter for wound care     FOR LLL WOUND    Fall 10/29/2020    Ganglion cyst     RT HAND    Hematoma 10/2020    hematoma LLL from fall injury    Hx of blood clots     Hypercholesteremia     Hypertension     Pacemaker 03/02/2021    Unstable angina ([address] SURGICAL HISTORY       Past Surgical History:   Procedure Laterality Date    CARDIAC CATHETERIZATION  12/10/00    selective left heart and coronary arteriography with left ventriculography     CARDIAC CATHETERIZATION  01/23/98    left heart cath, left ventriculography, slective coronary arteriography, direct infarct angioplasty and stent placement to proximal left anterior descending coronary artery    CARDIAC CATHETERIZATION  03/05/94    left heart cath, selective coronary arteriography, left ventriculography    CARDIAC CATHETERIZATION  8/27/2011    [place]    CHOLECYSTECTOMY      CORONARY ANGIOPLASTY WITH STENT PLACEMENT  12/12/00    PTCA and stent placement to the mid LAD/ptca and stent placement first circumflex marginal (intermediate)     CORONARY ANGIOPLASTY WITH STENT PLACEMENT  08/2021    CORONARY ARTERY BYPASS GRAFT  8/29/2011    PACABG X 4 LIMA-LAD, SVG-DIAG, SVG-PDA, RT EVH, LT OPEN VEIN HARVEST, DR Coker Car    CYST REMOVAL      GANGLION CYST REMOVED RT HAND    HYSTERECTOMY      NECK SURGERY      parotid artery tumor removed bilaterally    PAROTIDECTOMY Bilateral          CURRENT MEDICATIONS     Previous Medications    AMLODIPINE (NORVASC) 5 MG TABLET    Take 1 tablet by mouth daily    CLOPIDOGREL (PLAVIX) 75 MG TABLET    Take 1 tablet by mouth daily    FUROSEMIDE (LASIX) 40 MG TABLET    Take 1 tablet by mouth daily    IPRATROPIUM-ALBUTEROL (DUONEB) 0.5-2.5 (3) MG/3ML SOLN NEBULIZER SOLUTION    Inhale 3 mLs into the lungs every 4 hours (while awake)    NITROGLYCERIN (NITROSTAT) 0.4 MG SL TABLET    Place 1 tablet under the tongue every 5 minutes as needed for Chest pain    RAMIPRIL (ALTACE) 5 MG CAPSULE    Take 1 capsule by mouth 2 times daily    RIVAROXABAN (XARELTO) 20 MG TABS TABLET    Take 1 tablet by mouth daily (with breakfast)    ROSUVASTATIN (CRESTOR) 10 MG TABLET    TAKE 1 TABLET DAILY    SOTALOL (BETAPACE) 120 MG TABLET    Take 1 tablet by mouth 2 times daily       ALLERGIES Codeine    FAMILY HISTORY       Family History   Problem Relation Age of Onset    Cancer Mother     Coronary Art Dis Father     Mult Sclerosis Sister     Cancer Brother           SOCIAL HISTORY       Social History     Socioeconomic History    Marital status:      Spouse name: None    Number of children: None    Years of education: None    Highest education level: None   Occupational History    None   Tobacco Use    Smoking status: Current Every Day Smoker     Packs/day: 0.50     Types: Cigarettes     Last attempt to quit: 3/29/2021     Years since quittin.9    Smokeless tobacco: Never Used    Tobacco comment: 1/2 PACKS EVERY 2 WEEKS, states has been decreasing amount    Vaping Use    Vaping Use: Never used   Substance and Sexual Activity    Alcohol use: Never    Drug use: Never    Sexual activity: Not Currently     Partners: Male   Other Topics Concern    None   Social History Narrative    None     Social Determinants of Health     Financial Resource Strain:     Difficulty of Paying Living Expenses: Not on file   Food Insecurity:     Worried About Running Out of Food in the Last Year: Not on file    Ada of Food in the Last Year: Not on file   Transportation Needs:     Lack of Transportation (Medical): Not on file    Lack of Transportation (Non-Medical):  Not on file   Physical Activity:     Days of Exercise per Week: Not on file    Minutes of Exercise per Session: Not on file   Stress:     Feeling of Stress : Not on file   Social Connections:     Frequency of Communication with Friends and Family: Not on file    Frequency of Social Gatherings with Friends and Family: Not on file    Attends Congregational Services: Not on file    Active Member of Clubs or Organizations: Not on file    Attends Club or Organization Meetings: Not on file    Marital Status: Not on file   Intimate Partner Violence:     Fear of Current or Ex-Partner: Not on file    Emotionally Abused: Not on file    Physically Abused: Not on file    Sexually Abused: Not on file   Housing Stability:     Unable to Pay for Housing in the Last Year: Not on file    Number of Places Lived in the Last Year: Not on file    Unstable Housing in the Last Year: Not on file       SCREENINGS    [place] Coma Scale  Eye Opening: To pain  Best Verbal Response: None  Best Motor Response: Withdraws from pain  [place] Coma Scale Score: 7        PHYSICAL EXAM    (up to 7 for level 4, 8 or more for level 5)     ED Triage Vitals   BP Temp Temp Source Pulse Resp SpO2 Height Weight   03/15/22 2245 03/15/22 2316 03/15/22 2316 03/15/22 2245 03/15/22 2245 03/15/22 2245 -- 03/15/22 2245   (!) 199/96 96.5 °F (35.8 °C) Rectal 85 19 99 %  180 lb (81.6 kg)       Physical Exam  Vitals and nursing note reviewed. Constitutional:       General: She is in acute distress. Appearance: She is ill-appearing. HENT:      Head: Atraumatic. Mouth/Throat:      Mouth: Mucous membranes are moist. Mucous membranes are not dry. Eyes:      General: No scleral icterus. Pupils: Pupils are equal, round, and reactive to light. Neck:      Trachea: No tracheal deviation. Cardiovascular:      Rate and Rhythm: Normal rate and regular rhythm. Pulses: Normal pulses. Heart sounds: Normal heart sounds. No murmur heard. Pulmonary:      Effort: Bradypnea, prolonged expiration and respiratory distress present. Breath sounds: No stridor. Wheezing present. Abdominal:      General: There is no distension. Palpations: Abdomen is soft. Tenderness: There is no abdominal tenderness. There is no guarding. Musculoskeletal:      Right lower leg: No edema. Left lower leg: No edema. Skin:     Capillary Refill: Capillary refill takes less than 2 seconds. Coloration: Skin is not pale. Findings: No rash. Neurological:      Mental Status: She is lethargic. GCS: GCS eye subscore is 2. GCS verbal subscore is 1.  GCS motor subscore is 4. Comments: A comprehensive neurological exam is difficult to perform. Patient is altered and not able to cooperate with exam.  There is no obvious facial drooping identified. DIAGNOSTIC RESULTS     EKG: All EKG's areinterpreted by the Emergency Department Physician who either signs or Co-signs this chart in the absence of a cardiologist.    2250: Paced rhythm at a rate of 81, no evidence of acute ST elevation is identified. QTc: 440 MS. RADIOLOGY:  Non-plain film images such as CT, Ultrasound and MRI are read by the radiologist. Plain radiographic images are visualized and preliminarily interpreted bythe emergency physician with the below findings:    CT HEAD:    Brain: No hemorrhage, hydrocephalus, mass effect, or herniation. Bones: Unremarkable.     CXR: No infiltrate, ETT in good position    XR CHEST PORTABLE    (Results Pending)   CT HEAD WO CONTRAST    (Results Pending)           LABS:  Labs Reviewed   CBC WITH AUTO DIFFERENTIAL - Abnormal; Notable for the following components:       Result Value    WBC 11.7 (*)     Hematocrit 50.6 (*)     .8 (*)     MCHC 30.2 (*)     RDW 14.6 (*)     MPV 9.1 (*)     Monocytes % 11.9 (*)     Monocytes Absolute 1.40 (*)     All other components within normal limits   COMPREHENSIVE METABOLIC PANEL - Abnormal; Notable for the following components:    Chloride 94 (*)     CO2 34 (*)     Glucose 239 (*)     Alkaline Phosphatase 107 (*)     All other components within normal limits   BRAIN NATRIURETIC PEPTIDE - Abnormal; Notable for the following components:    Pro-BNP 1,872 (*)     All other components within normal limits   BLOOD GAS, ARTERIAL - Abnormal; Notable for the following components:    pH, Arterial 7.260 (*)     pCO2, Arterial 86.0 (*)     pO2, Arterial 245.0 (*)     HCO3, Arterial 38.6 (*)     Base Excess, Arterial 7.6 (*)     All other components within normal limits    Narrative:     CALL  [facility]  [facility] tel. , vikas hsu rn  vikas hsu rn, 03/15/2022 23:22, by [facility]   POCT GLUCOSE - Abnormal; Notable for the following components:    POC Glucose 203 (*)     All other components within normal limits   POCT GLUCOSE - Normal   COVID-19, RAPID   TROPONIN   LACTIC ACID   POTASSIUM, WHOLE BLOOD       All other labs were within normal range or not returned as of this dictation. EMERGENCY DEPARTMENT COURSE and DIFFERENTIAL DIAGNOSIS/MDM:   Vitals:    Vitals:    03/16/22 0028 03/16/22 0029 03/16/22 0136 03/16/22 0221   BP: (!) 85/52 (!) 78/62 (!) 102/58 107/72   Pulse: 81 75 71 73   Resp:       Temp:       TempSrc:       SpO2: 100% 100%  95%   Weight:           MDM    Reassessment    Patient arrived to the ED with agonal respirations with bag valve mask in place. Her Accu-Chek was normal.  Decision made to proceed with stabilization of her airway with intubation. See procedure note as documented. A stat ABG was obtained which reveals evidence of acute hypercapnic respiratory failure with a PCO2 of 86, pH of 7.2. Patient was placed on the ventilator and was provided some sedation with propofol. She quickly developed hypotension and propofol infusion was discontinued. She was given IV fluid bolus and started on a fentanyl infusion for sedation. Chest radiograph does not reveal evidence of acute infiltrate. She has received IV Solu-Medrol along with a dose of IV antibiotics for treatment of COPD exacerbation. She will require inpatient admission to the intensive care unit. CONSULTS:    Case was discussed with Dr. Félix Mark regarding inpatient admission to the intensive care unit.     PROCEDURES:  Unless otherwise noted below, none     Intubation    Date/Time: 3/15/2022 10:52 PM  Performed by: Nancie Spicer MD  Authorized by: Nancie Spicer MD     Consent:     Consent obtained:  Emergent situation  Pre-procedure details:     Patient status:  Altered mental status    Paralytics:  Succinylcholine  Procedure details: Preoxygenation:  Bag valve mask    Intubation method:  Oral    Oral intubation technique:  Direct    Laryngoscope blade: Mac 4    Tube size (mm):  7.5    Tube type:  Cuffed    Number of attempts:  1    Tube visualized through cords: yes    Placement assessment:     ETT to lip:  22    Tube secured with:  ETT tello    Breath sounds:  Equal    Placement verification: chest rise, direct visualization, equal breath sounds, ETCO2 detector and tube exhalation      CXR findings:  ETT in proper place  Post-procedure details:     Patient tolerance of procedure: Tolerated well, no immediate complications      CRITICAL CARE TIME   Total Critical Care time was 42 minutes, excluding separately reportable procedures. There was a high probability of clinically significant/life threatening deterioration in the patient's condition which required my urgent intervention. Patient required my immediate presence at the bedside. Multiple reexaminations were undertaken throughout ED course of care. Time was spent reviewing plan of care with ED nursing staff. Time is inclusive of  and clinical documentation. FINAL IMPRESSION      1. Acute hypercapnic respiratory failure ([address]    2.  Acute exacerbation of chronic obstructive pulmonary disease (COPD) ([address]          DISPOSITION/PLAN   DISPOSITION Decision To Admit 03/16/2022 03:27:56 AM      (Please note that portions of this note were completed with a voice recognition program.  Efforts were made to edit thedictations but occasionally words are mis-transcribed.)    Jacqueline Monteiro MD (electronically signed)  Attending Emergency Physician         Jacqueline Monteiro MD  03/16/22 0058

## 2022-03-16 NOTE — CONSULTS
Palliative Care:   Pt is new to palliative care team.  Pt presented to ED by EMS with SOA and AMS. She is currently in CCU, vented and sedated. Call made to pt son for additional information. Past Medical History:        Past Medical History:   Diagnosis Date    ASHD (arteriosclerotic heart disease)     s/p PTCA and stent of circumflex, as well as intermediate and mid LAD     [facility]) [facility]     Coronary atherosclerosis     Diabetes mellitus ([facility]     diet controlled, type 2    Encounter for wound care     FOR LLL WOUND    Fall 10/29/2020    Ganglion cyst     RT HAND    Hematoma 10/2020    hematoma LLL from fall injury    Hx of blood clots     Hypercholesteremia     Hypertension     Pacemaker 03/02/2021    Unstable angina ([facility]        Advance Directives:    Pt does not have an AD on file. Son states she will reman a full code at present time. He is her only  child. Pain/Other Symptoms:  Vented and sedated. Activity:   Vent/sedated          Psychological/Spiritual:    Son states pt has visited multiple churches. Limited family support. Plan:  Pulmonology consulted for vent management,  Medical management     Patient/family discussion r/t goals:  Spoke with pt son, Hermann March, briefly for information on pt. Son tells me pt lives alone in her home. He lives next door. Son is a  in the area. 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 Pt is independent at home, able to manage her meals and self care. She does have a house keeper come in 1 x week. Updated pt on Dr. Saul Sharif visit. Son would like for his mother to get off vent and return to her home is possible. Palliative following for support, [facility].                    Electronically signed by Flor Angel RN on 3/16/2022 at 11:41 AM

## 2022-03-16 NOTE — PROGRESS NOTES
Blood Gas, Arterial [[id number]] (Abnormal) Collected: 03/15/22 2321     Specimen: Blood gases Updated: 03/15/22 2322      pH, Arterial 7.260      pCO2, Arterial 86.0 mmHg       pO2, Arterial 245.0 mmHg       HCO3, Arterial 38.6 mmol/L       Base Excess, Arterial 7.6 mmol/L       Hemoglobin, Art, Extended 15.3 g/dL       O2 Sat, Arterial 95.8 %       Carboxyhgb, Arterial 2.9 %       Methemoglobin, Arterial 1.1 %       O2 Content, Arterial 21.1 mL/dL       O2 Therapy Unknown     Narrative:       CALL  [facility]  [facility] tel. , vikas hsu rn   vikas hsu rn, 03/15/2022 23:22, by [facility]     Lactic Acid [[id number]] Collected: 03/15/22 2319     Specimen: Blood Updated: 03/15/22 2322     Potassium, Whole Blood [[id number]] Collected: 03/15/22 2321      Updated: 03/15/22 2321      Potassium, Whole Blood 5.4     Vc,16,450,50% + 5 peep  Right radial  At+

## 2022-03-16 NOTE — H&P
CHIEF COMPLAINT: Respiratory failure    History Obtained From:  electronic medical record     HISTORY OF PRESENT ILLNESS:      The patient is a 67 y.o. female with significant past medical history of advanced COPD, type 2 diabetes mellitus, coronary artery disease who presents with respiratory failure at home. Apparently she was having altered mental status and called EMS. Upon their arrival she was having significant difficulty in breathing. She was bagged in route with difficulty maintaining oxygen saturations. Upon arrival to the emergency room she was almost agonal with low oxygen saturation and an ABG showing a pH of 7.2, PCO2 of 86, and PO2 of 245 I believe post intubation. Currently she is sedated on the vent and unable to give much history of anything prior to her decompensation. No family currently in the room with her or available.     Past Medical History:   Diagnosis Date    ASHD (arteriosclerotic heart disease)     s/p PTCA and stent of circumflex, as well as intermediate and mid LAD     [facility] ([facility] disease) [facility]     Coronary atherosclerosis     Diabetes mellitus ([facility]     diet controlled, type 2    Encounter for wound care     FOR LLL WOUND    Fall 10/29/2020    Ganglion cyst     RT HAND    Hematoma 10/2020    hematoma LLL from fall injury    Hx of blood clots     Hypercholesteremia     Hypertension     Pacemaker 03/02/2021    Unstable angina [facility])        Past Surgical History:   Procedure Laterality Date    CARDIAC CATHETERIZATION  12/10/00    selective left heart and coronary arteriography with left ventriculography     CARDIAC CATHETERIZATION  01/23/98    left heart cath, left ventriculography, slective coronary arteriography, direct infarct angioplasty and stent placement to proximal left anterior descending coronary artery    CARDIAC CATHETERIZATION  03/05/94    left heart cath, selective coronary arteriography, left ventriculography   Tomie Grater CARDIAC CATHETERIZATION  8/27/2011    [place]    CHOLECYSTECTOMY      CORONARY ANGIOPLASTY WITH STENT PLACEMENT  12/12/00    PTCA and stent placement to the mid LAD/ptca and stent placement first circumflex marginal (intermediate)     CORONARY ANGIOPLASTY WITH STENT PLACEMENT  08/2021    CORONARY ARTERY BYPASS GRAFT  8/29/2011    PACABG X 4 LIMA-LAD, SVG-DIAG, SVG-PDA, RT EVH, LT OPEN VEIN HARVEST, DR Bridgette Cosme    CYST REMOVAL      GANGLION CYST REMOVED RT HAND    HYSTERECTOMY      NECK SURGERY      parotid artery tumor removed bilaterally    PAROTIDECTOMY Bilateral        Medications Prior to Admission:    Medications Prior to Admission: rivaroxaban (XARELTO) 20 MG TABS tablet, Take 1 tablet by mouth daily (with breakfast)  rosuvastatin (CRESTOR) 10 MG tablet, TAKE 1 TABLET DAILY  amLODIPine (NORVASC) 5 MG tablet, Take 1 tablet by mouth daily  clopidogrel (PLAVIX) 75 MG tablet, Take 1 tablet by mouth daily  ramipril (ALTACE) 5 MG capsule, Take 1 capsule by mouth 2 times daily  sotalol (BETAPACE) 120 MG tablet, Take 1 tablet by mouth 2 times daily  ipratropium-albuterol (DUONEB) 0.5-2.5 (3) MG/3ML SOLN nebulizer solution, Inhale 3 mLs into the lungs every 4 hours (while awake)  furosemide (LASIX) 40 MG tablet, Take 1 tablet by mouth daily  nitroGLYCERIN (NITROSTAT) 0.4 MG SL tablet, Place 1 tablet under the tongue every 5 minutes as needed for Chest pain    Allergies:    Codeine    Social History:    reports that she has been smoking cigarettes. She has been smoking about 0.50 packs per day. She has never used smokeless tobacco. She reports that she does not drink alcohol and does not use drugs. Family History:   family history includes Cancer in her brother and mother; Coronary Art Dis in her father; Mult Sclerosis in her sister.     REVIEW OF SYSTEMS:  Unable to be obtained secondary to intubation on ventilator    PHYSICAL EXAM:    Vitals:  BP (!) 159/85   Pulse 80   Temp 97.2 °F (36.2 °C) (Temporal)   Resp 13   Wt 180 lb (81.6 kg)   SpO2 93%   BMI 29.05 kg/m²     General Appearance: On ventilator but does open eyes to voice, not really following directions. Skin:  negatives: mobility and turgor normal  Head/face:  NCAT  Eyes:  No gross abnormalities. Neck:  neck- supple, no mass, non-tender  Lungs:  Normal expansion. Clear to auscultation. No rales, rhonchi, or wheezing. Heart:  Heart regular rate and rhythm  Abdomen: Auscultation: Normal bowel sounds. No bruits. Palpation: No masses, tenderness or organomegally. Extremities: Extremities warm to touch, pink, with no edema.   Musculoskeletal:  negative  Neurologic: Sedated on vent    DATA:  CBC with Differential:    Lab Results   Component Value Date    WBC 11.7 03/15/2022    RBC 5.02 03/15/2022    HGB 15.3 03/15/2022    HCT 50.6 03/15/2022    HCT 32.1 09/02/2011     03/15/2022     09/02/2011    .8 03/15/2022    MCH 30.5 03/15/2022    MCHC 30.2 03/15/2022    RDW 14.6 03/15/2022    LYMPHOPCT 31.3 03/15/2022    MONOPCT 11.9 03/15/2022    EOSPCT 0.5 09/02/2011    BASOPCT 0.4 03/15/2022    MONOSABS 1.40 03/15/2022    LYMPHSABS 3.7 03/15/2022    EOSABS 0.40 03/15/2022    BASOSABS 0.10 03/15/2022     CMP:    Lab Results   Component Value Date     03/15/2022     09/02/2011    K 5.7 03/16/2022    K 4.7 03/15/2022    K 3.5 06/28/2021    K 4.1 09/02/2011    CL 94 03/15/2022     09/02/2011    CO2 34 03/15/2022    BUN 17 03/15/2022    CREATININE 0.9 03/15/2022    CREATININE 0.6 09/02/2011    GFRAA >59 03/15/2022    LABGLOM >60 03/15/2022    GLUCOSE 206 03/15/2022    PROT 8.3 03/15/2022    PROT 7.5 01/18/2013    LABALBU 4.3 03/15/2022    LABALBU 3.3 09/02/2011    CALCIUM 9.1 03/15/2022    BILITOT 0.3 03/15/2022    ALKPHOS 107 03/15/2022    ALKPHOS 57 09/02/2011    AST 17 03/15/2022    ALT 16 03/15/2022     ABG:    Lab Results   Component Value Date    PH 7.45 08/31/2011    PCO2 42 08/31/2011    PO2 67 08/31/2011    HCO3 29.2 08/31/2011    O2SAT 14.5 08/31/2011       ASSESSMENT:      Patient Active Problem List   Diagnosis    Deep vein thrombosis (HCC)    Essential hypertension    Diabetes mellitus ([address]    Hypercholesteremia    S/P CABG x 4    History of coronary artery stent placement    Coronary artery disease involving native coronary artery of native heart without angina pectoris    Mixed hyperlipidemia    History of diabetes mellitus    Chronic obstructive pulmonary disease ([address]    NUNEZ (dyspnea on exertion)    Abdominal aortic aneurysm (AAA) without rupture ([address]    Diabetic ulcer of left lower leg associated with type 2 diabetes mellitus, with fat layer exposed ([address]    Smoker    Traumatic hematoma    NICM (nonischemic cardiomyopathy) ([address]    Hyponatremia    Acute hypercapnic respiratory failure (HCC)       PLAN:    Patient with significant respiratory compromise currently on mechanical ventilation. Consult placed for pulmonology. Her repeat gas is somewhat better but still with elevated PCO2 and now slightly decreased PO2 but acidosis is improving. Pulmonology consultation requested for ventilator management. I suspect she will be a challenge to wean off of the vent given her advanced COPD. Seems to be tolerating current sedation well enough. May transition to Precedex to allow more flexibility in potentially weaning vent. Greater than 50 minutes total time spent in critical care and coordination of patient care in the intensive care unit.     Electronically signed by Vinnie Miller MD on 3/16/2022 at 8:35 AM

## 2022-03-16 NOTE — ED NOTES
Called Dr. Trujillo Ship answering service at [phone], transferred call to [address] [place] [address] at [address] [facility]  03/16/22 1756

## 2022-03-16 NOTE — PROGRESS NOTES
Potassium, Whole Blood [[id number]] Collected: 03/16/22 0805      Updated: 03/16/22 0806      Potassium, Whole Blood 5.7     Blood Gas, Arterial [[id number]] (Abnormal) Collected: 03/16/22 0805     Specimen: Blood gases Updated: 03/16/22 0806      pH, Arterial 7.310      pCO2, Arterial 66.0 mmHg       pO2, Arterial 68.0 mmHg       HCO3, Arterial 33.2 mmol/L       Base Excess, Arterial 4.7 mmol/L       Hemoglobin, Art, Extended 14.5 g/dL       O2 Sat, Arterial 91.8 %       Carboxyhgb, Arterial 2.7 %       Methemoglobin, Arterial 1.2 %       O2 Content, Arterial 18.7 mL/dL       O2 Therapy Unknown     AT+, L rad, Vent settings: VT = 450, RR = 14, ACVC, O2 @ 30%, PEEP= 5 cm

## 2022-03-16 NOTE — PLAN OF CARE
Nutrition Problem #1: Inadequate oral intake  Intervention: Food and/or Nutrient Delivery: Start Tube Feeding  Nutritional Goals: Meet nutritional needs through EN

## 2022-03-17 ENCOUNTER — APPOINTMENT (OUTPATIENT)
Dept: GENERAL RADIOLOGY | Age: 72
DRG: 207 | End: 2022-03-17
Payer: MEDICARE

## 2022-03-17 LAB
ALBUMIN SERPL-MCNC: 3.8 G/DL (ref 3.5–5.2)
ALP BLD-CCNC: 81 U/L (ref 35–104)
ALT SERPL-CCNC: 20 U/L (ref 5–33)
ANION GAP SERPL CALCULATED.3IONS-SCNC: 10 MMOL/L (ref 7–19)
AST SERPL-CCNC: 17 U/L (ref 5–32)
BASE EXCESS ARTERIAL: 3.4 MMOL/L (ref -2–2)
BASOPHILS ABSOLUTE: 0 K/UL (ref 0–0.2)
BASOPHILS RELATIVE PERCENT: 0.1 % (ref 0–1)
BILIRUB SERPL-MCNC: <0.2 MG/DL (ref 0.2–1.2)
BUN BLDV-MCNC: 30 MG/DL (ref 8–23)
CALCIUM SERPL-MCNC: 8.8 MG/DL (ref 8.8–10.2)
CARBOXYHEMOGLOBIN ARTERIAL: 2.4 % (ref 0–5)
CHLORIDE BLD-SCNC: 95 MMOL/L (ref 98–111)
CO2: 26 MMOL/L (ref 22–29)
CREAT SERPL-MCNC: 1.2 MG/DL (ref 0.5–0.9)
EOSINOPHILS ABSOLUTE: 0 K/UL (ref 0–0.6)
EOSINOPHILS RELATIVE PERCENT: 0 % (ref 0–5)
GFR AFRICAN AMERICAN: 53
GFR NON-AFRICAN AMERICAN: 44
GLUCOSE BLD-MCNC: 221 MG/DL (ref 70–99)
GLUCOSE BLD-MCNC: 281 MG/DL (ref 70–99)
GLUCOSE BLD-MCNC: 297 MG/DL (ref 70–99)
GLUCOSE BLD-MCNC: 315 MG/DL (ref 74–109)
GLUCOSE BLD-MCNC: 316 MG/DL (ref 70–99)
HCO3 ARTERIAL: 33.4 MMOL/L (ref 22–26)
HCT VFR BLD CALC: 43.1 % (ref 37–47)
HEMOGLOBIN, ART, EXTENDED: 13.6 G/DL (ref 12–16)
HEMOGLOBIN: 13.3 G/DL (ref 12–16)
IMMATURE GRANULOCYTES #: 0 K/UL
LYMPHOCYTES ABSOLUTE: 0.6 K/UL (ref 1.1–4.5)
LYMPHOCYTES RELATIVE PERCENT: 6.3 % (ref 20–40)
MCH RBC QN AUTO: 30.9 PG (ref 27–31)
MCHC RBC AUTO-ENTMCNC: 30.9 G/DL (ref 33–37)
MCV RBC AUTO: 100 FL (ref 81–99)
METHEMOGLOBIN ARTERIAL: 1 %
MONOCYTES ABSOLUTE: 0.6 K/UL (ref 0–0.9)
MONOCYTES RELATIVE PERCENT: 5.4 % (ref 0–10)
NEUTROPHILS ABSOLUTE: 9 K/UL (ref 1.5–7.5)
NEUTROPHILS RELATIVE PERCENT: 87.9 % (ref 50–65)
O2 CONTENT ARTERIAL: 18.2 ML/DL
O2 SAT, ARTERIAL: 94.6 %
O2 THERAPY: ABNORMAL
PCO2 ARTERIAL: 78 MMHG (ref 35–45)
PDW BLD-RTO: 14.8 % (ref 11.5–14.5)
PERFORMED ON: ABNORMAL
PH ARTERIAL: 7.24 (ref 7.35–7.45)
PLATELET # BLD: 178 K/UL (ref 130–400)
PMV BLD AUTO: 9 FL (ref 9.4–12.3)
PO2 ARTERIAL: 86 MMHG (ref 80–100)
POTASSIUM SERPL-SCNC: 5.6 MMOL/L (ref 3.5–5)
POTASSIUM, WHOLE BLOOD: 5.4
RBC # BLD: 4.31 M/UL (ref 4.2–5.4)
SODIUM BLD-SCNC: 131 MMOL/L (ref 136–145)
TOTAL PROTEIN: 6.3 G/DL (ref 6.6–8.7)
WBC # BLD: 10.2 K/UL (ref 4.8–10.8)

## 2022-03-17 PROCEDURE — 2700000000 HC OXYGEN THERAPY PER DAY

## 2022-03-17 PROCEDURE — 94003 VENT MGMT INPAT SUBQ DAY: CPT

## 2022-03-17 PROCEDURE — 36415 COLL VENOUS BLD VENIPUNCTURE: CPT

## 2022-03-17 PROCEDURE — 6360000002 HC RX W HCPCS: Performed by: FAMILY MEDICINE

## 2022-03-17 PROCEDURE — 85025 COMPLETE CBC W/AUTO DIFF WBC: CPT

## 2022-03-17 PROCEDURE — 6370000000 HC RX 637 (ALT 250 FOR IP): Performed by: INTERNAL MEDICINE

## 2022-03-17 PROCEDURE — 80053 COMPREHEN METABOLIC PANEL: CPT

## 2022-03-17 PROCEDURE — 2100000000 HC CCU R&B

## 2022-03-17 PROCEDURE — 71045 X-RAY EXAM CHEST 1 VIEW: CPT

## 2022-03-17 PROCEDURE — 36600 WITHDRAWAL OF ARTERIAL BLOOD: CPT

## 2022-03-17 PROCEDURE — 84132 ASSAY OF SERUM POTASSIUM: CPT

## 2022-03-17 PROCEDURE — 94640 AIRWAY INHALATION TREATMENT: CPT

## 2022-03-17 PROCEDURE — 6370000000 HC RX 637 (ALT 250 FOR IP): Performed by: FAMILY MEDICINE

## 2022-03-17 PROCEDURE — 2500000003 HC RX 250 WO HCPCS: Performed by: FAMILY MEDICINE

## 2022-03-17 PROCEDURE — 99291 CRITICAL CARE FIRST HOUR: CPT | Performed by: INTERNAL MEDICINE

## 2022-03-17 PROCEDURE — 82803 BLOOD GASES ANY COMBINATION: CPT

## 2022-03-17 PROCEDURE — 82947 ASSAY GLUCOSE BLOOD QUANT: CPT

## 2022-03-17 PROCEDURE — 2580000003 HC RX 258: Performed by: FAMILY MEDICINE

## 2022-03-17 RX ORDER — FUROSEMIDE 10 MG/ML
20 INJECTION INTRAMUSCULAR; INTRAVENOUS DAILY
Status: DISCONTINUED | OUTPATIENT
Start: 2022-03-17 | End: 2022-03-23

## 2022-03-17 RX ORDER — SODIUM CHLORIDE, SODIUM LACTATE, POTASSIUM CHLORIDE, CALCIUM CHLORIDE 600; 310; 30; 20 MG/100ML; MG/100ML; MG/100ML; MG/100ML
INJECTION, SOLUTION INTRAVENOUS CONTINUOUS
Status: DISCONTINUED | OUTPATIENT
Start: 2022-03-17 | End: 2022-03-28 | Stop reason: HOSPADM

## 2022-03-17 RX ADMIN — METHYLPREDNISOLONE SODIUM SUCCINATE 40 MG: 40 INJECTION, POWDER, FOR SOLUTION INTRAMUSCULAR; INTRAVENOUS at 12:33

## 2022-03-17 RX ADMIN — SODIUM CHLORIDE, PRESERVATIVE FREE 1000 MG: 5 INJECTION INTRAVENOUS at 00:37

## 2022-03-17 RX ADMIN — METHYLPREDNISOLONE SODIUM SUCCINATE 40 MG: 40 INJECTION, POWDER, FOR SOLUTION INTRAMUSCULAR; INTRAVENOUS at 00:37

## 2022-03-17 RX ADMIN — INSULIN LISPRO 3 UNITS: 100 INJECTION, SOLUTION INTRAVENOUS; SUBCUTANEOUS at 17:10

## 2022-03-17 RX ADMIN — IPRATROPIUM BROMIDE AND ALBUTEROL SULFATE 1 AMPULE: .5; 2.5 SOLUTION RESPIRATORY (INHALATION) at 11:00

## 2022-03-17 RX ADMIN — Medication 200 MCG/HR: at 20:37

## 2022-03-17 RX ADMIN — CHLORHEXIDINE GLUCONATE 15 ML: 1.2 RINSE ORAL at 20:23

## 2022-03-17 RX ADMIN — ENOXAPARIN SODIUM 80 MG: 80 INJECTION SUBCUTANEOUS at 07:22

## 2022-03-17 RX ADMIN — METHYLPREDNISOLONE SODIUM SUCCINATE 40 MG: 40 INJECTION, POWDER, FOR SOLUTION INTRAMUSCULAR; INTRAVENOUS at 17:10

## 2022-03-17 RX ADMIN — Medication 200 MCG/HR: at 07:14

## 2022-03-17 RX ADMIN — INSULIN LISPRO 1 UNITS: 100 INJECTION, SOLUTION INTRAVENOUS; SUBCUTANEOUS at 20:23

## 2022-03-17 RX ADMIN — IPRATROPIUM BROMIDE AND ALBUTEROL SULFATE 1 AMPULE: .5; 2.5 SOLUTION RESPIRATORY (INHALATION) at 06:46

## 2022-03-17 RX ADMIN — INSULIN LISPRO 4 UNITS: 100 INJECTION, SOLUTION INTRAVENOUS; SUBCUTANEOUS at 07:24

## 2022-03-17 RX ADMIN — INSULIN LISPRO 3 UNITS: 100 INJECTION, SOLUTION INTRAVENOUS; SUBCUTANEOUS at 12:34

## 2022-03-17 RX ADMIN — METHYLPREDNISOLONE SODIUM SUCCINATE 40 MG: 40 INJECTION, POWDER, FOR SOLUTION INTRAMUSCULAR; INTRAVENOUS at 05:49

## 2022-03-17 RX ADMIN — IPRATROPIUM BROMIDE AND ALBUTEROL SULFATE 1 AMPULE: .5; 2.5 SOLUTION RESPIRATORY (INHALATION) at 22:00

## 2022-03-17 RX ADMIN — FUROSEMIDE 20 MG: 10 INJECTION, SOLUTION INTRAMUSCULAR; INTRAVENOUS at 14:02

## 2022-03-17 RX ADMIN — SODIUM CHLORIDE, PRESERVATIVE FREE 20 MG: 5 INJECTION INTRAVENOUS at 07:23

## 2022-03-17 RX ADMIN — SODIUM CHLORIDE, POTASSIUM CHLORIDE, SODIUM LACTATE AND CALCIUM CHLORIDE: 600; 310; 30; 20 INJECTION, SOLUTION INTRAVENOUS at 14:03

## 2022-03-17 RX ADMIN — IPRATROPIUM BROMIDE AND ALBUTEROL SULFATE 1 AMPULE: .5; 2.5 SOLUTION RESPIRATORY (INHALATION) at 02:10

## 2022-03-17 RX ADMIN — DEXTROSE AND SODIUM CHLORIDE: 5; 450 INJECTION, SOLUTION INTRAVENOUS at 05:48

## 2022-03-17 RX ADMIN — IPRATROPIUM BROMIDE AND ALBUTEROL SULFATE 1 AMPULE: .5; 2.5 SOLUTION RESPIRATORY (INHALATION) at 14:59

## 2022-03-17 RX ADMIN — CHLORHEXIDINE GLUCONATE 15 ML: 1.2 RINSE ORAL at 07:23

## 2022-03-17 RX ADMIN — DEXMEDETOMIDINE HYDROCHLORIDE 0.6 MCG/KG/HR: 400 INJECTION INTRAVENOUS at 05:11

## 2022-03-17 RX ADMIN — IPRATROPIUM BROMIDE AND ALBUTEROL SULFATE 1 AMPULE: .5; 2.5 SOLUTION RESPIRATORY (INHALATION) at 18:51

## 2022-03-17 RX ADMIN — SODIUM CHLORIDE, PRESERVATIVE FREE 20 MG: 5 INJECTION INTRAVENOUS at 20:21

## 2022-03-17 RX ADMIN — DEXMEDETOMIDINE HYDROCHLORIDE 0.9 MCG/KG/HR: 400 INJECTION INTRAVENOUS at 21:28

## 2022-03-17 ASSESSMENT — PULMONARY FUNCTION TESTS
PIF_VALUE: 26
PIF_VALUE: 28
PIF_VALUE: 28
PIF_VALUE: 32
PIF_VALUE: 43
PIF_VALUE: 33
PIF_VALUE: 29
PIF_VALUE: 34
PIF_VALUE: 41
PIF_VALUE: 31
PIF_VALUE: 33
PIF_VALUE: 32
PIF_VALUE: 37
PIF_VALUE: 31
PIF_VALUE: 28
PIF_VALUE: 32

## 2022-03-17 ASSESSMENT — PAIN SCALES - GENERAL
PAINLEVEL_OUTOF10: 0
PAINLEVEL_OUTOF10: 0

## 2022-03-17 NOTE — PROGRESS NOTES
Follow up:  Pt remains on the ventilator today. Did not tolerate weaning yesterday. Review of chart and nurse report. No significant change in condition. Palliative Care reached out to family yesterday and we will continue to support and follow POC.     Electronically signed by Neisha Hdez RN on 3/17/2022 at 1:21 PM

## 2022-03-17 NOTE — PROGRESS NOTES
Results for Jaylin Cabello (MRN [id number]) as of 3/17/2022 03:16   Ref.  Range 3/17/2022 03:13   Hemoglobin, Art, Extended Latest Ref Range: 12.0 - 16.0 g/dL 13.6   pH, Arterial Latest Ref Range: 7.350 - 7.450  7.240 (LL)   pCO2, Arterial Latest Ref Range: 35.0 - 45.0 mmHg 78.0 (HH)   pO2, Arterial Latest Ref Range: 80.0 - 100.0 mmHg 86.0   HCO3, Arterial Latest Ref Range: 22.0 - 26.0 mmol/L 33.4 (H)   Base Excess, Arterial Latest Ref Range: -2.0 - 2.0 mmol/L 3.4 (H)   O2 Sat, Arterial Latest Ref Range: >92 % 94.6   O2 Content, Arterial Latest Ref Range: Not Established mL/dL 18.2   Methemoglobin, Arterial Latest Ref Range: <1.5 % 1.0   Carboxyhgb, Arterial Latest Ref Range: 0.0 - 5.0 % 2.4       AC/vc + 12  450  35%  +5  RR, +AT

## 2022-03-17 NOTE — PROGRESS NOTES
Family Medicine Progress Note    Patient:  Belgica Dunlap  YOB: 1950    MRN: [id number]     Acct: [de-identified]     Admit date: 3/15/2022    Patient Seen, Chart, Consults notes, Labs, Radiology studies reviewed. Subjective: Day 1 of stay with acute hypercapnic respiratory failure and most recent (in last 24 hours) has had basically no significant change. Remains sedated on ventilator. Did not respond well to breathing trials yesterday. Got little agitated overnight and placed her on Precedex. Blood pressures have been better. Adjustments made in ventilator per pulmonology based on most recent ABG. Past, Family, Social History unchanged from admission.     Diet:  Diet NPO  ADULT TUBE FEEDING; Orogastric; Peptide Based High Protein; Continuous; 20; Yes; 5; Q 6 hours; 58; 0; Other (specify); no free water flulsh    Medications:  Scheduled Meds:   furosemide  20 mg IntraVENous Daily    sodium chloride flush  5-40 mL IntraVENous 2 times per day    cefTRIAXone (ROCEPHIN) IV  1,000 mg IntraVENous Q24H    methylPREDNISolone  40 mg IntraVENous Q6H    insulin lispro  0-6 Units SubCUTAneous TID WC    insulin lispro  0-3 Units SubCUTAneous Nightly    enoxaparin  80 mg SubCUTAneous Daily    chlorhexidine  15 mL Mouth/Throat BID    famotidine (PEPCID) injection  20 mg IntraVENous BID    ipratropium-albuterol  1 ampule Inhalation Q4H     Continuous Infusions:   lactated ringers      fentaNYL 200 mcg/hr (03/17/22 0818)    sodium chloride      norepinephrine Stopped (03/17/22 0255)    dextrose      dexmedetomidine HCl in NaCl 0.6 mcg/kg/hr (03/17/22 0818)     PRN Meds:sodium chloride flush, sodium chloride, ondansetron **OR** ondansetron, glucagon (rDNA), dextrose, glucose, dextrose bolus (hypoglycemia) **OR** dextrose bolus (hypoglycemia)    Objective:    Vitals: /82   Pulse 79   Temp 96.5 °F (35.8 °C) (Temporal)   Resp 16   Ht 5' 6\" (1.676 m)   Wt 180 lb (81.6 kg)   SpO2 100% BMI 29.05 kg/m²   24 hour intake/output:    Intake/Output Summary (Last 24 hours) at 3/17/2022 1300  Last data filed at 3/17/2022 0818  Gross per 24 hour   Intake 3134.13 ml   Output 1145 ml   Net 1989.13 ml     Last 3 weights: Wt Readings from Last 3 Encounters:   03/15/22 180 lb (81.6 kg)   12/01/21 157 lb (71.2 kg)   08/13/21 174 lb 8 oz (79.2 kg)       Physical Exam:    General Appearance: Sedated on ventilator  Skin:  negatives: mobility and turgor normal  Eyes:  No gross abnormalities. Neck:  neck- supple, no mass, non-tender  Lungs:  Breathing Pattern: regular, no distress, Breath sounds: wheezing- diffuse  Heart:  Heart regular rate and rhythm  Abdomen: Auscultation: Normal bowel sounds. No bruits. Palpation: No masses, tenderness or organomegally. Extremities: Extremities warm to touch, pink, with no edema.   Musculoskeletal:  negative  Neurologic: Sedated    CBC with Differential:    Lab Results   Component Value Date    WBC 10.2 03/17/2022    RBC 4.31 03/17/2022    HGB 13.3 03/17/2022    HCT 43.1 03/17/2022    HCT 32.1 09/02/2011     03/17/2022     09/02/2011    .0 03/17/2022    MCH 30.9 03/17/2022    MCHC 30.9 03/17/2022    RDW 14.8 03/17/2022    LYMPHOPCT 6.3 03/17/2022    MONOPCT 5.4 03/17/2022    EOSPCT 0.5 09/02/2011    BASOPCT 0.1 03/17/2022    MONOSABS 0.60 03/17/2022    LYMPHSABS 0.6 03/17/2022    EOSABS 0.00 03/17/2022    BASOSABS 0.00 03/17/2022     CMP:    Lab Results   Component Value Date     03/17/2022     09/02/2011    K 5.4 03/17/2022    K 5.6 03/17/2022    K 3.5 06/28/2021    K 4.1 09/02/2011    CL 95 03/17/2022     09/02/2011    CO2 26 03/17/2022    BUN 30 03/17/2022    CREATININE 1.2 03/17/2022    CREATININE 0.6 09/02/2011    GFRAA 53 03/17/2022    LABGLOM 44 03/17/2022    GLUCOSE 315 03/17/2022    PROT 6.3 03/17/2022    PROT 7.5 01/18/2013    LABALBU 3.8 03/17/2022    LABALBU 3.3 09/02/2011    CALCIUM 8.8 03/17/2022    BILITOT <0.2 03/17/2022 ALKPHOS 81 03/17/2022    ALKPHOS 57 09/02/2011    AST 17 03/17/2022    ALT 20 03/17/2022     Last 3 Troponin:    Lab Results   Component Value Date    TROPONINI <0.01 03/15/2022    TROPONINI <0.01 08/12/2021    TROPONINI <0.01 06/23/2021     Urine Culture:  No components found for: CURINE  Blood Culture:  No components found for: CBLOOD, CFUNGUSBL  Stool Culture:  No components found for: CSTOOL    Assessment:    Principal Problem:    Acute hypercapnic respiratory failure (HCC)  Active Problems:    NICM (nonischemic cardiomyopathy) ([facility]    Diabetes mellitus ([facility]    S/P CABG x 4    Coronary artery disease involving native coronary artery of native heart without angina pectoris    Chronic obstructive pulmonary disease (HCC)    Abdominal aortic aneurysm (AAA) without rupture ([facility] [address]    Smoker  Resolved Problems:    * No resolved hospital problems. *          Plan:  Continue with mechanical ventilation adjustments per pulmonology. DC her current fluids given the hyperglycemia she is having likely from fluids as well as from high-dose steroids. She is on insulin coverage. Also has started tube feeding. Mild hyperkalemia with history of some diminished cardiac function mainly diastolic. We will give a dose of Lasix therefore. Remains in critical condition. Appreciate pulmonology assistance. Greater than 30 minutes spent in critical care time.       Electronically signed by Saumya Rubin MD on 3/17/2022 at 1:00 PM

## 2022-03-17 NOTE — PROGRESS NOTES
Pulmonary and Critical Care Progress note. [address] [facility]    MRN# [id number]    Acct# [de-identified]  3/17/2022   4:37 PM CDT    Referring Yvonne Balbuena MD      Chief Complaint: Respiratory failure on mechanical ventilation    HPI: She continues to be intubated on mechanical ventilation    Medications  furosemide, 20 mg, IntraVENous, Daily    sodium chloride flush, 5-40 mL, IntraVENous, 2 times per day    cefTRIAXone (ROCEPHIN) IV, 1,000 mg, IntraVENous, Q24H    methylPREDNISolone, 40 mg, IntraVENous, Q6H    insulin lispro, 0-6 Units, SubCUTAneous, TID WC    insulin lispro, 0-3 Units, SubCUTAneous, Nightly    enoxaparin, 80 mg, SubCUTAneous, Daily    chlorhexidine, 15 mL, Mouth/Throat, BID    famotidine (PEPCID) injection, 20 mg, IntraVENous, BID    ipratropium-albuterol, 1 ampule, Inhalation, Q4H     Review of Systems:  Unable to obtain. Patient is on mechanical ventilation  Physical Exam:  BP (!) 143/85   Pulse 80   Temp 96.5 °F (35.8 °C) (Temporal)   Resp 18   Ht 5' 6\" (1.676 m)   Wt 180 lb (81.6 kg)   SpO2 97%   BMI 29.05 kg/m²   Intake/Output Summary (Last 24 hours) at 3/17/2022 1637  Last data filed at 3/17/2022 1607  Gross per 24 hour   Intake 3936.48 ml   Output 1945 ml   Net 1991.48 ml       General appearance: Intubated sedated on mechanical ventilation   HEENT: Normocephalic atraumatic.   Endotracheal tube in place  Heart: S1-S2 distant sounds no murmurs  Lungs: Diminished bilaterally no rubs or tenderness or dullness to percussion  Abdomen: Soft nontender no organomegalies normal bowel sounds  Extremities: No clubbing cyanosis or edema  Neuro: No focal findings  Skin: Intact    Recent Labs     03/15/22  2249 03/17/22  0240   WBC 11.7* 10.2   RBC 5.02 4.31   HGB 15.3 13.3   HCT 50.6* 43.1    178   .8* 100.0*   MCH 30.5 30.9   MCHC 30.2* 30.9*   RDW 14.6* 14.8*      Recent Labs     03/15/22  2249 03/15/22  2315 03/15/22  2321 03/16/22  0805 03/17/22  0240 03/17/22 0313     --   --   --  131*  --    K 4.7  --  5.4 5.7 5.6* 5.4   CL 94*  --   --   --  95*  --    CO2 34*  --   --   --  26  --    BUN 17  --   --   --  30*  --    CREATININE 0.9  --   --   --  1.2*  --    CALCIUM 9.1  --   --   --  8.8  --    GLUCOSE 239* 206  --   --  315*  --       Recent Labs     03/15/22  2321 03/16/22  0805 03/17/22 0313   PHART 7.260* 7.310* 7.240*   OZR9CME 86.0* 66.0* 78.0*   PO2ART 245.0* 68.0* 86.0   CMO4KIJ 38.6* 33.2* 33.4*   H2ISHIEF 95.8 91.8 94.6   BEART 7.6* 4.7* 3.4*     Recent Labs     03/15/22  2249 03/15/22  2249 03/15/22  2319 03/17/22  0240   AST 17   < >  --  17   ALT 16   < >  --  20   ALKPHOS 107*   < >  --  81   BILITOT 0.3   < >  --  <0.2   CALCIUM 9.1   < >  --  8.8   PROBNP 1,872*  --   --   --    TROPONINI <0.01  --   --   --    LACTA  --   --  0.7  --     < > = values in this interval not displayed. No results for input(s): BC, LABGRAM, CULTRESP, BFCX in the last 72 hours. Radiograph: CT HEAD WO CONTRAST    Result Date: 3/16/2022  1. No acute intracranial abnormality. 2. No skull fracture. 3. Chronic white matter ischemic changes. 4. Moderate soft tissue thickening of the posterior wall of the nasopharynx with narrowing of the nasopharyngeal airway is probably due to recent intubation. This may be clinically correlated. The above study was initially reviewed and reported by stat [facility]. I do not find any discrepancies. Signed by Dr Genia Womack    Result Date: 3/17/2022  1. No active cardiopulmonary disease. Signed by Dr Genia Womack    Result Date: 3/16/2022  1. No active cardiopulmonary disease. 2. Endotracheal tube in place. The nasogastric tube is not evaluated. 3. Dual-chamber cardiac pacer in place.  Signed by Dr Margy Betancourt       My radiograph interpretation/independent review of imaging: Reviewed    Problem list generated by [facility] Problems           Last

## 2022-03-17 NOTE — PROGRESS NOTES
Comprehensive Nutrition Assessment    Type and Reason for Visit:  Reassess    Nutrition Recommendations/Plan: continue to work toward goal    Nutrition Assessment:  Remains adequately nourished. EN has been started. Is currntly at 20ml/hr wih no free water flush,  No Propofol. Accuchek's level are elevated 291-322 but is on Solumedrol    Malnutrition Assessment:  Malnutrition Status: At risk for malnutrition (Comment)    Context:  Acute Illness     Findings of the 6 clinical characteristics of malnutrition:  Energy Intake:  Mild decrease in energy intake (Comment)  Weight Loss:  No significant weight loss     Body Fat Loss:  No significant body fat loss     Muscle Mass Loss:  No significant muscle mass loss    Fluid Accumulation:  No significant fluid accumulation Extremities   Strength:  Not Performed    Estimated Daily Nutrient Needs:  Energy (kcal):  8631-6179 kcals (17-25 kcals/kg); Weight Used for Energy Requirements:  Current     Protein (g):  71-122g; Weight Used for Protein Requirements:  Ideal        Fluid (ml/day):  4568-1488 ml; Method Used for Fluid Requirements:  1 ml/kcal      Nutrition Related Findings:  remains on vent      Wounds:  None       Current Nutrition Therapies:    Diet NPO  ADULT TUBE FEEDING; Orogastric; Peptide Based High Protein; Continuous; 20; Yes; 5; Q 6 hours; 58; 0; Other (specify); no free water flulsh    Anthropometric Measures:  · Height: 5' 6\" (167.6 cm)  · Current Body Weight: 180 lb (81.6 kg)   · Admission Body Weight: 180 lb (81.6 kg)    · Usual Body Weight: 157 lb (71.2 kg) (12/2021)     · Ideal Body Weight: 130 lbs; % Ideal Body Weight 138.5 %   · BMI: 29.1  · Adjusted Body Weight:  ; No Adjustment    · BMI Categories: Overweight (BMI 25.0-29. 9)       Nutrition Diagnosis:   · Inadequate oral intake related to acute injury/trauma,impaired respiratory function as evidenced by NPO or clear liquid status due to medical condition,intubation,nutrition support - enteral nutrition      Nutrition Interventions:   Food and/or Nutrient Delivery:  Continue Current Tube Feeding  Nutrition Education/Counseling:  No recommendation at this time   Coordination of Nutrition Care:  Continue to monitor while inpatient    Goals:  Meet nutritional needs through EN       Nutrition Monitoring and Evaluation:   Behavioral-Environmental Outcomes:  None Identified   Food/Nutrient Intake Outcomes:  Enteral Nutrition Intake/Tolerance  Physical Signs/Symptoms Outcomes:  Biochemical Data,Weight,Skin,Nutrition Focused Physical Findings,Fluid Status or Edema     Discharge Planning:     Too soon to determine     Electronically signed by Clau Gandhi MS, RD, LD on 3/17/22 at 1:02 PM CDT    Contact: [phone]

## 2022-03-18 LAB
ALBUMIN SERPL-MCNC: 3.5 G/DL (ref 3.5–5.2)
ALP BLD-CCNC: 80 U/L (ref 35–104)
ALT SERPL-CCNC: 15 U/L (ref 5–33)
ANION GAP SERPL CALCULATED.3IONS-SCNC: 13 MMOL/L (ref 7–19)
AST SERPL-CCNC: 10 U/L (ref 5–32)
BASOPHILS ABSOLUTE: 0 K/UL (ref 0–0.2)
BASOPHILS RELATIVE PERCENT: 0.1 % (ref 0–1)
BILIRUB SERPL-MCNC: <0.2 MG/DL (ref 0.2–1.2)
BUN BLDV-MCNC: 29 MG/DL (ref 8–23)
CALCIUM SERPL-MCNC: 9 MG/DL (ref 8.8–10.2)
CHLORIDE BLD-SCNC: 98 MMOL/L (ref 98–111)
CO2: 26 MMOL/L (ref 22–29)
CREAT SERPL-MCNC: 0.9 MG/DL (ref 0.5–0.9)
EOSINOPHILS ABSOLUTE: 0 K/UL (ref 0–0.6)
EOSINOPHILS RELATIVE PERCENT: 0 % (ref 0–5)
GFR AFRICAN AMERICAN: >59
GFR NON-AFRICAN AMERICAN: >60
GLUCOSE BLD-MCNC: 207 MG/DL (ref 70–99)
GLUCOSE BLD-MCNC: 239 MG/DL (ref 70–99)
GLUCOSE BLD-MCNC: 240 MG/DL (ref 74–109)
GLUCOSE BLD-MCNC: 265 MG/DL (ref 70–99)
GLUCOSE BLD-MCNC: 299 MG/DL (ref 70–99)
HCT VFR BLD CALC: 45.9 % (ref 37–47)
HEMOGLOBIN: 14.5 G/DL (ref 12–16)
IMMATURE GRANULOCYTES #: 0 K/UL
LYMPHOCYTES ABSOLUTE: 0.5 K/UL (ref 1.1–4.5)
LYMPHOCYTES RELATIVE PERCENT: 5.8 % (ref 20–40)
MCH RBC QN AUTO: 30.4 PG (ref 27–31)
MCHC RBC AUTO-ENTMCNC: 31.6 G/DL (ref 33–37)
MCV RBC AUTO: 96.2 FL (ref 81–99)
MONOCYTES ABSOLUTE: 0.5 K/UL (ref 0–0.9)
MONOCYTES RELATIVE PERCENT: 5.1 % (ref 0–10)
NEUTROPHILS ABSOLUTE: 7.9 K/UL (ref 1.5–7.5)
NEUTROPHILS RELATIVE PERCENT: 88.7 % (ref 50–65)
PDW BLD-RTO: 14.3 % (ref 11.5–14.5)
PERFORMED ON: ABNORMAL
PLATELET # BLD: 171 K/UL (ref 130–400)
PMV BLD AUTO: 9.5 FL (ref 9.4–12.3)
POTASSIUM SERPL-SCNC: 4.6 MMOL/L (ref 3.5–5)
RBC # BLD: 4.77 M/UL (ref 4.2–5.4)
SODIUM BLD-SCNC: 137 MMOL/L (ref 136–145)
TOTAL PROTEIN: 6.2 G/DL (ref 6.6–8.7)
WBC # BLD: 8.9 K/UL (ref 4.8–10.8)

## 2022-03-18 PROCEDURE — 6370000000 HC RX 637 (ALT 250 FOR IP): Performed by: FAMILY MEDICINE

## 2022-03-18 PROCEDURE — 80053 COMPREHEN METABOLIC PANEL: CPT

## 2022-03-18 PROCEDURE — 94640 AIRWAY INHALATION TREATMENT: CPT

## 2022-03-18 PROCEDURE — 36415 COLL VENOUS BLD VENIPUNCTURE: CPT

## 2022-03-18 PROCEDURE — 6360000002 HC RX W HCPCS: Performed by: FAMILY MEDICINE

## 2022-03-18 PROCEDURE — 99291 CRITICAL CARE FIRST HOUR: CPT | Performed by: INTERNAL MEDICINE

## 2022-03-18 PROCEDURE — 85025 COMPLETE CBC W/AUTO DIFF WBC: CPT

## 2022-03-18 PROCEDURE — 82947 ASSAY GLUCOSE BLOOD QUANT: CPT

## 2022-03-18 PROCEDURE — 6370000000 HC RX 637 (ALT 250 FOR IP): Performed by: INTERNAL MEDICINE

## 2022-03-18 PROCEDURE — 2500000003 HC RX 250 WO HCPCS: Performed by: FAMILY MEDICINE

## 2022-03-18 PROCEDURE — 94003 VENT MGMT INPAT SUBQ DAY: CPT

## 2022-03-18 PROCEDURE — 2580000003 HC RX 258: Performed by: FAMILY MEDICINE

## 2022-03-18 PROCEDURE — 2700000000 HC OXYGEN THERAPY PER DAY

## 2022-03-18 PROCEDURE — 2100000000 HC CCU R&B

## 2022-03-18 RX ORDER — PROPOFOL 10 MG/ML
5-50 INJECTION, EMULSION INTRAVENOUS CONTINUOUS
Status: DISCONTINUED | OUTPATIENT
Start: 2022-03-18 | End: 2022-03-28 | Stop reason: HOSPADM

## 2022-03-18 RX ORDER — INSULIN GLARGINE 100 [IU]/ML
0.25 INJECTION, SOLUTION SUBCUTANEOUS NIGHTLY
Status: DISCONTINUED | OUTPATIENT
Start: 2022-03-18 | End: 2022-03-25

## 2022-03-18 RX ORDER — HYDRALAZINE HYDROCHLORIDE 20 MG/ML
5 INJECTION INTRAMUSCULAR; INTRAVENOUS EVERY 6 HOURS PRN
Status: DISCONTINUED | OUTPATIENT
Start: 2022-03-18 | End: 2022-03-28 | Stop reason: HOSPADM

## 2022-03-18 RX ADMIN — SODIUM CHLORIDE, PRESERVATIVE FREE 20 MG: 5 INJECTION INTRAVENOUS at 20:32

## 2022-03-18 RX ADMIN — IPRATROPIUM BROMIDE AND ALBUTEROL SULFATE 1 AMPULE: .5; 2.5 SOLUTION RESPIRATORY (INHALATION) at 02:20

## 2022-03-18 RX ADMIN — DEXMEDETOMIDINE HYDROCHLORIDE 2 MCG/KG/HR: 400 INJECTION INTRAVENOUS at 20:41

## 2022-03-18 RX ADMIN — INSULIN LISPRO 1 UNITS: 100 INJECTION, SOLUTION INTRAVENOUS; SUBCUTANEOUS at 20:49

## 2022-03-18 RX ADMIN — CHLORHEXIDINE GLUCONATE 15 ML: 1.2 RINSE ORAL at 07:59

## 2022-03-18 RX ADMIN — INSULIN LISPRO 2 UNITS: 100 INJECTION, SOLUTION INTRAVENOUS; SUBCUTANEOUS at 12:34

## 2022-03-18 RX ADMIN — Medication 200 MCG/HR: at 19:35

## 2022-03-18 RX ADMIN — HYDRALAZINE HYDROCHLORIDE 5 MG: 20 INJECTION, SOLUTION INTRAMUSCULAR; INTRAVENOUS at 20:32

## 2022-03-18 RX ADMIN — IPRATROPIUM BROMIDE AND ALBUTEROL SULFATE 1 AMPULE: .5; 2.5 SOLUTION RESPIRATORY (INHALATION) at 07:22

## 2022-03-18 RX ADMIN — SODIUM CHLORIDE, PRESERVATIVE FREE 10 ML: 5 INJECTION INTRAVENOUS at 02:03

## 2022-03-18 RX ADMIN — INSULIN LISPRO 3 UNITS: 100 INJECTION, SOLUTION INTRAVENOUS; SUBCUTANEOUS at 08:56

## 2022-03-18 RX ADMIN — IPRATROPIUM BROMIDE AND ALBUTEROL SULFATE 1 AMPULE: .5; 2.5 SOLUTION RESPIRATORY (INHALATION) at 22:09

## 2022-03-18 RX ADMIN — IPRATROPIUM BROMIDE AND ALBUTEROL SULFATE 1 AMPULE: .5; 2.5 SOLUTION RESPIRATORY (INHALATION) at 11:17

## 2022-03-18 RX ADMIN — SODIUM CHLORIDE, PRESERVATIVE FREE 1000 MG: 5 INJECTION INTRAVENOUS at 00:42

## 2022-03-18 RX ADMIN — METHYLPREDNISOLONE SODIUM SUCCINATE 40 MG: 40 INJECTION, POWDER, FOR SOLUTION INTRAMUSCULAR; INTRAVENOUS at 05:02

## 2022-03-18 RX ADMIN — SODIUM CHLORIDE, PRESERVATIVE FREE 20 MG: 5 INJECTION INTRAVENOUS at 07:59

## 2022-03-18 RX ADMIN — FUROSEMIDE 20 MG: 10 INJECTION, SOLUTION INTRAMUSCULAR; INTRAVENOUS at 08:00

## 2022-03-18 RX ADMIN — CHLORHEXIDINE GLUCONATE 15 ML: 1.2 RINSE ORAL at 20:31

## 2022-03-18 RX ADMIN — SODIUM CHLORIDE, PRESERVATIVE FREE 10 ML: 5 INJECTION INTRAVENOUS at 20:58

## 2022-03-18 RX ADMIN — PROPOFOL 30 MCG/KG/MIN: 10 INJECTION, EMULSION INTRAVENOUS at 20:45

## 2022-03-18 RX ADMIN — DEXMEDETOMIDINE HYDROCHLORIDE 1.5 MCG/KG/HR: 400 INJECTION INTRAVENOUS at 23:09

## 2022-03-18 RX ADMIN — INSULIN GLARGINE 20 UNITS: 100 INJECTION, SOLUTION SUBCUTANEOUS at 20:49

## 2022-03-18 RX ADMIN — METHYLPREDNISOLONE SODIUM SUCCINATE 40 MG: 40 INJECTION, POWDER, FOR SOLUTION INTRAMUSCULAR; INTRAVENOUS at 23:10

## 2022-03-18 RX ADMIN — ENOXAPARIN SODIUM 80 MG: 80 INJECTION SUBCUTANEOUS at 07:59

## 2022-03-18 RX ADMIN — Medication 200 MCG/HR: at 09:26

## 2022-03-18 RX ADMIN — METHYLPREDNISOLONE SODIUM SUCCINATE 40 MG: 40 INJECTION, POWDER, FOR SOLUTION INTRAMUSCULAR; INTRAVENOUS at 00:42

## 2022-03-18 RX ADMIN — DEXMEDETOMIDINE HYDROCHLORIDE 1.2 MCG/KG/HR: 400 INJECTION INTRAVENOUS at 02:02

## 2022-03-18 RX ADMIN — IPRATROPIUM BROMIDE AND ALBUTEROL SULFATE 1 AMPULE: .5; 2.5 SOLUTION RESPIRATORY (INHALATION) at 15:00

## 2022-03-18 RX ADMIN — ENOXAPARIN SODIUM 80 MG: 100 INJECTION SUBCUTANEOUS at 20:31

## 2022-03-18 RX ADMIN — DEXMEDETOMIDINE HYDROCHLORIDE 1.2 MCG/KG/HR: 400 INJECTION INTRAVENOUS at 06:00

## 2022-03-18 RX ADMIN — SODIUM CHLORIDE, POTASSIUM CHLORIDE, SODIUM LACTATE AND CALCIUM CHLORIDE: 600; 310; 30; 20 INJECTION, SOLUTION INTRAVENOUS at 22:06

## 2022-03-18 RX ADMIN — IPRATROPIUM BROMIDE AND ALBUTEROL SULFATE 1 AMPULE: .5; 2.5 SOLUTION RESPIRATORY (INHALATION) at 18:11

## 2022-03-18 RX ADMIN — METHYLPREDNISOLONE SODIUM SUCCINATE 40 MG: 40 INJECTION, POWDER, FOR SOLUTION INTRAMUSCULAR; INTRAVENOUS at 12:34

## 2022-03-18 RX ADMIN — SODIUM CHLORIDE, PRESERVATIVE FREE 1000 MG: 5 INJECTION INTRAVENOUS at 23:10

## 2022-03-18 ASSESSMENT — PULMONARY FUNCTION TESTS
PIF_VALUE: 23
PIF_VALUE: 23
PIF_VALUE: 25
PIF_VALUE: 27
PIF_VALUE: 23
PIF_VALUE: 29
PIF_VALUE: 36
PIF_VALUE: 31
PIF_VALUE: 37
PIF_VALUE: 23
PIF_VALUE: 29
PIF_VALUE: 25

## 2022-03-18 ASSESSMENT — PAIN SCALES - GENERAL: PAINLEVEL_OUTOF10: 4

## 2022-03-18 NOTE — PROGRESS NOTES
Family Medicine Progress Note    Patient:  Analia Trevino  YOB: 1950    MRN: [id number]     Acct: [de-identified]     Admit date: 3/15/2022    Patient Seen, Chart, Consults notes, Labs, Radiology studies reviewed. Subjective: Day 2 of stay with hypercapnic respiratory failure with underlying advanced COPD and most recent (in last 24 hours) has had slow gradual improvement. Continues intubation with mechanical ventilation. Notes from yesterday reviewed. Has been having some spikes in blood pressure intermittently per nursing staff. Past, Family, Social History unchanged from admission.     Diet:  Diet NPO  ADULT TUBE FEEDING; Orogastric; Peptide Based High Protein; Continuous; 20; Yes; 5; Q 6 hours; 58; 0; Other (specify); no free water flulsh    Medications:  Scheduled Meds:   insulin glargine  0.25 Units/kg SubCUTAneous Nightly    furosemide  20 mg IntraVENous Daily    sodium chloride flush  5-40 mL IntraVENous 2 times per day    cefTRIAXone (ROCEPHIN) IV  1,000 mg IntraVENous Q24H    methylPREDNISolone  40 mg IntraVENous Q6H    insulin lispro  0-6 Units SubCUTAneous TID WC    insulin lispro  0-3 Units SubCUTAneous Nightly    enoxaparin  80 mg SubCUTAneous Daily    chlorhexidine  15 mL Mouth/Throat BID    famotidine (PEPCID) injection  20 mg IntraVENous BID    ipratropium-albuterol  1 ampule Inhalation Q4H     Continuous Infusions:   lactated ringers 100 mL/hr at 03/18/22 0500    fentaNYL 200 mcg/hr (03/18/22 0500)    sodium chloride      norepinephrine Stopped (03/17/22 0255)    dextrose      dexmedetomidine HCl in NaCl 1.2 mcg/kg/hr (03/18/22 0600)     PRN Meds:hydrALAZINE, sodium chloride flush, sodium chloride, ondansetron **OR** ondansetron, glucagon (rDNA), dextrose, glucose, dextrose bolus (hypoglycemia) **OR** dextrose bolus (hypoglycemia)    Objective:    Vitals: BP (!) 139/101   Pulse 70   Temp 97.4 °F (36.3 °C) (Temporal)   Resp 15   Ht 5' 6\" (1.676 m)   Wt 180 lb (81.6 kg)   SpO2 (!) 89%   BMI 29.05 kg/m²   24 hour intake/output:    Intake/Output Summary (Last 24 hours) at 3/18/2022 0858  Last data filed at 3/18/2022 0800  Gross per 24 hour   Intake 3367.18 ml   Output 2425 ml   Net 942.18 ml     Last 3 weights: Wt Readings from Last 3 Encounters:   03/15/22 180 lb (81.6 kg)   12/01/21 157 lb (71.2 kg)   08/13/21 174 lb 8 oz (79.2 kg)       Physical Exam:    General Appearance: Sedated on the ventilator  Skin:  negatives: mobility and turgor normal  Eyes:  Sclera nonicteric  Neck:  neck- supple, no mass, non-tender  Lungs:  Breathing Pattern: regular, no distress, Breath sounds: wheezing- scattered  Heart:  Heart regular rate and rhythm  Abdomen: Auscultation: Normal bowel sounds. No bruits. Extremities: Extremities warm to touch, pink, with no edema.   Musculoskeletal:  negative  Neurologic: Sedated    CBC with Differential:    Lab Results   Component Value Date    WBC 8.9 03/18/2022    RBC 4.77 03/18/2022    HGB 14.5 03/18/2022    HCT 45.9 03/18/2022    HCT 32.1 09/02/2011     03/18/2022     09/02/2011    MCV 96.2 03/18/2022    MCH 30.4 03/18/2022    MCHC 31.6 03/18/2022    RDW 14.3 03/18/2022    LYMPHOPCT 5.8 03/18/2022    MONOPCT 5.1 03/18/2022    EOSPCT 0.5 09/02/2011    BASOPCT 0.1 03/18/2022    MONOSABS 0.50 03/18/2022    LYMPHSABS 0.5 03/18/2022    EOSABS 0.00 03/18/2022    BASOSABS 0.00 03/18/2022     CMP:    Lab Results   Component Value Date     03/18/2022     09/02/2011    K 4.6 03/18/2022    K 3.5 06/28/2021    K 4.1 09/02/2011    CL 98 03/18/2022     09/02/2011    CO2 26 03/18/2022    BUN 29 03/18/2022    CREATININE 0.9 03/18/2022    CREATININE 0.6 09/02/2011    GFRAA >59 03/18/2022    LABGLOM >60 03/18/2022    GLUCOSE 240 03/18/2022    PROT 6.2 03/18/2022    PROT 7.5 01/18/2013    LABALBU 3.5 03/18/2022    LABALBU 3.3 09/02/2011    CALCIUM 9.0 03/18/2022    BILITOT <0.2 03/18/2022    ALKPHOS 80 03/18/2022    ALKPHOS 57 09/02/2011    AST 10 03/18/2022    ALT 15 03/18/2022     Last 3 Troponin:    Lab Results   Component Value Date    TROPONINI <0.01 03/15/2022    TROPONINI <0.01 08/12/2021    TROPONINI <0.01 06/23/2021     Urine Culture:  No components found for: CURINE  Blood Culture:  No components found for: CBLOOD, CFUNGUSBL  Stool Culture:  No components found for: CSTOOL    Assessment:    Principal Problem:    Acute hypercapnic respiratory failure (HCC)  Active Problems:    NICM (nonischemic cardiomyopathy) ([facility] [address]    Diabetes mellitus ([facility] [address]    S/P CABG x 4    Coronary artery disease involving native coronary artery of native heart without angina pectoris    Chronic obstructive pulmonary disease (HCC)    Abdominal aortic aneurysm ([facility] ([facility]    Smoker  Resolved Problems:    * No resolved hospital problems. *          Plan:  Continue mechanical ventilation. As needed hydralazine for blood pressures greater than 593 systolic. Wean ventilatory support as she tolerates. Appreciate pulmonology's assistance. Monitor labs closely. Nutrition via feeding tube presently. Blood sugars elevated. We will add basal long-acting insulin to her regimen. Greater than 25 minutes spent in coordination patient care.       Electronically signed by Levi Collins MD on 3/18/2022 at 8:58 AM

## 2022-03-18 NOTE — PROGRESS NOTES
Physician Progress Note      Rufina Horowitz  CSN #:                  [id number]  :                       1950  ADMIT DATE:       3/15/2022 10:47 PM  [address] [place] DATE:  RESPONDING  PROVIDER #:        Yobani ARCINIEGA          QUERY TEXT:    Pt admitted with acute on chronic hypercapnic respiratory failure and has   altered mental status likely secondary to hypercapnia documented by Pulmonary   last dated 3/17. If possible, please document in progress notes and discharge   summary further specificity regarding the type of encephalopathy:    The medical record reflects the following:  Risk Factors: Acute on chronic respiratory failure with hypercapnia requiring   mechanical ventilation. Clinical Indicators: ABGs, PH 7.26 PCO2 86, PH 7.31 PCO2 66, PH 7.24 PCO2 78,   Presented with AMS and intubated in ER. CT Head, 1. No acute intracranial   abnormality. Treatment: Pulmonology consult, mechanical ventilation, seiral ABGs, CT Head  Options provided:  -- Metabolic encephalopathy  -- Encephalopathy due to hypercapnia  -- AMS only  -- Other - I will add my own diagnosis  -- Disagree - Not applicable / Not valid  -- Disagree - Clinically unable to determine / Unknown  -- Refer to Clinical Documentation Reviewer    PROVIDER RESPONSE TEXT:    This patient has AMS only.     Query created by: Herrera Hall on 3/18/2022 4:08 PM      Electronically signed by:  Alexa Desai 3/18/2022 4:32 PM

## 2022-03-18 NOTE — PROGRESS NOTES
Pulmonary and Critical Care Progress note. [address] [facility]    MRN# [id number]    Acct# [de-identified]  3/18/2022   4:37 PM CDT    Referring Nayan Rosario MD      Chief Complaint: Respiratory failure on mechanical ventilation    HPI: She continues to be intubated on mechanical ventilation    Medications    insulin glargine, 0.25 Units/kg, SubCUTAneous, Nightly    enoxaparin, 80 mg, SubCUTAneous, BID    furosemide, 20 mg, IntraVENous, Daily    sodium chloride flush, 5-40 mL, IntraVENous, 2 times per day    cefTRIAXone (ROCEPHIN) IV, 1,000 mg, IntraVENous, Q24H    methylPREDNISolone, 40 mg, IntraVENous, Q6H    insulin lispro, 0-6 Units, SubCUTAneous, TID WC    insulin lispro, 0-3 Units, SubCUTAneous, Nightly    chlorhexidine, 15 mL, Mouth/Throat, BID    famotidine (PEPCID) injection, 20 mg, IntraVENous, BID    ipratropium-albuterol, 1 ampule, Inhalation, Q4H     Review of Systems:  Unable to obtain. Patient is on mechanical ventilation  Physical Exam:  /80   Pulse 105   Temp 97.4 °F (36.3 °C) (Temporal)   Resp 27   Ht 5' 6\" (1.676 m)   Wt 180 lb (81.6 kg)   SpO2 92%   BMI 29.05 kg/m²     Intake/Output Summary (Last 24 hours) at 3/18/2022 1532  Last data filed at 3/18/2022 1110  Gross per 24 hour   Intake 2445.04 ml   Output 2700 ml   Net -254.96 ml       General appearance: Intubated sedated on mechanical ventilation   HEENT: Normocephalic atraumatic.   Endotracheal tube in place  Heart: S1-S2 distant sounds no murmurs  Lungs: Diminished bilaterally no rubs or tenderness or dullness to percussion  Abdomen: Soft nontender no organomegalies normal bowel sounds  Extremities: No clubbing cyanosis or edema  Neuro: No focal findings  Skin: Intact    Recent Labs     03/15/22  2249 03/17/22  0240 03/18/22  0146   WBC 11.7* 10.2 8.9   RBC 5.02 4.31 4.77   HGB 15.3 13.3 14.5   HCT 50.6* 43.1 45.9    178 171   .8* 100.0* 96.2   MCH 30.5 30.9 30.4   MCHC 30.2* 30.9* 31.6*   RDW 14.6* 14.8* 14.3      Recent Labs     03/15/22  2249 03/15/22  2315 03/15/22  2321 03/16/22  0805 03/17/22  0240 03/17/22  0313 03/18/22  0146     --   --   --  131*  --  137   K 4.7  --  5.4 5.7 5.6* 5.4 4.6   CL 94*  --   --   --  95*  --  98   CO2 34*  --   --   --  26  --  26   BUN 17  --   --   --  30*  --  29*   CREATININE 0.9  --   --   --  1.2*  --  0.9   CALCIUM 9.1  --   --   --  8.8  --  9.0   GLUCOSE 239* 206  --   --  315*  --  240*      Recent Labs     03/15/22  2321 03/16/22  0805 03/17/22  0313   PHART 7.260* 7.310* 7.240*   GKH4DLQ 86.0* 66.0* 78.0*   PO2ART 245.0* 68.0* 86.0   FTJ2UCP 38.6* 33.2* 33.4*   B1HIYOEQ 95.8 91.8 94.6   BEART 7.6* 4.7* 3.4*     Recent Labs     03/15/22  2249 03/15/22  2319 03/17/22  0240 03/18/22  0146   AST 17  --    < > 10   ALT 16  --    < > 15   ALKPHOS 107*  --    < > 80   BILITOT 0.3  --    < > <0.2   CALCIUM 9.1  --    < > 9.0   PROBNP 1,872*  --   --   --    TROPONINI <0.01  --   --   --    LACTA  --  0.7  --   --     < > = values in this interval not displayed. No results for input(s): BC, LABGRAM, CULTRESP, BFCX in the last 72 hours. Radiograph: CT HEAD WO CONTRAST    Result Date: 3/16/2022  1. No acute intracranial abnormality. 2. No skull fracture. 3. Chronic white matter ischemic changes. 4. Moderate soft tissue thickening of the posterior wall of the nasopharynx with narrowing of the nasopharyngeal airway is probably due to recent intubation. This may be clinically correlated. The above study was initially reviewed and reported by [facility]. I do not find any discrepancies. Signed by Dr Sammy Ariza    Result Date: 3/17/2022  1. No active cardiopulmonary disease. Signed by Dr Sammy Ariza    Result Date: 3/16/2022  1. No active cardiopulmonary disease. 2. Endotracheal tube in place. The nasogastric tube is not evaluated. 3. Dual-chamber cardiac pacer in place.  Signed by

## 2022-03-18 NOTE — PROGRESS NOTES
Recent Labs     03/17/22  0240 03/18/22  0146   BUN 30* 29*       Recent Labs     03/17/22  0240 03/18/22  0146   CREATININE 1.2* 0.9       Estimated Creatinine Clearance: 60 mL/min (based on SCr of 0.9 mg/dL).       Plan: Changed Lovenox per telephone order (Dr Kira Odom) to 1mg/kg 80 mg BID    Electronically signed by John Del Rosario, [facility] on 3/18/2022 at 11:16 AM

## 2022-03-19 ENCOUNTER — APPOINTMENT (OUTPATIENT)
Dept: GENERAL RADIOLOGY | Age: 72
DRG: 207 | End: 2022-03-19
Payer: MEDICARE

## 2022-03-19 LAB
ALBUMIN SERPL-MCNC: 3.2 G/DL (ref 3.5–5.2)
ALP BLD-CCNC: 76 U/L (ref 35–104)
ALT SERPL-CCNC: 13 U/L (ref 5–33)
ANION GAP SERPL CALCULATED.3IONS-SCNC: 12 MMOL/L (ref 7–19)
AST SERPL-CCNC: 9 U/L (ref 5–32)
BASE EXCESS ARTERIAL: 7.4 MMOL/L (ref -2–2)
BASOPHILS ABSOLUTE: 0 K/UL (ref 0–0.2)
BASOPHILS RELATIVE PERCENT: 0.1 % (ref 0–1)
BILIRUB SERPL-MCNC: <0.2 MG/DL (ref 0.2–1.2)
BUN BLDV-MCNC: 33 MG/DL (ref 8–23)
CALCIUM SERPL-MCNC: 9 MG/DL (ref 8.8–10.2)
CARBOXYHEMOGLOBIN ARTERIAL: 1.9 % (ref 0–5)
CHLORIDE BLD-SCNC: 96 MMOL/L (ref 98–111)
CO2: 29 MMOL/L (ref 22–29)
CREAT SERPL-MCNC: 0.9 MG/DL (ref 0.5–0.9)
EOSINOPHILS ABSOLUTE: 0 K/UL (ref 0–0.6)
EOSINOPHILS RELATIVE PERCENT: 0 % (ref 0–5)
GFR AFRICAN AMERICAN: >59
GFR NON-AFRICAN AMERICAN: >60
GLUCOSE BLD-MCNC: 274 MG/DL (ref 70–99)
GLUCOSE BLD-MCNC: 286 MG/DL (ref 74–109)
GLUCOSE BLD-MCNC: 298 MG/DL (ref 70–99)
GLUCOSE BLD-MCNC: 307 MG/DL (ref 70–99)
GLUCOSE BLD-MCNC: 313 MG/DL (ref 70–99)
HCO3 ARTERIAL: 34.5 MMOL/L (ref 22–26)
HCT VFR BLD CALC: 48.4 % (ref 37–47)
HEMOGLOBIN, ART, EXTENDED: 15.5 G/DL (ref 12–16)
HEMOGLOBIN: 15.4 G/DL (ref 12–16)
IMMATURE GRANULOCYTES #: 0 K/UL
LYMPHOCYTES ABSOLUTE: 0.8 K/UL (ref 1.1–4.5)
LYMPHOCYTES RELATIVE PERCENT: 7.3 % (ref 20–40)
MCH RBC QN AUTO: 30.8 PG (ref 27–31)
MCHC RBC AUTO-ENTMCNC: 31.8 G/DL (ref 33–37)
MCV RBC AUTO: 96.8 FL (ref 81–99)
METHEMOGLOBIN ARTERIAL: 1.3 %
MONOCYTES ABSOLUTE: 1.2 K/UL (ref 0–0.9)
MONOCYTES RELATIVE PERCENT: 10.7 % (ref 0–10)
NEUTROPHILS ABSOLUTE: 9.3 K/UL (ref 1.5–7.5)
NEUTROPHILS RELATIVE PERCENT: 81.6 % (ref 50–65)
O2 CONTENT ARTERIAL: 19 ML/DL
O2 SAT, ARTERIAL: 87.4 %
O2 THERAPY: ABNORMAL
PCO2 ARTERIAL: 57 MMHG (ref 35–45)
PDW BLD-RTO: 14.6 % (ref 11.5–14.5)
PERFORMED ON: ABNORMAL
PH ARTERIAL: 7.39 (ref 7.35–7.45)
PLATELET # BLD: 166 K/UL (ref 130–400)
PMV BLD AUTO: 9.6 FL (ref 9.4–12.3)
PO2 ARTERIAL: 56 MMHG (ref 80–100)
POTASSIUM SERPL-SCNC: 4.2 MMOL/L (ref 3.5–5)
POTASSIUM, WHOLE BLOOD: 4.3
PRO-BNP: 518 PG/ML (ref 0–900)
RBC # BLD: 5 M/UL (ref 4.2–5.4)
SODIUM BLD-SCNC: 137 MMOL/L (ref 136–145)
TOTAL PROTEIN: 5.9 G/DL (ref 6.6–8.7)
WBC # BLD: 11.4 K/UL (ref 4.8–10.8)

## 2022-03-19 PROCEDURE — 6370000000 HC RX 637 (ALT 250 FOR IP): Performed by: INTERNAL MEDICINE

## 2022-03-19 PROCEDURE — 71045 X-RAY EXAM CHEST 1 VIEW: CPT

## 2022-03-19 PROCEDURE — 36600 WITHDRAWAL OF ARTERIAL BLOOD: CPT

## 2022-03-19 PROCEDURE — 84132 ASSAY OF SERUM POTASSIUM: CPT

## 2022-03-19 PROCEDURE — 2580000003 HC RX 258: Performed by: FAMILY MEDICINE

## 2022-03-19 PROCEDURE — 2100000000 HC CCU R&B

## 2022-03-19 PROCEDURE — 2500000003 HC RX 250 WO HCPCS: Performed by: FAMILY MEDICINE

## 2022-03-19 PROCEDURE — 6360000002 HC RX W HCPCS: Performed by: FAMILY MEDICINE

## 2022-03-19 PROCEDURE — 94640 AIRWAY INHALATION TREATMENT: CPT

## 2022-03-19 PROCEDURE — 94003 VENT MGMT INPAT SUBQ DAY: CPT

## 2022-03-19 PROCEDURE — 82803 BLOOD GASES ANY COMBINATION: CPT

## 2022-03-19 PROCEDURE — 85025 COMPLETE CBC W/AUTO DIFF WBC: CPT

## 2022-03-19 PROCEDURE — 6370000000 HC RX 637 (ALT 250 FOR IP): Performed by: FAMILY MEDICINE

## 2022-03-19 PROCEDURE — 82947 ASSAY GLUCOSE BLOOD QUANT: CPT

## 2022-03-19 PROCEDURE — 36415 COLL VENOUS BLD VENIPUNCTURE: CPT

## 2022-03-19 PROCEDURE — 80053 COMPREHEN METABOLIC PANEL: CPT

## 2022-03-19 PROCEDURE — 2700000000 HC OXYGEN THERAPY PER DAY

## 2022-03-19 PROCEDURE — 83880 ASSAY OF NATRIURETIC PEPTIDE: CPT

## 2022-03-19 RX ADMIN — DEXMEDETOMIDINE HYDROCHLORIDE 1.3 MCG/KG/HR: 400 INJECTION INTRAVENOUS at 13:11

## 2022-03-19 RX ADMIN — SODIUM CHLORIDE, POTASSIUM CHLORIDE, SODIUM LACTATE AND CALCIUM CHLORIDE: 600; 310; 30; 20 INJECTION, SOLUTION INTRAVENOUS at 08:20

## 2022-03-19 RX ADMIN — SODIUM CHLORIDE, PRESERVATIVE FREE 10 ML: 5 INJECTION INTRAVENOUS at 08:51

## 2022-03-19 RX ADMIN — IPRATROPIUM BROMIDE AND ALBUTEROL SULFATE 1 AMPULE: .5; 2.5 SOLUTION RESPIRATORY (INHALATION) at 06:14

## 2022-03-19 RX ADMIN — DEXMEDETOMIDINE HYDROCHLORIDE 1.3 MCG/KG/HR: 400 INJECTION INTRAVENOUS at 17:02

## 2022-03-19 RX ADMIN — IPRATROPIUM BROMIDE AND ALBUTEROL SULFATE 1 AMPULE: .5; 2.5 SOLUTION RESPIRATORY (INHALATION) at 02:01

## 2022-03-19 RX ADMIN — SODIUM CHLORIDE, PRESERVATIVE FREE 20 MG: 5 INJECTION INTRAVENOUS at 08:22

## 2022-03-19 RX ADMIN — INSULIN GLARGINE 20 UNITS: 100 INJECTION, SOLUTION SUBCUTANEOUS at 20:20

## 2022-03-19 RX ADMIN — METHYLPREDNISOLONE SODIUM SUCCINATE 40 MG: 40 INJECTION, POWDER, FOR SOLUTION INTRAMUSCULAR; INTRAVENOUS at 23:53

## 2022-03-19 RX ADMIN — METHYLPREDNISOLONE SODIUM SUCCINATE 40 MG: 40 INJECTION, POWDER, FOR SOLUTION INTRAMUSCULAR; INTRAVENOUS at 11:48

## 2022-03-19 RX ADMIN — IPRATROPIUM BROMIDE AND ALBUTEROL SULFATE 1 AMPULE: .5; 2.5 SOLUTION RESPIRATORY (INHALATION) at 14:20

## 2022-03-19 RX ADMIN — INSULIN LISPRO 3 UNITS: 100 INJECTION, SOLUTION INTRAVENOUS; SUBCUTANEOUS at 16:38

## 2022-03-19 RX ADMIN — IPRATROPIUM BROMIDE AND ALBUTEROL SULFATE 1 AMPULE: .5; 2.5 SOLUTION RESPIRATORY (INHALATION) at 10:22

## 2022-03-19 RX ADMIN — METHYLPREDNISOLONE SODIUM SUCCINATE 40 MG: 40 INJECTION, POWDER, FOR SOLUTION INTRAMUSCULAR; INTRAVENOUS at 02:00

## 2022-03-19 RX ADMIN — INSULIN LISPRO 4 UNITS: 100 INJECTION, SOLUTION INTRAVENOUS; SUBCUTANEOUS at 08:23

## 2022-03-19 RX ADMIN — CHLORHEXIDINE GLUCONATE 15 ML: 1.2 RINSE ORAL at 20:23

## 2022-03-19 RX ADMIN — DEXMEDETOMIDINE HYDROCHLORIDE 1.3 MCG/KG/HR: 400 INJECTION INTRAVENOUS at 09:21

## 2022-03-19 RX ADMIN — CHLORHEXIDINE GLUCONATE 15 ML: 1.2 RINSE ORAL at 08:51

## 2022-03-19 RX ADMIN — PROPOFOL 40 MCG/KG/MIN: 10 INJECTION, EMULSION INTRAVENOUS at 01:06

## 2022-03-19 RX ADMIN — SODIUM CHLORIDE, POTASSIUM CHLORIDE, SODIUM LACTATE AND CALCIUM CHLORIDE: 600; 310; 30; 20 INJECTION, SOLUTION INTRAVENOUS at 23:12

## 2022-03-19 RX ADMIN — METHYLPREDNISOLONE SODIUM SUCCINATE 40 MG: 40 INJECTION, POWDER, FOR SOLUTION INTRAMUSCULAR; INTRAVENOUS at 06:15

## 2022-03-19 RX ADMIN — INSULIN LISPRO 2 UNITS: 100 INJECTION, SOLUTION INTRAVENOUS; SUBCUTANEOUS at 20:20

## 2022-03-19 RX ADMIN — ENOXAPARIN SODIUM 80 MG: 100 INJECTION SUBCUTANEOUS at 20:21

## 2022-03-19 RX ADMIN — FUROSEMIDE 20 MG: 10 INJECTION, SOLUTION INTRAMUSCULAR; INTRAVENOUS at 08:22

## 2022-03-19 RX ADMIN — IPRATROPIUM BROMIDE AND ALBUTEROL SULFATE 1 AMPULE: .5; 2.5 SOLUTION RESPIRATORY (INHALATION) at 18:13

## 2022-03-19 RX ADMIN — INSULIN LISPRO 4 UNITS: 100 INJECTION, SOLUTION INTRAVENOUS; SUBCUTANEOUS at 11:48

## 2022-03-19 RX ADMIN — DEXMEDETOMIDINE HYDROCHLORIDE 1.4 MCG/KG/HR: 400 INJECTION INTRAVENOUS at 02:19

## 2022-03-19 RX ADMIN — METHYLPREDNISOLONE SODIUM SUCCINATE 40 MG: 40 INJECTION, POWDER, FOR SOLUTION INTRAMUSCULAR; INTRAVENOUS at 17:03

## 2022-03-19 RX ADMIN — PROPOFOL 40 MCG/KG/MIN: 10 INJECTION, EMULSION INTRAVENOUS at 06:15

## 2022-03-19 RX ADMIN — ENOXAPARIN SODIUM 80 MG: 100 INJECTION SUBCUTANEOUS at 08:23

## 2022-03-19 RX ADMIN — PROPOFOL 30 MCG/KG/MIN: 10 INJECTION, EMULSION INTRAVENOUS at 12:20

## 2022-03-19 RX ADMIN — SODIUM CHLORIDE, PRESERVATIVE FREE 10 ML: 5 INJECTION INTRAVENOUS at 20:24

## 2022-03-19 RX ADMIN — DEXMEDETOMIDINE HYDROCHLORIDE 1.3 MCG/KG/HR: 400 INJECTION INTRAVENOUS at 05:36

## 2022-03-19 RX ADMIN — PROPOFOL 30 MCG/KG/MIN: 10 INJECTION, EMULSION INTRAVENOUS at 17:08

## 2022-03-19 RX ADMIN — IPRATROPIUM BROMIDE AND ALBUTEROL SULFATE 1 AMPULE: .5; 2.5 SOLUTION RESPIRATORY (INHALATION) at 20:21

## 2022-03-19 RX ADMIN — Medication 100 MCG/HR: at 20:43

## 2022-03-19 RX ADMIN — Medication 200 MCG/HR: at 03:32

## 2022-03-19 RX ADMIN — DEXMEDETOMIDINE HYDROCHLORIDE 1.3 MCG/KG/HR: 400 INJECTION INTRAVENOUS at 20:41

## 2022-03-19 RX ADMIN — SODIUM CHLORIDE, PRESERVATIVE FREE 20 MG: 5 INJECTION INTRAVENOUS at 20:23

## 2022-03-19 RX ADMIN — SODIUM CHLORIDE, PRESERVATIVE FREE 1000 MG: 5 INJECTION INTRAVENOUS at 23:53

## 2022-03-19 ASSESSMENT — PULMONARY FUNCTION TESTS
PIF_VALUE: 7.7
PIF_VALUE: 23
PIF_VALUE: 25
PIF_VALUE: 24
PIF_VALUE: 28
PIF_VALUE: 25
PIF_VALUE: 23
PIF_VALUE: 22
PIF_VALUE: 24
PIF_VALUE: 21
PIF_VALUE: 28
PIF_VALUE: 23
PIF_VALUE: 24
PIF_VALUE: 21
PIF_VALUE: 26
PIF_VALUE: 24
PIF_VALUE: 21
PIF_VALUE: 22
PIF_VALUE: 24

## 2022-03-19 ASSESSMENT — PAIN SCALES - GENERAL
PAINLEVEL_OUTOF10: 0
PAINLEVEL_OUTOF10: 0

## 2022-03-19 NOTE — PROGRESS NOTES
Results for Bonita Ganser (MRN [id number]) as of 3/19/2022 04:25   Ref.  Range 3/19/2022 04:21   Hemoglobin, Art, Extended Latest Ref Range: 12.0 - 16.0 g/dL 15.5   pH, Arterial Latest Ref Range: 7.350 - 7.450  7.390   pCO2, Arterial Latest Ref Range: 35.0 - 45.0 mmHg 57.0 (H)   pO2, Arterial Latest Ref Range: 80.0 - 100.0 mmHg 56.0 (L)   HCO3, Arterial Latest Ref Range: 22.0 - 26.0 mmol/L 34.5 (H)   Base Excess, Arterial Latest Ref Range: -2.0 - 2.0 mmol/L 7.4 (H)   O2 Sat, Arterial Latest Ref Range: >92 % 87.4 (LL)   O2 Content, Arterial Latest Ref Range: Not Established mL/dL 19.0   Methemoglobin, Arterial Latest Ref Range: <1.5 % 1.3   Carboxyhgb, Arterial Latest Ref Range: 0.0 - 5.0 % 1.9       Pt on ACVC+ 450 16 30% +5    RR AT+

## 2022-03-19 NOTE — PROGRESS NOTES
Daily Progress Note  Domingo Griffin  MRN: [id number] LOS: 3    Admit Date: 3/15/2022   3/19/2022 1:17 PM    Subjective:          Chief Complaint:  Chief Complaint   Patient presents with    Shortness of Breath    Altered Mental Status       Interval History:    Reviewed overnight events and nursing notes. Status:  Did well for approx 20 min of SBT, but again had to have sedation turned back up with fentanyl and precedex. Propofol currently off.   Pain:  intubated        Review of Systems   Unable to perform ROS: Intubated       DIET:  Diet NPO  ADULT TUBE FEEDING; Orogastric; Peptide Based High Protein; Continuous; 20; Yes; 5; Q 6 hours; 58; 0; Other (specify); no free water flulsh    Medications:      propofol 30 mcg/kg/min (03/19/22 1220)    lactated ringers 100 mL/hr at 03/19/22 1105    fentaNYL 100 mcg/hr (03/19/22 1105)    sodium chloride      norepinephrine Stopped (03/17/22 0255)    dextrose      dexmedetomidine HCl in NaCl 1.3 mcg/kg/hr (03/19/22 1311)      insulin glargine  0.25 Units/kg SubCUTAneous Nightly    enoxaparin  80 mg SubCUTAneous BID    furosemide  20 mg IntraVENous Daily    sodium chloride flush  5-40 mL IntraVENous 2 times per day    cefTRIAXone (ROCEPHIN) IV  1,000 mg IntraVENous Q24H    methylPREDNISolone  40 mg IntraVENous Q6H    insulin lispro  0-6 Units SubCUTAneous TID WC    insulin lispro  0-3 Units SubCUTAneous Nightly    chlorhexidine  15 mL Mouth/Throat BID    famotidine (PEPCID) injection  20 mg IntraVENous BID    ipratropium-albuterol  1 ampule Inhalation Q4H       Data:     Code Status: Full Code    Family History   Problem Relation Age of Onset    Cancer Mother     Coronary Art Dis Father     Mult Sclerosis Sister     Cancer Brother      Social History     Socioeconomic History    Marital status:      Spouse name: Not on file    Number of children: Not on file    Years of education: Not on file    Highest education level: Not on file Occupational History    Not on file   Tobacco Use    Smoking status: Current Every Day Smoker     Packs/day: 0.50     Types: Cigarettes     Last attempt to quit: 3/29/2021     Years since quittin.9    Smokeless tobacco: Never Used    Tobacco comment: 1/2 PACKS EVERY 2 WEEKS, states has been decreasing amount    Vaping Use    Vaping Use: Never used   Substance and Sexual Activity    Alcohol use: Never    Drug use: Never    Sexual activity: Not Currently     Partners: Male   Other Topics Concern    Not on file   Social History Narrative    Not on file     Social Determinants of Health     Financial Resource Strain:     Difficulty of Paying Living Expenses: Not on file   Food Insecurity:     Worried About Running Out of Food in the Last Year: Not on file    Ada of Food in the Last Year: Not on file   Transportation Needs:     Lack of Transportation (Medical): Not on file    Lack of Transportation (Non-Medical):  Not on file   Physical Activity:     Days of Exercise per Week: Not on file    Minutes of Exercise per Session: Not on file   Stress:     Feeling of Stress : Not on file   Social Connections:     Frequency of Communication with Friends and Family: Not on file    Frequency of Social Gatherings with Friends and Family: Not on file    Attends Scientology Services: Not on file    Active Member of [address] [place] [address] [employer] or Organizations: Not on file    Attends Club or Organization Meetings: Not on file    Marital Status: Not on file   Intimate Partner Violence:     Fear of Current or Ex-Partner: Not on file    Emotionally Abused: Not on file    Physically Abused: Not on file    Sexually Abused: Not on file   Housing Stability:     Unable to Pay for Housing in the Last Year: Not on file    Number of [place] in the Last Year: Not on file    Unstable Housing in the Last Year: Not on file       Labs:  CBC:   Recent Labs     22  0240 22  0146 22  0224   WBC 10.2 8.9 11.4*   HGB hypercapnic respiratory failure (HCC)  Active Problems:    NICM (nonischemic cardiomyopathy) ([facility]    Diabetes mellitus ([address]    S/P CABG x 4    Coronary artery disease involving native coronary artery of native heart without angina pectoris    Chronic obstructive pulmonary disease (HCC)    Abdominal aortic aneurysm (AAA) without rupture ([address]    Smoker  Resolved Problems:    * No resolved hospital problems. *      PMH:  Past Medical History:   Diagnosis Date    ASHD (arteriosclerotic heart disease)     s/p PTCA and stent of circumflex, as well as intermediate and mid LAD     COPD (chronic obstructive pulmonary disease) ([facility]     Coronary atherosclerosis     Diabetes mellitus ([facility]     diet controlled, type 2    Encounter for wound care     FOR LLL WOUND    Fall 10/29/2020    Ganglion cyst     RT HAND    Hematoma 10/2020    hematoma LLL from fall injury    Hx of blood clots     Hypercholesteremia     Hypertension     Pacemaker 03/02/2021    Unstable angina (HCC)        Treatment Plan:  Continue mechanical ventilation. As needed hydralazine for blood pressures greater than 598 systolic. Wean ventilatory support as she tolerates. Appreciate pulmonology's assistance. Monitor labs closely. Nutrition via feeding tube presently. Blood sugars elevated. Continue basal long-acting insulin to her regimen. Propofol added to precedex for sedation. Reviewed treatment plans with the patient and/or family. 45 minutes spent in face to face interaction and coordination of care.      Electronically signed by Lewis Medina MD on 3/19/2022 at 1:17 PM

## 2022-03-19 NOTE — PROGRESS NOTES
Propofol weaned off. Patient squeezes hands to command. She does appear tense but VSS. Placed on CPAP for SBT per daily order.

## 2022-03-20 LAB
ALBUMIN SERPL-MCNC: 3.2 G/DL (ref 3.5–5.2)
ALP BLD-CCNC: 62 U/L (ref 35–104)
ALT SERPL-CCNC: 11 U/L (ref 5–33)
ANION GAP SERPL CALCULATED.3IONS-SCNC: 10 MMOL/L (ref 7–19)
AST SERPL-CCNC: 9 U/L (ref 5–32)
BASOPHILS ABSOLUTE: 0 K/UL (ref 0–0.2)
BASOPHILS RELATIVE PERCENT: 0 % (ref 0–1)
BILIRUB SERPL-MCNC: <0.2 MG/DL (ref 0.2–1.2)
BUN BLDV-MCNC: 40 MG/DL (ref 8–23)
CALCIUM SERPL-MCNC: 8.5 MG/DL (ref 8.8–10.2)
CHLORIDE BLD-SCNC: 98 MMOL/L (ref 98–111)
CO2: 30 MMOL/L (ref 22–29)
CREAT SERPL-MCNC: 1 MG/DL (ref 0.5–0.9)
EOSINOPHILS ABSOLUTE: 0 K/UL (ref 0–0.6)
EOSINOPHILS RELATIVE PERCENT: 0 % (ref 0–5)
GFR AFRICAN AMERICAN: >59
GFR NON-AFRICAN AMERICAN: 54
GLUCOSE BLD-MCNC: 304 MG/DL (ref 70–99)
GLUCOSE BLD-MCNC: 307 MG/DL (ref 70–99)
GLUCOSE BLD-MCNC: 323 MG/DL (ref 70–99)
GLUCOSE BLD-MCNC: 331 MG/DL (ref 74–109)
GLUCOSE BLD-MCNC: 348 MG/DL (ref 70–99)
HCT VFR BLD CALC: 47 % (ref 37–47)
HEMOGLOBIN: 14.7 G/DL (ref 12–16)
IMMATURE GRANULOCYTES #: 0 K/UL
LYMPHOCYTES ABSOLUTE: 0.4 K/UL (ref 1.1–4.5)
LYMPHOCYTES RELATIVE PERCENT: 6 % (ref 20–40)
MCH RBC QN AUTO: 30.8 PG (ref 27–31)
MCHC RBC AUTO-ENTMCNC: 31.3 G/DL (ref 33–37)
MCV RBC AUTO: 98.5 FL (ref 81–99)
MONOCYTES ABSOLUTE: 0.6 K/UL (ref 0–0.9)
MONOCYTES RELATIVE PERCENT: 7.9 % (ref 0–10)
NEUTROPHILS ABSOLUTE: 6.1 K/UL (ref 1.5–7.5)
NEUTROPHILS RELATIVE PERCENT: 86 % (ref 50–65)
PDW BLD-RTO: 15.2 % (ref 11.5–14.5)
PERFORMED ON: ABNORMAL
PLATELET # BLD: 143 K/UL (ref 130–400)
PMV BLD AUTO: 9.9 FL (ref 9.4–12.3)
POTASSIUM SERPL-SCNC: 4.7 MMOL/L (ref 3.5–5)
RBC # BLD: 4.77 M/UL (ref 4.2–5.4)
SODIUM BLD-SCNC: 138 MMOL/L (ref 136–145)
TOTAL PROTEIN: 5.4 G/DL (ref 6.6–8.7)
WBC # BLD: 7.1 K/UL (ref 4.8–10.8)

## 2022-03-20 PROCEDURE — 6370000000 HC RX 637 (ALT 250 FOR IP): Performed by: FAMILY MEDICINE

## 2022-03-20 PROCEDURE — 80053 COMPREHEN METABOLIC PANEL: CPT

## 2022-03-20 PROCEDURE — 94003 VENT MGMT INPAT SUBQ DAY: CPT

## 2022-03-20 PROCEDURE — 6360000002 HC RX W HCPCS: Performed by: FAMILY MEDICINE

## 2022-03-20 PROCEDURE — 85025 COMPLETE CBC W/AUTO DIFF WBC: CPT

## 2022-03-20 PROCEDURE — 2580000003 HC RX 258: Performed by: FAMILY MEDICINE

## 2022-03-20 PROCEDURE — 2700000000 HC OXYGEN THERAPY PER DAY

## 2022-03-20 PROCEDURE — 36415 COLL VENOUS BLD VENIPUNCTURE: CPT

## 2022-03-20 PROCEDURE — 2100000000 HC CCU R&B

## 2022-03-20 PROCEDURE — 2500000003 HC RX 250 WO HCPCS: Performed by: FAMILY MEDICINE

## 2022-03-20 PROCEDURE — 94640 AIRWAY INHALATION TREATMENT: CPT

## 2022-03-20 PROCEDURE — 6370000000 HC RX 637 (ALT 250 FOR IP): Performed by: INTERNAL MEDICINE

## 2022-03-20 PROCEDURE — 82947 ASSAY GLUCOSE BLOOD QUANT: CPT

## 2022-03-20 RX ADMIN — METHYLPREDNISOLONE SODIUM SUCCINATE 40 MG: 40 INJECTION, POWDER, FOR SOLUTION INTRAMUSCULAR; INTRAVENOUS at 06:06

## 2022-03-20 RX ADMIN — CHLORHEXIDINE GLUCONATE 15 ML: 1.2 RINSE ORAL at 08:27

## 2022-03-20 RX ADMIN — IPRATROPIUM BROMIDE AND ALBUTEROL SULFATE 1 AMPULE: .5; 2.5 SOLUTION RESPIRATORY (INHALATION) at 02:01

## 2022-03-20 RX ADMIN — METHYLPREDNISOLONE SODIUM SUCCINATE 40 MG: 40 INJECTION, POWDER, FOR SOLUTION INTRAMUSCULAR; INTRAVENOUS at 17:21

## 2022-03-20 RX ADMIN — METHYLPREDNISOLONE SODIUM SUCCINATE 40 MG: 40 INJECTION, POWDER, FOR SOLUTION INTRAMUSCULAR; INTRAVENOUS at 11:59

## 2022-03-20 RX ADMIN — FUROSEMIDE 20 MG: 10 INJECTION, SOLUTION INTRAMUSCULAR; INTRAVENOUS at 07:35

## 2022-03-20 RX ADMIN — CHLORHEXIDINE GLUCONATE 15 ML: 1.2 RINSE ORAL at 20:41

## 2022-03-20 RX ADMIN — INSULIN LISPRO 2 UNITS: 100 INJECTION, SOLUTION INTRAVENOUS; SUBCUTANEOUS at 20:39

## 2022-03-20 RX ADMIN — DEXMEDETOMIDINE HYDROCHLORIDE 1.3 MCG/KG/HR: 400 INJECTION INTRAVENOUS at 15:52

## 2022-03-20 RX ADMIN — DEXMEDETOMIDINE HYDROCHLORIDE 1.3 MCG/KG/HR: 400 INJECTION INTRAVENOUS at 12:00

## 2022-03-20 RX ADMIN — IPRATROPIUM BROMIDE AND ALBUTEROL SULFATE 1 AMPULE: .5; 2.5 SOLUTION RESPIRATORY (INHALATION) at 10:09

## 2022-03-20 RX ADMIN — INSULIN LISPRO 4 UNITS: 100 INJECTION, SOLUTION INTRAVENOUS; SUBCUTANEOUS at 11:59

## 2022-03-20 RX ADMIN — INSULIN LISPRO 4 UNITS: 100 INJECTION, SOLUTION INTRAVENOUS; SUBCUTANEOUS at 17:19

## 2022-03-20 RX ADMIN — DEXMEDETOMIDINE HYDROCHLORIDE 1.3 MCG/KG/HR: 400 INJECTION INTRAVENOUS at 19:27

## 2022-03-20 RX ADMIN — PROPOFOL 30 MCG/KG/MIN: 10 INJECTION, EMULSION INTRAVENOUS at 18:01

## 2022-03-20 RX ADMIN — ENOXAPARIN SODIUM 80 MG: 100 INJECTION SUBCUTANEOUS at 20:39

## 2022-03-20 RX ADMIN — DEXMEDETOMIDINE HYDROCHLORIDE 1.3 MCG/KG/HR: 400 INJECTION INTRAVENOUS at 04:10

## 2022-03-20 RX ADMIN — DEXMEDETOMIDINE HYDROCHLORIDE 1.3 MCG/KG/HR: 400 INJECTION INTRAVENOUS at 23:22

## 2022-03-20 RX ADMIN — Medication 100 MCG/HR: at 06:25

## 2022-03-20 RX ADMIN — IPRATROPIUM BROMIDE AND ALBUTEROL SULFATE 1 AMPULE: .5; 2.5 SOLUTION RESPIRATORY (INHALATION) at 14:15

## 2022-03-20 RX ADMIN — SODIUM CHLORIDE, PRESERVATIVE FREE 10 ML: 5 INJECTION INTRAVENOUS at 20:41

## 2022-03-20 RX ADMIN — DEXMEDETOMIDINE HYDROCHLORIDE 1.3 MCG/KG/HR: 400 INJECTION INTRAVENOUS at 08:27

## 2022-03-20 RX ADMIN — IPRATROPIUM BROMIDE AND ALBUTEROL SULFATE 1 AMPULE: .5; 2.5 SOLUTION RESPIRATORY (INHALATION) at 18:03

## 2022-03-20 RX ADMIN — IPRATROPIUM BROMIDE AND ALBUTEROL SULFATE 1 AMPULE: .5; 2.5 SOLUTION RESPIRATORY (INHALATION) at 06:13

## 2022-03-20 RX ADMIN — IPRATROPIUM BROMIDE AND ALBUTEROL SULFATE 1 AMPULE: .5; 2.5 SOLUTION RESPIRATORY (INHALATION) at 21:59

## 2022-03-20 RX ADMIN — DEXMEDETOMIDINE HYDROCHLORIDE 1.3 MCG/KG/HR: 400 INJECTION INTRAVENOUS at 00:30

## 2022-03-20 RX ADMIN — SODIUM CHLORIDE, PRESERVATIVE FREE 10 ML: 5 INJECTION INTRAVENOUS at 07:35

## 2022-03-20 RX ADMIN — INSULIN LISPRO 4 UNITS: 100 INJECTION, SOLUTION INTRAVENOUS; SUBCUTANEOUS at 07:35

## 2022-03-20 RX ADMIN — PROPOFOL 30 MCG/KG/MIN: 10 INJECTION, EMULSION INTRAVENOUS at 00:23

## 2022-03-20 RX ADMIN — INSULIN GLARGINE 20 UNITS: 100 INJECTION, SOLUTION SUBCUTANEOUS at 20:38

## 2022-03-20 RX ADMIN — SODIUM CHLORIDE, PRESERVATIVE FREE 20 MG: 5 INJECTION INTRAVENOUS at 07:35

## 2022-03-20 RX ADMIN — ENOXAPARIN SODIUM 80 MG: 100 INJECTION SUBCUTANEOUS at 07:35

## 2022-03-20 RX ADMIN — SODIUM CHLORIDE, PRESERVATIVE FREE 20 MG: 5 INJECTION INTRAVENOUS at 20:39

## 2022-03-20 RX ADMIN — PROPOFOL 30 MCG/KG/MIN: 10 INJECTION, EMULSION INTRAVENOUS at 04:46

## 2022-03-20 RX ADMIN — PROPOFOL 30 MCG/KG/MIN: 10 INJECTION, EMULSION INTRAVENOUS at 12:01

## 2022-03-20 ASSESSMENT — PULMONARY FUNCTION TESTS
PIF_VALUE: 22
PIF_VALUE: 23
PIF_VALUE: 23
PIF_VALUE: 22
PIF_VALUE: 22
PIF_VALUE: 21
PIF_VALUE: 22
PIF_VALUE: 22
PIF_VALUE: 26
PEFR_L/MIN: 16
PIF_VALUE: 22
PIF_VALUE: 25
PIF_VALUE: 23
PIF_VALUE: 21
PIF_VALUE: 23
PIF_VALUE: 22
PIF_VALUE: 23
PIF_VALUE: 24
PIF_VALUE: 22
PIF_VALUE: 22
PIF_VALUE: 25
PIF_VALUE: 22
PIF_VALUE: 31
PIF_VALUE: 25
PIF_VALUE: 23
PIF_VALUE: 22
PIF_VALUE: 23

## 2022-03-20 ASSESSMENT — PAIN SCALES - GENERAL
PAINLEVEL_OUTOF10: 0

## 2022-03-20 NOTE — PROGRESS NOTES
Daily Progress Note  Marta Steel  MRN: [id number] LOS: 4    Admit Date: 3/15/2022   3/20/2022 10:57 AM    Subjective:          Chief Complaint:  Chief Complaint   Patient presents with    Shortness of Breath    Altered Mental Status       Interval History:    Reviewed overnight events and nursing notes. Status:  Did well for approx 20 min of SBT, but again had to have sedation turned back up with fentanyl and precedex. Propofol off.   Pain:  intubated        Review of Systems   Unable to perform ROS: Intubated       DIET:  Diet NPO  ADULT TUBE FEEDING; Orogastric; Peptide Based High Protein; Continuous; 20; Yes; 5; Q 6 hours; 58; 0; Other (specify); no free water flulsh    Medications:      propofol 30 mcg/kg/min (03/20/22 1010)    lactated ringers 50 mL/hr at 03/20/22 1010    fentaNYL 75 mcg/hr (03/20/22 1052)    sodium chloride      norepinephrine Stopped (03/17/22 0255)    dextrose      dexmedetomidine HCl in NaCl 1.3 mcg/kg/hr (03/20/22 1010)      insulin glargine  0.25 Units/kg SubCUTAneous Nightly    enoxaparin  80 mg SubCUTAneous BID    furosemide  20 mg IntraVENous Daily    sodium chloride flush  5-40 mL IntraVENous 2 times per day    cefTRIAXone (ROCEPHIN) IV  1,000 mg IntraVENous Q24H    methylPREDNISolone  40 mg IntraVENous Q6H    insulin lispro  0-6 Units SubCUTAneous TID WC    insulin lispro  0-3 Units SubCUTAneous Nightly    chlorhexidine  15 mL Mouth/Throat BID    famotidine (PEPCID) injection  20 mg IntraVENous BID    ipratropium-albuterol  1 ampule Inhalation Q4H       Data:     Code Status: Full Code    Family History   Problem Relation Age of Onset    Cancer Mother     Coronary Art Dis Father     Mult Sclerosis Sister     Cancer Brother      Social History     Socioeconomic History    Marital status:      Spouse name: Not on file    Number of children: Not on file    Years of education: Not on file    Highest education level: Not on file   Occupational History    Not on file   Tobacco Use    Smoking status: Current Every Day Smoker     Packs/day: 0.50     Types: Cigarettes     Last attempt to quit: 3/29/2021     Years since quittin.9    Smokeless tobacco: Never Used    Tobacco comment: 1/2 PACKS EVERY 2 WEEKS, states has been decreasing amount    Vaping Use    Vaping Use: Never used   Substance and Sexual Activity    Alcohol use: Never    Drug use: Never    Sexual activity: Not Currently     Partners: Male   Other Topics Concern    Not on file   Social History Narrative    Not on file     Social Determinants of Health     Financial Resource Strain:     Difficulty of Paying Living Expenses: Not on file   Food Insecurity:     Worried About Running Out of Food in the Last Year: Not on file    Ada of Food in the Last Year: Not on file   Transportation Needs:     Lack of Transportation (Medical): Not on file    Lack of Transportation (Non-Medical):  Not on file   Physical Activity:     Days of Exercise per Week: Not on file    Minutes of Exercise per Session: Not on file   Stress:     Feeling of Stress : Not on file   Social Connections:     Frequency of Communication with Friends and Family: Not on file    Frequency of Social Gatherings with Friends and Family: Not on file    Attends Muslim Services: Not on file    Active Member of [address] [place] [address] [employer] or Organizations: Not on file    Attends Club or Organization Meetings: Not on file    Marital Status: Not on file   Intimate Partner Violence:     Fear of Current or Ex-Partner: Not on file    Emotionally Abused: Not on file    Physically Abused: Not on file    Sexually Abused: Not on file   Housing Stability:     Unable to Pay for Housing in the Last Year: Not on file    Number of [place] in the Last Year: Not on file    Unstable Housing in the Last Year: Not on file       Labs:  CBC:   Recent Labs     22  0146 22  0224 22  0135   WBC 8.9 11.4* 7.1   HGB 14.5 15.4 14.7  166 143     BMP:    Recent Labs     22  0146 22  0146 22  0224 22  0421 22  0135     --  137  --  138   K 4.6   < > 4.2 4.3 4.7   CL 98  --  96*  --  98   CO2 26  --  29  --  30*   BUN 29*  --  33*  --  40*   CREATININE 0.9  --  0.9  --  1.0*   GLUCOSE 240*  --  286*  --  331*    < > = values in this interval not displayed. Hepatic:   Recent Labs     22  0146 22  0224 22  0135   AST 10 9 9   ALT 15 13 11   BILITOT <0.2 <0.2 <0.2   ALKPHOS 80 76 62     Lipids: No results for input(s): CHOL, HDL in the last 72 hours. Invalid input(s): LDLCALCU  INR: No results for input(s): INR in the last 72 hours. Objective:     Vitals: /60   Pulse 85   Temp 98.2 °F (36.8 °C) (Temporal)   Resp 16   Ht 5' 6\" (1.676 m)   Wt 161 lb 14.4 oz (73.4 kg)   SpO2 93%   BMI 26.13 kg/m²      Intake/Output Summary (Last 24 hours) at 3/20/2022 1057  Last data filed at 3/20/2022 1010  Gross per 24 hour   Intake 5107.57 ml   Output 2425 ml   Net 2682.57 ml    Temp (24hrs), Av.9 °F (36.6 °C), Min:97.3 °F (36.3 °C), Max:98.4 °F (36.9 °C)    Glucose:  Recent Labs     22  1122 22  1610 22  1940 22  0730   POCGLU 313* 298* 274* 304*       Physical Exam  Vitals reviewed. Constitutional:       Comments: Sedated and intubated   HENT:      Head: Normocephalic and atraumatic. Nose: Nose normal.   Cardiovascular:      Rate and Rhythm: Normal rate and regular rhythm. Pulses: Normal pulses. Heart sounds: No murmur heard. Pulmonary:      Comments: Mechanically ventilated  Abdominal:      General: Bowel sounds are normal. There is no distension. Skin:     Capillary Refill: Capillary refill takes less than 2 seconds. Coloration: Skin is not jaundiced.            Assessment and Plan:     Primary Problem:  Acute hypercapnic respiratory failure [facility] Problem list:  Principal Problem:    Acute hypercapnic respiratory failure (HCC)  Active Problems:    NICM (nonischemic cardiomyopathy) ([facility]    Diabetes mellitus ([address]    S/P CABG x 4    Coronary artery disease involving native coronary artery of native heart without angina pectoris    Chronic obstructive pulmonary disease (HCC)    Abdominal aortic aneurysm (AAA) without rupture ([address]    Smoker  Resolved Problems:    * No resolved hospital problems. *      PMH:  Past Medical History:   Diagnosis Date    ASHD (arteriosclerotic heart disease)     s/p PTCA and stent of circumflex, as well as intermediate and mid LAD     COPD (chronic obstructive pulmonary disease) ([facility]     Coronary atherosclerosis     Diabetes mellitus ([facility] [facility]     diet controlled, type 2    Encounter for wound care     FOR LLL WOUND    Fall 10/29/2020    Ganglion cyst     RT HAND    Hematoma 10/2020    hematoma LLL from fall injury    Hx of blood clots     Hypercholesteremia     Hypertension     Pacemaker 03/02/2021    Unstable angina (HCC)        Treatment Plan:  Continue mechanical ventilation. As needed hydralazine for blood pressures greater than 768 systolic. Wean ventilatory support with SBT as she tolerates. Appreciate pulmonology's assistance. Monitor labs closely. Nutrition via feeding tube presently. Blood sugars elevated. Continue basal long-acting insulin to her regimen. Propofol added to precedex for sedation. Reviewed treatment plans with the patient and/or family. 45 minutes spent in face to face interaction and coordination of care.      Electronically signed by Ben Mcghee MD on 3/20/2022 at 10:57 AM

## 2022-03-21 ENCOUNTER — APPOINTMENT (OUTPATIENT)
Dept: GENERAL RADIOLOGY | Age: 72
DRG: 207 | End: 2022-03-21
Payer: MEDICARE

## 2022-03-21 LAB
ALBUMIN SERPL-MCNC: 3 G/DL (ref 3.5–5.2)
ALP BLD-CCNC: 54 U/L (ref 35–104)
ALT SERPL-CCNC: 12 U/L (ref 5–33)
ANION GAP SERPL CALCULATED.3IONS-SCNC: 10 MMOL/L (ref 7–19)
AST SERPL-CCNC: 8 U/L (ref 5–32)
BASE EXCESS ARTERIAL: 10.2 MMOL/L (ref -2–2)
BASE EXCESS ARTERIAL: 8.7 MMOL/L (ref -2–2)
BASE EXCESS ARTERIAL: 9.3 MMOL/L (ref -2–2)
BASE EXCESS ARTERIAL: 9.8 MMOL/L (ref -2–2)
BASOPHILS ABSOLUTE: 0 K/UL (ref 0–0.2)
BASOPHILS RELATIVE PERCENT: 0.1 % (ref 0–1)
BILIRUB SERPL-MCNC: <0.2 MG/DL (ref 0.2–1.2)
BUN BLDV-MCNC: 48 MG/DL (ref 8–23)
CALCIUM SERPL-MCNC: 8.5 MG/DL (ref 8.8–10.2)
CARBOXYHEMOGLOBIN ARTERIAL: 1.8 % (ref 0–5)
CARBOXYHEMOGLOBIN ARTERIAL: 1.9 % (ref 0–5)
CHLORIDE BLD-SCNC: 103 MMOL/L (ref 98–111)
CO2: 32 MMOL/L (ref 22–29)
CREAT SERPL-MCNC: 1 MG/DL (ref 0.5–0.9)
EOSINOPHILS ABSOLUTE: 0 K/UL (ref 0–0.6)
EOSINOPHILS RELATIVE PERCENT: 0 % (ref 0–5)
GFR AFRICAN AMERICAN: >59
GFR NON-AFRICAN AMERICAN: 54
GLUCOSE BLD-MCNC: 212 MG/DL (ref 70–99)
GLUCOSE BLD-MCNC: 259 MG/DL (ref 70–99)
GLUCOSE BLD-MCNC: 287 MG/DL (ref 70–99)
GLUCOSE BLD-MCNC: 317 MG/DL (ref 70–99)
GLUCOSE BLD-MCNC: 340 MG/DL (ref 74–109)
HCO3 ARTERIAL: 35.9 MMOL/L (ref 22–26)
HCO3 ARTERIAL: 36.9 MMOL/L (ref 22–26)
HCO3 ARTERIAL: 37.2 MMOL/L (ref 22–26)
HCO3 ARTERIAL: 37.4 MMOL/L (ref 22–26)
HCT VFR BLD CALC: 47.6 % (ref 37–47)
HEMOGLOBIN, ART, EXTENDED: 14.7 G/DL (ref 12–16)
HEMOGLOBIN, ART, EXTENDED: 15.2 G/DL (ref 12–16)
HEMOGLOBIN, ART, EXTENDED: 15.2 G/DL (ref 12–16)
HEMOGLOBIN, ART, EXTENDED: 15.3 G/DL (ref 12–16)
HEMOGLOBIN: 14.3 G/DL (ref 12–16)
IMMATURE GRANULOCYTES #: 0 K/UL
LYMPHOCYTES ABSOLUTE: 0.7 K/UL (ref 1.1–4.5)
LYMPHOCYTES RELATIVE PERCENT: 9.3 % (ref 20–40)
MCH RBC QN AUTO: 30.3 PG (ref 27–31)
MCHC RBC AUTO-ENTMCNC: 30 G/DL (ref 33–37)
MCV RBC AUTO: 100.8 FL (ref 81–99)
METHEMOGLOBIN ARTERIAL: 1 %
METHEMOGLOBIN ARTERIAL: 1.3 %
METHEMOGLOBIN ARTERIAL: 1.3 %
METHEMOGLOBIN ARTERIAL: 1.5 %
MONOCYTES ABSOLUTE: 1.1 K/UL (ref 0–0.9)
MONOCYTES RELATIVE PERCENT: 13.5 % (ref 0–10)
NEUTROPHILS ABSOLUTE: 6.1 K/UL (ref 1.5–7.5)
NEUTROPHILS RELATIVE PERCENT: 76.7 % (ref 50–65)
O2 CONTENT ARTERIAL: 18.5 ML/DL
O2 CONTENT ARTERIAL: 19.6 ML/DL
O2 CONTENT ARTERIAL: 19.7 ML/DL
O2 CONTENT ARTERIAL: 19.7 ML/DL
O2 SAT, ARTERIAL: 89.5 %
O2 SAT, ARTERIAL: 91.7 %
O2 SAT, ARTERIAL: 92 %
O2 SAT, ARTERIAL: 92.2 %
O2 THERAPY: ABNORMAL
PCO2 ARTERIAL: 58 MMHG (ref 35–45)
PCO2 ARTERIAL: 59 MMHG (ref 35–45)
PCO2 ARTERIAL: 60 MMHG (ref 35–45)
PCO2 ARTERIAL: 61 MMHG (ref 35–45)
PDW BLD-RTO: 15.2 % (ref 11.5–14.5)
PERFORMED ON: ABNORMAL
PH ARTERIAL: 7.39 (ref 7.35–7.45)
PH ARTERIAL: 7.4 (ref 7.35–7.45)
PH ARTERIAL: 7.4 (ref 7.35–7.45)
PH ARTERIAL: 7.41 (ref 7.35–7.45)
PLATELET # BLD: 142 K/UL (ref 130–400)
PMV BLD AUTO: 10 FL (ref 9.4–12.3)
PO2 ARTERIAL: 58 MMHG (ref 80–100)
PO2 ARTERIAL: 66 MMHG (ref 80–100)
PO2 ARTERIAL: 67 MMHG (ref 80–100)
PO2 ARTERIAL: 68 MMHG (ref 80–100)
POTASSIUM SERPL-SCNC: 4.7 MMOL/L (ref 3.5–5)
POTASSIUM, WHOLE BLOOD: 4.7
POTASSIUM, WHOLE BLOOD: 4.7
POTASSIUM, WHOLE BLOOD: 4.8
POTASSIUM, WHOLE BLOOD: 4.8
RBC # BLD: 4.72 M/UL (ref 4.2–5.4)
SODIUM BLD-SCNC: 145 MMOL/L (ref 136–145)
TOTAL PROTEIN: 5.4 G/DL (ref 6.6–8.7)
WBC # BLD: 8 K/UL (ref 4.8–10.8)

## 2022-03-21 PROCEDURE — 2700000000 HC OXYGEN THERAPY PER DAY

## 2022-03-21 PROCEDURE — 94003 VENT MGMT INPAT SUBQ DAY: CPT

## 2022-03-21 PROCEDURE — 6370000000 HC RX 637 (ALT 250 FOR IP): Performed by: HOSPITALIST

## 2022-03-21 PROCEDURE — 71045 X-RAY EXAM CHEST 1 VIEW: CPT

## 2022-03-21 PROCEDURE — 82947 ASSAY GLUCOSE BLOOD QUANT: CPT

## 2022-03-21 PROCEDURE — 6370000000 HC RX 637 (ALT 250 FOR IP): Performed by: FAMILY MEDICINE

## 2022-03-21 PROCEDURE — 82803 BLOOD GASES ANY COMBINATION: CPT

## 2022-03-21 PROCEDURE — 6360000002 HC RX W HCPCS: Performed by: FAMILY MEDICINE

## 2022-03-21 PROCEDURE — 2500000003 HC RX 250 WO HCPCS: Performed by: FAMILY MEDICINE

## 2022-03-21 PROCEDURE — 36415 COLL VENOUS BLD VENIPUNCTURE: CPT

## 2022-03-21 PROCEDURE — 2100000000 HC CCU R&B

## 2022-03-21 PROCEDURE — 94660 CPAP INITIATION&MGMT: CPT

## 2022-03-21 PROCEDURE — 94640 AIRWAY INHALATION TREATMENT: CPT

## 2022-03-21 PROCEDURE — 2580000003 HC RX 258: Performed by: FAMILY MEDICINE

## 2022-03-21 PROCEDURE — 80053 COMPREHEN METABOLIC PANEL: CPT

## 2022-03-21 PROCEDURE — 85025 COMPLETE CBC W/AUTO DIFF WBC: CPT

## 2022-03-21 PROCEDURE — 84132 ASSAY OF SERUM POTASSIUM: CPT

## 2022-03-21 PROCEDURE — 36600 WITHDRAWAL OF ARTERIAL BLOOD: CPT

## 2022-03-21 PROCEDURE — 99291 CRITICAL CARE FIRST HOUR: CPT | Performed by: INTERNAL MEDICINE

## 2022-03-21 PROCEDURE — 6370000000 HC RX 637 (ALT 250 FOR IP): Performed by: INTERNAL MEDICINE

## 2022-03-21 RX ORDER — DILTIAZEM HYDROCHLORIDE 5 MG/ML
10 INJECTION INTRAVENOUS ONCE
Status: COMPLETED | OUTPATIENT
Start: 2022-03-21 | End: 2022-03-21

## 2022-03-21 RX ORDER — ACETAMINOPHEN 325 MG/1
650 TABLET ORAL EVERY 4 HOURS PRN
Status: DISCONTINUED | OUTPATIENT
Start: 2022-03-21 | End: 2022-03-28 | Stop reason: HOSPADM

## 2022-03-21 RX ADMIN — INSULIN LISPRO 6 UNITS: 100 INJECTION, SOLUTION INTRAVENOUS; SUBCUTANEOUS at 16:37

## 2022-03-21 RX ADMIN — DILTIAZEM HYDROCHLORIDE 10 MG: 5 INJECTION INTRAVENOUS at 19:05

## 2022-03-21 RX ADMIN — DEXMEDETOMIDINE HYDROCHLORIDE 1.3 MCG/KG/HR: 400 INJECTION INTRAVENOUS at 10:29

## 2022-03-21 RX ADMIN — IPRATROPIUM BROMIDE AND ALBUTEROL SULFATE 1 AMPULE: .5; 2.5 SOLUTION RESPIRATORY (INHALATION) at 06:55

## 2022-03-21 RX ADMIN — INSULIN LISPRO 6 UNITS: 100 INJECTION, SOLUTION INTRAVENOUS; SUBCUTANEOUS at 11:55

## 2022-03-21 RX ADMIN — CHLORHEXIDINE GLUCONATE 15 ML: 1.2 RINSE ORAL at 08:06

## 2022-03-21 RX ADMIN — PROPOFOL 30 MCG/KG/MIN: 10 INJECTION, EMULSION INTRAVENOUS at 00:18

## 2022-03-21 RX ADMIN — DEXMEDETOMIDINE HYDROCHLORIDE 1.3 MCG/KG/HR: 400 INJECTION INTRAVENOUS at 14:23

## 2022-03-21 RX ADMIN — ACETAMINOPHEN 650 MG: 325 TABLET ORAL at 05:01

## 2022-03-21 RX ADMIN — SODIUM CHLORIDE, PRESERVATIVE FREE 1000 MG: 5 INJECTION INTRAVENOUS at 23:57

## 2022-03-21 RX ADMIN — METHYLPREDNISOLONE SODIUM SUCCINATE 40 MG: 40 INJECTION, POWDER, FOR SOLUTION INTRAMUSCULAR; INTRAVENOUS at 05:41

## 2022-03-21 RX ADMIN — SODIUM CHLORIDE, PRESERVATIVE FREE 20 MG: 5 INJECTION INTRAVENOUS at 08:03

## 2022-03-21 RX ADMIN — SODIUM CHLORIDE, PRESERVATIVE FREE 10 ML: 5 INJECTION INTRAVENOUS at 20:13

## 2022-03-21 RX ADMIN — METHYLPREDNISOLONE SODIUM SUCCINATE 40 MG: 40 INJECTION, POWDER, FOR SOLUTION INTRAMUSCULAR; INTRAVENOUS at 23:57

## 2022-03-21 RX ADMIN — METHYLPREDNISOLONE SODIUM SUCCINATE 40 MG: 40 INJECTION, POWDER, FOR SOLUTION INTRAMUSCULAR; INTRAVENOUS at 11:55

## 2022-03-21 RX ADMIN — INSULIN LISPRO 8 UNITS: 100 INJECTION, SOLUTION INTRAVENOUS; SUBCUTANEOUS at 08:04

## 2022-03-21 RX ADMIN — SODIUM CHLORIDE, PRESERVATIVE FREE 10 ML: 5 INJECTION INTRAVENOUS at 08:05

## 2022-03-21 RX ADMIN — METHYLPREDNISOLONE SODIUM SUCCINATE 40 MG: 40 INJECTION, POWDER, FOR SOLUTION INTRAMUSCULAR; INTRAVENOUS at 00:21

## 2022-03-21 RX ADMIN — FUROSEMIDE 20 MG: 10 INJECTION, SOLUTION INTRAMUSCULAR; INTRAVENOUS at 08:03

## 2022-03-21 RX ADMIN — DEXMEDETOMIDINE HYDROCHLORIDE 0.6 MCG/KG/HR: 400 INJECTION INTRAVENOUS at 19:20

## 2022-03-21 RX ADMIN — DEXMEDETOMIDINE HYDROCHLORIDE 1.3 MCG/KG/HR: 400 INJECTION INTRAVENOUS at 07:17

## 2022-03-21 RX ADMIN — PROPOFOL 25 MCG/KG/MIN: 10 INJECTION, EMULSION INTRAVENOUS at 05:23

## 2022-03-21 RX ADMIN — Medication 75 MCG/HR: at 02:25

## 2022-03-21 RX ADMIN — DILTIAZEM HYDROCHLORIDE 2.5 MG/HR: 5 INJECTION INTRAVENOUS at 19:09

## 2022-03-21 RX ADMIN — SODIUM CHLORIDE, PRESERVATIVE FREE 20 MG: 5 INJECTION INTRAVENOUS at 20:10

## 2022-03-21 RX ADMIN — IPRATROPIUM BROMIDE AND ALBUTEROL SULFATE 1 AMPULE: .5; 2.5 SOLUTION RESPIRATORY (INHALATION) at 22:21

## 2022-03-21 RX ADMIN — INSULIN LISPRO 1 UNITS: 100 INJECTION, SOLUTION INTRAVENOUS; SUBCUTANEOUS at 20:11

## 2022-03-21 RX ADMIN — IPRATROPIUM BROMIDE AND ALBUTEROL SULFATE 1 AMPULE: .5; 2.5 SOLUTION RESPIRATORY (INHALATION) at 14:45

## 2022-03-21 RX ADMIN — ENOXAPARIN SODIUM 80 MG: 100 INJECTION SUBCUTANEOUS at 20:10

## 2022-03-21 RX ADMIN — INSULIN GLARGINE 20 UNITS: 100 INJECTION, SOLUTION SUBCUTANEOUS at 20:10

## 2022-03-21 RX ADMIN — IPRATROPIUM BROMIDE AND ALBUTEROL SULFATE 1 AMPULE: .5; 2.5 SOLUTION RESPIRATORY (INHALATION) at 10:50

## 2022-03-21 RX ADMIN — SODIUM CHLORIDE, PRESERVATIVE FREE 1000 MG: 5 INJECTION INTRAVENOUS at 00:21

## 2022-03-21 RX ADMIN — DEXMEDETOMIDINE HYDROCHLORIDE 1.3 MCG/KG/HR: 400 INJECTION INTRAVENOUS at 03:01

## 2022-03-21 RX ADMIN — IPRATROPIUM BROMIDE AND ALBUTEROL SULFATE 1 AMPULE: .5; 2.5 SOLUTION RESPIRATORY (INHALATION) at 01:58

## 2022-03-21 RX ADMIN — METHYLPREDNISOLONE SODIUM SUCCINATE 40 MG: 40 INJECTION, POWDER, FOR SOLUTION INTRAMUSCULAR; INTRAVENOUS at 16:37

## 2022-03-21 RX ADMIN — IPRATROPIUM BROMIDE AND ALBUTEROL SULFATE 1 AMPULE: .5; 2.5 SOLUTION RESPIRATORY (INHALATION) at 18:12

## 2022-03-21 RX ADMIN — ENOXAPARIN SODIUM 80 MG: 100 INJECTION SUBCUTANEOUS at 08:04

## 2022-03-21 ASSESSMENT — PULMONARY FUNCTION TESTS
PIF_VALUE: 31
PIF_VALUE: 30
PIF_VALUE: 16
PIF_VALUE: 24
PIF_VALUE: 21
PIF_VALUE: 22
PIF_VALUE: 25
PIF_VALUE: 22
PIF_VALUE: 22
PIF_VALUE: 26
PIF_VALUE: 16
PIF_VALUE: 21
PIF_VALUE: 20
PIF_VALUE: 20
PIF_VALUE: 25

## 2022-03-21 ASSESSMENT — PAIN SCALES - GENERAL
PAINLEVEL_OUTOF10: 0

## 2022-03-21 NOTE — PROGRESS NOTES
BLOOD GAS, ARTERIAL [[id number]] (Abnormal) Collected: 03/21/22 1620     Specimen: Blood gases Updated: 03/21/22 1621      pH, Arterial 7.400      pCO2, Arterial 60.0 mmHg       pO2, Arterial 68.0 mmHg       HCO3, Arterial 37.2 mmol/L       Base Excess, Arterial 9.8 mmol/L       Hemoglobin, Art, Extended 15.2 g/dL       O2 Sat, Arterial 92.2 %       Carboxyhgb, Arterial 1.9 %       Methemoglobin, Arterial 1.3 %       O2 Content, Arterial 19.7 mL/dL       O2 Therapy Unknown     Potassium, Whole Blood [[id number]] Collected: 03/21/22 1620      Updated: 03/21/22 1621      Potassium, Whole Blood 4.7     AT+,R rad, RR = 22, Bipap - 16/8,O2 @ 10 LPM

## 2022-03-21 NOTE — PROGRESS NOTES
Family Medicine Progress Note    Patient:  France Linda  YOB: 1950    MRN: [id number]     Acct: [de-identified]     Admit date: 3/15/2022    Patient Seen, Chart, Consults notes, Labs, Radiology studies reviewed. Subjective: Day 5 of stay with hypercapnic respiratory failure and most recent (in last 24 hours) has had continuation of mechanical ventilation and intubation. Numbers were doing much better prior to the weekend and had good breathing trial Saturday however abruptly became very agitated and sedation had to be reinstated. No attempt made yesterday but hopeful from attempt to get off ventilator today. No new issues other than high blood sugar. Tube feeds are at goal.    Past, Family, Social History unchanged from admission.     Diet:  Diet NPO  ADULT TUBE FEEDING; Orogastric; Peptide Based High Protein; Continuous; 20; Yes; 5; Q 6 hours; 58; 0; Other (specify); no free water flulsh    Medications:  Scheduled Meds:   insulin lispro  0-12 Units SubCUTAneous TID WC    insulin lispro  0-6 Units SubCUTAneous Nightly    insulin glargine  0.25 Units/kg SubCUTAneous Nightly    enoxaparin  80 mg SubCUTAneous BID    furosemide  20 mg IntraVENous Daily    sodium chloride flush  5-40 mL IntraVENous 2 times per day    cefTRIAXone (ROCEPHIN) IV  1,000 mg IntraVENous Q24H    methylPREDNISolone  40 mg IntraVENous Q6H    chlorhexidine  15 mL Mouth/Throat BID    famotidine (PEPCID) injection  20 mg IntraVENous BID    ipratropium-albuterol  1 ampule Inhalation Q4H     Continuous Infusions:   propofol 30 mcg/kg/min (03/21/22 0533)    lactated ringers 25 mL/hr at 03/20/22 1552    fentaNYL 75 mcg/hr (03/21/22 0225)    sodium chloride      norepinephrine Stopped (03/17/22 0255)    dextrose      dexmedetomidine HCl in NaCl 1.3 mcg/kg/hr (03/21/22 0717)     PRN Meds:acetaminophen, hydrALAZINE, sodium chloride flush, sodium chloride, ondansetron **OR** ondansetron, glucagon (rDNA), dextrose, glucose, dextrose bolus (hypoglycemia) **OR** dextrose bolus (hypoglycemia)    Objective:    Vitals: BP (!) 89/48   Pulse 104   Temp 99.9 °F (37.7 °C) (Axillary)   Resp 16   Ht 5' 6\" (1.676 m)   Wt 161 lb 14.4 oz (73.4 kg)   SpO2 91%   BMI 26.13 kg/m²   24 hour intake/output:    Intake/Output Summary (Last 24 hours) at 3/21/2022 0752  Last data filed at 3/21/2022 0400  Gross per 24 hour   Intake 3276.52 ml   Output 2735 ml   Net 541.52 ml     Last 3 weights: Wt Readings from Last 3 Encounters:   03/20/22 161 lb 14.4 oz (73.4 kg)   12/01/21 157 lb (71.2 kg)   08/13/21 174 lb 8 oz (79.2 kg)       Physical Exam:    General Appearance: Sedated on the vent  Skin:  negatives: mobility and turgor normal  Eyes:  No gross abnormalities. Neck:  neck- supple, no mass, non-tender  Lungs:  Breathing Pattern: regular, no distress, Breath sounds: normal  Heart:  Heart regular rate and rhythm  Abdomen: Auscultation: Normal bowel sounds. No bruits. Palpation: No masses, tenderness or organomegally. Extremities: Extremities warm to touch, pink, with no edema.   Musculoskeletal:  negative  Neurologic: Unable to assess given sedation    CBC with Differential:    Lab Results   Component Value Date    WBC 8.0 03/21/2022    RBC 4.72 03/21/2022    HGB 14.3 03/21/2022    HCT 47.6 03/21/2022    HCT 32.1 09/02/2011     03/21/2022     09/02/2011    .8 03/21/2022    MCH 30.3 03/21/2022    MCHC 30.0 03/21/2022    RDW 15.2 03/21/2022    LYMPHOPCT 9.3 03/21/2022    MONOPCT 13.5 03/21/2022    EOSPCT 0.5 09/02/2011    BASOPCT 0.1 03/21/2022    MONOSABS 1.10 03/21/2022    LYMPHSABS 0.7 03/21/2022    EOSABS 0.00 03/21/2022    BASOSABS 0.00 03/21/2022     CMP:    Lab Results   Component Value Date     03/21/2022     09/02/2011    K 4.7 03/21/2022    K 3.5 06/28/2021    K 4.1 09/02/2011     03/21/2022     09/02/2011    CO2 32 03/21/2022    BUN 48 03/21/2022    CREATININE 1.0 03/21/2022 CREATININE 0.6 09/02/2011    GFRAA >59 03/21/2022    LABGLOM 54 03/21/2022    GLUCOSE 340 03/21/2022    PROT 5.4 03/21/2022    PROT 7.5 01/18/2013    LABALBU 3.0 03/21/2022    LABALBU 3.3 09/02/2011    CALCIUM 8.5 03/21/2022    BILITOT <0.2 03/21/2022    ALKPHOS 54 03/21/2022    ALKPHOS 57 09/02/2011    AST 8 03/21/2022    ALT 12 03/21/2022     Last 3 Troponin:    Lab Results   Component Value Date    TROPONINI <0.01 03/15/2022    TROPONINI <0.01 08/12/2021    TROPONINI <0.01 06/23/2021     Urine Culture:  No components found for: CURINE  Blood Culture:  No components found for: CBLOOD, CFUNGUSBL  Stool Culture:  No components found for: CSTOOL    Assessment:    Principal Problem:    Acute hypercapnic respiratory failure (HCC)  Active Problems:    NICM (nonischemic cardiomyopathy) ([facility] [address]    Diabetes mellitus ([facility] [address]    S/P CABG x 4    Coronary artery disease involving native coronary artery of native heart without angina pectoris    Chronic obstructive pulmonary disease (HCC)    Abdominal aortic aneurysm (AAA) without rupture ([facility] [address]    Smoker  Resolved Problems:    * No resolved hospital problems. *          Plan:  Work towards weaning off ventilator and eventual extubation. Hopeful can do that later today as long as numbers remain good and patient remains calm. Appreciate pulmonology assistance. Increase insulin coverage for point-of-care glucose check. Will restart oral medications once extubated. Greater than 25 minutes in total spent in coordination of patient care.       Electronically signed by Russell Perkins MD on 3/21/2022 at 7:52 AM

## 2022-03-21 NOTE — PROGRESS NOTES
Blood Gas, Arterial [[id number]] (Abnormal) Collected: 03/21/22 0754     Specimen: Blood gases Updated: 03/21/22 0801      pH, Arterial 7.410      pCO2, Arterial 59.0 mmHg       pO2, Arterial 58.0 mmHg       HCO3, Arterial 37.4 mmol/L       Base Excess, Arterial 10.2 mmol/L       Hemoglobin, Art, Extended 14.7 g/dL       O2 Sat, Arterial 89.5 %       Carboxyhgb, Arterial 1.9 %       Methemoglobin, Arterial 1.3 %       O2 Content, Arterial 18.5 mL/dL      Potassium, Whole Blood [[id number]] Collected: 03/21/22 0754      Updated: 03/21/22 0801      Potassium, Whole Blood 4.8     AT+, R rad, Vent settings: ACVC, RR = 16, VT = 450, 40% O2, PEEP - 5 cm

## 2022-03-21 NOTE — PROGRESS NOTES
Pulmonary and Critical Care Progress note. [address] [facility]    MRN# [id number]    Acct# [de-identified]  3/21/2022   4:37 PM CDT    Referring Hay Bird MD      Chief Complaint: Respiratory failure on mechanical ventilation    HPI: She continues to be intubated on mechanical ventilation    Medications  insulin lispro, 0-12 Units, SubCUTAneous, TID WC    insulin lispro, 0-6 Units, SubCUTAneous, Nightly    insulin glargine, 0.25 Units/kg, SubCUTAneous, Nightly    enoxaparin, 80 mg, SubCUTAneous, BID    furosemide, 20 mg, IntraVENous, Daily    sodium chloride flush, 5-40 mL, IntraVENous, 2 times per day    cefTRIAXone (ROCEPHIN) IV, 1,000 mg, IntraVENous, Q24H    methylPREDNISolone, 40 mg, IntraVENous, Q6H    chlorhexidine, 15 mL, Mouth/Throat, BID    famotidine (PEPCID) injection, 20 mg, IntraVENous, BID    ipratropium-albuterol, 1 ampule, Inhalation, Q4H     Review of Systems:  Unable to obtain. Patient is on mechanical ventilation  Physical Exam:  BP (!) 223/200   Pulse 106   Temp 100.7 °F (38.2 °C) (Axillary)   Resp 22   Ht 5' 6\" (1.676 m)   Wt 161 lb 14.4 oz (73.4 kg)   SpO2 94%   BMI 26.13 kg/m²     Intake/Output Summary (Last 24 hours) at 3/21/2022 1523  Last data filed at 3/21/2022 1200  Gross per 24 hour   Intake 2807.8 ml   Output 2405 ml   Net 402.8 ml       General appearance: Intubated sedated on mechanical ventilation   HEENT: Normocephalic atraumatic.   Endotracheal tube in place  Heart: S1-S2 distant sounds no murmurs  Lungs: Diminished bilaterally no rubs or tenderness or dullness to percussion  Abdomen: Soft nontender no organomegalies normal bowel sounds  Extremities: No clubbing cyanosis or edema  Neuro: No focal findings  Skin: Intact    Recent Labs     03/19/22  0224 03/20/22  0135 03/21/22  0418   WBC 11.4* 7.1 8.0   RBC 5.00 4.77 4.72   HGB 15.4 14.7 14.3   HCT 48.4* 47.0 47.6*    143 142   MCV 96.8 98.5 100.8*   MCH 30.8 30.8 30.3   MCHC 31.8* 31.3* 30.0*   RDW 14.6* 15.2* 15.2*      Recent Labs     03/19/22 0224 03/19/22 0421 03/20/22 0135 03/21/22 0418 03/21/22 0754 03/21/22  1300 03/21/22  1447     --  138 145  --   --   --    K 4.2   < > 4.7 4.7 4.8 4.7 4.8   CL 96*  --  98 103  --   --   --    CO2 29  --  30* 32*  --   --   --    BUN 33*  --  40* 48*  --   --   --    CREATININE 0.9  --  1.0* 1.0*  --   --   --    CALCIUM 9.0  --  8.5* 8.5*  --   --   --    GLUCOSE 286*  --  331* 340*  --   --   --     < > = values in this interval not displayed. Recent Labs     03/19/22 0421 03/21/22 0754 03/21/22 1300 03/21/22  1447   PHART 7.390 7.410 7.400 7.390   WHY5IJS 57.0* 59.0* 58.0* 61.0*   PO2ART 56.0* 58.0* 66.0* 67.0*   FUX0NRT 34.5* 37.4* 35.9* 36.9*   C4XRAWYE 87.4* 89.5* 92.0 91.7   BEART 7.4* 10.2* 8.7* 9.3*     Recent Labs     03/19/22 0224 03/20/22 0135 03/21/22 0418   AST 9   < > 8   ALT 13   < > 12   ALKPHOS 76   < > 54   BILITOT <0.2   < > <0.2   CALCIUM 9.0   < > 8.5*   PROBNP 518  --   --     < > = values in this interval not displayed. No results for input(s): BC, LABGRAM, CULTRESP, BFCX in the last 72 hours. Radiograph: CT HEAD WO CONTRAST    Result Date: 3/16/2022  1. No acute intracranial abnormality. 2. No skull fracture. 3. Chronic white matter ischemic changes. 4. Moderate soft tissue thickening of the posterior wall of the nasopharynx with narrowing of the nasopharyngeal airway is probably due to recent intubation. This may be clinically correlated. The above study was initially reviewed and reported by [facility]. I do not find any discrepancies. Signed by Dr Uyen Alvarez    Result Date: 3/17/2022  1. No active cardiopulmonary disease. Signed by Dr Uyen Alvarez    Result Date: 3/16/2022  1. No active cardiopulmonary disease. 2. Endotracheal tube in place. The nasogastric tube is not evaluated. 3. Dual-chamber cardiac pacer in place.  Signed by Dr Ryan Peterson Rosmery Bryan       My radiograph interpretation/independent review of imaging: Reviewed    Problem list generated by [facility]:  Hospital Problems           Last Modified POA    * (Principal) Acute hypercapnic respiratory failure ([address] 3/16/2022 Yes    NICM (nonischemic cardiomyopathy) ([address] 3/16/2022 Yes    Diabetes mellitus ([address] 3/16/2022 Yes    Overview Signed 6/2/2011  1:19 PM by Saray muñoz controlled         S/P CABG x 4 3/16/2022 Yes    Overview Signed 10/11/2016 11:15 AM by Sydnie Paulino, APRN     8/29/11 EF 60%         Coronary artery disease involving native coronary artery of native heart without angina pectoris 3/16/2022 Yes    Chronic obstructive pulmonary disease ([address] 3/16/2022 Yes    Abdominal aortic aneurysm (AAA) without rupture ([facility] [address] 3/16/2022 Yes    Smoker 3/16/2022 Yes           Pulmonary Assessment/Plan:     1. Acute on chronic hypoxic hypercapnic respiratory failure. SBT was done after rounds this morning. Patient tolerated spontaneous breathing trial.  Extubated to BiPAP. She continues to be on noninvasive ventilation will get ABG  2. Underlying severe COPD clinically wheezing improved. Continue bronchodilators continue pulmonary toilet. 3. Possible committee acquired pneumonia. On empirical antibiotic therapy. De-escalate as feasible. F/u cxr   4. Altered mental status likely secondary to hypercapnia. Reevaluate once extubated. 5. DVT prophylaxis. Critical care time 36 min. Jessie De La O MD, [facility], [facility]    The above note was generated using voice recognition software. Inadvertent typographical errors in transcription may have occurred.       Electronically signed by Jessie De La O MD on 03/21/22 at 3:23 PM

## 2022-03-21 NOTE — PROGRESS NOTES
Potassium, Whole Blood [[id number]] Collected: 03/21/22 1447      Updated: 03/21/22 1448      Potassium, Whole Blood 4.8     Blood Gas, Arterial [[id number]] (Abnormal) Collected: 03/21/22 1447     Specimen: Blood gases Updated: 03/21/22 1448      pH, Arterial 7.390      pCO2, Arterial 61.0 mmHg       pO2, Arterial 67.0 mmHg       HCO3, Arterial 36.9 mmol/L       Base Excess, Arterial 9.3 mmol/L       Hemoglobin, Art, Extended 15.3 g/dL       O2 Sat, Arterial 91.7 %       Carboxyhgb, Arterial 1.8 %       Methemoglobin, Arterial 1.5 %       O2 Content, Arterial 19.7 mL/dL       O2 Therapy Unknown     AT+, RR = 24, R rad, BIPAP 14/8, O2 @ 10 lpm

## 2022-03-21 NOTE — PROGRESS NOTES
Blood Gas, Arterial [[id number]] (Abnormal) Collected: 03/21/22 1300      Specimen: Blood gases Updated: 03/21/22 1303      pH, Arterial 7.400      pCO2, Arterial 58.0 mmHg       pO2, Arterial 66.0 mmHg       HCO3, Arterial 35.9 mmol/L       Base Excess, Arterial 8.7 mmol/L       Hemoglobin, Art, Extended 15.2 g/dL       O2 Sat, Arterial 92.0 %       Carboxyhgb, Arterial 1.9 %       Methemoglobin, Arterial 1.0 %       O2 Content, Arterial 19.6 mL/dL       O2 Therapy Unknown     Potassium, Whole Blood [[id number]] Collected: 03/21/22 1300      Updated: 03/21/22 1303      Potassium, Whole Blood 4.7     AT+, R rad, Vent -- CPAP, PSS = 10, PEEP 5 cm, 40% O2

## 2022-03-22 ENCOUNTER — APPOINTMENT (OUTPATIENT)
Dept: GENERAL RADIOLOGY | Age: 72
DRG: 207 | End: 2022-03-22
Payer: MEDICARE

## 2022-03-22 LAB
ALBUMIN SERPL-MCNC: 2.9 G/DL (ref 3.5–5.2)
ALP BLD-CCNC: 45 U/L (ref 35–104)
ALT SERPL-CCNC: 13 U/L (ref 5–33)
ANION GAP SERPL CALCULATED.3IONS-SCNC: 6 MMOL/L (ref 7–19)
AST SERPL-CCNC: 10 U/L (ref 5–32)
BASE EXCESS ARTERIAL: 12.8 MMOL/L (ref -2–2)
BASOPHILS ABSOLUTE: 0 K/UL (ref 0–0.2)
BASOPHILS RELATIVE PERCENT: 0.1 % (ref 0–1)
BILIRUB SERPL-MCNC: 0.3 MG/DL (ref 0.2–1.2)
BUN BLDV-MCNC: 55 MG/DL (ref 8–23)
CALCIUM SERPL-MCNC: 8.7 MG/DL (ref 8.8–10.2)
CARBOXYHEMOGLOBIN ARTERIAL: 1.9 % (ref 0–5)
CHLORIDE BLD-SCNC: 106 MMOL/L (ref 98–111)
CO2: 35 MMOL/L (ref 22–29)
CREAT SERPL-MCNC: 1 MG/DL (ref 0.5–0.9)
EOSINOPHILS ABSOLUTE: 0 K/UL (ref 0–0.6)
EOSINOPHILS RELATIVE PERCENT: 0 % (ref 0–5)
GFR AFRICAN AMERICAN: >59
GFR NON-AFRICAN AMERICAN: 54
GLUCOSE BLD-MCNC: 146 MG/DL (ref 70–99)
GLUCOSE BLD-MCNC: 155 MG/DL (ref 70–99)
GLUCOSE BLD-MCNC: 159 MG/DL (ref 70–99)
GLUCOSE BLD-MCNC: 159 MG/DL (ref 70–99)
GLUCOSE BLD-MCNC: 169 MG/DL (ref 74–109)
HCO3 ARTERIAL: 39.8 MMOL/L (ref 22–26)
HCT VFR BLD CALC: 43.5 % (ref 37–47)
HEMOGLOBIN, ART, EXTENDED: 13.5 G/DL (ref 12–16)
HEMOGLOBIN: 13.5 G/DL (ref 12–16)
IMMATURE GRANULOCYTES #: 0 K/UL
LYMPHOCYTES ABSOLUTE: 0.6 K/UL (ref 1.1–4.5)
LYMPHOCYTES RELATIVE PERCENT: 7.5 % (ref 20–40)
MCH RBC QN AUTO: 31.2 PG (ref 27–31)
MCHC RBC AUTO-ENTMCNC: 31 G/DL (ref 33–37)
MCV RBC AUTO: 100.5 FL (ref 81–99)
METHEMOGLOBIN ARTERIAL: 0.8 %
MONOCYTES ABSOLUTE: 0.9 K/UL (ref 0–0.9)
MONOCYTES RELATIVE PERCENT: 11.3 % (ref 0–10)
NEUTROPHILS ABSOLUTE: 6.6 K/UL (ref 1.5–7.5)
NEUTROPHILS RELATIVE PERCENT: 80.9 % (ref 50–65)
O2 CONTENT ARTERIAL: 17.3 ML/DL
O2 SAT, ARTERIAL: 91.4 %
O2 THERAPY: ABNORMAL
PCO2 ARTERIAL: 60 MMHG (ref 35–45)
PDW BLD-RTO: 15.5 % (ref 11.5–14.5)
PERFORMED ON: ABNORMAL
PH ARTERIAL: 7.43 (ref 7.35–7.45)
PLATELET # BLD: 122 K/UL (ref 130–400)
PMV BLD AUTO: 10.3 FL (ref 9.4–12.3)
PO2 ARTERIAL: 60 MMHG (ref 80–100)
POTASSIUM SERPL-SCNC: 4.2 MMOL/L (ref 3.5–5)
POTASSIUM, WHOLE BLOOD: 4.1
RBC # BLD: 4.33 M/UL (ref 4.2–5.4)
SODIUM BLD-SCNC: 147 MMOL/L (ref 136–145)
TOTAL PROTEIN: 5.6 G/DL (ref 6.6–8.7)
WBC # BLD: 8.1 K/UL (ref 4.8–10.8)

## 2022-03-22 PROCEDURE — 82947 ASSAY GLUCOSE BLOOD QUANT: CPT

## 2022-03-22 PROCEDURE — 2500000003 HC RX 250 WO HCPCS: Performed by: FAMILY MEDICINE

## 2022-03-22 PROCEDURE — 94640 AIRWAY INHALATION TREATMENT: CPT

## 2022-03-22 PROCEDURE — 6360000002 HC RX W HCPCS: Performed by: FAMILY MEDICINE

## 2022-03-22 PROCEDURE — 36415 COLL VENOUS BLD VENIPUNCTURE: CPT

## 2022-03-22 PROCEDURE — 71045 X-RAY EXAM CHEST 1 VIEW: CPT

## 2022-03-22 PROCEDURE — 84132 ASSAY OF SERUM POTASSIUM: CPT

## 2022-03-22 PROCEDURE — 36600 WITHDRAWAL OF ARTERIAL BLOOD: CPT

## 2022-03-22 PROCEDURE — 85025 COMPLETE CBC W/AUTO DIFF WBC: CPT

## 2022-03-22 PROCEDURE — 2700000000 HC OXYGEN THERAPY PER DAY

## 2022-03-22 PROCEDURE — 82803 BLOOD GASES ANY COMBINATION: CPT

## 2022-03-22 PROCEDURE — 2580000003 HC RX 258: Performed by: FAMILY MEDICINE

## 2022-03-22 PROCEDURE — 80053 COMPREHEN METABOLIC PANEL: CPT

## 2022-03-22 PROCEDURE — 2100000000 HC CCU R&B

## 2022-03-22 PROCEDURE — 6370000000 HC RX 637 (ALT 250 FOR IP): Performed by: INTERNAL MEDICINE

## 2022-03-22 PROCEDURE — 99233 SBSQ HOSP IP/OBS HIGH 50: CPT | Performed by: INTERNAL MEDICINE

## 2022-03-22 PROCEDURE — 6370000000 HC RX 637 (ALT 250 FOR IP): Performed by: FAMILY MEDICINE

## 2022-03-22 RX ADMIN — METHYLPREDNISOLONE SODIUM SUCCINATE 40 MG: 40 INJECTION, POWDER, FOR SOLUTION INTRAMUSCULAR; INTRAVENOUS at 17:43

## 2022-03-22 RX ADMIN — INSULIN GLARGINE 20 UNITS: 100 INJECTION, SOLUTION SUBCUTANEOUS at 20:17

## 2022-03-22 RX ADMIN — ENOXAPARIN SODIUM 80 MG: 100 INJECTION SUBCUTANEOUS at 08:05

## 2022-03-22 RX ADMIN — IPRATROPIUM BROMIDE AND ALBUTEROL SULFATE 1 AMPULE: .5; 2.5 SOLUTION RESPIRATORY (INHALATION) at 06:47

## 2022-03-22 RX ADMIN — SODIUM CHLORIDE, PRESERVATIVE FREE 20 MG: 5 INJECTION INTRAVENOUS at 08:04

## 2022-03-22 RX ADMIN — IPRATROPIUM BROMIDE AND ALBUTEROL SULFATE 1 AMPULE: .5; 2.5 SOLUTION RESPIRATORY (INHALATION) at 02:16

## 2022-03-22 RX ADMIN — DEXMEDETOMIDINE HYDROCHLORIDE 1 MCG/KG/HR: 400 INJECTION INTRAVENOUS at 23:28

## 2022-03-22 RX ADMIN — METHYLPREDNISOLONE SODIUM SUCCINATE 40 MG: 40 INJECTION, POWDER, FOR SOLUTION INTRAMUSCULAR; INTRAVENOUS at 05:32

## 2022-03-22 RX ADMIN — CHLORHEXIDINE GLUCONATE 15 ML: 1.2 RINSE ORAL at 20:34

## 2022-03-22 RX ADMIN — IPRATROPIUM BROMIDE AND ALBUTEROL SULFATE 1 AMPULE: .5; 2.5 SOLUTION RESPIRATORY (INHALATION) at 10:19

## 2022-03-22 RX ADMIN — FUROSEMIDE 20 MG: 10 INJECTION, SOLUTION INTRAMUSCULAR; INTRAVENOUS at 08:05

## 2022-03-22 RX ADMIN — IPRATROPIUM BROMIDE AND ALBUTEROL SULFATE 1 AMPULE: .5; 2.5 SOLUTION RESPIRATORY (INHALATION) at 18:37

## 2022-03-22 RX ADMIN — INSULIN LISPRO 2 UNITS: 100 INJECTION, SOLUTION INTRAVENOUS; SUBCUTANEOUS at 12:06

## 2022-03-22 RX ADMIN — DEXMEDETOMIDINE HYDROCHLORIDE 1.2 MCG/KG/HR: 400 INJECTION INTRAVENOUS at 00:04

## 2022-03-22 RX ADMIN — IPRATROPIUM BROMIDE AND ALBUTEROL SULFATE 1 AMPULE: .5; 2.5 SOLUTION RESPIRATORY (INHALATION) at 22:02

## 2022-03-22 RX ADMIN — INSULIN LISPRO 2 UNITS: 100 INJECTION, SOLUTION INTRAVENOUS; SUBCUTANEOUS at 08:05

## 2022-03-22 RX ADMIN — METHYLPREDNISOLONE SODIUM SUCCINATE 40 MG: 40 INJECTION, POWDER, FOR SOLUTION INTRAMUSCULAR; INTRAVENOUS at 12:06

## 2022-03-22 RX ADMIN — IPRATROPIUM BROMIDE AND ALBUTEROL SULFATE 1 AMPULE: .5; 2.5 SOLUTION RESPIRATORY (INHALATION) at 14:49

## 2022-03-22 RX ADMIN — ENOXAPARIN SODIUM 80 MG: 100 INJECTION SUBCUTANEOUS at 20:17

## 2022-03-22 RX ADMIN — DEXMEDETOMIDINE HYDROCHLORIDE 1.2 MCG/KG/HR: 400 INJECTION INTRAVENOUS at 01:45

## 2022-03-22 RX ADMIN — SODIUM CHLORIDE, PRESERVATIVE FREE 20 MG: 5 INJECTION INTRAVENOUS at 20:17

## 2022-03-22 ASSESSMENT — PAIN SCALES - GENERAL
PAINLEVEL_OUTOF10: 0

## 2022-03-22 NOTE — PROGRESS NOTES
Family Medicine Progress Note    Patient:  Kalyan Officer  YOB: 1950    MRN: [id number]     Acct: [de-identified]     Admit date: 3/15/2022    Patient Seen, Chart, Consults notes, Labs, Radiology studies reviewed. Subjective: Day 6 of stay with hypercapnic respiratory failure requiring intubation ventilation and most recent (in last 24 hours) has had extubation and remained relatively stable overnight. He did have some A. fib yesterday with RVR treated with Cardizem. Is kept her rate controlled. She is talking but difficult to understand in part due to some hoarseness. Does states she is having a hard time coughing things up. Also states she feels weak all over    Past, Family, Social History unchanged from admission.     Diet:  Diet NPO    Medications:  Scheduled Meds:   insulin lispro  0-12 Units SubCUTAneous TID WC    insulin lispro  0-6 Units SubCUTAneous Nightly    insulin glargine  0.25 Units/kg SubCUTAneous Nightly    enoxaparin  80 mg SubCUTAneous BID    furosemide  20 mg IntraVENous Daily    sodium chloride flush  5-40 mL IntraVENous 2 times per day    cefTRIAXone (ROCEPHIN) IV  1,000 mg IntraVENous Q24H    methylPREDNISolone  40 mg IntraVENous Q6H    chlorhexidine  15 mL Mouth/Throat BID    famotidine (PEPCID) injection  20 mg IntraVENous BID    ipratropium-albuterol  1 ampule Inhalation Q4H     Continuous Infusions:   dilTIAZem 2.5 mg/hr (03/21/22 1909)    propofol Stopped (03/21/22 1151)    lactated ringers 15 mL/hr at 03/21/22 1651    fentaNYL Stopped (03/21/22 1151)    sodium chloride      norepinephrine Stopped (03/17/22 0255)    dextrose      dexmedetomidine HCl in NaCl 1 mcg/kg/hr (03/22/22 0557)     PRN Meds:acetaminophen, hydrALAZINE, sodium chloride flush, sodium chloride, ondansetron **OR** ondansetron, glucagon (rDNA), dextrose, glucose, dextrose bolus (hypoglycemia) **OR** dextrose bolus (hypoglycemia)    Objective:    Vitals: /64   Pulse 83   Temp 98.1 °F (36.7 °C) (Temporal)   Resp 19   Ht 5' 6\" (1.676 m)   Wt 161 lb 14.4 oz (73.4 kg)   SpO2 98%   BMI 26.13 kg/m²   24 hour intake/output:    Intake/Output Summary (Last 24 hours) at 3/22/2022 0727  Last data filed at 3/22/2022 0400  Gross per 24 hour   Intake 2063.82 ml   Output 1625 ml   Net 438.82 ml     Last 3 weights: Wt Readings from Last 3 Encounters:   03/20/22 161 lb 14.4 oz (73.4 kg)   12/01/21 157 lb (71.2 kg)   08/13/21 174 lb 8 oz (79.2 kg)       Physical Exam:    General Appearance:  in no acute distress, alert and cooperative  Skin:  negatives: mobility and turgor normal  Eyes:  No gross abnormalities. Neck:  neck- supple, no mass, non-tender  Lungs:  Breathing Pattern: use of accessory muscles, Breath sounds: diminished breath sounds- throughout and rales- scattered  Heart: Auscultation: Rhythm: irregularly irregular and Heart sounds: Normal S1, S2  Abdomen: Auscultation: Normal bowel sounds. No bruits. Extremities: Extremities warm to touch, pink, with no edema. Musculoskeletal:  No joint swelling, deformity, or tenderness. Neurologic: Awake and alert but is difficult enough to understand that it is hard to determine orientation. Strength is 4+ out of 5 throughout with no gross focal deficits noted.     CBC with Differential:    Lab Results   Component Value Date    WBC 8.1 03/22/2022    RBC 4.33 03/22/2022    HGB 13.5 03/22/2022    HCT 43.5 03/22/2022    HCT 32.1 09/02/2011     03/22/2022     09/02/2011    .5 03/22/2022    MCH 31.2 03/22/2022    MCHC 31.0 03/22/2022    RDW 15.5 03/22/2022    LYMPHOPCT 7.5 03/22/2022    MONOPCT 11.3 03/22/2022    EOSPCT 0.5 09/02/2011    BASOPCT 0.1 03/22/2022    MONOSABS 0.90 03/22/2022    LYMPHSABS 0.6 03/22/2022    EOSABS 0.00 03/22/2022    BASOSABS 0.00 03/22/2022     CMP:    Lab Results   Component Value Date     03/22/2022     09/02/2011    K 4.1 03/22/2022    K 4.2 03/22/2022    K 3.5 06/28/2021    K 4.1 09/02/2011     03/22/2022     09/02/2011    CO2 35 03/22/2022    BUN 55 03/22/2022    CREATININE 1.0 03/22/2022    CREATININE 0.6 09/02/2011    GFRAA >59 03/22/2022    LABGLOM 54 03/22/2022    GLUCOSE 169 03/22/2022    PROT 5.6 03/22/2022    PROT 7.5 01/18/2013    LABALBU 2.9 03/22/2022    LABALBU 3.3 09/02/2011    CALCIUM 8.7 03/22/2022    BILITOT 0.3 03/22/2022    ALKPHOS 45 03/22/2022    ALKPHOS 57 09/02/2011    AST 10 03/22/2022    ALT 13 03/22/2022     Last 3 Troponin:    Lab Results   Component Value Date    TROPONINI <0.01 03/15/2022    TROPONINI <0.01 08/12/2021    TROPONINI <0.01 06/23/2021     Urine Culture:  No components found for: CURINE  Blood Culture:  No components found for: CBLOOD, CFUNGUSBL  Stool Culture:  No components found for: CSTOOL    Assessment:    Principal Problem:    Acute hypercapnic respiratory failure (HCC)  Active Problems:    NICM (nonischemic cardiomyopathy) ([facility] [address]    Diabetes mellitus ([facility] [address]    S/P CABG x 4    Coronary artery disease involving native coronary artery of native heart without angina pectoris    Chronic obstructive pulmonary disease (HCC)    Abdominal aortic aneurysm (AAA) without rupture ([facility] [address]    Smoker  Resolved Problems:    * No resolved hospital problems. *          Plan:  Swallowing eval today post extubation. If she passes will start to add back her home medications particularly for her rate control off drips. We will also start to de-escalate antibiotics once she is able to take oral.  Will need PT and OT to assist with the generalized weakness. Aggressive pulmonary toilet. Appreciate pulmonology excellent care and assistance. Continue to monitor in ICU for now but likely to the floor in the very near future.     25 minutes spent in coordination of care this morning      Electronically signed by Kevon Floyd MD on 3/22/2022 at 7:27 AM

## 2022-03-22 NOTE — PROGRESS NOTES
Patient converted to salter @ 12L, following breathing treatment. Patient O2 sat presently 96%, and tolerating well.

## 2022-03-22 NOTE — PROGRESS NOTES
Comprehensive Nutrition Assessment    Type and Reason for Visit:  Reassess    Nutrition Recommendations/Plan: follow for diet advancement, tolerance    Nutrition Assessment:  Pt has been extubated. TF access is gone. Pt now NPO. Aware pt has sore mouth and throat. Malnutrition Assessment:  Malnutrition Status: At risk for malnutrition (Comment)    Context:  Acute Illness     Findings of the 6 clinical characteristics of malnutrition:  Energy Intake:  Mild decrease in energy intake (Comment)  Weight Loss:  7 - Greater than 2% over 1 week     Body Fat Loss:  No significant body fat loss     Muscle Mass Loss:  No significant muscle mass loss    Fluid Accumulation:  No significant fluid accumulation Extremities   Strength:  Not Performed    Estimated Daily Nutrient Needs:  Energy (kcal):  7979-1898 kcals (17-25 kcals/kg); Weight Used for Energy Requirements:  Current     Protein (g):  71-122g; Weight Used for Protein Requirements:  Ideal        Fluid (ml/day):  5127-9472 ml; Method Used for Fluid Requirements:  1 ml/kcal      Nutrition Related Findings:  extubated. Sore mouth and throat. Dried and cracked lips. HFNC      Wounds:  None       Current Nutrition Therapies:    Diet NPO    Anthropometric Measures:  · Height: 5' 6\" (167.6 cm)  · Current Body Weight: 161 lb 14.4 oz (73.4 kg)   · Admission Body Weight: 180 lb (81.6 kg)    · Usual Body Weight: 157 lb (71.2 kg) (12/2021)     · Ideal Body Weight: 130 lbs; % Ideal Body Weight 124.5 %   · BMI: 26.1  · Adjusted Body Weight:  ; No Adjustment   · BMI Categories: Overweight (BMI 25.0-29. 9)       Nutrition Diagnosis:   · Inadequate oral intake related to acute injury/trauma as evidenced by NPO or clear liquid status due to medical condition,weight loss greater than or equal to 2% in 1 week      Nutrition Interventions:   Food and/or Nutrient Delivery:  Continue NPO  Nutrition Education/Counseling:  No recommendation at this time   Coordination of Nutrition Care:  Continue to monitor while inpatient    Goals:  NEW Goal:  meet nutritional needs through po intake       Nutrition Monitoring and Evaluation:   Behavioral-Environmental Outcomes:  None Identified   Food/Nutrient Intake Outcomes:  Diet Advancement/Tolerance,Food and Nutrient Intake  Physical Signs/Symptoms Outcomes:  Biochemical Data,Chewing or Swallowing,Fluid Status or Edema,Weight,Skin,Nutrition Focused Physical Findings     Discharge Planning:     Too soon to determine     Electronically signed by Tamir Yang MS, RD, LD on 3/22/22 at 7:31 AM CDT    Contact: [phone]

## 2022-03-22 NOTE — PROGRESS NOTES
Patient hs been weaned back to 8L via salter, and O2 sat remains @ 97%. Extensive oral care provided to address severely dried/cracked lips and mucous membranes. Patient ability to communicate steadily improving, however, sore throat and tongue make it difficult for patient to form words. Singular words, and short sentences are somewhat understandable. Precedex remains at 1.2 mcg/kg/hr due to patient becoming agitated at times.

## 2022-03-22 NOTE — PLAN OF CARE
Nutrition Problem #1: Inadequate oral intake  Intervention: Food and/or Nutrient Delivery: Continue NPO  Nutritional Goals: NEW Goal:  meet nutritional needs through po intake

## 2022-03-22 NOTE — PROGRESS NOTES
Pulmonary and Critical Care Progress note. [facility]    MRN# [id number]    Acct# [de-identified]  3/22/2022   4:37 PM CDT    Referring Tre Freeman MD      Chief Complaint: Respiratory failure on mechanical ventilation    HPI: She is laying in bed looks comfortable. On nasal cannula oxygen denies any complaints. She expresses gratitude for her care. Medications  insulin lispro, 0-12 Units, SubCUTAneous, TID WC    insulin lispro, 0-6 Units, SubCUTAneous, Nightly    insulin glargine, 0.25 Units/kg, SubCUTAneous, Nightly    enoxaparin, 80 mg, SubCUTAneous, BID    furosemide, 20 mg, IntraVENous, Daily    sodium chloride flush, 5-40 mL, IntraVENous, 2 times per day    cefTRIAXone (ROCEPHIN) IV, 1,000 mg, IntraVENous, Q24H    methylPREDNISolone, 40 mg, IntraVENous, Q6H    chlorhexidine, 15 mL, Mouth/Throat, BID    famotidine (PEPCID) injection, 20 mg, IntraVENous, BID    ipratropium-albuterol, 1 ampule, Inhalation, Q4H     Review of Systems:  Unable to obtain. Patient is on mechanical ventilation  Physical Exam:  /79   Pulse 83   Temp 96.9 °F (36.1 °C) (Temporal)   Resp 19   Ht 5' 6\" (1.676 m)   Wt 161 lb 14.4 oz (73.4 kg)   SpO2 (!) 81%   BMI 26.13 kg/m²     Intake/Output Summary (Last 24 hours) at 3/22/2022 1742  Last data filed at 3/22/2022 1600  Gross per 24 hour   Intake 894.92 ml   Output 1145 ml   Net -250.08 ml       General appearance: Elderly white female who appears to be in no distress  HEENT: Normocephalic atraumatic.    Heart: S1-S2 distant sounds no murmurs  Lungs: Diminished bilaterally no rubs or tenderness or dullness to percussion  Abdomen: Soft nontender no organomegalies normal bowel sounds  Extremities: No clubbing cyanosis or edema  Neuro: No focal findings  Skin: Intact    Recent Labs     03/20/22  0135 03/21/22  0418 03/22/22  0132   WBC 7.1 8.0 8.1   RBC 4.77 4.72 4.33   HGB 14.7 14.3 13.5   HCT 47.0 47.6* 43.5    142 122* MCV 98.5 100.8* 100.5*   MCH 30.8 30.3 31.2*   MCHC 31.3* 30.0* 31.0*   RDW 15.2* 15.2* 15.5*      Recent Labs     03/20/22 0135 03/20/22 0135 03/21/22  0418 03/21/22 0754 03/21/22  1300 03/21/22  1447 03/21/22  1620 03/22/22 0132 03/22/22  0407     --  145  --   --   --   --  147*  --    K 4.7   < > 4.7   < > 4.7 4.8 4.7 4.2 4.1   CL 98  --  103  --   --   --   --  106  --    CO2 30*  --  32*  --   --   --   --  35*  --    BUN 40*  --  48*  --   --   --   --  55*  --    CREATININE 1.0*  --  1.0*  --   --   --   --  1.0*  --    CALCIUM 8.5*  --  8.5*  --   --   --   --  8.7*  --    GLUCOSE 331*  --  340*  --   --   --   --  169*  --     < > = values in this interval not displayed. Recent Labs     03/21/22 0754 03/21/22 1300 03/21/22 1447 03/21/22 1620 03/22/22  0407   PHART 7.410 7.400 7.390 7.400 7.430   TBP0WBA 59.0* 58.0* 61.0* 60.0* 60.0*   PO2ART 58.0* 66.0* 67.0* 68.0* 60.0*   OMS7NQZ 37.4* 35.9* 36.9* 37.2* 39.8*   Q8LIPLDX 89.5* 92.0 91.7 92.2 91.4   BEART 10.2* 8.7* 9.3* 9.8* 12.8*     Recent Labs     03/22/22 0132   AST 10   ALT 13   ALKPHOS 45   BILITOT 0.3   CALCIUM 8.7*     No results for input(s): BC, LABGRAM, CULTRESP, BFCX in the last 72 hours. Radiograph: CT HEAD WO CONTRAST    Result Date: 3/16/2022  1. No acute intracranial abnormality. 2. No skull fracture. 3. Chronic white matter ischemic changes. 4. Moderate soft tissue thickening of the posterior wall of the nasopharynx with narrowing of the nasopharyngeal airway is probably due to recent intubation. This may be clinically correlated. The above study was initially reviewed and reported by [facility]. I do not find any discrepancies. Signed by Dr Chidi Castellanos    Result Date: 3/17/2022  1. No active cardiopulmonary disease. Signed by Dr Chidi Castellanos    Result Date: 3/16/2022  1. No active cardiopulmonary disease. 2. Endotracheal tube in place.  The nasogastric tube is not evaluated. 3. Dual-chamber cardiac pacer in place. Signed by Dr Char Cramer       My radiograph interpretation/independent review of imaging: Reviewed    Problem list generated by [facility]:  Hospital Problems           Last Modified POA    * (Principal) Acute hypercapnic respiratory failure ([address] 3/16/2022 Yes    NICM (nonischemic cardiomyopathy) ([address] 3/16/2022 Yes    Diabetes mellitus ([address] 3/16/2022 Yes    Overview Signed 6/2/2011  1:19 PM by Kevin muñoz controlled         S/P CABG x 4 3/16/2022 Yes    Overview Signed 10/11/2016 11:15 AM by JAKOB Finn     8/29/11 EF 60%         Coronary artery disease involving native coronary artery of native heart without angina pectoris 3/16/2022 Yes    Chronic obstructive pulmonary disease ([address] 3/16/2022 Yes    Abdominal aortic aneurysm (AAA) without rupture ([address] 3/16/2022 Yes    Smoker 3/16/2022 Yes           Pulmonary Assessment/Plan:     1. Acute on chronic hypoxic hypercapnic respiratory failure. Status post mechanical ventilation. The patient continues to have compensated hypercapnic respiratory failure. She might benefit from a noninvasive ventilation at home such as ASV with trilogy  2. Underlying severe COPD clinically wheezing improved. Continue bronchodilators continue pulmonary toilet. 3. Possible committee acquired pneumonia. On empirical antibiotic therapy. De-escalate as feasible. 4. Altered mental status improved since extubation. 5. DVT prophylaxis. 6. Consider transfer to regular floor           Wes Vega MD, [facility], [facility]    The above note was generated using voice recognition software. Inadvertent typographical errors in transcription may have occurred.       Electronically signed by Vanessa Rincon MD on 03/22/22 at 5:42 PM

## 2022-03-22 NOTE — PROGRESS NOTES
Results for Tejas Harley (MRN [id number]) as of 3/22/2022 04:10   Ref.  Range 3/22/2022 04:07   Hemoglobin, Art, Extended Latest Ref Range: 12.0 - 16.0 g/dL 13.5   pH, Arterial Latest Ref Range: 7.350 - 7.450  7.430   pCO2, Arterial Latest Ref Range: 35.0 - 45.0 mmHg 60.0 (H)   pO2, Arterial Latest Ref Range: 80.0 - 100.0 mmHg 60.0 (L)   HCO3, Arterial Latest Ref Range: 22.0 - 26.0 mmol/L 39.8 (H)   Base Excess, Arterial Latest Ref Range: -2.0 - 2.0 mmol/L 12.8 (H)   O2 Sat, Arterial Latest Ref Range: >92 % 91.4   O2 Content, Arterial Latest Ref Range: Not Established mL/dL 17.3   Methemoglobin, Arterial Latest Ref Range: <1.5 % 0.8   Carboxyhgb, Arterial Latest Ref Range: 0.0 - 5.0 % 1.9       Pt on 6 lpm hf nc    RR AT+

## 2022-03-23 LAB
ALBUMIN SERPL-MCNC: 3.2 G/DL (ref 3.5–5.2)
ALP BLD-CCNC: 44 U/L (ref 35–104)
ALT SERPL-CCNC: 16 U/L (ref 5–33)
ANION GAP SERPL CALCULATED.3IONS-SCNC: 11 MMOL/L (ref 7–19)
AST SERPL-CCNC: 14 U/L (ref 5–32)
BASOPHILS ABSOLUTE: 0 K/UL (ref 0–0.2)
BASOPHILS RELATIVE PERCENT: 0.1 % (ref 0–1)
BILIRUB SERPL-MCNC: 0.4 MG/DL (ref 0.2–1.2)
BUN BLDV-MCNC: 51 MG/DL (ref 8–23)
CALCIUM SERPL-MCNC: 8.7 MG/DL (ref 8.8–10.2)
CHLORIDE BLD-SCNC: 107 MMOL/L (ref 98–111)
CO2: 30 MMOL/L (ref 22–29)
CREAT SERPL-MCNC: 0.9 MG/DL (ref 0.5–0.9)
EOSINOPHILS ABSOLUTE: 0 K/UL (ref 0–0.6)
EOSINOPHILS RELATIVE PERCENT: 0 % (ref 0–5)
GFR AFRICAN AMERICAN: >59
GFR NON-AFRICAN AMERICAN: >60
GLUCOSE BLD-MCNC: 122 MG/DL (ref 70–99)
GLUCOSE BLD-MCNC: 146 MG/DL (ref 74–109)
GLUCOSE BLD-MCNC: 154 MG/DL (ref 70–99)
GLUCOSE BLD-MCNC: 159 MG/DL (ref 70–99)
HCT VFR BLD CALC: 46.7 % (ref 37–47)
HEMOGLOBIN: 13.7 G/DL (ref 12–16)
IMMATURE GRANULOCYTES #: 0 K/UL
LYMPHOCYTES ABSOLUTE: 0.7 K/UL (ref 1.1–4.5)
LYMPHOCYTES RELATIVE PERCENT: 9.4 % (ref 20–40)
MCH RBC QN AUTO: 30.2 PG (ref 27–31)
MCHC RBC AUTO-ENTMCNC: 29.3 G/DL (ref 33–37)
MCV RBC AUTO: 103.1 FL (ref 81–99)
MONOCYTES ABSOLUTE: 0.4 K/UL (ref 0–0.9)
MONOCYTES RELATIVE PERCENT: 5.6 % (ref 0–10)
NEUTROPHILS ABSOLUTE: 6 K/UL (ref 1.5–7.5)
NEUTROPHILS RELATIVE PERCENT: 84.6 % (ref 50–65)
PDW BLD-RTO: 15.1 % (ref 11.5–14.5)
PERFORMED ON: ABNORMAL
PLATELET # BLD: 110 K/UL (ref 130–400)
PMV BLD AUTO: 10.3 FL (ref 9.4–12.3)
POTASSIUM SERPL-SCNC: 4.2 MMOL/L (ref 3.5–5)
RBC # BLD: 4.53 M/UL (ref 4.2–5.4)
SODIUM BLD-SCNC: 148 MMOL/L (ref 136–145)
TOTAL PROTEIN: 5.7 G/DL (ref 6.6–8.7)
WBC # BLD: 7.1 K/UL (ref 4.8–10.8)

## 2022-03-23 PROCEDURE — 97165 OT EVAL LOW COMPLEX 30 MIN: CPT

## 2022-03-23 PROCEDURE — 80053 COMPREHEN METABOLIC PANEL: CPT

## 2022-03-23 PROCEDURE — 82947 ASSAY GLUCOSE BLOOD QUANT: CPT

## 2022-03-23 PROCEDURE — 92522 EVALUATE SPEECH PRODUCTION: CPT

## 2022-03-23 PROCEDURE — 2580000003 HC RX 258: Performed by: FAMILY MEDICINE

## 2022-03-23 PROCEDURE — 6360000002 HC RX W HCPCS: Performed by: FAMILY MEDICINE

## 2022-03-23 PROCEDURE — 99233 SBSQ HOSP IP/OBS HIGH 50: CPT | Performed by: INTERNAL MEDICINE

## 2022-03-23 PROCEDURE — 97530 THERAPEUTIC ACTIVITIES: CPT

## 2022-03-23 PROCEDURE — 94660 CPAP INITIATION&MGMT: CPT

## 2022-03-23 PROCEDURE — 6370000000 HC RX 637 (ALT 250 FOR IP): Performed by: INTERNAL MEDICINE

## 2022-03-23 PROCEDURE — 6370000000 HC RX 637 (ALT 250 FOR IP): Performed by: FAMILY MEDICINE

## 2022-03-23 PROCEDURE — 97535 SELF CARE MNGMENT TRAINING: CPT

## 2022-03-23 PROCEDURE — 1210000000 HC MED SURG R&B

## 2022-03-23 PROCEDURE — 36415 COLL VENOUS BLD VENIPUNCTURE: CPT

## 2022-03-23 PROCEDURE — 85025 COMPLETE CBC W/AUTO DIFF WBC: CPT

## 2022-03-23 PROCEDURE — 2700000000 HC OXYGEN THERAPY PER DAY

## 2022-03-23 PROCEDURE — 94761 N-INVAS EAR/PLS OXIMETRY MLT: CPT

## 2022-03-23 PROCEDURE — 94640 AIRWAY INHALATION TREATMENT: CPT

## 2022-03-23 PROCEDURE — 92610 EVALUATE SWALLOWING FUNCTION: CPT

## 2022-03-23 RX ORDER — METHYLPREDNISOLONE SODIUM SUCCINATE 40 MG/ML
40 INJECTION, POWDER, LYOPHILIZED, FOR SOLUTION INTRAMUSCULAR; INTRAVENOUS EVERY 12 HOURS
Status: DISCONTINUED | OUTPATIENT
Start: 2022-03-23 | End: 2022-03-23

## 2022-03-23 RX ORDER — AMLODIPINE BESYLATE 5 MG/1
5 TABLET ORAL DAILY
Status: DISCONTINUED | OUTPATIENT
Start: 2022-03-23 | End: 2022-03-28 | Stop reason: HOSPADM

## 2022-03-23 RX ORDER — ROSUVASTATIN CALCIUM 10 MG/1
10 TABLET, COATED ORAL NIGHTLY
Status: DISCONTINUED | OUTPATIENT
Start: 2022-03-23 | End: 2022-03-28 | Stop reason: HOSPADM

## 2022-03-23 RX ORDER — PREDNISONE 20 MG/1
20 TABLET ORAL DAILY
Status: COMPLETED | OUTPATIENT
Start: 2022-03-23 | End: 2022-03-25

## 2022-03-23 RX ORDER — CEFUROXIME AXETIL 250 MG/1
500 TABLET ORAL EVERY 12 HOURS SCHEDULED
Status: DISCONTINUED | OUTPATIENT
Start: 2022-03-23 | End: 2022-03-28 | Stop reason: HOSPADM

## 2022-03-23 RX ORDER — CLOPIDOGREL BISULFATE 75 MG/1
75 TABLET ORAL DAILY
Status: DISCONTINUED | OUTPATIENT
Start: 2022-03-23 | End: 2022-03-28 | Stop reason: HOSPADM

## 2022-03-23 RX ORDER — PREDNISONE 10 MG/1
10 TABLET ORAL DAILY
Status: DISCONTINUED | OUTPATIENT
Start: 2022-03-29 | End: 2022-03-28 | Stop reason: HOSPADM

## 2022-03-23 RX ORDER — NITROGLYCERIN 0.4 MG/1
0.4 TABLET SUBLINGUAL EVERY 5 MIN PRN
Status: DISCONTINUED | OUTPATIENT
Start: 2022-03-23 | End: 2022-03-28 | Stop reason: HOSPADM

## 2022-03-23 RX ORDER — PREDNISONE 10 MG/1
15 TABLET ORAL DAILY
Status: COMPLETED | OUTPATIENT
Start: 2022-03-26 | End: 2022-03-28

## 2022-03-23 RX ORDER — SOTALOL HYDROCHLORIDE 120 MG/1
120 TABLET ORAL 2 TIMES DAILY
Status: DISCONTINUED | OUTPATIENT
Start: 2022-03-23 | End: 2022-03-28 | Stop reason: HOSPADM

## 2022-03-23 RX ORDER — PREDNISONE 10 MG/1
5 TABLET ORAL DAILY
Status: DISCONTINUED | OUTPATIENT
Start: 2022-04-01 | End: 2022-03-28 | Stop reason: HOSPADM

## 2022-03-23 RX ORDER — RAMIPRIL 2.5 MG/1
5 CAPSULE ORAL 2 TIMES DAILY
Status: DISCONTINUED | OUTPATIENT
Start: 2022-03-23 | End: 2022-03-28

## 2022-03-23 RX ORDER — FUROSEMIDE 40 MG/1
40 TABLET ORAL DAILY
Status: DISCONTINUED | OUTPATIENT
Start: 2022-03-23 | End: 2022-03-28 | Stop reason: HOSPADM

## 2022-03-23 RX ADMIN — ROSUVASTATIN CALCIUM 10 MG: 10 TABLET, FILM COATED ORAL at 21:07

## 2022-03-23 RX ADMIN — RAMIPRIL 5 MG: 2.5 CAPSULE ORAL at 21:11

## 2022-03-23 RX ADMIN — SODIUM CHLORIDE, PRESERVATIVE FREE 10 ML: 5 INJECTION INTRAVENOUS at 21:09

## 2022-03-23 RX ADMIN — CLOPIDOGREL BISULFATE 75 MG: 75 TABLET ORAL at 08:33

## 2022-03-23 RX ADMIN — SOTALOL HYDROCHLORIDE 120 MG: 120 TABLET ORAL at 21:08

## 2022-03-23 RX ADMIN — IPRATROPIUM BROMIDE AND ALBUTEROL SULFATE 1 AMPULE: .5; 2.5 SOLUTION RESPIRATORY (INHALATION) at 15:06

## 2022-03-23 RX ADMIN — IPRATROPIUM BROMIDE AND ALBUTEROL SULFATE 1 AMPULE: .5; 2.5 SOLUTION RESPIRATORY (INHALATION) at 22:18

## 2022-03-23 RX ADMIN — SOTALOL HYDROCHLORIDE 120 MG: 120 TABLET ORAL at 08:33

## 2022-03-23 RX ADMIN — IPRATROPIUM BROMIDE AND ALBUTEROL SULFATE 1 AMPULE: .5; 2.5 SOLUTION RESPIRATORY (INHALATION) at 08:06

## 2022-03-23 RX ADMIN — IPRATROPIUM BROMIDE AND ALBUTEROL SULFATE 1 AMPULE: .5; 2.5 SOLUTION RESPIRATORY (INHALATION) at 02:49

## 2022-03-23 RX ADMIN — FUROSEMIDE 40 MG: 40 TABLET ORAL at 08:33

## 2022-03-23 RX ADMIN — IPRATROPIUM BROMIDE AND ALBUTEROL SULFATE 1 AMPULE: .5; 2.5 SOLUTION RESPIRATORY (INHALATION) at 18:29

## 2022-03-23 RX ADMIN — SODIUM CHLORIDE, PRESERVATIVE FREE 1000 MG: 5 INJECTION INTRAVENOUS at 00:11

## 2022-03-23 RX ADMIN — AMLODIPINE BESYLATE 5 MG: 5 TABLET ORAL at 08:33

## 2022-03-23 RX ADMIN — INSULIN GLARGINE 20 UNITS: 100 INJECTION, SOLUTION SUBCUTANEOUS at 21:00

## 2022-03-23 RX ADMIN — RAMIPRIL 5 MG: 2.5 CAPSULE ORAL at 08:33

## 2022-03-23 RX ADMIN — CEFUROXIME AXETIL 500 MG: 250 TABLET ORAL at 21:08

## 2022-03-23 RX ADMIN — ONDANSETRON 4 MG: 2 INJECTION INTRAMUSCULAR; INTRAVENOUS at 18:33

## 2022-03-23 RX ADMIN — IPRATROPIUM BROMIDE AND ALBUTEROL SULFATE 1 AMPULE: .5; 2.5 SOLUTION RESPIRATORY (INHALATION) at 10:55

## 2022-03-23 RX ADMIN — RIVAROXABAN 20 MG: 20 TABLET, FILM COATED ORAL at 18:14

## 2022-03-23 RX ADMIN — METHYLPREDNISOLONE SODIUM SUCCINATE 40 MG: 40 INJECTION, POWDER, FOR SOLUTION INTRAMUSCULAR; INTRAVENOUS at 00:12

## 2022-03-23 RX ADMIN — METHYLPREDNISOLONE SODIUM SUCCINATE 40 MG: 40 INJECTION, POWDER, FOR SOLUTION INTRAMUSCULAR; INTRAVENOUS at 06:26

## 2022-03-23 RX ADMIN — PREDNISONE 20 MG: 20 TABLET ORAL at 18:19

## 2022-03-23 ASSESSMENT — PAIN SCALES - GENERAL: PAINLEVEL_OUTOF10: 5

## 2022-03-23 ASSESSMENT — PAIN DESCRIPTION - LOCATION: LOCATION: GENERALIZED

## 2022-03-23 NOTE — PROGRESS NOTES
Occupational Therapy   Occupational Therapy Initial Assessment  Date: 3/23/2022   Patient Name: Marion Logan  MRN: [id number]     : 1950    Date of Service: 3/23/2022    Discharge Recommendations:  Continue to assess pending progress       Assessment   Performance deficits / Impairments: Decreased functional mobility ; Decreased ADL status; Decreased high-level IADLs;Decreased strength;Decreased balance;Decreased vision/visual deficit  Assessment: Pt will be Benefit from skilled OT to address decreased I with ADL tasks,balance, endurance, and functional mobility s/p hypercapnic RF. Treatment Diagnosis: Hypercapnic RF  Prognosis: Good  Decision Making: Low Complexity  REQUIRES OT FOLLOW UP: Yes  Activity Tolerance  Activity Tolerance: Patient Tolerated treatment well  Safety Devices  Safety Devices in place: Yes  Type of devices: All fall risk precautions in place; Bed alarm in place; Left in bed           Patient Diagnosis(es): The primary encounter diagnosis was Acute hypercapnic respiratory failure ([facility] A diagnosis of Acute exacerbation of chronic obstructive pulmonary disease (COPD) ([facility] was also pertinent to this visit. has a past medical history of ASHD (arteriosclerotic heart disease), COPD (chronic obstructive pulmonary disease) ([address] Coronary atherosclerosis, Diabetes mellitus ([facility] Encounter for wound care, Fall, Ganglion cyst, Hematoma, Hx of blood clots, Hypercholesteremia, Hypertension, Pacemaker, and Unstable angina ([facility] has a past surgical history that includes Cholecystectomy; Hysterectomy; Coronary angioplasty with stent (00); Cardiac catheterization (12/10/00); Cardiac catheterization (98); Cardiac catheterization (94); Cardiac catheterization (2011); Coronary artery bypass graft (2011); Neck surgery; Coronary angioplasty with stent (2021); cyst removal; and Parotidectomy (Bilateral).     Treatment Diagnosis: Hypercapnic RF      Restrictions  Restrictions/Precautions  Restrictions/Precautions: Fall Risk  Required Braces or Orthoses?: Yes    Subjective   General  Chart Reviewed: Yes  Referring Practitioner: Dr. Natty Paredes  Subjective  Subjective: Pt pleasant and cooperative for session  Patient Currently in Pain: Yes  Pain Assessment  Pain Assessment: 0-10  Pain Level: 5  Pain Location: Generalized  Vital Signs  Patient Currently in Pain: Yes  Oxygen Therapy  O2 Device: PAP (positive airway pressure)  O2 Flow Rate (L/min): 10 L/min       Social/Functional History  Social/Functional History  Lives With: Alone  Type of Home: House  Home Layout: One level  Home Access: Level entry  Bathroom Shower/Tub: Tub/Shower unit,Walk-in shower,Shower chair with back  Bathroom Toilet: Standard  Bathroom Equipment: Grab bars in shower  Home Equipment: 4 wheeled walker  ADL Assistance: [address] [place] [address] Avenue: Independent  Ambulation Assistance: Independent  Transfer Assistance: Independent  Active : Yes  Mode of Transportation: Car       Objective   Vision: Within Functional Limits  Hearing: Within functional limits    Orientation  Overall Orientation Status: Within Functional Limits  Observation/Palpation  Posture: Fair  Balance  Sitting Balance: Stand by assistance  Standing Balance: Stand by assistance  Functional Mobility  Functional - Mobility Device: No device  Assist Level: Minimal assistance  Functional Mobility Comments: unable to tolerate standing at this time due to decreased endurance and SOB  ADL  Feeding: Minimal assistance  Grooming: Contact guard assistance  UE Bathing: Contact guard assistance  LE Bathing: Moderate assistance  UE Dressing: Contact guard assistance  LE Dressing:  Moderate assistance  Toileting: Contact guard assistance  Tone RUE  RUE Tone: Normotonic  Tone LUE  LUE Tone: Normotonic     Bed mobility  Supine to Sit: Contact guard assistance  Sit to Supine: Contact guard assistance  Scooting: Contact guard assistance  Transfers  Sit to stand: Contact guard assistance;Minimal assistance  Stand to sit: Contact guard assistance;Minimal assistance     Cognition  Overall Cognitive Status: WFL        Sensation  Overall Sensation Status: WFL        LUE AROM (degrees)  LUE AROM : WFL  Left Hand AROM (degrees)  Left Hand AROM: WFL  RUE AROM (degrees)  RUE AROM : WFL  Right Hand AROM (degrees)  Right Hand AROM: WFL  LUE Strength  Gross LUE Strength: WFL  RUE Strength  Gross RUE Strength: WFL        Plan   Plan  Times per week: 3-5x/wk  Current Treatment Recommendations: Endurance Training,Functional Mobility Training,Balance Training,Self-Care / ADL,Home Management Training         Goals  Short term goals  Time Frame for Short term goals: 1 week  Short term goal 1: Pt will be modified I for functional mobility to/from bathroom  Short term goal 2: Pt will be Modified I for toileting task/transfer  Short term goal 3: Pt will be I BUE HEP to increase strength/endurance  Patient Goals   Patient goals :  To go home            Electronically signed by Denny Butts OT on 3/23/2022 at 12:43 PM    Denny Butts OT

## 2022-03-23 NOTE — PROGRESS NOTES
Comprehensive Nutrition Assessment    Type and Reason for Visit:  Reassess    Nutrition Recommendations/Plan: follow for po intake, SLP recommendations and need for ONS    Nutrition Assessment:  Pt has been moved to 4th. Diet has been advanced to Carb Control 4. Had been NPO x 7 days. Had not received diet at time of visit. No new wt. Malnutrition Assessment:  Malnutrition Status: At risk for malnutrition (Comment)    Context:  Acute Illness     Findings of the 6 clinical characteristics of malnutrition:  Energy Intake:  Mild decrease in energy intake (Comment)  Weight Loss:  7 - Greater than 2% over 1 week     Body Fat Loss:  No significant body fat loss     Muscle Mass Loss:  No significant muscle mass loss    Fluid Accumulation:  1 - Mild Extremities   Strength:  Not Performed    Estimated Daily Nutrient Needs:  Energy (kcal):  2684-8470 kcals (17-25 kcals/kg); Weight Used for Energy Requirements:  Current     Protein (g):  71-122g; Weight Used for Protein Requirements:  Ideal        Fluid (ml/day):  0909-5027 ml; Method Used for Fluid Requirements:  1 ml/kcal      Nutrition Related Findings:  extubated. Sore mouth and throat. Dried and cracked lips. HFNC      Wounds:  None       Current Nutrition Therapies:    ADULT DIET; Full Liquid; 4 carb choices (60 gm/meal); Mildly Thick (Nectar)    Anthropometric Measures:  · Height: 5' 6\" (167.6 cm)  · Current Body Weight: 161 lb 14.4 oz (73.4 kg)   · Admission Body Weight: 180 lb (81.6 kg)    · Usual Body Weight: 157 lb (71.2 kg) (12/2021)     · Ideal Body Weight: 130 lbs; % Ideal Body Weight 124.5 %   · BMI: 26.1  · Adjusted Body Weight:  ; No Adjustment    · BMI Categories: Overweight (BMI 25.0-29. 9)       Nutrition Diagnosis:   · Inadequate oral intake related to acute injury/trauma as evidenced by weight loss greater than or equal to 2% in 1 week (NPO x 7 days--diet just advanced)      Nutrition Interventions:   Food and/or Nutrient Delivery:

## 2022-03-23 NOTE — PROGRESS NOTES
Patient requested to go back on BiPAP to rest.  Placed on previous settings, O2 @ 10 l/m. SpO2 currently 90%.

## 2022-03-23 NOTE — PROGRESS NOTES
Speech Language Pathology  Facility/Department: [facility] ONCOLOGY UNIT   CLINICAL BEDSIDE SWALLOW EVALUATION  SPEECH PRODUCTION EVALUATION     NAME: Elisha Barrow  : 1950  MRN: [id number]    ADMISSION DATE: 3/15/2022  ADMITTING DIAGNOSIS: has Deep vein thrombosis ([address] Essential hypertension; Diabetes mellitus ([address] Hypercholesteremia; S/P CABG x 4; History of coronary artery stent placement; Coronary artery disease involving native coronary artery of native heart without angina pectoris; Mixed hyperlipidemia; History of diabetes mellitus; Chronic obstructive pulmonary disease ([address] NUNEZ (dyspnea on exertion); Abdominal aortic aneurysm (AAA) without rupture ([address] Diabetic ulcer of left lower leg associated with type 2 diabetes mellitus, with fat layer exposed ([address] Smoker; Traumatic hematoma; NICM (nonischemic cardiomyopathy) ([address] Hyponatremia; Acute hypercapnic respiratory failure ([address] and Palliative care patient on their problem list.    Date of Eval: 3/23/2022  Evaluating Therapist: MATTI Jon    Reason for Referral  Elisha Barrow was referred for a bedside swallow evaluation to assess the efficiency of her swallow function, identify signs and symptoms of aspiration and make recommendations regarding safe dietary consistencies, effective compensatory strategies, and safe eating environment. Impression  Assessed patient's swallowing function. Patient exhibited decreased oral prep of more solid consistencies as well as sluggish, moderately decreased laryngeal elevation for swallow airway protection. It is noted that frequent delayed coughs were observed with every consistency presented during evaluation this date (ice chips;puree;mildly thick/nectar;thin).  However, do not feel that all coughs were related to penetration/aspiration secondary to timing of swallows and presence of throat movement and patient exhibited coughs frequently at rest.    At this time, would trial full liquid diet with mildly thick/nectar thick liquids. Recommend meds whole in pudding/applesauce. If patient receives mouth care prior to intake, okay for ice chips and small sips thin H2O IN BETWEEN MEALS for comfort. Will continue to follow. Thank you for this consult. Treatment Plan  Requires SLP Intervention: Yes     Recommended Diet and Intervention  Liquid Consistency Recommendation: Full liquid diet with mildly thick/nectar thick liquids  Recommended Form of Meds: Meds in puree as able  Therapeutic Interventions: Patient/Family education;Diet tolerance monitoring; Therapeutic PO trials with SLP     Compensatory Swallowing Strategies  Compensatory Swallowing Strategies: Upright as possible for all oral intake;Small bites/sips;Eat/Feed slowly; Alternate solids and liquids; Remain upright for 30-45 minutes after meals     Treatment/Goals  Timeframe for Short-term Goals: 1x/day for 3 days   Goal 1: Patient will tolerate full liquid diet with mildly thick/nectar thick liquids with min S/S penetration/aspiration during PO intake. Goal 2: Patient staff will follow swallow safety recommendations to decrease risk of penetration/aspiration during PO intake. Goal 3: Re-assessment of swallow function for potential diet upgrade. Goal 4: Monitor speech production. General  Chart Reviewed: Yes  Behavior/Cognition: Alert; Cooperative  O2 Device: High-flow  Communication Observation: (Assessed patient's speech production. Patient exhibited slowed lingual movements with hoarse vocal quality noted. SLP still ranked functional intelligibility of speech for unfamiliar listeners at 100% in utterances with background noise present.)  Follows Directions: Simple   Dentition: Adequate  Patient Positioning: Upright in bed  Consistencies Administered: Ice chips;Dysphagia Pureed (Dysphagia I); Nectar - cup; Thin - cup      Oral Motor Examination   Labial ROM: (Adequate during labial retraction trials and labial protrusion trials.)  Labial Strength: (Adequate during labial compression trials.)  Labial Coordination: (Adequate movements were noted.)  Lingual ROM: (Adequate during lingual extension trials with full point achieved; adequate during lingual elevation trials without use of accessory jaw movement; adequate movements noted bilaterally.)  Lingual Strength: (Decreased.)  Lingual Coordination: (Slowed movements were noted.)     Assessed patient's swallowing function with the following observations noted:     Oral Phase  Mastication: Ice chips (Patient exhibited decreased rotary jaw movement during oral prep of ice chip trials presented by SLP.)  Suspected Premature Bolus Loss: Puree (Oral transit of puree consistency trials, presented by SLP, varied from 1-3 seconds in length.)  Oral Phase - Comment: Oral transit of ice chip trials primarily measured 1-2 seconds in length. No puree consistency residue was noted post swallows. Oral transit of nectar thick liquid trials and thin H2O trials, all presented via cup by SLP, primarily measured 1-2 seconds in length. Pharyngeal Phase  Suspected Swallow Delay: Puree (Suspect secondary to oral transit times.)  Laryngeal Elevation: (Patient exhibited sluggish, moderately decreased laryngeal elevation for swallow airway protection.)  Delayed Coughs: All   Pharyngeal Phase - Comment: It is noted that frequent delayed coughs were observed with every consistency presented during assessment this date (ice chips;puree;mildly thick/nectar;thin). However, do not feel that all coughs were related to penetration/aspiration secondary to timing of swallows and presence of throat movement and patient exhibited coughs frequently at rest.    At this time, would trial full liquid diet with mildly thick/nectar thick liquids. Recommend meds whole in pudding/applesauce. If patient receives mouth care prior to intake, okay for ice chips and small sips thin H2O IN BETWEEN MEALS for comfort.  Will continue to follow.     Electronically signed by MATTI Mesa on 3/23/2022 at 12:50 PM

## 2022-03-23 NOTE — PLAN OF CARE
Problem: Nutrition  Goal: Optimal nutrition therapy  Outcome: Ongoing     Problem: Skin Integrity:  Goal: Will show no infection signs and symptoms  Description: Will show no infection signs and symptoms  Outcome: Ongoing  Goal: Absence of new skin breakdown  Description: Absence of new skin breakdown  Outcome: Ongoing     Problem: Falls - Risk of:  Goal: Will remain free from falls  Description: Will remain free from falls  Outcome: Ongoing  Goal: Absence of physical injury  Description: Absence of physical injury  Outcome: Ongoing     Problem: Breathing Pattern - Ineffective:  Goal: Ability to achieve and maintain a regular respiratory rate will improve  Description: Ability to achieve and maintain a regular respiratory rate will improve  Outcome: Ongoing

## 2022-03-23 NOTE — PROGRESS NOTES
Pulmonary and Critical Care Progress note. [facility]    MRN# [id number]    Acct# [de-identified]  3/23/2022   4:37 PM CDT    Referring Tre Freeman MD      Chief Complaint: Respiratory failure on mechanical ventilation    HPI: She is sitting up in bed looks comfortable. On nasal cannula oxygen denies any complaints. Medications    amLODIPine, 5 mg, Oral, Daily    clopidogrel, 75 mg, Oral, Daily    furosemide, 40 mg, Oral, Daily    ramipril, 5 mg, Oral, BID    rivaroxaban, 20 mg, Oral, Daily with breakfast    rosuvastatin, 10 mg, Oral, Nightly    sotalol, 120 mg, Oral, BID    methylPREDNISolone, 40 mg, IntraVENous, Q12H    insulin lispro, 0-12 Units, SubCUTAneous, TID WC    insulin lispro, 0-6 Units, SubCUTAneous, Nightly    insulin glargine, 0.25 Units/kg, SubCUTAneous, Nightly    sodium chloride flush, 5-40 mL, IntraVENous, 2 times per day    cefTRIAXone (ROCEPHIN) IV, 1,000 mg, IntraVENous, Q24H    chlorhexidine, 15 mL, Mouth/Throat, BID    ipratropium-albuterol, 1 ampule, Inhalation, Q4H     Review of Systems:  Unable to obtain. Patient is on mechanical ventilation  Physical Exam:  BP (!) 166/98 Comment: Reported to nurse  Pulse 74   Temp 97.4 °F (36.3 °C) (Temporal)   Resp 16   Ht 5' 6\" (1.676 m)   Wt 161 lb 14.4 oz (73.4 kg)   SpO2 91%   BMI 26.13 kg/m²     Intake/Output Summary (Last 24 hours) at 3/23/2022 1723  Last data filed at 3/23/2022 1617  Gross per 24 hour   Intake 1027.39 ml   Output 2900 ml   Net -1872.61 ml       General appearance: Elderly white female who appears to be in no distress  HEENT: Normocephalic atraumatic.    Heart: S1-S2 distant sounds no murmurs  Lungs: Diminished bilaterally no rubs or tenderness or dullness to percussion  Abdomen: Soft nontender no organomegalies normal bowel sounds  Extremities: No clubbing cyanosis or edema  Neuro: No focal findings  Skin: Intact    Recent Labs     03/21/22  0418 03/22/22  0132 03/23/22  0404   WBC 8.0 8.1 7.1   RBC 4.72 4.33 4.53   HGB 14.3 13.5 13.7   HCT 47.6* 43.5 46.7    122* 110*   .8* 100.5* 103.1*   MCH 30.3 31.2* 30.2   MCHC 30.0* 31.0* 29.3*   RDW 15.2* 15.5* 15.1*      Recent Labs     03/21/22  0418 03/21/22  0754 03/21/22  1447 03/21/22  1620 03/22/22  0132 03/22/22  0407 03/23/22  0404     --   --   --  147*  --  148*   K 4.7   < > 4.8 4.7 4.2 4.1 4.2     --   --   --  106  --  107   CO2 32*  --   --   --  35*  --  30*   BUN 48*  --   --   --  55*  --  51*   CREATININE 1.0*  --   --   --  1.0*  --  0.9   CALCIUM 8.5*  --   --   --  8.7*  --  8.7*   GLUCOSE 340*  --   --   --  169*  --  146*    < > = values in this interval not displayed. Recent Labs     03/21/22  0754 03/21/22  1300 03/21/22  1447 03/21/22  1620 03/22/22  0407   PHART 7.410 7.400 7.390 7.400 7.430   UZS9FQA 59.0* 58.0* 61.0* 60.0* 60.0*   PO2ART 58.0* 66.0* 67.0* 68.0* 60.0*   BAG9FLQ 37.4* 35.9* 36.9* 37.2* 39.8*   Q5EYKPXO 89.5* 92.0 91.7 92.2 91.4   BEART 10.2* 8.7* 9.3* 9.8* 12.8*     Recent Labs     03/23/22  0404   AST 14   ALT 16   ALKPHOS 44   BILITOT 0.4   CALCIUM 8.7*     No results for input(s): BC, LABGRAM, CULTRESP, BFCX in the last 72 hours. Radiograph: CT HEAD WO CONTRAST    Result Date: 3/16/2022  1. No acute intracranial abnormality. 2. No skull fracture. 3. Chronic white matter ischemic changes. 4. Moderate soft tissue thickening of the posterior wall of the nasopharynx with narrowing of the nasopharyngeal airway is probably due to recent intubation. This may be clinically correlated. The above study was initially reviewed and reported by [facility]. I do not find any discrepancies. Signed by Dr Uyen Alvarez    Result Date: 3/17/2022  1. No active cardiopulmonary disease. Signed by Dr Uyen Alvarez    Result Date: 3/16/2022  1. No active cardiopulmonary disease. 2. Endotracheal tube in place.  The nasogastric tube is not evaluated. 3. Dual-chamber cardiac pacer in place. Signed by Dr Bhaskar Loera       My radiograph interpretation/independent review of imaging: Reviewed    Problem list generated by [facility]:  Hospital Problems           Last Modified POA    * (Principal) Acute hypercapnic respiratory failure ([address] 3/16/2022 Yes    NICM (nonischemic cardiomyopathy) ([address] 3/16/2022 Yes    Diabetes mellitus ([address] 3/16/2022 Yes    Overview Signed 6/2/2011  1:19 PM by Vita Bernstein     diet controlled         S/P CABG x 4 3/16/2022 Yes    Overview Signed 10/11/2016 11:15 AM by JAKOB Torrez     8/29/11 EF 60%         Coronary artery disease involving native coronary artery of native heart without angina pectoris 3/16/2022 Yes    Chronic obstructive pulmonary disease ([address] 3/16/2022 Yes    Abdominal aortic aneurysm (AAA) without rupture ([address] 3/16/2022 Yes    Smoker 3/16/2022 Yes           Pulmonary Assessment/Plan:     1. Acute on chronic hypoxic hypercapnic respiratory failure. Status post mechanical ventilation. The patient continues to have compensated hypercapnic respiratory failure. She might benefit from a noninvasive ventilation at home such as ASV with trilogy  2. Underlying severe COPD clinically wheezing improved. Continue bronchodilators continue pulmonary toilet. Steroid taper. 3. Possible committee acquired pneumonia. On empirical antibiotic therapy. De-escalate to PO. 4. Altered mental status resolved. 5. DVT prophylaxis. 6. Consider rehab. Wilma Andrade MD, [facility], [facility]    The above note was generated using voice recognition software. Inadvertent typographical errors in transcription may have occurred.       Electronically signed by Wilma Andrade MD on 03/23/22 at 5:23 PM

## 2022-03-23 NOTE — PROGRESS NOTES
Patient's SpO2 78% on 4 l/m nasal cannula. O2 increased to 6 l/m, SpO2 up to 82%. Placed her on 10 l/m high-flow cannula. SpO2 up to 92%. Decreased to 8 l/m, SpO2 currently 90%.

## 2022-03-23 NOTE — PROGRESS NOTES
Family Medicine Progress Note    Patient:  José Miguel Olvera  YOB: 1950    MRN: [id number]     Acct: [de-identified]     Admit date: 3/15/2022    Patient Seen, Chart, Consults notes, Labs, Radiology studies reviewed. Subjective: Day 7 of stay with hypercapnic respiratory failure and most recent (in last 24 hours) has had successful maintaining of time off mechanical ventilation. Has not been evaluated by speech yesterday as ordered. Also no evaluation by OT and PT, however patient states she is moving her arms better. Has benefited from suction device for her secretions. Still requiring fair amount of oxygen. Past, Family, Social History unchanged from admission. Diet:  ADULT DIET;  Regular; 4 carb choices (60 gm/meal)    Medications:  Scheduled Meds:   amLODIPine  5 mg Oral Daily    clopidogrel  75 mg Oral Daily    furosemide  40 mg Oral Daily    ramipril  5 mg Oral BID    rivaroxaban  20 mg Oral Daily with breakfast    rosuvastatin  10 mg Oral Nightly    sotalol  120 mg Oral BID    methylPREDNISolone  40 mg IntraVENous Q12H    insulin lispro  0-12 Units SubCUTAneous TID WC    insulin lispro  0-6 Units SubCUTAneous Nightly    insulin glargine  0.25 Units/kg SubCUTAneous Nightly    sodium chloride flush  5-40 mL IntraVENous 2 times per day    cefTRIAXone (ROCEPHIN) IV  1,000 mg IntraVENous Q24H    chlorhexidine  15 mL Mouth/Throat BID    ipratropium-albuterol  1 ampule Inhalation Q4H     Continuous Infusions:   propofol Stopped (03/21/22 1151)    lactated ringers 15 mL/hr at 03/22/22 1600    fentaNYL Stopped (03/21/22 1151)    sodium chloride      norepinephrine Stopped (03/17/22 0255)    dextrose      dexmedetomidine HCl in NaCl Stopped (03/23/22 0430)     PRN Meds:nitroGLYCERIN, acetaminophen, hydrALAZINE, sodium chloride flush, sodium chloride, ondansetron **OR** ondansetron, glucagon (rDNA), dextrose, glucose, dextrose bolus (hypoglycemia) **OR** dextrose bolus (hypoglycemia)    Objective:    Vitals: BP (!) 140/63   Pulse 119   Temp 97.9 °F (36.6 °C) (Temporal)   Resp 27   Ht 5' 6\" (1.676 m)   Wt 161 lb 14.4 oz (73.4 kg)   SpO2 (!) 87%   BMI 26.13 kg/m²   24 hour intake/output:    Intake/Output Summary (Last 24 hours) at 3/23/2022 0823  Last data filed at 3/23/2022 0800  Gross per 24 hour   Intake 807.58 ml   Output 2000 ml   Net -1192.42 ml     Last 3 weights: Wt Readings from Last 3 Encounters:   03/20/22 161 lb 14.4 oz (73.4 kg)   12/01/21 157 lb (71.2 kg)   08/13/21 174 lb 8 oz (79.2 kg)       Physical Exam:    General Appearance:  alert, cooperative and appears frail  Skin:  negatives: mobility and turgor normal  Eyes:  No gross abnormalities. Neck:  neck- supple, no mass, non-tender  Lungs:  Breathing Pattern: rapid, shallow and audible wheezes, Breath sounds: wheezing- throughout  Heart:  Heart regular rate and rhythm  Abdomen: Auscultation: Normal bowel sounds. No bruits. Palpation: No masses, tenderness or organomegally. Extremities: Extremities warm to touch, pink, with no edema. Musculoskeletal:  No joint swelling, deformity, or tenderness.   Neurologic:  negative    CBC with Differential:    Lab Results   Component Value Date    WBC 7.1 03/23/2022    RBC 4.53 03/23/2022    HGB 13.7 03/23/2022    HCT 46.7 03/23/2022    HCT 32.1 09/02/2011     03/23/2022     09/02/2011    .1 03/23/2022    MCH 30.2 03/23/2022    MCHC 29.3 03/23/2022    RDW 15.1 03/23/2022    LYMPHOPCT 9.4 03/23/2022    MONOPCT 5.6 03/23/2022    EOSPCT 0.5 09/02/2011    BASOPCT 0.1 03/23/2022    MONOSABS 0.40 03/23/2022    LYMPHSABS 0.7 03/23/2022    EOSABS 0.00 03/23/2022    BASOSABS 0.00 03/23/2022     CMP:    Lab Results   Component Value Date     03/23/2022     09/02/2011    K 4.2 03/23/2022    K 3.5 06/28/2021    K 4.1 09/02/2011     03/23/2022     09/02/2011    CO2 30 03/23/2022    BUN 51 03/23/2022    CREATININE 0.9 03/23/2022 CREATININE 0.6 09/02/2011    GFRAA >59 03/23/2022    LABGLOM >60 03/23/2022    GLUCOSE 146 03/23/2022    PROT 5.7 03/23/2022    PROT 7.5 01/18/2013    LABALBU 3.2 03/23/2022    LABALBU 3.3 09/02/2011    CALCIUM 8.7 03/23/2022    BILITOT 0.4 03/23/2022    ALKPHOS 44 03/23/2022    ALKPHOS 57 09/02/2011    AST 14 03/23/2022    ALT 16 03/23/2022     Last 3 Troponin:    Lab Results   Component Value Date    TROPONINI <0.01 03/15/2022    TROPONINI <0.01 08/12/2021    TROPONINI <0.01 06/23/2021     Urine Culture:  No components found for: CURINE  Blood Culture:  No components found for: CBLOOD, CFUNGUSBL  Stool Culture:  No components found for: CSTOOL    Assessment:    Principal Problem:    Acute hypercapnic respiratory failure ([address]  Active Problems:    NICM (nonischemic cardiomyopathy) ([address]    Diabetes mellitus ([address]    S/P CABG x 4    Coronary artery disease involving native coronary artery of native heart without angina pectoris    Chronic obstructive pulmonary disease (HCC)    Abdominal aortic aneurysm (AAA) without rupture ([address]    Smoker  Resolved Problems:    * No resolved hospital problems. *          Plan:  Increase patient care including start diet while still awaiting speech therapy evaluation. Can probably move to the floor now that she is off drips. Aggressive pulmonary toilet. PT and OT to assess her deconditioned state. Appreciate pulmonology assistance. Continue telemetry given her conversion to A. fib with return to now sinus tach. Cardiology consultation if necessary. Greater than 25 minutes spent in coordination of patient care this morning.       Electronically signed by Sharene Brunner, MD on 3/23/2022 at 8:23 AM

## 2022-03-24 LAB
ALBUMIN SERPL-MCNC: 3.3 G/DL (ref 3.5–5.2)
ALP BLD-CCNC: 46 U/L (ref 35–104)
ALT SERPL-CCNC: 27 U/L (ref 5–33)
ANION GAP SERPL CALCULATED.3IONS-SCNC: 11 MMOL/L (ref 7–19)
AST SERPL-CCNC: 21 U/L (ref 5–32)
BASOPHILS ABSOLUTE: 0 K/UL (ref 0–0.2)
BASOPHILS RELATIVE PERCENT: 0.1 % (ref 0–1)
BILIRUB SERPL-MCNC: 0.6 MG/DL (ref 0.2–1.2)
BUN BLDV-MCNC: 63 MG/DL (ref 8–23)
CALCIUM SERPL-MCNC: 9 MG/DL (ref 8.8–10.2)
CHLORIDE BLD-SCNC: 108 MMOL/L (ref 98–111)
CO2: 32 MMOL/L (ref 22–29)
CREAT SERPL-MCNC: 1 MG/DL (ref 0.5–0.9)
EOSINOPHILS ABSOLUTE: 0 K/UL (ref 0–0.6)
EOSINOPHILS RELATIVE PERCENT: 0 % (ref 0–5)
GFR AFRICAN AMERICAN: >59
GFR NON-AFRICAN AMERICAN: 54
GLUCOSE BLD-MCNC: 117 MG/DL (ref 70–99)
GLUCOSE BLD-MCNC: 62 MG/DL (ref 70–99)
GLUCOSE BLD-MCNC: 67 MG/DL (ref 70–99)
GLUCOSE BLD-MCNC: 81 MG/DL (ref 70–99)
GLUCOSE BLD-MCNC: 88 MG/DL (ref 70–99)
GLUCOSE BLD-MCNC: 89 MG/DL (ref 74–109)
HCT VFR BLD CALC: 46.4 % (ref 37–47)
HEMOGLOBIN: 13.7 G/DL (ref 12–16)
IMMATURE GRANULOCYTES #: 0 K/UL
LYMPHOCYTES ABSOLUTE: 0.7 K/UL (ref 1.1–4.5)
LYMPHOCYTES RELATIVE PERCENT: 8.9 % (ref 20–40)
MCH RBC QN AUTO: 30.4 PG (ref 27–31)
MCHC RBC AUTO-ENTMCNC: 29.5 G/DL (ref 33–37)
MCV RBC AUTO: 102.9 FL (ref 81–99)
MONOCYTES ABSOLUTE: 0.8 K/UL (ref 0–0.9)
MONOCYTES RELATIVE PERCENT: 10 % (ref 0–10)
NEUTROPHILS ABSOLUTE: 6.4 K/UL (ref 1.5–7.5)
NEUTROPHILS RELATIVE PERCENT: 80.5 % (ref 50–65)
PDW BLD-RTO: 15.2 % (ref 11.5–14.5)
PERFORMED ON: ABNORMAL
PERFORMED ON: NORMAL
PERFORMED ON: NORMAL
PLATELET # BLD: 138 K/UL (ref 130–400)
PMV BLD AUTO: 10.3 FL (ref 9.4–12.3)
POTASSIUM SERPL-SCNC: 3.8 MMOL/L (ref 3.5–5)
RBC # BLD: 4.51 M/UL (ref 4.2–5.4)
SODIUM BLD-SCNC: 151 MMOL/L (ref 136–145)
TOTAL PROTEIN: 5.9 G/DL (ref 6.6–8.7)
WBC # BLD: 7.9 K/UL (ref 4.8–10.8)

## 2022-03-24 PROCEDURE — 99233 SBSQ HOSP IP/OBS HIGH 50: CPT | Performed by: INTERNAL MEDICINE

## 2022-03-24 PROCEDURE — 6370000000 HC RX 637 (ALT 250 FOR IP): Performed by: FAMILY MEDICINE

## 2022-03-24 PROCEDURE — 80053 COMPREHEN METABOLIC PANEL: CPT

## 2022-03-24 PROCEDURE — 94660 CPAP INITIATION&MGMT: CPT

## 2022-03-24 PROCEDURE — 97530 THERAPEUTIC ACTIVITIES: CPT

## 2022-03-24 PROCEDURE — 6370000000 HC RX 637 (ALT 250 FOR IP): Performed by: INTERNAL MEDICINE

## 2022-03-24 PROCEDURE — 97162 PT EVAL MOD COMPLEX 30 MIN: CPT

## 2022-03-24 PROCEDURE — 82947 ASSAY GLUCOSE BLOOD QUANT: CPT

## 2022-03-24 PROCEDURE — 94761 N-INVAS EAR/PLS OXIMETRY MLT: CPT

## 2022-03-24 PROCEDURE — 36415 COLL VENOUS BLD VENIPUNCTURE: CPT

## 2022-03-24 PROCEDURE — 2700000000 HC OXYGEN THERAPY PER DAY

## 2022-03-24 PROCEDURE — 85025 COMPLETE CBC W/AUTO DIFF WBC: CPT

## 2022-03-24 PROCEDURE — 1210000000 HC MED SURG R&B

## 2022-03-24 PROCEDURE — 94640 AIRWAY INHALATION TREATMENT: CPT

## 2022-03-24 PROCEDURE — 2580000003 HC RX 258: Performed by: FAMILY MEDICINE

## 2022-03-24 RX ADMIN — AMLODIPINE BESYLATE 5 MG: 5 TABLET ORAL at 10:45

## 2022-03-24 RX ADMIN — ROSUVASTATIN CALCIUM 10 MG: 10 TABLET, FILM COATED ORAL at 21:11

## 2022-03-24 RX ADMIN — FUROSEMIDE 40 MG: 40 TABLET ORAL at 10:46

## 2022-03-24 RX ADMIN — CEFUROXIME AXETIL 500 MG: 250 TABLET ORAL at 21:10

## 2022-03-24 RX ADMIN — CEFUROXIME AXETIL 500 MG: 250 TABLET ORAL at 10:45

## 2022-03-24 RX ADMIN — IPRATROPIUM BROMIDE AND ALBUTEROL SULFATE 1 AMPULE: .5; 2.5 SOLUTION RESPIRATORY (INHALATION) at 10:34

## 2022-03-24 RX ADMIN — IPRATROPIUM BROMIDE AND ALBUTEROL SULFATE 1 AMPULE: .5; 2.5 SOLUTION RESPIRATORY (INHALATION) at 22:57

## 2022-03-24 RX ADMIN — SODIUM CHLORIDE, PRESERVATIVE FREE 10 ML: 5 INJECTION INTRAVENOUS at 10:47

## 2022-03-24 RX ADMIN — RIVAROXABAN 20 MG: 20 TABLET, FILM COATED ORAL at 18:29

## 2022-03-24 RX ADMIN — SODIUM CHLORIDE, PRESERVATIVE FREE 10 ML: 5 INJECTION INTRAVENOUS at 21:11

## 2022-03-24 RX ADMIN — RAMIPRIL 5 MG: 2.5 CAPSULE ORAL at 10:45

## 2022-03-24 RX ADMIN — IPRATROPIUM BROMIDE AND ALBUTEROL SULFATE 1 AMPULE: .5; 2.5 SOLUTION RESPIRATORY (INHALATION) at 15:05

## 2022-03-24 RX ADMIN — CLOPIDOGREL BISULFATE 75 MG: 75 TABLET ORAL at 10:45

## 2022-03-24 RX ADMIN — PREDNISONE 20 MG: 20 TABLET ORAL at 10:46

## 2022-03-24 RX ADMIN — RAMIPRIL 5 MG: 2.5 CAPSULE ORAL at 18:29

## 2022-03-24 RX ADMIN — IPRATROPIUM BROMIDE AND ALBUTEROL SULFATE 1 AMPULE: .5; 2.5 SOLUTION RESPIRATORY (INHALATION) at 06:38

## 2022-03-24 RX ADMIN — SOTALOL HYDROCHLORIDE 120 MG: 120 TABLET ORAL at 21:10

## 2022-03-24 RX ADMIN — IPRATROPIUM BROMIDE AND ALBUTEROL SULFATE 1 AMPULE: .5; 2.5 SOLUTION RESPIRATORY (INHALATION) at 02:24

## 2022-03-24 RX ADMIN — IPRATROPIUM BROMIDE AND ALBUTEROL SULFATE 1 AMPULE: .5; 2.5 SOLUTION RESPIRATORY (INHALATION) at 20:01

## 2022-03-24 NOTE — CARE COORDINATION
The [address] [facility]  Notification of Admission Decision      [] Patient has been accepted for admit to [facility] on :       Please write discharge orders and summary prior to discharge. [] Patient acceptance to Rehab pending the following :    [x] Eval in progress       [] Patient determined to be ineligible for services at [facility] because : We recommend you consider        Thank you for your referral, we appreciate you. If you have any questions, please feel   free to contact me at [phone].     Electronically Signed by Luis Ochoa, Admissions Coordinator 3/24/2022 2:24 PM

## 2022-03-24 NOTE — CARE COORDINATION
Initial CM Assessment    Initial Assessment Completed at bedside with:    [x]   Patient  []   Family/Caregiver/Guardian   []   Other:      Patient Contact Information:  [address] [place] [address]  [phone] (home)   Above information verified? [x]   Yes  []   No    ADLS:  Patient lives at home alone. Her son, Mana Sky, lives next door. Prior to admission, she was independent with ADLs and continues to drive. If she gets out of bed at night, she uses a walker that is left by the bedside. She has other equipment available at home, if needed. She has a wheelchair, BSC and shower chair. Support System:    Family/Friends  Plan to return to current housing:   [x]   Yes, Patient wants to return home, but is awaiting PT Eval.  []   No     D/C Plan if not returning home:    Transportation plan for Discharge:  Family/friend    Do you have any unmet social needs that would keep you from returning home safely:  []   Yes  [x]   No              Unmet Social Needs Notes:       Had [facility] prior to admission:    [x]   Yes, 2L at night, plus PRN  []   No    Oxygen Company:   [facility] [phone]   [phone]    Has a pulse oximetry unit at home:   [x]   Yes  []   No    Currently ACTIVE with [address]:    []   Yes  []   No  [x]   Interested at discharge  [address] [place] [address]:      Current PCP:  Jamel Schilder, MD  PCP verified? [x]   Yes  []   No    Have you been vaccinated for COVID-19 (SARS-CoV-2)? [x]   Yes  []   No                   If so, when?     Which :  [x]   Pf[employer]  []   Moderna  []   [employer]  []   Other:       Pharmacy:    [facility], [address] [facility] [phone] [address] [address] [address]  Phone: [phone] Fax: [phone]    [facility] [address] [place] [address] Frank [place]h [address]  Phone: [phone] Fax: [phone]    Prefer to use Meds to Bed? []   Yes  [x]   No  Potential assistance purchasing medications? []   Yes  [x]   No    Active with HD/PD prior to admission:           []   Yes  [x]   No  HD Center:       Financial:  Payor: Manav Foote / Plan: MEDICARE PART A AND B / Product Type: *No Product type* /     Pre-Cert required for SNF:   []   Yes  [x]   No    Patient Deficits:  [x]   Yes, weakness  []   No    If yes:  []   Confusion/Memory  []   Visual  []   Motor/Sensory         []   Right arm         []   Right leg         []   Left arm         []   Left leg  []   Language/Speech         []   Aphasia         []   Dysarthria         []   Swallow         Lonnie Coma Scale  Eye Opening: Spontaneous  Best Verbal Response: Oriented  Best Motor Response: Obeys commands  Lonnie Coma Scale Score: 15    Patient Deficit Notes: Additional CM/SW Notes:   Discussed possibility of discharging to a SNF or IP Rehab with patient. Patient wishes to discharge home, but recognizes that she is weak. Awaiting PT [address] Worker program.  Provided literature. Patient consented to participate in CHW program.  Information faxed to [facility].     Byron Valencia and/or her family were provided with choice of provider:  [x]   Yes   []   No      Patient Admission Status:   Inpatient    Jerry Ervin RN  [facility]  Electronically signed by Jerry Ervin RN on 3/24/2022 at 9:29 AM

## 2022-03-24 NOTE — PROGRESS NOTES
Family Medicine Progress Note    Patient:  Shannan Agosto  YOB: 1950    MRN: [id number]     Acct: [de-identified]     Admit date: 3/15/2022    Patient Seen, Chart, Consults notes, Labs, Radiology studies reviewed. Subjective: Day 8 of stay with hypercapnic respiratory failure and most recent (in last 24 hours) has had continued slow gradual improvement. Still not at or near baseline. Complaining of weakness in her legs. Had difficulty ambulating with therapy. Past, Family, Social History unchanged from admission. Diet:  ADULT DIET;  Full Liquid; 4 carb choices (60 gm/meal); Mildly Thick (Nectar)    Medications:  Scheduled Meds:   amLODIPine  5 mg Oral Daily    clopidogrel  75 mg Oral Daily    furosemide  40 mg Oral Daily    ramipril  5 mg Oral BID    rivaroxaban  20 mg Oral Daily with breakfast    rosuvastatin  10 mg Oral Nightly    sotalol  120 mg Oral BID    cefUROXime  500 mg Oral 2 times per day    predniSONE  20 mg Oral Daily    Followed by   Beryle Oxford ON 3/26/2022] predniSONE  15 mg Oral Daily    Followed by   Beryle Oxford ON 3/29/2022] predniSONE  10 mg Oral Daily    Followed by   Beryle Oxford ON 4/1/2022] predniSONE  5 mg Oral Daily    insulin lispro  0-12 Units SubCUTAneous TID WC    insulin lispro  0-6 Units SubCUTAneous Nightly    insulin glargine  0.25 Units/kg SubCUTAneous Nightly    sodium chloride flush  5-40 mL IntraVENous 2 times per day    chlorhexidine  15 mL Mouth/Throat BID    ipratropium-albuterol  1 ampule Inhalation Q4H     Continuous Infusions:   propofol Stopped (03/21/22 1151)    lactated ringers 15 mL/hr at 03/22/22 1600    fentaNYL Stopped (03/21/22 1151)    sodium chloride      norepinephrine Stopped (03/17/22 0255)    dextrose      dexmedetomidine HCl in NaCl Stopped (03/23/22 0430)     PRN Meds:nitroGLYCERIN, acetaminophen, hydrALAZINE, sodium chloride flush, sodium chloride, ondansetron **OR** ondansetron, glucagon (rDNA), dextrose, glucose, dextrose bolus (hypoglycemia) **OR** dextrose bolus (hypoglycemia)    Objective:    Vitals: BP (!) 152/91   Pulse 83   Temp 96.3 °F (35.7 °C) (Temporal)   Resp 20   Ht 5' 6\" (1.676 m)   Wt 161 lb 14.4 oz (73.4 kg)   SpO2 99%   BMI 26.13 kg/m²   24 hour intake/output:    Intake/Output Summary (Last 24 hours) at 3/24/2022 1442  Last data filed at 3/24/2022 0541  Gross per 24 hour   Intake 550 ml   Output 1200 ml   Net -650 ml     Last 3 weights: Wt Readings from Last 3 Encounters:   03/20/22 161 lb 14.4 oz (73.4 kg)   12/01/21 157 lb (71.2 kg)   08/13/21 174 lb 8 oz (79.2 kg)       Physical Exam:    General Appearance:  awake, alert, oriented, in no acute distress  Skin:  negatives: mobility and turgor normal  Eyes:  No gross abnormalities. Neck:  neck- supple, no mass, non-tender  Lungs:  Breathing Pattern: rapid, shallow, Breath sounds: diminished breath sounds- throughout  Heart:  Heart regular rate and rhythm  Abdomen: Auscultation: Normal bowel sounds. No bruits. Palpation: No masses, tenderness or organomegally. Extremities: Extremities warm to touch, pink, with no edema. Musculoskeletal:  No joint swelling, deformity, or tenderness.   Neurologic:  negative    CBC with Differential:    Lab Results   Component Value Date    WBC 7.9 03/24/2022    RBC 4.51 03/24/2022    HGB 13.7 03/24/2022    HCT 46.4 03/24/2022    HCT 32.1 09/02/2011     03/24/2022     09/02/2011    .9 03/24/2022    MCH 30.4 03/24/2022    MCHC 29.5 03/24/2022    RDW 15.2 03/24/2022    LYMPHOPCT 8.9 03/24/2022    MONOPCT 10.0 03/24/2022    EOSPCT 0.5 09/02/2011    BASOPCT 0.1 03/24/2022    MONOSABS 0.80 03/24/2022    LYMPHSABS 0.7 03/24/2022    EOSABS 0.00 03/24/2022    BASOSABS 0.00 03/24/2022     CMP:    Lab Results   Component Value Date     03/24/2022     09/02/2011    K 3.8 03/24/2022    K 3.5 06/28/2021    K 4.1 09/02/2011     03/24/2022     09/02/2011    CO2 32 03/24/2022    BUN 63 03/24/2022 CREATININE 1.0 03/24/2022    CREATININE 0.6 09/02/2011    GFRAA >59 03/24/2022    LABGLOM 54 03/24/2022    GLUCOSE 89 03/24/2022    PROT 5.9 03/24/2022    PROT 7.5 01/18/2013    LABALBU 3.3 03/24/2022    LABALBU 3.3 09/02/2011    CALCIUM 9.0 03/24/2022    BILITOT 0.6 03/24/2022    ALKPHOS 46 03/24/2022    ALKPHOS 57 09/02/2011    AST 21 03/24/2022    ALT 27 03/24/2022     Last 3 Troponin:    Lab Results   Component Value Date    TROPONINI <0.01 03/15/2022    TROPONINI <0.01 08/12/2021    TROPONINI <0.01 06/23/2021     Urine Culture:  No components found for: CURINE  Blood Culture:  No components found for: CBLOOD, CFUNGUSBL  Stool Culture:  No components found for: CSTOOL    Assessment:    Principal Problem:    Acute hypercapnic respiratory failure (HCC)  Active Problems:    NICM (nonischemic cardiomyopathy) ([facility]    Diabetes mellitus ([facility]    S/P CABG x 4    Coronary artery disease involving native coronary artery of native heart without angina pectoris    Chronic obstructive pulmonary disease (HCC)    Abdominal aortic aneurysm (AAA) without rupture ([facility]    Smoker  Resolved Problems:    * No resolved hospital problems. *          Plan:  Continue with aggressive pulmonary management. Medications as is for now. De-escalate antibiotics as appropriate. Also de-escalate steroids as appropriate. Still on high flow oxygen. Appreciate pulmonology assistance in care. Tentatively plan for discharge on trilogy device. I believe rehab appropriate. Order for evaluation placed.       Electronically signed by Otoniel Alfonso MD on 3/24/2022 at 2:42 PM

## 2022-03-24 NOTE — PROGRESS NOTES
Physical Therapy    Facility/Department: [facility] ONCOLOGY UNIT  Initial Assessment    NAME: Samia Moses  : 1950  MRN: [id number]    Date of Service: 3/24/2022    Discharge Recommendations:  Continue to assess pending progress,Patient would benefit from continued therapy after discharge (ANTICIPATE Pt [address] TO D/C HOME UNLESS SHE MAKES SIGNIFICANT GAINS IWTH SUBSEQUENT SESSIONS)   PT Equipment Recommendations  Other: NO NEEDS IDENTIFIED    Assessment   Body structures, Functions, Activity limitations: Decreased functional mobility ; Decreased endurance;Decreased balance;Decreased strength;Decreased safe awareness  Prognosis: Good  Decision Making: Low Complexity  PT Education: Goals;Plan of Care;PT Role;Transfer Training;Functional Mobility Training;General Safety  REQUIRES PT FOLLOW UP: Yes  Activity Tolerance  Activity Tolerance: Patient Tolerated treatment well       Patient Diagnosis(es): The primary encounter diagnosis was Acute hypercapnic respiratory failure ([facility] [address] A diagnosis of Acute exacerbation of chronic obstructive pulmonary disease (COPD) ([address] was also pertinent to this visit. has a past medical history of ASHD (arteriosclerotic heart disease), COPD (chronic obstructive pulmonary disease) ([address] Coronary atherosclerosis, Diabetes mellitus ([address] Encounter for wound care, Fall, Ganglion cyst, Hematoma, Hx of blood clots, Hypercholesteremia, Hypertension, Pacemaker, and Unstable angina ([address] has a past surgical history that includes Cholecystectomy; Hysterectomy; Coronary angioplasty with stent (00); Cardiac catheterization (12/10/00); Cardiac catheterization (98); Cardiac catheterization (94); Cardiac catheterization (2011); Coronary artery bypass graft (2011); Neck surgery; Coronary angioplasty with stent (2021); cyst removal; and Parotidectomy (Bilateral).     Restrictions  Restrictions/Precautions  Restrictions/Precautions: Fall Risk  Required Braces or Orthoses?: Yes  Vision/Hearing  Vision: Within Functional Limits  Hearing: Within functional limits     Subjective  General  Diagnosis: resp failure, uses 02 noc at home  Follows Commands: Within Functional Limits  General Comment  Comments: on 6L 02 wears 2.5 at home  Subjective  Subjective: Pt states she feels very weak and fatigues easily but wants to work with PT  Pain Screening  Patient Currently in Pain: Denies  Vital Signs  Patient Currently in Pain: Denies       Orientation  Orientation  Overall Orientation Status: Within Functional Limits  Social/Functional History  Social/Functional History  Lives With: Alone  Type of Home: House  Home Layout: One level  Home Access: Level entry  Bathroom Shower/Tub: Tub/Shower unit,Walk-in shower,Shower chair with back  Bathroom Toilet: Standard  Bathroom Equipment: Grab bars in shower  Home Equipment: 4 wheeled walker,Oxygen  ADL Assistance: Independent  Homemaking Assistance: Independent  Ambulation Assistance: Independent  Transfer Assistance: Independent  Active : Yes  Mode of Transportation: Car  Cognition        Objective          AROM RLE (degrees)  RLE AROM: WFL  AROM LLE (degrees)  LLE AROM : WFL  Strength RLE  Comment: hip flex 2+/5, knee ext 3+/5, ankle DF 3+/5  Strength LLE  Comment: hip flex 2/5, knee ext - 3 to 3/5, ankle DF 3/5        Bed mobility  Supine to Sit: Minimal assistance  Sit to Supine: Minimal assistance  Transfers  Sit to Stand: Moderate Assistance (1+1 for safety due to le's buckling)  Stand to sit: Minimal Assistance;Contact guard assistance  Bed to Chair:  (would require use of a brooks stedy for TF to recliner)  Comment: did not Tf to recliner with brooks stedy due to Pt likely having diffculty with sit to stand from lower surface. PT stood from eob x 3 and was able to take 3-4 shuffling steps toward [facility].   Ambulation  Ambulation?: No     Balance  Sitting - Static: Fair;+  Sitting - Dynamic: Fair;+  Standing - Static: Fair;-  Standing - Dynamic: Fair;-  Exercises  Comments: LE EX IN SUPINE AND SITTING     Plan   Plan  Times per week: 5-6  Plan weeks: 2  Current Treatment Recommendations: Balance Training,Strengthening,Functional Mobility Training,Transfer Training,Gait Training,Pain Management,Positioning,Patient/Caregiver Education & Training,Safety Education & Training  Plan Comment: 02, MAY NEED EDWIN PIERCE INITIALLY FOR TF'S.  ASSIST OF TWO AS INDICATED  Safety Devices  Type of devices: Call light within reach,Gait belt,Left in bed,Bed alarm in place    Goals  Short term goals  Time Frame for Short term goals: 2 WKS  Short term goal 1: SIT<>STAND,  CGA  Short term goal 2: TF'S WITH RW, MIN/CGA  Short term goal 3:  FT WITH RW, CGA       Therapy Time   Individual Concurrent Group Co-treatment   Time In           Time Out           Minutes                   Aurelio Mesa PT    Electronically signed by Aurelio Mesa PT on 3/24/2022 at 1:01 PM

## 2022-03-24 NOTE — PROGRESS NOTES
Occupational Therapy  Facility/Department: [facility] 4 ONCOLOGY UNIT  Daily Treatment Note  NAME: Iglesia Kimball  : 1950  MRN: [id number]    Date of Service: 3/24/2022    Discharge Recommendations:  Continue to assess pending progress       Assessment   Assessment: Pt. did well in standing            Patient Diagnosis(es): The primary encounter diagnosis was Acute hypercapnic respiratory failure ([facility] A diagnosis of Acute exacerbation of chronic obstructive pulmonary disease (COPD) ([facility] [facility] was also pertinent to this visit. has a past medical history of ASHD (arteriosclerotic heart disease), COPD (chronic obstructive pulmonary disease) ([facility] Coronary atherosclerosis, Diabetes mellitus ([facility] [facility] Encounter for wound care, Fall, Ganglion cyst, Hematoma, Hx of blood clots, Hypercholesteremia, Hypertension, Pacemaker, and Unstable angina ([facility] has a past surgical history that includes Cholecystectomy; Hysterectomy; Coronary angioplasty with stent (00); Cardiac catheterization (12/10/00); Cardiac catheterization (98); Cardiac catheterization (94); Cardiac catheterization (2011); Coronary artery bypass graft (2011); Neck surgery; Coronary angioplasty with stent (2021); cyst removal; and Parotidectomy (Bilateral).     Restrictions  Restrictions/Precautions  Restrictions/Precautions: Fall Risk  Required Braces or Orthoses?: Yes  Subjective   General  Chart Reviewed: Yes  Referring Practitioner: Dr. Asha Soto  Subjective  Subjective: Pt pleasant and cooperative for session      Orientation     Objective                   Transfers  Stand Step Transfers: Minimal assistance  Sit to stand: Minimal assistance  Transfer Comments: Antonio of 2 to sidestep to [address]  Times per week: 3-5x/wk  Current Treatment Recommendations: Endurance Training,Functional Mobility Training,Balance Training,Self-Care / ADL,Home Management Training  G-Code     OutComes Score                                                  AM-PAC Score             Goals  Short term goals  Time Frame for Short term goals: 1 week  Short term goal 1: Pt will be modified I for functional mobility to/from bathroom  Short term goal 2: Pt will be Modified I for toileting task/transfer  Short term goal 3: Pt will be I BUE HEP to increase strength/endurance  Patient Goals   Patient goals :  To go home       Therapy Time   Individual Concurrent Group Co-treatment   Time In           Time Out           Minutes                   Debo Sue, OT

## 2022-03-24 NOTE — CARE COORDINATION
Faxed information to [employer] for review for eligibility of Trilogy. Awaiting response. [facility]   P [phone]  F [phone]  Electronically signed by Kassi Lange RN on 3/24/2022 at 3:00 PM    [facility]   Awaiting approval.  [facility]   P [phone]  F [phone]  Electronically signed by Kassi Lange RN on 3/24/2022 at 3:36 PM

## 2022-03-24 NOTE — PROGRESS NOTES
Occupational Therapy     03/24/22 1519   Restrictions/Precautions   Restrictions/Precautions Fall Risk   Required Braces or Orthoses? Yes   Vision   Vision [facility]   Hearing   Hearing [facility]   General   Chart Reviewed Yes   Patient assessed for rehabilitation services? Yes   Response to previous treatment Patient with no complaints from previous session   Family / Caregiver Present Yes  (maybe son? )   Referring Practitioner Dr. Bing Wright in bed and states \"I want to sit up\". Pain Assessment   Patient Currently in Pain Denies   ADL   Feeding Setup;Supervision;Minimal assistance   Grooming Setup;Stand by assistance   UE Bathing Contact guard assistance;Minimal assistance   LE Bathing Moderate assistance   UE Dressing Contact guard assistance   LE Dressing Moderate assistance   Toileting Dependent/Total  (currently using catheter and bed pan. )   Additional Comments Stood [facility] Bowels this pm with mod-min assist for STS mobility. Balance   Sitting Balance Minimal assistance  (CGA-MIN assist for sitting balance. )   Standing Balance Moderate assistance  Jeannine Rouse )   Bed mobility   Supine to Sit Moderate assistance;Maximum assistance   Sit to Supine Moderate assistance;Maximum assistance   Scooting Moderate assistance;Maximal assistance   Transfers   Sit to stand Moderate assistance   Stand to sit Moderate assistance   Transfer Comments [facility]    Assessment   Performance deficits / Impairments Decreased functional mobility ; Decreased ADL status; Decreased high-level IADLs;Decreased strength;Decreased balance;Decreased vision/visual deficit   Assessment Pt progressing as tolerated. Treatment Diagnosis Hypercapnic RF   Prognosis Good   REQUIRES OT FOLLOW UP Yes   Treatment Initiated  Tx focused on sitting EOB and STS mobility at [facility]els level.     Discharge Recommendations Patient would benefit from continued therapy after discharge   Activity Tolerance   Activity Tolerance Patient Tolerated treatment well   Safety Devices   Safety Devices in place Yes   Type of devices Bed alarm in place;Call light within reach; Left in bed   Plan   Times per week 3-5x/wk   Times per day Daily   Electronically signed by EMMA Powers on 3/24/2022 at 3:26 PM

## 2022-03-24 NOTE — PROGRESS NOTES
Pulmonary and Critical Care Progress note. [address] [facility]    MRN# [id number]    Acct# [de-identified]  3/24/2022   4:37 PM CDT    Referring Josette Harman MD      Chief Complaint: Respiratory failure on mechanical ventilation    HPI: She is sitting up in bed looks comfortable. On nasal cannula oxygen. Complains of weakness. Medications    amLODIPine, 5 mg, Oral, Daily    clopidogrel, 75 mg, Oral, Daily    furosemide, 40 mg, Oral, Daily    ramipril, 5 mg, Oral, BID    rivaroxaban, 20 mg, Oral, Daily with breakfast    rosuvastatin, 10 mg, Oral, Nightly    sotalol, 120 mg, Oral, BID    cefUROXime, 500 mg, Oral, 2 times per day    predniSONE, 20 mg, Oral, Daily **FOLLOWED BY** [START ON 3/26/2022] predniSONE, 15 mg, Oral, Daily **FOLLOWED BY** [START ON 3/29/2022] predniSONE, 10 mg, Oral, Daily **FOLLOWED BY** [START ON 4/1/2022] predniSONE, 5 mg, Oral, Daily    insulin lispro, 0-12 Units, SubCUTAneous, TID WC    insulin lispro, 0-6 Units, SubCUTAneous, Nightly    insulin glargine, 0.25 Units/kg, SubCUTAneous, Nightly    sodium chloride flush, 5-40 mL, IntraVENous, 2 times per day    chlorhexidine, 15 mL, Mouth/Throat, BID    ipratropium-albuterol, 1 ampule, Inhalation, Q4H     Review of Systems:  Unable to obtain. Patient is on mechanical ventilation  Physical Exam:  BP (!) 152/91   Pulse 83   Temp 96.3 °F (35.7 °C) (Temporal)   Resp 20   Ht 5' 6\" (1.676 m)   Wt 161 lb 14.4 oz (73.4 kg)   SpO2 95%   BMI 26.13 kg/m²     Intake/Output Summary (Last 24 hours) at 3/24/2022 1650  Last data filed at 3/24/2022 1525  Gross per 24 hour   Intake 470 ml   Output 1650 ml   Net -1180 ml       General appearance: Elderly white female who appears to be in no distress  HEENT: Normocephalic atraumatic.    Heart: S1-S2 distant sounds no murmurs  Lungs: Diminished bilaterally no rubs or tenderness or dullness to percussion  Abdomen: Soft nontender no organomegalies normal bowel sounds  Extremities: No clubbing cyanosis or edema  Neuro: No focal findings  Skin: Intact    Recent Labs     03/22/22  0132 03/23/22  0404 03/24/22  0440   WBC 8.1 7.1 7.9   RBC 4.33 4.53 4.51   HGB 13.5 13.7 13.7   HCT 43.5 46.7 46.4   * 110* 138   .5* 103.1* 102.9*   MCH 31.2* 30.2 30.4   MCHC 31.0* 29.3* 29.5*   RDW 15.5* 15.1* 15.2*      Recent Labs     03/22/22  0132 03/22/22  0407 03/23/22  0404 03/24/22  0440   *  --  148* 151*   K 4.2 4.1 4.2 3.8     --  107 108   CO2 35*  --  30* 32*   BUN 55*  --  51* 63*   CREATININE 1.0*  --  0.9 1.0*   CALCIUM 8.7*  --  8.7* 9.0   GLUCOSE 169*  --  146* 89      Recent Labs     03/22/22  0407   PHART 7.430   FBY7ECI 60.0*   PO2ART 60.0*   JKF6FCT 39.8*   J4TZCARI 91.4   BEART 12.8*     Recent Labs     03/24/22  0440   AST 21   ALT 27   ALKPHOS 46   BILITOT 0.6   CALCIUM 9.0     No results for input(s): BC, LABGRAM, CULTRESP, BFCX in the last 72 hours. Radiograph: CT HEAD WO CONTRAST    Result Date: 3/16/2022  1. No acute intracranial abnormality. 2. No skull fracture. 3. Chronic white matter ischemic changes. 4. Moderate soft tissue thickening of the posterior wall of the nasopharynx with narrowing of the nasopharyngeal airway is probably due to recent intubation. This may be clinically correlated. The above study was initially reviewed and reported by stat [facility]. I do not find any discrepancies. Signed by Dr Linsey Kumar    Result Date: 3/17/2022  1. No active cardiopulmonary disease. Signed by Dr Linsey Kumar    Result Date: 3/16/2022  1. No active cardiopulmonary disease. 2. Endotracheal tube in place. The nasogastric tube is not evaluated. 3. Dual-chamber cardiac pacer in place.  Signed by Dr Desean Hardwick       My radiograph interpretation/independent review of imaging: Reviewed    Problem list generated by [facility] HOSPITAL Problems           Last Modified POA    * (Principal) Acute hypercapnic respiratory failure ([address] 3/16/2022 Yes    NICM (nonischemic cardiomyopathy) ([facility] 3/16/2022 Yes    Diabetes mellitus ([address] 3/16/2022 Yes    Overview Signed 6/2/2011  1:19 PM by Bev Gold     diet controlled         S/P CABG x 4 3/16/2022 Yes    Overview Signed 10/11/2016 11:15 AM by JAKOB Gomez     8/29/11 EF 60%         Coronary artery disease involving native coronary artery of native heart without angina pectoris 3/16/2022 Yes    Chronic obstructive pulmonary disease ([address] 3/16/2022 Yes    Abdominal aortic aneurysm (AAA) without rupture ([facility] 3/16/2022 Yes    Smoker 3/16/2022 Yes           Pulmonary Assessment/Plan:     1. Acute on chronic hypoxic hypercapnic respiratory failure. Status post mechanical ventilation. The patient continues to have compensated hypercapnic respiratory failure. She might benefit from a noninvasive ventilation at home such as ASV with trilogy. Weaning oxygen supplementation. 2. Underlying severe COPD clinically wheezing improved. Continue bronchodilators continue pulmonary toilet. Steroid taper. 3. Possible committee acquired pneumonia. On empirical antibiotic therapy. De-escalate to PO. 4. Altered mental status resolved. 5. DVT prophylaxis. 6. Weakness: Consider in patient rehab. Juan M Santos MD, [facility], [facility]    The above note was generated using voice recognition software. Inadvertent typographical errors in transcription may have occurred.       Electronically signed by Juan M Santos MD on 03/24/22 at 4:50 PM

## 2022-03-25 LAB
ALBUMIN SERPL-MCNC: 3.1 G/DL (ref 3.5–5.2)
ALP BLD-CCNC: 50 U/L (ref 35–104)
ALT SERPL-CCNC: 28 U/L (ref 5–33)
ANION GAP SERPL CALCULATED.3IONS-SCNC: 9 MMOL/L (ref 7–19)
AST SERPL-CCNC: 21 U/L (ref 5–32)
BASOPHILS ABSOLUTE: 0 K/UL (ref 0–0.2)
BASOPHILS RELATIVE PERCENT: 0.1 % (ref 0–1)
BILIRUB SERPL-MCNC: 0.7 MG/DL (ref 0.2–1.2)
BUN BLDV-MCNC: 51 MG/DL (ref 8–23)
CALCIUM SERPL-MCNC: 8.9 MG/DL (ref 8.8–10.2)
CHLORIDE BLD-SCNC: 103 MMOL/L (ref 98–111)
CO2: 40 MMOL/L (ref 22–29)
CREAT SERPL-MCNC: 0.9 MG/DL (ref 0.5–0.9)
EOSINOPHILS ABSOLUTE: 0 K/UL (ref 0–0.6)
EOSINOPHILS RELATIVE PERCENT: 0 % (ref 0–5)
GFR AFRICAN AMERICAN: >59
GFR NON-AFRICAN AMERICAN: >60
GLUCOSE BLD-MCNC: 110 MG/DL (ref 74–109)
GLUCOSE BLD-MCNC: 111 MG/DL (ref 70–99)
GLUCOSE BLD-MCNC: 152 MG/DL (ref 70–99)
GLUCOSE BLD-MCNC: 161 MG/DL (ref 70–99)
GLUCOSE BLD-MCNC: 191 MG/DL (ref 70–99)
GLUCOSE BLD-MCNC: 72 MG/DL (ref 70–99)
HCT VFR BLD CALC: 49.1 % (ref 37–47)
HEMOGLOBIN: 14.6 G/DL (ref 12–16)
IMMATURE GRANULOCYTES #: 0 K/UL
LYMPHOCYTES ABSOLUTE: 1 K/UL (ref 1.1–4.5)
LYMPHOCYTES RELATIVE PERCENT: 11.5 % (ref 20–40)
MCH RBC QN AUTO: 30.9 PG (ref 27–31)
MCHC RBC AUTO-ENTMCNC: 29.7 G/DL (ref 33–37)
MCV RBC AUTO: 103.8 FL (ref 81–99)
MONOCYTES ABSOLUTE: 0.8 K/UL (ref 0–0.9)
MONOCYTES RELATIVE PERCENT: 10.2 % (ref 0–10)
NEUTROPHILS ABSOLUTE: 6.4 K/UL (ref 1.5–7.5)
NEUTROPHILS RELATIVE PERCENT: 77.8 % (ref 50–65)
PDW BLD-RTO: 14.8 % (ref 11.5–14.5)
PERFORMED ON: ABNORMAL
PERFORMED ON: NORMAL
PLATELET # BLD: 126 K/UL (ref 130–400)
PMV BLD AUTO: 10.3 FL (ref 9.4–12.3)
POTASSIUM SERPL-SCNC: 3.3 MMOL/L (ref 3.5–5)
RBC # BLD: 4.73 M/UL (ref 4.2–5.4)
SODIUM BLD-SCNC: 152 MMOL/L (ref 136–145)
TOTAL PROTEIN: 5.8 G/DL (ref 6.6–8.7)
WBC # BLD: 8.3 K/UL (ref 4.8–10.8)

## 2022-03-25 PROCEDURE — 94640 AIRWAY INHALATION TREATMENT: CPT

## 2022-03-25 PROCEDURE — 2580000003 HC RX 258: Performed by: FAMILY MEDICINE

## 2022-03-25 PROCEDURE — 80053 COMPREHEN METABOLIC PANEL: CPT

## 2022-03-25 PROCEDURE — 92526 ORAL FUNCTION THERAPY: CPT

## 2022-03-25 PROCEDURE — 85025 COMPLETE CBC W/AUTO DIFF WBC: CPT

## 2022-03-25 PROCEDURE — 6370000000 HC RX 637 (ALT 250 FOR IP): Performed by: INTERNAL MEDICINE

## 2022-03-25 PROCEDURE — 1210000000 HC MED SURG R&B

## 2022-03-25 PROCEDURE — 97110 THERAPEUTIC EXERCISES: CPT

## 2022-03-25 PROCEDURE — 97530 THERAPEUTIC ACTIVITIES: CPT

## 2022-03-25 PROCEDURE — 6370000000 HC RX 637 (ALT 250 FOR IP): Performed by: FAMILY MEDICINE

## 2022-03-25 PROCEDURE — 2700000000 HC OXYGEN THERAPY PER DAY

## 2022-03-25 PROCEDURE — 82947 ASSAY GLUCOSE BLOOD QUANT: CPT

## 2022-03-25 PROCEDURE — 92507 TX SP LANG VOICE COMM INDIV: CPT

## 2022-03-25 RX ADMIN — SOTALOL HYDROCHLORIDE 120 MG: 120 TABLET ORAL at 22:45

## 2022-03-25 RX ADMIN — RIVAROXABAN 20 MG: 20 TABLET, FILM COATED ORAL at 18:48

## 2022-03-25 RX ADMIN — RAMIPRIL 5 MG: 2.5 CAPSULE ORAL at 09:30

## 2022-03-25 RX ADMIN — PREDNISONE 20 MG: 20 TABLET ORAL at 09:30

## 2022-03-25 RX ADMIN — SODIUM CHLORIDE, PRESERVATIVE FREE 10 ML: 5 INJECTION INTRAVENOUS at 22:45

## 2022-03-25 RX ADMIN — IPRATROPIUM BROMIDE AND ALBUTEROL SULFATE 1 AMPULE: .5; 2.5 SOLUTION RESPIRATORY (INHALATION) at 06:43

## 2022-03-25 RX ADMIN — CEFUROXIME AXETIL 500 MG: 250 TABLET ORAL at 22:45

## 2022-03-25 RX ADMIN — IPRATROPIUM BROMIDE AND ALBUTEROL SULFATE 1 AMPULE: .5; 2.5 SOLUTION RESPIRATORY (INHALATION) at 22:30

## 2022-03-25 RX ADMIN — IPRATROPIUM BROMIDE AND ALBUTEROL SULFATE 1 AMPULE: .5; 2.5 SOLUTION RESPIRATORY (INHALATION) at 14:45

## 2022-03-25 RX ADMIN — CEFUROXIME AXETIL 500 MG: 250 TABLET ORAL at 09:30

## 2022-03-25 RX ADMIN — FUROSEMIDE 40 MG: 40 TABLET ORAL at 09:31

## 2022-03-25 RX ADMIN — IPRATROPIUM BROMIDE AND ALBUTEROL SULFATE 1 AMPULE: .5; 2.5 SOLUTION RESPIRATORY (INHALATION) at 18:01

## 2022-03-25 RX ADMIN — CLOPIDOGREL BISULFATE 75 MG: 75 TABLET ORAL at 09:30

## 2022-03-25 RX ADMIN — ROSUVASTATIN CALCIUM 10 MG: 10 TABLET, FILM COATED ORAL at 22:45

## 2022-03-25 RX ADMIN — SOTALOL HYDROCHLORIDE 120 MG: 120 TABLET ORAL at 09:30

## 2022-03-25 RX ADMIN — IPRATROPIUM BROMIDE AND ALBUTEROL SULFATE 1 AMPULE: .5; 2.5 SOLUTION RESPIRATORY (INHALATION) at 10:52

## 2022-03-25 RX ADMIN — IPRATROPIUM BROMIDE AND ALBUTEROL SULFATE 1 AMPULE: .5; 2.5 SOLUTION RESPIRATORY (INHALATION) at 02:17

## 2022-03-25 NOTE — CARE COORDINATION
Patient received bed offer from [facility]. Patient has accepted offer. [facility] will provide a Trilogy for patient to use. Christin, with [facility], is emailing Trilogy settings to [email or web address]   P [phone]  F [phone], with [facility], is learning when a Trilogy will be available. Awaiting response.   [facility] [phone]  F [phone]  Electronically signed by Amanda Rothman RN on 3/25/2022 at 3:23 PM

## 2022-03-25 NOTE — PROGRESS NOTES
Physical Therapy  Name: Shannan Agosto  MRN:  [id number]  Date of service:  3/25/2022     03/25/22 1034   Restrictions/Precautions   Restrictions/Precautions Fall Risk   Required Braces or Orthoses? Yes   Subjective   Subjective Patient is sitting up in chair and agrees to therapy    Pain Screening   Patient Currently in Pain Denies   Oxygen Therapy   O2 Device Nasal cannula   O2 Flow Rate (L/min) 3 L/min   Transfers   Sit to Stand Dependent/Total;Unable to assess   Stand to sit Unable to assess   Comment Patient unable to standing from recliner with multiple attempts made with SS    Ambulation   Ambulation? No   Exercises   Hip Flexion 10   Knee Long Arc Quad 10   Ankle Pumps 10   Short term goals   Time Frame for Short term goals 2 WKS   Short term goal 1 SIT<>STAND,  CGA   Short term goal 2 TF'S WITH RW, MIN/CGA   Short term goal 3  FT WITH RW, CGA   Conditions Requiring Skilled Therapeutic Intervention   Body structures, Functions, Activity limitations Decreased functional mobility ; Decreased endurance;Decreased balance;Decreased strength;Decreased safe awareness   Assessment Attempted STS from chair without and with SS with patient unable to clear bottom. Patient became upset when she noticed how weak she was with therapist providing encouragment and suport. She is able to perform seated exercises and shows fairly good mobility just severe B LE weakness. Will attempt another session this date with additional therapist as she does not wish to return back to bed at this time. Will continue to follow.       Activity Tolerance   Activity Tolerance Patient limited by fatigue;Patient limited by endurance   Safety Devices   Type of devices Call light within reach;Gait belt;Left in chair         Electronically signed by Joseline Cho PTA on 3/25/2022 at 10:45 AM

## 2022-03-25 NOTE — PROGRESS NOTES
Speech Language Pathology  Facility/Department: [facility] 4 ONCOLOGY UNIT  SWALLOW THERAPY  SPEECH THERAPY     NAME: John Paul Arciniega  : 1950  MRN: [id number]    ADMISSION DATE: 3/15/2022  ADMITTING DIAGNOSIS: has Deep vein thrombosis ([address] Essential hypertension; Diabetes mellitus ([address] Hypercholesteremia; S/P CABG x 4; History of coronary artery stent placement; Coronary artery disease involving native coronary artery of native heart without angina pectoris; Mixed hyperlipidemia; History of diabetes mellitus; Chronic obstructive pulmonary disease ([address] NUNEZ (dyspnea on exertion); Abdominal aortic aneurysm (AAA) without rupture ([address] Diabetic ulcer of left lower leg associated with type 2 diabetes mellitus, with fat layer exposed ([address] Smoker; Traumatic hematoma; NICM (nonischemic cardiomyopathy) ([address] Hyponatremia; Acute hypercapnic respiratory failure ([address] and Palliative care patient on their problem list.    Date of Treat: 3/25/2022  Evaluating Therapist: MATTI Hedrick    Current Diet level:  Full liquid diet with mildly thick/nectar thick liquids    Reason for Referral  John Paul Arciniega was referred for a bedside swallow evaluation to assess the efficiency of her swallow function, identify signs and symptoms of aspiration and make recommendations regarding safe dietary consistencies, effective compensatory strategies, and safe eating environment. Impression  Re-assessed patient's swallowing function. Patient exhibited slow, decreased oral prep of more solid consistencies as well as sluggish, mild-moderately decreased laryngeal elevation for swallow airway protection. Even so, no outward S/S penetration/aspiration was noted with any puree consistency presentation, regular solid consistency trial, or thin H2O trial presented during treatment session this date. At this time, would trial easy to chew consistency with thin liquids. Recommend meds whole in pudding/applesauce.  Will continue to follow.     Treatment Plan  Requires SLP Intervention: Yes     Recommended Diet and Intervention  Solid Consistency Recommendation: Easy to chew  Liquid Consistency Recommendation: Thin  Recommended Form of Meds: Meds whole in puree as able  Therapeutic Interventions: Patient/Family education;Diet tolerance monitoring; Therapeutic PO trials with SLP     Compensatory Swallowing Strategies  Compensatory Swallowing Strategies: Upright as possible for all oral intake;Small bites/sips;Eat/Feed slowly; Alternate solids and liquids; Remain upright for 30-45 minutes after meals     Treatment/Goals  Timeframe for Short-term Goals: 1x/day for 3 days   Goal 1: Patient will tolerate easy to chew consistency and thin liquids with min S/S penetration/aspiration during PO intake. Goal 2: Patient staff will follow swallow safety recommendations to decrease risk of penetration/aspiration during PO intake. Goal 3: Re-assessment of swallow function for potential diet upgrade. Goal 4: Monitor speech production.     General  Chart Reviewed: Yes  Behavior/Cognition: Alert; Cooperative  O2 Device: High-flow  Communication Observation: (Monitored patient's speech production. Patient continued to exhibit slowed lingual movements during verbalizations. SLP still ranked functional intelligibility of speech for unfamiliar listeners at 100% in utterances with background noise present.)  Follows Directions: Simple   Dentition: Adequate  Patient Positioning: Upright in bed  Consistencies Administered: Dysphagia Pureed (Dysphagia I); Regular solid; Thin - cup      Oral Motor Examination   Labial ROM: (Adequate during labial retraction trials and labial protrusion trials.)  Labial Strength: (Adequate during labial compression trials.)  Labial Coordination: (Adequate movements were noted.)  Lingual ROM: (Adequate during lingual extension trials with full point achieved; adequate during lingual elevation trials without use of accessory jaw movement; adequate movements noted bilaterally.)  Lingual Strength: (Adequate.)  Lingual Coordination: (Slowed movements were noted.)     Re-assessed patient's swallowing function with the following observations noted:     Oral Phase  Mastication: Regulars solid (Patient exhibited slow oral prep with decreased rotary jaw movement noted during regular solid consistency trials presented independently.)  Suspected Premature Bolus Loss: Puree;Regular solid (Oral transit of puree consistency presentations and regular solid consistency trials, all presented independently, varied from 1-3 seconds in length.)  Oral Phase - Comment: No puree consistency residue was noted post swallows. Min regular solid consistency residue was observed post swallows; residue cleared from the mouth with additional drys wallows. Oral transit of thin H2O trials, presented independently via cup, primarily measured 1-2 seconds in length.      Pharyngeal Phase  Suspected Swallow Delay: Puree;Regular solid (Suspect secondary to oral transit times.)  Laryngeal Elevation: (Patient exhibited sluggish, mild-moderately decreased laryngeal elevation for swallow airway protection.)  Pharyngeal Phase - Comment: No outward S/S penetration/aspiration was noted with any puree consistency presentation, regular solid consistency trial, or thin H2O trial presented during treatment session this date. At this time, would trial easy to chew consistency with thin liquids. Recommend meds whole in pudding/applesauce. Will continue to follow.     Electronically signed by MATTI Cardoso on 3/26/2022 at 6:37 AM

## 2022-03-25 NOTE — CARE COORDINATION
The [address] at [facility]  Notification of Admission Decision      [] Patient has been accepted for admit to [facility] on :       Please write discharge orders and summary prior to discharge. [] Patient acceptance to Rehab pending the following :    [] Eval in progress       [x] Patient determined to be ineligible for services at [facility] because :  Dependent for standing, unable to transfer        We recommend you consider SNF       Thank you for your referral, we appreciate you. If you have any questions, please feel   free to contact me at [phone].

## 2022-03-25 NOTE — PROGRESS NOTES
Physical Therapy  Name: Viviane Kunz  MRN:  [id number]  Date of service:  3/25/2022     03/25/22 1356   Restrictions/Precautions   Restrictions/Precautions Fall Risk   Required Braces or Orthoses? Yes   Subjective   Subjective Patient is sitting up in chair and agrees to therapy    General Comment   Comments on 6L 02 wears 2.5 at home   Pain Screening   Patient Currently in Pain Denies   Oxygen Therapy   O2 Device Nasal cannula   O2 Flow Rate (L/min) 3 L/min   Transfers   Sit to Stand Minimal Assistance; Moderate Assistance;2 Person Assistance  (In SS)   Stand to sit Minimal Assistance  (From SS )   Comment Patient is able to stand from chair this afternoon with x 2 assist. Multiple STS while inside SS    Ambulation   Ambulation? No   Short term goals   Time Frame for Short term goals 2 WKS   Short term goal 1 SIT<>STAND,  CGA   Short term goal 2 TF'S WITH RW, MIN/CGA   Short term goal 3  FT WITH RW, CGA   Conditions Requiring Skilled Therapeutic Intervention   Body structures, Functions, Activity limitations Decreased functional mobility ; Decreased endurance;Decreased balance;Decreased strength;Decreased safe awareness   Assessment Patient is able to transfer BTB with SS this afternoon needing continued assist with STS from recliner but was able to clear bottom with x 2 assist. She was repostioned in supine with all needs in reach and to comfort. Will continue to follow. Activity Tolerance   Activity Tolerance Patient Tolerated treatment well   Safety Devices   Type of devices Call light within reach; Bed alarm in place; Left in bed;Gait belt     Electronically signed by Sivakumar Rodriguez PTA on 3/25/2022 at 2:02 PM

## 2022-03-25 NOTE — PROGRESS NOTES
Family Medicine Progress Note    Patient:  Domingo Griffin  YOB: 1950    MRN: [id number]     Acct: [de-identified]     Admit date: 3/15/2022    Patient Seen, Chart, Consults notes, Labs, Radiology studies reviewed. Subjective: Day 9 of stay with acute hypercapnic respiratory failure with possible community-acquired pneumonia and most recent (in last 24 hours) has had significant improvement in respiratory status. Now down to 3 L on her oxygen. Coughing is decreased. Legs continue to feel very weak. Asking to get up to the chair. Past, Family, Social History unchanged from admission.     Diet:  ADULT DIET; Easy to Chew; 4 carb choices (60 gm/meal)    Medications:  Scheduled Meds:   amLODIPine  5 mg Oral Daily    clopidogrel  75 mg Oral Daily    furosemide  40 mg Oral Daily    ramipril  5 mg Oral BID    rivaroxaban  20 mg Oral Daily with breakfast    rosuvastatin  10 mg Oral Nightly    sotalol  120 mg Oral BID    cefUROXime  500 mg Oral 2 times per day    predniSONE  20 mg Oral Daily    Followed by   Chris Lopez ON 3/26/2022] predniSONE  15 mg Oral Daily    Followed by   Chris Lopez ON 3/29/2022] predniSONE  10 mg Oral Daily    Followed by   Chris Lopez ON 4/1/2022] predniSONE  5 mg Oral Daily    insulin lispro  0-12 Units SubCUTAneous TID WC    insulin lispro  0-6 Units SubCUTAneous Nightly    sodium chloride flush  5-40 mL IntraVENous 2 times per day    chlorhexidine  15 mL Mouth/Throat BID    ipratropium-albuterol  1 ampule Inhalation Q4H     Continuous Infusions:   propofol Stopped (03/21/22 1151)    lactated ringers 15 mL/hr at 03/22/22 1600    fentaNYL Stopped (03/21/22 1151)    sodium chloride      norepinephrine Stopped (03/17/22 0255)    dextrose      dexmedetomidine HCl in NaCl Stopped (03/23/22 0430)     PRN Meds:nitroGLYCERIN, acetaminophen, hydrALAZINE, sodium chloride flush, sodium chloride, ondansetron **OR** ondansetron, glucagon (rDNA), dextrose, glucose, dextrose bolus (hypoglycemia) **OR** dextrose bolus (hypoglycemia)    Objective:    Vitals: /74   Pulse 79   Temp 96.6 °F (35.9 °C) (Temporal)   Resp 16   Ht 5' 6\" (1.676 m)   Wt 161 lb 14.4 oz (73.4 kg)   SpO2 93%   BMI 26.13 kg/m²   24 hour intake/output:    Intake/Output Summary (Last 24 hours) at 3/25/2022 0817  Last data filed at 3/24/2022 1525  Gross per 24 hour   Intake 120 ml   Output 1250 ml   Net -1130 ml     Last 3 weights: Wt Readings from Last 3 Encounters:   03/20/22 161 lb 14.4 oz (73.4 kg)   12/01/21 157 lb (71.2 kg)   08/13/21 174 lb 8 oz (79.2 kg)       Physical Exam:    General Appearance:  awake, alert, oriented, in no acute distress  Skin:  Skin color, texture, turgor normal. No rashes or lesions. Eyes:  No gross abnormalities. Neck:  neck- supple, no mass, non-tender  Lungs:  Breathing Pattern: regular, no distress, Breath sounds: diminished breath sounds- throughout  Heart:  Heart regular rate and rhythm  Abdomen: Auscultation: Normal bowel sounds. No bruits. Palpation: No masses, tenderness or organomegally. Extremities: Extremities warm to touch, pink, with no edema.   Musculoskeletal:  negative  Neurologic:  negative    CBC with Differential:    Lab Results   Component Value Date    WBC 8.3 03/25/2022    RBC 4.73 03/25/2022    HGB 14.6 03/25/2022    HCT 49.1 03/25/2022    HCT 32.1 09/02/2011     03/25/2022     09/02/2011    .8 03/25/2022    MCH 30.9 03/25/2022    MCHC 29.7 03/25/2022    RDW 14.8 03/25/2022    LYMPHOPCT 11.5 03/25/2022    MONOPCT 10.2 03/25/2022    EOSPCT 0.5 09/02/2011    BASOPCT 0.1 03/25/2022    MONOSABS 0.80 03/25/2022    LYMPHSABS 1.0 03/25/2022    EOSABS 0.00 03/25/2022    BASOSABS 0.00 03/25/2022     CMP:    Lab Results   Component Value Date     03/25/2022     09/02/2011    K 3.3 03/25/2022    K 3.5 06/28/2021    K 4.1 09/02/2011     03/25/2022     09/02/2011    CO2 40 03/25/2022    BUN 51 03/25/2022    CREATININE 0.9 03/25/2022    CREATININE 0.6 09/02/2011    GFRAA >59 03/25/2022    LABGLOM >60 03/25/2022    GLUCOSE 110 03/25/2022    PROT 5.8 03/25/2022    PROT 7.5 01/18/2013    LABALBU 3.1 03/25/2022    LABALBU 3.3 09/02/2011    CALCIUM 8.9 03/25/2022    BILITOT 0.7 03/25/2022    ALKPHOS 50 03/25/2022    ALKPHOS 57 09/02/2011    AST 21 03/25/2022    ALT 28 03/25/2022     Last 3 Troponin:    Lab Results   Component Value Date    TROPONINI <0.01 03/15/2022    TROPONINI <0.01 08/12/2021    TROPONINI <0.01 06/23/2021     Urine Culture:  No components found for: CURINE  Blood Culture:  No components found for: CBLOOD, CFUNGUSBL  Stool Culture:  No components found for: CSTOOL    Assessment:    Principal Problem:    Acute hypercapnic respiratory failure ([address]  Active Problems:    NICM (nonischemic cardiomyopathy) ([address]    Diabetes mellitus ([address]    S/P CABG x 4    Coronary artery disease involving native coronary artery of native heart without angina pectoris    Chronic obstructive pulmonary disease (HCC)    Abdominal aortic aneurysm (AAA) without rupture ([address]    Smoker  Resolved Problems:    * No resolved hospital problems. *          Plan:  Continue with respiratory treatments. Doing much better in that regard. Pending approval for inpatient rehab. Was hypoglycemic last night. Will discontinue Lantus and just treat with sliding scale insulin for now. Adjust accordingly if blood sugar goes back up. Increase activity with being up to chair most of the day if she tolerates it.       Electronically signed by Carlo Gerber MD on 3/25/2022 at 8:17 AM

## 2022-03-25 NOTE — PROGRESS NOTES
Nutrition Assessment     Type and Reason for Visit: Reassess    Nutrition Assessment:  Pt's diet has advanced from full liquids to Easy to Chew, and reports good appetite. PO intakes <25% per flowsheet records. Glucose , pt recieving prednisone. Continue current diet at this time. Malnutrition Assessment:  Malnutrition Status: At risk for malnutrition (Comment)    Nutrition Related Findings: +1 LUE, BLE pitting edema      Current Nutrition Therapies:    ADULT DIET; Easy to Chew; 4 carb choices (60 gm/meal)    Anthropometric Measures:  · Height: 5' 6\" (167.6 cm)  · Current Body Wt: 161 lb 14.4 oz (73.4 kg)   · BMI: 26.1    Nutrition Interventions:   Food and/or Nutrient Delivery:  Continue Current Diet   Coordination of Nutrition Care:  Continue to monitor while inpatient,Speech Therapy    Goals:  NEW Goal:  meet nutritional needs through po intake       Nutrition Monitoring and Evaluation:   Food/Nutrient Intake Outcomes:  Food and Nutrient Intake  Physical Signs/Symptoms Outcomes:  Biochemical Data,Chewing or Swallowing,Weight,Skin,Nutrition Focused Physical Findings     Discharge Planning:     Too soon to determine     Electronically signed by Javier Connor, MS, RD, LD on 3/25/22 at 2:00 PM CDT    Contact: [phone]

## 2022-03-25 NOTE — PROGRESS NOTES
Occupational Therapy     03/25/22 6070   Restrictions/Precautions   Restrictions/Precautions Fall Risk   Required Braces or Orthoses? Yes   Vision   [facility]   Hearing   Hearing [facility]   General   Chart Reviewed Yes   Patient assessed for rehabilitation services? Yes   Response to previous treatment Patient with no complaints from previous session   Family / Caregiver Present No   Referring Practitioner Dr. Tali Matta in recliner and is agreeable to get back to bed. Pain Assessment   Patient Currently in Pain No   Oxygen Therapy   O2 Device Nasal cannula   O2 Flow Rate (L/min) 3 L/min   Orientation   Overall Orientation Status WFL   ADL   Feeding Setup;Supervision;Minimal assistance   Grooming Setup;Stand by assistance;Minimal assistance   UE Bathing Contact guard assistance;Minimal assistance   LE Bathing Moderate assistance   UE Dressing Contact guard assistance;Minimal assistance   LE Dressing Moderate assistance   Toileting Dependent/Total   Additional Comments [facility] Estrada for transfer. Balance   Sitting Balance Contact guard assistance   Standing Balance Minimal assistance  (in [place] )   Functional Mobility   Functional Mobility Comments Burton rodrigues for functional mobility at this time. Bed mobility   Sit to Supine Minimal assistance   Scooting Minimal assistance;Stand by assistance   Transfers   Sit to stand Minimal assistance;2 Person assistance   Stand to sit Minimal assistance;2 Person assistance   Transfer Comments [facility]    Assessment   Performance deficits / Impairments Decreased functional mobility ; Decreased ADL status; Decreased high-level IADLs;Decreased strength;Decreased balance;Decreased vision/visual deficit   Assessment Pt progressing as tolerated. Treatment Diagnosis Hypercapnic RF   Prognosis Good   REQUIRES OT FOLLOW UP Yes   Treatment Initiated  Tx focused on STS mobility at [place] level with Min-Mod assist x2.  Pt able to stand on 4 occasions holding a stand for 1-2 mins at a time. pt instructed on bed mobility back to bed. Discharge Recommendations Patient would benefit from continued therapy after discharge   Activity Tolerance   Activity Tolerance Patient Tolerated treatment well   Safety Devices   Safety Devices in place Yes   Type of devices Bed alarm in place;Call light within reach; Left in bed   Plan   Times per week 3-5x/wk   Times per day Daily   Electronically signed by EMMA Diaz on 3/25/2022 at 3:01 PM

## 2022-03-25 NOTE — PROGRESS NOTES
PCA informed nurse accuchek 67. Pt asymptomatic. Pt drinking sweet tea and given ice cream for snack.  Electronically signed by Radha Abdullahi RN on 3/25/2022 at 1:13 AM

## 2022-03-25 NOTE — CARE COORDINATION
Trilogy unit has been approved for home use. [facility] will bring for patient's use a couple days prior to discharge from [address] [place] [address].   [facility]   P [phone]  F [phone]  Electronically signed by Rossy Olson RN on 3/25/2022 at 10:36 AM

## 2022-03-25 NOTE — PLAN OF CARE
Nutrition Problem #1: Inadequate oral intake  Intervention: Food and/or Nutrient Delivery: Continue Current Diet  Nutritional Goals: NEW Goal:  meet nutritional needs through po intake    Problem: Nutrition  Goal: Optimal nutrition therapy  Outcome: Ongoing [___ Week(s) Ago] : [unfilled] week(s) ago [Changing Medication Dose ___] : changing the dose of [unfilled] [Shortness of Breath] : shortness of breath [Dyspnea on Exertion] : dyspnea on exertion [Edema] : edema [Paroxysmal Nocturnal Dyspnea] : no paroxysmal nocturnal dyspnea

## 2022-03-25 NOTE — CARE COORDINATION
Met with patient to inform her that Trilogy unit has been approved. She voiced her appreciation. Explained to patient that she has been denied for IP Rehab. Provided patient with SNF Choice list.  She requested referrals to the four [place] SNFs. [place], informed.   Electronically signed by Amanda Rothman RN on 3/25/2022 at 11:32 AM

## 2022-03-26 PROBLEM — R29.898 LEG WEAKNESS, BILATERAL: Status: ACTIVE | Noted: 2022-03-26

## 2022-03-26 LAB
ALBUMIN SERPL-MCNC: 2.9 G/DL (ref 3.5–5.2)
ALP BLD-CCNC: 47 U/L (ref 35–104)
ALT SERPL-CCNC: 32 U/L (ref 5–33)
ANION GAP SERPL CALCULATED.3IONS-SCNC: 7 MMOL/L (ref 7–19)
AST SERPL-CCNC: 23 U/L (ref 5–32)
BASOPHILS ABSOLUTE: 0 K/UL (ref 0–0.2)
BASOPHILS RELATIVE PERCENT: 0.1 % (ref 0–1)
BILIRUB SERPL-MCNC: 0.9 MG/DL (ref 0.2–1.2)
BUN BLDV-MCNC: 41 MG/DL (ref 8–23)
CALCIUM SERPL-MCNC: 8.6 MG/DL (ref 8.8–10.2)
CHLORIDE BLD-SCNC: 100 MMOL/L (ref 98–111)
CO2: 37 MMOL/L (ref 22–29)
CREAT SERPL-MCNC: 0.9 MG/DL (ref 0.5–0.9)
EOSINOPHILS ABSOLUTE: 0 K/UL (ref 0–0.6)
EOSINOPHILS RELATIVE PERCENT: 0.2 % (ref 0–5)
GFR AFRICAN AMERICAN: >59
GFR NON-AFRICAN AMERICAN: >60
GLUCOSE BLD-MCNC: 118 MG/DL (ref 74–109)
GLUCOSE BLD-MCNC: 158 MG/DL (ref 70–99)
GLUCOSE BLD-MCNC: 177 MG/DL (ref 70–99)
GLUCOSE BLD-MCNC: 178 MG/DL (ref 70–99)
GLUCOSE BLD-MCNC: 242 MG/DL (ref 70–99)
HCT VFR BLD CALC: 44.2 % (ref 37–47)
HEMOGLOBIN: 13.4 G/DL (ref 12–16)
IMMATURE GRANULOCYTES #: 0 K/UL
LYMPHOCYTES ABSOLUTE: 1.4 K/UL (ref 1.1–4.5)
LYMPHOCYTES RELATIVE PERCENT: 14.3 % (ref 20–40)
MCH RBC QN AUTO: 30.6 PG (ref 27–31)
MCHC RBC AUTO-ENTMCNC: 30.3 G/DL (ref 33–37)
MCV RBC AUTO: 100.9 FL (ref 81–99)
MONOCYTES ABSOLUTE: 1 K/UL (ref 0–0.9)
MONOCYTES RELATIVE PERCENT: 10.2 % (ref 0–10)
NEUTROPHILS ABSOLUTE: 7.2 K/UL (ref 1.5–7.5)
NEUTROPHILS RELATIVE PERCENT: 74.9 % (ref 50–65)
PDW BLD-RTO: 14.6 % (ref 11.5–14.5)
PERFORMED ON: ABNORMAL
PLATELET # BLD: 125 K/UL (ref 130–400)
PMV BLD AUTO: 10 FL (ref 9.4–12.3)
POTASSIUM SERPL-SCNC: 3.6 MMOL/L (ref 3.5–5)
RBC # BLD: 4.38 M/UL (ref 4.2–5.4)
SODIUM BLD-SCNC: 144 MMOL/L (ref 136–145)
TOTAL PROTEIN: 5.3 G/DL (ref 6.6–8.7)
WBC # BLD: 9.7 K/UL (ref 4.8–10.8)

## 2022-03-26 PROCEDURE — 94761 N-INVAS EAR/PLS OXIMETRY MLT: CPT

## 2022-03-26 PROCEDURE — 2700000000 HC OXYGEN THERAPY PER DAY

## 2022-03-26 PROCEDURE — 94660 CPAP INITIATION&MGMT: CPT

## 2022-03-26 PROCEDURE — 1210000000 HC MED SURG R&B

## 2022-03-26 PROCEDURE — 36415 COLL VENOUS BLD VENIPUNCTURE: CPT

## 2022-03-26 PROCEDURE — 82947 ASSAY GLUCOSE BLOOD QUANT: CPT

## 2022-03-26 PROCEDURE — 80053 COMPREHEN METABOLIC PANEL: CPT

## 2022-03-26 PROCEDURE — 2580000003 HC RX 258: Performed by: FAMILY MEDICINE

## 2022-03-26 PROCEDURE — 6370000000 HC RX 637 (ALT 250 FOR IP): Performed by: INTERNAL MEDICINE

## 2022-03-26 PROCEDURE — 94640 AIRWAY INHALATION TREATMENT: CPT

## 2022-03-26 PROCEDURE — 85025 COMPLETE CBC W/AUTO DIFF WBC: CPT

## 2022-03-26 PROCEDURE — 6370000000 HC RX 637 (ALT 250 FOR IP): Performed by: FAMILY MEDICINE

## 2022-03-26 RX ADMIN — CEFUROXIME AXETIL 500 MG: 250 TABLET ORAL at 22:03

## 2022-03-26 RX ADMIN — CEFUROXIME AXETIL 500 MG: 250 TABLET ORAL at 09:09

## 2022-03-26 RX ADMIN — IPRATROPIUM BROMIDE AND ALBUTEROL SULFATE 1 AMPULE: .5; 2.5 SOLUTION RESPIRATORY (INHALATION) at 18:33

## 2022-03-26 RX ADMIN — SODIUM CHLORIDE, PRESERVATIVE FREE 10 ML: 5 INJECTION INTRAVENOUS at 11:17

## 2022-03-26 RX ADMIN — RAMIPRIL 5 MG: 2.5 CAPSULE ORAL at 16:10

## 2022-03-26 RX ADMIN — RAMIPRIL 5 MG: 2.5 CAPSULE ORAL at 09:09

## 2022-03-26 RX ADMIN — INSULIN LISPRO 1 UNITS: 100 INJECTION, SOLUTION INTRAVENOUS; SUBCUTANEOUS at 22:04

## 2022-03-26 RX ADMIN — ROSUVASTATIN CALCIUM 10 MG: 10 TABLET, FILM COATED ORAL at 22:03

## 2022-03-26 RX ADMIN — IPRATROPIUM BROMIDE AND ALBUTEROL SULFATE 1 AMPULE: .5; 2.5 SOLUTION RESPIRATORY (INHALATION) at 14:55

## 2022-03-26 RX ADMIN — SOTALOL HYDROCHLORIDE 120 MG: 120 TABLET ORAL at 22:03

## 2022-03-26 RX ADMIN — PREDNISONE 15 MG: 10 TABLET ORAL at 09:10

## 2022-03-26 RX ADMIN — AMLODIPINE BESYLATE 5 MG: 5 TABLET ORAL at 09:09

## 2022-03-26 RX ADMIN — CLOPIDOGREL BISULFATE 75 MG: 75 TABLET ORAL at 09:10

## 2022-03-26 RX ADMIN — RIVAROXABAN 20 MG: 20 TABLET, FILM COATED ORAL at 16:10

## 2022-03-26 RX ADMIN — SOTALOL HYDROCHLORIDE 120 MG: 120 TABLET ORAL at 09:09

## 2022-03-26 RX ADMIN — IPRATROPIUM BROMIDE AND ALBUTEROL SULFATE 1 AMPULE: .5; 2.5 SOLUTION RESPIRATORY (INHALATION) at 22:25

## 2022-03-26 RX ADMIN — INSULIN LISPRO 2 UNITS: 100 INJECTION, SOLUTION INTRAVENOUS; SUBCUTANEOUS at 09:11

## 2022-03-26 RX ADMIN — FUROSEMIDE 40 MG: 40 TABLET ORAL at 09:09

## 2022-03-26 RX ADMIN — SODIUM CHLORIDE, PRESERVATIVE FREE 10 ML: 5 INJECTION INTRAVENOUS at 22:03

## 2022-03-26 RX ADMIN — INSULIN LISPRO 2 UNITS: 100 INJECTION, SOLUTION INTRAVENOUS; SUBCUTANEOUS at 13:25

## 2022-03-26 RX ADMIN — IPRATROPIUM BROMIDE AND ALBUTEROL SULFATE 1 AMPULE: .5; 2.5 SOLUTION RESPIRATORY (INHALATION) at 10:59

## 2022-03-26 RX ADMIN — IPRATROPIUM BROMIDE AND ALBUTEROL SULFATE 1 AMPULE: .5; 2.5 SOLUTION RESPIRATORY (INHALATION) at 07:10

## 2022-03-26 RX ADMIN — INSULIN LISPRO 4 UNITS: 100 INJECTION, SOLUTION INTRAVENOUS; SUBCUTANEOUS at 17:33

## 2022-03-26 NOTE — PROGRESS NOTES
Chief Complaint: Leg weakness      Interval History:     She states her breathing is improved and stable. Not having chest pain. Leg swelling is much better. She states \"I cannot walk\". States both legs have been very weak since coming out of the ICU. Review of Systems:   General ROS: no chills or fever  Respiratory ROS: no cough, shortness of breath, or wheezing  Cardiovascular ROS: no chest pain or dyspnea on exertion  Gastrointestinal ROS: no abdominal pain, diarrhea or nausea/vomiting       Vitals: /65   Pulse 79   Temp 97 °F (36.1 °C) (Temporal)   Resp 16   Ht 5' 6\" (1.676 m)   Wt 161 lb 14.4 oz (73.4 kg)   SpO2 91%   BMI 26.13 kg/m²   24 hour intake/output:  Intake/Output Summary (Last 24 hours) at 3/26/2022 1529  Last data filed at 3/26/2022 1414  Gross per 24 hour   Intake 120 ml   Output 1925 ml   Net -1805 ml     Last 3 weights:   Wt Readings from Last 3 Encounters:   03/20/22 161 lb 14.4 oz (73.4 kg)   12/01/21 157 lb (71.2 kg)   08/13/21 174 lb 8 oz (79.2 kg)         Physical Exam:     General Appearance:    Alert, cooperative, in no acute distress  Head:    N/A  Throat:   N/A  Neck:   N/A  Lungs:   Clear to auscultation,respirations regular, even and unlabored  Heart:    Regular rhythm and normal rate, normal S1 and S2, no murmur, no gallop  Abdomen:     Normal bowel sounds, no masses, no organomegaly, soft, non-tender,   non-distended, no guarding, no rebound      Extremities:   No edema, no cyanosis, no clubbing  Pulses:   N/A  Skin:   N/A  Lymph nodes:   N/A  Neurologic: Bilateral lower extremity weakness         Results Review:      Lab:  Recent Results (from the past 24 hour(s))   POCT Glucose    Collection Time: 03/25/22  4:53 PM   Result Value Ref Range    POC Glucose 161 (H) 70 - 99 mg/dl    Performed on AccuChek    POCT Glucose    Collection Time: 03/25/22  8:42 PM   Result Value Ref Range    POC Glucose 152 (H) 70 - 99 mg/dl    Performed on [employer]-Source[employer]Chek    Comprehensive Metabolic Panel    Collection Time: 03/26/22  4:07 AM   Result Value Ref Range    Sodium 144 136 - 145 mmol/L    Potassium 3.6 3.5 - 5.0 mmol/L    Chloride 100 98 - 111 mmol/L    CO2 37 (H) 22 - 29 mmol/L    Anion Gap 7 7 - 19 mmol/L    Glucose 118 (H) 74 - 109 mg/dL    BUN 41 (H) 8 - 23 mg/dL    CREATININE 0.9 0.5 - 0.9 mg/dL    GFR Non-African American >60 >60    GFR African American >59 >59    Calcium 8.6 (L) 8.8 - 10.2 mg/dL    Total Protein 5.3 (L) 6.6 - 8.7 g/dL    Albumin 2.9 (L) 3.5 - 5.2 g/dL    Total Bilirubin 0.9 0.2 - 1.2 mg/dL    Alkaline Phosphatase 47 35 - 104 U/L    ALT 32 5 - 33 U/L    AST 23 5 - 32 U/L   CBC with Auto Differential    Collection Time: 03/26/22  4:07 AM   Result Value Ref Range    WBC 9.7 4.8 - 10.8 K/uL    RBC 4.38 4.20 - 5.40 M/uL    Hemoglobin 13.4 12.0 - 16.0 g/dL    Hematocrit 44.2 37.0 - 47.0 %    .9 (H) 81.0 - 99.0 fL    MCH 30.6 27.0 - 31.0 pg    MCHC 30.3 (L) 33.0 - 37.0 g/dL    RDW 14.6 (H) 11.5 - 14.5 %    Platelets 510 (L) 607 - 400 K/uL    MPV 10.0 9.4 - 12.3 fL    Neutrophils % 74.9 (H) 50.0 - 65.0 %    Lymphocytes % 14.3 (L) 20.0 - 40.0 %    Monocytes % 10.2 (H) 0.0 - 10.0 %    Eosinophils % 0.2 0.0 - 5.0 %    Basophils % 0.1 0.0 - 1.0 %    Neutrophils Absolute 7.2 1.5 - 7.5 K/uL    Immature Granulocytes # 0.0 K/uL    Lymphocytes Absolute 1.4 1.1 - 4.5 K/uL    Monocytes Absolute 1.00 (H) 0.00 - 0.90 K/uL    Eosinophils Absolute 0.00 0.00 - 0.60 K/uL    Basophils Absolute 0.00 0.00 - 0.20 K/uL   POCT Glucose    Collection Time: 03/26/22  7:55 AM   Result Value Ref Range    POC Glucose 158 (H) 70 - 99 mg/dl    Performed on AccuChek    POCT Glucose    Collection Time: 03/26/22 11:40 AM   Result Value Ref Range    POC Glucose 178 (H) 70 - 99 mg/dl    Performed on AccuChek         Imaging:  Imaging study reports reviewed      ASSESSMENT:    Principal Problem:    Acute hypercapnic respiratory failure (HCC)  Active Problems:    NICM (nonischemic cardiomyopathy) ([facility] Diabetes mellitus (HCC)    S/P CABG x 4    Coronary artery disease involving native coronary artery of native heart without angina pectoris    Chronic obstructive pulmonary disease (HCC)    Abdominal aortic aneurysm (AAA) without rupture (HCC)    Smoker    Leg weakness, bilateral  Resolved Problems:    * No resolved hospital problems. *      PLAN:    1. 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 Needs aggressive PT OT. We will try to wean steroids as quickly as possible. 2.  Continue nasal cannula oxygen when awake in CPAP while asleep  3. Rehab placement.   Patient states she is going to [facility], [place]  03/26/22  3:29 PM

## 2022-03-27 LAB
ALBUMIN SERPL-MCNC: 3 G/DL (ref 3.5–5.2)
ALP BLD-CCNC: 46 U/L (ref 35–104)
ALT SERPL-CCNC: 29 U/L (ref 5–33)
ANION GAP SERPL CALCULATED.3IONS-SCNC: 5 MMOL/L (ref 7–19)
AST SERPL-CCNC: 19 U/L (ref 5–32)
BASOPHILS ABSOLUTE: 0 K/UL (ref 0–0.2)
BASOPHILS RELATIVE PERCENT: 0.1 % (ref 0–1)
BILIRUB SERPL-MCNC: 0.9 MG/DL (ref 0.2–1.2)
BUN BLDV-MCNC: 23 MG/DL (ref 8–23)
CALCIUM SERPL-MCNC: 8.1 MG/DL (ref 8.8–10.2)
CHLORIDE BLD-SCNC: 97 MMOL/L (ref 98–111)
CO2: 38 MMOL/L (ref 22–29)
CREAT SERPL-MCNC: 0.7 MG/DL (ref 0.5–0.9)
EOSINOPHILS ABSOLUTE: 0.1 K/UL (ref 0–0.6)
EOSINOPHILS RELATIVE PERCENT: 0.9 % (ref 0–5)
GFR AFRICAN AMERICAN: >59
GFR NON-AFRICAN AMERICAN: >60
GLUCOSE BLD-MCNC: 115 MG/DL (ref 70–99)
GLUCOSE BLD-MCNC: 132 MG/DL (ref 70–99)
GLUCOSE BLD-MCNC: 283 MG/DL (ref 70–99)
GLUCOSE BLD-MCNC: 82 MG/DL (ref 74–109)
GLUCOSE BLD-MCNC: 83 MG/DL (ref 70–99)
HCT VFR BLD CALC: 42.9 % (ref 37–47)
HEMOGLOBIN: 13.5 G/DL (ref 12–16)
IMMATURE GRANULOCYTES #: 0 K/UL
LYMPHOCYTES ABSOLUTE: 1.6 K/UL (ref 1.1–4.5)
LYMPHOCYTES RELATIVE PERCENT: 15.5 % (ref 20–40)
MCH RBC QN AUTO: 31 PG (ref 27–31)
MCHC RBC AUTO-ENTMCNC: 31.5 G/DL (ref 33–37)
MCV RBC AUTO: 98.4 FL (ref 81–99)
MONOCYTES ABSOLUTE: 0.8 K/UL (ref 0–0.9)
MONOCYTES RELATIVE PERCENT: 7.6 % (ref 0–10)
NEUTROPHILS ABSOLUTE: 7.7 K/UL (ref 1.5–7.5)
NEUTROPHILS RELATIVE PERCENT: 75.5 % (ref 50–65)
PDW BLD-RTO: 14.1 % (ref 11.5–14.5)
PERFORMED ON: ABNORMAL
PERFORMED ON: NORMAL
PLATELET # BLD: 131 K/UL (ref 130–400)
PMV BLD AUTO: 10 FL (ref 9.4–12.3)
POTASSIUM SERPL-SCNC: 3.1 MMOL/L (ref 3.5–5)
RBC # BLD: 4.36 M/UL (ref 4.2–5.4)
SODIUM BLD-SCNC: 140 MMOL/L (ref 136–145)
TOTAL PROTEIN: 5.3 G/DL (ref 6.6–8.7)
WBC # BLD: 10.1 K/UL (ref 4.8–10.8)

## 2022-03-27 PROCEDURE — 2580000003 HC RX 258: Performed by: FAMILY MEDICINE

## 2022-03-27 PROCEDURE — 6370000000 HC RX 637 (ALT 250 FOR IP): Performed by: INTERNAL MEDICINE

## 2022-03-27 PROCEDURE — 94761 N-INVAS EAR/PLS OXIMETRY MLT: CPT

## 2022-03-27 PROCEDURE — 94660 CPAP INITIATION&MGMT: CPT

## 2022-03-27 PROCEDURE — 94640 AIRWAY INHALATION TREATMENT: CPT

## 2022-03-27 PROCEDURE — 85025 COMPLETE CBC W/AUTO DIFF WBC: CPT

## 2022-03-27 PROCEDURE — 2700000000 HC OXYGEN THERAPY PER DAY

## 2022-03-27 PROCEDURE — 1210000000 HC MED SURG R&B

## 2022-03-27 PROCEDURE — 6370000000 HC RX 637 (ALT 250 FOR IP): Performed by: FAMILY MEDICINE

## 2022-03-27 PROCEDURE — 80053 COMPREHEN METABOLIC PANEL: CPT

## 2022-03-27 PROCEDURE — 36415 COLL VENOUS BLD VENIPUNCTURE: CPT

## 2022-03-27 PROCEDURE — 82947 ASSAY GLUCOSE BLOOD QUANT: CPT

## 2022-03-27 RX ORDER — POLYETHYLENE GLYCOL 3350 17 G/17G
17 POWDER, FOR SOLUTION ORAL DAILY PRN
Status: DISCONTINUED | OUTPATIENT
Start: 2022-03-27 | End: 2022-03-28 | Stop reason: HOSPADM

## 2022-03-27 RX ORDER — POTASSIUM CHLORIDE 20 MEQ/1
20 TABLET, EXTENDED RELEASE ORAL DAILY
Status: DISCONTINUED | OUTPATIENT
Start: 2022-03-27 | End: 2022-03-28 | Stop reason: HOSPADM

## 2022-03-27 RX ORDER — POTASSIUM CHLORIDE 20 MEQ/1
40 TABLET, EXTENDED RELEASE ORAL ONCE
Status: COMPLETED | OUTPATIENT
Start: 2022-03-27 | End: 2022-03-27

## 2022-03-27 RX ADMIN — POTASSIUM CHLORIDE 40 MEQ: 1500 TABLET, EXTENDED RELEASE ORAL at 10:25

## 2022-03-27 RX ADMIN — RAMIPRIL 5 MG: 2.5 CAPSULE ORAL at 17:00

## 2022-03-27 RX ADMIN — IPRATROPIUM BROMIDE AND ALBUTEROL SULFATE 1 AMPULE: .5; 2.5 SOLUTION RESPIRATORY (INHALATION) at 22:21

## 2022-03-27 RX ADMIN — IPRATROPIUM BROMIDE AND ALBUTEROL SULFATE 1 AMPULE: .5; 2.5 SOLUTION RESPIRATORY (INHALATION) at 06:35

## 2022-03-27 RX ADMIN — RAMIPRIL 5 MG: 2.5 CAPSULE ORAL at 10:25

## 2022-03-27 RX ADMIN — SODIUM CHLORIDE, PRESERVATIVE FREE 10 ML: 5 INJECTION INTRAVENOUS at 21:37

## 2022-03-27 RX ADMIN — INSULIN LISPRO 6 UNITS: 100 INJECTION, SOLUTION INTRAVENOUS; SUBCUTANEOUS at 17:01

## 2022-03-27 RX ADMIN — IPRATROPIUM BROMIDE AND ALBUTEROL SULFATE 1 AMPULE: .5; 2.5 SOLUTION RESPIRATORY (INHALATION) at 11:06

## 2022-03-27 RX ADMIN — PREDNISONE 15 MG: 10 TABLET ORAL at 10:26

## 2022-03-27 RX ADMIN — CLOPIDOGREL BISULFATE 75 MG: 75 TABLET ORAL at 10:25

## 2022-03-27 RX ADMIN — CEFUROXIME AXETIL 500 MG: 250 TABLET ORAL at 21:37

## 2022-03-27 RX ADMIN — SODIUM CHLORIDE, PRESERVATIVE FREE 10 ML: 5 INJECTION INTRAVENOUS at 10:26

## 2022-03-27 RX ADMIN — SOTALOL HYDROCHLORIDE 120 MG: 120 TABLET ORAL at 10:25

## 2022-03-27 RX ADMIN — SOTALOL HYDROCHLORIDE 120 MG: 120 TABLET ORAL at 21:37

## 2022-03-27 RX ADMIN — CEFUROXIME AXETIL 500 MG: 250 TABLET ORAL at 10:25

## 2022-03-27 RX ADMIN — FUROSEMIDE 40 MG: 40 TABLET ORAL at 10:25

## 2022-03-27 RX ADMIN — IPRATROPIUM BROMIDE AND ALBUTEROL SULFATE 1 AMPULE: .5; 2.5 SOLUTION RESPIRATORY (INHALATION) at 19:00

## 2022-03-27 RX ADMIN — ROSUVASTATIN CALCIUM 10 MG: 10 TABLET, FILM COATED ORAL at 21:37

## 2022-03-27 RX ADMIN — RIVAROXABAN 20 MG: 20 TABLET, FILM COATED ORAL at 17:01

## 2022-03-27 RX ADMIN — POLYETHYLENE GLYCOL 3350 17 G: 17 POWDER, FOR SOLUTION ORAL at 10:25

## 2022-03-27 RX ADMIN — IPRATROPIUM BROMIDE AND ALBUTEROL SULFATE 1 AMPULE: .5; 2.5 SOLUTION RESPIRATORY (INHALATION) at 14:23

## 2022-03-27 RX ADMIN — AMLODIPINE BESYLATE 5 MG: 5 TABLET ORAL at 10:25

## 2022-03-27 NOTE — PROGRESS NOTES
Chief Complaint: Leg weakness      Interval History:     She states her breathing is improved and stable. She states her legs feel stronger today. Swelling is still improved. Review of Systems:   General ROS: no chills or fever  Respiratory ROS: no cough, shortness of breath, or wheezing  Cardiovascular ROS: no chest pain or dyspnea on exertion  Gastrointestinal ROS: no abdominal pain, diarrhea or nausea/vomiting       Vitals: /60   Pulse 81   Temp 97.9 °F (36.6 °C) (Temporal)   Resp 18   Ht 5' 6\" (1.676 m)   Wt 161 lb 14.4 oz (73.4 kg)   SpO2 97%   BMI 26.13 kg/m²   24 hour intake/output:    Intake/Output Summary (Last 24 hours) at 3/27/2022 1333  Last data filed at 3/27/2022 1020  Gross per 24 hour   Intake 720 ml   Output 1800 ml   Net -1080 ml     Last 3 weights:   Wt Readings from Last 3 Encounters:   03/20/22 161 lb 14.4 oz (73.4 kg)   12/01/21 157 lb (71.2 kg)   08/13/21 174 lb 8 oz (79.2 kg)         Physical Exam:     General Appearance:    Alert, cooperative, in no acute distress  Head:    N/A  Throat:   N/A  Neck:   N/A  Lungs:   Clear to auscultation,respirations regular, even and unlabored  Heart:    Regular rhythm and normal rate, normal S1 and S2, no murmur, no gallop  Abdomen:     Normal bowel sounds, no masses, no organomegaly, soft, non-tender,   non-distended, no guarding, no rebound      Extremities:   No edema, no cyanosis, no clubbing  Pulses:   N/A  Skin:   N/A  Lymph nodes:   N/A  Neurologic: Bilateral lower extremity weakness         Results Review:      Lab:  Recent Results (from the past 24 hour(s))   POCT Glucose    Collection Time: 03/26/22  5:26 PM   Result Value Ref Range    POC Glucose 242 (H) 70 - 99 mg/dl    Performed on AccuChek    POCT Glucose    Collection Time: 03/26/22  8:22 PM   Result Value Ref Range    POC Glucose 177 (H) 70 - 99 mg/dl    Performed on AccuChek    Comprehensive Metabolic Panel    Collection Time: 03/27/22  4:23 AM   Result Value Ref Range Sodium 140 136 - 145 mmol/L    Potassium 3.1 (L) 3.5 - 5.0 mmol/L    Chloride 97 (L) 98 - 111 mmol/L    CO2 38 (H) 22 - 29 mmol/L    Anion Gap 5 (L) 7 - 19 mmol/L    Glucose 82 74 - 109 mg/dL    BUN 23 8 - 23 mg/dL    CREATININE 0.7 0.5 - 0.9 mg/dL    GFR Non-African American >60 >60    GFR African American >59 >59    Calcium 8.1 (L) 8.8 - 10.2 mg/dL    Total Protein 5.3 (L) 6.6 - 8.7 g/dL    Albumin 3.0 (L) 3.5 - 5.2 g/dL    Total Bilirubin 0.9 0.2 - 1.2 mg/dL    Alkaline Phosphatase 46 35 - 104 U/L    ALT 29 5 - 33 U/L    AST 19 5 - 32 U/L   CBC with Auto Differential    Collection Time: 03/27/22  4:23 AM   Result Value Ref Range    WBC 10.1 4.8 - 10.8 K/uL    RBC 4.36 4.20 - 5.40 M/uL    Hemoglobin 13.5 12.0 - 16.0 g/dL    Hematocrit 42.9 37.0 - 47.0 %    MCV 98.4 81.0 - 99.0 fL    MCH 31.0 27.0 - 31.0 pg    MCHC 31.5 (L) 33.0 - 37.0 g/dL    RDW 14.1 11.5 - 14.5 %    Platelets 852 298 - 205 K/uL    MPV 10.0 9.4 - 12.3 fL    Neutrophils % 75.5 (H) 50.0 - 65.0 %    Lymphocytes % 15.5 (L) 20.0 - 40.0 %    Monocytes % 7.6 0.0 - 10.0 %    Eosinophils % 0.9 0.0 - 5.0 %    Basophils % 0.1 0.0 - 1.0 %    Neutrophils Absolute 7.7 (H) 1.5 - 7.5 K/uL    Immature Granulocytes # 0.0 K/uL    Lymphocytes Absolute 1.6 1.1 - 4.5 K/uL    Monocytes Absolute 0.80 0.00 - 0.90 K/uL    Eosinophils Absolute 0.10 0.00 - 0.60 K/uL    Basophils Absolute 0.00 0.00 - 0.20 K/uL   POCT Glucose    Collection Time: 03/27/22  7:38 AM   Result Value Ref Range    POC Glucose 83 70 - 99 mg/dl    Performed on AccuChek    POCT Glucose    Collection Time: 03/27/22 11:58 AM   Result Value Ref Range    POC Glucose 132 (H) 70 - 99 mg/dl    Performed on AccuChek         Imaging:  Imaging study reports reviewed      ASSESSMENT:    Principal Problem:    Acute hypercapnic respiratory failure (HCC)  Active Problems:    NICM (nonischemic cardiomyopathy) (HCC)    Diabetes mellitus (HCC)    S/P CABG x 4    Coronary artery disease involving native coronary artery of native heart without angina pectoris    Chronic obstructive pulmonary disease (HCC)    Abdominal aortic aneurysm (AAA) without rupture (HCC)    Smoker    Leg weakness, bilateral  Resolved Problems:    * No resolved hospital problems. *      PLAN:    1. 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 Needs aggressive PT OT. We will try to wean steroids as quickly as possible. 2.  Continue nasal cannula oxygen when awake in CPAP while asleep  3. Replete potassium and start daily supplement which she states she was taking at home  4. Rehab placement.   Patient states she is going to [facility]  03/27/22  1:33 PM

## 2022-03-28 VITALS
BODY MASS INDEX: 26.02 KG/M2 | TEMPERATURE: 97.4 F | DIASTOLIC BLOOD PRESSURE: 77 MMHG | HEIGHT: 66 IN | OXYGEN SATURATION: 99 % | HEART RATE: 88 BPM | WEIGHT: 161.9 LBS | RESPIRATION RATE: 16 BRPM | SYSTOLIC BLOOD PRESSURE: 123 MMHG

## 2022-03-28 PROBLEM — J96.02 ACUTE HYPERCAPNIC RESPIRATORY FAILURE (HCC): Status: RESOLVED | Noted: 2022-03-16 | Resolved: 2022-03-28

## 2022-03-28 LAB
ALBUMIN SERPL-MCNC: 2.9 G/DL (ref 3.5–5.2)
ALP BLD-CCNC: 47 U/L (ref 35–104)
ALT SERPL-CCNC: 30 U/L (ref 5–33)
ANION GAP SERPL CALCULATED.3IONS-SCNC: 5 MMOL/L (ref 7–19)
AST SERPL-CCNC: 23 U/L (ref 5–32)
BASOPHILS ABSOLUTE: 0 K/UL (ref 0–0.2)
BASOPHILS RELATIVE PERCENT: 0 % (ref 0–1)
BILIRUB SERPL-MCNC: 0.6 MG/DL (ref 0.2–1.2)
BUN BLDV-MCNC: 18 MG/DL (ref 8–23)
CALCIUM SERPL-MCNC: 8.5 MG/DL (ref 8.8–10.2)
CHLORIDE BLD-SCNC: 98 MMOL/L (ref 98–111)
CO2: 36 MMOL/L (ref 22–29)
CREAT SERPL-MCNC: 0.9 MG/DL (ref 0.5–0.9)
EOSINOPHILS ABSOLUTE: 0.1 K/UL (ref 0–0.6)
EOSINOPHILS RELATIVE PERCENT: 0.9 % (ref 0–5)
GFR AFRICAN AMERICAN: >59
GFR NON-AFRICAN AMERICAN: >60
GLUCOSE BLD-MCNC: 103 MG/DL (ref 74–109)
GLUCOSE BLD-MCNC: 260 MG/DL (ref 70–99)
GLUCOSE BLD-MCNC: 91 MG/DL (ref 70–99)
HCT VFR BLD CALC: 41.9 % (ref 37–47)
HEMOGLOBIN: 13.1 G/DL (ref 12–16)
IMMATURE GRANULOCYTES #: 0 K/UL
LYMPHOCYTES ABSOLUTE: 1.5 K/UL (ref 1.1–4.5)
LYMPHOCYTES RELATIVE PERCENT: 17.1 % (ref 20–40)
MAGNESIUM: 2 MG/DL (ref 1.6–2.4)
MCH RBC QN AUTO: 30.4 PG (ref 27–31)
MCHC RBC AUTO-ENTMCNC: 31.3 G/DL (ref 33–37)
MCV RBC AUTO: 97.2 FL (ref 81–99)
MONOCYTES ABSOLUTE: 0.9 K/UL (ref 0–0.9)
MONOCYTES RELATIVE PERCENT: 10 % (ref 0–10)
NEUTROPHILS ABSOLUTE: 6 K/UL (ref 1.5–7.5)
NEUTROPHILS RELATIVE PERCENT: 71.5 % (ref 50–65)
PDW BLD-RTO: 13.7 % (ref 11.5–14.5)
PERFORMED ON: ABNORMAL
PERFORMED ON: NORMAL
PLATELET # BLD: 140 K/UL (ref 130–400)
PMV BLD AUTO: 10.2 FL (ref 9.4–12.3)
POTASSIUM SERPL-SCNC: 3.8 MMOL/L (ref 3.5–5)
RBC # BLD: 4.31 M/UL (ref 4.2–5.4)
SARS-COV-2, NAAT: NOT DETECTED
SODIUM BLD-SCNC: 139 MMOL/L (ref 136–145)
TOTAL PROTEIN: 5.1 G/DL (ref 6.6–8.7)
WBC # BLD: 8.5 K/UL (ref 4.8–10.8)

## 2022-03-28 PROCEDURE — 97110 THERAPEUTIC EXERCISES: CPT

## 2022-03-28 PROCEDURE — 6370000000 HC RX 637 (ALT 250 FOR IP): Performed by: INTERNAL MEDICINE

## 2022-03-28 PROCEDURE — 82947 ASSAY GLUCOSE BLOOD QUANT: CPT

## 2022-03-28 PROCEDURE — 97530 THERAPEUTIC ACTIVITIES: CPT

## 2022-03-28 PROCEDURE — 2700000000 HC OXYGEN THERAPY PER DAY

## 2022-03-28 PROCEDURE — 6370000000 HC RX 637 (ALT 250 FOR IP): Performed by: FAMILY MEDICINE

## 2022-03-28 PROCEDURE — 85025 COMPLETE CBC W/AUTO DIFF WBC: CPT

## 2022-03-28 PROCEDURE — 87635 SARS-COV-2 COVID-19 AMP PRB: CPT

## 2022-03-28 PROCEDURE — 97535 SELF CARE MNGMENT TRAINING: CPT

## 2022-03-28 PROCEDURE — 80053 COMPREHEN METABOLIC PANEL: CPT

## 2022-03-28 PROCEDURE — 83735 ASSAY OF MAGNESIUM: CPT

## 2022-03-28 PROCEDURE — 94640 AIRWAY INHALATION TREATMENT: CPT

## 2022-03-28 PROCEDURE — 2580000003 HC RX 258: Performed by: FAMILY MEDICINE

## 2022-03-28 RX ORDER — PREDNISONE 10 MG/1
10 TABLET ORAL DAILY
Qty: 3 TABLET | Refills: 0 | Status: SHIPPED | OUTPATIENT
Start: 2022-03-29 | End: 2022-04-01

## 2022-03-28 RX ORDER — CEFUROXIME AXETIL 500 MG/1
500 TABLET ORAL EVERY 12 HOURS SCHEDULED
Qty: 20 TABLET | Refills: 0 | Status: SHIPPED | OUTPATIENT
Start: 2022-03-28 | End: 2022-04-07

## 2022-03-28 RX ORDER — PREDNISONE 1 MG/1
5 TABLET ORAL DAILY
Qty: 3 TABLET | Refills: 0 | Status: SHIPPED | OUTPATIENT
Start: 2022-04-01 | End: 2022-04-04

## 2022-03-28 RX ORDER — RAMIPRIL 2.5 MG/1
2.5 CAPSULE ORAL 2 TIMES DAILY
Status: DISCONTINUED | OUTPATIENT
Start: 2022-03-28 | End: 2022-03-28 | Stop reason: HOSPADM

## 2022-03-28 RX ADMIN — IPRATROPIUM BROMIDE AND ALBUTEROL SULFATE 1 AMPULE: .5; 2.5 SOLUTION RESPIRATORY (INHALATION) at 16:34

## 2022-03-28 RX ADMIN — SODIUM CHLORIDE, PRESERVATIVE FREE 10 ML: 5 INJECTION INTRAVENOUS at 08:55

## 2022-03-28 RX ADMIN — CLOPIDOGREL BISULFATE 75 MG: 75 TABLET ORAL at 08:55

## 2022-03-28 RX ADMIN — IPRATROPIUM BROMIDE AND ALBUTEROL SULFATE 1 AMPULE: .5; 2.5 SOLUTION RESPIRATORY (INHALATION) at 07:53

## 2022-03-28 RX ADMIN — RAMIPRIL 2.5 MG: 2.5 CAPSULE ORAL at 16:40

## 2022-03-28 RX ADMIN — CEFUROXIME AXETIL 500 MG: 250 TABLET ORAL at 08:54

## 2022-03-28 RX ADMIN — POLYETHYLENE GLYCOL 3350 17 G: 17 POWDER, FOR SOLUTION ORAL at 08:55

## 2022-03-28 RX ADMIN — IPRATROPIUM BROMIDE AND ALBUTEROL SULFATE 1 AMPULE: .5; 2.5 SOLUTION RESPIRATORY (INHALATION) at 10:45

## 2022-03-28 RX ADMIN — INSULIN LISPRO 6 UNITS: 100 INJECTION, SOLUTION INTRAVENOUS; SUBCUTANEOUS at 12:28

## 2022-03-28 RX ADMIN — PREDNISONE 15 MG: 10 TABLET ORAL at 08:54

## 2022-03-28 RX ADMIN — FUROSEMIDE 40 MG: 40 TABLET ORAL at 08:54

## 2022-03-28 RX ADMIN — AMLODIPINE BESYLATE 5 MG: 5 TABLET ORAL at 08:55

## 2022-03-28 RX ADMIN — SOTALOL HYDROCHLORIDE 120 MG: 120 TABLET ORAL at 08:55

## 2022-03-28 RX ADMIN — RAMIPRIL 2.5 MG: 2.5 CAPSULE ORAL at 08:54

## 2022-03-28 RX ADMIN — POTASSIUM CHLORIDE 20 MEQ: 1500 TABLET, EXTENDED RELEASE ORAL at 08:55

## 2022-03-28 NOTE — DISCHARGE INSTR - ACTIVITY
Up with assist and walker. PT/OT activities.   Oxygen nasal cannula 2.5L  Trilogy machine @ HS - IPAP 14, EPAP 8

## 2022-03-28 NOTE — PROGRESS NOTES
Physical Therapy  Name: Marion Logan  MRN:  [id number]  Date of service:  3/28/2022     03/28/22 0919   Restrictions/Precautions   Restrictions/Precautions Fall Risk   Required Braces or Orthoses? Yes   Subjective   Subjective Pt up to [facility] with PCA via SS, states she is doing better today. Pain Screening   Patient Currently in Pain No   Transfers   Sit to Stand Contact guard assistance   Stand to sit Contact guard assistance   Bed to Chair Dependent/Total  (brooks stedy)   Ambulation   Ambulation? No   Exercises   Heelslides 10   Hip Flexion 10   Knee Long Arc Quad 10   Ankle Pumps 10   Comments Seated BLE ther ex AROM   Short term goals   Time Frame for Short term goals 2 WKS   Short term goal 1 SIT<>STAND,  CGA   Short term goal 2 TF'S WITH RW, MIN/CGA   Short term goal 3  FT WITH RW, CGA   Conditions Requiring Skilled Therapeutic Intervention   Body structures, Functions, Activity limitations Decreased functional mobility ; Decreased endurance;Decreased balance;Decreased strength;Decreased safe awareness   Assessment Pt showing much improvement in STS capability, able to  SS from [facility] and from  paddles with CGA, exhibits good eccentric lowering to recliner from . Pt tolerated seated BLE ther ex well AROM, no c/o increased pain and states that she does these ex's throughout the day while sitting up to work on LE strength. Pt positioned for comfort in recliner with all needs in reach, will cont to progress as tolerated. Activity Tolerance   Activity Tolerance Patient Tolerated treatment well   Safety Devices   Type of devices Call light within reach; Chair alarm in place;Gait belt;Left in chair         Electronically signed by Martin Aaron PTA on 3/28/2022 at 9:22 AM

## 2022-03-28 NOTE — DISCHARGE INSTR - DIET
Good nutrition is important when healing from an illness, injury, or surgery. Follow any nutrition recommendations given to you during your hospital stay. If you were given an oral nutrition supplement while in the hospital, continue to take this supplement at home. You can take it with meals, in-between meals, and/or before bedtime. These supplements can be purchased at most local grocery stores, pharmacies, and chain [employer]stores. If you have any questions about your diet or nutrition, call the hospital and ask for the dietitian.     Cardiac, low carb diet

## 2022-03-28 NOTE — CARE COORDINATION
Confirmed that SNF has gotten trilogy and can accept pt today, 3/28. Attempted to reach MD via office phone. No answer.    [facility]   [address]  Electronically signed by Dipesh Guerrero on 3/28/2022 at 2:07 PM

## 2022-03-28 NOTE — PROGRESS NOTES
Occupational Therapy     03/28/22 1459   Restrictions/Precautions   Restrictions/Precautions Fall Risk   Required Braces or Orthoses? Yes   Vision   Vision [facility]   Hearing   Hearing [facility]   General   Chart Reviewed Yes   Patient assessed for rehabilitation services? Yes   Response to previous treatment Patient with no complaints from previous session   Family / Caregiver Present No   Referring Practitioner Dr. Richar Griffith in bed upon arrival for therapy. Pt agreeable to participate. Pain Assessment   Patient Currently in Pain No   Oxygen Therapy   O2 Device Nasal cannula   O2 Flow Rate (L/min) 2.5 L/min   Orientation   Overall Orientation Status WFL   ADL   Feeding Setup;Supervision   Grooming Setup;Supervision   UE Bathing Setup;Stand by assistance   LE Bathing Setup;Minimal assistance;Contact guard assistance  (sitting )   UE Dressing Setup;Stand by assistance   LE Dressing Setup;Minimal assistance; Moderate assistance  (assistance pulling up pants while maintaining standing bal. )   Toileting Minimal assistance; Moderate assistance   Additional Comments Pt using RW this date for transfers. Will need increased assistance with aspects of ADLs in standing and maintaining balance. Balance   Sitting Balance Supervision   Standing Balance Contact guard assistance   Functional Mobility   Functional - Mobility Device Rolling Walker   Activity Other  (recliner to bed (6-7 ft))   Assist Level Minimal assistance   Functional Mobility Comments Min-CGA   Bed mobility   Sit to Supine Minimal assistance   Scooting Contact guard assistance   Transfers   Sit to stand Minimal assistance   Stand to sit Contact guard assistance   Type of ROM/Therapeutic Exercise   Type of ROM/Therapeutic Exercise AROM   Comment Pt instructed on BUE/BLE exercises while sitting to increase strength and endurance. Assessment   Performance deficits / Impairments Decreased functional mobility ; Decreased ADL status; Decreased high-level IADLs;Decreased strength;Decreased balance;Decreased vision/visual deficit   Assessment Pt progressing as tolerated. Treatment Diagnosis Hypercapnic RF   Prognosis Good   REQUIRES OT FOLLOW UP Yes   Treatment Initiated  Tx focused on functional mobility at RW level, STS mobility and walking in place 3x with CGA/ MIN assist, AROM exercises sitting EOB, LBD techniques and bed mobility. Discharge Recommendations Patient would benefit from continued therapy after discharge   Activity Tolerance   Activity Tolerance Patient Tolerated treatment well   Safety Devices   Safety Devices in place Yes   Type of devices Bed alarm in place;Call light within reach; Left in bed   Plan   Times per week 3-5x/wk   Times per day Daily   Electronically signed by EMMA Ruano on 3/28/2022 at 3:06 PM Declined

## 2022-03-28 NOTE — DISCHARGE SUMMARY
Discharge Summary     Date:3/28/2022        Patient Name:Renata Mendez     YOB: 1950     Age:72 y.o. Admit Date:3/15/2022   Admission Condition:critical   Discharged Condition:stable  Discharge Date: 03/28/22     Discharge Diagnoses   Principal Problem (Resolved):    Acute hypercapnic respiratory failure (HCC)  Active Problems:    NICM (nonischemic cardiomyopathy) ([facility] [facility]    Diabetes mellitus ([facility] [facility]    S/P CABG x 4    Coronary artery disease involving native coronary artery of native heart without angina pectoris    Chronic obstructive pulmonary disease (HCC)    Abdominal aortic aneurysm (AAA) without rupture (HCC)    Smoker    Leg weakness, bilateral      Hospital Stay   Narrative of Hospital Course: 67year old presenting with respiratory failure in part due to her COPD. Hypercapnea on arrival as well as hypoxic. Required intubation and mechanical ventilation. Admitted to the ICU for several days with pulmonary consultation for vent management. Gradually weaned off of the vent and was able to tolerate. Complicated by muscle weakness in all extremities, primarily lower extremities. PT and OT consultations obtained. [place] were best to go to skilled type unit for therapy. She did require setting up of Trilogy device to help with her respiratory failure. She maximized inpatient therapy and is now ready for transfer. Consultants:   PALLIATIVE CARE EVAL  IP CONSULT TO PULMONOLOGY  IP CONSULT TO DIETITIAN  IP CONSULT TO REHAB/TCU ADMISSION COORDINATOR    Time Spent on Discharge:  35 minutes were spent in patient examination, evaluation, counseling as well as medication reconciliation, prescriptions for required medications, discharge plan and follow up.       Surgeries/Procedures Performed:        Treatments:   antibiotics: ceftriaxone and respiratory therapy: O2 and albuterol/atropine nebulizer    Significant Diagnostic Studies:   Recent Labs:  CBC:   Lab Results   Component Value Date    WBC 8.5 03/28/2022    RBC 4.31 03/28/2022    HGB 13.1 03/28/2022    HCT 41.9 03/28/2022    HCT 32.1 09/02/2011    MCV 97.2 03/28/2022    MCH 30.4 03/28/2022    MCHC 31.3 03/28/2022    RDW 13.7 03/28/2022     03/28/2022     09/02/2011     CMP:    Lab Results   Component Value Date    GLUCOSE 103 03/28/2022     03/28/2022     09/02/2011    K 3.8 03/28/2022    K 3.5 06/28/2021    K 4.1 09/02/2011    CL 98 03/28/2022     09/02/2011    CO2 36 03/28/2022    BUN 18 03/28/2022    CREATININE 0.9 03/28/2022    CREATININE 0.6 09/02/2011    ANIONGAP 5 03/28/2022    ALKPHOS 47 03/28/2022    ALKPHOS 57 09/02/2011    ALT 30 03/28/2022    AST 23 03/28/2022    BILITOT 0.6 03/28/2022    LABALBU 2.9 03/28/2022    LABALBU 3.3 09/02/2011    LABGLOM >60 03/28/2022    GFRAA >59 03/28/2022    PROT 5.1 03/28/2022    PROT 7.5 01/18/2013    CALCIUM 8.5 03/28/2022       Radiology Last 7 Days:  XR CHEST PORTABLE    Result Date: 3/22/2022  1. Patient extubated and NG tube removed. 2. Minimal opacity in the lung bases, likely atelectasis. 3. Mild cardiomegaly. Signed by Dr Priyanka Vogt      Discharge Plan   Disposition: To a non-[facility] facility    Provider Follow-Up:   No follow-up provider specified. Hospital/Incidental Findings Requiring Follow-Up:  Respiratory failure    Patient Instructions   Diet: cardiac diet    Activity: with PT assist    Other Instructions:    Follow up on discharge from skilled facility    Discharge Medications         Medication List      START taking these medications    cefUROXime 500 MG tablet  Commonly known as: CEFTIN  Take 1 tablet by mouth every 12 hours for 10 days     * predniSONE 10 MG tablet  Commonly known as: DELTASONE  Take 1 tablet by mouth daily for 3 doses  Start taking on: March 29, 2022     * predniSONE 5 MG tablet  Commonly known as: DELTASONE  Take 1 tablet by mouth daily for 3 doses  Start taking on: April 1, 2022         * This list has 2 medication(s) that are the same as other medications prescribed for you. Read the directions carefully, and ask your doctor or other care provider to review them with you.             CONTINUE taking these medications    amLODIPine 5 MG tablet  Commonly known as: NORVASC  Take 1 tablet by mouth daily     clopidogrel 75 MG tablet  Commonly known as: Plavix  Take 1 tablet by mouth daily     furosemide 40 MG tablet  Commonly known as: LASIX  Take 1 tablet by mouth daily     ipratropium-albuterol 0.5-2.5 (3) MG/3ML Soln nebulizer solution  Commonly known as: DUONEB  Inhale 3 mLs into the lungs every 4 hours (while awake)     nitroGLYCERIN 0.4 MG SL tablet  Commonly known as: Nitrostat  Place 1 tablet under the tongue every 5 minutes as needed for Chest pain     ramipril 5 MG capsule  Commonly known as: Altace  Take 1 capsule by mouth 2 times daily     rivaroxaban 20 MG Tabs tablet  Commonly known as: Xarelto  Take 1 tablet by mouth daily (with breakfast)     rosuvastatin 10 MG tablet  Commonly known as: CRESTOR  TAKE 1 TABLET DAILY     sotalol 120 MG tablet  Commonly known as: BETAPACE  Take 1 tablet by mouth 2 times daily           Where to Get Your Medications      These medications were sent to [id number], [address] [facility] [address]  [address] [facility], [address] [place] [id number]    Phone: [phone]   · cefUROXime 500 MG tablet  · predniSONE 10 MG tablet  · predniSONE 5 MG tablet         Electronically signed by Marylin Grey MD on 3/28/22 at 3:05 PM CDT

## 2022-03-28 NOTE — PROGRESS NOTES
Family Medicine Progress Note    Patient:  Juan Anton  YOB: 1950    MRN: [id number]     Acct: [de-identified]     Admit date: 3/15/2022    Patient Seen, Chart, Consults notes, Labs, Radiology studies reviewed. Subjective: Day 12 of stay with acute on chronic respiratory failure with hypercapnia and most recent (in last 24 hours) has had continued stabilization of breathing but also continued weakness of the legs. States it is getting better but not at baseline. Past, Family, Social History unchanged from admission.     Diet:  ADULT DIET; Easy to Chew; 4 carb choices (60 gm/meal)    Medications:  Scheduled Meds:   ramipril  2.5 mg Oral BID    potassium chloride  20 mEq Oral Daily    amLODIPine  5 mg Oral Daily    clopidogrel  75 mg Oral Daily    furosemide  40 mg Oral Daily    rivaroxaban  20 mg Oral Daily with breakfast    rosuvastatin  10 mg Oral Nightly    sotalol  120 mg Oral BID    cefUROXime  500 mg Oral 2 times per day    predniSONE  15 mg Oral Daily    Followed by   Faby Pineda ON 3/29/2022] predniSONE  10 mg Oral Daily    Followed by   Faby Pineda ON 4/1/2022] predniSONE  5 mg Oral Daily    insulin lispro  0-12 Units SubCUTAneous TID WC    insulin lispro  0-6 Units SubCUTAneous Nightly    sodium chloride flush  5-40 mL IntraVENous 2 times per day    chlorhexidine  15 mL Mouth/Throat BID    ipratropium-albuterol  1 ampule Inhalation Q4H     Continuous Infusions:   propofol Stopped (03/21/22 1151)    lactated ringers 15 mL/hr at 03/22/22 1600    fentaNYL Stopped (03/21/22 1151)    sodium chloride      norepinephrine Stopped (03/17/22 0255)    dextrose      dexmedetomidine HCl in NaCl Stopped (03/23/22 0430)     PRN Meds:polyethylene glycol, nitroGLYCERIN, acetaminophen, hydrALAZINE, sodium chloride flush, sodium chloride, ondansetron **OR** ondansetron, glucagon (rDNA), dextrose, glucose, dextrose bolus (hypoglycemia) **OR** dextrose bolus 09/02/2011    K 3.8 03/28/2022    K 3.5 06/28/2021    K 4.1 09/02/2011    CL 98 03/28/2022     09/02/2011    CO2 36 03/28/2022    BUN 18 03/28/2022    CREATININE 0.9 03/28/2022    CREATININE 0.6 09/02/2011    GFRAA >59 03/28/2022    LABGLOM >60 03/28/2022    GLUCOSE 103 03/28/2022    PROT 5.1 03/28/2022    PROT 7.5 01/18/2013    LABALBU 2.9 03/28/2022    LABALBU 3.3 09/02/2011    CALCIUM 8.5 03/28/2022    BILITOT 0.6 03/28/2022    ALKPHOS 47 03/28/2022    ALKPHOS 57 09/02/2011    AST 23 03/28/2022    ALT 30 03/28/2022     Last 3 Troponin:    Lab Results   Component Value Date    TROPONINI <0.01 03/15/2022    TROPONINI <0.01 08/12/2021    TROPONINI <0.01 06/23/2021     Urine Culture:  No components found for: CURINE  Blood Culture:  No components found for: CBLOOD, CFUNGUSBL  Stool Culture:  No components found for: CSTOOL    Assessment:    Principal Problem:    Acute hypercapnic respiratory failure ([facility] [address]  Active Problems:    NICM (nonischemic cardiomyopathy) ([facility] [address]    Diabetes mellitus ([facility] [address] [facility]    S/P CABG x 4    Coronary artery disease involving native coronary artery of native heart without angina pectoris    Chronic obstructive pulmonary disease (HCC)    Abdominal aortic aneurysm (AAA) without rupture (HCC)    Smoker    Leg weakness, bilateral  Resolved Problems:    * No resolved hospital problems. *          Plan:  Working on transfer to rehab. Have trilogy approved for home use but it does not appear that that has been coordinated yet with [facility] for use there. Once orders clarified discharge. Continue to wean steroids. Bilateral leg weakness likely related to polyneuropathy and/or myopathy from extended stay in ICU on ventilator. Needs rehab.       Electronically signed by Gwenevere Cheadle, MD on 3/28/2022 at 8:16 AM

## 2022-04-15 DIAGNOSIS — Z95.0 PACEMAKER: Primary | ICD-10-CM

## 2022-04-15 DIAGNOSIS — I49.5 SINOATRIAL NODE DYSFUNCTION (HCC): ICD-10-CM

## 2022-05-03 ENCOUNTER — TRANSCRIBE ORDERS (OUTPATIENT)
Dept: ADMINISTRATIVE | Facility: HOSPITAL | Age: 72
End: 2022-05-03

## 2022-05-03 DIAGNOSIS — J44.9 CHRONIC OBSTRUCTIVE PULMONARY DISEASE, UNSPECIFIED COPD TYPE: Primary | ICD-10-CM

## 2022-05-04 ENCOUNTER — TELEPHONE (OUTPATIENT)
Dept: CARDIOLOGY CLINIC | Age: 72
End: 2022-05-04

## 2022-05-12 ENCOUNTER — OFFICE VISIT (OUTPATIENT)
Dept: CARDIOLOGY CLINIC | Age: 72
End: 2022-05-12
Payer: MEDICARE

## 2022-05-12 VITALS
WEIGHT: 154 LBS | BODY MASS INDEX: 25.66 KG/M2 | SYSTOLIC BLOOD PRESSURE: 120 MMHG | DIASTOLIC BLOOD PRESSURE: 70 MMHG | HEIGHT: 65 IN | HEART RATE: 74 BPM

## 2022-05-12 DIAGNOSIS — I25.10 CORONARY ARTERY DISEASE INVOLVING NATIVE CORONARY ARTERY OF NATIVE HEART WITHOUT ANGINA PECTORIS: Primary | ICD-10-CM

## 2022-05-12 DIAGNOSIS — I49.5 SA NODE DYSFUNCTION (HCC): ICD-10-CM

## 2022-05-12 DIAGNOSIS — I10 ESSENTIAL HYPERTENSION: ICD-10-CM

## 2022-05-12 DIAGNOSIS — Z95.0 CARDIAC PACEMAKER: ICD-10-CM

## 2022-05-12 DIAGNOSIS — Z95.5 HISTORY OF CORONARY ARTERY STENT PLACEMENT: ICD-10-CM

## 2022-05-12 DIAGNOSIS — E78.2 MIXED HYPERLIPIDEMIA: ICD-10-CM

## 2022-05-12 DIAGNOSIS — Z95.1 S/P CABG X 4: ICD-10-CM

## 2022-05-12 PROCEDURE — G8417 CALC BMI ABV UP PARAM F/U: HCPCS | Performed by: NURSE PRACTITIONER

## 2022-05-12 PROCEDURE — G8427 DOCREV CUR MEDS BY ELIG CLIN: HCPCS | Performed by: NURSE PRACTITIONER

## 2022-05-12 PROCEDURE — 4004F PT TOBACCO SCREEN RCVD TLK: CPT | Performed by: NURSE PRACTITIONER

## 2022-05-12 PROCEDURE — 93280 PM DEVICE PROGR EVAL DUAL: CPT | Performed by: NURSE PRACTITIONER

## 2022-05-12 PROCEDURE — 99214 OFFICE O/P EST MOD 30 MIN: CPT | Performed by: NURSE PRACTITIONER

## 2022-05-12 PROCEDURE — 1123F ACP DISCUSS/DSCN MKR DOCD: CPT | Performed by: NURSE PRACTITIONER

## 2022-05-12 PROCEDURE — 3017F COLORECTAL CA SCREEN DOC REV: CPT | Performed by: NURSE PRACTITIONER

## 2022-05-12 PROCEDURE — 1090F PRES/ABSN URINE INCON ASSESS: CPT | Performed by: NURSE PRACTITIONER

## 2022-05-12 PROCEDURE — G8400 PT W/DXA NO RESULTS DOC: HCPCS | Performed by: NURSE PRACTITIONER

## 2022-05-12 RX ORDER — POTASSIUM CHLORIDE 1.5 G/1.77G
20 POWDER, FOR SOLUTION ORAL DAILY
COMMUNITY

## 2022-05-12 NOTE — PATIENT INSTRUCTIONS
New instructions for today:  Viviscal (hair and skin vitamins). Eliquis can increase your risk of bleeding. If you notice blood in urine or stool, bleeding gums, excessive bruising or cough productive of bloody sputum, notify the office. Information on this blood thinner has been included in your after visit summary. Patient Instructions:  Continue current medications as prescribed. Always keep a current medication list. Bring your medications to every office visit. Continue to follow up with primary care provider for non cardiac medical problems. Call the office with any problems, questions or concerns at [phone]. If you have been asked to keep a blood pressure log, do so for 2 weeks. Call the office to report readings to the triage nurse at [phone]. Follow up with cardiologist as scheduled. The following educational material has been included in this after visit summary for your review: Life simple 7. Heart health. Life simple 7  1) Manage blood pressure - high blood pressure is a major risk factor for heart disease and stroke. Keeping blood pressure in health range reduces strain on your heart, arteries and kidneys. Blood pressure goal is less than 130/80. 2) Control cholesterol - contributes to plaque, which can clog arteries and lead to heart disease and stroke. When you control your cholesterol you are giving your arteries their best chance to remain clear. It is recommended that you get cholesterol lab work done once a year. 3) Reduce blood sugar - most of the food we eat is turning into glucose or blood sugar that our body uses for energy. Over time, high levels of blood sugar can damage your heart, kidneys, eyes and nerves. 4) Get active - living an active life is one of the most rewarding gifts you can give yourself and those you love. Simply put, daily physical activity increases your length and quality of life.  Strive to exercise 15 minutes most days of the week. 5)  Eat better - A healthy diet is one of your best weapons for fighting cardiovascular disease. When you eat a heart healthy diet, you improve your chances for feeling good and staying healthy for life. 6)  Lose weight - when you shed extra fat an unnecessary pounds, you reduce the burden on your hear, lungs, blood vessels and skeleton. You give yourself the gift of active living, you lower your blood pressure and help yourself feel better. 7) Stop smoking - cigarette smokers have a higher risk of developing cardiovascular disease. If  You smoke, quitting is the best thing you can do for your health. Check American Heart Association on line for more information on Life's Simple 7 and tips for healthy living. A Healthy Heart: Care Instructions  Your Care Instructions     Coronary artery disease, also called heart disease, occurs when a substance called plaque builds up in the vessels that supply oxygen-rich blood to your heart muscle. This can narrow the blood vessels and reduce blood flow. A heart attack happens when blood flow is completely blocked. A high-fat diet, smoking, and other factors increase the risk of heart disease. Your doctor has found that you have a chance of having heart disease. You can do lots of things to keep your heart healthy. It may not be easy, but you can change your diet, exercise more, and quit smoking. These steps really work to lower your chance of heart disease. Follow-up care is a key part of your treatment and safety. Be sure to make and go to all appointments, and call your doctor if you are having problems. It's also a good idea to know your test results and keep a list of the medicines you take. How can you care for yourself at home? Diet  · Use less salt when you cook and eat. This helps lower your blood pressure. Taste food before salting. Add only a little salt when you think you need it. With time, your taste buds will adjust to less salt.   · Eat fewer snack items, fast foods, canned soups, and other high-salt, high-fat, processed foods. · Read food labels and try to avoid saturated and trans fats. They increase your risk of heart disease by raising cholesterol levels. · Limit the amount of solid fat-butter, margarine, and shortening-you eat. Use olive, peanut, or canola oil when you cook. Bake, broil, and steam foods instead of frying them. · Eat a variety of fruit and vegetables every day. Dark green, deep orange, red, or yellow fruits and vegetables are especially good for you. Examples include spinach, carrots, peaches, and berries. · Foods high in fiber can reduce your cholesterol and provide important vitamins and minerals. High-fiber foods include whole-grain cereals and breads, oatmeal, beans, brown rice, citrus fruits, and apples. · Eat lean proteins. Heart-healthy proteins include seafood, lean meats and poultry, eggs, beans, peas, nuts, seeds, and soy products. · Limit drinks and foods with added sugar. These include candy, desserts, and soda pop. Lifestyle changes  · If your doctor recommends it, get more exercise. Walking is a good choice. Bit by bit, increase the amount you walk every day. Try for at least 30 minutes on most days of the week. You also may want to swim, bike, or do other activities. · Do not smoke. If you need help quitting, talk to your doctor about stop-smoking programs and medicines. These can increase your chances of quitting for good. Quitting smoking may be the most important step you can take to protect your heart. It is never too late to quit. · Limit alcohol to 2 drinks a day for men and 1 drink a day for women. Too much alcohol can cause health problems. · Manage other health problems such as diabetes, high blood pressure, and high cholesterol. If you think you may have a problem with alcohol or drug use, talk to your doctor. Medicines  · Take your medicines exactly as prescribed.  Call your doctor if you think you are having a problem with your medicine. · If your doctor recommends aspirin, take the amount directed each day. Make sure you take aspirin and not another kind of pain reliever, such as acetaminophen (Tylenol). When should you call for help? [id number] if you have symptoms of a heart attack. These may include:  · Chest pain or pressure, or a strange feeling in the chest.  · Sweating. · Shortness of breath. · Pain, pressure, or a strange feeling in the back, neck, jaw, or upper belly or in one or both shoulders or arms. · Lightheadedness or sudden weakness. · A fast or irregular heartbeat. After you call 911, the  may tell you to chew 1 adult-strength or 2 to 4 low-dose aspirin. Wait for an ambulance. Do not try to drive yourself. Watch closely for changes in your health, and be sure to contact your doctor if you have any problems. Where can you learn more? Go to https://[employer]arch. org and sign in to your [employer] account. Enter K288 in the [employer] box to learn more about \"A Healthy Heart: Care Instructions. \"     If you do not have an account, please click on the \"Sign Up Now\" link. Current as of: December 16, 2019               Content Version: 12.5  © 9850-3599 Healthwise, Inc[employer]. Care instructions adapted under license by [facility] ([facility]). If you have questions about a medical condition or this instruction, always ask your healthcare professional. [place] [address] any warranty or liability for your use of this information.

## 2022-05-12 NOTE — PROGRESS NOTES
[facility] [place], [place]. [address] [facility]  [phone] office  [phone] fax      OFFICE VISIT:  2022    Abhinav Krishna - : 1950  Reason For Visit:  Abdoulaye Lozoya is a 67 y.o. female who is here for 6 Month Follow-Up (and pacemaker check), Coronary Artery Disease, and Hypertension    History:   Diagnosis Orders   1. Coronary artery disease involving native coronary artery of native heart without angina pectoris     2. S/P CABG x 4     3. History of coronary artery stent placement     4. Mixed hyperlipidemia     5. Essential hypertension     6. SA node dysfunction (HCC)     7. Cardiac pacemaker       The patient presents today for cardiology follow up and pacer check. Abdoulaye Lozoya is doing very well from a cardiac standpoint. She was admitted to [facility]) ED on 3/15/22 for acute hypercapnic respiratory failure requiring mechanical ventilation. The patient looks great today. She reports no cardiac related issues today. She continue on Eliquis with no bleeding issues. The patient denies symptoms to suggest myocardial ischemia, heart failure or arrhythmia. BP is well controlled on current regimen. The patient's PCP monitors cholesterol. Nadege Wilson denies exertional chest pain, shortness of breath, orthopnea, paroxysmal nocturnal dyspnea, syncope, presyncope, sensed arrhythmia, edema and fatigue. The patient denies numbness or weakness to suggest cerebrovascular accident or transient ischemic attack. No change in respiratory status above patient's baseline.      Abhinav Krishna has the following history as recorded in [facility]:  Patient Active Problem List   Diagnosis Code    Deep vein thrombosis (HCC) I82.409    Essential hypertension I10    Diabetes mellitus ([facility] [address] E11.9    Hypercholesteremia E78.00    S/P CABG x 4 Z95.1    History of coronary artery stent placement Z95.5    Coronary artery disease visit. Allergies: Codeine    Review of Systems  Constitutional - no appetite change, or unexpected weight change. No fever, chills or diaphoresis. No significant change in activity level or new onset of fatigue. HEENT - no significant rhinorrhea or epistaxis. No tinnitus or significant hearing loss. Eyes - no sudden vision change or amaurosis. No corneal arcus, xantholasma, subconjunctival hemorrhage or discharge. Respiratory - no significant wheezing, stridor, apnea or cough. No change in respiratory status above patient's baseline. Cardiovascular - no exertional chest pain to suggest myocardial ischemia. No orthopnea or PND. No sensation of sustained arrythmia. No occurrence of slow heart rate. No palpitations. No claudication. Gastrointestinal - no abdominal swelling or pain. No blood in stool. No severe constipation, diarrhea, nausea, or vomiting. Genitourinary - no dysuria, frequency, or urgency. No flank pain or hematuria. Musculoskeletal - no back pain or myalgia. No problems with gait. Extremities - no clubbing, cyanosis or extremity edema. Skin - no color change or rash. No pallor. No new surgical incision. Neurologic - no speech difficulty, facial asymmetry or lateralizing weakness. No seizures, presyncope or syncope. No significant dizziness. Hematologic - no easy bruising or excessive bleeding. Psychiatric - no severe anxiety or insomnia. No confusion. All other review of systems are negative. Objective  Vital Signs - /70   Pulse 74   Ht 5' 5\" (1.651 m)   Wt 154 lb (69.9 kg)   BMI 25.63 kg/m²   General - Danni Mcneill is alert, cooperative, and pleasant. Well groomed. No acute distress. Body habitus - Body mass index is 25.63 kg/m². HEENT - Head is normocephalic. No circumoral cyanosis. Dentition is normal.  EYES -   Lids normal without ptosis. No discharge, edema or subconjunctival hemorrhage.    Neck - Symmetrical without apparent mass or lymphadenopathy. Respiratory - Normal respiratory effort without use of accessory muscles. Ausculatation reveals vesicular breath sounds without crackles, wheezes, rub or rhonchi. Cardiovascular - No jugular venous distention. Auscultation reveals regular rate and rhythm. No audible clicks, gallop or rub. No murmur. No lower extremity varicosities. No carotid bruits. Abdominal -  No visible distention, mass or pulsations. Extremities - No clubbing or cyanosis. No statis dermatitis or ulcers. No edema. Musculoskeletal -   No Osler's nodes. No kyphosis or scoliosis. Gait is even and regular without limp or shuffle. Ambulates without assistance. Skin -  Warm and dry; no rash or pallor. No new surgical wound. Neurological - No focal neurological deficits. Thought processes coherent. No apparent tremor. Oriented to person, place and time. Psychiatric -  Appropriate affect and mood. Data reviewed:  6/25/21 Transesophageal echocardiogram report:    LV is normal in size with normal systolic function. LV ejection fraction   estimated at 60%. No mass or thrombus in left ventricle. RV appears mildly dilated with preserved RV systolic function. No mass or   thrombus noted in right ventricle. Left atrium appears mildly dilated. No mass or thrombus noted in the left   atrium. Left atrial appendage with no mass or thrombus noted. Good velocity noted   in appendage. Right atrium appears moderate to severely dilated. No mass or thrombus   noted. Coronary sinus appears dilated. Aortic valve is trileaflet with mild leaflet thickening but preserved   mobility. No significant stenosis or regurgitation. Mitral valve with posterior leaflet thickened and calcified and appears   fixed. Normal anterior leaflet mobility. Probable mild mitral stenosis   (mean gradient 3 mmHg). Mild mitral regurgitation. Tricuspid leaflets appear structurally normal. Moderate tricuspid   regurgitation. Pulmonic valve not well visualized but no significant regurgitation   appreciated   Intra-atrial septum is aneurysmal and thinned. A slitlike patent foramen   ovale is present. Right to left shunting is noted on bubble study. Pacer wires noted in right-sided chambers. Ascending aorta is mildly dilated at 3.3 cm. Mild to moderate descending thoracic aortic atheromatous changes. Electronically signed by Zoya Rivas MD(Interpreting physician)   on 06/25/2021 12:22 AM    Lab Results   Component Value Date    WBC 8.5 03/28/2022    HGB 13.1 03/28/2022    HCT 41.9 03/28/2022    MCV 97.2 03/28/2022     03/28/2022     Lab Results   Component Value Date     03/28/2022    K 3.8 03/28/2022    CL 98 03/28/2022    CO2 36 (H) 03/28/2022    BUN 18 03/28/2022    CREATININE 0.9 03/28/2022    GLUCOSE 103 03/28/2022    CALCIUM 8.5 (L) 03/28/2022    PROT 5.1 (L) 03/28/2022    LABALBU 2.9 (L) 03/28/2022    BILITOT 0.6 03/28/2022    ALKPHOS 47 03/28/2022    AST 23 03/28/2022    ALT 30 03/28/2022    LABGLOM >60 03/28/2022    GFRAA >59 03/28/2022       Lab Results   Component Value Date    CHOL 133 (L) 08/24/2020    CHOL 206 (H) 11/13/2017    CHOL 149 03/30/2014     Lab Results   Component Value Date    TRIG 145 08/24/2020    TRIG 201 (H) 11/13/2017    TRIG 105 03/30/2014     Lab Results   Component Value Date    HDL 43 (L) 08/24/2020    HDL 51 (L) 11/13/2017    HDL 56 03/30/2014     Lab Results   Component Value Date    LDLCALC 61 08/24/2020    LDLCALC 115 11/13/2017    LDLCALC 60 08/23/2011       BP Readings from Last 3 Encounters:   05/12/22 120/70   03/28/22 123/77   12/01/21 104/60    Pulse Readings from Last 3 Encounters:   05/12/22 74   03/28/22 88   12/01/21 80        Wt Readings from Last 3 Encounters:   05/12/22 154 lb (69.9 kg)   03/20/22 161 lb 14.4 oz (73.4 kg)   12/01/21 157 lb (71.2 kg)     Assessment/Plan:   Diagnosis Orders   1.  Coronary artery disease involving native coronary artery of native heart without angina pectoris     2. S/P CABG x 4     3. History of coronary artery stent placement     4. Mixed hyperlipidemia     5. Essential hypertension     6. SA node dysfunction (HCC)     7. Cardiac pacemaker       [id number] Score: 4  Disclaimer: Risk Score calculation is dependent on accuracy of patient problem list and past encounter diagnosis. Pacer interrogation:  Pacemaker check showed adequate battery status at 11.7 years. Mode: AAIR-DDDR. Lead impedances are stable. Pacing:  AP 81.4%;  2.0%. Reprogramming for sensitivity and threshold testing. Appropriate diagnostics and safety margins noted. A output 0.875 V at 0.40 ms; P wave 5.1 mV. V output 1.750 V at 0.40 ms, R wave 19.3 mV. AF burden 2.9%  23 of 35 AF episodes were pace terminated (65.7%). Next [employer] remote transmission:  3 months,    Stable CV status without overt heart failure, sensed arrhythmia or angina. CAD s/p CABG - stable on current medical management. Continue same. HTN - normotensive on current regimen. BP today 120/70. Continue same. PAF - Continues on Sotalol 120 mg BID and Eliquis. No bleeding issues reported. AF burden 2.9%. Hyperlipidemia - LDL 61. Monitored and managed by PCP. Continue Crestor. Pacer - interrogations shows adequate battery status, diagnostics and safety margins. Patient is compliant with medication regimen. Previous cardiac history and records reviewed. Continue current medical management for cardiac related condition. Continue other current medications as directed. Continue to follow up with primary care provider for non cardiac medical problems. If your primary care provider is outside of the [facility], please request that your labs be faxed to this office at [phone]. BP goal 130/80 or less. Call the office with any problems, questions or concerns at [phone]. Cardiology follow up as scheduled in [address] appointments.   Educational included in patient instructions. Heart health.       Hansa Sandoval, APRN

## 2022-05-19 DIAGNOSIS — I10 ESSENTIAL HYPERTENSION: ICD-10-CM

## 2022-05-20 RX ORDER — SOTALOL HYDROCHLORIDE 120 MG/1
TABLET ORAL
Qty: 180 TABLET | Refills: 1 | Status: SHIPPED | OUTPATIENT
Start: 2022-05-20

## 2022-05-20 RX ORDER — AMLODIPINE BESYLATE 5 MG/1
TABLET ORAL
Qty: 90 TABLET | Refills: 1 | Status: SHIPPED | OUTPATIENT
Start: 2022-05-20 | End: 2022-07-23

## 2022-05-23 DIAGNOSIS — I49.5 SA NODE DYSFUNCTION (HCC): ICD-10-CM

## 2022-05-23 DIAGNOSIS — Z95.0 CARDIAC PACEMAKER: Primary | ICD-10-CM

## 2022-05-27 ENCOUNTER — APPOINTMENT (OUTPATIENT)
Dept: GENERAL RADIOLOGY | Age: 72
DRG: 190 | End: 2022-05-27
Payer: MEDICARE

## 2022-05-27 ENCOUNTER — HOSPITAL ENCOUNTER (INPATIENT)
Age: 72
LOS: 2 days | Discharge: HOME | DRG: 190 | End: 2022-05-29
Attending: EMERGENCY MEDICINE | Admitting: FAMILY MEDICINE
Payer: MEDICARE

## 2022-05-27 DIAGNOSIS — J96.22 ACUTE ON CHRONIC RESPIRATORY FAILURE WITH HYPOXIA AND HYPERCAPNIA (HCC): ICD-10-CM

## 2022-05-27 DIAGNOSIS — J96.21 ACUTE ON CHRONIC RESPIRATORY FAILURE WITH HYPOXIA AND HYPERCAPNIA (HCC): ICD-10-CM

## 2022-05-27 DIAGNOSIS — J44.1 COPD EXACERBATION (HCC): Primary | ICD-10-CM

## 2022-05-27 LAB
ADENOVIRUS BY PCR: NOT DETECTED
ALBUMIN SERPL-MCNC: 3.9 G/DL (ref 3.5–5.2)
ALP BLD-CCNC: 75 U/L (ref 35–104)
ALT SERPL-CCNC: 11 U/L (ref 5–33)
ANION GAP SERPL CALCULATED.3IONS-SCNC: 5 MMOL/L (ref 7–19)
AST SERPL-CCNC: 13 U/L (ref 5–32)
BACTERIA: NEGATIVE /HPF
BASE EXCESS VENOUS: 11 MMOL/L
BASOPHILS ABSOLUTE: 0.1 K/UL (ref 0–0.2)
BASOPHILS RELATIVE PERCENT: 0.7 % (ref 0–1)
BILIRUB SERPL-MCNC: <0.2 MG/DL (ref 0.2–1.2)
BILIRUBIN URINE: NEGATIVE
BLOOD, URINE: NEGATIVE
BORDETELLA PARAPERTUSSIS BY PCR: NOT DETECTED
BORDETELLA PERTUSSIS BY PCR: NOT DETECTED
BUN BLDV-MCNC: 19 MG/DL (ref 8–23)
CALCIUM SERPL-MCNC: 9.4 MG/DL (ref 8.8–10.2)
CARBOXYHEMOGLOBIN: 2.1 %
CHLAMYDOPHILIA PNEUMONIAE BY PCR: NOT DETECTED
CHLORIDE BLD-SCNC: 96 MMOL/L (ref 98–111)
CLARITY: CLEAR
CO2: 36 MMOL/L (ref 22–29)
COLOR: YELLOW
CORONAVIRUS 229E BY PCR: NOT DETECTED
CORONAVIRUS HKU1 BY PCR: NOT DETECTED
CORONAVIRUS NL63 BY PCR: NOT DETECTED
CORONAVIRUS OC43 BY PCR: NOT DETECTED
CREAT SERPL-MCNC: 0.8 MG/DL (ref 0.5–0.9)
CRYSTALS, UA: ABNORMAL /HPF
EKG P AXIS: 76 DEGREES
EKG P-R INTERVAL: 218 MS
EKG Q-T INTERVAL: 400 MS
EKG QRS DURATION: 88 MS
EKG QTC CALCULATION (BAZETT): 442 MS
EKG T AXIS: 89 DEGREES
EOSINOPHILS ABSOLUTE: 0.5 K/UL (ref 0–0.6)
EOSINOPHILS RELATIVE PERCENT: 6.3 % (ref 0–5)
EPITHELIAL CELLS, UA: 1 /HPF (ref 0–5)
GFR AFRICAN AMERICAN: >59
GFR NON-AFRICAN AMERICAN: >60
GLUCOSE BLD-MCNC: 130 MG/DL (ref 74–109)
GLUCOSE BLD-MCNC: 297 MG/DL (ref 70–99)
GLUCOSE BLD-MCNC: 314 MG/DL (ref 70–99)
GLUCOSE BLD-MCNC: 338 MG/DL (ref 70–99)
GLUCOSE URINE: 250 MG/DL
HCO3 VENOUS: 40 MMOL/L (ref 23–29)
HCT VFR BLD CALC: 38.7 % (ref 37–47)
HEMOGLOBIN: 11.8 G/DL (ref 12–16)
HUMAN METAPNEUMOVIRUS BY PCR: NOT DETECTED
HUMAN RHINOVIRUS/ENTEROVIRUS BY PCR: NOT DETECTED
HYALINE CASTS: 0 /HPF (ref 0–8)
IMMATURE GRANULOCYTES #: 0 K/UL
INFLUENZA A BY PCR: NOT DETECTED
INFLUENZA B BY PCR: NOT DETECTED
KETONES, URINE: NEGATIVE MG/DL
LEUKOCYTE ESTERASE, URINE: NEGATIVE
LYMPHOCYTES ABSOLUTE: 1.5 K/UL (ref 1.1–4.5)
LYMPHOCYTES RELATIVE PERCENT: 19.2 % (ref 20–40)
MCH RBC QN AUTO: 32.1 PG (ref 27–31)
MCHC RBC AUTO-ENTMCNC: 30.5 G/DL (ref 33–37)
MCV RBC AUTO: 105.2 FL (ref 81–99)
METHEMOGLOBIN VENOUS: 1.2 %
MONOCYTES ABSOLUTE: 1 K/UL (ref 0–0.9)
MONOCYTES RELATIVE PERCENT: 12.8 % (ref 0–10)
MYCOPLASMA PNEUMONIAE BY PCR: NOT DETECTED
NEUTROPHILS ABSOLUTE: 4.7 K/UL (ref 1.5–7.5)
NEUTROPHILS RELATIVE PERCENT: 60.7 % (ref 50–65)
NITRITE, URINE: NEGATIVE
O2 CONTENT, VEN: 8 ML/DL
O2 SAT, VEN: 47 %
PARAINFLUENZA VIRUS 1 BY PCR: NOT DETECTED
PARAINFLUENZA VIRUS 2 BY PCR: NOT DETECTED
PARAINFLUENZA VIRUS 3 BY PCR: NOT DETECTED
PARAINFLUENZA VIRUS 4 BY PCR: NOT DETECTED
PCO2, VEN: 81 MMHG (ref 40–50)
PDW BLD-RTO: 13.6 % (ref 11.5–14.5)
PERFORMED ON: ABNORMAL
PH UA: 7.5 (ref 5–8)
PH VENOUS: 7.3 (ref 7.35–7.45)
PLATELET # BLD: 187 K/UL (ref 130–400)
PMV BLD AUTO: 9.2 FL (ref 9.4–12.3)
PO2, VEN: 23 MMHG
POTASSIUM REFLEX MAGNESIUM: 4.5 MMOL/L (ref 3.5–5)
PRO-BNP: 2172 PG/ML (ref 0–900)
PROTEIN UA: 30 MG/DL
RBC # BLD: 3.68 M/UL (ref 4.2–5.4)
RBC UA: 6 /HPF (ref 0–4)
RESPIRATORY SYNCYTIAL VIRUS BY PCR: NOT DETECTED
SARS-COV-2, PCR: NOT DETECTED
SODIUM BLD-SCNC: 137 MMOL/L (ref 136–145)
SPECIFIC GRAVITY UA: 1.01 (ref 1–1.03)
TOTAL PROTEIN: 7.2 G/DL (ref 6.6–8.7)
UROBILINOGEN, URINE: 0.2 E.U./DL
WBC # BLD: 7.7 K/UL (ref 4.8–10.8)
WBC UA: 0 /HPF (ref 0–5)

## 2022-05-27 PROCEDURE — 6370000000 HC RX 637 (ALT 250 FOR IP): Performed by: FAMILY MEDICINE

## 2022-05-27 PROCEDURE — 6370000000 HC RX 637 (ALT 250 FOR IP): Performed by: EMERGENCY MEDICINE

## 2022-05-27 PROCEDURE — 94640 AIRWAY INHALATION TREATMENT: CPT

## 2022-05-27 PROCEDURE — 82800 BLOOD PH: CPT

## 2022-05-27 PROCEDURE — 6360000002 HC RX W HCPCS: Performed by: EMERGENCY MEDICINE

## 2022-05-27 PROCEDURE — 2580000003 HC RX 258: Performed by: FAMILY MEDICINE

## 2022-05-27 PROCEDURE — 93010 ELECTROCARDIOGRAM REPORT: CPT | Performed by: INTERNAL MEDICINE

## 2022-05-27 PROCEDURE — 82947 ASSAY GLUCOSE BLOOD QUANT: CPT

## 2022-05-27 PROCEDURE — 83880 ASSAY OF NATRIURETIC PEPTIDE: CPT

## 2022-05-27 PROCEDURE — 99285 EMERGENCY DEPT VISIT HI MDM: CPT

## 2022-05-27 PROCEDURE — 2580000003 HC RX 258: Performed by: NURSE PRACTITIONER

## 2022-05-27 PROCEDURE — 36415 COLL VENOUS BLD VENIPUNCTURE: CPT

## 2022-05-27 PROCEDURE — 81001 URINALYSIS AUTO W/SCOPE: CPT

## 2022-05-27 PROCEDURE — 93005 ELECTROCARDIOGRAM TRACING: CPT | Performed by: EMERGENCY MEDICINE

## 2022-05-27 PROCEDURE — 96374 THER/PROPH/DIAG INJ IV PUSH: CPT

## 2022-05-27 PROCEDURE — 1210000000 HC MED SURG R&B

## 2022-05-27 PROCEDURE — 2700000000 HC OXYGEN THERAPY PER DAY

## 2022-05-27 PROCEDURE — 6360000002 HC RX W HCPCS: Performed by: FAMILY MEDICINE

## 2022-05-27 PROCEDURE — 85025 COMPLETE CBC W/AUTO DIFF WBC: CPT

## 2022-05-27 PROCEDURE — 0202U NFCT DS 22 TRGT SARS-COV-2: CPT

## 2022-05-27 PROCEDURE — 82803 BLOOD GASES ANY COMBINATION: CPT

## 2022-05-27 PROCEDURE — 94660 CPAP INITIATION&MGMT: CPT

## 2022-05-27 PROCEDURE — 80053 COMPREHEN METABOLIC PANEL: CPT

## 2022-05-27 PROCEDURE — 6370000000 HC RX 637 (ALT 250 FOR IP): Performed by: NURSE PRACTITIONER

## 2022-05-27 PROCEDURE — 71045 X-RAY EXAM CHEST 1 VIEW: CPT

## 2022-05-27 RX ORDER — METHYLPREDNISOLONE SODIUM SUCCINATE 125 MG/2ML
60 INJECTION, POWDER, LYOPHILIZED, FOR SOLUTION INTRAMUSCULAR; INTRAVENOUS EVERY 8 HOURS
Status: DISCONTINUED | OUTPATIENT
Start: 2022-05-27 | End: 2022-05-28

## 2022-05-27 RX ORDER — METHYLPREDNISOLONE SODIUM SUCCINATE 125 MG/2ML
60 INJECTION, POWDER, LYOPHILIZED, FOR SOLUTION INTRAMUSCULAR; INTRAVENOUS ONCE
Status: COMPLETED | OUTPATIENT
Start: 2022-05-27 | End: 2022-05-27

## 2022-05-27 RX ORDER — SOTALOL HYDROCHLORIDE 80 MG/1
120 TABLET ORAL EVERY 12 HOURS SCHEDULED
Status: DISCONTINUED | OUTPATIENT
Start: 2022-05-27 | End: 2022-05-29 | Stop reason: HOSPADM

## 2022-05-27 RX ORDER — SODIUM CHLORIDE 0.9 % (FLUSH) 0.9 %
5-40 SYRINGE (ML) INJECTION EVERY 12 HOURS SCHEDULED
Status: DISCONTINUED | OUTPATIENT
Start: 2022-05-27 | End: 2022-05-27 | Stop reason: SDUPTHER

## 2022-05-27 RX ORDER — SODIUM CHLORIDE 0.9 % (FLUSH) 0.9 %
5-40 SYRINGE (ML) INJECTION PRN
Status: DISCONTINUED | OUTPATIENT
Start: 2022-05-27 | End: 2022-05-29 | Stop reason: HOSPADM

## 2022-05-27 RX ORDER — IPRATROPIUM BROMIDE AND ALBUTEROL SULFATE 2.5; .5 MG/3ML; MG/3ML
1 SOLUTION RESPIRATORY (INHALATION) ONCE
Status: COMPLETED | OUTPATIENT
Start: 2022-05-27 | End: 2022-05-27

## 2022-05-27 RX ORDER — SODIUM CHLORIDE 0.9 % (FLUSH) 0.9 %
5-40 SYRINGE (ML) INJECTION EVERY 12 HOURS SCHEDULED
Status: DISCONTINUED | OUTPATIENT
Start: 2022-05-27 | End: 2022-05-29 | Stop reason: HOSPADM

## 2022-05-27 RX ORDER — ONDANSETRON 4 MG/1
4 TABLET, ORALLY DISINTEGRATING ORAL EVERY 8 HOURS PRN
Status: DISCONTINUED | OUTPATIENT
Start: 2022-05-27 | End: 2022-05-29 | Stop reason: HOSPADM

## 2022-05-27 RX ORDER — SODIUM CHLORIDE 0.9 % (FLUSH) 0.9 %
5-40 SYRINGE (ML) INJECTION PRN
Status: DISCONTINUED | OUTPATIENT
Start: 2022-05-27 | End: 2022-05-27 | Stop reason: SDUPTHER

## 2022-05-27 RX ORDER — ACETAMINOPHEN 325 MG/1
650 TABLET ORAL EVERY 6 HOURS PRN
Status: DISCONTINUED | OUTPATIENT
Start: 2022-05-27 | End: 2022-05-29 | Stop reason: HOSPADM

## 2022-05-27 RX ORDER — IPRATROPIUM BROMIDE AND ALBUTEROL SULFATE 2.5; .5 MG/3ML; MG/3ML
1 SOLUTION RESPIRATORY (INHALATION)
Status: DISCONTINUED | OUTPATIENT
Start: 2022-05-27 | End: 2022-05-29 | Stop reason: HOSPADM

## 2022-05-27 RX ORDER — ACETAMINOPHEN 325 MG/1
650 TABLET ORAL EVERY 4 HOURS PRN
Status: DISCONTINUED | OUTPATIENT
Start: 2022-05-27 | End: 2022-05-27 | Stop reason: SDUPTHER

## 2022-05-27 RX ORDER — FUROSEMIDE 40 MG/1
40 TABLET ORAL DAILY
Status: DISCONTINUED | OUTPATIENT
Start: 2022-05-27 | End: 2022-05-29 | Stop reason: HOSPADM

## 2022-05-27 RX ORDER — AMLODIPINE BESYLATE 5 MG/1
5 TABLET ORAL DAILY
Status: DISCONTINUED | OUTPATIENT
Start: 2022-05-27 | End: 2022-05-29 | Stop reason: HOSPADM

## 2022-05-27 RX ORDER — RAMIPRIL 5 MG/1
5 CAPSULE ORAL 2 TIMES DAILY
Status: DISCONTINUED | OUTPATIENT
Start: 2022-05-27 | End: 2022-05-29 | Stop reason: HOSPADM

## 2022-05-27 RX ORDER — IPRATROPIUM BROMIDE AND ALBUTEROL SULFATE 2.5; .5 MG/3ML; MG/3ML
3 SOLUTION RESPIRATORY (INHALATION)
Status: DISCONTINUED | OUTPATIENT
Start: 2022-05-27 | End: 2022-05-27 | Stop reason: SDUPTHER

## 2022-05-27 RX ORDER — SODIUM CHLORIDE 9 MG/ML
INJECTION, SOLUTION INTRAVENOUS PRN
Status: DISCONTINUED | OUTPATIENT
Start: 2022-05-27 | End: 2022-05-27 | Stop reason: SDUPTHER

## 2022-05-27 RX ORDER — CLOPIDOGREL BISULFATE 75 MG/1
75 TABLET ORAL DAILY
Status: DISCONTINUED | OUTPATIENT
Start: 2022-05-27 | End: 2022-05-29 | Stop reason: HOSPADM

## 2022-05-27 RX ORDER — ONDANSETRON 2 MG/ML
4 INJECTION INTRAMUSCULAR; INTRAVENOUS EVERY 6 HOURS PRN
Status: DISCONTINUED | OUTPATIENT
Start: 2022-05-27 | End: 2022-05-29 | Stop reason: HOSPADM

## 2022-05-27 RX ORDER — DEXTROSE MONOHYDRATE 50 MG/ML
100 INJECTION, SOLUTION INTRAVENOUS PRN
Status: DISCONTINUED | OUTPATIENT
Start: 2022-05-27 | End: 2022-05-29 | Stop reason: HOSPADM

## 2022-05-27 RX ORDER — INSULIN LISPRO 100 [IU]/ML
0-6 INJECTION, SOLUTION INTRAVENOUS; SUBCUTANEOUS
Status: DISCONTINUED | OUTPATIENT
Start: 2022-05-27 | End: 2022-05-29 | Stop reason: HOSPADM

## 2022-05-27 RX ORDER — ROSUVASTATIN CALCIUM 10 MG/1
10 TABLET, COATED ORAL DAILY
Status: DISCONTINUED | OUTPATIENT
Start: 2022-05-27 | End: 2022-05-29 | Stop reason: HOSPADM

## 2022-05-27 RX ORDER — POLYETHYLENE GLYCOL 3350 17 G/17G
17 POWDER, FOR SOLUTION ORAL DAILY PRN
Status: DISCONTINUED | OUTPATIENT
Start: 2022-05-27 | End: 2022-05-29 | Stop reason: HOSPADM

## 2022-05-27 RX ORDER — ACETAMINOPHEN 650 MG/1
650 SUPPOSITORY RECTAL EVERY 6 HOURS PRN
Status: DISCONTINUED | OUTPATIENT
Start: 2022-05-27 | End: 2022-05-29 | Stop reason: HOSPADM

## 2022-05-27 RX ORDER — INSULIN LISPRO 100 [IU]/ML
0-3 INJECTION, SOLUTION INTRAVENOUS; SUBCUTANEOUS NIGHTLY
Status: DISCONTINUED | OUTPATIENT
Start: 2022-05-27 | End: 2022-05-29 | Stop reason: HOSPADM

## 2022-05-27 RX ORDER — SODIUM CHLORIDE 9 MG/ML
INJECTION, SOLUTION INTRAVENOUS PRN
Status: DISCONTINUED | OUTPATIENT
Start: 2022-05-27 | End: 2022-05-29 | Stop reason: HOSPADM

## 2022-05-27 RX ORDER — POTASSIUM CHLORIDE 20 MEQ/1
20 TABLET, EXTENDED RELEASE ORAL DAILY
Status: DISCONTINUED | OUTPATIENT
Start: 2022-05-27 | End: 2022-05-29 | Stop reason: HOSPADM

## 2022-05-27 RX ADMIN — METHYLPREDNISOLONE SODIUM SUCCINATE 60 MG: 125 INJECTION, POWDER, FOR SOLUTION INTRAMUSCULAR; INTRAVENOUS at 17:37

## 2022-05-27 RX ADMIN — ROSUVASTATIN CALCIUM 10 MG: 10 TABLET, FILM COATED ORAL at 11:08

## 2022-05-27 RX ADMIN — CLOPIDOGREL BISULFATE 75 MG: 75 TABLET ORAL at 11:08

## 2022-05-27 RX ADMIN — AMLODIPINE BESYLATE 5 MG: 5 TABLET ORAL at 11:08

## 2022-05-27 RX ADMIN — SODIUM CHLORIDE, PRESERVATIVE FREE 10 ML: 5 INJECTION INTRAVENOUS at 22:48

## 2022-05-27 RX ADMIN — APIXABAN 5 MG: 5 TABLET, FILM COATED ORAL at 20:29

## 2022-05-27 RX ADMIN — METFORMIN HYDROCHLORIDE 500 MG: 500 TABLET ORAL at 17:37

## 2022-05-27 RX ADMIN — POTASSIUM CHLORIDE 20 MEQ: 20 TABLET, EXTENDED RELEASE ORAL at 11:09

## 2022-05-27 RX ADMIN — IPRATROPIUM BROMIDE AND ALBUTEROL SULFATE 1 AMPULE: 2.5; .5 SOLUTION RESPIRATORY (INHALATION) at 06:12

## 2022-05-27 RX ADMIN — SOTALOL HYDROCHLORIDE 120 MG: 80 TABLET ORAL at 11:08

## 2022-05-27 RX ADMIN — SODIUM CHLORIDE, PRESERVATIVE FREE 1000 MG: 5 INJECTION INTRAVENOUS at 11:07

## 2022-05-27 RX ADMIN — INSULIN LISPRO 3 UNITS: 100 INJECTION, SOLUTION INTRAVENOUS; SUBCUTANEOUS at 13:22

## 2022-05-27 RX ADMIN — INSULIN LISPRO 2 UNITS: 100 INJECTION, SOLUTION INTRAVENOUS; SUBCUTANEOUS at 23:30

## 2022-05-27 RX ADMIN — SODIUM CHLORIDE, PRESERVATIVE FREE 10 ML: 5 INJECTION INTRAVENOUS at 11:09

## 2022-05-27 RX ADMIN — IPRATROPIUM BROMIDE AND ALBUTEROL SULFATE 1 AMPULE: 2.5; .5 SOLUTION RESPIRATORY (INHALATION) at 19:14

## 2022-05-27 RX ADMIN — APIXABAN 5 MG: 5 TABLET, FILM COATED ORAL at 11:08

## 2022-05-27 RX ADMIN — METHYLPREDNISOLONE SODIUM SUCCINATE 60 MG: 125 INJECTION, POWDER, FOR SOLUTION INTRAMUSCULAR; INTRAVENOUS at 05:09

## 2022-05-27 RX ADMIN — IPRATROPIUM BROMIDE AND ALBUTEROL SULFATE 1 AMPULE: 2.5; .5 SOLUTION RESPIRATORY (INHALATION) at 10:46

## 2022-05-27 RX ADMIN — RAMIPRIL 5 MG: 5 CAPSULE ORAL at 17:37

## 2022-05-27 RX ADMIN — RAMIPRIL 5 MG: 5 CAPSULE ORAL at 11:10

## 2022-05-27 RX ADMIN — METHYLPREDNISOLONE SODIUM SUCCINATE 60 MG: 125 INJECTION, POWDER, FOR SOLUTION INTRAMUSCULAR; INTRAVENOUS at 11:08

## 2022-05-27 RX ADMIN — FUROSEMIDE 40 MG: 40 TABLET ORAL at 11:08

## 2022-05-27 RX ADMIN — SOTALOL HYDROCHLORIDE 120 MG: 80 TABLET ORAL at 20:29

## 2022-05-27 RX ADMIN — IPRATROPIUM BROMIDE AND ALBUTEROL SULFATE 1 AMPULE: 2.5; .5 SOLUTION RESPIRATORY (INHALATION) at 15:32

## 2022-05-27 RX ADMIN — METFORMIN HYDROCHLORIDE 500 MG: 500 TABLET ORAL at 11:08

## 2022-05-27 ASSESSMENT — ENCOUNTER SYMPTOMS
EYE REDNESS: 0
COUGH: 1
VOICE CHANGE: 0
VOMITING: 0
DIARRHEA: 0
SHORTNESS OF BREATH: 1
RHINORRHEA: 0
ABDOMINAL PAIN: 0
EYE PAIN: 0

## 2022-05-27 ASSESSMENT — PAIN SCALES - GENERAL: PAINLEVEL_OUTOF10: 0

## 2022-05-27 NOTE — PLAN OF CARE
Problem: Discharge Planning  Goal: Discharge to home or other facility with appropriate resources  Outcome: Not Progressing  Flowsheets  Taken 5/27/2022 1148  Discharge to home or other facility with appropriate resources:   Identify barriers to discharge with patient and caregiver   Identify discharge learning needs (meds, wound care, etc)   Refer to discharge planning if patient needs post-hospital services based on physician order or complex needs related to functional status, cognitive ability or social support system   Arrange for needed discharge resources and transportation as appropriate   Arrange for interpreters to assist at discharge as needed  Taken 5/27/2022 0922  Discharge to home or other facility with appropriate resources:   Identify barriers to discharge with patient and caregiver   Arrange for needed discharge resources and transportation as appropriate   Identify discharge learning needs (meds, wound care, etc)   Arrange for interpreters to assist at discharge as needed   Refer to discharge planning if patient needs post-hospital services based on physician order or complex needs related to functional status, cognitive ability or social support system     Problem: Safety - Adult  Goal: Free from fall injury  Outcome: Not Progressing     Problem: ABCDS Injury Assessment  Goal: Absence of physical injury  Outcome: Not Progressing

## 2022-05-27 NOTE — PROGRESS NOTES
I have contacted Dr. Ramírez Greco on 5/18/22 about another consult, and unfortunately his is out of town until after the holidays. So there is no pulmonary on call till after Monday 5/30/22.

## 2022-05-27 NOTE — PROGRESS NOTES
Tianna Olivia arrived to room # 308. Presented with: COPD exac. Mental Status: Patient is oriented, alert and coherent. Vitals:    05/27/22 0730   BP: 135/66   Pulse: 71   Resp:    Temp: 97 °F (36.1 °C)   SpO2: 92%     Patient safety contract and falls prevention contract reviewed with patient Yes. Oriented Patient to room. Call light within reach. Yes.   Needs, issues or concerns expressed at this time: no.      Electronically signed by Candance Isles, RN on 5/27/2022 at 7:57 AM

## 2022-05-27 NOTE — ED NOTES
Pt repeatedly removing BiPAP without staff assistance to spit out phlegm and ask for drinks of water. Pt educated on importance of keeping BiPAP on.        Tawnya Sol RN  05/27/22 1955

## 2022-05-27 NOTE — ED PROVIDER NOTES
[facility] EMERGENCY DEPT  EMERGENCY DEPARTMENT ENCOUNTER      Pt Name: Genia Camarillo  MRN: [id number]  [place] 1950  Date of evaluation: 5/27/2022  Provider: Lea Landau, MD    [address] [place] [address]       Chief Complaint   Patient presents with    Shortness of Breath     Patient went to the restroom and became short of breath; Took a breathing treatment at home with no improvement      Wheezing         HISTORY OF PRESENT ILLNESS   (Location/Symptom, Timing/Onset,Context/Setting, Quality, Duration, Modifying Factors, Severity)  Note limiting factors. Genia Camarillo is a 67 y.o. female who presents to the emergency department for evaluation after having shortness of breath tonight. States she started having productive cough earlier this week and was started on an antibiotic by her primary care doctor. Feeling she had gotten better for a little while but then yesterday started having increased cough again. States she woke up to go to the bathroom around 2:00 this morning and was very short of breath with chest tightness and wheezing when she got up. HPI    NursingNotes were reviewed. REVIEW OF SYSTEMS    (2-9 systems for level 4, 10 or more for level 5)     Review of Systems   Constitutional: Positive for fatigue. Negative for fever. HENT: Negative for congestion, rhinorrhea and voice change. Eyes: Negative for pain and redness. Respiratory: Positive for cough and shortness of breath. Cardiovascular: Negative for chest pain. Gastrointestinal: Negative for abdominal pain, diarrhea and vomiting. Endocrine: Negative. Genitourinary: Negative. Musculoskeletal: Negative for arthralgias and gait problem. Skin: Negative for rash and wound. Neurological: Negative for weakness and headaches. Hematological: Negative. Psychiatric/Behavioral: Negative. All other systems reviewed and are negative.       A complete review of systems was performed and is negative except as noted above in the HPI.       PAST MEDICAL HISTORY     Past Medical History:   Diagnosis Date    ASHD (arteriosclerotic heart disease)     s/p PTCA and stent of circumflex, as well as intermediate and mid LAD     [facility] obstructive [facility] disease) [facility]     Coronary atherosclerosis     Diabetes mellitus ([facility]     diet controlled, type 2    Encounter for wound care     FOR LLL WOUND    Fall 10/29/2020    Ganglion cyst     RT HAND    Hematoma 10/2020    hematoma LLL from fall injury    Hx of blood clots     Hypercholesteremia     Hypertension     Pacemaker 03/02/2021    Unstable angina ([facility]          SURGICAL HISTORY       Past Surgical History:   Procedure Laterality Date    CARDIAC CATHETERIZATION  12/10/00    selective left heart and coronary arteriography with left ventriculography     CARDIAC CATHETERIZATION  01/23/98    left heart cath, left ventriculography, slective coronary arteriography, direct infarct angioplasty and stent placement to proximal left anterior descending coronary artery    CARDIAC CATHETERIZATION  03/05/94    left heart cath, selective coronary arteriography, left ventriculography    CARDIAC CATHETERIZATION  8/27/2011    [place]    CHOLECYSTECTOMY      CORONARY ANGIOPLASTY WITH STENT PLACEMENT  12/12/00    PTCA and stent placement to the mid LAD/ptca and stent placement first circumflex marginal (intermediate)     CORONARY ANGIOPLASTY WITH STENT PLACEMENT  08/2021    CORONARY ARTERY BYPASS GRAFT  8/29/2011    PACABG X 4 LIMA-LAD, SVG-DIAG, SVG-PDA, RT EVH, LT OPEN VEIN HARVEST, DR Cece Wiley    CYST REMOVAL      GANGLION CYST REMOVED RT HAND    HYSTERECTOMY      NECK SURGERY      parotid artery tumor removed bilaterally    PAROTIDECTOMY Bilateral          CURRENT MEDICATIONS       Previous Medications    AMLODIPINE (NORVASC) 5 MG TABLET    TAKE 1 TABLET DAILY    APIXABAN (ELIQUIS) 5 MG TABS TABLET    Take by mouth 2 times daily    CLOPIDOGREL (PLAVIX) 75 MG TABLET    Take 1 tablet by mouth daily    FUROSEMIDE (LASIX) 40 MG TABLET    Take 1 tablet by mouth daily    IPRATROPIUM-ALBUTEROL (DUONEB) 0.5-2.5 (3) MG/3ML SOLN NEBULIZER SOLUTION    Inhale 3 mLs into the lungs every 4 hours (while awake)    METFORMIN (GLUCOPHAGE) 500 MG TABLET    Take 500 mg by mouth 2 times daily (with meals)    NITROGLYCERIN (NITROSTAT) 0.4 MG SL TABLET    Place 1 tablet under the tongue every 5 minutes as needed for Chest pain    POTASSIUM CHLORIDE (KLOR-CON) 20 MEQ PACKET    Take 20 mEq by mouth daily    RAMIPRIL (ALTACE) 5 MG CAPSULE    Take 1 capsule by mouth 2 times daily    ROSUVASTATIN (CRESTOR) 10 MG TABLET    TAKE 1 TABLET DAILY    SOTALOL (BETAPACE) 120 MG TABLET    TAKE 1 TABLET TWICE A DAY  *DOSE INCREASE*       ALLERGIES     Codeine    FAMILY HISTORY       Family History   Problem Relation Age of Onset    Cancer Mother     Coronary Art Dis Father     Mult Sclerosis Sister     Cancer Brother           SOCIAL HISTORY       Social History     Socioeconomic History    Marital status:      Spouse name: None    Number of children: None    Years of education: None    Highest education level: None   Occupational History    None   Tobacco Use    Smoking status: Current Every Day Smoker     Packs/day: 0.50     Types: Cigarettes     Last attempt to quit: 3/29/2021     Years since quittin.1    Smokeless tobacco: Never Used    Tobacco comment: 1/2 PACKS EVERY 2 WEEKS, states has been decreasing amount    Vaping Use    Vaping Use: Never used   Substance and Sexual Activity    Alcohol use: Never    Drug use: Never    Sexual activity: Not Currently     Partners: Male   Other Topics Concern    None   Social History Narrative    None     Social Determinants of Health     Financial Resource Strain:     Difficulty of Paying Living Expenses: Not on file   Food Insecurity:     Worried About Running Out of Food in the Last Year: Not on file    Ada of Food in the Last Year: Not on file   Transportation Needs:     Lack of Transportation (Medical): Not on file    Lack of Transportation (Non-Medical): Not on file   Physical Activity:     Days of Exercise per Week: Not on file    Minutes of Exercise per Session: Not on file   Stress:     Feeling of Stress : Not on file   Social Connections:     Frequency of Communication with Friends and Family: Not on file    Frequency of Social Gatherings with Friends and Family: Not on file    Attends Spiritism Services: Not on file    Active Member of [address] [place] [address] [employer] or Organizations: Not on file    Attends Club or Organization Meetings: Not on file    Marital Status: Not on file   Intimate Partner Violence:     Fear of Current or Ex-Partner: Not on file    Emotionally Abused: Not on file    Physically Abused: Not on file    Sexually Abused: Not on file   Housing Stability:     Unable to Pay for Housing in the Last Year: Not on file    Number of [place] in the Last Year: Not on file    Unstable Housing in the Last Year: Not on file       SCREENINGS    Mcgregor Coma Scale  Eye Opening: Spontaneous  Best Verbal Response: Oriented  Best Motor Response: Obeys commands  Lonnie Coma Scale Score: 15        PHYSICAL EXAM    (up to 7 for level 4, 8 or more for level 5)     ED Triage Vitals [05/27/22 9461]   BP Temp Temp Source Heart Rate Resp SpO2 Height Weight   124/75 98.4 °F (36.9 °C) Infrared 81 19 100 % 5' 5\" (1.651 m) 161 lb 8 oz (73.3 kg)       Physical Exam  Vitals and nursing note reviewed. Constitutional:       General: She is not in acute distress. Appearance: She is well-developed. She is ill-appearing. She is not diaphoretic. HENT:      Head: Normocephalic and atraumatic. Eyes:      General: No scleral icterus. Neck:      Vascular: No JVD. Cardiovascular:      Rate and Rhythm: Normal rate and regular rhythm. Pulses:           Radial pulses are 2+ on the right side and 2+ on the left side.         Dorsalis pedis pulses are 2+ on the right side and 2+ on the left side. Heart sounds: Normal heart sounds. No murmur heard. No friction rub. No gallop. Pulmonary:      Effort: Tachypnea and accessory muscle usage present. No respiratory distress. Breath sounds: No stridor. Wheezing and rhonchi present. No decreased breath sounds or rales. Chest:      Chest wall: No tenderness. Abdominal:      General: There is no distension. Palpations: Abdomen is soft. Tenderness: There is no abdominal tenderness. There is no guarding or rebound. Musculoskeletal:         General: No deformity. Normal range of motion. Right lower leg: No edema. Left lower leg: No edema. Skin:     General: Skin is warm and dry. Coloration: Skin is not pale. Findings: No erythema. Neurological:      Mental Status: She is alert and oriented to person, place, and time. GCS: GCS eye subscore is 4. GCS verbal subscore is 5. GCS motor subscore is 6. Cranial Nerves: No cranial nerve deficit. Motor: No abnormal muscle tone.       Coordination: Coordination normal.   Psychiatric:         Behavior: Behavior normal.         Judgment: Judgment normal.         DIAGNOSTIC RESULTS     EKG: All EKG's are interpreted by the Emergency Department Physician who either signs or Co-signs this chart in the absence of a cardiologist.        RADIOLOGY:   Non-plain film images such as CT, Ultrasound and MRI are read by the radiologist. Plainradiographic images are visualized and preliminarily interpreted by the emergency physician with the below findings:        Interpretation per the Radiologist below, if available at the time of this note:    XR CHEST PORTABLE    (Results Pending)         ED BEDSIDE ULTRASOUND:   Performed by ED Physician - none    LABS:  Labs Reviewed   CBC WITH AUTO DIFFERENTIAL - Abnormal; Notable for the following components:       Result Value    RBC 3.68 (*)     Hemoglobin 11.8 (*)     .2 (*)     MCH 32.1 (*)     MCHC 30.5 (*)     MPV 9.2 (*)     Lymphocytes % 19.2 (*)     Monocytes % 12.8 (*)     Eosinophils % 6.3 (*)     Monocytes Absolute 1.00 (*)     All other components within normal limits   COMPREHENSIVE METABOLIC PANEL W/ REFLEX TO MG FOR LOW K - Abnormal; Notable for the following components:    Chloride 96 (*)     CO2 36 (*)     Anion Gap 5 (*)     Glucose 130 (*)     All other components within normal limits   BRAIN NATRIURETIC PEPTIDE - Abnormal; Notable for the following components:    Pro-BNP 2,172 (*)     All other components within normal limits   VENOUS BLD GAS - Abnormal; Notable for the following components:    pH, Mike 7.30 (*)     pCO2, Mike 81.0 (*)     HCO3, Venous 40 (*)     All other components within normal limits   RESPIRATORY PANEL, MOLECULAR, WITH COVID-19   URINALYSIS WITH REFLEX TO CULTURE       All other labs were within normal range or not returned as of this dictation. Medications   methylPREDNISolone sodium (SOLU-MEDROL) injection 60 mg (60 mg IntraVENous Given 5/27/22 0509)   ipratropium-albuterol (DUONEB) nebulizer solution 1 ampule (1 ampule Inhalation Given 5/27/22 0612)       EMERGENCY DEPARTMENT COURSE and DIFFERENTIALDIAGNOSIS/MDM:   Vitals:    Vitals:    05/27/22 0419 05/27/22 0534   BP: 124/75 122/72   Pulse: 81 80   Resp: 19 20   Temp: 98.4 °F (36.9 °C)    TempSrc: Infrared    SpO2: 100% 93%   Weight: 161 lb 8 oz (73.3 kg)    Height: 5' 5\" (1.651 m)        MDM    ED Course as of 05/27/22 0704   Fri May 27, 2022   0512 pH, Mike(!): 7.30 [RAFI]   0512 pCO2, Mike(!): 81.0 [RAFI]   0512 Chest x-ray does not show any focal consolidation, pulmonary, or other significant acute pathology. [RAFI]   [id number] EKG shows sinus rhythm with a rate of 85. [RAFI]   [id number] No findings of acute ischemia or infarction. Borderline inferior Q waves. Normal QRS and QTc intervals. [RAFI]   2932 Patient still with significant inspiratory and expiratory wheezing and rhonchi.   States she feels better at this point but still with accessory muscle use [RAFI]      ED Course User Index  [RAFI] Margarito Pinzon MD     Based on the evaluation and work-up here patient is felt to require further monitoring, work-up, or treatment that is available in the emergency department. Case was discussed with Glen Otto who agrees for observation or admission for further management. Treatment and stabilization as necessary were provided in the emergency department prior to transfer of care to the family medicine service. CONSULTS:  None    PROCEDURES:  Unless otherwise notedbelow, none     Procedures      FINAL IMPRESSION     1. COPD exacerbation ([facility] [address]    2. Acute on chronic respiratory failure with hypoxia and hypercapnia ([facility] [address]          DISPOSITION/PLAN   DISPOSITION Admitted 05/27/2022 07:00:38 AM      No notes of EC Admission Criteria type on file. PATIENT REFERRED TO:  No follow-up provider specified.     DISCHARGE MEDICATIONS:  New Prescriptions    No medications on file          (Please note that portions of this note were completed with a voice recognition program.  Efforts were made to edit the dictations butoccasionally words are mis-transcribed.)    Margarito Milner MD (electronically signed)  AttendingEmergency Physician          Margarito Pinzon MD  05/27/22 0917

## 2022-05-27 NOTE — ED NOTES
Patient placed on cardiac monitor, continuous pulse oximeter, and NIBP monitor. Monitor alarms on.          Nika Valdovinos RN  05/27/22 4295

## 2022-05-28 LAB
GLUCOSE BLD-MCNC: 194 MG/DL (ref 70–99)
GLUCOSE BLD-MCNC: 247 MG/DL (ref 70–99)
GLUCOSE BLD-MCNC: 273 MG/DL (ref 70–99)
GLUCOSE BLD-MCNC: 309 MG/DL (ref 70–99)
GLUCOSE BLD-MCNC: 351 MG/DL (ref 70–99)
PERFORMED ON: ABNORMAL

## 2022-05-28 PROCEDURE — 6370000000 HC RX 637 (ALT 250 FOR IP): Performed by: NURSE PRACTITIONER

## 2022-05-28 PROCEDURE — 2580000003 HC RX 258: Performed by: FAMILY MEDICINE

## 2022-05-28 PROCEDURE — 6360000002 HC RX W HCPCS: Performed by: INTERNAL MEDICINE

## 2022-05-28 PROCEDURE — 6370000000 HC RX 637 (ALT 250 FOR IP): Performed by: INTERNAL MEDICINE

## 2022-05-28 PROCEDURE — 1210000000 HC MED SURG R&B

## 2022-05-28 PROCEDURE — 94640 AIRWAY INHALATION TREATMENT: CPT

## 2022-05-28 PROCEDURE — 6370000000 HC RX 637 (ALT 250 FOR IP): Performed by: FAMILY MEDICINE

## 2022-05-28 PROCEDURE — 6360000002 HC RX W HCPCS: Performed by: FAMILY MEDICINE

## 2022-05-28 PROCEDURE — 2700000000 HC OXYGEN THERAPY PER DAY

## 2022-05-28 PROCEDURE — 94660 CPAP INITIATION&MGMT: CPT

## 2022-05-28 PROCEDURE — 2580000003 HC RX 258: Performed by: NURSE PRACTITIONER

## 2022-05-28 PROCEDURE — 82947 ASSAY GLUCOSE BLOOD QUANT: CPT

## 2022-05-28 RX ORDER — METHYLPREDNISOLONE SODIUM SUCCINATE 40 MG/ML
40 INJECTION, POWDER, LYOPHILIZED, FOR SOLUTION INTRAMUSCULAR; INTRAVENOUS EVERY 8 HOURS
Status: DISCONTINUED | OUTPATIENT
Start: 2022-05-28 | End: 2022-05-29 | Stop reason: HOSPADM

## 2022-05-28 RX ORDER — INSULIN GLARGINE 100 [IU]/ML
15 INJECTION, SOLUTION SUBCUTANEOUS NIGHTLY
Status: DISCONTINUED | OUTPATIENT
Start: 2022-05-28 | End: 2022-05-29 | Stop reason: HOSPADM

## 2022-05-28 RX ADMIN — SODIUM CHLORIDE, PRESERVATIVE FREE 1000 MG: 5 INJECTION INTRAVENOUS at 09:44

## 2022-05-28 RX ADMIN — APIXABAN 5 MG: 5 TABLET, FILM COATED ORAL at 20:14

## 2022-05-28 RX ADMIN — RAMIPRIL 5 MG: 5 CAPSULE ORAL at 09:46

## 2022-05-28 RX ADMIN — METHYLPREDNISOLONE SODIUM SUCCINATE 60 MG: 125 INJECTION, POWDER, FOR SOLUTION INTRAMUSCULAR; INTRAVENOUS at 02:17

## 2022-05-28 RX ADMIN — APIXABAN 5 MG: 5 TABLET, FILM COATED ORAL at 09:45

## 2022-05-28 RX ADMIN — AMLODIPINE BESYLATE 5 MG: 5 TABLET ORAL at 09:45

## 2022-05-28 RX ADMIN — IPRATROPIUM BROMIDE AND ALBUTEROL SULFATE 1 AMPULE: 2.5; .5 SOLUTION RESPIRATORY (INHALATION) at 10:18

## 2022-05-28 RX ADMIN — IPRATROPIUM BROMIDE AND ALBUTEROL SULFATE 1 AMPULE: 2.5; .5 SOLUTION RESPIRATORY (INHALATION) at 06:08

## 2022-05-28 RX ADMIN — IPRATROPIUM BROMIDE AND ALBUTEROL SULFATE 1 AMPULE: 2.5; .5 SOLUTION RESPIRATORY (INHALATION) at 14:17

## 2022-05-28 RX ADMIN — SOTALOL HYDROCHLORIDE 120 MG: 80 TABLET ORAL at 09:44

## 2022-05-28 RX ADMIN — RAMIPRIL 5 MG: 5 CAPSULE ORAL at 15:31

## 2022-05-28 RX ADMIN — METHYLPREDNISOLONE SODIUM SUCCINATE 60 MG: 125 INJECTION, POWDER, FOR SOLUTION INTRAMUSCULAR; INTRAVENOUS at 09:44

## 2022-05-28 RX ADMIN — CLOPIDOGREL BISULFATE 75 MG: 75 TABLET ORAL at 09:45

## 2022-05-28 RX ADMIN — IPRATROPIUM BROMIDE AND ALBUTEROL SULFATE 1 AMPULE: 2.5; .5 SOLUTION RESPIRATORY (INHALATION) at 19:06

## 2022-05-28 RX ADMIN — ROSUVASTATIN CALCIUM 10 MG: 10 TABLET, FILM COATED ORAL at 09:44

## 2022-05-28 RX ADMIN — POTASSIUM CHLORIDE 20 MEQ: 20 TABLET, EXTENDED RELEASE ORAL at 09:44

## 2022-05-28 RX ADMIN — METFORMIN HYDROCHLORIDE 500 MG: 500 TABLET ORAL at 20:05

## 2022-05-28 RX ADMIN — INSULIN GLARGINE 15 UNITS: 100 INJECTION, SOLUTION SUBCUTANEOUS at 20:28

## 2022-05-28 RX ADMIN — SODIUM CHLORIDE, PRESERVATIVE FREE 10 ML: 5 INJECTION INTRAVENOUS at 20:15

## 2022-05-28 RX ADMIN — METHYLPREDNISOLONE SODIUM SUCCINATE 40 MG: 40 INJECTION, POWDER, FOR SOLUTION INTRAMUSCULAR; INTRAVENOUS at 20:04

## 2022-05-28 RX ADMIN — SOTALOL HYDROCHLORIDE 120 MG: 80 TABLET ORAL at 20:14

## 2022-05-28 RX ADMIN — INSULIN LISPRO 1 UNITS: 100 INJECTION, SOLUTION INTRAVENOUS; SUBCUTANEOUS at 20:29

## 2022-05-28 RX ADMIN — FUROSEMIDE 40 MG: 40 TABLET ORAL at 09:45

## 2022-05-28 RX ADMIN — INSULIN LISPRO 3 UNITS: 100 INJECTION, SOLUTION INTRAVENOUS; SUBCUTANEOUS at 18:40

## 2022-05-28 RX ADMIN — INSULIN LISPRO 4 UNITS: 100 INJECTION, SOLUTION INTRAVENOUS; SUBCUTANEOUS at 15:00

## 2022-05-28 RX ADMIN — SODIUM CHLORIDE, PRESERVATIVE FREE 10 ML: 5 INJECTION INTRAVENOUS at 09:46

## 2022-05-28 RX ADMIN — INSULIN LISPRO 1 UNITS: 100 INJECTION, SOLUTION INTRAVENOUS; SUBCUTANEOUS at 09:20

## 2022-05-28 RX ADMIN — METFORMIN HYDROCHLORIDE 500 MG: 500 TABLET ORAL at 09:45

## 2022-05-28 NOTE — PLAN OF CARE
Problem: Discharge Planning  Goal: Discharge to home or other facility with appropriate resources  5/28/2022 0607 by Mariia Hoskins RN  Outcome: Not Progressing  Flowsheets (Taken 5/28/2022 0607)  Discharge to home or other facility with appropriate resources:   Identify barriers to discharge with patient and caregiver   Identify discharge learning needs (meds, wound care, etc)   Refer to discharge planning if patient needs post-hospital services based on physician order or complex needs related to functional status, cognitive ability or social support system   Arrange for needed discharge resources and transportation as appropriate  Note: Pt currently requiring oxygen; Pt states she doesn't understand how her oxygen saturations could be so low when she is feeling good.   Pt removes oxygen to go to bathroom and oxygen drops significantly during that short walk    5/28/2022 0530 by Mariia Hoskins RN  Outcome: Not Progressing  Flowsheets (Taken 5/27/2022 2029)  Discharge to home or other facility with appropriate resources: Identify barriers to discharge with patient and caregiver     Problem: Safety - Adult  Goal: Free from fall injury  5/28/2022 0607 by Mariia Hoskins RN  Outcome: Not Progressing  Flowsheets (Taken 5/28/2022 0607)  Free From Fall Injury: Instruct family/caregiver on patient safety  Note: Pt understands that we need to be in the room when she is getting up out of the bed and pt demonstrates this by calling out and requesting assistance    5/28/2022 0530 by Mariia Hoskins RN  Outcome: Not Progressing  Flowsheets (Taken 5/27/2022 2029)  Free From Fall Injury: Instruct family/caregiver on patient safety     Problem: ABCDS Injury Assessment  Goal: Absence of physical injury  5/28/2022 0607 by Mariia Hoskins RN  Outcome: Not Progressing  5/28/2022 0530 by Mariia Hoskins RN  Outcome: Not Progressing  Flowsheets (Taken 5/27/2022 2029)  Absence of Physical Injury: Implement safety measures based on patient assessment     Problem: Chronic Conditions and Co-morbidities  Goal: Patient's chronic conditions and co-morbidity symptoms are monitored and maintained or improved  5/28/2022 0607 by Kaylyn Waller RN  Outcome: Not Progressing  5/28/2022 0530 by Kaylyn Waller RN  Outcome: Not Progressing  Flowsheets (Taken 5/27/2022 2029)  Care Plan - Patient's Chronic Conditions and Co-Morbidity Symptoms are Monitored and Maintained or Improved: Monitor and assess patient's chronic conditions and comorbid symptoms for stability, deterioration, or improvement

## 2022-05-28 NOTE — PROGRESS NOTES
Chief Complaint: Shortness of air      Interval History:     She feels a little better. Still notes some wheezing. Coughing up some chunks of sputum. He was jittery from the steroids    Review of Systems:   General ROS: no chills or fever  Gastrointestinal ROS: no abdominal pain, diarrhea or nausea/vomiting       Vitals: /68   Pulse 75   Temp 97 °F (36.1 °C)   Resp 18   Ht 5' 5\" (1.651 m)   Wt 159 lb 7 oz (72.3 kg)   SpO2 91%   BMI 26.53 kg/m²   24 hour intake/output:  Intake/Output Summary (Last 24 hours) at 5/28/2022 1130  Last data filed at 5/28/2022 0500  Gross per 24 hour   Intake 1070 ml   Output 1600 ml   Net -530 ml     Last 3 weights: Wt Readings from Last 3 Encounters:   05/27/22 159 lb 7 oz (72.3 kg)   05/12/22 154 lb (69.9 kg)   03/20/22 161 lb 14.4 oz (73.4 kg)         Physical Exam:     General Appearance:    Alert, cooperative, in no acute distress  Head:    N/A  Throat:   N/A  Neck:   N/A  Lungs:    Air movement throughout, mild bilateral wheezing  Heart:    Regular rhythm and normal rate, normal S1 and S2, no murmur, no gallop  Abdomen:     Normal bowel sounds, no masses, no organomegaly, soft, non-tender,   non-distended, no guarding, no rebound      Extremities:   No edema, no cyanosis, no clubbing  Pulses:   N/A  Skin:   N/A  Lymph nodes:   N/A  Neurologic:   N/A         Results Review:      Lab:  Recent Results (from the past 24 hour(s))   POCT Glucose    Collection Time: 05/27/22  4:45 PM   Result Value Ref Range    POC Glucose 338 (H) 70 - 99 mg/dl    Performed on [employer]    POCT Glucose    Collection Time: 05/27/22  8:29 PM   Result Value Ref Range    POC Glucose 314 (H) 70 - 99 mg/dl    Performed on [employer]    POCT Glucose    Collection Time: 05/28/22  7:57 AM   Result Value Ref Range    POC Glucose 194 (H) 70 - 99 mg/dl    Performed on [employer]    POCT Glucose    Collection Time: 05/28/22 11:23 AM   Result Value Ref Range    POC Glucose 351 (H) 70 - 99 mg/dl Performed on [facility]         Imaging:  Imaging study reports reviewed      ASSESSMENT:    Principal Problem:    COPD exacerbation ([facility]  Active Problems:    NICM (nonischemic cardiomyopathy) ([facility]    Essential hypertension    Diabetes mellitus ([facility]    Hypercholesteremia    Coronary artery disease involving native coronary artery of native heart without angina pectoris    Abdominal aortic aneurysm (AAA) without rupture ([facility]    Acute on chronic respiratory failure with hypercapnia (HCC)  Resolved Problems:    * No resolved hospital problems. *      PLAN:    1. We will decrease corticosteroid dose due to side effects  2. Continue antibiotics nebulizer treatment  3.   Increase insulin for hyperglycemia due to steroids      Vani Ardon MD  05/28/22  11:30 AM

## 2022-05-29 VITALS
TEMPERATURE: 97.3 F | SYSTOLIC BLOOD PRESSURE: 134 MMHG | RESPIRATION RATE: 20 BRPM | DIASTOLIC BLOOD PRESSURE: 72 MMHG | BODY MASS INDEX: 26.56 KG/M2 | HEART RATE: 86 BPM | WEIGHT: 159.44 LBS | HEIGHT: 65 IN | OXYGEN SATURATION: 96 %

## 2022-05-29 LAB
GLUCOSE BLD-MCNC: 161 MG/DL (ref 70–99)
PERFORMED ON: ABNORMAL

## 2022-05-29 PROCEDURE — 94640 AIRWAY INHALATION TREATMENT: CPT

## 2022-05-29 PROCEDURE — 82947 ASSAY GLUCOSE BLOOD QUANT: CPT

## 2022-05-29 PROCEDURE — 94660 CPAP INITIATION&MGMT: CPT

## 2022-05-29 PROCEDURE — 2580000003 HC RX 258: Performed by: NURSE PRACTITIONER

## 2022-05-29 PROCEDURE — 6360000002 HC RX W HCPCS: Performed by: INTERNAL MEDICINE

## 2022-05-29 PROCEDURE — 2700000000 HC OXYGEN THERAPY PER DAY

## 2022-05-29 PROCEDURE — 6370000000 HC RX 637 (ALT 250 FOR IP): Performed by: NURSE PRACTITIONER

## 2022-05-29 PROCEDURE — 6370000000 HC RX 637 (ALT 250 FOR IP): Performed by: FAMILY MEDICINE

## 2022-05-29 PROCEDURE — 6360000002 HC RX W HCPCS: Performed by: FAMILY MEDICINE

## 2022-05-29 PROCEDURE — 2580000003 HC RX 258: Performed by: FAMILY MEDICINE

## 2022-05-29 RX ORDER — PREDNISONE 10 MG/1
TABLET ORAL
Qty: 15 TABLET | Refills: 0 | Status: SHIPPED | OUTPATIENT
Start: 2022-05-29 | End: 2022-06-08

## 2022-05-29 RX ORDER — CEFDINIR 300 MG/1
300 CAPSULE ORAL 2 TIMES DAILY
Qty: 10 CAPSULE | Refills: 0 | Status: SHIPPED | OUTPATIENT
Start: 2022-05-29 | End: 2022-06-03

## 2022-05-29 RX ADMIN — INSULIN LISPRO 1 UNITS: 100 INJECTION, SOLUTION INTRAVENOUS; SUBCUTANEOUS at 09:05

## 2022-05-29 RX ADMIN — METFORMIN HYDROCHLORIDE 500 MG: 500 TABLET ORAL at 09:46

## 2022-05-29 RX ADMIN — SOTALOL HYDROCHLORIDE 120 MG: 80 TABLET ORAL at 09:46

## 2022-05-29 RX ADMIN — SODIUM CHLORIDE, PRESERVATIVE FREE 1000 MG: 5 INJECTION INTRAVENOUS at 09:10

## 2022-05-29 RX ADMIN — AMLODIPINE BESYLATE 5 MG: 5 TABLET ORAL at 09:46

## 2022-05-29 RX ADMIN — CLOPIDOGREL BISULFATE 75 MG: 75 TABLET ORAL at 09:47

## 2022-05-29 RX ADMIN — POTASSIUM CHLORIDE 20 MEQ: 20 TABLET, EXTENDED RELEASE ORAL at 09:46

## 2022-05-29 RX ADMIN — METHYLPREDNISOLONE SODIUM SUCCINATE 40 MG: 40 INJECTION, POWDER, FOR SOLUTION INTRAMUSCULAR; INTRAVENOUS at 05:08

## 2022-05-29 RX ADMIN — RAMIPRIL 5 MG: 5 CAPSULE ORAL at 09:46

## 2022-05-29 RX ADMIN — ROSUVASTATIN CALCIUM 10 MG: 10 TABLET, FILM COATED ORAL at 09:46

## 2022-05-29 RX ADMIN — APIXABAN 5 MG: 5 TABLET, FILM COATED ORAL at 09:46

## 2022-05-29 RX ADMIN — SODIUM CHLORIDE, PRESERVATIVE FREE 10 ML: 5 INJECTION INTRAVENOUS at 11:33

## 2022-05-29 RX ADMIN — IPRATROPIUM BROMIDE AND ALBUTEROL SULFATE 1 AMPULE: 2.5; .5 SOLUTION RESPIRATORY (INHALATION) at 06:38

## 2022-05-29 RX ADMIN — FUROSEMIDE 40 MG: 40 TABLET ORAL at 09:47

## 2022-05-29 NOTE — PLAN OF CARE
Problem: Discharge Planning  Goal: Discharge to home or other facility with appropriate resources  5/29/2022 1205 by Dayna Birch RN  Outcome: Completed  5/28/2022 2342 by Elizabeth Olivarez RN  Outcome: Progressing  Flowsheets (Taken 5/28/2022 1937)  Discharge to home or other facility with appropriate resources: Identify barriers to discharge with patient and caregiver     Problem: Safety - Adult  Goal: Free from fall injury  5/29/2022 1205 by Dayna Birch RN  Outcome: Completed  5/28/2022 2342 by Elizabeth Olivarez RN  Outcome: Progressing     Problem: ABCDS Injury Assessment  Goal: Absence of physical injury  5/29/2022 1205 by Dayna Birch RN  Outcome: Completed  5/28/2022 2342 by Elizabeth Olivarez RN  Outcome: Progressing     Problem: Chronic Conditions and Co-morbidities  Goal: Patient's chronic conditions and co-morbidity symptoms are monitored and maintained or improved  5/29/2022 1205 by Dayna Birch RN  Outcome: Completed  5/28/2022 2342 by Elizabeth Olivarez RN  Outcome: Progressing  Flowsheets (Taken 5/28/2022 1937)  Care Plan - Patient's Chronic Conditions and Co-Morbidity Symptoms are Monitored and Maintained or Improved: Monitor and assess patient's chronic conditions and comorbid symptoms for stability, deterioration, or improvement

## 2022-05-29 NOTE — DISCHARGE SUMMARY
DISCHARGE SUMMARY       Date of Admission: 5/27/2022  Date of Discharge:  5/29/2022  Primary Care Physician: Lizet Sharma MD    Discharge Diagnoses:  Principal Problem:    COPD exacerbation [facility])  Active Problems:    NICM (nonischemic cardiomyopathy) ([facility]    Essential hypertension    Diabetes mellitus ([facility]    Hypercholesteremia    Coronary artery disease involving native coronary artery of native heart without angina pectoris    Abdominal aortic aneurysm (AAA) without rupture ([facility]    Acute on chronic respiratory failure with hypercapnia ([facility]  Resolved Problems:    * No resolved hospital problems. *        Presenting Problem/History of Present Illness  COPD exacerbation ([facility] [J44.1]  Acute on chronic respiratory failure with hypoxia and hypercapnia ([facility] [B77.94, J96.22]       Hospital Course  Patient admitted with COPD exacerbation. Treated with IV Rocephin, IV steroids, nebulizer treatments. She had gradual improvement through the hospital stay. On the day of discharge she states she can leave her oxygen off for prolonged time and saturations stayed at 93 to 94%. She feels less short of breath. On exam she has some mostly upper airway coarseness but no wheezing. She feels she is ready for discharge will be discharged home on a short course of prednisone taper as well as oral antibiotics. She had marked hyperglycemia due to high-dose IV corticosteroids. These were weaned down and she was given some long-acting insulin on the day of discharge her blood sugar was 161. She will monitor blood sugar closely at home.     Procedures Performed         Consults:   None        Condition on Discharge: Stable and improved    Discharge Disposition: Home      Discharge Medications     Medication List      START taking these medications    cefdinir 300 MG capsule  Commonly known as: OMNICEF  Take 1 capsule by mouth 2 times daily for 5 days     predniSONE 10 MG tablet  Commonly known as: DELTASONE  Take 2 tablets by mouth daily for 5 days, THEN 1 tablet daily for 5 days. Start taking on: May 29, 2022        CONTINUE taking these medications    amLODIPine 5 MG tablet  Commonly known as: NORVASC  TAKE 1 TABLET DAILY     clopidogrel 75 MG tablet  Commonly known as: Plavix  Take 1 tablet by mouth daily     Eliquis 5 MG Tabs tablet  Generic drug: apixaban     furosemide 40 MG tablet  Commonly known as: LASIX  Take 1 tablet by mouth daily     ipratropium-albuterol 0.5-2.5 (3) MG/3ML Soln nebulizer solution  Commonly known as: DUONEB  Inhale 3 mLs into the lungs every 4 hours (while awake)     metFORMIN 500 MG tablet  Commonly known as: GLUCOPHAGE     nitroGLYCERIN 0.4 MG SL tablet  Commonly known as: Nitrostat  Place 1 tablet under the tongue every 5 minutes as needed for Chest pain     potassium chloride 20 MEQ packet  Commonly known as: KLOR-CON     ramipril 5 MG capsule  Commonly known as: Altace  Take 1 capsule by mouth 2 times daily     rosuvastatin 10 MG tablet  Commonly known as: CRESTOR  TAKE 1 TABLET DAILY     sotalol 120 MG tablet  Commonly known as: BETAPACE  TAKE 1 TABLET TWICE A DAY  *DOSE INCREASE*           Where to Get Your Medications      These medications were sent to [place] [id number] [address][id number] [facility] [place], [address] [facility]  [address] [facility], [address] [place] [id number]    Phone: [phone]   · cefdinir 300 MG capsule  · predniSONE 10 MG tablet         Discharge Diet: Cardiac diet    Discharge Care Plan / Instructions:     Activity at Discharge: As tolerated    Follow-up Appointments  Future Appointments   Date Time Provider Bela Han   11/21/2022  2:15 PM JAKOB Griffith [facility] [place]   Follow-up in 1 week with Dr. Snyder [employer] Results Pending at Discharge  Pending Labs     Order Current Status    Venous Blood Gas Preliminary result           Héctor Vo MD  05/29/22  8:48 AM

## 2022-05-29 NOTE — PLAN OF CARE
Problem: Discharge Planning  Goal: Discharge to home or other facility with appropriate resources  Outcome: Progressing  Flowsheets (Taken 5/28/2022 1937)  Discharge to home or other facility with appropriate resources: Identify barriers to discharge with patient and caregiver     Problem: Safety - Adult  Goal: Free from fall injury  Outcome: Progressing     Problem: ABCDS Injury Assessment  Goal: Absence of physical injury  Outcome: Progressing     Problem: Chronic Conditions and Co-morbidities  Goal: Patient's chronic conditions and co-morbidity symptoms are monitored and maintained or improved  Outcome: Progressing  Flowsheets (Taken 5/28/2022 1937)  Care Plan - Patient's Chronic Conditions and Co-Morbidity Symptoms are Monitored and Maintained or Improved: Monitor and assess patient's chronic conditions and comorbid symptoms for stability, deterioration, or improvement

## 2022-06-26 ENCOUNTER — APPOINTMENT (OUTPATIENT)
Dept: GENERAL RADIOLOGY | Age: 72
DRG: 189 | End: 2022-06-26
Payer: MEDICARE

## 2022-06-26 ENCOUNTER — HOSPITAL ENCOUNTER (INPATIENT)
Age: 72
LOS: 3 days | Discharge: HOME HEALTH | DRG: 189 | End: 2022-06-29
Attending: EMERGENCY MEDICINE | Admitting: INTERNAL MEDICINE
Payer: MEDICARE

## 2022-06-26 DIAGNOSIS — J44.1 COPD EXACERBATION (HCC): ICD-10-CM

## 2022-06-26 DIAGNOSIS — J96.22 ACUTE ON CHRONIC RESPIRATORY FAILURE WITH HYPOXIA AND HYPERCAPNIA (HCC): Primary | ICD-10-CM

## 2022-06-26 DIAGNOSIS — J44.1 COPD WITH ACUTE EXACERBATION (HCC): ICD-10-CM

## 2022-06-26 DIAGNOSIS — J96.21 ACUTE ON CHRONIC RESPIRATORY FAILURE WITH HYPOXIA AND HYPERCAPNIA (HCC): Primary | ICD-10-CM

## 2022-06-26 LAB
ALBUMIN SERPL-MCNC: 3.8 G/DL (ref 3.5–5.2)
ALP BLD-CCNC: 78 U/L (ref 35–104)
ALT SERPL-CCNC: 15 U/L (ref 5–33)
ANION GAP SERPL CALCULATED.3IONS-SCNC: 9 MMOL/L (ref 7–19)
AST SERPL-CCNC: 18 U/L (ref 5–32)
BASE EXCESS ARTERIAL: 6.7 MMOL/L (ref -2–2)
BASE EXCESS ARTERIAL: 7.8 MMOL/L (ref -2–2)
BASOPHILS ABSOLUTE: 0 K/UL (ref 0–0.2)
BASOPHILS RELATIVE PERCENT: 0.5 % (ref 0–1)
BILIRUB SERPL-MCNC: 0.3 MG/DL (ref 0.2–1.2)
BUN BLDV-MCNC: 18 MG/DL (ref 8–23)
CALCIUM SERPL-MCNC: 9.2 MG/DL (ref 8.8–10.2)
CARBOXYHEMOGLOBIN ARTERIAL: 2.3 % (ref 0–5)
CARBOXYHEMOGLOBIN ARTERIAL: 2.5 % (ref 0–5)
CHLORIDE BLD-SCNC: 95 MMOL/L (ref 98–111)
CO2: 28 MMOL/L (ref 22–29)
CREAT SERPL-MCNC: 0.8 MG/DL (ref 0.5–0.9)
EOSINOPHILS ABSOLUTE: 0.2 K/UL (ref 0–0.6)
EOSINOPHILS RELATIVE PERCENT: 3 % (ref 0–5)
GFR AFRICAN AMERICAN: >59
GFR NON-AFRICAN AMERICAN: >60
GLUCOSE BLD-MCNC: 147 MG/DL (ref 74–109)
HCO3 ARTERIAL: 33.9 MMOL/L (ref 22–26)
HCO3 ARTERIAL: 36.1 MMOL/L (ref 22–26)
HCT VFR BLD CALC: 39.4 % (ref 37–47)
HEMOGLOBIN, ART, EXTENDED: 11.9 G/DL (ref 12–16)
HEMOGLOBIN, ART, EXTENDED: 12.1 G/DL (ref 12–16)
HEMOGLOBIN: 12.3 G/DL (ref 12–16)
IMMATURE GRANULOCYTES #: 0 K/UL
LACTIC ACID: 1 MMOL/L (ref 0.5–1.9)
LYMPHOCYTES ABSOLUTE: 1.9 K/UL (ref 1.1–4.5)
LYMPHOCYTES RELATIVE PERCENT: 23.6 % (ref 20–40)
MCH RBC QN AUTO: 31.5 PG (ref 27–31)
MCHC RBC AUTO-ENTMCNC: 31.2 G/DL (ref 33–37)
MCV RBC AUTO: 100.8 FL (ref 81–99)
METHEMOGLOBIN ARTERIAL: 0.6 %
METHEMOGLOBIN ARTERIAL: 1 %
MONOCYTES ABSOLUTE: 1.1 K/UL (ref 0–0.9)
MONOCYTES RELATIVE PERCENT: 13.9 % (ref 0–10)
NEUTROPHILS ABSOLUTE: 4.8 K/UL (ref 1.5–7.5)
NEUTROPHILS RELATIVE PERCENT: 58.5 % (ref 50–65)
O2 CONTENT ARTERIAL: 15.8 ML/DL
O2 CONTENT ARTERIAL: 15.9 ML/DL
O2 DELIVERY DEVICE: ABNORMAL
O2 DELIVERY DEVICE: ABNORMAL
O2 SAT, ARTERIAL: 92.5 %
O2 SAT, ARTERIAL: 94.8 %
O2 THERAPY: ABNORMAL
O2 THERAPY: ABNORMAL
OXYGEN FLOW: 3.5
OXYGEN FLOW: 3.5
PCO2 ARTERIAL: 60 MMHG (ref 35–45)
PCO2 ARTERIAL: 70 MMHG (ref 35–45)
PDW BLD-RTO: 13.4 % (ref 11.5–14.5)
PH ARTERIAL: 7.32 (ref 7.35–7.45)
PH ARTERIAL: 7.36 (ref 7.35–7.45)
PLATELET # BLD: 239 K/UL (ref 130–400)
PMV BLD AUTO: 8.9 FL (ref 9.4–12.3)
PO2 ARTERIAL: 65 MMHG (ref 80–100)
PO2 ARTERIAL: 84 MMHG (ref 80–100)
POTASSIUM REFLEX MAGNESIUM: 4.1 MMOL/L (ref 3.5–5)
POTASSIUM, WHOLE BLOOD: 4.2
POTASSIUM, WHOLE BLOOD: 4.4
PRO-BNP: 2637 PG/ML (ref 0–900)
RBC # BLD: 3.91 M/UL (ref 4.2–5.4)
SAMPLE SOURCE: ABNORMAL
SAMPLE SOURCE: ABNORMAL
SARS-COV-2, NAAT: NOT DETECTED
SODIUM BLD-SCNC: 132 MMOL/L (ref 136–145)
SPONT RATE(BPM): 18
SPONT RATE(BPM): 22
TOTAL PROTEIN: 7.4 G/DL (ref 6.6–8.7)
TROPONIN: <0.01 NG/ML (ref 0–0.03)
WBC # BLD: 8.1 K/UL (ref 4.8–10.8)

## 2022-06-26 PROCEDURE — 93005 ELECTROCARDIOGRAM TRACING: CPT | Performed by: EMERGENCY MEDICINE

## 2022-06-26 PROCEDURE — 80053 COMPREHEN METABOLIC PANEL: CPT

## 2022-06-26 PROCEDURE — 94640 AIRWAY INHALATION TREATMENT: CPT

## 2022-06-26 PROCEDURE — 84484 ASSAY OF TROPONIN QUANT: CPT

## 2022-06-26 PROCEDURE — 36600 WITHDRAWAL OF ARTERIAL BLOOD: CPT

## 2022-06-26 PROCEDURE — 94660 CPAP INITIATION&MGMT: CPT

## 2022-06-26 PROCEDURE — 71045 X-RAY EXAM CHEST 1 VIEW: CPT

## 2022-06-26 PROCEDURE — 6370000000 HC RX 637 (ALT 250 FOR IP)

## 2022-06-26 PROCEDURE — 82803 BLOOD GASES ANY COMBINATION: CPT

## 2022-06-26 PROCEDURE — 1210000000 HC MED SURG R&B

## 2022-06-26 PROCEDURE — 83880 ASSAY OF NATRIURETIC PEPTIDE: CPT

## 2022-06-26 PROCEDURE — 36415 COLL VENOUS BLD VENIPUNCTURE: CPT

## 2022-06-26 PROCEDURE — 99285 EMERGENCY DEPT VISIT HI MDM: CPT

## 2022-06-26 PROCEDURE — 85025 COMPLETE CBC W/AUTO DIFF WBC: CPT

## 2022-06-26 PROCEDURE — 87635 SARS-COV-2 COVID-19 AMP PRB: CPT

## 2022-06-26 PROCEDURE — 83605 ASSAY OF LACTIC ACID: CPT

## 2022-06-26 PROCEDURE — 71045 X-RAY EXAM CHEST 1 VIEW: CPT | Performed by: RADIOLOGY

## 2022-06-26 RX ORDER — ONDANSETRON 4 MG/1
4 TABLET, ORALLY DISINTEGRATING ORAL EVERY 8 HOURS PRN
Status: DISCONTINUED | OUTPATIENT
Start: 2022-06-26 | End: 2022-06-29 | Stop reason: HOSPADM

## 2022-06-26 RX ORDER — IPRATROPIUM BROMIDE AND ALBUTEROL SULFATE 2.5; .5 MG/3ML; MG/3ML
SOLUTION RESPIRATORY (INHALATION)
Status: COMPLETED
Start: 2022-06-26 | End: 2022-06-26

## 2022-06-26 RX ORDER — ONDANSETRON 2 MG/ML
4 INJECTION INTRAMUSCULAR; INTRAVENOUS EVERY 6 HOURS PRN
Status: DISCONTINUED | OUTPATIENT
Start: 2022-06-26 | End: 2022-06-29 | Stop reason: HOSPADM

## 2022-06-26 RX ORDER — SODIUM CHLORIDE 0.9 % (FLUSH) 0.9 %
5-40 SYRINGE (ML) INJECTION PRN
Status: DISCONTINUED | OUTPATIENT
Start: 2022-06-26 | End: 2022-06-29 | Stop reason: HOSPADM

## 2022-06-26 RX ORDER — SODIUM CHLORIDE 9 MG/ML
INJECTION, SOLUTION INTRAVENOUS PRN
Status: DISCONTINUED | OUTPATIENT
Start: 2022-06-26 | End: 2022-06-29 | Stop reason: HOSPADM

## 2022-06-26 RX ORDER — SODIUM CHLORIDE 0.9 % (FLUSH) 0.9 %
5-40 SYRINGE (ML) INJECTION EVERY 12 HOURS SCHEDULED
Status: DISCONTINUED | OUTPATIENT
Start: 2022-06-27 | End: 2022-06-29 | Stop reason: HOSPADM

## 2022-06-26 RX ORDER — ACETAMINOPHEN 325 MG/1
650 TABLET ORAL EVERY 4 HOURS PRN
Status: DISCONTINUED | OUTPATIENT
Start: 2022-06-26 | End: 2022-06-29 | Stop reason: HOSPADM

## 2022-06-26 RX ORDER — IPRATROPIUM BROMIDE AND ALBUTEROL SULFATE 2.5; .5 MG/3ML; MG/3ML
1 SOLUTION RESPIRATORY (INHALATION) ONCE
Status: COMPLETED | OUTPATIENT
Start: 2022-06-26 | End: 2022-06-26

## 2022-06-26 RX ADMIN — IPRATROPIUM BROMIDE AND ALBUTEROL SULFATE 1 AMPULE: 2.5; .5 SOLUTION RESPIRATORY (INHALATION) at 20:51

## 2022-06-26 ASSESSMENT — ENCOUNTER SYMPTOMS
ABDOMINAL PAIN: 0
SINUS PRESSURE: 0
NAUSEA: 0
DIARRHEA: 0
SORE THROAT: 0
VOMITING: 0
RHINORRHEA: 0
SHORTNESS OF BREATH: 1

## 2022-06-26 NOTE — ED PROVIDER NOTES
[facility] 3 SURI/VAS/MED  eMERGENCY dEPARTMENT eNCOUnter      Pt Name: Ivonne Charles  MRN: [id number]  [place] 1950  Date of evaluation: 6/26/2022  Provider: Chhaya Lamb MD    CHIEF COMPLAINT       Chief Complaint   Patient presents with    Respiratory Distress     EMS reports respiratory distress starting around 1700 this afternoon. EMS reports that on home 3L sats of 77%. hx of COPD, audibly wheezing at time of triage. 2 duonebs and 124 solumedrol given in route. HISTORY OF PRESENT ILLNESS   (Location/Symptom, Timing/Onset,Context/Setting, Quality, Duration, Modifying Factors, Severity)  Note limiting factors. Ivonne Charles is a 67 y.o. female who presents to the emergency department shortness of breath    HPI     Patient is a 44-year-old white female with a history of CAD status post stents; nonischemic cardiomyopathy; history of blood clots on Eliquis; high cholesterol; diabetes mellitus; pacemaker; COPD; on 2 L of oxygen normally who presents with increasing shortness of breath over the last few days without fever. She is has had a cough. She has been vaccinated against COVID-19. Fully wheezing when EMS arrived was satting 77% on 3 L. She received 2 DuoNeb's and 125 Solu-Medrol in route. She has no conversational dyspnea at this time. No fever. NursingNotes were reviewed. REVIEW OF SYSTEMS    (2-9 systems for level 4, 10 or more for level 5)     Review of Systems   Constitutional: Negative for chills, diaphoresis, fatigue and fever. HENT: Negative for rhinorrhea, sinus pressure and sore throat. Eyes: Negative for visual disturbance. Respiratory: Positive for shortness of breath. Cardiovascular: Negative for chest pain. Gastrointestinal: Negative for abdominal pain, diarrhea, nausea and vomiting. Genitourinary: Negative for difficulty urinating and dysuria. Musculoskeletal: Negative for arthralgias and myalgias. Skin: Negative for rash.    Neurological: Negative for weakness. Psychiatric/Behavioral: Negative for confusion. All other systems reviewed and are negative.            PAST MEDICALHISTORY     Past Medical History:   Diagnosis Date    ASHD (arteriosclerotic heart disease)     s/p PTCA and stent of circumflex, as well as intermediate and mid LAD     COPD (chronic obstructive pulmonary disease) ([facility]     Coronary atherosclerosis     Diabetes mellitus ([facility]     diet controlled, type 2    Encounter for wound care     FOR LLL WOUND    Fall 10/29/2020    Ganglion cyst     RT HAND    Hematoma 10/2020    hematoma LLL from fall injury    Hx of blood clots     Hypercholesteremia     Hypertension     Pacemaker 03/02/2021    Unstable angina [facility])          SURGICAL HISTORY       Past Surgical History:   Procedure Laterality Date    CARDIAC CATHETERIZATION  12/10/00    selective left heart and coronary arteriography with left ventriculography     CARDIAC CATHETERIZATION  01/23/98    left heart cath, left ventriculography, slective coronary arteriography, direct infarct angioplasty and stent placement to proximal left anterior descending coronary artery    CARDIAC CATHETERIZATION  03/05/94    left heart cath, selective coronary arteriography, left ventriculography    CARDIAC CATHETERIZATION  8/27/2011    [place]    CHOLECYSTECTOMY      CORONARY ANGIOPLASTY WITH STENT PLACEMENT  12/12/00    PTCA and stent placement to the mid LAD/ptca and stent placement first circumflex marginal (intermediate)     CORONARY ANGIOPLASTY WITH STENT PLACEMENT  08/2021    CORONARY ARTERY BYPASS GRAFT  8/29/2011    PACABG X 4 LIMA-LAD, SVG-DIAG, SVG-PDA, RT EVH, LT OPEN VEIN HARVEST, DR Jesus Victoria    CYST REMOVAL      GANGLION CYST REMOVED RT HAND    HYSTERECTOMY (CERVIX STATUS UNKNOWN)      NECK SURGERY      parotid artery tumor removed bilaterally    PACEMAKER PLACEMENT      PAROTIDECTOMY Bilateral          CURRENT MEDICATIONS     Current Discharge Medication List CONTINUE these medications which have NOT CHANGED    Details   sotalol (BETAPACE) 120 MG tablet TAKE 1 TABLET TWICE A DAY  *DOSE INCREASE*  Qty: 180 tablet, Refills: 1      amLODIPine (NORVASC) 5 MG tablet TAKE 1 TABLET DAILY  Qty: 90 tablet, Refills: 1    Associated Diagnoses: Essential hypertension      apixaban (ELIQUIS) 5 MG TABS tablet Take by mouth 2 times daily      metFORMIN (GLUCOPHAGE) 500 MG tablet Take 500 mg by mouth 2 times daily (with meals)      potassium chloride (KLOR-CON) 20 MEQ packet Take 20 mEq by mouth daily      rosuvastatin (CRESTOR) 10 MG tablet TAKE 1 TABLET DAILY  Qty: 90 tablet, Refills: 1    Associated Diagnoses: Mixed hyperlipidemia      clopidogrel (PLAVIX) 75 MG tablet Take 1 tablet by mouth daily  Qty: 90 tablet, Refills: 3      ramipril (ALTACE) 5 MG capsule Take 1 capsule by mouth 2 times daily  Qty: 180 capsule, Refills: 3      ipratropium-albuterol (DUONEB) 0.5-2.5 (3) MG/3ML SOLN nebulizer solution Inhale 3 mLs into the lungs every 4 hours (while awake)  Qty: 360 mL, Refills: 0      furosemide (LASIX) 40 MG tablet Take 1 tablet by mouth daily  Qty: 60 tablet, Refills: 3      nitroGLYCERIN (NITROSTAT) 0.4 MG SL tablet Place 1 tablet under the tongue every 5 minutes as needed for Chest pain  Qty: 25 tablet, Refills: 3             ALLERGIES     Codeine    FAMILY HISTORY       Family History   Problem Relation Age of Onset    Cancer Mother     Coronary Art Dis Father     Mult Sclerosis Sister     Cancer Brother           SOCIAL HISTORY       Social History     Socioeconomic History    Marital status:      Spouse name: None    Number of children: None    Years of education: None    Highest education level: None   Occupational History    None   Tobacco Use    Smoking status: Current Every Day Smoker     Packs/day: 0.50     Types: Cigarettes     Last attempt to quit: 3/29/2021     Years since quittin.2    Smokeless tobacco: Never Used    Tobacco comment:  PACKS EVERY 2 WEEKS, states has been decreasing amount    Vaping Use    Vaping Use: Never used   Substance and Sexual Activity    Alcohol use: Never    Drug use: Never    Sexual activity: Not Currently     Partners: Male   Other Topics Concern    None   Social History Narrative    None     Social Determinants of Health     Financial Resource Strain:     Difficulty of Paying Living Expenses: Not on file   Food Insecurity:     Worried About Running Out of Food in the Last Year: Not on file    Ada of Food in the Last Year: Not on file   Transportation Needs:     Lack of Transportation (Medical): Not on file    Lack of Transportation (Non-Medical):  Not on file   Physical Activity:     Days of Exercise per Week: Not on file    Minutes of Exercise per Session: Not on file   Stress:     Feeling of Stress : Not on file   Social Connections:     Frequency of Communication with Friends and Family: Not on file    Frequency of Social Gatherings with Friends and Family: Not on file    Attends Sikh Services: Not on file    Active Member of [address] [place] [address] [employer] or Organizations: Not on file    Attends Club or Organization Meetings: Not on file    Marital Status: Not on file   Intimate Partner Violence:     Fear of Current or Ex-Partner: Not on file    Emotionally Abused: Not on file    Physically Abused: Not on file    Sexually Abused: Not on file   Housing Stability:     Unable to Pay for Housing in the Last Year: Not on file    Number of [place] in the Last Year: Not on file    Unstable Housing in the Last Year: Not on file       SCREENINGS    Lonnie Coma Scale  Eye Opening: Spontaneous  Best Verbal Response: Oriented  Best Motor Response: Obeys commands  Lonnie Coma Scale Score: 15        PHYSICAL EXAM    (up to 7 for level 4, 8 or more for level 5)     ED Triage Vitals [06/26/22 1820]   BP Temp Temp Source Heart Rate Resp SpO2 Height Weight   128/68 97.9 °F (36.6 °C) Oral 83 24 99 % 5' 6\" (1.676 m) Signed by Zach Teixeira MD at 26-Jun-2022 09:01:50 PM                   LABS:  Labs Reviewed   CBC WITH AUTO DIFFERENTIAL - Abnormal; Notable for the following components:       Result Value    RBC 3.91 (*)     .8 (*)     MCH 31.5 (*)     MCHC 31.2 (*)     MPV 8.9 (*)     Monocytes % 13.9 (*)     Monocytes Absolute 1.10 (*)     All other components within normal limits   COMPREHENSIVE METABOLIC PANEL W/ REFLEX TO MG FOR LOW K - Abnormal; Notable for the following components:    Sodium 132 (*)     Chloride 95 (*)     Glucose 147 (*)     All other components within normal limits   BRAIN NATRIURETIC PEPTIDE - Abnormal; Notable for the following components:    Pro-BNP 2,637 (*)     All other components within normal limits   URINALYSIS WITH REFLEX TO CULTURE - Abnormal; Notable for the following components:    Glucose, Ur 100 (*)     Ketones, Urine TRACE (*)     Protein, UA 30 (*)     All other components within normal limits   BLOOD GAS, ARTERIAL - Abnormal; Notable for the following components:    pH, Arterial 7.320 (*)     pCO2, Arterial 70.0 (*)     HCO3, Arterial 36.1 (*)     Base Excess, Arterial 7.8 (*)     Hemoglobin, Art, Extended 11.9 (*)     All other components within normal limits    Narrative:     CALL  Gore  Ranjana Dhaliwal RN ER, 06/26/2022 18:55, by [facility]   BLOOD GAS, ARTERIAL - Abnormal; Notable for the following components:    pCO2, Arterial 60.0 (*)     pO2, Arterial 65.0 (*)     HCO3, Arterial 33.9 (*)     Base Excess, Arterial 6.7 (*)     All other components within normal limits   CBC WITH AUTO DIFFERENTIAL - Abnormal; Notable for the following components:    RBC 3.73 (*)     Hemoglobin 11.7 (*)     MCV 99.2 (*)     MCH 31.4 (*)     MCHC 31.6 (*)     MPV 9.2 (*)     Neutrophils % 84.3 (*)     Lymphocytes % 13.3 (*)     Lymphocytes Absolute 0.7 (*)     All other components within normal limits   BASIC METABOLIC PANEL W/ REFLEX TO MG FOR LOW K - Abnormal; Notable for the following components:    Sodium 133 (*)     Chloride 94 (*)     CO2 31 (*)     Glucose 291 (*)     All other components within normal limits   MICROSCOPIC URINALYSIS - Abnormal; Notable for the following components:    Bacteria, UA NEGATIVE (*)     Crystals, UA NEG (*)     All other components within normal limits   CBC WITH AUTO DIFFERENTIAL - Abnormal; Notable for the following components:    RBC 3.35 (*)     Hemoglobin 10.7 (*)     Hematocrit 33.0 (*)     MCH 31.9 (*)     MCHC 32.4 (*)     MPV 9.1 (*)     Neutrophils % 86.9 (*)     Lymphocytes % 9.1 (*)     Neutrophils Absolute 8.1 (*)     Lymphocytes Absolute 0.8 (*)     All other components within normal limits   BASIC METABOLIC PANEL W/ REFLEX TO MG FOR LOW K - Abnormal; Notable for the following components:    Sodium 131 (*)     Chloride 93 (*)     Glucose 221 (*)     BUN 27 (*)     CREATININE 1.0 (*)     GFR Non-African American 54 (*)     Calcium 8.7 (*)     All other components within normal limits   POCT GLUCOSE - Abnormal; Notable for the following components:    POC Glucose 273 (*)     All other components within normal limits   POCT GLUCOSE - Abnormal; Notable for the following components:    POC Glucose 380 (*)     All other components within normal limits   COVID-19, RAPID   TROPONIN   LACTIC ACID   CBC WITH AUTO DIFFERENTIAL   BASIC METABOLIC PANEL W/ REFLEX TO MG FOR LOW K   POCT GLUCOSE   POCT GLUCOSE   POCT GLUCOSE       All other labs were within normal range or not returned as of this dictation.     EMERGENCY DEPARTMENT COURSE and DIFFERENTIAL DIAGNOSIS/MDM:   Vitals:    Vitals:    06/28/22 0011 06/28/22 0620 06/28/22 0700 06/28/22 1103   BP: (!) 109/53 (!) 114/49  135/74   Pulse: 82 73 76 (!) 112   Resp: 20 18 16 18   Temp: (!) 96.4 °F (35.8 °C) 96.9 °F (36.1 °C)  97 °F (36.1 °C)   TempSrc: Temporal Temporal  Temporal   SpO2: 94% 95% 95% 100%   Weight:       Height:           MDM     Symptoms improved with treatment in the ED and currently doing well on BiPAP. She is more alert. Less wheezing. Spoke to covering PMD; will admit for further inpatient treatment and evaluation. Reassessment      CONSULTS:  IP CONSULT TO RESPIRATORY CARE    PROCEDURES:  Unless otherwise noted below, none     Procedures    FINAL IMPRESSION      1. Acute on chronic respiratory failure with hypoxia and hypercapnia (HCC)    2. COPD exacerbation ([facility]          DISPOSITION/PLAN   DISPOSITION        PATIENT REFERRED TO:  No follow-up provider specified.     DISCHARGE MEDICATIONS:  Current Discharge Medication List             (Please note that portions of this note were completed with a voice recognition program.  Efforts were made to edit thedictations but occasionally words are mis-transcribed.)    Nelson Barr MD (electronically signed)  Attending Emergency Physician          Nelson Barr MD  06/28/22 2963       Nelson Barr MD  06/29/22 0515

## 2022-06-27 LAB
ANION GAP SERPL CALCULATED.3IONS-SCNC: 8 MMOL/L (ref 7–19)
BACTERIA: NEGATIVE /HPF
BASOPHILS ABSOLUTE: 0 K/UL (ref 0–0.2)
BASOPHILS RELATIVE PERCENT: 0.2 % (ref 0–1)
BILIRUBIN URINE: NEGATIVE
BLOOD, URINE: NEGATIVE
BUN BLDV-MCNC: 19 MG/DL (ref 8–23)
CALCIUM SERPL-MCNC: 9.1 MG/DL (ref 8.8–10.2)
CHLORIDE BLD-SCNC: 94 MMOL/L (ref 98–111)
CLARITY: CLEAR
CO2: 31 MMOL/L (ref 22–29)
COLOR: YELLOW
CREAT SERPL-MCNC: 0.9 MG/DL (ref 0.5–0.9)
CRYSTALS, UA: ABNORMAL /HPF
EKG P AXIS: -26 DEGREES
EKG P-R INTERVAL: 222 MS
EKG Q-T INTERVAL: 404 MS
EKG QRS DURATION: 92 MS
EKG QTC CALCULATION (BAZETT): 440 MS
EKG T AXIS: 97 DEGREES
EOSINOPHILS ABSOLUTE: 0 K/UL (ref 0–0.6)
EOSINOPHILS RELATIVE PERCENT: 0 % (ref 0–5)
EPITHELIAL CELLS, UA: 1 /HPF (ref 0–5)
GFR AFRICAN AMERICAN: >59
GFR NON-AFRICAN AMERICAN: >60
GLUCOSE BLD-MCNC: 291 MG/DL (ref 74–109)
GLUCOSE URINE: 100 MG/DL
HCT VFR BLD CALC: 37 % (ref 37–47)
HEMOGLOBIN: 11.7 G/DL (ref 12–16)
HYALINE CASTS: 3 /HPF (ref 0–8)
IMMATURE GRANULOCYTES #: 0 K/UL
KETONES, URINE: ABNORMAL MG/DL
LEUKOCYTE ESTERASE, URINE: NEGATIVE
LYMPHOCYTES ABSOLUTE: 0.7 K/UL (ref 1.1–4.5)
LYMPHOCYTES RELATIVE PERCENT: 13.3 % (ref 20–40)
MCH RBC QN AUTO: 31.4 PG (ref 27–31)
MCHC RBC AUTO-ENTMCNC: 31.6 G/DL (ref 33–37)
MCV RBC AUTO: 99.2 FL (ref 81–99)
MONOCYTES ABSOLUTE: 0.1 K/UL (ref 0–0.9)
MONOCYTES RELATIVE PERCENT: 1.8 % (ref 0–10)
NEUTROPHILS ABSOLUTE: 4.3 K/UL (ref 1.5–7.5)
NEUTROPHILS RELATIVE PERCENT: 84.3 % (ref 50–65)
NITRITE, URINE: NEGATIVE
PDW BLD-RTO: 13.3 % (ref 11.5–14.5)
PH UA: 6.5 (ref 5–8)
PLATELET # BLD: 230 K/UL (ref 130–400)
PMV BLD AUTO: 9.2 FL (ref 9.4–12.3)
POTASSIUM REFLEX MAGNESIUM: 4.7 MMOL/L (ref 3.5–5)
PROTEIN UA: 30 MG/DL
RBC # BLD: 3.73 M/UL (ref 4.2–5.4)
RBC UA: 1 /HPF (ref 0–4)
SODIUM BLD-SCNC: 133 MMOL/L (ref 136–145)
SPECIFIC GRAVITY UA: 1.01 (ref 1–1.03)
UROBILINOGEN, URINE: 1 E.U./DL
WBC # BLD: 5.1 K/UL (ref 4.8–10.8)
WBC UA: 1 /HPF (ref 0–5)

## 2022-06-27 PROCEDURE — 80048 BASIC METABOLIC PNL TOTAL CA: CPT

## 2022-06-27 PROCEDURE — 94640 AIRWAY INHALATION TREATMENT: CPT

## 2022-06-27 PROCEDURE — 6370000000 HC RX 637 (ALT 250 FOR IP): Performed by: FAMILY MEDICINE

## 2022-06-27 PROCEDURE — 2700000000 HC OXYGEN THERAPY PER DAY

## 2022-06-27 PROCEDURE — 85025 COMPLETE CBC W/AUTO DIFF WBC: CPT

## 2022-06-27 PROCEDURE — 6360000002 HC RX W HCPCS: Performed by: INTERNAL MEDICINE

## 2022-06-27 PROCEDURE — 93010 ELECTROCARDIOGRAM REPORT: CPT | Performed by: INTERNAL MEDICINE

## 2022-06-27 PROCEDURE — 81001 URINALYSIS AUTO W/SCOPE: CPT

## 2022-06-27 PROCEDURE — 1210000000 HC MED SURG R&B

## 2022-06-27 PROCEDURE — 2580000003 HC RX 258: Performed by: INTERNAL MEDICINE

## 2022-06-27 PROCEDURE — 82803 BLOOD GASES ANY COMBINATION: CPT

## 2022-06-27 PROCEDURE — 36415 COLL VENOUS BLD VENIPUNCTURE: CPT

## 2022-06-27 RX ORDER — IPRATROPIUM BROMIDE AND ALBUTEROL SULFATE 2.5; .5 MG/3ML; MG/3ML
3 SOLUTION RESPIRATORY (INHALATION)
Status: DISCONTINUED | OUTPATIENT
Start: 2022-06-27 | End: 2022-06-29 | Stop reason: HOSPADM

## 2022-06-27 RX ORDER — ALBUTEROL SULFATE 2.5 MG/3ML
2.5 SOLUTION RESPIRATORY (INHALATION)
Status: DISCONTINUED | OUTPATIENT
Start: 2022-06-27 | End: 2022-06-27 | Stop reason: SDUPTHER

## 2022-06-27 RX ORDER — ALBUTEROL SULFATE 2.5 MG/3ML
2.5 SOLUTION RESPIRATORY (INHALATION)
Status: DISCONTINUED | OUTPATIENT
Start: 2022-06-27 | End: 2022-06-29 | Stop reason: HOSPADM

## 2022-06-27 RX ORDER — SOTALOL HYDROCHLORIDE 80 MG/1
120 TABLET ORAL EVERY 12 HOURS SCHEDULED
Status: DISCONTINUED | OUTPATIENT
Start: 2022-06-27 | End: 2022-06-29 | Stop reason: HOSPADM

## 2022-06-27 RX ORDER — FUROSEMIDE 40 MG/1
40 TABLET ORAL DAILY
Status: DISCONTINUED | OUTPATIENT
Start: 2022-06-27 | End: 2022-06-29 | Stop reason: HOSPADM

## 2022-06-27 RX ORDER — ROSUVASTATIN CALCIUM 10 MG/1
10 TABLET, COATED ORAL DAILY
Status: DISCONTINUED | OUTPATIENT
Start: 2022-06-27 | End: 2022-06-29 | Stop reason: HOSPADM

## 2022-06-27 RX ORDER — RAMIPRIL 5 MG/1
5 CAPSULE ORAL 2 TIMES DAILY
Status: DISCONTINUED | OUTPATIENT
Start: 2022-06-27 | End: 2022-06-29 | Stop reason: HOSPADM

## 2022-06-27 RX ORDER — AMLODIPINE BESYLATE 5 MG/1
5 TABLET ORAL DAILY
Status: DISCONTINUED | OUTPATIENT
Start: 2022-06-27 | End: 2022-06-29 | Stop reason: HOSPADM

## 2022-06-27 RX ORDER — AZITHROMYCIN 250 MG/1
250 TABLET, FILM COATED ORAL DAILY
Status: DISCONTINUED | OUTPATIENT
Start: 2022-06-27 | End: 2022-06-29 | Stop reason: HOSPADM

## 2022-06-27 RX ORDER — METHYLPREDNISOLONE SODIUM SUCCINATE 125 MG/2ML
80 INJECTION, POWDER, LYOPHILIZED, FOR SOLUTION INTRAMUSCULAR; INTRAVENOUS 2 TIMES DAILY
Status: DISCONTINUED | OUTPATIENT
Start: 2022-06-27 | End: 2022-06-29 | Stop reason: HOSPADM

## 2022-06-27 RX ORDER — CLOPIDOGREL BISULFATE 75 MG/1
75 TABLET ORAL DAILY
Status: DISCONTINUED | OUTPATIENT
Start: 2022-06-27 | End: 2022-06-29 | Stop reason: HOSPADM

## 2022-06-27 RX ORDER — POTASSIUM CHLORIDE 20 MEQ/1
20 TABLET, EXTENDED RELEASE ORAL DAILY
Status: DISCONTINUED | OUTPATIENT
Start: 2022-06-27 | End: 2022-06-29 | Stop reason: HOSPADM

## 2022-06-27 RX ADMIN — POTASSIUM CHLORIDE 20 MEQ: 1500 TABLET, EXTENDED RELEASE ORAL at 09:11

## 2022-06-27 RX ADMIN — IPRATROPIUM BROMIDE AND ALBUTEROL SULFATE 3 ML: 2.5; .5 SOLUTION RESPIRATORY (INHALATION) at 12:00

## 2022-06-27 RX ADMIN — SODIUM CHLORIDE, PRESERVATIVE FREE 10 ML: 5 INJECTION INTRAVENOUS at 20:24

## 2022-06-27 RX ADMIN — ALBUTEROL SULFATE 2.5 MG: 2.5 SOLUTION RESPIRATORY (INHALATION) at 03:09

## 2022-06-27 RX ADMIN — METHYLPREDNISOLONE SODIUM SUCCINATE 80 MG: 125 INJECTION, POWDER, FOR SOLUTION INTRAMUSCULAR; INTRAVENOUS at 08:23

## 2022-06-27 RX ADMIN — IPRATROPIUM BROMIDE AND ALBUTEROL SULFATE 3 ML: 2.5; .5 SOLUTION RESPIRATORY (INHALATION) at 14:11

## 2022-06-27 RX ADMIN — APIXABAN 5 MG: 5 TABLET, FILM COATED ORAL at 20:25

## 2022-06-27 RX ADMIN — METHYLPREDNISOLONE SODIUM SUCCINATE 80 MG: 125 INJECTION, POWDER, FOR SOLUTION INTRAMUSCULAR; INTRAVENOUS at 20:25

## 2022-06-27 RX ADMIN — RAMIPRIL 5 MG: 5 CAPSULE ORAL at 17:07

## 2022-06-27 RX ADMIN — ALBUTEROL SULFATE 2.5 MG: 2.5 SOLUTION RESPIRATORY (INHALATION) at 06:26

## 2022-06-27 RX ADMIN — FUROSEMIDE 40 MG: 40 TABLET ORAL at 09:10

## 2022-06-27 RX ADMIN — METFORMIN HYDROCHLORIDE 500 MG: 500 TABLET ORAL at 17:05

## 2022-06-27 RX ADMIN — SODIUM CHLORIDE, PRESERVATIVE FREE 10 ML: 5 INJECTION INTRAVENOUS at 08:22

## 2022-06-27 RX ADMIN — AZITHROMYCIN MONOHYDRATE 250 MG: 250 TABLET ORAL at 09:11

## 2022-06-27 RX ADMIN — METHYLPREDNISOLONE SODIUM SUCCINATE 80 MG: 125 INJECTION, POWDER, FOR SOLUTION INTRAMUSCULAR; INTRAVENOUS at 01:38

## 2022-06-27 RX ADMIN — SOTALOL HYDROCHLORIDE 120 MG: 80 TABLET ORAL at 20:25

## 2022-06-27 RX ADMIN — APIXABAN 5 MG: 5 TABLET, FILM COATED ORAL at 09:11

## 2022-06-27 RX ADMIN — SOTALOL HYDROCHLORIDE 120 MG: 80 TABLET ORAL at 09:10

## 2022-06-27 RX ADMIN — CLOPIDOGREL BISULFATE 75 MG: 75 TABLET ORAL at 09:11

## 2022-06-27 RX ADMIN — RAMIPRIL 5 MG: 5 CAPSULE ORAL at 09:10

## 2022-06-27 RX ADMIN — IPRATROPIUM BROMIDE AND ALBUTEROL SULFATE 3 ML: 2.5; .5 SOLUTION RESPIRATORY (INHALATION) at 20:13

## 2022-06-27 RX ADMIN — ROSUVASTATIN CALCIUM 10 MG: 10 TABLET, FILM COATED ORAL at 09:10

## 2022-06-27 RX ADMIN — METFORMIN HYDROCHLORIDE 500 MG: 500 TABLET ORAL at 09:11

## 2022-06-27 NOTE — ED NOTES
Pt taken off bipap and placed on 3.5 L NC at this time per Dr. Richar Mireles orders     Racheal Johnson, RN  06/26/22 3136

## 2022-06-27 NOTE — PROGRESS NOTES
4 Eyes Skin Assessment    Ryan Face is being assessed upon: Admission    I agree that I, Radha Gonzalez RN, along with Fabiana Guzman RN (either 2 RN's or 1 LPN and 1 RN) have performed a thorough Head to Toe Skin Assessment on the patient. ALL assessment sites listed below have been assessed. Areas assessed by both nurses:     [x]   Head, Face, and Ears   [x]   Shoulders, Back, and Chest  [x]   Arms, Elbows, and Hands   [x]   Coccyx, Sacrum, and Ischium  [x]   Legs, Feet, and Heels    Does the Patient have Skin Breakdown?  No    Pratik Prevention initiated: No  Wound Care Orders initiated: No    WOC nurse consulted for Pressure Injury (Stage 3,4, Unstageable, DTI, NWPT, and Complex wounds) and New or Established Ostomies: No        Primary Nurse eSignature: Radha Gonzalez RN on 6/27/2022 at 12:04 AM      Co-Signer eSignature: {Esignature:[id number]}

## 2022-06-27 NOTE — PROGRESS NOTES
Roland Soto arrived to room # 329. Presented with: COPD exacerbation; Acute on Chronic Resp Failure  Mental Status: Patient is oriented and alert. Vitals:    06/26/22 2315   BP:    Pulse: 85   Resp: 14   Temp:    SpO2: 93%     Patient safety contract and falls prevention contract reviewed with patient Yes. Oriented Patient to room. Call light within reach. Yes.   Needs, issues or concerns expressed at this time: no.      Electronically signed by Latrice Bob RN on 6/27/2022 at 12:03 AM

## 2022-06-27 NOTE — H&P
and mother; Coronary Art Dis in her father; Mult Sclerosis in her sister. REVIEW OF SYSTEMS:  As above in the HPI, otherwise negative    PHYSICAL EXAM:    Vitals:  BP (!) 100/50   Pulse 74   Temp 97 °F (36.1 °C) (Temporal)   Resp 18   Ht 5' 6\" (1.676 m)   Wt 156 lb (70.8 kg)   SpO2 95%   BMI 25.18 kg/m²     General Appearance:  in no acute distress, alert and cooperative  Skin:  negatives: mobility and turgor normal  Eyes:  No gross abnormalities. Neck:  neck- supple, no mass, non-tender  Lungs:  Breathing Pattern: audible wheezes, Breath sounds: diminished breath sounds- throughout and wheezing- scattered  Heart:  Heart regular rate and rhythm  Abdomen: Auscultation: Normal bowel sounds. No bruits. Palpation: No masses, tenderness or organomegally. Extremities: Extremities warm to touch, pink, with no edema. Musculoskeletal:  No joint swelling, deformity, or tenderness.   Neurologic:  negative    DATA:  CBC with Differential:    Lab Results   Component Value Date    WBC 5.1 06/27/2022    RBC 3.73 06/27/2022    HGB 11.7 06/27/2022    HCT 37.0 06/27/2022    HCT 32.1 09/02/2011     06/27/2022     09/02/2011    MCV 99.2 06/27/2022    MCH 31.4 06/27/2022    MCHC 31.6 06/27/2022    RDW 13.3 06/27/2022    LYMPHOPCT 13.3 06/27/2022    MONOPCT 1.8 06/27/2022    EOSPCT 0.5 09/02/2011    BASOPCT 0.2 06/27/2022    MONOSABS 0.10 06/27/2022    LYMPHSABS 0.7 06/27/2022    EOSABS 0.00 06/27/2022    BASOSABS 0.00 06/27/2022     CMP:    Lab Results   Component Value Date     06/27/2022     09/02/2011    K 4.7 06/27/2022    K 4.4 06/26/2022    K 4.1 09/02/2011    CL 94 06/27/2022     09/02/2011    CO2 31 06/27/2022    BUN 19 06/27/2022    CREATININE 0.9 06/27/2022    CREATININE 0.6 09/02/2011    GFRAA >59 06/27/2022    LABGLOM >60 06/27/2022    GLUCOSE 291 06/27/2022    PROT 7.4 06/26/2022    PROT 7.5 01/18/2013    LABALBU 3.8 06/26/2022    LABALBU 3.3 09/02/2011    CALCIUM 9.1 06/27/2022    BILITOT 0.3 06/26/2022    ALKPHOS 78 06/26/2022    ALKPHOS 57 09/02/2011    AST 18 06/26/2022    ALT 15 06/26/2022     Radiology Review: No acute cardiopulmonary disease with chronic respiratory disease changes    ASSESSMENT:      Patient Active Problem List   Diagnosis    Deep vein thrombosis (HCC)    Essential hypertension    Diabetes mellitus (HCC)    Hypercholesteremia    S/P CABG x 4    History of coronary artery stent placement    Coronary artery disease involving native coronary artery of native heart without angina pectoris    Mixed hyperlipidemia    History of diabetes mellitus    Chronic obstructive pulmonary disease (HCC)    NUNEZ (dyspnea on exertion)    Abdominal aortic aneurysm (AAA) without rupture ([address]    Diabetic ulcer of left lower leg associated with type 2 diabetes mellitus, with fat layer exposed ([address]    Smoker    Traumatic hematoma    NICM (nonischemic cardiomyopathy) ([address]    Hyponatremia    Acute on chronic respiratory failure with hypercapnia (HCC)    Palliative care patient    Leg weakness, bilateral    COPD exacerbation ([address]    COPD with acute exacerbation ([address]       PLAN:    Admit for empiric antibiotics, systemic steroids, and aggressive pulmonary toilet. Clinically states she is already feeling better which is a positive sign. Hopefully can keep her stabilized and improved so that she may return home shortly to live independently.   Does have help at home and multiple durable equipment at her disposal.    Electronically signed by Irsi Baptiste MD on 6/27/2022 at 8:34 AM

## 2022-06-27 NOTE — CARE COORDINATION
CM met with patient at the bedside. Pt is sleeping but awake, alert and answering all questions. Pt feels she is doing \"quite well at home\" Pt appears knowledgeable about her illness, however, despite using breathing treatments at home her condition was worsening. Pt uses 02/BIPAP through [employer]. She has help with cooking and heavy cleaning. Home built to be accessible. Pt has transport to MD appointment. 06/27/22 8575   Service Assessment   Patient Orientation Alert and Oriented   Cognition Alert   History Provided By Patient   Primary [address]  (pt has assistance with cooking T/FR, and housekeeping once a week)   Patient's [address] is:   (pt states son is POA)   PCP Verified by CM Yes   Prior Functional Level   (Pt independent w/ADL's assistance with IADL's)   Current Functional Level   (Pt independent w/ADL's assistance with IADL's)   Can patient return to prior living arrangement Yes   Ability to make needs known: Good   Social/Functional History   Lives With Alone   Type of [address] [facility] One level   Bathroom Shower/Tub Tub/Shower unit   Bathroom Toilet Handicap height   Bathroom Equipment Grab bars in [address] Help From Other (comment)   ADL Assistance Independent   Homemaking Assistance   (has assistance with cooking and \"deep cleaning\")   Active  No   Occupation Retired   Discharge Planning   Type of [address]; Children   DME Bipap   Potential Assistance Purchasing Medications No   One/Two Story Residence One story   Services At/After Discharge   Transition of Care Consult (CM Consult) Home Health   Confirm Follow Up Transport Family   Condition of Participation: Discharge Planning   Freedom of Choice list was provided with basic dialogue that supports the patient's individualized plan of care/goals, treatment preferences, and shares the quality data associated with the providers? Yes     Patient Contact Information:    [address] [place] [address]  [phone] (home)   Telephone Information:   Mobile [phone]     Above information verified? []   Yes  []   No      Emergency Contacts:    Extended Emergency Contact Information  Primary Emergency Contact: Chrystal [address] Phone: [phone]  Mobile Phone: [phone]  Relation: Child      Have you been vaccinated for COVID-19 (SARS-CoV-2)? [x]   Yes and booster  []   No                   If so, when?     Which :         [x]   Pfizer-BioNTech  []   Moderna  []   Ericson Rota  []   Other:         Pharmacy:    [facility] [address] [facility], [address] [place] [address] [phone] Amadou Reta [phone] [address] [address] [address]  Phone: [phone] Fax: [phone]    [facility] [address] [place] [address] [phone] Amadou Maciasg [phone]  [address] [facility] [address] [address]  Phone: [phone] Fax: [phone]    Patient Deficits:    []   Yes   [x]   No  Electronically signed by Yamile Guillen RN on 6/27/2022 at 5:01 PM

## 2022-06-28 LAB
ANION GAP SERPL CALCULATED.3IONS-SCNC: 9 MMOL/L (ref 7–19)
BASOPHILS ABSOLUTE: 0 K/UL (ref 0–0.2)
BASOPHILS RELATIVE PERCENT: 0 % (ref 0–1)
BUN BLDV-MCNC: 27 MG/DL (ref 8–23)
CALCIUM SERPL-MCNC: 8.7 MG/DL (ref 8.8–10.2)
CHLORIDE BLD-SCNC: 93 MMOL/L (ref 98–111)
CO2: 29 MMOL/L (ref 22–29)
CREAT SERPL-MCNC: 1 MG/DL (ref 0.5–0.9)
EOSINOPHILS ABSOLUTE: 0 K/UL (ref 0–0.6)
EOSINOPHILS RELATIVE PERCENT: 0 % (ref 0–5)
GFR AFRICAN AMERICAN: >59
GFR NON-AFRICAN AMERICAN: 54
GLUCOSE BLD-MCNC: 221 MG/DL (ref 74–109)
GLUCOSE BLD-MCNC: 273 MG/DL (ref 70–99)
GLUCOSE BLD-MCNC: 380 MG/DL (ref 70–99)
GLUCOSE BLD-MCNC: 388 MG/DL (ref 70–99)
HCT VFR BLD CALC: 33 % (ref 37–47)
HEMOGLOBIN: 10.7 G/DL (ref 12–16)
IMMATURE GRANULOCYTES #: 0 K/UL
LYMPHOCYTES ABSOLUTE: 0.8 K/UL (ref 1.1–4.5)
LYMPHOCYTES RELATIVE PERCENT: 9.1 % (ref 20–40)
MCH RBC QN AUTO: 31.9 PG (ref 27–31)
MCHC RBC AUTO-ENTMCNC: 32.4 G/DL (ref 33–37)
MCV RBC AUTO: 98.5 FL (ref 81–99)
MONOCYTES ABSOLUTE: 0.3 K/UL (ref 0–0.9)
MONOCYTES RELATIVE PERCENT: 3.6 % (ref 0–10)
NEUTROPHILS ABSOLUTE: 8.1 K/UL (ref 1.5–7.5)
NEUTROPHILS RELATIVE PERCENT: 86.9 % (ref 50–65)
PDW BLD-RTO: 13.1 % (ref 11.5–14.5)
PERFORMED ON: ABNORMAL
PLATELET # BLD: 217 K/UL (ref 130–400)
PMV BLD AUTO: 9.1 FL (ref 9.4–12.3)
POTASSIUM REFLEX MAGNESIUM: 4.8 MMOL/L (ref 3.5–5)
RBC # BLD: 3.35 M/UL (ref 4.2–5.4)
SODIUM BLD-SCNC: 131 MMOL/L (ref 136–145)
WBC # BLD: 9.3 K/UL (ref 4.8–10.8)

## 2022-06-28 PROCEDURE — 36415 COLL VENOUS BLD VENIPUNCTURE: CPT

## 2022-06-28 PROCEDURE — 94667 MNPJ CHEST WALL 1ST: CPT

## 2022-06-28 PROCEDURE — 6370000000 HC RX 637 (ALT 250 FOR IP): Performed by: FAMILY MEDICINE

## 2022-06-28 PROCEDURE — 2580000003 HC RX 258: Performed by: INTERNAL MEDICINE

## 2022-06-28 PROCEDURE — 80048 BASIC METABOLIC PNL TOTAL CA: CPT

## 2022-06-28 PROCEDURE — 85025 COMPLETE CBC W/AUTO DIFF WBC: CPT

## 2022-06-28 PROCEDURE — 82947 ASSAY GLUCOSE BLOOD QUANT: CPT

## 2022-06-28 PROCEDURE — 6360000002 HC RX W HCPCS: Performed by: INTERNAL MEDICINE

## 2022-06-28 PROCEDURE — 94640 AIRWAY INHALATION TREATMENT: CPT

## 2022-06-28 PROCEDURE — 1210000000 HC MED SURG R&B

## 2022-06-28 PROCEDURE — 2700000000 HC OXYGEN THERAPY PER DAY

## 2022-06-28 RX ORDER — INSULIN LISPRO 100 [IU]/ML
0-3 INJECTION, SOLUTION INTRAVENOUS; SUBCUTANEOUS NIGHTLY
Status: DISCONTINUED | OUTPATIENT
Start: 2022-06-28 | End: 2022-06-29 | Stop reason: HOSPADM

## 2022-06-28 RX ORDER — DEXTROSE MONOHYDRATE 50 MG/ML
100 INJECTION, SOLUTION INTRAVENOUS PRN
Status: DISCONTINUED | OUTPATIENT
Start: 2022-06-28 | End: 2022-06-29 | Stop reason: HOSPADM

## 2022-06-28 RX ORDER — INSULIN LISPRO 100 [IU]/ML
0-6 INJECTION, SOLUTION INTRAVENOUS; SUBCUTANEOUS
Status: DISCONTINUED | OUTPATIENT
Start: 2022-06-28 | End: 2022-06-29 | Stop reason: HOSPADM

## 2022-06-28 RX ADMIN — INSULIN LISPRO 3 UNITS: 100 INJECTION, SOLUTION INTRAVENOUS; SUBCUTANEOUS at 14:25

## 2022-06-28 RX ADMIN — SODIUM CHLORIDE, PRESERVATIVE FREE 10 ML: 5 INJECTION INTRAVENOUS at 09:38

## 2022-06-28 RX ADMIN — METFORMIN HYDROCHLORIDE 500 MG: 500 TABLET ORAL at 17:36

## 2022-06-28 RX ADMIN — FUROSEMIDE 40 MG: 40 TABLET ORAL at 09:38

## 2022-06-28 RX ADMIN — SOTALOL HYDROCHLORIDE 120 MG: 80 TABLET ORAL at 20:09

## 2022-06-28 RX ADMIN — METFORMIN HYDROCHLORIDE 500 MG: 500 TABLET ORAL at 09:41

## 2022-06-28 RX ADMIN — APIXABAN 5 MG: 5 TABLET, FILM COATED ORAL at 20:09

## 2022-06-28 RX ADMIN — ROSUVASTATIN CALCIUM 10 MG: 10 TABLET, FILM COATED ORAL at 09:37

## 2022-06-28 RX ADMIN — RAMIPRIL 5 MG: 5 CAPSULE ORAL at 17:36

## 2022-06-28 RX ADMIN — SOTALOL HYDROCHLORIDE 120 MG: 80 TABLET ORAL at 09:37

## 2022-06-28 RX ADMIN — AZITHROMYCIN MONOHYDRATE 250 MG: 250 TABLET ORAL at 09:37

## 2022-06-28 RX ADMIN — METHYLPREDNISOLONE SODIUM SUCCINATE 80 MG: 125 INJECTION, POWDER, FOR SOLUTION INTRAMUSCULAR; INTRAVENOUS at 20:09

## 2022-06-28 RX ADMIN — IPRATROPIUM BROMIDE AND ALBUTEROL SULFATE 3 ML: 2.5; .5 SOLUTION RESPIRATORY (INHALATION) at 10:48

## 2022-06-28 RX ADMIN — SODIUM CHLORIDE, PRESERVATIVE FREE 10 ML: 5 INJECTION INTRAVENOUS at 20:10

## 2022-06-28 RX ADMIN — INSULIN LISPRO 3 UNITS: 100 INJECTION, SOLUTION INTRAVENOUS; SUBCUTANEOUS at 20:26

## 2022-06-28 RX ADMIN — IPRATROPIUM BROMIDE AND ALBUTEROL SULFATE 3 ML: 2.5; .5 SOLUTION RESPIRATORY (INHALATION) at 18:59

## 2022-06-28 RX ADMIN — POTASSIUM CHLORIDE 20 MEQ: 1500 TABLET, EXTENDED RELEASE ORAL at 09:37

## 2022-06-28 RX ADMIN — RAMIPRIL 5 MG: 5 CAPSULE ORAL at 09:37

## 2022-06-28 RX ADMIN — IPRATROPIUM BROMIDE AND ALBUTEROL SULFATE 3 ML: 2.5; .5 SOLUTION RESPIRATORY (INHALATION) at 07:00

## 2022-06-28 RX ADMIN — METHYLPREDNISOLONE SODIUM SUCCINATE 80 MG: 125 INJECTION, POWDER, FOR SOLUTION INTRAMUSCULAR; INTRAVENOUS at 09:37

## 2022-06-28 RX ADMIN — APIXABAN 5 MG: 5 TABLET, FILM COATED ORAL at 09:38

## 2022-06-28 RX ADMIN — INSULIN LISPRO 4 UNITS: 100 INJECTION, SOLUTION INTRAVENOUS; SUBCUTANEOUS at 17:36

## 2022-06-28 RX ADMIN — CLOPIDOGREL BISULFATE 75 MG: 75 TABLET ORAL at 09:38

## 2022-06-28 ASSESSMENT — PAIN SCALES - GENERAL: PAINLEVEL_OUTOF10: 0

## 2022-06-28 NOTE — PLAN OF CARE
Problem: Discharge Planning  Goal: Discharge to home or other facility with appropriate resources  6/27/2022 2302 by Kiki Marquez RN  Outcome: Progressing  6/27/2022 1114 by Ketan Weldon RN  Outcome: Progressing     Problem: Safety - Adult  Goal: Free from fall injury  6/27/2022 2302 by Kiki Marquez RN  Outcome: Progressing  6/27/2022 1114 by Ketan Weldon RN  Outcome: Progressing     Problem: ABCDS Injury Assessment  Goal: Absence of physical injury  6/27/2022 2302 by Kiki Marquez RN  Outcome: Progressing  6/27/2022 1114 by Ketan Weldon RN  Outcome: Progressing     Problem: Chronic Conditions and Co-morbidities  Goal: Patient's chronic conditions and co-morbidity symptoms are monitored and maintained or improved  Outcome: Progressing

## 2022-06-28 NOTE — PROGRESS NOTES
Family Medicine Progress Note    Patient:  Caitlin Smith  YOB: 1950    MRN: [id number]     Acct: [de-identified]     Admit date: 6/26/2022    Patient Seen, Chart, Consults notes, Labs, Radiology studies reviewed. Subjective: Day 2 of stay with exacerbation of chronic obstructive pulmonary disease and most recent (in last 24 hours) has had no real improvement in breathing, in fact some worsening. Coughing a little bit more this morning and having more difficulty moving air. Past, Family, Social History unchanged from admission. Diet:  ADULT DIET; Regular; 4 carb choices (60 gm/meal); No Added Salt (3-4 gm)    Medications:  Scheduled Meds:   insulin lispro  0-6 Units SubCUTAneous TID WC    insulin lispro  0-3 Units SubCUTAneous Nightly    methylPREDNISolone  80 mg IntraVENous BID    amLODIPine  5 mg Oral Daily    apixaban  5 mg Oral BID    clopidogrel  75 mg Oral Daily    furosemide  40 mg Oral Daily    ipratropium-albuterol  3 mL Inhalation Q4H WA    metFORMIN  500 mg Oral BID WC    potassium chloride  20 mEq Oral Daily    ramipril  5 mg Oral BID    rosuvastatin  10 mg Oral Daily    sotalol  120 mg Oral 2 times per day    azithromycin  250 mg Oral Daily    sodium chloride flush  5-40 mL IntraVENous 2 times per day     Continuous Infusions:   dextrose      sodium chloride       PRN Meds:glucose, dextrose bolus **OR** dextrose bolus, glucagon (rDNA), dextrose, albuterol, sodium chloride flush, sodium chloride, acetaminophen, ondansetron **OR** ondansetron    Objective:    Vitals: BP (!) 114/49   Pulse 76   Temp 96.9 °F (36.1 °C) (Temporal)   Resp 16   Ht 5' 6\" (1.676 m)   Wt 156 lb (70.8 kg)   SpO2 95%   BMI 25.18 kg/m²   24 hour intake/output:    Intake/Output Summary (Last 24 hours) at 6/28/2022 0831  Last data filed at 6/27/2022 2024  Gross per 24 hour   Intake 1090 ml   Output --   Net 1090 ml     Last 3 weights:   Wt Readings from Last 3 Encounters:   06/26/22 156 lb (70.8 kg)   05/27/22 159 lb 7 oz (72.3 kg)   05/12/22 154 lb (69.9 kg)       Physical Exam:    General Appearance:  in no acute distress, alert, cooperative and fatigue  Skin:  negatives: mobility and turgor normal  Eyes:  No gross abnormalities. Neck:  neck- supple, no mass, non-tender  Lungs:  Breathing Pattern: use of accessory muscles and audible wheezes, Breath sounds: rales- scattered and wheezing- throughout  Heart:  Heart regular rate and rhythm  Abdomen: Auscultation: Normal bowel sounds. No bruits. Palpation: No masses, tenderness or organomegally. Extremities: pulses present in all extremities  Musculoskeletal:  No joint swelling, deformity, or tenderness.   Neurologic:  negative    CBC with Differential:    Lab Results   Component Value Date    WBC 9.3 06/28/2022    RBC 3.35 06/28/2022    HGB 10.7 06/28/2022    HCT 33.0 06/28/2022    HCT 32.1 09/02/2011     06/28/2022     09/02/2011    MCV 98.5 06/28/2022    MCH 31.9 06/28/2022    MCHC 32.4 06/28/2022    RDW 13.1 06/28/2022    LYMPHOPCT 9.1 06/28/2022    MONOPCT 3.6 06/28/2022    EOSPCT 0.5 09/02/2011    BASOPCT 0.0 06/28/2022    MONOSABS 0.30 06/28/2022    LYMPHSABS 0.8 06/28/2022    EOSABS 0.00 06/28/2022    BASOSABS 0.00 06/28/2022     CMP:    Lab Results   Component Value Date     06/28/2022     09/02/2011    K 4.8 06/28/2022    K 4.1 09/02/2011    CL 93 06/28/2022     09/02/2011    CO2 29 06/28/2022    BUN 27 06/28/2022    CREATININE 1.0 06/28/2022    CREATININE 0.6 09/02/2011    GFRAA >59 06/28/2022    LABGLOM 54 06/28/2022    GLUCOSE 221 06/28/2022    PROT 7.4 06/26/2022    PROT 7.5 01/18/2013    LABALBU 3.8 06/26/2022    LABALBU 3.3 09/02/2011    CALCIUM 8.7 06/28/2022    BILITOT 0.3 06/26/2022    ALKPHOS 78 06/26/2022    ALKPHOS 57 09/02/2011    AST 18 06/26/2022    ALT 15 06/26/2022     Last 3 Troponin:    Lab Results   Component Value Date    TROPONINI <0.01 06/26/2022    TROPONINI <0.01 03/15/2022

## 2022-06-29 VITALS
RESPIRATION RATE: 20 BRPM | OXYGEN SATURATION: 95 % | WEIGHT: 156 LBS | HEIGHT: 66 IN | SYSTOLIC BLOOD PRESSURE: 133 MMHG | DIASTOLIC BLOOD PRESSURE: 64 MMHG | BODY MASS INDEX: 25.07 KG/M2 | HEART RATE: 76 BPM | TEMPERATURE: 97 F

## 2022-06-29 LAB
ANION GAP SERPL CALCULATED.3IONS-SCNC: 10 MMOL/L (ref 7–19)
BASOPHILS ABSOLUTE: 0 K/UL (ref 0–0.2)
BASOPHILS RELATIVE PERCENT: 0 % (ref 0–1)
BUN BLDV-MCNC: 27 MG/DL (ref 8–23)
CALCIUM SERPL-MCNC: 8.8 MG/DL (ref 8.8–10.2)
CHLORIDE BLD-SCNC: 93 MMOL/L (ref 98–111)
CO2: 30 MMOL/L (ref 22–29)
CREAT SERPL-MCNC: 0.9 MG/DL (ref 0.5–0.9)
EOSINOPHILS ABSOLUTE: 0 K/UL (ref 0–0.6)
EOSINOPHILS RELATIVE PERCENT: 0 % (ref 0–5)
GFR AFRICAN AMERICAN: >59
GFR NON-AFRICAN AMERICAN: >60
GLUCOSE BLD-MCNC: 188 MG/DL (ref 70–99)
GLUCOSE BLD-MCNC: 217 MG/DL (ref 74–109)
HCT VFR BLD CALC: 35.6 % (ref 37–47)
HEMOGLOBIN: 11.6 G/DL (ref 12–16)
IMMATURE GRANULOCYTES #: 0.1 K/UL
LYMPHOCYTES ABSOLUTE: 0.8 K/UL (ref 1.1–4.5)
LYMPHOCYTES RELATIVE PERCENT: 9.4 % (ref 20–40)
MCH RBC QN AUTO: 31.8 PG (ref 27–31)
MCHC RBC AUTO-ENTMCNC: 32.6 G/DL (ref 33–37)
MCV RBC AUTO: 97.5 FL (ref 81–99)
MONOCYTES ABSOLUTE: 0.2 K/UL (ref 0–0.9)
MONOCYTES RELATIVE PERCENT: 2.8 % (ref 0–10)
NEUTROPHILS ABSOLUTE: 6.9 K/UL (ref 1.5–7.5)
NEUTROPHILS RELATIVE PERCENT: 86.9 % (ref 50–65)
PDW BLD-RTO: 13.2 % (ref 11.5–14.5)
PERFORMED ON: ABNORMAL
PLATELET # BLD: 229 K/UL (ref 130–400)
PMV BLD AUTO: 9 FL (ref 9.4–12.3)
POTASSIUM REFLEX MAGNESIUM: 4.5 MMOL/L (ref 3.5–5)
RBC # BLD: 3.65 M/UL (ref 4.2–5.4)
SODIUM BLD-SCNC: 133 MMOL/L (ref 136–145)
WBC # BLD: 8 K/UL (ref 4.8–10.8)

## 2022-06-29 PROCEDURE — 85025 COMPLETE CBC W/AUTO DIFF WBC: CPT

## 2022-06-29 PROCEDURE — 2700000000 HC OXYGEN THERAPY PER DAY

## 2022-06-29 PROCEDURE — 6360000002 HC RX W HCPCS: Performed by: INTERNAL MEDICINE

## 2022-06-29 PROCEDURE — 36415 COLL VENOUS BLD VENIPUNCTURE: CPT

## 2022-06-29 PROCEDURE — 80048 BASIC METABOLIC PNL TOTAL CA: CPT

## 2022-06-29 PROCEDURE — 2580000003 HC RX 258: Performed by: INTERNAL MEDICINE

## 2022-06-29 PROCEDURE — 82947 ASSAY GLUCOSE BLOOD QUANT: CPT

## 2022-06-29 PROCEDURE — 6370000000 HC RX 637 (ALT 250 FOR IP): Performed by: FAMILY MEDICINE

## 2022-06-29 PROCEDURE — 94669 MECHANICAL CHEST WALL OSCILL: CPT

## 2022-06-29 PROCEDURE — 94640 AIRWAY INHALATION TREATMENT: CPT

## 2022-06-29 RX ORDER — AZITHROMYCIN 250 MG/1
250 TABLET, FILM COATED ORAL DAILY
Qty: 3 TABLET | Refills: 0 | Status: SHIPPED | OUTPATIENT
Start: 2022-06-29 | End: 2022-07-02

## 2022-06-29 RX ORDER — PREDNISONE 20 MG/1
20 TABLET ORAL DAILY
Qty: 7 TABLET | Refills: 0 | Status: SHIPPED | OUTPATIENT
Start: 2022-06-29 | End: 2022-07-06

## 2022-06-29 RX ADMIN — POTASSIUM CHLORIDE 20 MEQ: 1500 TABLET, EXTENDED RELEASE ORAL at 08:35

## 2022-06-29 RX ADMIN — INSULIN LISPRO 1 UNITS: 100 INJECTION, SOLUTION INTRAVENOUS; SUBCUTANEOUS at 08:35

## 2022-06-29 RX ADMIN — AZITHROMYCIN MONOHYDRATE 250 MG: 250 TABLET ORAL at 08:35

## 2022-06-29 RX ADMIN — SODIUM CHLORIDE, PRESERVATIVE FREE 10 ML: 5 INJECTION INTRAVENOUS at 08:36

## 2022-06-29 RX ADMIN — IPRATROPIUM BROMIDE AND ALBUTEROL SULFATE 3 ML: 2.5; .5 SOLUTION RESPIRATORY (INHALATION) at 07:19

## 2022-06-29 RX ADMIN — METHYLPREDNISOLONE SODIUM SUCCINATE 80 MG: 125 INJECTION, POWDER, FOR SOLUTION INTRAMUSCULAR; INTRAVENOUS at 08:35

## 2022-06-29 RX ADMIN — ROSUVASTATIN CALCIUM 10 MG: 10 TABLET, FILM COATED ORAL at 08:35

## 2022-06-29 RX ADMIN — SOTALOL HYDROCHLORIDE 120 MG: 80 TABLET ORAL at 08:34

## 2022-06-29 RX ADMIN — RAMIPRIL 5 MG: 5 CAPSULE ORAL at 08:34

## 2022-06-29 RX ADMIN — CLOPIDOGREL BISULFATE 75 MG: 75 TABLET ORAL at 08:34

## 2022-06-29 RX ADMIN — IPRATROPIUM BROMIDE AND ALBUTEROL SULFATE 3 ML: 2.5; .5 SOLUTION RESPIRATORY (INHALATION) at 10:22

## 2022-06-29 RX ADMIN — FUROSEMIDE 40 MG: 40 TABLET ORAL at 08:34

## 2022-06-29 RX ADMIN — METFORMIN HYDROCHLORIDE 500 MG: 500 TABLET ORAL at 08:35

## 2022-06-29 RX ADMIN — APIXABAN 5 MG: 5 TABLET, FILM COATED ORAL at 08:34

## 2022-06-29 NOTE — PROGRESS NOTES
Patient instructed on use and cleaning of flutter valve. Patient verbalized understanding and was able to use flutter valve as directed.

## 2022-06-29 NOTE — CARE COORDINATION
CM met with patient at the bedside to discuss CHW program. Pt has agreed to [facility] program, referral sent.    Electronically signed by Elizabeth Khalil RN on 6/29/2022 at 2:29 PM

## 2022-06-29 NOTE — ACP (ADVANCE CARE PLANNING)
Advance Care Planning     Advance Care Planning Activator (Inpatient)  Conversation Note      Date of ACP Conversation: 6/29/2022     [employer] with: Pt with decision making capacity. ACP Activator: Mau Pineda RN        Health Care Decision Maker:     Current Designated Health Care Decision Maker:     Primary Decision Maker: Chyna Munguia Child - [phone]      Care Preferences    Ventilation: \"If you were in your present state of health and suddenly became very ill and were unable to breathe on your own, what would your preference be about the use of a ventilator (breathing machine) if it were available to you? \"      Would the patient desire the use of ventilator (breathing machine)?: Yes    \"If your health worsens and it becomes clear that your chance of recovery is unlikely, what would your preference be about the use of a ventilator (breathing machine) if it were available to you? \"     Would the patient desire the use of ventilator (breathing machine)?: No      Resuscitation  \"CPR works best to restart the heart when there is a sudden event, like a heart attack, in someone who is otherwise healthy. Unfortunately, CPR does not typically restart the heart for people who have serious health conditions or who are very sick. \"    \"In the event your heart stopped as a result of an underlying serious health condition, would you want attempts to be made to restart your heart (answer \"yes\" for attempt to resuscitate) or would you prefer a natural death (answer \"no\" for do not attempt to resuscitate)? \" Yes           Conversation Outcomes:  [x] ACP discussion completed  [] Existing advance directive reviewed with patient; no changes to patient's previously recorded wishes  [] New Advance Directive completed  [] Portable Do Not Rescitate prepared for Provider review and signature  [] POLST/POST/MOLST/MOST prepared for Provider review and signature      Follow-up plan:    [x] Schedule follow-up conversation to continue planning  [] Referred individual to Provider for additional questions/concerns   [] Advised patient/agent/surrogate to review completed ACP document and update if needed with changes in condition, patient preferences or care setting    [] This note routed to one or more involved healthcare providers    Electronically signed by Lee Guerra RN on 6/29/2022 at 12:03 PM

## 2022-06-29 NOTE — DISCHARGE SUMMARY
Discharge Summary     Date:6/29/2022        Patient Name:Renata Mendez     YOB: 1950     Age:72 y.o. Admit Date:6/26/2022   Admission Condition:poor   Discharged Condition:stable  Discharge Date: 06/29/22     Discharge Diagnoses   Principal Problem:    COPD with acute exacerbation ([address]  Resolved Problems:    * No resolved hospital problems. [facility] Stay   Narrative of Hospital Course: 65-year-old with advanced COPD as well as chronic cardiac issues presented to the emergency room with increasing shortness of air and cough. Reports the onset of her symptoms much more suddenly than her last exacerbation of COPD. She also decided to seek treatment much sooner than previous. Felt a lot of congestion that was unrelieved with nebulizer. She states usually nebulizer will help significantly to relieve some of her dyspnea. She was unable to maintain good oxygen percentage on her home O2. At that point sought treatment in the emergency room. Found to be marginally hypoxic with increased work of breathing. No significant findings of infectious etiology on x-ray. She was admitted for further treatment evaluation including systemic steroid, empiric antibiotic, and aggressive pulmonary toilet. Included chest physiotherapy which benefited her a great deal.  She was able to use her home CPAP machine. Gradually titrated oxygen down to her home dose. Was able to get a significant amount of rest in the last 48 hours and is feeling much better today. She feels she can manage at home. Recommendation per care coordinator is for home health and will get that coordinated. I believe she would benefit from chest physiotherapy at home. I have contacted her oxygen supply company and awaiting a call back to see if we can get that arranged.     Consultants:   IP CONSULT TO RESPIRATORY CARE  PALLIATIVE CARE EVAL    Time Spent on Discharge:  35 minutes were spent in patient examination, evaluation, counseling as well as medication reconciliation, prescriptions for required medications, discharge plan and follow up. Surgeries/Procedures Performed:        Treatments:   antibiotics: azithromycin, steroids: solu-medrol and respiratory therapy: O2, albuterol/atropine nebulizer and chest PT    Significant Diagnostic Studies:   Recent Labs:  CBC:   Lab Results   Component Value Date    WBC 8.0 06/29/2022    RBC 3.65 06/29/2022    HGB 11.6 06/29/2022    HCT 35.6 06/29/2022    HCT 32.1 09/02/2011    MCV 97.5 06/29/2022    MCH 31.8 06/29/2022    MCHC 32.6 06/29/2022    RDW 13.2 06/29/2022     06/29/2022     09/02/2011     CMP:    Lab Results   Component Value Date    GLUCOSE 217 06/29/2022     06/29/2022     09/02/2011    K 4.5 06/29/2022    K 4.1 09/02/2011    CL 93 06/29/2022     09/02/2011    CO2 30 06/29/2022    BUN 27 06/29/2022    CREATININE 0.9 06/29/2022    CREATININE 0.6 09/02/2011    ANIONGAP 10 06/29/2022    ALKPHOS 78 06/26/2022    ALKPHOS 57 09/02/2011    ALT 15 06/26/2022    AST 18 06/26/2022    BILITOT 0.3 06/26/2022    LABALBU 3.8 06/26/2022    LABALBU 3.3 09/02/2011    LABGLOM >60 06/29/2022    GFRAA >59 06/29/2022    PROT 7.4 06/26/2022    PROT 7.5 01/18/2013    CALCIUM 8.8 06/29/2022       Radiology Last 7 Days:  XR CHEST PORTABLE    Result Date: 6/26/2022  1. Post sternotomy. 2. Suspect mild bronchitis. 3. Bilateral lung hyperinflation. 4. Left-sided pacemaker in situ. Recommendation: Follow up as clinically indicated.  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 With the lungs being hyperinflated, it has pushed the diaphragm, causing it to be weakened and flat. Patient has frequent COPD exacerbations that require antibiotics and steroids. Patient is unable to cough up any thick sputum due to the weakened diaphragm. She has tried a flutter valve, but it has failed to be effective. Ordering AffloVest to mobilize and clear secretions from the lungs.       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  R09.89 Hyperinflation of Lungs  R93.89 Bilateral Flattening of Diaphragm    Discharge Medications         Medication List      START taking these medications    azithromycin 250 MG tablet  Commonly known as: ZITHROMAX  Take 1 tablet by mouth daily for 3 doses     predniSONE 20 MG tablet  Commonly known as: DELTASONE  Take 1 tablet by mouth daily for 7 days        CONTINUE taking these medications    amLODIPine 5 MG tablet  Commonly known as: NORVASC  TAKE 1 TABLET DAILY     clopidogrel 75 MG tablet  Commonly known as: Plavix  Take 1 tablet by mouth daily     Eliquis 5 MG Tabs tablet  Generic drug: apixaban     furosemide 40 MG tablet  Commonly known as: LASIX  Take 1 tablet by mouth daily     ipratropium-albuterol 0.5-2.5 (3) MG/3ML Soln nebulizer solution  Commonly known as: DUONEB  Inhale 3 mLs into the lungs every 4 hours (while awake)     metFORMIN 500 MG tablet  Commonly known as: GLUCOPHAGE     nitroGLYCERIN 0.4 MG SL tablet  Commonly known as: Nitrostat  Place 1 tablet under the tongue every 5 minutes as needed for Chest pain     potassium chloride 20 MEQ packet  Commonly known as: KLOR-CON     ramipril 5 MG capsule  Commonly known as: Altace  Take 1 capsule by mouth 2 times daily     rosuvastatin 10 MG tablet  Commonly known as: CRESTOR  TAKE 1 TABLET DAILY     sotalol 120 MG tablet  Commonly known as: BETAPACE  TAKE 1 TABLET TWICE A DAY  *DOSE INCREASE*           Where to Get Your Medications      These medications were sent to [facility][id number], [address] [facility] [phone] [facility] [phone] [address] [address] [address]    Phone: [phone]   · azithromycin 250 MG tablet  · predniSONE 20 MG tablet         Electronically signed by Maninder Shaw MD on 6/29/22 at 8:28 AM CDT

## 2022-06-29 NOTE — CONSULTS
Palliative Care:   Pt known to palliative care team.  Very pleasant, alert and oriented 67 yr old who presents to ed d/t resp distress. Pt states she feels much better today. Noted congested cough during my visit with pt. Past Medical History:        Past Medical History:   Diagnosis Date    ASHD (arteriosclerotic heart disease)     s/p PTCA and stent of circumflex, as well as intermediate and mid LAD     [facility] (chronic obstructive [facility] disease) [facility]     Coronary atherosclerosis     Diabetes mellitus ([facility]     diet controlled, type 2    Encounter for wound care     FOR LLL WOUND    Fall 10/29/2020    Ganglion cyst     RT HAND    Hematoma 10/2020    hematoma LLL from fall injury    Hx of blood clots     Hypercholesteremia     Hypertension     Pacemaker 03/02/2021    Palliative care patient 03/16/2022    Unstable angina [facility])        Advance Directives:    Full Code. PC  did a f/u visit to complete paperwork for AD to be placed in pt chart. Pain/Other Symptoms:   Denies     Activity:  As di           Psychological/Spiritual:    States good support. Plan:   Home today     Patient/family discussion r/t goals:  Pt plans to return to her home and resume her usual daily activities. Pt is currently using 2L O2 at home. States she has 2 CG that assist 4 days per week. Pt tells me she still drives but, has not recently d/t weather is \"too hot\". Pt tells me she uses a walker for amb at night. She is waiting on her CG, Raissa Elias, to arrive and take her home.                     Electronically signed by Emely Flowers RN on 6/29/2022 at 11:08 AM

## 2022-06-29 NOTE — PROGRESS NOTES
Barrie Hale asked this  to assist pt in completing an AD/LW. Pt was prepared with her primary decision maker, her son Nissa Gillette. She also made advance care planning decisions. Pt initialed and signed the document and this  witnessed and notarized the document. Copies were given to pt and pt discharged shortly after. A copy was emailed to Rosaura Sykes to be scanned into her EMR. Pt expressed gratitude for spiritual care.     Electronically signed by Alfonso Chan on 6/29/2022 at 1:18 PM

## 2022-06-29 NOTE — CARE COORDINATION
At request of physician, faxed Chest xray and notes to [facility] to see if she will qualify for an afflo vest;   [facility]   P  [phone] F  Electronically signed by WAI Caraballo on 6/29/2022 at 9:40 AM

## 2022-07-23 ENCOUNTER — HOSPITAL ENCOUNTER (EMERGENCY)
Age: 72
Discharge: HOME | End: 2022-07-24
Attending: PEDIATRICS
Payer: MEDICARE

## 2022-07-23 DIAGNOSIS — J44.1 COPD EXACERBATION (HCC): Primary | ICD-10-CM

## 2022-07-23 PROCEDURE — 96365 THER/PROPH/DIAG IV INF INIT: CPT

## 2022-07-23 PROCEDURE — 96375 TX/PRO/DX INJ NEW DRUG ADDON: CPT

## 2022-07-23 PROCEDURE — 99285 EMERGENCY DEPT VISIT HI MDM: CPT

## 2022-07-23 RX ORDER — LEVOFLOXACIN 500 MG/1
500 TABLET, FILM COATED ORAL DAILY
Status: ON HOLD | COMMUNITY
End: 2022-09-03 | Stop reason: HOSPADM

## 2022-07-24 ENCOUNTER — APPOINTMENT (OUTPATIENT)
Dept: GENERAL RADIOLOGY | Age: 72
End: 2022-07-24
Payer: MEDICARE

## 2022-07-24 VITALS
DIASTOLIC BLOOD PRESSURE: 94 MMHG | BODY MASS INDEX: 25.02 KG/M2 | WEIGHT: 155 LBS | HEART RATE: 88 BPM | SYSTOLIC BLOOD PRESSURE: 121 MMHG | TEMPERATURE: 97.8 F | OXYGEN SATURATION: 94 % | RESPIRATION RATE: 19 BRPM

## 2022-07-24 LAB
ADENOVIRUS BY PCR: NOT DETECTED
ALBUMIN SERPL-MCNC: 3.9 G/DL (ref 3.5–5.2)
ALLENS TEST: ABNORMAL
ALP BLD-CCNC: 74 U/L (ref 35–104)
ALT SERPL-CCNC: 11 U/L (ref 5–33)
ANION GAP SERPL CALCULATED.3IONS-SCNC: 3 MMOL/L (ref 7–19)
AST SERPL-CCNC: 12 U/L (ref 5–32)
BASE EXCESS ARTERIAL: 7 MMOL/L (ref -2–2)
BASOPHILS ABSOLUTE: 0 K/UL (ref 0–0.2)
BASOPHILS RELATIVE PERCENT: 0.5 % (ref 0–1)
BILIRUB SERPL-MCNC: <0.2 MG/DL (ref 0.2–1.2)
BILIRUBIN URINE: NEGATIVE
BLOOD, URINE: NEGATIVE
BORDETELLA PARAPERTUSSIS BY PCR: NOT DETECTED
BORDETELLA PERTUSSIS BY PCR: NOT DETECTED
BUN BLDV-MCNC: 16 MG/DL (ref 8–23)
CALCIUM SERPL-MCNC: 9.9 MG/DL (ref 8.8–10.2)
CARBOXYHEMOGLOBIN ARTERIAL: 2.1 % (ref 0–5)
CHLAMYDOPHILIA PNEUMONIAE BY PCR: NOT DETECTED
CHLORIDE BLD-SCNC: 96 MMOL/L (ref 98–111)
CLARITY: CLEAR
CO2: 37 MMOL/L (ref 22–29)
COLOR: YELLOW
CORONAVIRUS 229E BY PCR: NOT DETECTED
CORONAVIRUS HKU1 BY PCR: NOT DETECTED
CORONAVIRUS NL63 BY PCR: NOT DETECTED
CORONAVIRUS OC43 BY PCR: NOT DETECTED
CREAT SERPL-MCNC: 0.9 MG/DL (ref 0.5–0.9)
D DIMER: 0.42 UG/ML FEU (ref 0–0.48)
EOSINOPHILS ABSOLUTE: 0.4 K/UL (ref 0–0.6)
EOSINOPHILS RELATIVE PERCENT: 5.6 % (ref 0–5)
GFR AFRICAN AMERICAN: >59
GFR NON-AFRICAN AMERICAN: >60
GLUCOSE BLD-MCNC: 109 MG/DL (ref 74–109)
GLUCOSE URINE: NEGATIVE MG/DL
HCO3 ARTERIAL: 34.8 MMOL/L (ref 22–26)
HCT VFR BLD CALC: 39.5 % (ref 37–47)
HEMOGLOBIN, ART, EXTENDED: 13.4 G/DL (ref 12–16)
HEMOGLOBIN: 12.7 G/DL (ref 12–16)
HUMAN METAPNEUMOVIRUS BY PCR: NOT DETECTED
HUMAN RHINOVIRUS/ENTEROVIRUS BY PCR: NOT DETECTED
IMMATURE GRANULOCYTES #: 0 K/UL
INFLUENZA A BY PCR: NOT DETECTED
INFLUENZA B BY PCR: NOT DETECTED
KETONES, URINE: NEGATIVE MG/DL
LEUKOCYTE ESTERASE, URINE: NEGATIVE
LYMPHOCYTES ABSOLUTE: 1.6 K/UL (ref 1.1–4.5)
LYMPHOCYTES RELATIVE PERCENT: 24.6 % (ref 20–40)
MCH RBC QN AUTO: 32.2 PG (ref 27–31)
MCHC RBC AUTO-ENTMCNC: 32.2 G/DL (ref 33–37)
MCV RBC AUTO: 100 FL (ref 81–99)
METHEMOGLOBIN ARTERIAL: 0.9 %
MONOCYTES ABSOLUTE: 0.9 K/UL (ref 0–0.9)
MONOCYTES RELATIVE PERCENT: 14.8 % (ref 0–10)
MYCOPLASMA PNEUMONIAE BY PCR: NOT DETECTED
NEUTROPHILS ABSOLUTE: 3.4 K/UL (ref 1.5–7.5)
NEUTROPHILS RELATIVE PERCENT: 54.2 % (ref 50–65)
NITRITE, URINE: NEGATIVE
O2 CONTENT ARTERIAL: 17.5 ML/DL
O2 SAT, ARTERIAL: 92.8 %
O2 THERAPY: ABNORMAL
OXYGEN FLOW: 3
PARAINFLUENZA VIRUS 1 BY PCR: NOT DETECTED
PARAINFLUENZA VIRUS 2 BY PCR: NOT DETECTED
PARAINFLUENZA VIRUS 3 BY PCR: NOT DETECTED
PARAINFLUENZA VIRUS 4 BY PCR: NOT DETECTED
PCO2 ARTERIAL: 63 MMHG (ref 35–45)
PDW BLD-RTO: 13.5 % (ref 11.5–14.5)
PH ARTERIAL: 7.35 (ref 7.35–7.45)
PH UA: 7 (ref 5–8)
PLATELET # BLD: 209 K/UL (ref 130–400)
PMV BLD AUTO: 8.9 FL (ref 9.4–12.3)
PO2 ARTERIAL: 71 MMHG (ref 80–100)
POTASSIUM REFLEX MAGNESIUM: 4.5 MMOL/L (ref 3.5–5)
POTASSIUM, WHOLE BLOOD: 3.9
PRO-BNP: 2099 PG/ML (ref 0–900)
PROTEIN UA: NEGATIVE MG/DL
RBC # BLD: 3.95 M/UL (ref 4.2–5.4)
RESPIRATORY SYNCYTIAL VIRUS BY PCR: NOT DETECTED
SAMPLE SOURCE: ABNORMAL
SARS-COV-2, PCR: NOT DETECTED
SODIUM BLD-SCNC: 136 MMOL/L (ref 136–145)
SPECIFIC GRAVITY UA: 1 (ref 1–1.03)
TOTAL PROTEIN: 7 G/DL (ref 6.6–8.7)
TROPONIN: <0.01 NG/ML (ref 0–0.03)
UROBILINOGEN, URINE: 0.2 E.U./DL
WBC # BLD: 6.3 K/UL (ref 4.8–10.8)

## 2022-07-24 PROCEDURE — 6360000002 HC RX W HCPCS: Performed by: PEDIATRICS

## 2022-07-24 PROCEDURE — 2580000003 HC RX 258: Performed by: PEDIATRICS

## 2022-07-24 PROCEDURE — 36600 WITHDRAWAL OF ARTERIAL BLOOD: CPT

## 2022-07-24 PROCEDURE — 85025 COMPLETE CBC W/AUTO DIFF WBC: CPT

## 2022-07-24 PROCEDURE — 93005 ELECTROCARDIOGRAM TRACING: CPT | Performed by: PEDIATRICS

## 2022-07-24 PROCEDURE — 84484 ASSAY OF TROPONIN QUANT: CPT

## 2022-07-24 PROCEDURE — 81003 URINALYSIS AUTO W/O SCOPE: CPT

## 2022-07-24 PROCEDURE — 80053 COMPREHEN METABOLIC PANEL: CPT

## 2022-07-24 PROCEDURE — 71045 X-RAY EXAM CHEST 1 VIEW: CPT

## 2022-07-24 PROCEDURE — 36415 COLL VENOUS BLD VENIPUNCTURE: CPT

## 2022-07-24 PROCEDURE — 0202U NFCT DS 22 TRGT SARS-COV-2: CPT

## 2022-07-24 PROCEDURE — 94644 CONT INHLJ TX 1ST HOUR: CPT

## 2022-07-24 PROCEDURE — 85379 FIBRIN DEGRADATION QUANT: CPT

## 2022-07-24 PROCEDURE — 71045 X-RAY EXAM CHEST 1 VIEW: CPT | Performed by: RADIOLOGY

## 2022-07-24 PROCEDURE — 82803 BLOOD GASES ANY COMBINATION: CPT

## 2022-07-24 PROCEDURE — 83880 ASSAY OF NATRIURETIC PEPTIDE: CPT

## 2022-07-24 RX ORDER — METHYLPREDNISOLONE SODIUM SUCCINATE 125 MG/2ML
125 INJECTION, POWDER, LYOPHILIZED, FOR SOLUTION INTRAMUSCULAR; INTRAVENOUS ONCE
Status: COMPLETED | OUTPATIENT
Start: 2022-07-24 | End: 2022-07-24

## 2022-07-24 RX ORDER — AZITHROMYCIN 250 MG/1
250 TABLET, FILM COATED ORAL SEE ADMIN INSTRUCTIONS
Qty: 3 TABLET | Refills: 0 | Status: SHIPPED | OUTPATIENT
Start: 2022-07-24 | End: 2022-07-27

## 2022-07-24 RX ORDER — PREDNISONE 50 MG/1
50 TABLET ORAL DAILY
Qty: 5 TABLET | Refills: 0 | Status: SHIPPED | OUTPATIENT
Start: 2022-07-24 | End: 2022-07-29

## 2022-07-24 RX ORDER — FUROSEMIDE 10 MG/ML
20 INJECTION INTRAMUSCULAR; INTRAVENOUS ONCE
Status: COMPLETED | OUTPATIENT
Start: 2022-07-24 | End: 2022-07-24

## 2022-07-24 RX ADMIN — ALBUTEROL SULFATE 10 MG: 2.5 SOLUTION RESPIRATORY (INHALATION) at 02:04

## 2022-07-24 RX ADMIN — AZITHROMYCIN MONOHYDRATE 500 MG: 500 INJECTION, POWDER, LYOPHILIZED, FOR SOLUTION INTRAVENOUS at 01:43

## 2022-07-24 RX ADMIN — METHYLPREDNISOLONE SODIUM SUCCINATE 125 MG: 125 INJECTION, POWDER, FOR SOLUTION INTRAMUSCULAR; INTRAVENOUS at 01:36

## 2022-07-24 RX ADMIN — WATER 1000 MG: 1 INJECTION INTRAMUSCULAR; INTRAVENOUS; SUBCUTANEOUS at 01:36

## 2022-07-24 RX ADMIN — FUROSEMIDE 20 MG: 10 INJECTION, SOLUTION INTRAMUSCULAR; INTRAVENOUS at 01:36

## 2022-07-24 NOTE — PROGRESS NOTES
Latest Reference Range & Units 7/24/22 02:16   O2 Therapy  Unknown   Hemoglobin, Art, Extended 12.0 - 16.0 g/dL 13.4   pH, Arterial 7.350 - 7.450  7.350   pCO2, Arterial 35.0 - 45.0 mmHg 63.0 (H)   pO2, Arterial 80.0 - 100.0 mmHg 71.0 (L)   HCO3, Arterial 22.0 - 26.0 mmol/L 34.8 (H)   Base Excess, Arterial -2.0 - 2.0 mmol/L 7.0 (H)   O2 Sat, Arterial >92 % 92.8   O2 Content, Arterial Not Established mL/dL 17.5   Methemoglobin, Arterial <1.5 % 0.9   Carboxyhgb, Arterial 0.0 - 5.0 % 2.1   (H): Data is abnormally high  (L): Data is abnormally low  Rt rad, 3 l/m AT+

## 2022-07-24 NOTE — ED PROVIDER NOTES
[facility] EMERGENCY DEPT  eMERGENCY dEPARTMENT eNCOUnter      Pt Name: Marjorie Chilel  MRN: [id number]  [place] 1950  Date of evaluation: 7/23/2022  Provider: Lionel Melo MD    CHIEF COMPLAINT       Chief Complaint   Patient presents with    Shortness of Breath         HISTORY OF PRESENT ILLNESS   (Location/Symptom, Timing/Onset,Context/Setting, Quality, Duration, Modifying Factors, Severity)  Note limiting factors. Marjorie Chilel is a 67 y.o. female who presents to the emergency department with shortness of breath. Symptoms began Friday morning. Patient took a breathing treatment last at 6 AM.  Patient is taking nebulizer treatments every 6 hours but states \"they are not helping. \"  Patient states she has been coughing up \"dark brown\" sputum but denies seeing any blood. Patient started antibiotics today but cannot recall the name. \"I taken to 7 pills. \"  Patient is normally on oxygen 2 to 2-1/2 L/day. Patient continues to use tobacco.  Patient is on CPAP but wakes up every 1-2 hours. She last woke at 11 PM.  Patient is concerned that she has a \"chest infection. \"  Patient states she had \"lots of sputum yesterday and then I became really short of breath tonight. I could hear wheezing. \"  Patient has a history of COPD, atrial fibrillation for which she is on Eliquis, and CABG in 2011 with stent placement in 2021. HPI    NursingNotes were reviewed. REVIEW OF SYSTEMS    (2-9 systems for level 4, 10 or more for level 5)     Review of Systems   Constitutional:  Negative for chills and fever. HENT:  Positive for congestion. Negative for rhinorrhea. Eyes:  Negative for discharge. Respiratory:  Positive for cough, shortness of breath and wheezing. Cardiovascular:  Negative for chest pain and leg swelling. Gastrointestinal:  Negative for abdominal pain, nausea and vomiting. Genitourinary:  Negative for difficulty urinating and dysuria. Musculoskeletal:  Negative for back pain and neck pain. Skin:  Negative for color change and pallor. Neurological:  Negative for syncope and light-headedness. Psychiatric/Behavioral:  Negative for agitation and confusion. All other systems reviewed and are negative.          PAST MEDICALHISTORY     Past Medical History:   Diagnosis Date    ASHD (arteriosclerotic heart disease)     s/p PTCA and stent of circumflex, as well as intermediate and mid LAD     COPD (chronic obstructive pulmonary disease) (HCC)     Coronary atherosclerosis     Diabetes mellitus ([facility] [address]     diet controlled, type 2    Encounter for wound care     FOR LLL WOUND    Fall 10/29/2020    Ganglion cyst     RT HAND    Hematoma 10/2020    hematoma LLL from fall injury    Hx of blood clots     Hypercholesteremia     Hypertension     Pacemaker 03/02/2021    Palliative care patient 03/16/2022    Unstable angina [facility])          SURGICAL HISTORY       Past Surgical History:   Procedure Laterality Date    CARDIAC CATHETERIZATION  12/10/00    selective left heart and coronary arteriography with left ventriculography     CARDIAC CATHETERIZATION  01/23/98    left heart cath, left ventriculography, slective coronary arteriography, direct infarct angioplasty and stent placement to proximal left anterior descending coronary artery    CARDIAC CATHETERIZATION  03/05/94    left heart cath, selective coronary arteriography, left ventriculography    CARDIAC CATHETERIZATION  8/27/2011    [place]    CHOLECYSTECTOMY      CORONARY ANGIOPLASTY WITH STENT PLACEMENT  12/12/00    PTCA and stent placement to the mid LAD/ptca and stent placement first circumflex marginal (intermediate)     CORONARY ANGIOPLASTY WITH STENT PLACEMENT  08/2021    CORONARY ARTERY BYPASS GRAFT  8/29/2011    PACABG X 4 LIMA-LAD, SVG-DIAG, SVG-PDA, RT EVH, LT OPEN VEIN HARVEST, DR Gennaro Neri    CYST REMOVAL      GANGLION CYST REMOVED RT HAND    HYSTERECTOMY (CERVIX STATUS UNKNOWN)      NECK SURGERY      parotid artery tumor removed bilaterally PACEMAKER PLACEMENT      PAROTIDECTOMY Bilateral          CURRENT MEDICATIONS     Discharge Medication List as of 2022  9:01 AM        CONTINUE these medications which have NOT CHANGED    Details   levoFLOXacin (LEVAQUIN) 500 MG tablet Take 500 mg by mouth in the morning. X 7 days. Historical Med      sotalol (BETAPACE) 120 MG tablet TAKE 1 TABLET TWICE A DAY  *DOSE INCREASE*, Disp-180 tablet, R-1Normal      apixaban (ELIQUIS) 5 MG TABS tablet Take by mouth 2 times dailyHistorical Med      metFORMIN (GLUCOPHAGE) 500 MG tablet Take 500 mg by mouth 2 times daily (with meals)Historical Med      potassium chloride (KLOR-CON) 20 MEQ packet Take 20 mEq by mouth dailyHistorical Med      rosuvastatin (CRESTOR) 10 MG tablet TAKE 1 TABLET DAILY, Disp-90 tablet, R-1Normal      clopidogrel (PLAVIX) 75 MG tablet Take 1 tablet by mouth daily, Disp-90 tablet, R-3Normal      ramipril (ALTACE) 5 MG capsule Take 1 capsule by mouth 2 times daily, Disp-180 capsule, R-3Normal      ipratropium-albuterol (DUONEB) 0.5-2.5 (3) MG/3ML SOLN nebulizer solution Inhale 3 mLs into the lungs every 4 hours (while awake), Disp-360 mL, R-0Normal      furosemide (LASIX) 40 MG tablet Take 1 tablet by mouth daily, Disp-60 tablet, R-3Normal      nitroGLYCERIN (NITROSTAT) 0.4 MG SL tablet Place 1 tablet under the tongue every 5 minutes as needed for Chest pain, Disp-25 tablet, R-3             ALLERGIES     Codeine    FAMILY HISTORY       Family History   Problem Relation Age of Onset    Cancer Mother     Coronary Art Dis Father     Mult Sclerosis Sister     Cancer Brother           SOCIAL HISTORY       Social History     Socioeconomic History    Marital status:      Spouse name: None    Number of children: None    Years of education: None    Highest education level: None   Tobacco Use    Smoking status: Every Day     Packs/day: 0.50     Types: Cigarettes     Last attempt to quit: 3/29/2021     Years since quittin.3    Smokeless tobacco: Never    Tobacco comments:     1/2 PACKS EVERY 2 WEEKS, states has been decreasing amount    Vaping Use    Vaping Use: Never used   Substance and Sexual Activity    Alcohol use: Never    Drug use: Never    Sexual activity: Not Currently     Partners: Male       SCREENINGS    [place] Coma Scale  Eye Opening: Spontaneous  Best Verbal Response: Oriented  Best Motor Response: Obeys commands  [place] Coma Scale Score: 15        PHYSICAL EXAM    (up to 7 for level 4, 8 or more for level 5)     ED Triage Vitals [07/23/22 2341]   BP Temp Temp src Heart Rate Resp SpO2 Height Weight   109/82 97.8 °F (36.6 °C) -- 84 22 94 % -- 155 lb (70.3 kg)       Physical Exam  Vitals and nursing note reviewed. Constitutional:       General: She is not in acute distress. Appearance: Normal appearance. HENT:      Head: Normocephalic and atraumatic. Right Ear: External ear normal.      Left Ear: External ear normal.      Nose: Nose normal.      Mouth/Throat:      Mouth: Mucous membranes are moist.      Pharynx: Oropharynx is clear. No oropharyngeal exudate. Eyes:      General: No scleral icterus. Conjunctiva/sclera: Conjunctivae normal.      Pupils: Pupils are equal, round, and reactive to light. Cardiovascular:      Rate and Rhythm: Normal rate and regular rhythm. Pulses: Normal pulses. Heart sounds: Normal heart sounds. Pulmonary:      Effort: Pulmonary effort is normal. No respiratory distress. Breath sounds: No stridor. Wheezing (Throughout bilateral lung fields.) and rales (Few bibasilar crackles. ) present. No rhonchi. Chest:      Chest wall: No tenderness. Abdominal:      General: Bowel sounds are normal.      Palpations: Abdomen is soft. Tenderness: There is no abdominal tenderness. There is no guarding. Musculoskeletal:         General: No tenderness or deformity. Cervical back: Neck supple. No rigidity. Right lower leg: No edema. Left lower leg: No edema.    Skin: General: Skin is warm and dry. Capillary Refill: Capillary refill takes less than 2 seconds. Coloration: Skin is not jaundiced. Neurological:      General: No focal deficit present. Mental Status: She is alert and oriented to person, place, and time. Mental status is at baseline. Sensory: No sensory deficit. Motor: No weakness. Coordination: Coordination normal.   Psychiatric:         Mood and Affect: Mood normal.         Behavior: Behavior normal.       DIAGNOSTIC RESULTS     EKG: All EKG's areinterpreted by the Emergency Department Physician who either signs or Co-signs this chart in the absence of a cardiologist.    EKG dated 7/24/2022 at 00 13 a.m.: Atrial pacemaker. PVC present. No further analysis. RADIOLOGY:  Non-plain film images such as CT, Ultrasound and MRI are read by the radiologist. Plain radiographic images are visualized and preliminarily interpreted bythe emergency physician with the below findings:        XR CHEST PORTABLE   Final Result   No evidence of acute infiltrate or effusion. Post sternotomy. Recommendation: Follow up as clinically indicated.    Electronically Signed by Daria Lyles MD at 24-Jul-2022 03:02:52 AM                       LABS:  Labs Reviewed   CBC WITH AUTO DIFFERENTIAL - Abnormal; Notable for the following components:       Result Value    RBC 3.95 (*)     .0 (*)     MCH 32.2 (*)     MCHC 32.2 (*)     MPV 8.9 (*)     Monocytes % 14.8 (*)     Eosinophils % 5.6 (*)     All other components within normal limits   COMPREHENSIVE METABOLIC PANEL W/ REFLEX TO MG FOR LOW K - Abnormal; Notable for the following components:    Chloride 96 (*)     CO2 37 (*)     Anion Gap 3 (*)     All other components within normal limits   BRAIN NATRIURETIC PEPTIDE - Abnormal; Notable for the following components:    Pro-BNP 2,099 (*)     All other components within normal limits   BLOOD GAS, ARTERIAL - Abnormal; Notable for the following components:    pCO2, Arterial 63.0 (*)     pO2, Arterial 71.0 (*)     HCO3, Arterial 34.8 (*)     Base Excess, Arterial 7.0 (*)     All other components within normal limits   RESPIRATORY PANEL, MOLECULAR, WITH COVID-19   TROPONIN   D-DIMER, QUANTITATIVE   URINALYSIS WITH REFLEX TO CULTURE       All other labs were within normal range or not returned as of this dictation. EMERGENCY DEPARTMENT COURSE and DIFFERENTIAL DIAGNOSIS/MDM:   Vitals:    Vitals:    07/23/22 2341 07/24/22 0042 07/24/22 0334 07/24/22 0354   BP: 109/82 (!) 130/58 (!) 121/94    Pulse: 84 82 88 88   Resp: 22 12 19 19   Temp: 97.8 °F (36.6 °C)      SpO2: 94% 96% 95% 94%   Weight: 155 lb (70.3 kg)          MDM     Amount and/or Complexity of Data Reviewed  Decide to obtain previous medical records or to obtain history from someone other than the patient: yes    48-year-old female presents with COPD exacerbation. Patient has good sats on normal home oxygen. Patient given a continuous albuterol breathing treatment and IV Solu-Medrol. Patient improves during emergency department stay. Discussed with patient to call her primary care provider, Dr. Mimi James, for admission and further treatment. Patient declines and wishes to go home. Patient is improved following treatment and is discharged to home to follow-up with Dr. Mimi James later this morning. Patient will return with increased difficulty breathing, chest pain, or other concerns. Patient discharged with azithromycin and prednisone. Patient will take every 6 hour nebulizer treatments. Patient will not smoke during this time period. Patient verbalized agreement and understanding.       CONSULTS:  None    PROCEDURES:  Unless otherwise noted below, none     Procedures    FINAL IMPRESSION      1. COPD exacerbation ([facility] [address]          DISPOSITION/PLAN   DISPOSITION Decision To Discharge 07/24/2022 06:37:37 AM      PATIENT REFERRED TO:    Dr Mimi James, PCP.    DISCHARGE MEDICATIONS:  Discharge Medication List as of 7/24/2022  9:01 AM        START taking these medications    Details   azithromycin (ZITHROMAX) 250 MG tablet Take 1 tablet by mouth See Admin Instructions for 3 days 500mg on day 1 followed by 250mg on days 2 - 5, Disp-3 tablet, R-0Normal      predniSONE (DELTASONE) 50 MG tablet Take 1 tablet by mouth in the morning for 5 days. , Disp-5 tablet, R-0Normal                (Please note that portions of this note were completed with a voice recognition program.  Efforts were made to edit thedictations but occasionally words are mis-transcribed.)    Elva Major MD (electronically signed)  Attending Emergency Physician          Elva Major MD  07/27/22 [id number]

## 2022-07-24 NOTE — ED NOTES
Attempted to call pt son to have him come pick pt up, no answer at this time.       Carli Hsu RN  07/24/22 1964

## 2022-07-24 NOTE — ED NOTES
Son answered phone and will be on way shortly       [facility], [address] [facility]  07/24/22 4114

## 2022-07-25 LAB
EKG P AXIS: 82 DEGREES
EKG P-R INTERVAL: 188 MS
EKG Q-T INTERVAL: 426 MS
EKG QRS DURATION: 122 MS
EKG QTC CALCULATION (BAZETT): 462 MS
EKG T AXIS: 84 DEGREES

## 2022-07-25 PROCEDURE — 93010 ELECTROCARDIOGRAM REPORT: CPT | Performed by: INTERNAL MEDICINE

## 2022-07-27 ASSESSMENT — ENCOUNTER SYMPTOMS
COUGH: 1
COLOR CHANGE: 0
VOMITING: 0
SHORTNESS OF BREATH: 1
EYE DISCHARGE: 0
NAUSEA: 0
RHINORRHEA: 0
WHEEZING: 1
ABDOMINAL PAIN: 0
BACK PAIN: 0

## 2022-08-10 PROCEDURE — 93296 REM INTERROG EVL PM/IDS: CPT | Performed by: NURSE PRACTITIONER

## 2022-08-10 PROCEDURE — 93294 REM INTERROG EVL PM/LDLS PM: CPT | Performed by: NURSE PRACTITIONER

## 2022-08-11 DIAGNOSIS — Z95.0 CARDIAC PACEMAKER: Primary | ICD-10-CM

## 2022-08-11 DIAGNOSIS — I49.5 SA NODE DYSFUNCTION (HCC): ICD-10-CM

## 2022-08-30 ENCOUNTER — HOSPITAL ENCOUNTER (INPATIENT)
Age: 72
LOS: 2 days | Discharge: HOME HEALTH | DRG: 190 | End: 2022-09-03
Attending: EMERGENCY MEDICINE | Admitting: FAMILY MEDICINE
Payer: MEDICARE

## 2022-08-30 ENCOUNTER — APPOINTMENT (OUTPATIENT)
Dept: GENERAL RADIOLOGY | Age: 72
DRG: 190 | End: 2022-08-30
Payer: MEDICARE

## 2022-08-30 DIAGNOSIS — J96.22 ACUTE ON CHRONIC RESPIRATORY FAILURE WITH HYPERCAPNIA (HCC): Primary | ICD-10-CM

## 2022-08-30 DIAGNOSIS — J44.1 COPD EXACERBATION (HCC): ICD-10-CM

## 2022-08-30 DIAGNOSIS — J44.1 COPD WITH ACUTE EXACERBATION (HCC): ICD-10-CM

## 2022-08-30 LAB
ALBUMIN SERPL-MCNC: 4.1 G/DL (ref 3.5–5.2)
ALLENS TEST: ABNORMAL
ALP BLD-CCNC: 73 U/L (ref 35–104)
ALT SERPL-CCNC: 14 U/L (ref 5–33)
ANION GAP SERPL CALCULATED.3IONS-SCNC: 4 MMOL/L (ref 7–19)
AST SERPL-CCNC: 19 U/L (ref 5–32)
BASE EXCESS ARTERIAL: 6.4 MMOL/L (ref -2–2)
BASE EXCESS ARTERIAL: 6.8 MMOL/L (ref -2–2)
BASOPHILS ABSOLUTE: 0 K/UL (ref 0–0.2)
BASOPHILS RELATIVE PERCENT: 0.2 % (ref 0–1)
BI-LEVEL POS AIRWAY PRESSURE(EXPIRATORY): 6
BI-LEVEL POS AIRWAY PRESSURE(INSPIRATORY): 12
BILIRUB SERPL-MCNC: 0.3 MG/DL (ref 0.2–1.2)
BUN BLDV-MCNC: 17 MG/DL (ref 8–23)
CALCIUM SERPL-MCNC: 9.5 MG/DL (ref 8.8–10.2)
CARBOXYHEMOGLOBIN ARTERIAL: 3.7 % (ref 0–5)
CARBOXYHEMOGLOBIN ARTERIAL: 4 % (ref 0–5)
CHLORIDE BLD-SCNC: 95 MMOL/L (ref 98–111)
CO2: 36 MMOL/L (ref 22–29)
CREAT SERPL-MCNC: 0.9 MG/DL (ref 0.5–0.9)
EOSINOPHILS ABSOLUTE: 0.2 K/UL (ref 0–0.6)
EOSINOPHILS RELATIVE PERCENT: 2.5 % (ref 0–5)
GFR AFRICAN AMERICAN: >59
GFR NON-AFRICAN AMERICAN: >60
GLUCOSE BLD-MCNC: 161 MG/DL (ref 74–109)
GLUCOSE BLD-MCNC: 265 MG/DL (ref 70–99)
GLUCOSE BLD-MCNC: 300 MG/DL (ref 70–99)
GLUCOSE BLD-MCNC: 409 MG/DL (ref 70–99)
GLUCOSE BLD-MCNC: 416 MG/DL (ref 70–99)
HCO3 ARTERIAL: 33.9 MMOL/L (ref 22–26)
HCO3 ARTERIAL: 35.7 MMOL/L (ref 22–26)
HCT VFR BLD CALC: 42.2 % (ref 37–47)
HEMOGLOBIN, ART, EXTENDED: 13.6 G/DL (ref 12–16)
HEMOGLOBIN, ART, EXTENDED: 14 G/DL (ref 12–16)
HEMOGLOBIN: 13.3 G/DL (ref 12–16)
IMMATURE GRANULOCYTES #: 0 K/UL
LACTIC ACID: 1 MMOL/L (ref 0.5–1.9)
LYMPHOCYTES ABSOLUTE: 1.8 K/UL (ref 1.1–4.5)
LYMPHOCYTES RELATIVE PERCENT: 20.7 % (ref 20–40)
MCH RBC QN AUTO: 31.4 PG (ref 27–31)
MCHC RBC AUTO-ENTMCNC: 31.5 G/DL (ref 33–37)
MCV RBC AUTO: 99.5 FL (ref 81–99)
METHEMOGLOBIN ARTERIAL: 0.9 %
METHEMOGLOBIN ARTERIAL: 1.1 %
MODE: ABNORMAL
MONOCYTES ABSOLUTE: 1 K/UL (ref 0–0.9)
MONOCYTES RELATIVE PERCENT: 11.3 % (ref 0–10)
NEUTROPHILS ABSOLUTE: 5.7 K/UL (ref 1.5–7.5)
NEUTROPHILS RELATIVE PERCENT: 64.8 % (ref 50–65)
O2 CONTENT ARTERIAL: 17.8 ML/DL
O2 CONTENT ARTERIAL: 18.2 ML/DL
O2 DELIVERY DEVICE: ABNORMAL
O2 SAT, ARTERIAL: 90.6 %
O2 SAT, ARTERIAL: 94.3 %
O2 THERAPY: ABNORMAL
O2 THERAPY: ABNORMAL
OXYGEN FLOW: 3
OXYGEN FLOW: 3
PCO2 ARTERIAL: 60 MMHG (ref 35–45)
PCO2 ARTERIAL: 71 MMHG (ref 35–45)
PDW BLD-RTO: 13.3 % (ref 11.5–14.5)
PERFORMED ON: ABNORMAL
PH ARTERIAL: 7.31 (ref 7.35–7.45)
PH ARTERIAL: 7.36 (ref 7.35–7.45)
PLATELET # BLD: 178 K/UL (ref 130–400)
PMV BLD AUTO: 9.1 FL (ref 9.4–12.3)
PO2 ARTERIAL: 124 MMHG (ref 80–100)
PO2 ARTERIAL: 64 MMHG (ref 80–100)
POTASSIUM SERPL-SCNC: 4.4 MMOL/L (ref 3.5–5)
POTASSIUM, WHOLE BLOOD: 4.1
POTASSIUM, WHOLE BLOOD: 4.4
PRO-BNP: 1859 PG/ML (ref 0–900)
RBC # BLD: 4.24 M/UL (ref 4.2–5.4)
SAMPLE SOURCE: ABNORMAL
SAMPLE SOURCE: ABNORMAL
SARS-COV-2, NAAT: NOT DETECTED
SODIUM BLD-SCNC: 135 MMOL/L (ref 136–145)
TOTAL PROTEIN: 7 G/DL (ref 6.6–8.7)
TROPONIN: <0.01 NG/ML (ref 0–0.03)
TROPONIN: <0.01 NG/ML (ref 0–0.03)
WBC # BLD: 8.8 K/UL (ref 4.8–10.8)

## 2022-08-30 PROCEDURE — 94640 AIRWAY INHALATION TREATMENT: CPT

## 2022-08-30 PROCEDURE — 99285 EMERGENCY DEPT VISIT HI MDM: CPT

## 2022-08-30 PROCEDURE — 2580000003 HC RX 258: Performed by: FAMILY MEDICINE

## 2022-08-30 PROCEDURE — 96375 TX/PRO/DX INJ NEW DRUG ADDON: CPT

## 2022-08-30 PROCEDURE — 84484 ASSAY OF TROPONIN QUANT: CPT

## 2022-08-30 PROCEDURE — 80053 COMPREHEN METABOLIC PANEL: CPT

## 2022-08-30 PROCEDURE — 36600 WITHDRAWAL OF ARTERIAL BLOOD: CPT

## 2022-08-30 PROCEDURE — 99223 1ST HOSP IP/OBS HIGH 75: CPT | Performed by: INTERNAL MEDICINE

## 2022-08-30 PROCEDURE — 96376 TX/PRO/DX INJ SAME DRUG ADON: CPT

## 2022-08-30 PROCEDURE — 82803 BLOOD GASES ANY COMBINATION: CPT

## 2022-08-30 PROCEDURE — 94660 CPAP INITIATION&MGMT: CPT

## 2022-08-30 PROCEDURE — 87635 SARS-COV-2 COVID-19 AMP PRB: CPT

## 2022-08-30 PROCEDURE — 83605 ASSAY OF LACTIC ACID: CPT

## 2022-08-30 PROCEDURE — 6360000002 HC RX W HCPCS: Performed by: INTERNAL MEDICINE

## 2022-08-30 PROCEDURE — 36415 COLL VENOUS BLD VENIPUNCTURE: CPT

## 2022-08-30 PROCEDURE — 71045 X-RAY EXAM CHEST 1 VIEW: CPT

## 2022-08-30 PROCEDURE — 2700000000 HC OXYGEN THERAPY PER DAY

## 2022-08-30 PROCEDURE — 82947 ASSAY GLUCOSE BLOOD QUANT: CPT

## 2022-08-30 PROCEDURE — 85025 COMPLETE CBC W/AUTO DIFF WBC: CPT

## 2022-08-30 PROCEDURE — 93005 ELECTROCARDIOGRAM TRACING: CPT | Performed by: EMERGENCY MEDICINE

## 2022-08-30 PROCEDURE — 71045 X-RAY EXAM CHEST 1 VIEW: CPT | Performed by: RADIOLOGY

## 2022-08-30 PROCEDURE — 87205 SMEAR GRAM STAIN: CPT

## 2022-08-30 PROCEDURE — 96365 THER/PROPH/DIAG IV INF INIT: CPT

## 2022-08-30 PROCEDURE — 2580000003 HC RX 258: Performed by: INTERNAL MEDICINE

## 2022-08-30 PROCEDURE — 6370000000 HC RX 637 (ALT 250 FOR IP): Performed by: EMERGENCY MEDICINE

## 2022-08-30 PROCEDURE — 6370000000 HC RX 637 (ALT 250 FOR IP): Performed by: FAMILY MEDICINE

## 2022-08-30 PROCEDURE — 83880 ASSAY OF NATRIURETIC PEPTIDE: CPT

## 2022-08-30 PROCEDURE — G0378 HOSPITAL OBSERVATION PER HR: HCPCS

## 2022-08-30 RX ORDER — FUROSEMIDE 40 MG/1
40 TABLET ORAL DAILY
Status: DISCONTINUED | OUTPATIENT
Start: 2022-08-30 | End: 2022-09-03 | Stop reason: HOSPADM

## 2022-08-30 RX ORDER — CLOPIDOGREL BISULFATE 75 MG/1
75 TABLET ORAL DAILY
Status: DISCONTINUED | OUTPATIENT
Start: 2022-08-30 | End: 2022-09-03 | Stop reason: HOSPADM

## 2022-08-30 RX ORDER — IPRATROPIUM BROMIDE AND ALBUTEROL SULFATE 2.5; .5 MG/3ML; MG/3ML
3 SOLUTION RESPIRATORY (INHALATION)
Status: DISCONTINUED | OUTPATIENT
Start: 2022-08-30 | End: 2022-09-03 | Stop reason: HOSPADM

## 2022-08-30 RX ORDER — RAMIPRIL 5 MG/1
5 CAPSULE ORAL 2 TIMES DAILY
Status: DISCONTINUED | OUTPATIENT
Start: 2022-08-30 | End: 2022-09-03 | Stop reason: HOSPADM

## 2022-08-30 RX ORDER — DEXTROSE MONOHYDRATE 100 MG/ML
INJECTION, SOLUTION INTRAVENOUS CONTINUOUS PRN
Status: DISCONTINUED | OUTPATIENT
Start: 2022-08-30 | End: 2022-09-03 | Stop reason: HOSPADM

## 2022-08-30 RX ORDER — INSULIN LISPRO 100 [IU]/ML
0-4 INJECTION, SOLUTION INTRAVENOUS; SUBCUTANEOUS
Status: DISCONTINUED | OUTPATIENT
Start: 2022-08-30 | End: 2022-08-31

## 2022-08-30 RX ORDER — INSULIN LISPRO 100 [IU]/ML
0-4 INJECTION, SOLUTION INTRAVENOUS; SUBCUTANEOUS NIGHTLY
Status: DISCONTINUED | OUTPATIENT
Start: 2022-08-30 | End: 2022-08-31

## 2022-08-30 RX ORDER — LEVOFLOXACIN 500 MG/1
500 TABLET, FILM COATED ORAL DAILY
Status: DISCONTINUED | OUTPATIENT
Start: 2022-08-30 | End: 2022-08-30 | Stop reason: ALTCHOICE

## 2022-08-30 RX ORDER — METHYLPREDNISOLONE SODIUM SUCCINATE 125 MG/2ML
60 INJECTION, POWDER, LYOPHILIZED, FOR SOLUTION INTRAMUSCULAR; INTRAVENOUS EVERY 6 HOURS
Status: DISCONTINUED | OUTPATIENT
Start: 2022-08-30 | End: 2022-09-03 | Stop reason: HOSPADM

## 2022-08-30 RX ORDER — PREDNISONE 20 MG/1
20 TABLET ORAL DAILY
Status: DISCONTINUED | OUTPATIENT
Start: 2022-08-30 | End: 2022-08-30

## 2022-08-30 RX ORDER — INSULIN LISPRO 100 [IU]/ML
10 INJECTION, SOLUTION INTRAVENOUS; SUBCUTANEOUS ONCE
Status: COMPLETED | OUTPATIENT
Start: 2022-08-30 | End: 2022-08-30

## 2022-08-30 RX ORDER — SODIUM CHLORIDE 0.9 % (FLUSH) 0.9 %
5-40 SYRINGE (ML) INJECTION PRN
Status: DISCONTINUED | OUTPATIENT
Start: 2022-08-30 | End: 2022-09-03 | Stop reason: HOSPADM

## 2022-08-30 RX ORDER — SODIUM CHLORIDE 0.9 % (FLUSH) 0.9 %
5-40 SYRINGE (ML) INJECTION EVERY 12 HOURS SCHEDULED
Status: DISCONTINUED | OUTPATIENT
Start: 2022-08-30 | End: 2022-09-03 | Stop reason: HOSPADM

## 2022-08-30 RX ORDER — AZITHROMYCIN 250 MG/1
250 TABLET, FILM COATED ORAL SEE ADMIN INSTRUCTIONS
Qty: 6 TABLET | Refills: 0 | Status: SHIPPED | OUTPATIENT
Start: 2022-08-30 | End: 2022-09-04

## 2022-08-30 RX ORDER — ONDANSETRON 2 MG/ML
4 INJECTION INTRAMUSCULAR; INTRAVENOUS EVERY 6 HOURS PRN
Status: DISCONTINUED | OUTPATIENT
Start: 2022-08-30 | End: 2022-09-03 | Stop reason: HOSPADM

## 2022-08-30 RX ORDER — ONDANSETRON 4 MG/1
4 TABLET, ORALLY DISINTEGRATING ORAL EVERY 8 HOURS PRN
Status: DISCONTINUED | OUTPATIENT
Start: 2022-08-30 | End: 2022-09-03 | Stop reason: HOSPADM

## 2022-08-30 RX ORDER — PREDNISONE 50 MG/1
50 TABLET ORAL DAILY
Qty: 5 TABLET | Refills: 0 | Status: SHIPPED | OUTPATIENT
Start: 2022-08-30 | End: 2022-09-04

## 2022-08-30 RX ORDER — SOTALOL HYDROCHLORIDE 80 MG/1
120 TABLET ORAL EVERY 12 HOURS SCHEDULED
Status: DISCONTINUED | OUTPATIENT
Start: 2022-08-30 | End: 2022-09-03 | Stop reason: HOSPADM

## 2022-08-30 RX ORDER — IPRATROPIUM BROMIDE AND ALBUTEROL SULFATE 2.5; .5 MG/3ML; MG/3ML
1 SOLUTION RESPIRATORY (INHALATION) ONCE
Status: COMPLETED | OUTPATIENT
Start: 2022-08-30 | End: 2022-08-30

## 2022-08-30 RX ORDER — ACETAMINOPHEN 325 MG/1
650 TABLET ORAL EVERY 4 HOURS PRN
Status: DISCONTINUED | OUTPATIENT
Start: 2022-08-30 | End: 2022-09-03 | Stop reason: HOSPADM

## 2022-08-30 RX ORDER — SODIUM CHLORIDE 9 MG/ML
INJECTION, SOLUTION INTRAVENOUS PRN
Status: DISCONTINUED | OUTPATIENT
Start: 2022-08-30 | End: 2022-09-03 | Stop reason: HOSPADM

## 2022-08-30 RX ORDER — ROSUVASTATIN CALCIUM 10 MG/1
10 TABLET, COATED ORAL NIGHTLY
Status: DISCONTINUED | OUTPATIENT
Start: 2022-08-30 | End: 2022-09-03 | Stop reason: HOSPADM

## 2022-08-30 RX ADMIN — RAMIPRIL 5 MG: 5 CAPSULE ORAL at 16:30

## 2022-08-30 RX ADMIN — METHYLPREDNISOLONE SODIUM SUCCINATE 60 MG: 125 INJECTION, POWDER, FOR SOLUTION INTRAMUSCULAR; INTRAVENOUS at 21:26

## 2022-08-30 RX ADMIN — INSULIN LISPRO 10 UNITS: 100 INJECTION, SOLUTION INTRAVENOUS; SUBCUTANEOUS at 21:28

## 2022-08-30 RX ADMIN — IPRATROPIUM BROMIDE AND ALBUTEROL SULFATE 1 AMPULE: .5; 3 SOLUTION RESPIRATORY (INHALATION) at 10:02

## 2022-08-30 RX ADMIN — CEFEPIME 2000 MG: 2 INJECTION, POWDER, FOR SOLUTION INTRAVENOUS at 18:29

## 2022-08-30 RX ADMIN — INSULIN LISPRO 4 UNITS: 100 INJECTION, SOLUTION INTRAVENOUS; SUBCUTANEOUS at 18:15

## 2022-08-30 RX ADMIN — FUROSEMIDE 40 MG: 40 TABLET ORAL at 13:27

## 2022-08-30 RX ADMIN — METHYLPREDNISOLONE SODIUM SUCCINATE 60 MG: 125 INJECTION, POWDER, FOR SOLUTION INTRAMUSCULAR; INTRAVENOUS at 16:30

## 2022-08-30 RX ADMIN — METFORMIN HYDROCHLORIDE 500 MG: 500 TABLET ORAL at 18:27

## 2022-08-30 RX ADMIN — APIXABAN 5 MG: 5 TABLET, FILM COATED ORAL at 21:25

## 2022-08-30 RX ADMIN — POTASSIUM BICARBONATE 20 MEQ: 782 TABLET, EFFERVESCENT ORAL at 13:28

## 2022-08-30 RX ADMIN — Medication 10 ML: at 21:26

## 2022-08-30 RX ADMIN — INSULIN LISPRO 2 UNITS: 100 INJECTION, SOLUTION INTRAVENOUS; SUBCUTANEOUS at 13:28

## 2022-08-30 RX ADMIN — ROSUVASTATIN CALCIUM 10 MG: 10 TABLET, FILM COATED ORAL at 21:26

## 2022-08-30 RX ADMIN — IPRATROPIUM BROMIDE AND ALBUTEROL SULFATE 3 ML: 2.5; .5 SOLUTION RESPIRATORY (INHALATION) at 14:30

## 2022-08-30 RX ADMIN — CLOPIDOGREL BISULFATE 75 MG: 75 TABLET ORAL at 13:27

## 2022-08-30 RX ADMIN — PREDNISONE 20 MG: 20 TABLET ORAL at 13:27

## 2022-08-30 RX ADMIN — APIXABAN 5 MG: 5 TABLET, FILM COATED ORAL at 13:27

## 2022-08-30 RX ADMIN — SOTALOL HYDROCHLORIDE 120 MG: 80 TABLET ORAL at 21:25

## 2022-08-30 RX ADMIN — IPRATROPIUM BROMIDE AND ALBUTEROL SULFATE 3 ML: 2.5; .5 SOLUTION RESPIRATORY (INHALATION) at 19:09

## 2022-08-30 SDOH — ECONOMIC STABILITY: HOUSING INSECURITY: IN THE LAST 12 MONTHS, HOW MANY PLACES HAVE YOU LIVED?: 1

## 2022-08-30 SDOH — ECONOMIC STABILITY: FOOD INSECURITY: WITHIN THE PAST 12 MONTHS, YOU WORRIED THAT YOUR FOOD WOULD RUN OUT BEFORE YOU GOT MONEY TO BUY MORE.: NEVER TRUE

## 2022-08-30 SDOH — HEALTH STABILITY: PHYSICAL HEALTH: ON AVERAGE, HOW MANY MINUTES DO YOU ENGAGE IN EXERCISE AT THIS LEVEL?: 30 MIN

## 2022-08-30 SDOH — ECONOMIC STABILITY: INCOME INSECURITY: IN THE LAST 12 MONTHS, WAS THERE A TIME WHEN YOU WERE NOT ABLE TO PAY THE MORTGAGE OR RENT ON TIME?: NO

## 2022-08-30 SDOH — ECONOMIC STABILITY: TRANSPORTATION INSECURITY
IN THE PAST 12 MONTHS, HAS LACK OF TRANSPORTATION KEPT YOU FROM MEETINGS, WORK, OR FROM GETTING THINGS NEEDED FOR DAILY LIVING?: NO

## 2022-08-30 SDOH — ECONOMIC STABILITY: TRANSPORTATION INSECURITY
IN THE PAST 12 MONTHS, HAS THE LACK OF TRANSPORTATION KEPT YOU FROM MEDICAL APPOINTMENTS OR FROM GETTING MEDICATIONS?: NO

## 2022-08-30 SDOH — ECONOMIC STABILITY: FOOD INSECURITY: WITHIN THE PAST 12 MONTHS, THE FOOD YOU BOUGHT JUST DIDN'T LAST AND YOU DIDN'T HAVE MONEY TO GET MORE.: NEVER TRUE

## 2022-08-30 SDOH — HEALTH STABILITY: PHYSICAL HEALTH: ON AVERAGE, HOW MANY DAYS PER WEEK DO YOU ENGAGE IN MODERATE TO STRENUOUS EXERCISE (LIKE A BRISK WALK)?: 7 DAYS

## 2022-08-30 SDOH — ECONOMIC STABILITY: HOUSING INSECURITY
IN THE LAST 12 MONTHS, WAS THERE A TIME WHEN YOU DID NOT HAVE A STEADY PLACE TO SLEEP OR SLEPT IN A SHELTER (INCLUDING NOW)?: NO

## 2022-08-30 ASSESSMENT — ENCOUNTER SYMPTOMS
WHEEZING: 1
DIARRHEA: 0
VOMITING: 0
ABDOMINAL PAIN: 0
SHORTNESS OF BREATH: 1
SORE THROAT: 0
RHINORRHEA: 0
SINUS PRESSURE: 0
NAUSEA: 0

## 2022-08-30 ASSESSMENT — LIFESTYLE VARIABLES
HOW OFTEN DO YOU HAVE A DRINK CONTAINING ALCOHOL: NEVER
HOW OFTEN DO YOU HAVE A DRINK CONTAINING ALCOHOL: NEVER

## 2022-08-30 ASSESSMENT — SOCIAL DETERMINANTS OF HEALTH (SDOH)
IN A TYPICAL WEEK, HOW MANY TIMES DO YOU TALK ON THE PHONE WITH FAMILY, FRIENDS, OR NEIGHBORS?: MORE THAN THREE TIMES A WEEK
DO YOU BELONG TO ANY CLUBS OR ORGANIZATIONS SUCH AS CHURCH GROUPS UNIONS, FRATERNAL OR ATHLETIC GROUPS, OR SCHOOL GROUPS?: YES
WITHIN THE LAST YEAR, HAVE YOU BEEN KICKED, HIT, SLAPPED, OR OTHERWISE PHYSICALLY HURT BY YOUR PARTNER OR EX-PARTNER?: NO
WITHIN THE LAST YEAR, HAVE TO BEEN RAPED OR FORCED TO HAVE ANY KIND OF SEXUAL ACTIVITY BY YOUR PARTNER OR EX-PARTNER?: NO
HOW OFTEN DO YOU ATTEND CHURCH OR RELIGIOUS SERVICES?: MORE THAN 4 TIMES PER YEAR
HOW OFTEN DO YOU GET TOGETHER WITH FRIENDS OR RELATIVES?: MORE THAN THREE TIMES A WEEK
WITHIN THE LAST YEAR, HAVE YOU BEEN HUMILIATED OR EMOTIONALLY ABUSED IN OTHER WAYS BY YOUR PARTNER OR EX-PARTNER?: NO
HOW OFTEN DO YOU ATTENT MEETINGS OF THE CLUB OR ORGANIZATION YOU BELONG TO?: MORE THAN 4 TIMES PER YEAR
HOW HARD IS IT FOR YOU TO PAY FOR THE VERY BASICS LIKE FOOD, HOUSING, MEDICAL CARE, AND HEATING?: NOT HARD AT ALL
WITHIN THE LAST YEAR, HAVE YOU BEEN AFRAID OF YOUR PARTNER OR EX-PARTNER?: NO

## 2022-08-30 ASSESSMENT — PAIN SCALES - GENERAL: PAINLEVEL_OUTOF10: 0

## 2022-08-30 NOTE — ED NOTES
Pt up to discharge and states she feels SOB. Pt wants to stay in the hospital vs go home.  Bipap reapplied and pt resting pt notes burning CP ekg done at bedside      Sherie Espinosa RN  08/30/22 1290

## 2022-08-30 NOTE — PLAN OF CARE
Problem: Discharge Planning  Goal: Discharge to home or other facility with appropriate resources  Outcome: Progressing     Problem: Chronic Conditions and Co-morbidities  Goal: Patient's chronic conditions and co-morbidity symptoms are monitored and maintained or improved  Outcome: Progressing     Problem: Skin/Tissue Integrity  Goal: Absence of new skin breakdown  Description: 1. Monitor for areas of redness and/or skin breakdown  2. Assess vascular access sites hourly  3. Every 4-6 hours minimum:  Change oxygen saturation probe site  4. Every 4-6 hours:  If on nasal continuous positive airway pressure, respiratory therapy assess nares and determine need for appliance change or resting period.   Outcome: Progressing     Problem: Safety - Adult  Goal: Free from fall injury  Outcome: Progressing

## 2022-08-30 NOTE — ED PROVIDER NOTES
PAST MEDICALHISTORY     Past Medical History:   Diagnosis Date    ASHD (arteriosclerotic heart disease)     s/p PTCA and stent of circumflex, as well as intermediate and mid LAD     COPD (chronic obstructive pulmonary disease) (HCC)     Coronary atherosclerosis     Diabetes mellitus ([facility] [address]     diet controlled, type 2    Encounter for wound care     FOR LLL WOUND    Fall 10/29/2020    Ganglion cyst     RT HAND    Hematoma 10/2020    hematoma LLL from fall injury    Hx of blood clots     Hypercholesteremia     Hypertension     Pacemaker 03/02/2021    Palliative care patient 03/16/2022    Unstable angina [facility])          SURGICAL HISTORY       Past Surgical History:   Procedure Laterality Date    CARDIAC CATHETERIZATION  12/10/00    selective left heart and coronary arteriography with left ventriculography     CARDIAC CATHETERIZATION  01/23/98    left heart cath, left ventriculography, slective coronary arteriography, direct infarct angioplasty and stent placement to proximal left anterior descending coronary artery    CARDIAC CATHETERIZATION  03/05/94    left heart cath, selective coronary arteriography, left ventriculography    CARDIAC CATHETERIZATION  8/27/2011    [place]    CHOLECYSTECTOMY      CORONARY ANGIOPLASTY WITH STENT PLACEMENT  12/12/00    PTCA and stent placement to the mid LAD/ptca and stent placement first circumflex marginal (intermediate)     CORONARY ANGIOPLASTY WITH STENT PLACEMENT  08/2021    CORONARY ARTERY BYPASS GRAFT  8/29/2011    PACABG X 4 LIMA-LAD, SVG-DIAG, SVG-PDA, RT EVH, LT OPEN VEIN HARVEST, DR Vasile Villeda    CYST REMOVAL      GANGLION CYST REMOVED RT HAND    HYSTERECTOMY (CERVIX STATUS UNKNOWN)      NECK SURGERY      parotid artery tumor removed bilaterally    PACEMAKER PLACEMENT      PAROTIDECTOMY Bilateral          CURRENT MEDICATIONS     Current Discharge Medication List        CONTINUE these medications which have NOT CHANGED    Details   levoFLOXacin (LEVAQUIN) 500 MG tablet Take 500 mg by mouth in the morning. X 7 days.       sotalol (BETAPACE) 120 MG tablet TAKE 1 TABLET TWICE A DAY  *DOSE INCREASE*  Qty: 180 tablet, Refills: 1      apixaban (ELIQUIS) 5 MG TABS tablet Take by mouth 2 times daily      metFORMIN (GLUCOPHAGE) 500 MG tablet Take 500 mg by mouth 2 times daily (with meals)      potassium chloride (KLOR-CON) 20 MEQ packet Take 20 mEq by mouth daily      rosuvastatin (CRESTOR) 10 MG tablet TAKE 1 TABLET DAILY  Qty: 90 tablet, Refills: 1    Associated Diagnoses: Mixed hyperlipidemia      clopidogrel (PLAVIX) 75 MG tablet Take 1 tablet by mouth daily  Qty: 90 tablet, Refills: 3      ramipril (ALTACE) 5 MG capsule Take 1 capsule by mouth 2 times daily  Qty: 180 capsule, Refills: 3      ipratropium-albuterol (DUONEB) 0.5-2.5 (3) MG/3ML SOLN nebulizer solution Inhale 3 mLs into the lungs every 4 hours (while awake)  Qty: 360 mL, Refills: 0      furosemide (LASIX) 40 MG tablet Take 1 tablet by mouth daily  Qty: 60 tablet, Refills: 3      nitroGLYCERIN (NITROSTAT) 0.4 MG SL tablet Place 1 tablet under the tongue every 5 minutes as needed for Chest pain  Qty: 25 tablet, Refills: 3             ALLERGIES     Codeine    FAMILY HISTORY       Family History   Problem Relation Age of Onset    Cancer Mother     Coronary Art Dis Father     Mult Sclerosis Sister     Cancer Brother           SOCIAL HISTORY       Social History     Socioeconomic History    Marital status:      Spouse name: None    Number of children: None    Years of education: None    Highest education level: None   Tobacco Use    Smoking status: Some Days     Packs/day: 0.25     Types: Cigarettes     Last attempt to quit: 3/29/2021     Years since quittin.4    Smokeless tobacco: Never    Tobacco comments:     1/2 PACKS EVERY 2 WEEKS, states has been decreasing amount    Vaping Use    Vaping Use: Never used   Substance and Sexual Activity    Alcohol use: Never    Drug use: Never    Sexual activity: Not Currently Partners: Male     Social Determinants of Health     Financial Resource Strain: Low Risk     Difficulty of Paying Living Expenses: Not hard at all   Food Insecurity: No Food Insecurity    Worried About [address] in the Last Year: Never true    [facility] in the Last Year: Never true   Transportation Needs: No Transportation Needs    Lack of Transportation (Medical): No    Lack of Transportation (Non-Medical): No   Physical Activity: Sufficiently Active    Days of Exercise per Week: 7 days    Minutes of Exercise per Session: 30 min   Stress: No Stress Concern Present    Feeling of Stress : Only a little   Social Connections: Moderately Integrated    Frequency of Communication with Friends and Family: More than three times a week    Frequency of Social Gatherings with Friends and Family: More than three times a week    Attends Yazidism Services: More than 4 times per year    Active Member of [address] [place] [address] or Organizations: Yes    Attends Club or Organization Meetings: More than 4 times per year    Marital Status:    Intimate Partner Violence: Not At Risk    Fear of Current or Ex-Partner: No    Emotionally Abused: No    Physically Abused: No    Sexually Abused: No   Housing Stability: Low Risk     Unable to Pay for Housing in the Last Year: No    Number of [place] in the Last Year: 1    Unstable Housing in the Last Year: No       SCREENINGS    [place]on[place] Coma Scale  Eye Opening: Spontaneous  Best Verbal Response: Oriented  Best Motor Response: Obeys commands  [place] Coma Scale Score: 15        PHYSICAL EXAM    (up to 7 for level 4, 8 or more for level 5)     ED Triage Vitals [08/30/22 0430]   BP Temp Temp src Heart Rate Resp SpO2 Height Weight   (!) 156/87 98.9 °F (37.2 °C) -- 93 24 100 % -- --       Physical Exam  Vitals and nursing note reviewed. Constitutional:       Appearance: She is well-developed. HENT:      Head: Normocephalic and atraumatic.    Eyes:      General:         Right eye: No discharge. Left eye: No discharge. Conjunctiva/sclera: Conjunctivae normal.      Pupils: Pupils are equal, round, and reactive to light. Cardiovascular:      Rate and Rhythm: Normal rate and regular rhythm. Heart sounds: Normal heart sounds. Pulmonary:      Effort: Pulmonary effort is normal. No respiratory distress. Breath sounds: Examination of the right-upper field reveals wheezing. Examination of the left-upper field reveals wheezing. Examination of the right-middle field reveals wheezing. Examination of the left-middle field reveals wheezing. Examination of the right-lower field reveals wheezing. Examination of the left-lower field reveals wheezing. Wheezing present. No rales. Abdominal:      General: Bowel sounds are normal.      Palpations: Abdomen is soft. There is no mass. Tenderness: There is no abdominal tenderness. There is no guarding or rebound. Musculoskeletal:         General: No tenderness. Normal range of motion. Cervical back: Normal range of motion and neck supple. Skin:     General: Skin is warm and dry. Capillary Refill: Capillary refill takes less than 2 seconds. Neurological:      Mental Status: She is alert and oriented to person, place, and time. Psychiatric:         Behavior: Behavior normal.         Thought Content: Thought content normal.         Judgment: Judgment normal.       DIAGNOSTIC RESULTS     EKG: All EKG's areinterpreted by the Emergency Department Physician who either signs or Co-signs this chart in the absence of a cardiologist.    EKG shows real sensed pacing with a rate of 76    RADIOLOGY:  Non-plain film images such as CT, Ultrasound and MRI are read by the radiologist. Plain radiographic images are visualized and preliminarily interpreted bythe emergency physician with the below findings:      XR CHEST PORTABLE   Final Result   No significant interval change. Recommendation: Follow up as clinically indicated. Electronically Signed by Neftaly Cohen MD at 30-Aug-2022 06:48:28 AM                     LABS:  Labs Reviewed   CBC WITH AUTO DIFFERENTIAL - Abnormal; Notable for the following components:       Result Value    MCV 99.5 (*)     MCH 31.4 (*)     MCHC 31.5 (*)     MPV 9.1 (*)     Monocytes % 11.3 (*)     Monocytes Absolute 1.00 (*)     All other components within normal limits   COMPREHENSIVE METABOLIC PANEL - Abnormal; Notable for the following components:    Sodium 135 (*)     Chloride 95 (*)     CO2 36 (*)     Anion Gap 4 (*)     Glucose 161 (*)     All other components within normal limits   BLOOD GAS, ARTERIAL - Abnormal; Notable for the following components:    pH, Arterial 7.310 (*)     pCO2, Arterial 71.0 (*)     pO2, Arterial 124.0 (*)     HCO3, Arterial 35.7 (*)     Base Excess, Arterial 6.8 (*)     All other components within normal limits    Narrative:     [facility]  dr Hayde Reed, 08/30/2022 04:59, by [facility] - Abnormal; Notable for the following components:    Pro-BNP 1,859 (*)     All other components within normal limits   BLOOD GAS, ARTERIAL - Abnormal; Notable for the following components:    pCO2, Arterial 60.0 (*)     pO2, Arterial 64.0 (*)     HCO3, Arterial 33.9 (*)     Base Excess, Arterial 6.4 (*)     All other components within normal limits   COMPREHENSIVE METABOLIC PANEL - Abnormal; Notable for the following components:    Sodium 132 (*)     Chloride 91 (*)     CO2 31 (*)     Glucose 242 (*)     BUN 29 (*)     Creatinine 1.2 (*)     GFR Non- 44 (*)     GFR  53 (*)     All other components within normal limits   CBC WITH AUTO DIFFERENTIAL - Abnormal; Notable for the following components:    RBC 4.06 (*)     MCH 31.3 (*)     MCHC 31.9 (*)     Neutrophils % 86.5 (*)     Lymphocytes % 10.0 (*)     Lymphocytes Absolute 0.9 (*)     All other components within normal limits   POCT GLUCOSE - Abnormal; Notable for the following components:    POC Glucose 265 (*)     All other components within normal limits   POCT GLUCOSE - Abnormal; Notable for the following components:    POC Glucose 416 (*)     All other components within normal limits   POCT GLUCOSE - Abnormal; Notable for the following components:    POC Glucose 409 (*)     All other components within normal limits   POCT GLUCOSE - Abnormal; Notable for the following components:    POC Glucose 300 (*)     All other components within normal limits   COVID-19, RAPID   TROPONIN   LACTIC ACID   TROPONIN   POCT GLUCOSE   POCT GLUCOSE   POCT GLUCOSE   POCT GLUCOSE   POCT GLUCOSE       All other labs were within normal range or not returned as of this dictation. EMERGENCY DEPARTMENT COURSE and DIFFERENTIAL DIAGNOSIS/MDM:   Vitals:    Vitals:    08/30/22 1430 08/30/22 1746 08/30/22 2132 08/30/22 2348   BP: 124/76 111/80  127/67   Pulse: (!) 103 (!) 121 82 79   Resp: 20 18  18   Temp: 97.6 °F (36.4 °C) 97.3 °F (36.3 °C)  97.7 °F (36.5 °C)   TempSrc: Oral Axillary  Oral   SpO2: 99% 98%  95%   Weight: 153 lb 8 oz (69.6 kg)      Height: 5' 6\" (1.676 m)          MDM    Patient presents with acute on chronic respiratory failure setting of COPD exacerbation. She felt much better after neb utilizer treatments steroids and she would like to be discharged home. She responded well to being placed on BiPAP. Was no evidence of respiratory failure on repeated exam.  The end of my shift we are awaiting repeat ABG to see if she was improving such that she could be discharged home otherwise she would be admitted. Patient was endorsed to Dr. Antwon Evans at 6:30 AM.    Reassessment      CONSULTS:  IP CONSULT TO PULMONOLOGY    PROCEDURES:  Unless otherwise noted below, none     Procedures    FINAL IMPRESSION      1. Acute on chronic respiratory failure with hypercapnia (HCC)    2.  COPD exacerbation [facility])          DISPOSITION/PLAN   DISPOSITION Decision To Admit 08/30/2022 09:44:36 AM      PATIENT REFERRED TO:  [facility] EMERGENCY DEPT  Josemanuel De Guzman  [phone]    If symptoms worsen    Gerhardt Bass, [address] [facility] [address] [id number]  [phone]      As needed    DISCHARGE MEDICATIONS:  Current Discharge Medication List        START taking these medications    Details   predniSONE (DELTASONE) 50 MG tablet Take 1 tablet by mouth daily for 5 days  Qty: 5 tablet, Refills: 0      azithromycin (ZITHROMAX) 250 MG tablet Take 1 tablet by mouth See Admin Instructions for 5 days 500mg on day 1 followed by 250mg on days 2 - 5  Qty: 6 tablet, Refills: 0    Associated Diagnoses: COPD exacerbation ([address]                (Please note that portions of this note were completed with a voice recognition program.  Efforts were made to edit thedictations but occasionally words are mis-transcribed.)    Elizabeth Stacy MD (electronically signed)  Attending Emergency Physician         Elizabeth Stacy MD  08/31/22 3541

## 2022-08-30 NOTE — ED NOTES
Dr. Louie Mack and RT at the bedside      [facility]  08/30/22 0905

## 2022-08-30 NOTE — PROGRESS NOTES
Spoke with patient regarding code status on admission. She wishes to be a DNR. Dr. Shannon Ramsey notified and code status updated in the chart.

## 2022-08-30 NOTE — PROGRESS NOTES
Left a message for Dr. Yves Barrera office regarding patient home medications. Notified that patient medications are updated in chart.

## 2022-08-30 NOTE — PROGRESS NOTES
4 Eyes Skin Assessment    Petros Byrd is being assessed upon: Admission    I agree that I, Rodrick Bridges RN, along with Celia Jordan RN(either 2 RN's or 1 LPN and 1 RN) have performed a thorough Head to Toe Skin Assessment on the patient. ALL assessment sites listed below have been assessed. Areas assessed by both nurses:     [x]   Head, Face, and Ears   [x]   Shoulders, Back, and Chest  [x]   Arms, Elbows, and Hands   [x]   Coccyx, Sacrum, and Ischium  [x]   Legs, Feet, and Heels    Does the Patient have Skin Breakdown?  No    Pratik Prevention initiated: Yes  Wound Care Orders initiated: No    WOC nurse consulted for Pressure Injury (Stage 3,4, Unstageable, DTI, NWPT, and Complex wounds) and New or Established Ostomies: No        Primary Nurse eSignature: Rodrick Bridges RN on 8/30/2022 at 5:31 PM      Co-Signer eSignature: Electronically signed by Celia Jordan RN on 8/30/22 at 6:45 PM CDT

## 2022-08-30 NOTE — CARE COORDINATION
Patient Contact Information:    [address] [place] [address]  [phone] (home)   Telephone Information:   Mobile [phone]     Above information verified? [x]   Yes  []   No      Emergency Contacts:    Extended Emergency Contact Information  Primary Emergency Contact: Cas [address] Phone: [phone]  Mobile Phone: [phone]  Relation: Child  Secondary Emergency Contact: Farzaneh Gerber  Mobile Phone: [phone]  Relation: None      Have you been vaccinated for COVID-19 (SARS-CoV-2)? [x]   Yes  []   No                   If so, when?     Which :         [x]   Pfizer-[employer]  []   Moderna  []   [employer]  []   Other:         Pharmacy:    [id number], [address] [facility] [phone]  [address] [address] [address]  Phone: [phone] Fax: [phone]    [facility] [address] [place] [address] [phone]  F [phone]  [place] [address]  Phone: [phone] Fax: [phone]          Patient Deficits:    []   Yes   [x]   No    If yes:    []   Confusion/Memory  []   Visual  []   Motor/Sensory         []   Right arm         []   Right leg         []   Left arm         []   Left leg  []   Language/Speech         []   Aphasia         []   Dysarthria         []   Swallow         [place] Coma Scale  Eye Opening: Spontaneous  Best Verbal Response: Oriented  Best Motor Response: Obeys commands  [place] Coma Scale Score: 15    Patient Deficit Notes:     SW visited with Pt re: d/c planning; son at bedside in recliner sleeping/resting; she noted she has good neighbor support and her son lives across-the-street; she has a c/g that stays with her when her son is not home and he stays with her often; she uses oxygen at home and has cpap and neb machine via [employer]; stated she has a walker, rollator, 2 types of w/c's; shower chair, and BSC; not current with [place]; but not opposed at d/c ;she also consents to CHW program; following  Electronically signed by WAI Knox on 8/30/2022 at 5:49 PM

## 2022-08-30 NOTE — CONSULTS
Pulmonary and Critical Care Consult Note    [facility]    MRN# [id number]    Acct# [de-identified]  8/30/2022   4:04 PM CDT    Referring Leida Coronado MD      Chief Complaint: Shortness of breath    Requesting physician: Dr. Trudy Valiente    Reason for consult: COPD      HPI: We have been consulted to see this 67y.o. year old female born on 1950. Patient presented to the hospital with increasing shortness of breath. She says she felt congested yesterday. She felt that the heat and humidity had something to do with it. Denies any fevers or sweats however she is having chills. She presented to the emergency room due to the above. Her initial evaluation showed a normal white blood cell count. Chest x-ray was unremarkable. She did have however acute on chronic hypercapnic hypoxic respiratory failure with respiratory acidosis due to hypercapnia. The patient does have noninvasive ventilator at home and she does use it during sleep. She says that her nebulizer would not help her like it usually does. I was asked to see her regarding the above. Currently she is laying in bed. She feels somewhat better since earlier today but continues to be short of breath. She admits to smoking cigarettes.       Past Medical History      Past Medical History:   Diagnosis Date    ASHD (arteriosclerotic heart disease)     s/p PTCA and stent of circumflex, as well as intermediate and mid LAD     COPD (chronic obstructive pulmonary disease) (HCC)     Coronary atherosclerosis     Diabetes mellitus ([facility] [place] [address]     diet controlled, type 2    Encounter for wound care     FOR LLL WOUND    Fall 10/29/2020    Ganglion cyst     RT HAND    Hematoma 10/2020    hematoma LLL from fall injury    Hx of blood clots     Hypercholesteremia     Hypertension     Pacemaker 03/02/2021    Palliative care patient 03/16/2022    Unstable angina ([facility] [address]      SurgicalHistory  Past Surgical History:   Procedure Laterality Date    CARDIAC CATHETERIZATION  12/10/00    selective left heart and coronary arteriography with left ventriculography     CARDIAC CATHETERIZATION  01/23/98    left heart cath, left ventriculography, slective coronary arteriography, direct infarct angioplasty and stent placement to proximal left anterior descending coronary artery    CARDIAC CATHETERIZATION  03/05/94    left heart cath, selective coronary arteriography, left ventriculography    CARDIAC CATHETERIZATION  8/27/2011    [facility]    CHOLECYSTECTOMY      CORONARY ANGIOPLASTY WITH STENT PLACEMENT  12/12/00    PTCA and stent placement to the mid LAD/ptca and stent placement first circumflex marginal (intermediate)     CORONARY ANGIOPLASTY WITH STENT PLACEMENT  08/2021    CORONARY ARTERY BYPASS GRAFT  8/29/2011    PACABG X 4 LIMA-LAD, SVG-DIAG, SVG-PDA, RT EVH, LT OPEN VEIN HARVEST, DR Norma Talamantes    CYST REMOVAL      GANGLION CYST REMOVED RT HAND    HYSTERECTOMY (CERVIX STATUS UNKNOWN)      NECK SURGERY      parotid artery tumor removed bilaterally    PACEMAKER PLACEMENT      PAROTIDECTOMY Bilateral      Allergies  Allergies   Allergen Reactions    Codeine Other (See Comments)     SOB     Medications    sodium chloride flush, 5-40 mL, IntraVENous, 2 times per day    apixaban, 5 mg, Oral, BID    clopidogrel, 75 mg, Oral, Daily    furosemide, 40 mg, Oral, Daily    ipratropium-albuterol, 3 mL, Inhalation, Q4H WA    metFORMIN, 500 mg, Oral, BID WC    potassium bicarb-citric acid, 20 mEq, Oral, Daily    ramipril, 5 mg, Oral, BID    rosuvastatin, 10 mg, Oral, Nightly    sotalol, 120 mg, Oral, 2 times per day    predniSONE, 20 mg, Oral, Daily    insulin lispro, 0-4 Units, SubCUTAneous, TID WC    insulin lispro, 0-4 Units, SubCUTAneous, Nightly    cefTRIAXone (ROCEPHIN) IV, 1,000 mg, IntraVENous, Q24H   Social History   reports that she has been smoking cigarettes. She has been smoking an average of .25 packs per day. She has never used smokeless tobacco. She reports that she does not drink alcohol and does not use drugs. Family History  family history includes Cancer in her brother and mother; Coronary Art Dis in her father; Mult Sclerosis in her sister.     Review of Systems:  12 point review of systems is negative except as below:  CONSTITUTIONAL:  positive for  malaise  RESPIRATORY:  positive for  dyspnea  CARDIOVASCULAR:  positive for  dyspnea    Physical Exam:  /76   Pulse (!) 103   Temp 97.6 °F (36.4 °C) (Oral)   Resp 20   Wt 153 lb 8 oz (69.6 kg)   SpO2 99%   BMI 24.78 kg/m²   Intake/Output Summary (Last 24 hours) at 8/30/2022 1604  Last data filed at 8/30/2022 1424  Gross per 24 hour   Intake 240 ml   Output --   Net 240 ml       General appearance: Elderly female who appears to be in no distress   HEENT: Normocephalic atraumatic   Heart: S1-S2 distant sounds no murmurs  Lungs: Diminished bilaterally no rubs or tenderness on dullness to percussion she is wheezing diffusely  Abdomen: Soft nontender no organomegalies normal bowel sounds  Extremities: No clubbing cyanosis or edema  Neuro: No focal findings  Skin: Intact        Labs:  Recent Labs     08/30/22 0449   WBC 8.8   RBC 4.24   HGB 13.3   HCT 42.2      MCV 99.5*   MCH 31.4*   MCHC 31.5*   RDW 13.3      Recent Labs     08/30/22 0449 08/30/22 0458 08/30/22  0654   *  --   --    K 4.4 4.1 4.4   CL 95*  --   --    CO2 36*  --   --    BUN 17  --   --    CREATININE 0.9  --   --    CALCIUM 9.5  --   --    GLUCOSE 161*  --   --       Recent Labs     08/30/22 0458 08/30/22  0654   PHART 7.310* 7.360   JYD1QZE 71.0* 60.0*   PO2ART 124.0* 64.0*   FDP2CQL 35.7* 33.9*   C8JFQESJ 94.3 90.6   BEART 6.8* 6.4*     Recent Labs     08/30/22 0449 08/30/22 0456 08/30/22  1015   AST 19  --   --    ALT 14  --   --    ALKPHOS 73  --   --    BILITOT 0.3  --   --    CALCIUM 9.5  --   --    PROBNP 1,859*  -- --    TROPONINI <0.01  --  <0.01   LACTA  --  1.0  --      No results for input(s): BC, LABGRAM, CULTRESP, BFCX in the last 72 hours. Radiograph: XR CHEST PORTABLE    Result Date: 8/30/2022  No significant interval change. Recommendation: Follow up as clinically indicated. Electronically Signed by Izaiah Fitzgerald MD at 30-Aug-2022 06:48:28 AM                My radiograph interpretation/independent review of imaging: Reviewed    Problem list generated by [employer]:  Hospital Problems             Last Modified POA    * (Principal) COPD exacerbation ([address] 8/30/2022 Yes    Essential hypertension 8/30/2022 Yes    Diabetes mellitus ([address] 8/30/2022 Yes    Overview Signed 6/2/2011  1:19 PM by Jada Gallegos     diet controlled         Hypercholesteremia 8/30/2022 Yes    S/P CABG x 4 8/30/2022 Yes    Overview Signed 10/11/2016 11:15 AM by JAKOB Rush     8/29/11 EF 60%         Coronary artery disease involving native coronary artery of native heart without angina pectoris 8/30/2022 Yes    Mixed hyperlipidemia 8/30/2022 Yes    Chronic obstructive pulmonary disease ([address] 8/30/2022 Yes    Abdominal aortic aneurysm (AAA) without rupture ([address] 8/30/2022 Yes    Acute on chronic respiratory failure with hypercapnia ([address] 8/30/2022 Yes          Pulmonary Assessment/plan:    Acute on chronic hypercapnic hypoxic respiratory failure. Continue noninvasive ventilation as necessary to maintain adequate gas exchange. Noninvasive ventilation settings have been adjusted. Possible underlying severe COPD with acute exacerbation. Continue bronchodilators. Continue antibiotics empirically however will switch to Maxipime for expanded gram-negative coverage. We will also switch from prednisone to IV Solu-Medrol. Dyspnea due to the above supportive care. DVT prophylaxis she is on Eliquis. Henry Mendez MD, [facility]    The above note was generated using voice recognition software.   Inadvertent typographical errors in transcription may have occurred.     Electronically signed by Maxime Mckinney MD on 08/30/22 at 4:04 PM

## 2022-08-31 LAB
ALBUMIN SERPL-MCNC: 4.1 G/DL (ref 3.5–5.2)
ALP BLD-CCNC: 68 U/L (ref 35–104)
ALT SERPL-CCNC: 17 U/L (ref 5–33)
ANION GAP SERPL CALCULATED.3IONS-SCNC: 10 MMOL/L (ref 7–19)
AST SERPL-CCNC: 15 U/L (ref 5–32)
BASOPHILS ABSOLUTE: 0 K/UL (ref 0–0.2)
BASOPHILS RELATIVE PERCENT: 0.1 % (ref 0–1)
BILIRUB SERPL-MCNC: 0.3 MG/DL (ref 0.2–1.2)
BUN BLDV-MCNC: 29 MG/DL (ref 8–23)
CALCIUM SERPL-MCNC: 9.4 MG/DL (ref 8.8–10.2)
CHLORIDE BLD-SCNC: 91 MMOL/L (ref 98–111)
CO2: 31 MMOL/L (ref 22–29)
CREAT SERPL-MCNC: 1.2 MG/DL (ref 0.5–0.9)
EKG P AXIS: 29 DEGREES
EKG P-R INTERVAL: 200 MS
EKG Q-T INTERVAL: 414 MS
EKG QRS DURATION: 108 MS
EKG QTC CALCULATION (BAZETT): 443 MS
EKG T AXIS: 66 DEGREES
EOSINOPHILS ABSOLUTE: 0 K/UL (ref 0–0.6)
EOSINOPHILS RELATIVE PERCENT: 0 % (ref 0–5)
GFR AFRICAN AMERICAN: 53
GFR NON-AFRICAN AMERICAN: 44
GLUCOSE BLD-MCNC: 242 MG/DL (ref 74–109)
GLUCOSE BLD-MCNC: 250 MG/DL (ref 70–99)
GLUCOSE BLD-MCNC: 314 MG/DL (ref 70–99)
GLUCOSE BLD-MCNC: 330 MG/DL (ref 70–99)
GLUCOSE BLD-MCNC: 378 MG/DL (ref 70–99)
HCT VFR BLD CALC: 39.8 % (ref 37–47)
HEMOGLOBIN: 12.7 G/DL (ref 12–16)
IMMATURE GRANULOCYTES #: 0.1 K/UL
LYMPHOCYTES ABSOLUTE: 0.9 K/UL (ref 1.1–4.5)
LYMPHOCYTES RELATIVE PERCENT: 10 % (ref 20–40)
MCH RBC QN AUTO: 31.3 PG (ref 27–31)
MCHC RBC AUTO-ENTMCNC: 31.9 G/DL (ref 33–37)
MCV RBC AUTO: 98 FL (ref 81–99)
MONOCYTES ABSOLUTE: 0.2 K/UL (ref 0–0.9)
MONOCYTES RELATIVE PERCENT: 2.8 % (ref 0–10)
NEUTROPHILS ABSOLUTE: 7.5 K/UL (ref 1.5–7.5)
NEUTROPHILS RELATIVE PERCENT: 86.5 % (ref 50–65)
PDW BLD-RTO: 13.2 % (ref 11.5–14.5)
PERFORMED ON: ABNORMAL
PLATELET # BLD: 197 K/UL (ref 130–400)
PMV BLD AUTO: 9.8 FL (ref 9.4–12.3)
POTASSIUM SERPL-SCNC: 4.9 MMOL/L (ref 3.5–5)
RBC # BLD: 4.06 M/UL (ref 4.2–5.4)
SODIUM BLD-SCNC: 132 MMOL/L (ref 136–145)
TOTAL PROTEIN: 6.6 G/DL (ref 6.6–8.7)
WBC # BLD: 8.7 K/UL (ref 4.8–10.8)

## 2022-08-31 PROCEDURE — 93010 ELECTROCARDIOGRAM REPORT: CPT | Performed by: INTERNAL MEDICINE

## 2022-08-31 PROCEDURE — G0378 HOSPITAL OBSERVATION PER HR: HCPCS

## 2022-08-31 PROCEDURE — 36415 COLL VENOUS BLD VENIPUNCTURE: CPT

## 2022-08-31 PROCEDURE — 82947 ASSAY GLUCOSE BLOOD QUANT: CPT

## 2022-08-31 PROCEDURE — 80053 COMPREHEN METABOLIC PANEL: CPT

## 2022-08-31 PROCEDURE — 2580000003 HC RX 258: Performed by: INTERNAL MEDICINE

## 2022-08-31 PROCEDURE — 2700000000 HC OXYGEN THERAPY PER DAY

## 2022-08-31 PROCEDURE — 99233 SBSQ HOSP IP/OBS HIGH 50: CPT | Performed by: INTERNAL MEDICINE

## 2022-08-31 PROCEDURE — 97535 SELF CARE MNGMENT TRAINING: CPT

## 2022-08-31 PROCEDURE — 94640 AIRWAY INHALATION TREATMENT: CPT

## 2022-08-31 PROCEDURE — 6360000002 HC RX W HCPCS: Performed by: INTERNAL MEDICINE

## 2022-08-31 PROCEDURE — 96376 TX/PRO/DX INJ SAME DRUG ADON: CPT

## 2022-08-31 PROCEDURE — 97116 GAIT TRAINING THERAPY: CPT

## 2022-08-31 PROCEDURE — 6370000000 HC RX 637 (ALT 250 FOR IP): Performed by: FAMILY MEDICINE

## 2022-08-31 PROCEDURE — 94660 CPAP INITIATION&MGMT: CPT

## 2022-08-31 PROCEDURE — 97161 PT EVAL LOW COMPLEX 20 MIN: CPT

## 2022-08-31 PROCEDURE — 97165 OT EVAL LOW COMPLEX 30 MIN: CPT

## 2022-08-31 PROCEDURE — 2580000003 HC RX 258: Performed by: FAMILY MEDICINE

## 2022-08-31 PROCEDURE — 85025 COMPLETE CBC W/AUTO DIFF WBC: CPT

## 2022-08-31 PROCEDURE — 96366 THER/PROPH/DIAG IV INF ADDON: CPT

## 2022-08-31 RX ORDER — INSULIN GLARGINE 100 [IU]/ML
15 INJECTION, SOLUTION SUBCUTANEOUS NIGHTLY
Status: DISCONTINUED | OUTPATIENT
Start: 2022-08-31 | End: 2022-09-03 | Stop reason: HOSPADM

## 2022-08-31 RX ORDER — INSULIN LISPRO 100 [IU]/ML
0-8 INJECTION, SOLUTION INTRAVENOUS; SUBCUTANEOUS EVERY 4 HOURS
Status: DISCONTINUED | OUTPATIENT
Start: 2022-08-31 | End: 2022-09-03 | Stop reason: HOSPADM

## 2022-08-31 RX ADMIN — RAMIPRIL 5 MG: 5 CAPSULE ORAL at 16:48

## 2022-08-31 RX ADMIN — IPRATROPIUM BROMIDE AND ALBUTEROL SULFATE 3 ML: 2.5; .5 SOLUTION RESPIRATORY (INHALATION) at 07:47

## 2022-08-31 RX ADMIN — IPRATROPIUM BROMIDE AND ALBUTEROL SULFATE 3 ML: 2.5; .5 SOLUTION RESPIRATORY (INHALATION) at 18:53

## 2022-08-31 RX ADMIN — FUROSEMIDE 40 MG: 40 TABLET ORAL at 09:02

## 2022-08-31 RX ADMIN — INSULIN GLARGINE 15 UNITS: 100 INJECTION, SOLUTION SUBCUTANEOUS at 20:57

## 2022-08-31 RX ADMIN — INSULIN LISPRO 6 UNITS: 100 INJECTION, SOLUTION INTRAVENOUS; SUBCUTANEOUS at 12:00

## 2022-08-31 RX ADMIN — METFORMIN HYDROCHLORIDE 500 MG: 500 TABLET ORAL at 09:02

## 2022-08-31 RX ADMIN — Medication 10 ML: at 09:03

## 2022-08-31 RX ADMIN — APIXABAN 5 MG: 5 TABLET, FILM COATED ORAL at 09:02

## 2022-08-31 RX ADMIN — Medication 10 ML: at 20:56

## 2022-08-31 RX ADMIN — CLOPIDOGREL BISULFATE 75 MG: 75 TABLET ORAL at 09:02

## 2022-08-31 RX ADMIN — ROSUVASTATIN CALCIUM 10 MG: 10 TABLET, FILM COATED ORAL at 20:56

## 2022-08-31 RX ADMIN — INSULIN LISPRO 4 UNITS: 100 INJECTION, SOLUTION INTRAVENOUS; SUBCUTANEOUS at 09:02

## 2022-08-31 RX ADMIN — METHYLPREDNISOLONE SODIUM SUCCINATE 60 MG: 125 INJECTION, POWDER, FOR SOLUTION INTRAMUSCULAR; INTRAVENOUS at 16:48

## 2022-08-31 RX ADMIN — IPRATROPIUM BROMIDE AND ALBUTEROL SULFATE 3 ML: 2.5; .5 SOLUTION RESPIRATORY (INHALATION) at 15:16

## 2022-08-31 RX ADMIN — METHYLPREDNISOLONE SODIUM SUCCINATE 60 MG: 125 INJECTION, POWDER, FOR SOLUTION INTRAMUSCULAR; INTRAVENOUS at 04:54

## 2022-08-31 RX ADMIN — METHYLPREDNISOLONE SODIUM SUCCINATE 60 MG: 125 INJECTION, POWDER, FOR SOLUTION INTRAMUSCULAR; INTRAVENOUS at 11:54

## 2022-08-31 RX ADMIN — METHYLPREDNISOLONE SODIUM SUCCINATE 60 MG: 125 INJECTION, POWDER, FOR SOLUTION INTRAMUSCULAR; INTRAVENOUS at 22:09

## 2022-08-31 RX ADMIN — RAMIPRIL 5 MG: 5 CAPSULE ORAL at 09:02

## 2022-08-31 RX ADMIN — SOTALOL HYDROCHLORIDE 120 MG: 80 TABLET ORAL at 09:01

## 2022-08-31 RX ADMIN — INSULIN LISPRO 6 UNITS: 100 INJECTION, SOLUTION INTRAVENOUS; SUBCUTANEOUS at 20:57

## 2022-08-31 RX ADMIN — CEFEPIME 2000 MG: 2 INJECTION, POWDER, FOR SOLUTION INTRAVENOUS at 18:12

## 2022-08-31 RX ADMIN — APIXABAN 5 MG: 5 TABLET, FILM COATED ORAL at 20:56

## 2022-08-31 RX ADMIN — IPRATROPIUM BROMIDE AND ALBUTEROL SULFATE 3 ML: 2.5; .5 SOLUTION RESPIRATORY (INHALATION) at 11:20

## 2022-08-31 RX ADMIN — SOTALOL HYDROCHLORIDE 120 MG: 80 TABLET ORAL at 20:56

## 2022-08-31 RX ADMIN — INSULIN LISPRO 8 UNITS: 100 INJECTION, SOLUTION INTRAVENOUS; SUBCUTANEOUS at 17:00

## 2022-08-31 RX ADMIN — METFORMIN HYDROCHLORIDE 500 MG: 500 TABLET ORAL at 16:48

## 2022-08-31 ASSESSMENT — PAIN SCALES - GENERAL: PAINLEVEL_OUTOF10: 0

## 2022-08-31 NOTE — H&P
CHIEF COMPLAINT: Shortness of breath    History Obtained From:  patient     HISTORY OF PRESENT ILLNESS:      The patient is a 67 y.o. female with significant past medical history of COPD and coronary artery disease who presents with acute onset shortness of breath yesterday. Symptoms actually started the night before but patient attempted to manage with her scheduled breathing treatments and other interventions including CPAP and supplemental oxygen. She thought she had improved initially however patient decompensated by that next morning. She was attempting to do breathing treatments. Was not helping with her symptoms. Eventually had to tripod in order to ventilate. Has had wheezing over the prior days. No fever but did have some chills. No change in her baseline cough.     Past Medical History:   Diagnosis Date    ASHD (arteriosclerotic heart disease)     s/p PTCA and stent of circumflex, as well as intermediate and mid LAD     COPD (chronic obstructive pulmonary disease) ([facility])     Coronary atherosclerosis     Diabetes mellitus ([facility] [address]     diet controlled, type 2    Encounter for wound care     FOR LLL WOUND    Fall 10/29/2020    Ganglion cyst     RT HAND    Hematoma 10/2020    hematoma LLL from fall injury    Hx of blood clots     Hypercholesteremia     Hypertension     Pacemaker 03/02/2021    Palliative care patient 03/16/2022    Unstable angina [facility])        Past Surgical History:   Procedure Laterality Date    CARDIAC CATHETERIZATION  12/10/00    selective left heart and coronary arteriography with left ventriculography     CARDIAC CATHETERIZATION  01/23/98    left heart cath, left ventriculography, slective coronary arteriography, direct infarct angioplasty and stent placement to proximal left anterior descending coronary artery    CARDIAC CATHETERIZATION  03/05/94    left heart cath, selective coronary arteriography, left ventriculography    CARDIAC CATHETERIZATION  8/27/2011    [facility] CHOLECYSTECTOMY      CORONARY ANGIOPLASTY WITH STENT PLACEMENT  12/12/00    PTCA and stent placement to the mid LAD/ptca and stent placement first circumflex marginal (intermediate)     CORONARY ANGIOPLASTY WITH STENT PLACEMENT  08/2021    CORONARY ARTERY BYPASS GRAFT  8/29/2011    PACABG X 4 LIMA-LAD, SVG-DIAG, SVG-PDA, RT EVH, LT OPEN VEIN HARVEST, DR Jennifer Munson    CYST REMOVAL      GANGLION CYST REMOVED RT HAND    HYSTERECTOMY (CERVIX STATUS UNKNOWN)      NECK SURGERY      parotid artery tumor removed bilaterally    PACEMAKER PLACEMENT      PAROTIDECTOMY Bilateral        Medications Prior to Admission:    Medications Prior to Admission: levoFLOXacin (LEVAQUIN) 500 MG tablet, Take 500 mg by mouth in the morning. X 7 days. (Patient not taking: Reported on 8/30/2022)  sotalol (BETAPACE) 120 MG tablet, TAKE 1 TABLET TWICE A DAY  *DOSE INCREASE*  apixaban (ELIQUIS) 5 MG TABS tablet, Take by mouth 2 times daily  metFORMIN (GLUCOPHAGE) 500 MG tablet, Take 500 mg by mouth 2 times daily (with meals)  potassium chloride (KLOR-CON) 20 MEQ packet, Take 20 mEq by mouth daily  rosuvastatin (CRESTOR) 10 MG tablet, TAKE 1 TABLET DAILY  clopidogrel (PLAVIX) 75 MG tablet, Take 1 tablet by mouth daily  ramipril (ALTACE) 5 MG capsule, Take 1 capsule by mouth 2 times daily  ipratropium-albuterol (DUONEB) 0.5-2.5 (3) MG/3ML SOLN nebulizer solution, Inhale 3 mLs into the lungs every 4 hours (while awake) (Patient taking differently: Inhale 3 mLs into the lungs 4 times daily)  furosemide (LASIX) 40 MG tablet, Take 1 tablet by mouth daily  nitroGLYCERIN (NITROSTAT) 0.4 MG SL tablet, Place 1 tablet under the tongue every 5 minutes as needed for Chest pain    Allergies:    Codeine    Social History:    reports that she has been smoking cigarettes. She has been smoking an average of .25 packs per day. She has never used smokeless tobacco. She reports that she does not drink alcohol and does not use drugs.     Family History:   family history includes Cancer in her brother and mother; Coronary Art Dis in her father; Mult Sclerosis in her sister. REVIEW OF SYSTEMS:  As above in the HPI, otherwise negative    PHYSICAL EXAM:    Vitals:  BP (!) 112/56   Pulse 80   Temp 97.7 °F (36.5 °C) (Oral)   Resp 18   Ht 5' 6\" (1.676 m)   Wt 153 lb 8 oz (69.6 kg)   SpO2 96%   BMI 24.78 kg/m²     General Appearance:  alert, cooperative, and dyspneic  Skin:  negatives: mobility and turgor normal  Head/face:  NCAT  Eyes:  No gross abnormalities. Neck:  neck- supple, no mass, non-tender  Lungs:  Breathing Pattern: rapid, shallow and use of accessory muscles, Breath sounds: diminished breath sounds- throughout and wheezing- scattered  Heart:  Heart regular rate and rhythm  Abdomen: Auscultation: Normal bowel sounds. No bruits. Palpation: No masses, tenderness or organomegally. Extremities: Extremities warm to touch, pink, with no edema. Musculoskeletal:  No joint swelling, deformity, or tenderness.   Neurologic:  negative    DATA:  CBC with Differential:    Lab Results   Component Value Date/Time    WBC 8.7 08/31/2022 02:50 AM    RBC 4.06 08/31/2022 02:50 AM    HGB 12.7 08/31/2022 02:50 AM    HCT 39.8 08/31/2022 02:50 AM    HCT 32.1 09/02/2011 02:11 AM     08/31/2022 02:50 AM     09/02/2011 02:11 AM    MCV 98.0 08/31/2022 02:50 AM    MCH 31.3 08/31/2022 02:50 AM    MCHC 31.9 08/31/2022 02:50 AM    RDW 13.2 08/31/2022 02:50 AM    LYMPHOPCT 10.0 08/31/2022 02:50 AM    MONOPCT 2.8 08/31/2022 02:50 AM    EOSPCT 0.5 09/02/2011 02:11 AM    BASOPCT 0.1 08/31/2022 02:50 AM    MONOSABS 0.20 08/31/2022 02:50 AM    LYMPHSABS 0.9 08/31/2022 02:50 AM    EOSABS 0.00 08/31/2022 02:50 AM    BASOSABS 0.00 08/31/2022 02:50 AM     CMP:    Lab Results   Component Value Date/Time     08/31/2022 02:50 AM     09/02/2011 02:11 AM    K 4.9 08/31/2022 02:50 AM    K 4.5 07/24/2022 12:19 AM    K 4.1 09/02/2011 02:11 AM    CL 91 08/31/2022 02:50 AM     Hopefully can return her to baseline in a day or 2.     Electronically signed by Brigido Ann MD on 8/31/2022 at 8:39 AM

## 2022-08-31 NOTE — PROGRESS NOTES
Occupational Therapy  Facility/Department: [facility] SURI/VAS/MED  Occupational Therapy Initial Assessment    Name: Elsie Lange  : 1950  MRN: [id number]  Date of Service: 2022    Discharge Recommendations:  Home with assist PRN  OT Equipment Recommendations  Equipment Needed: No       Patient Diagnosis(es): The primary encounter diagnosis was Acute on chronic respiratory failure with hypercapnia ([facility] [address] A diagnosis of COPD exacerbation ([address] was also pertinent to this visit. Past Medical History:  has a past medical history of ASHD (arteriosclerotic heart disease), COPD (chronic obstructive pulmonary disease) ([address] Coronary atherosclerosis, Diabetes mellitus ([facility] [address] Encounter for wound care, Fall, Ganglion cyst, Hematoma, Hx of blood clots, Hypercholesteremia, Hypertension, Pacemaker, Palliative care patient, and Unstable angina ([facility] [address] Past Surgical History:  has a past surgical history that includes Cholecystectomy; Hysterectomy; Coronary angioplasty with stent (00); Cardiac catheterization (12/10/00); Cardiac catheterization (98); Cardiac catheterization (94); Cardiac catheterization (2011); Coronary artery bypass graft (2011); Neck surgery; Coronary angioplasty with stent (2021); cyst removal; Parotidectomy (Bilateral); and pacemaker placement. Treatment Diagnosis: COPD exacerbation      Assessment   Assessment: OT eval only. Pt is able to perform ADL and functional mobility at [facility].   Treatment Diagnosis: COPD exacerbation  No Skilled OT: Independent with ADL's;At baseline function  REQUIRES OT FOLLOW-UP: No  Activity Tolerance  Activity Tolerance: Patient Tolerated treatment well        Plan   Plan  Plan Comment: OT eval only     Restrictions  Restrictions/Precautions  Restrictions/Precautions: Fall Risk    Subjective   General  Chart Reviewed: Yes  Patient assessed for rehabilitation services?: Yes  Family / Caregiver Present: No     Social/Functional History  Social/Functional History  Lives With: Alone (pt's son lives across the street and stays with patient some days. Pt has caregiver assistance for days that son isn't home.)  Type of Home: House  Home Layout: One level  Bathroom Shower/Tub: Walk-in shower  Bathroom Toilet: Handicap height  Bathroom Equipment: Shower chair, 3-in-1 commode  ADL Assistance: Needs assistance  Bath: Supervision (pt only showers when someone is there to supervise)  Dressing: Independent  Grooming: Independent  Feeding: Independent  Toileting: Independent  Homemaking Assistance: Needs assistance (assist with some household tasks. pt reports that she cooks and does some laundry.)  Occupation: Retired  Type of Occupation: OR nurse                  Safety Devices  Type of Devices: Call light within reach;Gait belt     Toilet Transfers  Toilet Transfer: Modified independent  Toilet Transfers Comments: per clinical observation     ADL  Feeding: Independent  Grooming: Independent  UE Bathing: Independent;Setup  UE Bathing Skilled Clinical Factors: per clinical observation  LE Bathing: Supervision;Modified independent   LE Bathing Skilled Clinical Factors: per clinical observation for standing aspects in shower  UE Dressing: Independent  LE Dressing: Modified independent   Toileting: Modified independent   Toileting Skilled Clinical Factors: per clinical observation        Bed mobility  Supine to Sit: Independent  Sit to Supine: Independent  Transfers  Stand Step Transfers: Independent  Vision  Vision: Within Functional Limits  Hearing  Hearing: Within functional limits  Cognition  Overall Cognitive Status: WFL  Orientation  Overall Orientation Status: Within Normal Limits                     LUE AROM (degrees)  LUE AROM : WFL  RUE AROM (degrees)  RUE AROM : WFL                       Goals  No goals needed.  Eval only          Leonides Nelson OT  Electronically signed by Leonides Nelson OT on 8/31/2022 at 2:26 PM

## 2022-08-31 NOTE — PROGRESS NOTES
[place] PHYSICAL THERAPY EVALUATION      Janae Canales    : 1950  MRN: [id number]   PHYSICIAN:  Bon Hernandez MD  Primary Problem    Patient Active Problem List   Diagnosis    Deep vein thrombosis (HCC)    Essential hypertension    Diabetes mellitus ([address]    Hypercholesteremia    S/P CABG x 4    History of coronary artery stent placement    Coronary artery disease involving native coronary artery of native heart without angina pectoris    Mixed hyperlipidemia    History of diabetes mellitus    Chronic obstructive pulmonary disease ([facility])    NUNEZ (dyspnea on exertion)    Abdominal aortic aneurysm (AAA) without rupture ([address]    Diabetic ulcer of left lower leg associated with type 2 diabetes mellitus, with fat layer exposed ([address]    Smoker    Traumatic hematoma    NICM (nonischemic cardiomyopathy) ([facility])    Hyponatremia    Acute on chronic respiratory failure with hypercapnia ([facility])    Palliative care patient    Leg weakness, bilateral    COPD exacerbation ([address]    COPD with acute exacerbation ([facility])       Rehabilitation Diagnosis:     COPD exacerbation ([facility] [address] [J44.1]  Acute on chronic respiratory failure with hypercapnia ([facility] [address] [J96.22]       SERVICE DATE: 2022        SUBJECTIVE: Patient agrees that they are safe with mobility and do not require physical therapy services. Pain: No complaint of pain    OBJECTIVE:    Orientation is Within normal limits     O2 @ 3L per n/c        ACTIVE ROM:       All major lower extremity joints are within functional limits      STRENGTH     Both lower extremities are grossly within functional limits.     TRANSFERS   Sit to stand   SBA      Bed to chair   SBA    Bed to mobility   Supine to sit   Independent       Roll     Independent     Scoot Side to side / Up and down   Independent    AMBULATION   Distance: 20'     Device: NONE     Assistance: SBA       Comment: O2    BALANCE   Sitting    Good     Standing    Good    Tx Initiated: dynamic balance activity, amb functional distance IND, education    ASSESSMENT      Independent and safe with all functional mobility. No skilled physical therapy needs identified at this time. PLAN: Discharge from skilled physical therapy. Nursing has been updated.       GOALS   Independent and safe with all functional mobility (MET)            Electronically signed by Johan Dixon PT on 8/31/2022 at 2:49 PM

## 2022-08-31 NOTE — PLAN OF CARE
Discharge instructions given to pt. All questions answered and pt verbalized understanding. V/S stable at time of discharge. Pt ambulatory out of unit. Problem: Discharge Planning  Goal: Discharge to home or other facility with appropriate resources  Outcome: Progressing     Problem: Chronic Conditions and Co-morbidities  Goal: Patient's chronic conditions and co-morbidity symptoms are monitored and maintained or improved  Outcome: Progressing     Problem: Skin/Tissue Integrity  Goal: Absence of new skin breakdown  Description: 1. Monitor for areas of redness and/or skin breakdown  2. Assess vascular access sites hourly  3. Every 4-6 hours minimum:  Change oxygen saturation probe site  4. Every 4-6 hours:  If on nasal continuous positive airway pressure, respiratory therapy assess nares and determine need for appliance change or resting period.   Outcome: Progressing     Problem: Safety - Adult  Goal: Free from fall injury  Outcome: Progressing

## 2022-08-31 NOTE — PROGRESS NOTES
Pulmonary and Critical Care Progress note. [facility]    MRN# [id number]    Acct# [de-identified]  8/31/2022   5:11 PM CDT    Referring Ami Garland MD      Chief Complaint: shortness of breath. HPI: she feels significantly improved this afternoon.      Medications    insulin lispro, 0-8 Units, SubCUTAneous, Q4H    insulin glargine, 15 Units, SubCUTAneous, Nightly    sodium chloride flush, 5-40 mL, IntraVENous, 2 times per day    apixaban, 5 mg, Oral, BID    clopidogrel, 75 mg, Oral, Daily    furosemide, 40 mg, Oral, Daily    ipratropium-albuterol, 3 mL, Inhalation, Q4H WA    metFORMIN, 500 mg, Oral, BID WC    potassium bicarb-citric acid, 20 mEq, Oral, Daily    ramipril, 5 mg, Oral, BID    rosuvastatin, 10 mg, Oral, Nightly    sotalol, 120 mg, Oral, 2 times per day    methylPREDNISolone, 60 mg, IntraVENous, Q6H    [COMPLETED] cefepime, 2,000 mg, IntraVENous, Once **FOLLOWED BY** cefepime, 2,000 mg, IntraVENous, Q24H     Review of Systems:  RESPIRATORY:  positive for  cough with sputum and dyspnea  CARDIOVASCULAR:  positive for  dyspnea      Physical Exam:  /83   Pulse 82   Temp 97.2 °F (36.2 °C) (Oral)   Resp 24   Ht 5' 6\" (1.676 m)   Wt 153 lb 8 oz (69.6 kg)   SpO2 96%   BMI 24.78 kg/m²   Intake/Output Summary (Last 24 hours) at 8/31/2022 1711  Last data filed at 8/31/2022 1422  Gross per 24 hour   Intake 1330 ml   Output --   Net 1330 ml       General appearance: Elderly white female in no distress   HEENT:normocephalic atraumatic  [id number]:[id number] no murmurs  Lungs:diminished bilaterally no rubs or tenderness or dullness to percussion  Abdomen:Soft non tender no organomegaly  Extremities:non clubbing cyanosis or edema  Neuro:no focal findings  Skin:intact    Recent Labs     08/30/22  0449 08/31/22  0250   WBC 8.8 8.7   RBC 4.24 4.06*   HGB 13.3 12.7   HCT 42.2 39.8    197   MCV 99.5* 98.0   MCH 31.4* 31.3*   MCHC 31.5* 31.9*   RDW 13.3 13.2      Recent Labs 08/30/22 0449 08/30/22  0458 08/30/22  0654 08/31/22  0250   *  --   --  132*   K 4.4 4.1 4.4 4.9   CL 95*  --   --  91*   CO2 36*  --   --  31*   BUN 17  --   --  29*   CREATININE 0.9  --   --  1.2*   CALCIUM 9.5  --   --  9.4   GLUCOSE 161*  --   --  242*      Recent Labs     08/30/22 0458 08/30/22  0654   PHART 7.310* 7.360   UJI5ZXL 71.0* 60.0*   PO2ART 124.0* 64.0*   CZT9SGM 35.7* 33.9*   G5SYBFEX 94.3 90.6   BEART 6.8* 6.4*     Recent Labs     08/30/22 0449 08/30/22  0456 08/30/22  1015 08/31/22  0250   AST 19  --   --  15   ALT 14  --   --  17   ALKPHOS 73  --   --  68   BILITOT 0.3  --   --  0.3   CALCIUM 9.5  --   --  9.4   PROBNP 1,859*  --   --   --    TROPONINI <0.01  --  <0.01  --    LACTA  --  1.0  --   --      No results for input(s): BC, LABGRAM, CULTRESP, BFCX in the last 72 hours. Radiograph: XR CHEST PORTABLE    Result Date: 8/30/2022  No significant interval change. Recommendation: Follow up as clinically indicated. Electronically Signed by Izaiah Fitzgerald MD at 30-Aug-2022 06:48:28 AM                My radiograph interpretation/independent review of imaging: reviewed.      Problem list generated by [facility] Problems             Last Modified POA    * (Principal) COPD exacerbation ([facility] [address] 8/30/2022 Yes    Essential hypertension 8/30/2022 Yes    Diabetes mellitus ([facility] [address] 8/30/2022 Yes    Overview Signed 6/2/2011  1:19 PM by Jada Gallegos     diet controlled         Hypercholesteremia 8/30/2022 Yes    S/P CABG x 4 8/30/2022 Yes    Overview Signed 10/11/2016 11:15 AM by JAKOB Rush     8/29/11 EF 60%         Coronary artery disease involving native coronary artery of native heart without angina pectoris 8/30/2022 Yes    Mixed hyperlipidemia 8/30/2022 Yes    Chronic obstructive pulmonary disease ([address] 8/30/2022 Yes    Abdominal aortic aneurysm (AAA) without rupture ([facility] [address] 8/30/2022 Yes    Acute on chronic respiratory failure with hypercapnia ([address] 8/30/2022 Yes Pulmonary Assessment/Plan:     Acute on chronic hypercapnic hypoxic respiratory failure. Continue noninvasive ventilation during sleep and as needed during the daytime. Settings were adjusted as needed. Possible underlying COPD with acute exacerbation continue bronchodilators. Continue steroids. Continue antibiotics. Consider oral antibiotics and steroids in the morning if she continues to improve  Dyspnea due to the above supportive care. Discharge planning. Rupali Bejarano MD, [facility], [facility]    The above note was generated using voice recognition software. Inadvertent typographical errors in transcription may have occurred.       Electronically signed by Rupali Bejarano MD on 08/31/22 at 5:11 PM

## 2022-08-31 NOTE — PLAN OF CARE
Problem: Discharge Planning  Goal: Discharge to home or other facility with appropriate resources  8/31/2022 0031 by Kandi Rodriges RN  Outcome: Progressing  Flowsheets (Taken 8/30/2022 2132)  Discharge to home or other facility with appropriate resources: Identify barriers to discharge with patient and caregiver  8/30/2022 1722 by Chayo Crandall RN  Outcome: Progressing     Problem: Chronic Conditions and Co-morbidities  Goal: Patient's chronic conditions and co-morbidity symptoms are monitored and maintained or improved  8/31/2022 0031 by Kandi Rodriges RN  Outcome: Progressing  Flowsheets (Taken 8/30/2022 2132)  Care Plan - Patient's Chronic Conditions and Co-Morbidity Symptoms are Monitored and Maintained or Improved: Monitor and assess patient's chronic conditions and comorbid symptoms for stability, deterioration, or improvement  8/30/2022 1722 by Chayo Crandall RN  Outcome: Progressing     Problem: Skin/Tissue Integrity  Goal: Absence of new skin breakdown  Description: 1. Monitor for areas of redness and/or skin breakdown  2. Assess vascular access sites hourly  3. Every 4-6 hours minimum:  Change oxygen saturation probe site  4. Every 4-6 hours:  If on nasal continuous positive airway pressure, respiratory therapy assess nares and determine need for appliance change or resting period.   8/31/2022 0031 by Kandi Rodriges RN  Outcome: Progressing  8/30/2022 1722 by Chayo Crandall RN  Outcome: Progressing     Problem: Safety - Adult  Goal: Free from fall injury  8/31/2022 0031 by Kandi Rodriges RN  Outcome: Progressing  8/30/2022 1722 by Chayo Crandall RN  Outcome: Progressing

## 2022-08-31 NOTE — CONSULTS
Palliative Care:   Pt known to Palliative Care. Kyle 66 y/o lady with significant history of heart and lung disease. She presented to ED with COPD exacerbation. Pt is resting in bed with oxygen on via n/c at 3L. She also has a fan running. She becomes soa with conversation and says she currently has limited activity due to breathing. She lives alone, her son stays with her, and she has a caregiver present when he is away. She says her home is handicap accessible and she has all needed DME. Shower chair, BSC, walker, oxygen and CPAP. 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 Pt has good support and reports good spiritual support also. She watches [employer] weekly and has devotional time daily. She states, \"I'm in a good place with GOD. \"      Past Medical History:        Past Medical History:   Diagnosis Date    ASHD (arteriosclerotic heart disease)     s/p PTCA and stent of circumflex, as well as intermediate and mid LAD     COPD (chronic obstructive pulmonary disease) (HCC)     Coronary atherosclerosis     Diabetes mellitus ([address]     diet controlled, type 2    Encounter for wound care     FOR LLL WOUND    Fall 10/29/2020    Ganglion cyst     RT HAND    Hematoma 10/2020    hematoma LLL from fall injury    Hx of blood clots     Hypercholesteremia     Hypertension     Pacemaker 03/02/2021    Palliative care patient 03/16/2022    Unstable angina ([address]        Advance Directives:    DNR  Living will on file  ACP note completed     Pain/Other Symptoms:    Pt denies pain. Limited by shortness of breath. Activity:       as tolerated              Plan:    Continue medical treatments and monitoring    Patient/family discussion r/t goals: Pt will return home at discharge. Will consider HH if needed. Encouragement and support provided. Reviewed role of Palliative Care and will continue to follow.       Electronically signed by Vladimir Thapa RN on 8/31/2022 at 10:51 AM

## 2022-09-01 PROBLEM — J96.21 ACUTE ON CHRONIC RESPIRATORY FAILURE WITH HYPOXIA AND HYPERCAPNIA (HCC): Status: ACTIVE | Noted: 2022-09-01

## 2022-09-01 PROBLEM — J96.22 ACUTE ON CHRONIC RESPIRATORY FAILURE WITH HYPOXIA AND HYPERCAPNIA (HCC): Status: ACTIVE | Noted: 2022-09-01

## 2022-09-01 LAB
GLUCOSE BLD-MCNC: 218 MG/DL (ref 70–99)
GLUCOSE BLD-MCNC: 231 MG/DL (ref 70–99)
GLUCOSE BLD-MCNC: 236 MG/DL (ref 70–99)
GLUCOSE BLD-MCNC: 278 MG/DL (ref 70–99)
GLUCOSE BLD-MCNC: 324 MG/DL (ref 70–99)
GLUCOSE BLD-MCNC: 355 MG/DL (ref 70–99)
PERFORMED ON: ABNORMAL

## 2022-09-01 PROCEDURE — 96376 TX/PRO/DX INJ SAME DRUG ADON: CPT

## 2022-09-01 PROCEDURE — 99233 SBSQ HOSP IP/OBS HIGH 50: CPT | Performed by: INTERNAL MEDICINE

## 2022-09-01 PROCEDURE — 6360000002 HC RX W HCPCS: Performed by: INTERNAL MEDICINE

## 2022-09-01 PROCEDURE — 1210000000 HC MED SURG R&B

## 2022-09-01 PROCEDURE — 94640 AIRWAY INHALATION TREATMENT: CPT

## 2022-09-01 PROCEDURE — 87070 CULTURE OTHR SPECIMN AEROBIC: CPT

## 2022-09-01 PROCEDURE — 87205 SMEAR GRAM STAIN: CPT

## 2022-09-01 PROCEDURE — 94660 CPAP INITIATION&MGMT: CPT

## 2022-09-01 PROCEDURE — 2700000000 HC OXYGEN THERAPY PER DAY

## 2022-09-01 PROCEDURE — 94669 MECHANICAL CHEST WALL OSCILL: CPT

## 2022-09-01 PROCEDURE — 82947 ASSAY GLUCOSE BLOOD QUANT: CPT

## 2022-09-01 PROCEDURE — 2580000003 HC RX 258: Performed by: FAMILY MEDICINE

## 2022-09-01 PROCEDURE — 6370000000 HC RX 637 (ALT 250 FOR IP): Performed by: FAMILY MEDICINE

## 2022-09-01 RX ADMIN — INSULIN LISPRO 6 UNITS: 100 INJECTION, SOLUTION INTRAVENOUS; SUBCUTANEOUS at 13:12

## 2022-09-01 RX ADMIN — FUROSEMIDE 40 MG: 40 TABLET ORAL at 08:50

## 2022-09-01 RX ADMIN — SOTALOL HYDROCHLORIDE 120 MG: 80 TABLET ORAL at 08:50

## 2022-09-01 RX ADMIN — INSULIN LISPRO 8 UNITS: 100 INJECTION, SOLUTION INTRAVENOUS; SUBCUTANEOUS at 21:10

## 2022-09-01 RX ADMIN — SOTALOL HYDROCHLORIDE 120 MG: 80 TABLET ORAL at 21:09

## 2022-09-01 RX ADMIN — APIXABAN 5 MG: 5 TABLET, FILM COATED ORAL at 21:10

## 2022-09-01 RX ADMIN — RAMIPRIL 5 MG: 5 CAPSULE ORAL at 17:37

## 2022-09-01 RX ADMIN — RAMIPRIL 5 MG: 5 CAPSULE ORAL at 08:50

## 2022-09-01 RX ADMIN — SODIUM CHLORIDE 45 ML: 9 INJECTION, SOLUTION INTRAVENOUS at 17:38

## 2022-09-01 RX ADMIN — INSULIN LISPRO 4 UNITS: 100 INJECTION, SOLUTION INTRAVENOUS; SUBCUTANEOUS at 17:31

## 2022-09-01 RX ADMIN — METHYLPREDNISOLONE SODIUM SUCCINATE 60 MG: 125 INJECTION, POWDER, FOR SOLUTION INTRAMUSCULAR; INTRAVENOUS at 17:33

## 2022-09-01 RX ADMIN — POTASSIUM BICARBONATE 20 MEQ: 782 TABLET, EFFERVESCENT ORAL at 08:51

## 2022-09-01 RX ADMIN — METHYLPREDNISOLONE SODIUM SUCCINATE 60 MG: 125 INJECTION, POWDER, FOR SOLUTION INTRAMUSCULAR; INTRAVENOUS at 22:07

## 2022-09-01 RX ADMIN — IPRATROPIUM BROMIDE AND ALBUTEROL SULFATE 3 ML: 2.5; .5 SOLUTION RESPIRATORY (INHALATION) at 06:57

## 2022-09-01 RX ADMIN — CLOPIDOGREL BISULFATE 75 MG: 75 TABLET ORAL at 08:51

## 2022-09-01 RX ADMIN — CEFEPIME 2000 MG: 2 INJECTION, POWDER, FOR SOLUTION INTRAVENOUS at 17:39

## 2022-09-01 RX ADMIN — INSULIN LISPRO 2 UNITS: 100 INJECTION, SOLUTION INTRAVENOUS; SUBCUTANEOUS at 08:57

## 2022-09-01 RX ADMIN — METHYLPREDNISOLONE SODIUM SUCCINATE 60 MG: 125 INJECTION, POWDER, FOR SOLUTION INTRAMUSCULAR; INTRAVENOUS at 04:57

## 2022-09-01 RX ADMIN — APIXABAN 5 MG: 5 TABLET, FILM COATED ORAL at 08:51

## 2022-09-01 RX ADMIN — IPRATROPIUM BROMIDE AND ALBUTEROL SULFATE 3 ML: 2.5; .5 SOLUTION RESPIRATORY (INHALATION) at 10:36

## 2022-09-01 RX ADMIN — IPRATROPIUM BROMIDE AND ALBUTEROL SULFATE 3 ML: 2.5; .5 SOLUTION RESPIRATORY (INHALATION) at 18:56

## 2022-09-01 RX ADMIN — METFORMIN HYDROCHLORIDE 500 MG: 500 TABLET ORAL at 08:51

## 2022-09-01 RX ADMIN — METHYLPREDNISOLONE SODIUM SUCCINATE 60 MG: 125 INJECTION, POWDER, FOR SOLUTION INTRAMUSCULAR; INTRAVENOUS at 08:51

## 2022-09-01 RX ADMIN — ROSUVASTATIN CALCIUM 10 MG: 10 TABLET, FILM COATED ORAL at 21:10

## 2022-09-01 RX ADMIN — INSULIN GLARGINE 15 UNITS: 100 INJECTION, SOLUTION SUBCUTANEOUS at 21:10

## 2022-09-01 RX ADMIN — Medication 10 ML: at 08:51

## 2022-09-01 RX ADMIN — IPRATROPIUM BROMIDE AND ALBUTEROL SULFATE 3 ML: 2.5; .5 SOLUTION RESPIRATORY (INHALATION) at 14:43

## 2022-09-01 RX ADMIN — METFORMIN HYDROCHLORIDE 500 MG: 500 TABLET ORAL at 17:31

## 2022-09-01 NOTE — PROGRESS NOTES
Pulmonary and Critical Care Progress note. [facility]    MRN# [id number]    Acct# [de-identified]  9/1/2022   5:11 PM CDT    Referring Polina Owusu MD      Chief Complaint: shortness of breath. HPI: she felt somewhat congested earlier. Denies complaints at this time.     Medications    insulin lispro, 0-8 Units, SubCUTAneous, Q4H    insulin glargine, 15 Units, SubCUTAneous, Nightly    sodium chloride flush, 5-40 mL, IntraVENous, 2 times per day    apixaban, 5 mg, Oral, BID    clopidogrel, 75 mg, Oral, Daily    furosemide, 40 mg, Oral, Daily    ipratropium-albuterol, 3 mL, Inhalation, Q4H WA    metFORMIN, 500 mg, Oral, BID WC    potassium bicarb-citric acid, 20 mEq, Oral, Daily    ramipril, 5 mg, Oral, BID    rosuvastatin, 10 mg, Oral, Nightly    sotalol, 120 mg, Oral, 2 times per day    methylPREDNISolone, 60 mg, IntraVENous, Q6H    [COMPLETED] cefepime, 2,000 mg, IntraVENous, Once **FOLLOWED BY** cefepime, 2,000 mg, IntraVENous, Q24H     Review of Systems:  RESPIRATORY:  positive for  cough with sputum and dyspnea  CARDIOVASCULAR:  positive for  dyspnea      Physical Exam:  /73   Pulse 71   Temp 97.7 °F (36.5 °C)   Resp 16   Ht 5' 6\" (1.676 m)   Wt 153 lb 8 oz (69.6 kg)   SpO2 94%   BMI 24.78 kg/m²   Intake/Output Summary (Last 24 hours) at 9/1/2022 1356  Last data filed at 9/1/2022 1340  Gross per 24 hour   Intake 2501 ml   Output 2700 ml   Net -199 ml         General appearance: Elderly white female in no distress   HEENT:normocephalic atraumatic  YERSQ:O1Z6 no murmurs  Lungs:diminished bilaterally no rubs or tenderness or dullness to percussion, wheezing bilaterally  Abdomen:Soft non tender no organomegaly  Extremities:non clubbing cyanosis or edema  Neuro:no focal findings  Skin:intact    Recent Labs     08/30/22  0449 08/31/22  0250   WBC 8.8 8.7   RBC 4.24 4.06*   HGB 13.3 12.7   HCT 42.2 39.8    197   MCV 99.5* 98.0   MCH 31.4* 31.3*   MCHC 31.5* 31.9*   RDW 13.3 13.2        Recent Labs     08/30/22  0449 08/30/22  0458 08/30/22  0654 08/31/22  0250   *  --   --  132*   K 4.4 4.1 4.4 4.9   CL 95*  --   --  91*   CO2 36*  --   --  31*   BUN 17  --   --  29*   CREATININE 0.9  --   --  1.2*   CALCIUM 9.5  --   --  9.4   GLUCOSE 161*  --   --  242*        Recent Labs     08/30/22  0458 08/30/22  0654   PHART 7.310* 7.360   AKV9QOJ 71.0* 60.0*   PO2ART 124.0* 64.0*   JLS9MMY 35.7* 33.9*   Z1FMVZYD 94.3 90.6   BEART 6.8* 6.4*       Recent Labs     08/30/22  0449 08/30/22  0456 08/30/22  1015 08/31/22  0250   AST 19  --   --  15   ALT 14  --   --  17   ALKPHOS 73  --   --  68   BILITOT 0.3  --   --  0.3   CALCIUM 9.5  --   --  9.4   PROBNP 1,859*  --   --   --    TROPONINI <0.01  --  <0.01  --    LACTA  --  1.0  --   --        No results for input(s): BC, LABGRAM, CULTRESP, BFCX in the last 72 hours. Radiograph: XR CHEST PORTABLE    Result Date: 8/30/2022  No significant interval change. Recommendation: Follow up as clinically indicated. Electronically Signed by Peter Boggs MD at 30-Aug-2022 06:48:28 AM                My radiograph interpretation/independent review of imaging: reviewed.      Problem list generated by [facility] Problems             Last Modified POA    * (Principal) Acute on chronic respiratory failure with hypercapnia ([address] 9/1/2022 Yes    COPD exacerbation ([address] 9/1/2022 Yes    Acute on chronic respiratory failure with hypoxia and hypercapnia ([address] 9/1/2022 Yes    Essential hypertension 8/30/2022 Yes    Diabetes mellitus ([address] 8/30/2022 Yes    Overview Signed 6/2/2011  1:19 PM by Nguyen Zavala     diet controlled         Hypercholesteremia 8/30/2022 Yes    S/P CABG x 4 8/30/2022 Yes    Overview Signed 10/11/2016 11:15 AM by JAKOB Young     8/29/11 EF 60%         Coronary artery disease involving native coronary artery of native heart without angina pectoris 8/30/2022 Yes    Mixed hyperlipidemia 8/30/2022 Yes Chronic obstructive pulmonary disease ([facility] [address] 8/30/2022 Yes    Abdominal aortic aneurysm (AAA) without rupture ([facility] [address] 8/30/2022 Yes        Pulmonary Assessment/Plan:     Acute on chronic hypercapnic hypoxic respiratory failure. Continue noninvasive ventilation during sleep and as needed during the daytime. Settings were adjusted as needed. Possible underlying COPD with acute exacerbation continue bronchodilators. Continue steroids. Continue antibiotics. Continue IV steroids and antibiotics. Add percussive vest and flutter valve. Dyspnea due to the above supportive care. Discharge planning. Will be OOT tomorrow until 9/5       Nirmala Jean Baptiste MD, [facility], [facility]    The above note was generated using voice recognition software. Inadvertent typographical errors in transcription may have occurred.       Electronically signed by Nirmala Jean Baptiste MD on 09/01/22 at 1:56 PM

## 2022-09-01 NOTE — PLAN OF CARE
Problem: Discharge Planning  Goal: Discharge to home or other facility with appropriate resources  8/31/2022 2308 by Sofi Rojas RN  Outcome: Progressing  Flowsheets (Taken 8/31/2022 2145)  Discharge to home or other facility with appropriate resources: Identify barriers to discharge with patient and caregiver  8/31/2022 1728 by Jose Brown RN  Outcome: Progressing     Problem: Chronic Conditions and Co-morbidities  Goal: Patient's chronic conditions and co-morbidity symptoms are monitored and maintained or improved  8/31/2022 2308 by Sofi Rojas RN  Outcome: Progressing  Flowsheets (Taken 8/31/2022 2145)  Care Plan - Patient's Chronic Conditions and Co-Morbidity Symptoms are Monitored and Maintained or Improved: Monitor and assess patient's chronic conditions and comorbid symptoms for stability, deterioration, or improvement  8/31/2022 1728 by Jose Brown RN  Outcome: Progressing     Problem: Skin/Tissue Integrity  Goal: Absence of new skin breakdown  Description: 1. Monitor for areas of redness and/or skin breakdown  2. Assess vascular access sites hourly  3. Every 4-6 hours minimum:  Change oxygen saturation probe site  4. Every 4-6 hours:  If on nasal continuous positive airway pressure, respiratory therapy assess nares and determine need for appliance change or resting period.   8/31/2022 2308 by Sofi Rojas RN  Outcome: Progressing  8/31/2022 1728 by Jose Brown RN  Outcome: Progressing     Problem: Safety - Adult  Goal: Free from fall injury  8/31/2022 2308 by Sofi Rojas RN  Outcome: Progressing  8/31/2022 1728 by Jose Brown RN  Outcome: Progressing

## 2022-09-01 NOTE — CARE COORDINATION
Late entry from visit 8/30 08/30/22 1700   Service Assessment   Patient Orientation Alert and Oriented   Cognition Alert   History Provided By Patient; Child/Family   Primary Caregiver Self   Accompanied By/Relationship son at [address]  Children;Family Members;[employer];Friends/Neighbors   Patient's Healthcare Decision Maker is: Named in [facility]   PCP Verified by CM Yes   Last Visit to PCP Within last year   Prior Functional Level Independent in ADLs/IADLs  (some help with shopping/housework; stated she can do her laundry; just slowly)   Current Functional Level Independent in ADLs/IADLs  (some help with shopping/housework; stated she can do her laundry; just slowly)   Can patient return to prior living arrangement Yes   Ability to make needs known: Good   Family able to assist with home care needs: Yes   Would you like for me to discuss the discharge plan with any other family members/significant others, and if so, who? Yes  (son)   Financial Resources Medicare   Social/Functional History   Lives With Alone; Other (comment)  (son and c/g present most of the time)   Type of [facility] One level   Bathroom Shower/Tub Walk-in shower   Bathroom Toilet Handicap height   Bathroom Equipment 3-in-1 commode; Shower chair   Bathroom Accessibility Accessible   Home Equipment Oxygen;Rollator; Wheelchair-manual  (BSC, light weight and standard w/c; cpap, neb machine)   Receives Help From Family;Personal care attendant;Friend(s)   ADL Assistance Needs assistance  (some assist/oversight; stated she mostly manages alone or supervised; she makes sure someone is at home before she bathes.)   Homemaking Assistance Needs assistance  (she noted she does her laundry and what she can do with hsk and cooking, but does receive assistance from son and c/g too)   Ambulation Assistance Independent   Transfer Assistance Independent   Active  Yes  (stated she can drive; but she hasn't been)   Occupation Retired   Discharge Planning   Type of [address]; Alone  (caregiver)   Current Services Prior To Admission C-pap;Durable Medical Equipment; Oxygen Therapy;Private Duty [address]   DME Ordered? No   Potential Assistance Purchasing Medications No   Type of Home Care Services None   Patient expects to be discharged to: House   One/Two Story Residence One story   History of falls? 1   Services At/After Discharge   Transition of Care Consult (CM Consult) [facility] Community Resources; Home Health   Mode of Transport at Discharge Other (see comment)  (family)   Confirm Follow Up Transport Self   Electronically signed by WAI Knox on 9/1/2022 at 5:05 PM

## 2022-09-02 LAB
ALBUMIN SERPL-MCNC: 3.6 G/DL (ref 3.5–5.2)
ALP BLD-CCNC: 58 U/L (ref 35–104)
ALT SERPL-CCNC: 15 U/L (ref 5–33)
ANION GAP SERPL CALCULATED.3IONS-SCNC: 12 MMOL/L (ref 7–19)
AST SERPL-CCNC: 11 U/L (ref 5–32)
BASOPHILS ABSOLUTE: 0 K/UL (ref 0–0.2)
BASOPHILS RELATIVE PERCENT: 0 % (ref 0–1)
BILIRUB SERPL-MCNC: <0.2 MG/DL (ref 0.2–1.2)
BUN BLDV-MCNC: 27 MG/DL (ref 8–23)
CALCIUM SERPL-MCNC: 8.7 MG/DL (ref 8.8–10.2)
CHLORIDE BLD-SCNC: 94 MMOL/L (ref 98–111)
CO2: 30 MMOL/L (ref 22–29)
CREAT SERPL-MCNC: 0.9 MG/DL (ref 0.5–0.9)
EOSINOPHILS ABSOLUTE: 0 K/UL (ref 0–0.6)
EOSINOPHILS RELATIVE PERCENT: 0 % (ref 0–5)
GFR AFRICAN AMERICAN: >59
GFR NON-AFRICAN AMERICAN: >60
GLUCOSE BLD-MCNC: 137 MG/DL (ref 70–99)
GLUCOSE BLD-MCNC: 167 MG/DL (ref 70–99)
GLUCOSE BLD-MCNC: 207 MG/DL (ref 74–109)
GLUCOSE BLD-MCNC: 230 MG/DL (ref 70–99)
GLUCOSE BLD-MCNC: 268 MG/DL (ref 70–99)
GLUCOSE BLD-MCNC: 273 MG/DL (ref 70–99)
GLUCOSE BLD-MCNC: 368 MG/DL (ref 70–99)
HCT VFR BLD CALC: 38.6 % (ref 37–47)
HEMOGLOBIN: 12.6 G/DL (ref 12–16)
IMMATURE GRANULOCYTES #: 0.1 K/UL
LYMPHOCYTES ABSOLUTE: 0.7 K/UL (ref 1.1–4.5)
LYMPHOCYTES RELATIVE PERCENT: 8.3 % (ref 20–40)
MCH RBC QN AUTO: 30.7 PG (ref 27–31)
MCHC RBC AUTO-ENTMCNC: 32.6 G/DL (ref 33–37)
MCV RBC AUTO: 94.1 FL (ref 81–99)
MONOCYTES ABSOLUTE: 0.5 K/UL (ref 0–0.9)
MONOCYTES RELATIVE PERCENT: 6.1 % (ref 0–10)
NEUTROPHILS ABSOLUTE: 6.8 K/UL (ref 1.5–7.5)
NEUTROPHILS RELATIVE PERCENT: 84.7 % (ref 50–65)
PDW BLD-RTO: 13 % (ref 11.5–14.5)
PERFORMED ON: ABNORMAL
PLATELET # BLD: 183 K/UL (ref 130–400)
PMV BLD AUTO: 9.4 FL (ref 9.4–12.3)
POTASSIUM SERPL-SCNC: 4.3 MMOL/L (ref 3.5–5)
RBC # BLD: 4.1 M/UL (ref 4.2–5.4)
SODIUM BLD-SCNC: 136 MMOL/L (ref 136–145)
TOTAL PROTEIN: 5.8 G/DL (ref 6.6–8.7)
WBC # BLD: 8 K/UL (ref 4.8–10.8)

## 2022-09-02 PROCEDURE — 82947 ASSAY GLUCOSE BLOOD QUANT: CPT

## 2022-09-02 PROCEDURE — 6370000000 HC RX 637 (ALT 250 FOR IP): Performed by: FAMILY MEDICINE

## 2022-09-02 PROCEDURE — 85025 COMPLETE CBC W/AUTO DIFF WBC: CPT

## 2022-09-02 PROCEDURE — 2700000000 HC OXYGEN THERAPY PER DAY

## 2022-09-02 PROCEDURE — 94640 AIRWAY INHALATION TREATMENT: CPT

## 2022-09-02 PROCEDURE — 6360000002 HC RX W HCPCS: Performed by: INTERNAL MEDICINE

## 2022-09-02 PROCEDURE — 94660 CPAP INITIATION&MGMT: CPT

## 2022-09-02 PROCEDURE — 99233 SBSQ HOSP IP/OBS HIGH 50: CPT | Performed by: INTERNAL MEDICINE

## 2022-09-02 PROCEDURE — 94669 MECHANICAL CHEST WALL OSCILL: CPT

## 2022-09-02 PROCEDURE — 80053 COMPREHEN METABOLIC PANEL: CPT

## 2022-09-02 PROCEDURE — 2580000003 HC RX 258: Performed by: FAMILY MEDICINE

## 2022-09-02 PROCEDURE — 1210000000 HC MED SURG R&B

## 2022-09-02 PROCEDURE — 36415 COLL VENOUS BLD VENIPUNCTURE: CPT

## 2022-09-02 PROCEDURE — 2580000003 HC RX 258: Performed by: INTERNAL MEDICINE

## 2022-09-02 RX ADMIN — INSULIN LISPRO 4 UNITS: 100 INJECTION, SOLUTION INTRAVENOUS; SUBCUTANEOUS at 12:05

## 2022-09-02 RX ADMIN — Medication 10 ML: at 22:55

## 2022-09-02 RX ADMIN — METFORMIN HYDROCHLORIDE 500 MG: 500 TABLET ORAL at 17:27

## 2022-09-02 RX ADMIN — METHYLPREDNISOLONE SODIUM SUCCINATE 60 MG: 125 INJECTION, POWDER, FOR SOLUTION INTRAMUSCULAR; INTRAVENOUS at 17:27

## 2022-09-02 RX ADMIN — ROSUVASTATIN CALCIUM 10 MG: 10 TABLET, FILM COATED ORAL at 22:50

## 2022-09-02 RX ADMIN — POTASSIUM BICARBONATE 20 MEQ: 782 TABLET, EFFERVESCENT ORAL at 08:52

## 2022-09-02 RX ADMIN — RAMIPRIL 5 MG: 5 CAPSULE ORAL at 08:51

## 2022-09-02 RX ADMIN — SODIUM CHLORIDE 45 ML: 9 INJECTION, SOLUTION INTRAVENOUS at 17:28

## 2022-09-02 RX ADMIN — APIXABAN 5 MG: 5 TABLET, FILM COATED ORAL at 22:49

## 2022-09-02 RX ADMIN — METHYLPREDNISOLONE SODIUM SUCCINATE 60 MG: 125 INJECTION, POWDER, FOR SOLUTION INTRAMUSCULAR; INTRAVENOUS at 22:49

## 2022-09-02 RX ADMIN — SOTALOL HYDROCHLORIDE 120 MG: 80 TABLET ORAL at 22:49

## 2022-09-02 RX ADMIN — FUROSEMIDE 40 MG: 40 TABLET ORAL at 08:51

## 2022-09-02 RX ADMIN — IPRATROPIUM BROMIDE AND ALBUTEROL SULFATE 3 ML: 2.5; .5 SOLUTION RESPIRATORY (INHALATION) at 19:22

## 2022-09-02 RX ADMIN — CEFEPIME 2000 MG: 2 INJECTION, POWDER, FOR SOLUTION INTRAVENOUS at 17:29

## 2022-09-02 RX ADMIN — SOTALOL HYDROCHLORIDE 120 MG: 80 TABLET ORAL at 08:51

## 2022-09-02 RX ADMIN — METHYLPREDNISOLONE SODIUM SUCCINATE 60 MG: 125 INJECTION, POWDER, FOR SOLUTION INTRAMUSCULAR; INTRAVENOUS at 08:52

## 2022-09-02 RX ADMIN — APIXABAN 5 MG: 5 TABLET, FILM COATED ORAL at 08:51

## 2022-09-02 RX ADMIN — RAMIPRIL 5 MG: 5 CAPSULE ORAL at 17:27

## 2022-09-02 RX ADMIN — INSULIN LISPRO 2 UNITS: 100 INJECTION, SOLUTION INTRAVENOUS; SUBCUTANEOUS at 01:04

## 2022-09-02 RX ADMIN — IPRATROPIUM BROMIDE AND ALBUTEROL SULFATE 3 ML: 2.5; .5 SOLUTION RESPIRATORY (INHALATION) at 06:54

## 2022-09-02 RX ADMIN — INSULIN LISPRO 4 UNITS: 100 INJECTION, SOLUTION INTRAVENOUS; SUBCUTANEOUS at 22:54

## 2022-09-02 RX ADMIN — IPRATROPIUM BROMIDE AND ALBUTEROL SULFATE 3 ML: 2.5; .5 SOLUTION RESPIRATORY (INHALATION) at 14:06

## 2022-09-02 RX ADMIN — CLOPIDOGREL BISULFATE 75 MG: 75 TABLET ORAL at 08:51

## 2022-09-02 RX ADMIN — METHYLPREDNISOLONE SODIUM SUCCINATE 60 MG: 125 INJECTION, POWDER, FOR SOLUTION INTRAMUSCULAR; INTRAVENOUS at 05:26

## 2022-09-02 RX ADMIN — IPRATROPIUM BROMIDE AND ALBUTEROL SULFATE 3 ML: 2.5; .5 SOLUTION RESPIRATORY (INHALATION) at 10:06

## 2022-09-02 RX ADMIN — INSULIN LISPRO 8 UNITS: 100 INJECTION, SOLUTION INTRAVENOUS; SUBCUTANEOUS at 17:28

## 2022-09-02 RX ADMIN — INSULIN GLARGINE 15 UNITS: 100 INJECTION, SOLUTION SUBCUTANEOUS at 22:50

## 2022-09-02 RX ADMIN — Medication 10 ML: at 08:52

## 2022-09-02 RX ADMIN — METFORMIN HYDROCHLORIDE 500 MG: 500 TABLET ORAL at 08:51

## 2022-09-02 NOTE — PROGRESS NOTES
Follow up:  Supportive visit. Pt is sitting up on the side of the bed and says she is feeling better today. She is wearing nasal cannula at this time. Continues to have congested cough. Her activity is limited by her breathing, but she moves slow and takes frequent rests. Denies any pain. Active listening as pt reflects on life and shares history. Encouragement and support provided, will continue to follow POC.     Electronically signed by Say Parish RN on 9/2/2022 at 10:30 AM

## 2022-09-02 NOTE — PROGRESS NOTES
Family Medicine Progress Note    Patient:  Rosana Guzmán  YOB: 1950    MRN: [id number]     Acct: [de-identified]     Admit date: 8/30/2022    Patient Seen, Chart, Consults notes, Labs, Radiology studies reviewed. Subjective: Day 1 of stay with acute on chronic respiratory failure with hypercapnia and hypoxia and most recent (in last 24 hours) has had continued subjective improvement. Less cough and not as short of breath. Feels like she is moving better air. Still gets pretty tired ambulating in the room but not as severe as upon presentation. Cough has decreased in on its production. Past, Family, Social History unchanged from admission. Diet:  ADULT DIET;  Regular; 4 carb choices (60 gm/meal)    Medications:  Scheduled Meds:   insulin lispro  0-8 Units SubCUTAneous Q4H    insulin glargine  15 Units SubCUTAneous Nightly    sodium chloride flush  5-40 mL IntraVENous 2 times per day    apixaban  5 mg Oral BID    clopidogrel  75 mg Oral Daily    furosemide  40 mg Oral Daily    ipratropium-albuterol  3 mL Inhalation Q4H WA    metFORMIN  500 mg Oral BID WC    potassium bicarb-citric acid  20 mEq Oral Daily    ramipril  5 mg Oral BID    rosuvastatin  10 mg Oral Nightly    sotalol  120 mg Oral 2 times per day    methylPREDNISolone  60 mg IntraVENous Q6H    cefepime  2,000 mg IntraVENous Q24H     Continuous Infusions:   sodium chloride 45 mL (09/01/22 1738)    dextrose       PRN Meds:sodium chloride flush, sodium chloride, acetaminophen, ondansetron **OR** ondansetron, glucose, dextrose bolus **OR** dextrose bolus, glucagon (rDNA), dextrose    Objective:    Vitals: BP (!) 118/56   Pulse 93   Temp 97.9 °F (36.6 °C) (Oral)   Resp 16   Ht 5' 6\" (1.676 m)   Wt 153 lb 8 oz (69.6 kg)   SpO2 96%   BMI 24.78 kg/m²   24 hour intake/output:  Intake/Output Summary (Last 24 hours) at 9/2/2022 0805  Last data filed at 9/2/2022 0610  Gross per 24 hour   Intake 2170 ml   Output 1600 ml   Net 570 ml     Last 3 weights: Wt Readings from Last 3 Encounters:   08/30/22 153 lb 8 oz (69.6 kg)   07/23/22 155 lb (70.3 kg)   06/26/22 156 lb (70.8 kg)       Physical Exam:     General Appearance:  alert, cooperative, and appears fatigued  Skin:  negatives: mobility and turgor normal  Eyes:  No gross abnormalities. Neck:  neck- supple, no mass, non-tender  Lungs:  Breathing Pattern: use of accessory muscles, Breath sounds: wheezing- diffuse  Heart:  Heart regular rate and rhythm  Abdomen: Auscultation: Normal bowel sounds. No bruits. Extremities: Extremities warm to touch, pink, with no edema. Musculoskeletal:  No joint swelling, deformity, or tenderness.   Neurologic:  negative     CBC with Differential:    Lab Results   Component Value Date/Time    WBC 8.0 09/02/2022 02:12 AM    RBC 4.10 09/02/2022 02:12 AM    HGB 12.6 09/02/2022 02:12 AM    HCT 38.6 09/02/2022 02:12 AM    HCT 32.1 09/02/2011 02:11 AM     09/02/2022 02:12 AM     09/02/2011 02:11 AM    MCV 94.1 09/02/2022 02:12 AM    MCH 30.7 09/02/2022 02:12 AM    MCHC 32.6 09/02/2022 02:12 AM    RDW 13.0 09/02/2022 02:12 AM    LYMPHOPCT 8.3 09/02/2022 02:12 AM    MONOPCT 6.1 09/02/2022 02:12 AM    EOSPCT 0.5 09/02/2011 02:11 AM    BASOPCT 0.0 09/02/2022 02:12 AM    MONOSABS 0.50 09/02/2022 02:12 AM    LYMPHSABS 0.7 09/02/2022 02:12 AM    EOSABS 0.00 09/02/2022 02:12 AM    BASOSABS 0.00 09/02/2022 02:12 AM     CMP:    Lab Results   Component Value Date/Time     09/02/2022 02:12 AM     09/02/2011 02:11 AM    K 4.3 09/02/2022 02:12 AM    K 4.5 07/24/2022 12:19 AM    K 4.1 09/02/2011 02:11 AM    CL 94 09/02/2022 02:12 AM     09/02/2011 02:11 AM    CO2 30 09/02/2022 02:12 AM    BUN 27 09/02/2022 02:12 AM    CREATININE 0.9 09/02/2022 02:12 AM    CREATININE 0.6 09/02/2011 02:11 AM    GFRAA >59 09/02/2022 02:12 AM    LABGLOM >60 09/02/2022 02:12 AM    GLUCOSE 207 09/02/2022 02:12 AM    PROT 5.8 09/02/2022 02:12 AM    PROT 7.5 01/18/2013 10:35 AM LABALBU 3.6 09/02/2022 02:12 AM    LABALBU 3.3 09/02/2011 02:11 AM    CALCIUM 8.7 09/02/2022 02:12 AM    BILITOT <0.2 09/02/2022 02:12 AM    ALKPHOS 58 09/02/2022 02:12 AM    ALKPHOS 57 09/02/2011 02:11 AM    AST 11 09/02/2022 02:12 AM    ALT 15 09/02/2022 02:12 AM     Last 3 Troponin:    Lab Results   Component Value Date/Time    TROPONINI <0.01 08/30/2022 10:15 AM    TROPONINI <0.01 08/30/2022 04:49 AM    TROPONINI <0.01 07/24/2022 12:19 AM     Urine Culture:  No components found for: CURINE  Blood Culture:  No components found for: CBLOOD, CFUNGUSBL  Stool Culture:  No components found for: CSTOOL    Assessment:    Principal Problem:    Acute on chronic respiratory failure with hypercapnia (HCC)  Active Problems:    COPD exacerbation (HCC)    Acute on chronic respiratory failure with hypoxia and hypercapnia (HCC)    Essential hypertension    Diabetes mellitus ([address]    Hypercholesteremia    S/P CABG x 4    Coronary artery disease involving native coronary artery of native heart without angina pectoris    Mixed hyperlipidemia    Chronic obstructive pulmonary disease (HCC)    Abdominal aortic aneurysm (AAA) without rupture ([address]  Resolved Problems:    * No resolved hospital problems. *          Plan:  Better, but not yet still close to baseline. Still has very diffuse wheezes throughout both lung fields. I do think she would benefit from some continued treatment including steroids and pulmonary toilet, add nebulized breathing treatments, and other interventions as outlined by pulmonology. Wean steroids as tolerated. Obviously this is having affect her blood sugar but it seems to be settling out to some degree.       Electronically signed by Iris Baptiste MD on 9/2/2022 at 8:05 AM

## 2022-09-02 NOTE — PROGRESS NOTES
Pulmonary and Critical Care Progress note. [facility]    MRN# [id number]    Acct# [de-identified]  9/2/2022   5:11 PM CDT    Referring Keturah Merrill MD      Chief Complaint: shortness of breath. HPI: she feels better today, eating dinner.      Medications    insulin lispro, 0-8 Units, SubCUTAneous, Q4H    insulin glargine, 15 Units, SubCUTAneous, Nightly    sodium chloride flush, 5-40 mL, IntraVENous, 2 times per day    apixaban, 5 mg, Oral, BID    clopidogrel, 75 mg, Oral, Daily    furosemide, 40 mg, Oral, Daily    ipratropium-albuterol, 3 mL, Inhalation, Q4H WA    metFORMIN, 500 mg, Oral, BID WC    potassium bicarb-citric acid, 20 mEq, Oral, Daily    ramipril, 5 mg, Oral, BID    rosuvastatin, 10 mg, Oral, Nightly    sotalol, 120 mg, Oral, 2 times per day    methylPREDNISolone, 60 mg, IntraVENous, Q6H    [COMPLETED] cefepime, 2,000 mg, IntraVENous, Once **FOLLOWED BY** cefepime, 2,000 mg, IntraVENous, Q24H     Review of Systems:  RESPIRATORY:  positive for  cough with sputum and dyspnea  CARDIOVASCULAR:  positive for  dyspnea      Physical Exam:  BP (!) 152/81   Pulse 78   Temp 97.9 °F (36.6 °C) (Oral)   Resp 20   Ht 5' 6\" (1.676 m)   Wt 153 lb 8 oz (69.6 kg)   SpO2 93%   BMI 24.78 kg/m²   Intake/Output Summary (Last 24 hours) at 9/2/2022 1635  Last data filed at 9/2/2022 1621  Gross per 24 hour   Intake 1610 ml   Output 2800 ml   Net -1190 ml         General appearance: Elderly white female in no distress   HEENT:normocephalic atraumatic  VZOYO:Y2Y7 no murmurs  Lungs:diminished bilaterally no rubs or tenderness or dullness to percussion, wheezing bilaterally  Abdomen:Soft non tender no organomegaly  Extremities:non clubbing cyanosis or edema  Neuro:no focal findings  Skin:intact    Recent Labs     08/31/22  0250 09/02/22  0212   WBC 8.7 8.0   RBC 4.06* 4.10*   HGB 12.7 12.6   HCT 39.8 38.6    183   MCV 98.0 94.1   MCH 31.3* 30.7   MCHC 31.9* 32.6*   RDW 13.2 adjusted as needed. Possible underlying COPD with acute exacerbation continue bronchodilators. Continue steroids. Continue antibiotics. Continue IV steroids and antibiotics. Aggressive pulm toilet. Dyspnea due to the above supportive care. Discharge planning. Will be OOT tomorrow until 9/5       Jung Fisher MD, [facility], [facility]    The above note was generated using voice recognition software. Inadvertent typographical errors in transcription may have occurred.       Electronically signed by Jung Fisher MD on 09/02/22 at 4:35 PM

## 2022-09-02 NOTE — PLAN OF CARE
Problem: Discharge Planning  Goal: Discharge to home or other facility with appropriate resources  Outcome: Progressing  Flowsheets (Taken 9/1/2022 2041)  Discharge to home or other facility with appropriate resources: Identify barriers to discharge with patient and caregiver     Problem: Chronic Conditions and Co-morbidities  Goal: Patient's chronic conditions and co-morbidity symptoms are monitored and maintained or improved  Outcome: Progressing  Flowsheets (Taken 9/1/2022 2041)  Care Plan - Patient's Chronic Conditions and Co-Morbidity Symptoms are Monitored and Maintained or Improved: Monitor and assess patient's chronic conditions and comorbid symptoms for stability, deterioration, or improvement     Problem: Skin/Tissue Integrity  Goal: Absence of new skin breakdown  Description: 1. Monitor for areas of redness and/or skin breakdown  2. Assess vascular access sites hourly  3. Every 4-6 hours minimum:  Change oxygen saturation probe site  4. Every 4-6 hours:  If on nasal continuous positive airway pressure, respiratory therapy assess nares and determine need for appliance change or resting period.   Outcome: Progressing     Problem: Safety - Adult  Goal: Free from fall injury  Outcome: Progressing     Problem: ABCDS Injury Assessment  Goal: Absence of physical injury  Outcome: Progressing

## 2022-09-03 VITALS
BODY MASS INDEX: 25.91 KG/M2 | OXYGEN SATURATION: 95 % | RESPIRATION RATE: 16 BRPM | WEIGHT: 161.25 LBS | SYSTOLIC BLOOD PRESSURE: 124 MMHG | HEIGHT: 66 IN | TEMPERATURE: 97.9 F | HEART RATE: 77 BPM | DIASTOLIC BLOOD PRESSURE: 80 MMHG

## 2022-09-03 LAB
GLUCOSE BLD-MCNC: 162 MG/DL (ref 70–99)
GLUCOSE BLD-MCNC: 181 MG/DL (ref 70–99)
GLUCOSE BLD-MCNC: 258 MG/DL (ref 70–99)
GLUCOSE BLD-MCNC: 275 MG/DL (ref 70–99)
PERFORMED ON: ABNORMAL

## 2022-09-03 PROCEDURE — 6370000000 HC RX 637 (ALT 250 FOR IP): Performed by: FAMILY MEDICINE

## 2022-09-03 PROCEDURE — 2700000000 HC OXYGEN THERAPY PER DAY

## 2022-09-03 PROCEDURE — 2580000003 HC RX 258: Performed by: FAMILY MEDICINE

## 2022-09-03 PROCEDURE — 82947 ASSAY GLUCOSE BLOOD QUANT: CPT

## 2022-09-03 PROCEDURE — 94640 AIRWAY INHALATION TREATMENT: CPT

## 2022-09-03 PROCEDURE — 94669 MECHANICAL CHEST WALL OSCILL: CPT

## 2022-09-03 PROCEDURE — 6360000002 HC RX W HCPCS: Performed by: INTERNAL MEDICINE

## 2022-09-03 PROCEDURE — 94660 CPAP INITIATION&MGMT: CPT

## 2022-09-03 RX ADMIN — INSULIN LISPRO 4 UNITS: 100 INJECTION, SOLUTION INTRAVENOUS; SUBCUTANEOUS at 12:20

## 2022-09-03 RX ADMIN — INSULIN LISPRO 4 UNITS: 100 INJECTION, SOLUTION INTRAVENOUS; SUBCUTANEOUS at 02:16

## 2022-09-03 RX ADMIN — CLOPIDOGREL BISULFATE 75 MG: 75 TABLET ORAL at 09:26

## 2022-09-03 RX ADMIN — METFORMIN HYDROCHLORIDE 500 MG: 500 TABLET ORAL at 09:26

## 2022-09-03 RX ADMIN — POTASSIUM BICARBONATE 20 MEQ: 782 TABLET, EFFERVESCENT ORAL at 09:27

## 2022-09-03 RX ADMIN — SOTALOL HYDROCHLORIDE 120 MG: 80 TABLET ORAL at 09:26

## 2022-09-03 RX ADMIN — FUROSEMIDE 40 MG: 40 TABLET ORAL at 09:26

## 2022-09-03 RX ADMIN — Medication 10 ML: at 09:27

## 2022-09-03 RX ADMIN — IPRATROPIUM BROMIDE AND ALBUTEROL SULFATE 3 ML: 2.5; .5 SOLUTION RESPIRATORY (INHALATION) at 10:14

## 2022-09-03 RX ADMIN — METHYLPREDNISOLONE SODIUM SUCCINATE 60 MG: 125 INJECTION, POWDER, FOR SOLUTION INTRAMUSCULAR; INTRAVENOUS at 09:27

## 2022-09-03 RX ADMIN — APIXABAN 5 MG: 5 TABLET, FILM COATED ORAL at 09:26

## 2022-09-03 RX ADMIN — METHYLPREDNISOLONE SODIUM SUCCINATE 60 MG: 125 INJECTION, POWDER, FOR SOLUTION INTRAMUSCULAR; INTRAVENOUS at 05:49

## 2022-09-03 RX ADMIN — IPRATROPIUM BROMIDE AND ALBUTEROL SULFATE 3 ML: 2.5; .5 SOLUTION RESPIRATORY (INHALATION) at 06:20

## 2022-09-03 RX ADMIN — RAMIPRIL 5 MG: 5 CAPSULE ORAL at 09:38

## 2022-09-03 NOTE — PLAN OF CARE
Problem: Discharge Planning  Goal: Discharge to home or other facility with appropriate resources  Outcome: Completed     Problem: Chronic Conditions and Co-morbidities  Goal: Patient's chronic conditions and co-morbidity symptoms are monitored and maintained or improved  Outcome: Completed     Problem: Skin/Tissue Integrity  Goal: Absence of new skin breakdown  Outcome: Completed     Problem: Safety - Adult  Goal: Free from fall injury  Outcome: Completed     Problem: ABCDS Injury Assessment  Goal: Absence of physical injury  Outcome: Completed

## 2022-09-03 NOTE — DISCHARGE INSTR - DIET

## 2022-09-03 NOTE — DISCHARGE SUMMARY
Discharge Summary    Patient ID: Chandu Pearson  MRN: [id number]     Acct:  [de-identified]       Patient's PCP: Oumou Chu MD    Admit Date: 8/30/2022     Discharge Date:   9/3/2022    Admitting Physician: Oumou Chu MD    Discharge Physician: Daija Workman MD     Active Discharge Diagnoses:    Primary Problem  Acute on chronic respiratory failure with hypercapnia [facility])  [facility]    Diagnosis Date Noted    Acute on chronic respiratory failure with hypoxia and hypercapnia (HCC) [S33.73, J96.22] 09/01/2022     Priority: Medium    COPD exacerbation ([address] [J44.1] 05/27/2022     Priority: Medium    Acute on chronic respiratory failure with hypercapnia ([address] [J96.22] 03/16/2022    Abdominal aortic aneurysm (AAA) without rupture ([address] [I71.4] 09/10/2020    Chronic obstructive pulmonary disease ([address] [J44.9] 07/16/2020    Mixed hyperlipidemia [E78.2] 12/19/2018    S/P CABG x 4 [Z95.1] 10/11/2016    Coronary artery disease involving native coronary artery of native heart without angina pectoris [I25.10] 10/11/2016    Hypercholesteremia [E78.00] 10/10/2011    Essential hypertension [I10]     Diabetes mellitus ([address] [E11.9]        The patient was seen and examined on day of discharge and this discharge summary is in conjunction with the daily progress note from day of discharge. Code Status:  DNR    Hospital Course: 57-year-old female with known underlying COPD presented with acute exacerbation. Had increased oxygen requirement as well as increased cough and sputum production with wheezing. Was treated with IV steroids and antibiotics with provement of symptoms. On day of discharge she was down to her home oxygen requirement and anxious to discharge home. She will be discharged to complete prednisone and azithromycin outpatient. Close follow-up with her PCP in the office. The patient was admitted for the above noted medical/surgical issues.  Please see daily progress note for further details concerning their stay. The patient improved throughout their stay and reached maximum medical improvement on the day of discharge. The patient/family agree with the treatment plan as outlined above. All questions concerning their stay were answered prior to discharge. They understand the importance of follow up concerning any abnormal test results. Physical Exam  Constitutional:       Appearance: Normal appearance. HENT:      Head: Normocephalic. Nose: Nose normal.      Mouth/Throat:      Mouth: Mucous membranes are moist.   Eyes:      Pupils: Pupils are equal, round, and reactive to light. Cardiovascular:      Rate and Rhythm: Normal rate and regular rhythm. Pulses: Normal pulses. Pulmonary:      Effort: Pulmonary effort is normal.      Breath sounds: Wheezing present. Abdominal:      General: Abdomen is flat. There is no distension. Palpations: Abdomen is soft. Musculoskeletal:         General: Normal range of motion. Cervical back: Normal range of motion. Skin:     General: Skin is warm and dry. Coloration: Skin is not jaundiced. Neurological:      General: No focal deficit present. Mental Status: She is alert and oriented to person, place, and time.    Psychiatric:         Mood and Affect: Mood normal.      Consults:  IP CONSULT TO PULMONOLOGY  PALLIATIVE CARE EVAL  PALLIATIVE CARE EVAL    Disposition: Home    Discharged Condition: Stable    Follow Up: [facility] EMERGENCY DEPT  [facility]  [phone]    If symptoms worsen    Charles Goins MD  [address]   [place] [address] [facility] [address]  [phone]      As needed      Lab Frequency Next Occurrence   Up as tolerated PRN    POCT glucose 4 TIMES DAILY BEFORE MEALS & AT BEDTIME    HYPOGLYCEMIA TREATMENT: blood glucose LESS THAN 70 mg/dL and patient ALERT and TOLERATING PO PRN    HYPOGLYCEMIA TREATMENT: blood glucose LESS THAN 70 mg/dL and patient NOT ALERT or NPO PRN    POCT Glucose PRN    Chest Vest EVERY 8 HOURS    Acapella DAILY (RT)    Acapella EVERY 4 HOURS        Diet: ADULT DIET;  Regular; 4 carb choices (60 gm/meal)    Discharge Medications:      Medication List        START taking these medications      azithromycin 250 MG tablet  Commonly known as: ZITHROMAX  Take 1 tablet by mouth See Admin Instructions for 5 days 500mg on day 1 followed by 250mg on days 2 - 5     predniSONE 50 MG tablet  Commonly known as: DELTASONE  Take 1 tablet by mouth daily for 5 days            CHANGE how you take these medications      ipratropium-albuterol 0.5-2.5 (3) MG/3ML Soln nebulizer solution  Commonly known as: DUONEB  Inhale 3 mLs into the lungs every 4 hours (while awake)  What changed: when to take this            CONTINUE taking these medications      apixaban 5 MG Tabs tablet  Commonly known as: ELIQUIS     clopidogrel 75 MG tablet  Commonly known as: Plavix  Take 1 tablet by mouth daily     furosemide 40 MG tablet  Commonly known as: LASIX  Take 1 tablet by mouth daily     metFORMIN 500 MG tablet  Commonly known as: GLUCOPHAGE     nitroGLYCERIN 0.4 MG SL tablet  Commonly known as: Nitrostat  Place 1 tablet under the tongue every 5 minutes as needed for Chest pain     potassium chloride 20 MEQ packet  Commonly known as: KLOR-CON     ramipril 5 MG capsule  Commonly known as: Altace  Take 1 capsule by mouth 2 times daily     rosuvastatin 10 MG tablet  Commonly known as: CRESTOR  TAKE 1 TABLET DAILY     sotalol 120 MG tablet  Commonly known as: BETAPACE  TAKE 1 TABLET TWICE A DAY  *DOSE INCREASE*            STOP taking these medications      levoFLOXacin 500 MG tablet  Commonly known as: LEVAQUIN               Where to Get Your Medications        These medications were sent to [facility] #[id number], [address] [facility]  [address] [facility], [address] [place] [id number]      Phone: [phone]   azithromycin 250 MG tablet  predniSONE

## 2022-09-03 NOTE — PROGRESS NOTES
Discharge instructions reviewed with pt and understanding verbalized. Pt going home on O2 and advises \"I've got plenty of help\".

## 2022-09-04 LAB
CULTURE, RESPIRATORY: NORMAL
GRAM STAIN RESULT: NORMAL

## 2022-09-06 NOTE — CARE COORDINATION
Home Health Referral received over weekend by [facility] Intake. Patient set up with [facility].   Electronically signed by Ashley Stephens on 9/6/22 at 8:41 AM CDT

## 2022-09-15 LAB — HBA1C MFR BLD: 8.1 % (ref 4–6)

## 2022-10-18 RX ORDER — RAMIPRIL 5 MG/1
CAPSULE ORAL
Qty: 180 CAPSULE | Refills: 3 | Status: SHIPPED | OUTPATIENT
Start: 2022-10-18

## 2022-10-23 ENCOUNTER — HOSPITAL ENCOUNTER (INPATIENT)
Age: 72
LOS: 3 days | Discharge: HOME | DRG: 191 | End: 2022-10-26
Attending: EMERGENCY MEDICINE | Admitting: FAMILY MEDICINE
Payer: MEDICARE

## 2022-10-23 ENCOUNTER — APPOINTMENT (OUTPATIENT)
Dept: GENERAL RADIOLOGY | Age: 72
DRG: 191 | End: 2022-10-23
Payer: MEDICARE

## 2022-10-23 ENCOUNTER — APPOINTMENT (OUTPATIENT)
Dept: CT IMAGING | Age: 72
DRG: 191 | End: 2022-10-23
Payer: MEDICARE

## 2022-10-23 DIAGNOSIS — J44.1 COPD EXACERBATION (HCC): Primary | ICD-10-CM

## 2022-10-23 LAB
ADENOVIRUS BY PCR: NOT DETECTED
ALBUMIN SERPL-MCNC: 4.1 G/DL (ref 3.5–5.2)
ALLENS TEST: ABNORMAL
ALP BLD-CCNC: 71 U/L (ref 35–104)
ALT SERPL-CCNC: 12 U/L (ref 5–33)
ANION GAP SERPL CALCULATED.3IONS-SCNC: 5 MMOL/L (ref 7–19)
APTT: 30.3 SEC (ref 26–36.2)
AST SERPL-CCNC: 16 U/L (ref 5–32)
BACTERIA: ABNORMAL /HPF
BASE EXCESS ARTERIAL: 8.1 MMOL/L (ref -2–2)
BASOPHILS ABSOLUTE: 0 K/UL (ref 0–0.2)
BASOPHILS RELATIVE PERCENT: 0.3 % (ref 0–1)
BILIRUB SERPL-MCNC: 0.3 MG/DL (ref 0.2–1.2)
BILIRUBIN URINE: NEGATIVE
BLOOD, URINE: ABNORMAL
BORDETELLA PARAPERTUSSIS BY PCR: NOT DETECTED
BORDETELLA PERTUSSIS BY PCR: NOT DETECTED
BUN BLDV-MCNC: 16 MG/DL (ref 8–23)
CALCIUM SERPL-MCNC: 9.5 MG/DL (ref 8.8–10.2)
CARBOXYHEMOGLOBIN ARTERIAL: 2.5 % (ref 0–5)
CHLAMYDOPHILIA PNEUMONIAE BY PCR: NOT DETECTED
CHLORIDE BLD-SCNC: 94 MMOL/L (ref 98–111)
CLARITY: ABNORMAL
CO2: 34 MMOL/L (ref 22–29)
COLOR: YELLOW
CORONAVIRUS 229E BY PCR: NOT DETECTED
CORONAVIRUS HKU1 BY PCR: NOT DETECTED
CORONAVIRUS NL63 BY PCR: NOT DETECTED
CORONAVIRUS OC43 BY PCR: NOT DETECTED
CREAT SERPL-MCNC: 1 MG/DL (ref 0.5–0.9)
D DIMER: 0.5 UG/ML FEU (ref 0–0.48)
EOSINOPHILS ABSOLUTE: 0.3 K/UL (ref 0–0.6)
EOSINOPHILS RELATIVE PERCENT: 4.7 % (ref 0–5)
EPITHELIAL CELLS, UA: 1 /HPF (ref 0–5)
GFR SERPL CREATININE-BSD FRML MDRD: 59 ML/MIN/{1.73_M2}
GLUCOSE BLD-MCNC: 210 MG/DL (ref 74–109)
GLUCOSE BLD-MCNC: 214 MG/DL (ref 70–99)
GLUCOSE BLD-MCNC: 385 MG/DL (ref 70–99)
GLUCOSE URINE: NEGATIVE MG/DL
HCO3 ARTERIAL: 35.1 MMOL/L (ref 22–26)
HCT VFR BLD CALC: 45.1 % (ref 37–47)
HEMOGLOBIN, ART, EXTENDED: 13.4 G/DL (ref 12–16)
HEMOGLOBIN: 13.9 G/DL (ref 12–16)
HUMAN METAPNEUMOVIRUS BY PCR: NOT DETECTED
HUMAN RHINOVIRUS/ENTEROVIRUS BY PCR: NOT DETECTED
HYALINE CASTS: 1 /HPF (ref 0–8)
IMMATURE GRANULOCYTES #: 0 K/UL
INFLUENZA A BY PCR: NOT DETECTED
INFLUENZA B BY PCR: NOT DETECTED
INR BLD: 1.12 (ref 0.88–1.18)
KETONES, URINE: NEGATIVE MG/DL
LEUKOCYTE ESTERASE, URINE: ABNORMAL
LYMPHOCYTES ABSOLUTE: 1 K/UL (ref 1.1–4.5)
LYMPHOCYTES RELATIVE PERCENT: 17 % (ref 20–40)
MCH RBC QN AUTO: 30.2 PG (ref 27–31)
MCHC RBC AUTO-ENTMCNC: 30.8 G/DL (ref 33–37)
MCV RBC AUTO: 97.8 FL (ref 81–99)
METHEMOGLOBIN ARTERIAL: 0.8 %
MONOCYTES ABSOLUTE: 0.5 K/UL (ref 0–0.9)
MONOCYTES RELATIVE PERCENT: 8.6 % (ref 0–10)
MYCOPLASMA PNEUMONIAE BY PCR: NOT DETECTED
NEUTROPHILS ABSOLUTE: 4.1 K/UL (ref 1.5–7.5)
NEUTROPHILS RELATIVE PERCENT: 69.2 % (ref 50–65)
NITRITE, URINE: NEGATIVE
O2 CONTENT ARTERIAL: 17.4 ML/DL
O2 DELIVERY DEVICE: ABNORMAL
O2 SAT, ARTERIAL: 92.2 %
O2 THERAPY: ABNORMAL
OXYGEN FLOW: 2
PARAINFLUENZA VIRUS 1 BY PCR: NOT DETECTED
PARAINFLUENZA VIRUS 2 BY PCR: NOT DETECTED
PARAINFLUENZA VIRUS 3 BY PCR: NOT DETECTED
PARAINFLUENZA VIRUS 4 BY PCR: NOT DETECTED
PCO2 ARTERIAL: 58 MMHG (ref 35–45)
PDW BLD-RTO: 14 % (ref 11.5–14.5)
PERFORMED ON: ABNORMAL
PERFORMED ON: ABNORMAL
PH ARTERIAL: 7.39 (ref 7.35–7.45)
PH UA: 7.5 (ref 5–8)
PLATELET # BLD: 184 K/UL (ref 130–400)
PMV BLD AUTO: 9.6 FL (ref 9.4–12.3)
PO2 ARTERIAL: 66 MMHG (ref 80–100)
POTASSIUM SERPL-SCNC: 4.6 MMOL/L (ref 3.5–5)
POTASSIUM, WHOLE BLOOD: 4.2
PRO-BNP: 4792 PG/ML (ref 0–900)
PROTEIN UA: NEGATIVE MG/DL
PROTHROMBIN TIME: 14.3 SEC (ref 12–14.6)
RBC # BLD: 4.61 M/UL (ref 4.2–5.4)
RBC UA: 19 /HPF (ref 0–4)
RESPIRATORY SYNCYTIAL VIRUS BY PCR: NOT DETECTED
SAMPLE SOURCE: ABNORMAL
SARS-COV-2, PCR: NOT DETECTED
SODIUM BLD-SCNC: 133 MMOL/L (ref 136–145)
SPECIFIC GRAVITY UA: 1.01 (ref 1–1.03)
TOTAL PROTEIN: 7 G/DL (ref 6.6–8.7)
TROPONIN: <0.01 NG/ML (ref 0–0.03)
UROBILINOGEN, URINE: 0.2 E.U./DL
WBC # BLD: 5.9 K/UL (ref 4.8–10.8)
WBC UA: 2 /HPF (ref 0–5)

## 2022-10-23 PROCEDURE — 81001 URINALYSIS AUTO W/SCOPE: CPT

## 2022-10-23 PROCEDURE — 2580000003 HC RX 258: Performed by: INTERNAL MEDICINE

## 2022-10-23 PROCEDURE — 71275 CT ANGIOGRAPHY CHEST: CPT | Performed by: RADIOLOGY

## 2022-10-23 PROCEDURE — 2700000000 HC OXYGEN THERAPY PER DAY

## 2022-10-23 PROCEDURE — 82803 BLOOD GASES ANY COMBINATION: CPT

## 2022-10-23 PROCEDURE — 6370000000 HC RX 637 (ALT 250 FOR IP): Performed by: INTERNAL MEDICINE

## 2022-10-23 PROCEDURE — 82947 ASSAY GLUCOSE BLOOD QUANT: CPT

## 2022-10-23 PROCEDURE — 94640 AIRWAY INHALATION TREATMENT: CPT

## 2022-10-23 PROCEDURE — 36600 WITHDRAWAL OF ARTERIAL BLOOD: CPT

## 2022-10-23 PROCEDURE — 1210000000 HC MED SURG R&B

## 2022-10-23 PROCEDURE — 84484 ASSAY OF TROPONIN QUANT: CPT

## 2022-10-23 PROCEDURE — 6360000004 HC RX CONTRAST MEDICATION: Performed by: EMERGENCY MEDICINE

## 2022-10-23 PROCEDURE — 99285 EMERGENCY DEPT VISIT HI MDM: CPT

## 2022-10-23 PROCEDURE — 96374 THER/PROPH/DIAG INJ IV PUSH: CPT

## 2022-10-23 PROCEDURE — 83880 ASSAY OF NATRIURETIC PEPTIDE: CPT

## 2022-10-23 PROCEDURE — 6370000000 HC RX 637 (ALT 250 FOR IP): Performed by: EMERGENCY MEDICINE

## 2022-10-23 PROCEDURE — 85610 PROTHROMBIN TIME: CPT

## 2022-10-23 PROCEDURE — 0202U NFCT DS 22 TRGT SARS-COV-2: CPT

## 2022-10-23 PROCEDURE — 6360000002 HC RX W HCPCS: Performed by: EMERGENCY MEDICINE

## 2022-10-23 PROCEDURE — 85379 FIBRIN DEGRADATION QUANT: CPT

## 2022-10-23 PROCEDURE — 36415 COLL VENOUS BLD VENIPUNCTURE: CPT

## 2022-10-23 PROCEDURE — 80053 COMPREHEN METABOLIC PANEL: CPT

## 2022-10-23 PROCEDURE — 6360000002 HC RX W HCPCS: Performed by: INTERNAL MEDICINE

## 2022-10-23 PROCEDURE — 85730 THROMBOPLASTIN TIME PARTIAL: CPT

## 2022-10-23 PROCEDURE — 94644 CONT INHLJ TX 1ST HOUR: CPT

## 2022-10-23 PROCEDURE — 85025 COMPLETE CBC W/AUTO DIFF WBC: CPT

## 2022-10-23 PROCEDURE — 2580000003 HC RX 258: Performed by: EMERGENCY MEDICINE

## 2022-10-23 PROCEDURE — 93005 ELECTROCARDIOGRAM TRACING: CPT

## 2022-10-23 PROCEDURE — 71045 X-RAY EXAM CHEST 1 VIEW: CPT

## 2022-10-23 PROCEDURE — 71275 CT ANGIOGRAPHY CHEST: CPT

## 2022-10-23 PROCEDURE — 71045 X-RAY EXAM CHEST 1 VIEW: CPT | Performed by: RADIOLOGY

## 2022-10-23 RX ORDER — ONDANSETRON 4 MG/1
4 TABLET, ORALLY DISINTEGRATING ORAL EVERY 8 HOURS PRN
Status: DISCONTINUED | OUTPATIENT
Start: 2022-10-23 | End: 2022-10-26 | Stop reason: HOSPADM

## 2022-10-23 RX ORDER — ACETAMINOPHEN 650 MG/1
650 SUPPOSITORY RECTAL EVERY 6 HOURS PRN
Status: DISCONTINUED | OUTPATIENT
Start: 2022-10-23 | End: 2022-10-26 | Stop reason: HOSPADM

## 2022-10-23 RX ORDER — SOTALOL HYDROCHLORIDE 80 MG/1
120 TABLET ORAL EVERY 12 HOURS SCHEDULED
Status: DISCONTINUED | OUTPATIENT
Start: 2022-10-23 | End: 2022-10-26 | Stop reason: HOSPADM

## 2022-10-23 RX ORDER — POLYETHYLENE GLYCOL 3350 17 G/17G
17 POWDER, FOR SOLUTION ORAL DAILY PRN
Status: DISCONTINUED | OUTPATIENT
Start: 2022-10-23 | End: 2022-10-26 | Stop reason: HOSPADM

## 2022-10-23 RX ORDER — SODIUM CHLORIDE 9 MG/ML
INJECTION, SOLUTION INTRAVENOUS PRN
Status: DISCONTINUED | OUTPATIENT
Start: 2022-10-23 | End: 2022-10-26 | Stop reason: HOSPADM

## 2022-10-23 RX ORDER — ROSUVASTATIN CALCIUM 10 MG/1
10 TABLET, COATED ORAL NIGHTLY
Status: DISCONTINUED | OUTPATIENT
Start: 2022-10-23 | End: 2022-10-26 | Stop reason: HOSPADM

## 2022-10-23 RX ORDER — ACETAMINOPHEN 325 MG/1
650 TABLET ORAL EVERY 6 HOURS PRN
Status: DISCONTINUED | OUTPATIENT
Start: 2022-10-23 | End: 2022-10-26 | Stop reason: HOSPADM

## 2022-10-23 RX ORDER — FUROSEMIDE 40 MG/1
40 TABLET ORAL DAILY
Status: DISCONTINUED | OUTPATIENT
Start: 2022-10-23 | End: 2022-10-26 | Stop reason: HOSPADM

## 2022-10-23 RX ORDER — METHYLPREDNISOLONE SODIUM SUCCINATE 40 MG/ML
40 INJECTION, POWDER, LYOPHILIZED, FOR SOLUTION INTRAMUSCULAR; INTRAVENOUS EVERY 6 HOURS
Status: DISPENSED | OUTPATIENT
Start: 2022-10-23 | End: 2022-10-25

## 2022-10-23 RX ORDER — IPRATROPIUM BROMIDE AND ALBUTEROL SULFATE 2.5; .5 MG/3ML; MG/3ML
3 SOLUTION RESPIRATORY (INHALATION)
Status: DISCONTINUED | OUTPATIENT
Start: 2022-10-23 | End: 2022-10-26 | Stop reason: HOSPADM

## 2022-10-23 RX ORDER — SODIUM CHLORIDE 0.9 % (FLUSH) 0.9 %
5-40 SYRINGE (ML) INJECTION PRN
Status: DISCONTINUED | OUTPATIENT
Start: 2022-10-23 | End: 2022-10-26 | Stop reason: HOSPADM

## 2022-10-23 RX ORDER — IPRATROPIUM BROMIDE AND ALBUTEROL SULFATE 2.5; .5 MG/3ML; MG/3ML
1 SOLUTION RESPIRATORY (INHALATION) ONCE
Status: COMPLETED | OUTPATIENT
Start: 2022-10-23 | End: 2022-10-23

## 2022-10-23 RX ORDER — PREDNISONE 10 MG/1
40 TABLET ORAL DAILY
Status: DISCONTINUED | OUTPATIENT
Start: 2022-10-26 | End: 2022-10-26 | Stop reason: HOSPADM

## 2022-10-23 RX ORDER — ONDANSETRON 2 MG/ML
4 INJECTION INTRAMUSCULAR; INTRAVENOUS EVERY 6 HOURS PRN
Status: DISCONTINUED | OUTPATIENT
Start: 2022-10-23 | End: 2022-10-26 | Stop reason: HOSPADM

## 2022-10-23 RX ORDER — RAMIPRIL 5 MG/1
5 CAPSULE ORAL DAILY
Status: DISCONTINUED | OUTPATIENT
Start: 2022-10-23 | End: 2022-10-25

## 2022-10-23 RX ORDER — AZITHROMYCIN 250 MG/1
500 TABLET, FILM COATED ORAL DAILY
Status: COMPLETED | OUTPATIENT
Start: 2022-10-23 | End: 2022-10-25

## 2022-10-23 RX ORDER — CLOPIDOGREL BISULFATE 75 MG/1
75 TABLET ORAL DAILY
Status: DISCONTINUED | OUTPATIENT
Start: 2022-10-23 | End: 2022-10-26 | Stop reason: HOSPADM

## 2022-10-23 RX ORDER — SODIUM CHLORIDE 0.9 % (FLUSH) 0.9 %
5-40 SYRINGE (ML) INJECTION EVERY 12 HOURS SCHEDULED
Status: DISCONTINUED | OUTPATIENT
Start: 2022-10-23 | End: 2022-10-26 | Stop reason: HOSPADM

## 2022-10-23 RX ORDER — METHYLPREDNISOLONE SODIUM SUCCINATE 125 MG/2ML
125 INJECTION, POWDER, LYOPHILIZED, FOR SOLUTION INTRAMUSCULAR; INTRAVENOUS ONCE
Status: COMPLETED | OUTPATIENT
Start: 2022-10-23 | End: 2022-10-23

## 2022-10-23 RX ORDER — NITROGLYCERIN 0.4 MG/1
0.4 TABLET SUBLINGUAL EVERY 5 MIN PRN
Status: DISCONTINUED | OUTPATIENT
Start: 2022-10-23 | End: 2022-10-26 | Stop reason: HOSPADM

## 2022-10-23 RX ORDER — ALBUTEROL SULFATE 2.5 MG/3ML
2.5 SOLUTION RESPIRATORY (INHALATION)
Status: DISCONTINUED | OUTPATIENT
Start: 2022-10-23 | End: 2022-10-26 | Stop reason: HOSPADM

## 2022-10-23 RX ORDER — POTASSIUM CHLORIDE 20 MEQ/1
20 TABLET, EXTENDED RELEASE ORAL DAILY
Status: DISCONTINUED | OUTPATIENT
Start: 2022-10-23 | End: 2022-10-26 | Stop reason: HOSPADM

## 2022-10-23 RX ADMIN — AZITHROMYCIN DIHYDRATE 500 MG: 500 INJECTION, POWDER, LYOPHILIZED, FOR SOLUTION INTRAVENOUS at 14:18

## 2022-10-23 RX ADMIN — IOPAMIDOL 75 ML: 755 INJECTION, SOLUTION INTRAVENOUS at 12:19

## 2022-10-23 RX ADMIN — METHYLPREDNISOLONE SODIUM SUCCINATE 40 MG: 40 INJECTION, POWDER, FOR SOLUTION INTRAMUSCULAR; INTRAVENOUS at 22:18

## 2022-10-23 RX ADMIN — METHYLPREDNISOLONE SODIUM SUCCINATE 40 MG: 40 INJECTION, POWDER, FOR SOLUTION INTRAMUSCULAR; INTRAVENOUS at 18:16

## 2022-10-23 RX ADMIN — APIXABAN 5 MG: 5 TABLET, FILM COATED ORAL at 22:09

## 2022-10-23 RX ADMIN — AZITHROMYCIN MONOHYDRATE 500 MG: 250 TABLET ORAL at 18:16

## 2022-10-23 RX ADMIN — SOTALOL HYDROCHLORIDE 120 MG: 80 TABLET ORAL at 22:09

## 2022-10-23 RX ADMIN — FUROSEMIDE 40 MG: 40 TABLET ORAL at 18:19

## 2022-10-23 RX ADMIN — SODIUM CHLORIDE, PRESERVATIVE FREE 10 ML: 5 INJECTION INTRAVENOUS at 22:09

## 2022-10-23 RX ADMIN — RAMIPRIL 5 MG: 5 CAPSULE ORAL at 18:16

## 2022-10-23 RX ADMIN — ROSUVASTATIN CALCIUM 10 MG: 10 TABLET, FILM COATED ORAL at 22:09

## 2022-10-23 RX ADMIN — IPRATROPIUM BROMIDE AND ALBUTEROL SULFATE 3 ML: 2.5; .5 SOLUTION RESPIRATORY (INHALATION) at 19:32

## 2022-10-23 RX ADMIN — IPRATROPIUM BROMIDE AND ALBUTEROL SULFATE 1 AMPULE: 2.5; .5 SOLUTION RESPIRATORY (INHALATION) at 13:02

## 2022-10-23 RX ADMIN — ALBUTEROL SULFATE 0.5 MG/KG/HR: 2.5 SOLUTION RESPIRATORY (INHALATION) at 14:27

## 2022-10-23 RX ADMIN — METHYLPREDNISOLONE SODIUM SUCCINATE 125 MG: 125 INJECTION, POWDER, FOR SOLUTION INTRAMUSCULAR; INTRAVENOUS at 11:46

## 2022-10-23 ASSESSMENT — ENCOUNTER SYMPTOMS
SHORTNESS OF BREATH: 1
DIARRHEA: 0
BACK PAIN: 0
ABDOMINAL PAIN: 0
RHINORRHEA: 0
COUGH: 1
SORE THROAT: 0
WHEEZING: 1
NAUSEA: 0
VOMITING: 0

## 2022-10-23 ASSESSMENT — PAIN - FUNCTIONAL ASSESSMENT: PAIN_FUNCTIONAL_ASSESSMENT: NONE - DENIES PAIN

## 2022-10-23 NOTE — ED PROVIDER NOTES
[facility] EMERGENCY DEPT  eMERGENCY dEPARTMENT eNCOUnter      Pt Name: Nestor Zavaleta  MRN: [id number]  [place] 1950  Date of evaluation: 10/23/2022  Provider: Berry Nyhan, MD    [address] [place] [address]       Chief Complaint   Patient presents with    Shortness of Breath     Since 0830 - pt states she took breathing treatment w/ no resolution         HISTORY OF PRESENT ILLNESS   (Location/Symptom, Timing/Onset,Context/Setting, Quality, Duration, Modifying Factors, Severity)  Note limiting factors. Nestor Zavaleta is a 67 y.o. female who presents to the emergency department as of breath. Started last night. She has chest tightness. She has had some leg swelling on the left. No history of blood clots. She does have a history of COPD. She does not wear oxygen all the time. She does have it for as needed use. She has had a productive cough. HPI    NursingNotes were reviewed. REVIEW OF SYSTEMS    (2-9 systems for level 4, 10 or more for level 5)     Review of Systems   Constitutional:  Negative for chills and fever. HENT:  Negative for rhinorrhea and sore throat. Respiratory:  Positive for cough, shortness of breath and wheezing. Cardiovascular:  Positive for chest pain and leg swelling. Gastrointestinal:  Negative for abdominal pain, diarrhea, nausea and vomiting. Genitourinary:  Negative for difficulty urinating. Musculoskeletal:  Negative for back pain and neck pain. Skin:  Negative for rash. Neurological:  Negative for weakness and headaches. Psychiatric/Behavioral:  Negative for confusion. A complete review of systems was performed and is negative except as noted above in the HPI.        PAST MEDICAL HISTORY     Past Medical History:   Diagnosis Date    ASHD (arteriosclerotic heart disease)     s/p PTCA and stent of circumflex, as well as intermediate and mid LAD     COPD (chronic obstructive pulmonary disease) [address]     Coronary atherosclerosis     Diabetes mellitus ([facility] [address]     diet controlled, type 2    Encounter for wound care     FOR LLL WOUND    Fall 10/29/2020    Ganglion cyst     RT HAND    Hematoma 10/2020    hematoma LLL from fall injury    Hx of blood clots     Hypercholesteremia     Hypertension     Pacemaker 03/02/2021    Palliative care patient 03/16/2022    Unstable angina [facility])          SURGICAL HISTORY       Past Surgical History:   Procedure Laterality Date    CARDIAC CATHETERIZATION  12/10/00    selective left heart and coronary arteriography with left ventriculography     CARDIAC CATHETERIZATION  01/23/98    left heart cath, left ventriculography, slective coronary arteriography, direct infarct angioplasty and stent placement to proximal left anterior descending coronary artery    CARDIAC CATHETERIZATION  03/05/94    left heart cath, selective coronary arteriography, left ventriculography    CARDIAC CATHETERIZATION  8/27/2011    [place]    CHOLECYSTECTOMY      CORONARY ANGIOPLASTY WITH STENT PLACEMENT  12/12/00    PTCA and stent placement to the mid LAD/ptca and stent placement first circumflex marginal (intermediate)     CORONARY ANGIOPLASTY WITH STENT PLACEMENT  08/2021    CORONARY ARTERY BYPASS GRAFT  8/29/2011    PACABG X 4 LIMA-LAD, SVG-DIAG, SVG-PDA, RT EVH, LT OPEN VEIN HARVEST, DR Sandhu Abler    CYST REMOVAL      GANGLION CYST REMOVED RT HAND    HYSTERECTOMY (CERVIX STATUS UNKNOWN)      NECK SURGERY      parotid artery tumor removed bilaterally    PACEMAKER PLACEMENT      PAROTIDECTOMY Bilateral          CURRENT MEDICATIONS       Previous Medications    APIXABAN (ELIQUIS) 5 MG TABS TABLET    Take by mouth 2 times daily    CLOPIDOGREL (PLAVIX) 75 MG TABLET    Take 1 tablet by mouth daily    FUROSEMIDE (LASIX) 40 MG TABLET    Take 1 tablet by mouth daily    IPRATROPIUM-ALBUTEROL (DUONEB) 0.5-2.5 (3) MG/3ML SOLN NEBULIZER SOLUTION    Inhale 3 mLs into the lungs every 4 hours (while awake)    METFORMIN (GLUCOPHAGE) 500 MG TABLET    Take 500 mg by mouth 2 times daily (with meals)    NITROGLYCERIN (NITROSTAT) 0.4 MG SL TABLET    Place 1 tablet under the tongue every 5 minutes as needed for Chest pain    POTASSIUM CHLORIDE (KLOR-CON) 20 MEQ PACKET    Take 20 mEq by mouth daily    RAMIPRIL (ALTACE) 5 MG CAPSULE    TAKE 1 CAPSULE TWICE DAILY    ROSUVASTATIN (CRESTOR) 10 MG TABLET    TAKE 1 TABLET DAILY    SOTALOL (BETAPACE) 120 MG TABLET    TAKE 1 TABLET TWICE A DAY  *DOSE INCREASE*       ALLERGIES     Codeine    FAMILY HISTORY       Family History   Problem Relation Age of Onset    Cancer Mother     Coronary Art Dis Father     Mult Sclerosis Sister     Cancer Brother           SOCIAL HISTORY       Social History     Socioeconomic History    Marital status:      Spouse name: None    Number of children: None    Years of education: None    Highest education level: None   Tobacco Use    Smoking status: Some Days     Packs/day: 0.25     Types: Cigarettes     Last attempt to quit: 3/29/2021     Years since quittin.5    Smokeless tobacco: Never    Tobacco comments:     1/2 PACKS EVERY 2 WEEKS, states has been decreasing amount    Vaping Use    Vaping Use: Never used   Substance and Sexual Activity    Alcohol use: Never    Drug use: Never    Sexual activity: Not Currently     Partners: Male     Social Determinants of Health     Financial Resource Strain: Low Risk     Difficulty of Paying Living Expenses: Not hard at all   Food Insecurity: No Food Insecurity    Worried About Running Out of Food in the Last Year: Never true    Ran Out of Food in the Last Year: Never true   Transportation Needs: No Transportation Needs    Lack of Transportation (Medical): No    Lack of Transportation (Non-Medical): No   Physical Activity: Sufficiently Active    Days of Exercise per Week: 7 days    Minutes of Exercise per Session: 30 min   Stress: No Stress Concern Present    Feeling of Stress : Only a little   Social Connections:  Moderately Integrated    Frequency of Communication with Friends and Family: More than three times a week    Frequency of Social Gatherings with Friends and Family: More than three times a week    Attends Je[employer] witness Services: More than 4 times per year    Active Member of [employer] or Organizations: Yes    Attends Club or Organization Meetings: More than 4 times per year    Marital Status:    Intimate Partner Violence: Not At Risk    Fear of Current or Ex-Partner: No    Emotionally Abused: No    Physically Abused: No    Sexually Abused: No   Housing Stability: Low Risk     Unable to Pay for Housing in the Last Year: No    Number of [place] in the Last Year: 1    Unstable Housing in the Last Year: No       SCREENINGS    [place] Coma Scale  Eye Opening: Spontaneous  Best Verbal Response: Oriented  Best Motor Response: Obeys commands  [place] Com[place] Scale Score: 15        PHYSICAL EXAM    (up to 7 for level 4, 8 or more for level 5)     ED Triage Vitals [10/23/22 1049]   BP Temp Temp Source Heart Rate Resp SpO2 Height Weight   108/82 97.5 °F (36.4 °C) Oral 78 24 98 % -- 155 lb (70.3 kg)       Physical Exam  Constitutional:       General: She is not in acute distress. Appearance: She is well-developed. She is not diaphoretic. Interventions: She is intubated. HENT:      Head: Normocephalic and atraumatic. Eyes:      Pupils: Pupils are equal, round, and reactive to light. Cardiovascular:      Rate and Rhythm: Normal rate and regular rhythm. Heart sounds: Normal heart sounds. Pulmonary:      Effort: Tachypnea, accessory muscle usage and respiratory distress present. She is intubated. Breath sounds: Decreased breath sounds and wheezing present. Abdominal:      General: Bowel sounds are normal. There is no distension. Palpations: Abdomen is soft. Tenderness: There is no abdominal tenderness. Musculoskeletal:         General: Normal range of motion. Cervical back: Normal range of motion and neck supple. Right lower leg: No tenderness. Left lower leg: No tenderness. Skin:     General: Skin is warm and dry. Findings: No rash. Neurological:      Mental Status: She is alert and oriented to person, place, and time. Cranial Nerves: No cranial nerve deficit. Motor: No abnormal muscle tone. Coordination: Coordination normal.   Psychiatric:         Behavior: Behavior normal.       DIAGNOSTIC RESULTS     EKG: All EKG's are interpreted by the Emergency Department Physician who either signs or Co-signs this chart in the absence of a cardiologist.    Paced rhythm rate 79 no acute ST wave abnormality similar to prior    RADIOLOGY:   Non-plain film images such as CT, Ultrasound and MRI are read by the radiologist. Plainradiographic images are visualized and preliminarily interpreted by the emergency physician with the below findings:        Interpretation per the Radiologist below, if available at the time of this note:    CTA PULMONARY W CONTRAST   Final Result       1. No pulmonary embolism. 2. 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 Left-sided pacemaker. Tricuspid valve calcifications. 3. Centrilobular and paraseptal emphysema. Right lower lobe 4 mm nodule series 6, image 87. An intraparenchymal cyst is at the right lower lobe measuring 6 mm. 4. Left hilar calcifications, likely lymph nodes. Prominent soft tissue lymph node left hilum measuring 10 mm transverse diameter. 5. Thickening of bilateral adrenals indicating hyperplasia. Left is larger than right. Correlate with CT adrenals without and with IV contrast.       Recommendation:  Follow up as clinically indicated. All CT scans at this facility utilize dose modulation, iterative reconstruction, and/or weight based dosing when appropriate to reduce radiation dose to as low as reasonably achievable.    Electronically Signed by Sagar Blankenship MD at 23-Oct-2022 02:12:01 PM EST               XR CHEST PORTABLE   Final Result   No acute abnormality. No interval change. Recommendation: Follow up as clinically indicated. Electronically Signed by Dali Owens MD at 23-Oct-2022 01:26:57 PM EST                     ED BEDSIDE ULTRASOUND:   Performed by ED Physician - none    LABS:  Labs Reviewed   BLOOD GAS, ARTERIAL - Abnormal; Notable for the following components:       Result Value    pCO2, Arterial 58.0 (*)     pO2, Arterial 66.0 (*)     HCO3, Arterial 35.1 (*)     Base Excess, Arterial 8.1 (*)     All other components within normal limits   BRAIN NATRIURETIC PEPTIDE - Abnormal; Notable for the following components:    Pro-BNP 4,792 (*)     All other components within normal limits   CBC WITH AUTO DIFFERENTIAL - Abnormal; Notable for the following components:    MCHC 30.8 (*)     Neutrophils % 69.2 (*)     Lymphocytes % 17.0 (*)     Lymphocytes Absolute 1.0 (*)     All other components within normal limits   COMPREHENSIVE METABOLIC PANEL - Abnormal; Notable for the following components:    Sodium 133 (*)     Chloride 94 (*)     CO2 34 (*)     Anion Gap 5 (*)     Glucose 210 (*)     Creatinine 1.0 (*)     Est, Glom Filt Rate 59 (*)     All other components within normal limits   D-DIMER, QUANTITATIVE - Abnormal; Notable for the following components:    D-Dimer, Quant 0.50 (*)     All other components within normal limits   RESPIRATORY PANEL, MOLECULAR, WITH COVID-19   APTT   PROTIME-INR   TROPONIN   URINALYSIS WITH REFLEX TO CULTURE       All other labs were within normal range or not returned as of this dictation.     EMERGENCY DEPARTMENT COURSE and DIFFERENTIALDIAGNOSIS/MDM:   Vitals:    Vitals:    10/23/22 1049   BP: 108/82   Pulse: 78   Resp: 24   Temp: 97.5 °F (36.4 °C)   TempSrc: Oral   SpO2: 98%   Weight: 155 lb (70.3 kg)       MDM        CONSULTS:  None    PROCEDURES:  Unless otherwise notedbelow, none     Procedures    FINAL IMPRESSION     1. COPD exacerbation (HCC)          DISPOSITION/PLAN DISPOSITION Decision To Admit 10/23/2022 01:48:52 PM      PATIENT REFERRED TO:  @FUP@    DISCHARGE MEDICATIONS:  New Prescriptions    No medications on file          (Please note that portions of this note were completed with a voice recognition program.  Efforts were made to edit the dictations butoccasionally words are mis-transcribed.)    Joao Gomez MD (electronically signed)  AttendingEmergency Physician         Joao Gomez MD  10/23/22 5251

## 2022-10-23 NOTE — ED NOTES
Pt desatted heavily ( upper 90's to 70's ) on monitor - this RN went into room to assess pt and she was getting up to bedside commode w/ family - o2 re enforced at this time      [place]  10/23/22 [id number] [address],[place] [address]

## 2022-10-24 DIAGNOSIS — I49.5 SA NODE DYSFUNCTION (HCC): ICD-10-CM

## 2022-10-24 DIAGNOSIS — I48.92 ATRIAL FLUTTER, UNSPECIFIED TYPE (HCC): ICD-10-CM

## 2022-10-24 DIAGNOSIS — Z95.0 CARDIAC PACEMAKER: Primary | ICD-10-CM

## 2022-10-24 LAB
ANION GAP SERPL CALCULATED.3IONS-SCNC: 12 MMOL/L (ref 7–19)
BASOPHILS ABSOLUTE: 0 K/UL (ref 0–0.2)
BASOPHILS RELATIVE PERCENT: 0 % (ref 0–1)
BUN BLDV-MCNC: 21 MG/DL (ref 8–23)
CALCIUM SERPL-MCNC: 9 MG/DL (ref 8.8–10.2)
CHLORIDE BLD-SCNC: 92 MMOL/L (ref 98–111)
CO2: 26 MMOL/L (ref 22–29)
CREAT SERPL-MCNC: 1 MG/DL (ref 0.5–0.9)
EOSINOPHILS ABSOLUTE: 0 K/UL (ref 0–0.6)
EOSINOPHILS RELATIVE PERCENT: 0 % (ref 0–5)
GFR SERPL CREATININE-BSD FRML MDRD: 59 ML/MIN/{1.73_M2}
GLUCOSE BLD-MCNC: 232 MG/DL (ref 70–99)
GLUCOSE BLD-MCNC: 268 MG/DL (ref 70–99)
GLUCOSE BLD-MCNC: 319 MG/DL (ref 70–99)
GLUCOSE BLD-MCNC: 347 MG/DL (ref 74–109)
GLUCOSE BLD-MCNC: 374 MG/DL (ref 70–99)
HCT VFR BLD CALC: 39 % (ref 37–47)
HEMOGLOBIN: 12.6 G/DL (ref 12–16)
IMMATURE GRANULOCYTES #: 0 K/UL
LYMPHOCYTES ABSOLUTE: 0.7 K/UL (ref 1.1–4.5)
LYMPHOCYTES RELATIVE PERCENT: 11 % (ref 20–40)
MCH RBC QN AUTO: 30.4 PG (ref 27–31)
MCHC RBC AUTO-ENTMCNC: 32.3 G/DL (ref 33–37)
MCV RBC AUTO: 94.2 FL (ref 81–99)
MONOCYTES ABSOLUTE: 0.1 K/UL (ref 0–0.9)
MONOCYTES RELATIVE PERCENT: 1.7 % (ref 0–10)
NEUTROPHILS ABSOLUTE: 5.8 K/UL (ref 1.5–7.5)
NEUTROPHILS RELATIVE PERCENT: 86.8 % (ref 50–65)
PDW BLD-RTO: 13.7 % (ref 11.5–14.5)
PERFORMED ON: ABNORMAL
PLATELET # BLD: 178 K/UL (ref 130–400)
PMV BLD AUTO: 9.6 FL (ref 9.4–12.3)
POTASSIUM REFLEX MAGNESIUM: 4.4 MMOL/L (ref 3.5–5)
RBC # BLD: 4.14 M/UL (ref 4.2–5.4)
SODIUM BLD-SCNC: 130 MMOL/L (ref 136–145)
WBC # BLD: 6.7 K/UL (ref 4.8–10.8)

## 2022-10-24 PROCEDURE — 2580000003 HC RX 258: Performed by: INTERNAL MEDICINE

## 2022-10-24 PROCEDURE — 1210000000 HC MED SURG R&B

## 2022-10-24 PROCEDURE — 85025 COMPLETE CBC W/AUTO DIFF WBC: CPT

## 2022-10-24 PROCEDURE — 80048 BASIC METABOLIC PNL TOTAL CA: CPT

## 2022-10-24 PROCEDURE — 99223 1ST HOSP IP/OBS HIGH 75: CPT | Performed by: INTERNAL MEDICINE

## 2022-10-24 PROCEDURE — 2700000000 HC OXYGEN THERAPY PER DAY

## 2022-10-24 PROCEDURE — 6370000000 HC RX 637 (ALT 250 FOR IP): Performed by: INTERNAL MEDICINE

## 2022-10-24 PROCEDURE — 94640 AIRWAY INHALATION TREATMENT: CPT

## 2022-10-24 PROCEDURE — 82947 ASSAY GLUCOSE BLOOD QUANT: CPT

## 2022-10-24 PROCEDURE — 36415 COLL VENOUS BLD VENIPUNCTURE: CPT

## 2022-10-24 PROCEDURE — 6360000002 HC RX W HCPCS: Performed by: INTERNAL MEDICINE

## 2022-10-24 RX ORDER — INSULIN LISPRO 100 [IU]/ML
0-4 INJECTION, SOLUTION INTRAVENOUS; SUBCUTANEOUS
Status: DISCONTINUED | OUTPATIENT
Start: 2022-10-24 | End: 2022-10-26 | Stop reason: HOSPADM

## 2022-10-24 RX ORDER — INSULIN LISPRO 100 [IU]/ML
0-4 INJECTION, SOLUTION INTRAVENOUS; SUBCUTANEOUS NIGHTLY
Status: DISCONTINUED | OUTPATIENT
Start: 2022-10-24 | End: 2022-10-26 | Stop reason: HOSPADM

## 2022-10-24 RX ORDER — DEXTROSE MONOHYDRATE 100 MG/ML
INJECTION, SOLUTION INTRAVENOUS CONTINUOUS PRN
Status: DISCONTINUED | OUTPATIENT
Start: 2022-10-24 | End: 2022-10-26 | Stop reason: HOSPADM

## 2022-10-24 RX ADMIN — CLOPIDOGREL BISULFATE 75 MG: 75 TABLET ORAL at 11:39

## 2022-10-24 RX ADMIN — RAMIPRIL 5 MG: 5 CAPSULE ORAL at 11:38

## 2022-10-24 RX ADMIN — FUROSEMIDE 40 MG: 40 TABLET ORAL at 11:39

## 2022-10-24 RX ADMIN — METHYLPREDNISOLONE SODIUM SUCCINATE 40 MG: 40 INJECTION, POWDER, FOR SOLUTION INTRAMUSCULAR; INTRAVENOUS at 21:51

## 2022-10-24 RX ADMIN — IPRATROPIUM BROMIDE AND ALBUTEROL SULFATE 3 ML: 2.5; .5 SOLUTION RESPIRATORY (INHALATION) at 10:34

## 2022-10-24 RX ADMIN — POTASSIUM CHLORIDE 20 MEQ: 20 TABLET, EXTENDED RELEASE ORAL at 11:39

## 2022-10-24 RX ADMIN — ROSUVASTATIN CALCIUM 10 MG: 10 TABLET, FILM COATED ORAL at 21:51

## 2022-10-24 RX ADMIN — APIXABAN 5 MG: 5 TABLET, FILM COATED ORAL at 11:38

## 2022-10-24 RX ADMIN — SOTALOL HYDROCHLORIDE 120 MG: 80 TABLET ORAL at 21:51

## 2022-10-24 RX ADMIN — IPRATROPIUM BROMIDE AND ALBUTEROL SULFATE 3 ML: 2.5; .5 SOLUTION RESPIRATORY (INHALATION) at 06:36

## 2022-10-24 RX ADMIN — AZITHROMYCIN MONOHYDRATE 500 MG: 250 TABLET ORAL at 11:38

## 2022-10-24 RX ADMIN — METHYLPREDNISOLONE SODIUM SUCCINATE 40 MG: 40 INJECTION, POWDER, FOR SOLUTION INTRAMUSCULAR; INTRAVENOUS at 17:45

## 2022-10-24 RX ADMIN — APIXABAN 5 MG: 5 TABLET, FILM COATED ORAL at 21:52

## 2022-10-24 RX ADMIN — METHYLPREDNISOLONE SODIUM SUCCINATE 40 MG: 40 INJECTION, POWDER, FOR SOLUTION INTRAMUSCULAR; INTRAVENOUS at 04:53

## 2022-10-24 RX ADMIN — IPRATROPIUM BROMIDE AND ALBUTEROL SULFATE 3 ML: 2.5; .5 SOLUTION RESPIRATORY (INHALATION) at 14:54

## 2022-10-24 RX ADMIN — SODIUM CHLORIDE, PRESERVATIVE FREE 10 ML: 5 INJECTION INTRAVENOUS at 21:51

## 2022-10-24 RX ADMIN — SOTALOL HYDROCHLORIDE 120 MG: 80 TABLET ORAL at 11:38

## 2022-10-24 RX ADMIN — IPRATROPIUM BROMIDE AND ALBUTEROL SULFATE 3 ML: 2.5; .5 SOLUTION RESPIRATORY (INHALATION) at 20:42

## 2022-10-24 NOTE — CARE COORDINATION
Patient Contact Information:    [address] [place] [address] Place  [phone] (home)   Telephone Information:   Mobile [phone]     Above information verified? [x]   Yes  []   No      Emergency Contacts:    Extended Emergency Contact Information  Primary Emergency Contact: Maricarmen So [address] Phone: [phone]  Mobile Phone: [phone]  Relation: Child  Secondary Emergency Contact: Farzaneh Gerber  Mobile Phone: [phone]  Relation: None      Have you been vaccinated for COVID-19 (SARS-CoV-2)? [x]   Yes  []   No                   If so, when? Which :         [x]   Pfizer-BioNTech  []   Moderna  []   [employer]  []   Other:         Pharmacy:    [id number] - [address] [place] [address] [phone]  [address] [address] [address]  Phone: [phone] Fax: [phone]    [facility] [address] [place] [address] [phone]  F [phone]  [place] [address]  Phone: [phone] Fax: [phone]          Patient Deficits:    []   Yes   [x]   No    If yes:    []   Confusion/Memory  []   Visual  []   Motor/Sensory         []   Right arm         []   Right leg         []   Left arm         []   Left leg  []   Language/Speech         []   Aphasia         []   Dysarthria         []   Swallow         Lonnie Coma Scale  Eye Opening: Spontaneous  Best Verbal Response: Oriented  Best Motor Response: Obeys commands  [place] Coma Scale Score: 15    Patient Deficit Notes:   SW met with pt at bedside. Pt states that eliquis is $900 a month with insurance covering $200. Pt states that her son just went through the company to apply to reduce the cost of the drug. Pt 2L O2 supplied by [employer].        10/24/22 6678   Service Assessment   Patient Orientation Alert and Oriented   Cognition Alert   History Provided By Patient   Primary Caregiver Self   Support Systems Family Members  (two care takers)   Patient's Healthcare Decision Maker is: Legal Next of Juan Pablo 68   PCP Verified by CM Yes   Last Visit to PCP Within last 3 months   Prior Functional Level Independent in ADLs/IADLs   Current Functional Level Independent in ADLs/IADLs   Can patient return to prior living arrangement Yes   Ability to make needs known: Good   Family able to assist with home care needs: Yes   Would you like for me to discuss the discharge plan with any other family members/significant others, and if so, who? Yes  (Son)   Financial Resources Medicare   Community Resources   (Denied Needs)   CM/SW Referral Financial  (Eliquis)   Social/Functional History   Lives With Alone   Type of [address] One level   Home Access Ramped entrance   Bathroom Shower/Tub Walk-in shower; Shower chair without back   [address], rolling;Rollator;Cane   [address] Help From [address] Responsibilities Yes   Ambulation Assistance Independent   Transfer Assistance Independent   Active  Yes   Mode of Transportation Car   Occupation Retired   Discharge Planning   Type of [address] [place] [address] Prior To Admission [employer] Insurance; Other (Comment)  ([employer])   Current DME Prior to Arrival Bedside Commode;Cane;Wheelchair;Walker; Shower Chair   Potential Assistance Needed Prescription Assistance  (Eliquis)   DME Ordered?  No   Potential Assistance Purchasing Medications Yes  (Eliquis)   Type of Home Care Services None   Patient expects to be discharged to: House   One/Two Story Residence One story   Services At/After Discharge   Transition of Care Consult (CM Consult) [address] Discharge None   Mode of Transport at Discharge Other (see comment)  (Son or friend)   Confirm Follow Up Transport Family

## 2022-10-24 NOTE — CARE COORDINATION
CM met with patient briefly at the bedside. Pt is known to this CM from admission in 6/2022. Pt lives alone, but felt her home was s/u for her accessibility. Pt wears 02 and has Bipap from [employer]. Pt was referred to [facility] program, x 2. Pt is not longer part of the program. Pt has a caregiver, which was 2 days a week in the past. Pt have been doing a lot of coughing today, and is tired. CM will f/u and revisit again later. Pt is very clear again, she is going home at discharge.    Electronically signed by Rosetta Hendrix RN on 10/24/2022 at 2:58 PM

## 2022-10-24 NOTE — H&P
CHIEF COMPLAINT: Shortness of breath    History Obtained From:  patient, electronic medical record     HISTORY OF PRESENT ILLNESS:      The patient is a 67 y.o. female with significant past medical history of COPD who presents with abrupt onset of increasing shortness of breath and chest tightness. She has a history of blood clots as well as advanced chronic respiratory disease related to smoking history. She is multiple admissions in the past for COPD exacerbations. She wears home oxygen. Work-up in the emergency room negative for blood clot. She has been admitted due to her acute on chronic respiratory failure. As an aside she has been having issues getting her medications straight including her albuterol for her nebulizer.     Past Medical History:   Diagnosis Date    ASHD (arteriosclerotic heart disease)     s/p PTCA and stent of circumflex, as well as intermediate and mid LAD     COPD (chronic obstructive pulmonary disease) (HCC)     Coronary atherosclerosis     Diabetes mellitus ([facility] [address]     diet controlled, type 2    Encounter for wound care     FOR LLL WOUND    Fall 10/29/2020    Ganglion cyst     RT HAND    Hematoma 10/2020    hematoma LLL from fall injury    Hx of blood clots     Hypercholesteremia     Hypertension     Pacemaker 03/02/2021    Palliative care patient 03/16/2022    Unstable angina [facility])        Past Surgical History:   Procedure Laterality Date    CARDIAC CATHETERIZATION  12/10/00    selective left heart and coronary arteriography with left ventriculography     CARDIAC CATHETERIZATION  01/23/98    left heart cath, left ventriculography, slective coronary arteriography, direct infarct angioplasty and stent placement to proximal left anterior descending coronary artery    CARDIAC CATHETERIZATION  03/05/94    left heart cath, selective coronary arteriography, left ventriculography    CARDIAC CATHETERIZATION  8/27/2011    [facility]    CHOLECYSTECTOMY      CORONARY ANGIOPLASTY WITH STENT PLACEMENT  12/12/00    PTCA and stent placement to the mid LAD/ptca and stent placement first circumflex marginal (intermediate)     CORONARY ANGIOPLASTY WITH STENT PLACEMENT  08/2021    CORONARY ARTERY BYPASS GRAFT  8/29/2011    PACABG X 4 LIMA-LAD, SVG-DIAG, SVG-PDA, RT EVH, LT OPEN VEIN HARVEST, DR Jones Hurley    CYST REMOVAL      GANGLION CYST REMOVED RT HAND    HYSTERECTOMY (CERVIX STATUS UNKNOWN)      NECK SURGERY      parotid artery tumor removed bilaterally    PACEMAKER PLACEMENT      PAROTIDECTOMY Bilateral        Medications Prior to Admission:    Medications Prior to Admission: ramipril (ALTACE) 5 MG capsule, TAKE 1 CAPSULE TWICE DAILY  sotalol (BETAPACE) 120 MG tablet, TAKE 1 TABLET TWICE A DAY  *DOSE INCREASE*  apixaban (ELIQUIS) 5 MG TABS tablet, Take by mouth 2 times daily  metFORMIN (GLUCOPHAGE) 500 MG tablet, Take 500 mg by mouth 2 times daily (with meals)  potassium chloride (KLOR-CON) 20 MEQ packet, Take 20 mEq by mouth daily  rosuvastatin (CRESTOR) 10 MG tablet, TAKE 1 TABLET DAILY  clopidogrel (PLAVIX) 75 MG tablet, Take 1 tablet by mouth daily  ipratropium-albuterol (DUONEB) 0.5-2.5 (3) MG/3ML SOLN nebulizer solution, Inhale 3 mLs into the lungs every 4 hours (while awake)  furosemide (LASIX) 40 MG tablet, Take 1 tablet by mouth daily  nitroGLYCERIN (NITROSTAT) 0.4 MG SL tablet, Place 1 tablet under the tongue every 5 minutes as needed for Chest pain    Allergies:    Codeine    Social History:    reports that she has been smoking cigarettes. She has been smoking an average of .25 packs per day. She has never used smokeless tobacco. She reports that she does not drink alcohol and does not use drugs. Family History:   family history includes Cancer in her brother and mother; Coronary Art Dis in her father; Mult Sclerosis in her sister.     REVIEW OF SYSTEMS:  As above in the HPI, otherwise negative    PHYSICAL EXAM:    Vitals:  /89   Pulse 75   Temp 96.8 °F (36 °C)   Resp 20 Comment: 91% Wt 155 lb (70.3 kg)   SpO2 91%   BMI 25.02 kg/m²     General Appearance:  well developed, well nourished, alert, cooperative, and dyspneic  Skin:  negatives: color normal  Head/face:  NCAT  Eyes:  No gross abnormalities. Neck:  neck- supple, no mass, non-tender  Lungs:  Normal expansion. Clear to auscultation. No rales, rhonchi, or wheezing., Breathing Pattern: use of accessory muscles, Breath sounds: diminished breath sounds- throughout  Heart:  Heart regular rate and rhythm  Abdomen:  Soft, non-tender, normal bowel sounds. No bruits, organomegaly or masses. Extremities: Extremities warm to touch, pink, with no edema.   Musculoskeletal:  negative  Neurologic:  negative    DATA:  CBC with Differential:    Lab Results   Component Value Date/Time    WBC 6.7 10/24/2022 04:29 AM    RBC 4.14 10/24/2022 04:29 AM    HGB 12.6 10/24/2022 04:29 AM    HCT 39.0 10/24/2022 04:29 AM    HCT 32.1 09/02/2011 02:11 AM     10/24/2022 04:29 AM     09/02/2011 02:11 AM    MCV 94.2 10/24/2022 04:29 AM    MCH 30.4 10/24/2022 04:29 AM    MCHC 32.3 10/24/2022 04:29 AM    RDW 13.7 10/24/2022 04:29 AM    LYMPHOPCT 11.0 10/24/2022 04:29 AM    MONOPCT 1.7 10/24/2022 04:29 AM    EOSPCT 0.5 09/02/2011 02:11 AM    BASOPCT 0.0 10/24/2022 04:29 AM    MONOSABS 0.10 10/24/2022 04:29 AM    LYMPHSABS 0.7 10/24/2022 04:29 AM    EOSABS 0.00 10/24/2022 04:29 AM    BASOSABS 0.00 10/24/2022 04:29 AM     CMP:    Lab Results   Component Value Date/Time     10/24/2022 04:29 AM     09/02/2011 02:11 AM    K 4.4 10/24/2022 04:29 AM    K 4.1 09/02/2011 02:11 AM    CL 92 10/24/2022 04:29 AM     09/02/2011 02:11 AM    CO2 26 10/24/2022 04:29 AM    BUN 21 10/24/2022 04:29 AM    CREATININE 1.0 10/24/2022 04:29 AM    CREATININE 0.6 09/02/2011 02:11 AM    GFRAA >59 09/02/2022 02:12 AM    LABGLOM 59 10/24/2022 04:29 AM    GLUCOSE 347 10/24/2022 04:29 AM    PROT 7.0 10/23/2022 11:00 AM    PROT 7.5 01/18/2013 10:35 AM    LABALBU 4.1 10/23/2022 11:00 AM    LABALBU 3.3 09/02/2011 02:11 AM    CALCIUM 9.0 10/24/2022 04:29 AM    BILITOT 0.3 10/23/2022 11:00 AM    ALKPHOS 71 10/23/2022 11:00 AM    ALKPHOS 57 09/02/2011 02:11 AM    AST 16 10/23/2022 11:00 AM    ALT 12 10/23/2022 11:00 AM     Radiology Review: No acute cardiopulmonary findings    ASSESSMENT:      Patient Active Problem List   Diagnosis    Deep vein thrombosis (HCC)    Essential hypertension    Diabetes mellitus (HCC)    Hypercholesteremia    S/P CABG x 4    History of coronary artery stent placement    Coronary artery disease involving native coronary artery of native heart without angina pectoris    Mixed hyperlipidemia    History of diabetes mellitus    Chronic obstructive pulmonary disease (HCC)    NUNEZ (dyspnea on exertion)    Abdominal aortic aneurysm (AAA) without rupture    Diabetic ulcer of left lower leg associated with type 2 diabetes mellitus, with fat layer exposed ([address]    Smoker    Traumatic hematoma    NICM (nonischemic cardiomyopathy) (HCC)    Hyponatremia    Acute on chronic respiratory failure with hypercapnia (HCC)    Palliative care patient    Leg weakness, bilateral    COPD exacerbation ([address]    COPD with acute exacerbation (HCC)    Acute on chronic respiratory failure with hypoxia and hypercapnia (HCC)       PLAN:    Treating now with steroids, empiric antibiotics, and aggressive pulmonary toilet. Appreciate pulmonology's assistance. Already feeling much better this evening after admission yesterday. Hopefully have caught this early and can correct her back to her baseline very soon. We will have  to see her in regard to her issues with getting her regular nebulized medications.     Electronically signed by Jean-Claude Pineda MD on 10/24/2022 at 6:29 PM

## 2022-10-24 NOTE — CONSULTS
Pulmonary and Critical Care Consult Note    [facility]    MRN# [id number]    Acct# [de-identified]  10/24/2022   5:08 PM CDT    Referring Josey Hoskins MD      Chief Complaint: Shortness of breath    Requesting physician: Dr. Harry Caro    Reason for consult: COPD exacerbation      HPI: We have been consulted to see this 67y.o. year old female born on 1950. The patient is known to me from prior hospitalizations and office visit. The patient is known to have COPD and chronic hypoxic hypercapnic respiratory failure. She uses noninvasive ventilation at home during sleep. She continues to smoke. She presented to the hospital due to increasing shortness of breath. Her BNP was elevated. Her D-dimer was also elevated. She had a CT pulmonary angiogram that showed no evidence of PE. She had no fever. She however was congested and wheezing and very short of breath. She was admitted to the hospital and started on usual treatment for COPD exacerbation. Today she is significantly improved. She is sitting up side of the bed. Daughter is at the bedside.   I was asked to see her regarding the above      Past Medical History      Past Medical History:   Diagnosis Date    ASHD (arteriosclerotic heart disease)     s/p PTCA and stent of circumflex, as well as intermediate and mid LAD     COPD (chronic obstructive pulmonary disease) (HCC)     Coronary atherosclerosis     Diabetes mellitus ([facility] [address]     diet controlled, type 2    Encounter for wound care     FOR LLL WOUND    Fall 10/29/2020    Ganglion cyst     RT HAND    Hematoma 10/2020    hematoma LLL from fall injury    Hx of blood clots     Hypercholesteremia     Hypertension     Pacemaker 03/02/2021    Palliative care patient 03/16/2022    Unstable angina [facility])      SurgicalHistory  Past Surgical History:   Procedure Laterality Date    CARDIAC CATHETERIZATION  12/10/00    selective left heart and coronary arteriography with left ventriculography     CARDIAC CATHETERIZATION  01/23/98    left heart cath, left ventriculography, slective coronary arteriography, direct infarct angioplasty and stent placement to proximal left anterior descending coronary artery    CARDIAC CATHETERIZATION  03/05/94    left heart cath, selective coronary arteriography, left ventriculography    CARDIAC CATHETERIZATION  8/27/2011    [facility]    CHOLECYSTECTOMY      CORONARY ANGIOPLASTY WITH STENT PLACEMENT  12/12/00    PTCA and stent placement to the mid LAD/ptca and stent placement first circumflex marginal (intermediate)     CORONARY ANGIOPLASTY WITH STENT PLACEMENT  08/2021    CORONARY ARTERY BYPASS GRAFT  8/29/2011    PACABG X 4 LIMA-LAD, SVG-DIAG, SVG-PDA, RT EVH, LT OPEN VEIN HARVEST, DR Kushal Carty    CYST REMOVAL      GANGLION CYST REMOVED RT HAND    HYSTERECTOMY (CERVIX STATUS UNKNOWN)      NECK SURGERY      parotid artery tumor removed bilaterally    PACEMAKER PLACEMENT      PAROTIDECTOMY Bilateral      Allergies  Allergies   Allergen Reactions    Codeine Other (See Comments)     SOB     Medications    insulin lispro, 0-4 Units, SubCUTAneous, TID WC    insulin lispro, 0-4 Units, SubCUTAneous, Nightly    apixaban, 5 mg, Oral, BID    clopidogrel, 75 mg, Oral, Daily    furosemide, 40 mg, Oral, Daily    ipratropium-albuterol, 3 mL, Inhalation, Q4H WA    metFORMIN, 500 mg, Oral, BID WC    potassium chloride, 20 mEq, Oral, Daily    ramipril, 5 mg, Oral, Daily    rosuvastatin, 10 mg, Oral, Nightly    sotalol, 120 mg, Oral, 2 times per day    sodium chloride flush, 5-40 mL, IntraVENous, 2 times per day    methylPREDNISolone, 40 mg, IntraVENous, Q6H **FOLLOWED BY** [START ON 10/26/2022] predniSONE, 40 mg, Oral, Daily    azithromycin, 500 mg, Oral, Daily   Social History   reports that she has been smoking cigarettes.  She has been smoking an average of .25 packs per day. She has never used smokeless tobacco. She reports that she does not drink alcohol and does not use drugs. Family History  family history includes Cancer in her brother and mother; Coronary Art Dis in her father; Mult Sclerosis in her sister.     Review of Systems:  12 point review of systems is negative except as below:  CONSTITUTIONAL:  positive for  malaise  RESPIRATORY:  positive for  dyspnea  CARDIOVASCULAR:  positive for  dyspnea    Physical Exam:  /84   Pulse 79   Temp (!) 96.7 °F (35.9 °C)   Resp 20 Comment: 90%  Wt 155 lb (70.3 kg)   SpO2 91%   BMI 25.02 kg/m²   Intake/Output Summary (Last 24 hours) at 10/24/2022 1708  Last data filed at 10/24/2022 1207  Gross per 24 hour   Intake 1320 ml   Output --   Net 1320 ml       General appearance: Elderly female who is in no distress   HEENT: Normocephalic atraumatic  Heart: S1-S2 distant sounds no murmurs  Lungs: Diminished bilaterally no rubs or tenderness on dullness to percussion  Abdomen: Soft nontender no organomegalies normal bowel sounds  Extremities: No clubbing cyanosis or edema  Neuro: No focal findings  Skin: Intact        Labs:  Recent Labs     10/23/22  1100 10/24/22  0429   WBC 5.9 6.7   RBC 4.61 4.14*   HGB 13.9 12.6   HCT 45.1 39.0    178   MCV 97.8 94.2   MCH 30.2 30.4   MCHC 30.8* 32.3*   RDW 14.0 13.7      Recent Labs     10/23/22  1100 10/23/22  1209 10/24/22  0429   *  --  130*   K 4.6 4.2 4.4   CL 94*  --  92*   CO2 34*  --  26   BUN 16  --  21   CREATININE 1.0*  --  1.0*   CALCIUM 9.5  --  9.0   GLUCOSE 210*  --  347*      Recent Labs     10/23/22  1209   PHART 7.390   WHP1OWV 58.0*   PO2ART 66.0*   AOT5TDP 35.1*   B5VJIMCJ 92.2   BEART 8.1*     Recent Labs     10/23/22  1100 10/24/22  0429   AST 16  --    ALT 12  --    ALKPHOS 71  --    BILITOT 0.3  --    CALCIUM 9.5 9.0   PROBNP 4,792*  --    TROPONINI <0.01  --    INR 1.12  --    DDIMER 0.50*  --      No results for input(s): BC, LABGRAM, CULTRESP, BFCX in the last 72 hours. Radiograph: XR CHEST PORTABLE    Result Date: 10/23/2022  No acute abnormality. No interval change. Recommendation: Follow up as clinically indicated. Electronically Signed by Robert St MD at 23-Oct-2022 01:26:57 PM EST             CTA PULMONARY W CONTRAST    Result Date: 10/23/2022   1. No pulmonary embolism. 2. 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 Left-sided pacemaker. Tricuspid valve calcifications. 3. Centrilobular and paraseptal emphysema. Right lower lobe 4 mm nodule series 6, image 87. An intraparenchymal cyst is at the right lower lobe measuring 6 mm. 4. Left hilar calcifications, likely lymph nodes. Prominent soft tissue lymph node left hilum measuring 10 mm transverse diameter. 5. Thickening of bilateral adrenals indicating hyperplasia. Left is larger than right. Correlate with CT adrenals without and with IV contrast.  Recommendation:  Follow up as clinically indicated. All CT scans at this facility utilize dose modulation, iterative reconstruction, and/or weight based dosing when appropriate to reduce radiation dose to as low as reasonably achievable.  Electronically Signed by Robert St MD at 23-Oct-2022 02:12:01 PM EST                My radiograph interpretation/independent review of imaging: Reviewed    Problem list generated by [facility] Problems             Last Modified POA    * (Principal) COPD with acute exacerbation ([address] 10/23/2022 Yes    COPD exacerbation ([address] 10/23/2022 Yes    Essential hypertension 10/23/2022 Yes    Diabetes mellitus ([address] 10/23/2022 Yes    Overview Signed 6/2/2011  1:19 PM by Anton Hauser     diet controlled         Hypercholesteremia 10/23/2022 Yes    Coronary artery disease involving native coronary artery of native heart without angina pectoris 10/23/2022 Yes    Smoker 10/23/2022 Yes          Pulmonary Assessment/plan:    Acute exacerbation of chronic obstructive pulmonary disease. Continue current management with steroids and empirical antibiotic therapy. Hyponatremia of unclear etiology. Continue to follow. Chronic hypoxic hypercapnic respiratory failure. Continue noninvasive ventilation during sleep and as needed while awake. Dyspnea due to the above supportive care. DVT prophylaxis continue Eliquis. Clem Osborne MD, [facility], [facility]    The above note was generated using voice recognition software. Inadvertent typographical errors in transcription may have occurred.     Electronically signed by Clem Osborne MD on 10/24/22 at 5:08 PM

## 2022-10-25 LAB
ANION GAP SERPL CALCULATED.3IONS-SCNC: 9 MMOL/L (ref 7–19)
BUN BLDV-MCNC: 22 MG/DL (ref 8–23)
CALCIUM SERPL-MCNC: 9.5 MG/DL (ref 8.8–10.2)
CHLORIDE BLD-SCNC: 91 MMOL/L (ref 98–111)
CO2: 35 MMOL/L (ref 22–29)
CREAT SERPL-MCNC: 1 MG/DL (ref 0.5–0.9)
GFR SERPL CREATININE-BSD FRML MDRD: 59 ML/MIN/{1.73_M2}
GLUCOSE BLD-MCNC: 260 MG/DL (ref 74–109)
GLUCOSE BLD-MCNC: 269 MG/DL (ref 70–99)
GLUCOSE BLD-MCNC: 336 MG/DL (ref 70–99)
GLUCOSE BLD-MCNC: 380 MG/DL (ref 70–99)
GLUCOSE BLD-MCNC: 400 MG/DL (ref 70–99)
PERFORMED ON: ABNORMAL
POTASSIUM REFLEX MAGNESIUM: 4.4 MMOL/L (ref 3.5–5)
SODIUM BLD-SCNC: 135 MMOL/L (ref 136–145)

## 2022-10-25 PROCEDURE — 6360000002 HC RX W HCPCS: Performed by: INTERNAL MEDICINE

## 2022-10-25 PROCEDURE — 6370000000 HC RX 637 (ALT 250 FOR IP): Performed by: INTERNAL MEDICINE

## 2022-10-25 PROCEDURE — 82947 ASSAY GLUCOSE BLOOD QUANT: CPT

## 2022-10-25 PROCEDURE — 1210000000 HC MED SURG R&B

## 2022-10-25 PROCEDURE — 2580000003 HC RX 258: Performed by: INTERNAL MEDICINE

## 2022-10-25 PROCEDURE — 80048 BASIC METABOLIC PNL TOTAL CA: CPT

## 2022-10-25 PROCEDURE — 6370000000 HC RX 637 (ALT 250 FOR IP): Performed by: FAMILY MEDICINE

## 2022-10-25 PROCEDURE — 94640 AIRWAY INHALATION TREATMENT: CPT

## 2022-10-25 PROCEDURE — 2700000000 HC OXYGEN THERAPY PER DAY

## 2022-10-25 PROCEDURE — 36415 COLL VENOUS BLD VENIPUNCTURE: CPT

## 2022-10-25 PROCEDURE — 99232 SBSQ HOSP IP/OBS MODERATE 35: CPT | Performed by: INTERNAL MEDICINE

## 2022-10-25 RX ORDER — RAMIPRIL 5 MG/1
5 CAPSULE ORAL 2 TIMES DAILY
Status: DISCONTINUED | OUTPATIENT
Start: 2022-10-25 | End: 2022-10-26 | Stop reason: HOSPADM

## 2022-10-25 RX ORDER — INSULIN LISPRO 100 [IU]/ML
10 INJECTION, SOLUTION INTRAVENOUS; SUBCUTANEOUS ONCE
Status: COMPLETED | OUTPATIENT
Start: 2022-10-25 | End: 2022-10-25

## 2022-10-25 RX ADMIN — METHYLPREDNISOLONE SODIUM SUCCINATE 40 MG: 40 INJECTION, POWDER, FOR SOLUTION INTRAMUSCULAR; INTRAVENOUS at 04:10

## 2022-10-25 RX ADMIN — IPRATROPIUM BROMIDE AND ALBUTEROL SULFATE 3 ML: 2.5; .5 SOLUTION RESPIRATORY (INHALATION) at 14:35

## 2022-10-25 RX ADMIN — ROSUVASTATIN CALCIUM 10 MG: 10 TABLET, FILM COATED ORAL at 20:40

## 2022-10-25 RX ADMIN — IPRATROPIUM BROMIDE AND ALBUTEROL SULFATE 3 ML: 2.5; .5 SOLUTION RESPIRATORY (INHALATION) at 19:09

## 2022-10-25 RX ADMIN — RAMIPRIL 5 MG: 5 CAPSULE ORAL at 20:40

## 2022-10-25 RX ADMIN — INSULIN LISPRO 10 UNITS: 100 INJECTION, SOLUTION INTRAVENOUS; SUBCUTANEOUS at 20:41

## 2022-10-25 RX ADMIN — SODIUM CHLORIDE, PRESERVATIVE FREE 10 ML: 5 INJECTION INTRAVENOUS at 20:40

## 2022-10-25 RX ADMIN — RAMIPRIL 5 MG: 5 CAPSULE ORAL at 08:13

## 2022-10-25 RX ADMIN — INSULIN LISPRO 2 UNITS: 100 INJECTION, SOLUTION INTRAVENOUS; SUBCUTANEOUS at 08:17

## 2022-10-25 RX ADMIN — IPRATROPIUM BROMIDE AND ALBUTEROL SULFATE 3 ML: 2.5; .5 SOLUTION RESPIRATORY (INHALATION) at 10:27

## 2022-10-25 RX ADMIN — SOTALOL HYDROCHLORIDE 120 MG: 80 TABLET ORAL at 08:12

## 2022-10-25 RX ADMIN — POTASSIUM CHLORIDE 20 MEQ: 20 TABLET, EXTENDED RELEASE ORAL at 08:12

## 2022-10-25 RX ADMIN — SODIUM CHLORIDE, PRESERVATIVE FREE 10 ML: 5 INJECTION INTRAVENOUS at 16:38

## 2022-10-25 RX ADMIN — IPRATROPIUM BROMIDE AND ALBUTEROL SULFATE 3 ML: 2.5; .5 SOLUTION RESPIRATORY (INHALATION) at 07:14

## 2022-10-25 RX ADMIN — FUROSEMIDE 40 MG: 40 TABLET ORAL at 08:13

## 2022-10-25 RX ADMIN — APIXABAN 5 MG: 5 TABLET, FILM COATED ORAL at 08:13

## 2022-10-25 RX ADMIN — SOTALOL HYDROCHLORIDE 120 MG: 80 TABLET ORAL at 20:40

## 2022-10-25 RX ADMIN — AZITHROMYCIN MONOHYDRATE 500 MG: 250 TABLET ORAL at 08:13

## 2022-10-25 RX ADMIN — CLOPIDOGREL BISULFATE 75 MG: 75 TABLET ORAL at 08:13

## 2022-10-25 RX ADMIN — INSULIN LISPRO 4 UNITS: 100 INJECTION, SOLUTION INTRAVENOUS; SUBCUTANEOUS at 14:46

## 2022-10-25 RX ADMIN — METHYLPREDNISOLONE SODIUM SUCCINATE 40 MG: 40 INJECTION, POWDER, FOR SOLUTION INTRAMUSCULAR; INTRAVENOUS at 14:36

## 2022-10-25 RX ADMIN — APIXABAN 5 MG: 5 TABLET, FILM COATED ORAL at 20:40

## 2022-10-25 NOTE — ACP (ADVANCE CARE PLANNING)
Advance Care Planning     Advance Care Planning Activator (Inpatient)  Conversation Note      Date of ACP Conversation: 10/25/2022     Conversation Conducted with: Patient    ACP Activator: Osiris Birmingham      Current Designated Health Care Decision Maker:     Primary Decision Maker: Ashia Peace - Child - [phone]      Care Preferences    Ventilation: \"If you were in your present state of health and suddenly became very ill and were unable to breathe on your own, what would your preference be about the use of a ventilator (breathing machine) if it were available to you? \"      Would the patient desire the use of ventilator (breathing machine)?: Yes    \"If your health worsens and it becomes clear that your chance of recovery is unlikely, what would your preference be about the use of a ventilator (breathing machine) if it were available to you? \"     Would the patient desire the use of ventilator (breathing machine)?: Yes      Resuscitation  \"CPR works best to restart the heart when there is a sudden event, like a heart attack, in someone who is otherwise healthy. Unfortunately, CPR does not typically restart the heart for people who have serious health conditions or who are very sick. \"    \"In the event your heart stopped as a result of an underlying serious health condition, would you want attempts to be made to restart your heart (answer \"yes\" for attempt to resuscitate) or would you prefer a natural death (answer \"no\" for do not attempt to resuscitate)? \" Yes       [] Yes   [x] No   Educated Patient / Ebenezer Mar regarding differences between Advance Directives and portable DNR orders.       Conversation Outcomes:  [x] ACP discussion completed  [x] Existing advance directive reviewed with patient; no changes to patient's previously recorded wishes    Electronically signed by Osiris Birmingham on 10/25/2022 at 10:29 AM

## 2022-10-25 NOTE — PROGRESS NOTES
Family Medicine Progress Note    Patient:  Nestor Zavaleta  YOB: 1950    MRN: [id number]     Acct: [de-identified]     Admit date: 10/23/2022    Patient Seen, Chart, Consults notes, Labs, Radiology studies reviewed. Subjective: Day 2 of stay with COPD exacerbation with hypoxia and most recent (in last 24 hours) has had improved breathing. Less dyspneic at rest.  Slept okay overnight. Feels as though she is having very better air exchange. Cough is minimal.    Past, Family, Social History unchanged from admission. Diet:  ADULT DIET; Regular; 5 carb choices (75 gm/meal); Low Fat/Low Chol/High Fiber/DORA    Medications:  Scheduled Meds:   insulin lispro  0-4 Units SubCUTAneous TID WC    insulin lispro  0-4 Units SubCUTAneous Nightly    apixaban  5 mg Oral BID    clopidogrel  75 mg Oral Daily    furosemide  40 mg Oral Daily    ipratropium-albuterol  3 mL Inhalation Q4H WA    metFORMIN  500 mg Oral BID WC    potassium chloride  20 mEq Oral Daily    ramipril  5 mg Oral Daily    rosuvastatin  10 mg Oral Nightly    sotalol  120 mg Oral 2 times per day    sodium chloride flush  5-40 mL IntraVENous 2 times per day    methylPREDNISolone  40 mg IntraVENous Q6H    Followed by    [START ON 10/26/2022] predniSONE  40 mg Oral Daily     Continuous Infusions:   dextrose      sodium chloride       PRN Meds:glucose, dextrose bolus **OR** dextrose bolus, glucagon (rDNA), dextrose, nitroGLYCERIN, sodium chloride flush, sodium chloride, ondansetron **OR** ondansetron, polyethylene glycol, acetaminophen **OR** acetaminophen, albuterol    Objective:    Vitals: /68   Pulse 79   Temp 97.5 °F (36.4 °C) (Temporal)   Resp 20   Wt 155 lb (70.3 kg)   SpO2 95%   BMI 25.02 kg/m²   24 hour intake/output:  Intake/Output Summary (Last 24 hours) at 10/25/2022 0826  Last data filed at 10/24/2022 1207  Gross per 24 hour   Intake 840 ml   Output --   Net 840 ml     Last 3 weights:   Wt Readings from Last 3 Encounters:   10/23/22 155 lb (70.3 kg)   09/03/22 161 lb 4 oz (73.1 kg)   07/23/22 155 lb (70.3 kg)       Physical Exam:    General Appearance:  awake, alert, oriented, in no acute distress  Skin:  Skin color, texture, turgor normal. No rashes or lesions. Eyes:  No gross abnormalities. Neck:  neck- supple, no mass, non-tender  Lungs:  Breathing Pattern: regular, no distress, Breath sounds: wheezing- upper right and upper left  Heart:  Heart regular rate and rhythm  Abdomen:  Soft, non-tender, normal bowel sounds. No bruits, organomegaly or masses. Extremities: Extremities warm to touch, pink, with no edema. Musculoskeletal:  No joint swelling, deformity, or tenderness.   Neurologic:  negative    CBC with Differential:    Lab Results   Component Value Date/Time    WBC 6.7 10/24/2022 04:29 AM    RBC 4.14 10/24/2022 04:29 AM    HGB 12.6 10/24/2022 04:29 AM    HCT 39.0 10/24/2022 04:29 AM    HCT 32.1 09/02/2011 02:11 AM     10/24/2022 04:29 AM     09/02/2011 02:11 AM    MCV 94.2 10/24/2022 04:29 AM    MCH 30.4 10/24/2022 04:29 AM    MCHC 32.3 10/24/2022 04:29 AM    RDW 13.7 10/24/2022 04:29 AM    LYMPHOPCT 11.0 10/24/2022 04:29 AM    MONOPCT 1.7 10/24/2022 04:29 AM    EOSPCT 0.5 09/02/2011 02:11 AM    BASOPCT 0.0 10/24/2022 04:29 AM    MONOSABS 0.10 10/24/2022 04:29 AM    LYMPHSABS 0.7 10/24/2022 04:29 AM    EOSABS 0.00 10/24/2022 04:29 AM    BASOSABS 0.00 10/24/2022 04:29 AM     CMP:    Lab Results   Component Value Date/Time     10/24/2022 04:29 AM     09/02/2011 02:11 AM    K 4.4 10/24/2022 04:29 AM    K 4.1 09/02/2011 02:11 AM    CL 92 10/24/2022 04:29 AM     09/02/2011 02:11 AM    CO2 26 10/24/2022 04:29 AM    BUN 21 10/24/2022 04:29 AM    CREATININE 1.0 10/24/2022 04:29 AM    CREATININE 0.6 09/02/2011 02:11 AM    GFRAA >59 09/02/2022 02:12 AM    LABGLOM 59 10/24/2022 04:29 AM    GLUCOSE 347 10/24/2022 04:29 AM    PROT 7.0 10/23/2022 11:00 AM    PROT 7.5 01/18/2013 10:35 AM    LABALBU 4.1 10/23/2022 11:00 AM    LABALBU 3.3 09/02/2011 02:11 AM    CALCIUM 9.0 10/24/2022 04:29 AM    BILITOT 0.3 10/23/2022 11:00 AM    ALKPHOS 71 10/23/2022 11:00 AM    ALKPHOS 57 09/02/2011 02:11 AM    AST 16 10/23/2022 11:00 AM    ALT 12 10/23/2022 11:00 AM     Last 3 Troponin:    Lab Results   Component Value Date/Time    TROPONINI <0.01 10/23/2022 11:00 AM    TROPONINI <0.01 08/30/2022 10:15 AM    TROPONINI <0.01 08/30/2022 04:49 AM     Urine Culture:  No components found for: CURINE  Blood Culture:  No components found for: CBLOOD, CFUNGUSBL  Stool Culture:  No components found for: CSTOOL    Assessment:    Principal Problem:    COPD with acute exacerbation (HCC)  Active Problems:    COPD exacerbation ([facility] [address]    Essential hypertension    Diabetes mellitus ([facility] [address]    Hypercholesteremia    Coronary artery disease involving native coronary artery of native heart without angina pectoris    Smoker    Hyponatremia  Resolved Problems:    * No resolved hospital problems. *          Plan:  Continue with current aggressive pulmonary toilet, empiric antibiotic, and steroid treatment. Appreciate pulmonology assistance.  to assist to make sure that she is able to get her nebulized medications. That has been a difficult thing at home for some reason. He keeps kicking back and we will order from the office. Hopefully can discharge home in the next 24 to 48 hours. Repeat electrolytes to ensure normalizing sodium.       Electronically signed by Ny Taylor MD on 10/25/2022 at 8:26 AM

## 2022-10-25 NOTE — PLAN OF CARE
Problem: Safety - Adult  Goal: Free from fall injury  Outcome: Progressing     Problem: Discharge Planning  Goal: Discharge to home or other facility with appropriate resources  Outcome: Progressing     Problem: Chronic Conditions and Co-morbidities  Goal: Patient's chronic conditions and co-morbidity symptoms are monitored and maintained or improved  Outcome: Progressing

## 2022-10-25 NOTE — CARE COORDINATION
CM asked to see patient to assist with obtaining Duoneb. MARYANN reached out to [employer] [facility]. Apparently patient ran out to medication the last time. Williamirvin applied Good Rx card and patient was able to get 10 days of medication for $53.00. At this point for [employer] to be able to bill Medicare for nebulized medications a new rx is needed. MARYANN reached out to Funmi Garyson at Dr. Kitty Cabello office who has sent a new rx for Duonebs. [facility] reports, he received new rx and has sent back the additional Medicare form needed for completion to have Duoneb covered. At time of discharge use [employer] on [address] [facility] side for medication.    Electronically signed by Kvng Bates RN on 10/25/2022 at 5:11 PM

## 2022-10-25 NOTE — PROGRESS NOTES
Palliative Care/Spiritual Care: Met with pt to initiate palliative care. Pt says she was having trouble breathing and started not feeling well. Pt has COPD and other diagnoses in past medical history. Pt is known to palliative care. Advance Directives: Pt has a LW and her son Josselin Ruelas is her decision maker. Pt wants CPR and Ventilator. SEE ACP NOTE. Pain/other symptoms: Pt says she is not having pain. Social/Spiritual: Pt says she attends a [facility].         Pt/family discussion r/t goals: Pt has one son and lives at home. She says she has help at home everyday either her son or a caregiver. She says she has good days and she ambulates without assistance on those days. On days she is not breathing well she says she uses a walker. She is able to care for herself at home on her good days, performing most daily living skills. Pt's goal is to return home with previous quality of life. Provided spiritual care with sustaining presence, nurtured hope, and prayer. Pt expressed gratitude for spiritual care.      Electronically signed by Anna Harley on 10/25/2022 at 10:25 AM

## 2022-10-25 NOTE — PROGRESS NOTES
Pulmonary and Critical Care Progress note. [address] [facility]    MRN# [id number]    Acct# [de-identified]  10/25/2022   6:42 PM CDT    Referring Albert Caballero MD      Chief Complaint: Shortness of breath    HPI: The patient feels significantly improved since admission. Sitting on the edge of the bed.   Nasal cannula oxygen on    Medications    insulin lispro, 0-4 Units, SubCUTAneous, TID WC    insulin lispro, 0-4 Units, SubCUTAneous, Nightly    apixaban, 5 mg, Oral, BID    clopidogrel, 75 mg, Oral, Daily    furosemide, 40 mg, Oral, Daily    ipratropium-albuterol, 3 mL, Inhalation, Q4H WA    metFORMIN, 500 mg, Oral, BID WC    potassium chloride, 20 mEq, Oral, Daily    ramipril, 5 mg, Oral, Daily    rosuvastatin, 10 mg, Oral, Nightly    sotalol, 120 mg, Oral, 2 times per day    sodium chloride flush, 5-40 mL, IntraVENous, 2 times per day    [] methylPREDNISolone, 40 mg, IntraVENous, Q6H **FOLLOWED BY** [START ON 10/26/2022] predniSONE, 40 mg, Oral, Daily     Review of Systems:  RESPIRATORY:  positive for  dyspnea  CARDIOVASCULAR:  positive for  dyspnea      Physical Exam:  /85   Pulse 81   Temp 97.5 °F (36.4 °C) (Temporal)   Resp 20   Wt 155 lb (70.3 kg)   SpO2 98%   BMI 25.02 kg/m²   Intake/Output Summary (Last 24 hours) at 10/25/2022 1842  Last data filed at 10/25/2022 1235  Gross per 24 hour   Intake 480 ml   Output --   Net 480 ml       General appearance: Elderly female who appears to be in no distress   HEENT:normocephalic atraumatic  FZAAM:U9H4 no murmurs  Lungs:diminished bilaterally no rubs or tenderness or dullness to percussion  Abdomen:Soft non tender no organomegaly  Extremities:no clubbing cyanosis or edema  Neuro:no focal findings  Skin:intact    Recent Labs     10/23/22  1100 10/24/22  0429   WBC 5.9 6.7   RBC 4.61 4.14*   HGB 13.9 12.6   HCT 45.1 39.0    178   MCV 97.8 94.2   MCH 30.2 30.4   MCHC 30.8* 32.3*   RDW 14.0 13.7      Recent Labs 10/23/22  1100 10/23/22  1209 10/24/22  0429 10/25/22  0847   *  --  130* 135*   K 4.6 4.2 4.4 4.4   CL 94*  --  92* 91*   CO2 34*  --  26 35*   BUN 16  --  21 22   CREATININE 1.0*  --  1.0* 1.0*   CALCIUM 9.5  --  9.0 9.5   GLUCOSE 210*  --  347* 260*      Recent Labs     10/23/22  1209   PHART 7.390   HOH8ZBV 58.0*   PO2ART 66.0*   YPM9XCB 35.1*   N2VKFOHL 92.2   BEART 8.1*     Recent Labs     10/23/22  1100 10/24/22  0429 10/25/22  0847   AST 16  --   --    ALT 12  --   --    ALKPHOS 71  --   --    BILITOT 0.3  --   --    CALCIUM 9.5   < > 9.5   PROBNP 4,792*  --   --    TROPONINI <0.01  --   --    INR 1.12  --   --    DDIMER 0.50*  --   --     < > = values in this interval not displayed. No results for input(s): BC, LABGRAM, CULTRESP, BFCX in the last 72 hours. Radiograph: XR CHEST PORTABLE    Result Date: 10/23/2022  No acute abnormality. No interval change. Recommendation: Follow up as clinically indicated. Electronically Signed by Louie Narvaez MD at 23-Oct-2022 01:26:57 PM EST             CTA PULMONARY W CONTRAST    Result Date: 10/23/2022   1. No pulmonary embolism. 2. 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 Left-sided pacemaker. Tricuspid valve calcifications. 3. Centrilobular and paraseptal emphysema. Right lower lobe 4 mm nodule series 6, image 87. An intraparenchymal cyst is at the right lower lobe measuring 6 mm. 4. Left hilar calcifications, likely lymph nodes. Prominent soft tissue lymph node left hilum measuring 10 mm transverse diameter. 5. Thickening of bilateral adrenals indicating hyperplasia. Left is larger than right. Correlate with CT adrenals without and with IV contrast.  Recommendation:  Follow up as clinically indicated. All CT scans at this facility utilize dose modulation, iterative reconstruction, and/or weight based dosing when appropriate to reduce radiation dose to as low as reasonably achievable. Electronically Signed by Yvonne Bricsoe MD at 23-Oct-2022 02:12:01 PM EST                My radiograph interpretation/independent review of imaging: Reviewed    Problem list generated by [facility] Problems             Last Modified POA    * (Principal) COPD with acute exacerbation ([address] 10/23/2022 Yes    COPD exacerbation ([address] 10/23/2022 Yes    Essential hypertension 10/23/2022 Yes    Diabetes mellitus ([facility] [address] 10/23/2022 Yes    Overview Signed 6/2/2011  1:19 PM by Dunia Record     diet controlled         Hypercholesteremia 10/23/2022 Yes    Coronary artery disease involving native coronary artery of native heart without angina pectoris 10/23/2022 Yes    Smoker 10/23/2022 Yes    Hyponatremia 10/25/2022 Yes        Pulmonary Assessment/Plan:     COPD with acute exacerbation. Continue bronchodilators continue pulmonary toilet. On oral antibiotics and steroid taper. Doing well  Hyponatremia most likely nutritional.  Improved. Chronic hypoxic hypercapnic respiratory failure continue noninvasive ventilation during sleep and as needed while awake. Dyspnea due to the above improved. Supportive care. DVT prophylaxis on Eliquis. Discharge planning per primary. We will follow-up in the office. Arabella Jimenez MD, [facility], [facility]    The above note was generated using voice recognition software. Inadvertent typographical errors in transcription may have occurred.       Electronically signed by Arabella Jimenez MD on 10/25/22 at 6:42 PM

## 2022-10-26 VITALS
TEMPERATURE: 97.3 F | BODY MASS INDEX: 25.02 KG/M2 | RESPIRATION RATE: 20 BRPM | WEIGHT: 155 LBS | HEART RATE: 75 BPM | OXYGEN SATURATION: 100 % | SYSTOLIC BLOOD PRESSURE: 156 MMHG | DIASTOLIC BLOOD PRESSURE: 96 MMHG

## 2022-10-26 LAB
ANION GAP SERPL CALCULATED.3IONS-SCNC: 10 MMOL/L (ref 7–19)
BUN BLDV-MCNC: 26 MG/DL (ref 8–23)
CALCIUM SERPL-MCNC: 9.7 MG/DL (ref 8.8–10.2)
CHLORIDE BLD-SCNC: 92 MMOL/L (ref 98–111)
CO2: 33 MMOL/L (ref 22–29)
CREAT SERPL-MCNC: 0.9 MG/DL (ref 0.5–0.9)
GFR SERPL CREATININE-BSD FRML MDRD: >60 ML/MIN/{1.73_M2}
GLUCOSE BLD-MCNC: 176 MG/DL (ref 70–99)
GLUCOSE BLD-MCNC: 187 MG/DL (ref 70–99)
GLUCOSE BLD-MCNC: 199 MG/DL (ref 74–109)
PERFORMED ON: ABNORMAL
PERFORMED ON: ABNORMAL
POTASSIUM REFLEX MAGNESIUM: 4.4 MMOL/L (ref 3.5–5)
SODIUM BLD-SCNC: 135 MMOL/L (ref 136–145)

## 2022-10-26 PROCEDURE — 6370000000 HC RX 637 (ALT 250 FOR IP): Performed by: INTERNAL MEDICINE

## 2022-10-26 PROCEDURE — 82947 ASSAY GLUCOSE BLOOD QUANT: CPT

## 2022-10-26 PROCEDURE — 36415 COLL VENOUS BLD VENIPUNCTURE: CPT

## 2022-10-26 PROCEDURE — 80048 BASIC METABOLIC PNL TOTAL CA: CPT

## 2022-10-26 PROCEDURE — 2700000000 HC OXYGEN THERAPY PER DAY

## 2022-10-26 PROCEDURE — 2580000003 HC RX 258: Performed by: INTERNAL MEDICINE

## 2022-10-26 PROCEDURE — 6370000000 HC RX 637 (ALT 250 FOR IP): Performed by: FAMILY MEDICINE

## 2022-10-26 PROCEDURE — 94640 AIRWAY INHALATION TREATMENT: CPT

## 2022-10-26 RX ORDER — PREDNISONE 20 MG/1
20 TABLET ORAL DAILY
Qty: 7 TABLET | Refills: 0 | Status: SHIPPED | OUTPATIENT
Start: 2022-10-26 | End: 2022-11-02

## 2022-10-26 RX ADMIN — IPRATROPIUM BROMIDE AND ALBUTEROL SULFATE 3 ML: 2.5; .5 SOLUTION RESPIRATORY (INHALATION) at 10:14

## 2022-10-26 RX ADMIN — PREDNISONE 40 MG: 10 TABLET ORAL at 09:45

## 2022-10-26 RX ADMIN — SODIUM CHLORIDE, PRESERVATIVE FREE 10 ML: 5 INJECTION INTRAVENOUS at 09:46

## 2022-10-26 RX ADMIN — APIXABAN 5 MG: 5 TABLET, FILM COATED ORAL at 09:43

## 2022-10-26 RX ADMIN — CLOPIDOGREL BISULFATE 75 MG: 75 TABLET ORAL at 09:43

## 2022-10-26 RX ADMIN — RAMIPRIL 5 MG: 5 CAPSULE ORAL at 09:42

## 2022-10-26 RX ADMIN — METFORMIN HYDROCHLORIDE 500 MG: 500 TABLET ORAL at 09:42

## 2022-10-26 RX ADMIN — POTASSIUM CHLORIDE 20 MEQ: 20 TABLET, EXTENDED RELEASE ORAL at 09:42

## 2022-10-26 RX ADMIN — SOTALOL HYDROCHLORIDE 120 MG: 80 TABLET ORAL at 09:43

## 2022-10-26 RX ADMIN — FUROSEMIDE 40 MG: 40 TABLET ORAL at 09:42

## 2022-10-26 RX ADMIN — IPRATROPIUM BROMIDE AND ALBUTEROL SULFATE 3 ML: 2.5; .5 SOLUTION RESPIRATORY (INHALATION) at 06:17

## 2022-10-26 NOTE — CARE COORDINATION
Explained [facility] Worker program.  Provided literature. Patient consented to participate in CHW program.  Information faxed to [facility].

## 2022-10-26 NOTE — PROGRESS NOTES
CLINICAL PHARMACY NOTE: MEDS TO BEDS    Total # of Prescriptions Filled: 1   The following medications were delivered to the patient:  Prednisone 20 mg    Additional Documentation:    Handed script to patients friend/family member at pharmacy window.

## 2022-10-26 NOTE — DISCHARGE SUMMARY
Discharge Summary     Date:10/26/2022        Patient Name:Renata Mendez     YOB: 1950     Age:72 y.o. Admit Date:10/23/2022   Admission Condition:fair   Discharged Condition:stable  Discharge Date: 10/26/22     Discharge Diagnoses   Principal Problem:    COPD with acute exacerbation ([facility] [address]  Active Problems:    COPD exacerbation ([facility] [address]    Essential hypertension    Di[facility]    Hypercholesteremia    Coronary artery disease involving native coronary artery of native heart without angina pectoris    Smoker    Hyponatremia  Resolved Problems:    * No resolved hospital problems. [facility] Stay   Narrative of Hospital Course: 65-year-old with known advanced COPD admitted with increasing shortness of breath and wheezing. Determined to have COPD exacerbation. He was admitted and placed on empiric antibiotics and pulmonary toilet measures and steroids. Showed response to treatment. Did have issues with hyperglycemia and likely related to steroids. Was treated with as needed insulin on top of her home dose of metformin. Pulmonary consultation obtained and followed while stay. Each day her breathing improved and she was able to tolerate and maintain oxygen saturations in the 90s on her home dose of oxygen at 2 L nasal cannula. It is felt that this time she is maximized acute inpatient stay and plan is for discharge home with steroid taper and her home treatments. Consultants:   IP CONSULT TO PULMONOLOGY  IP CONSULT TO SOCIAL WORK  PALLIATIVE CARE EVAL    Time Spent on Discharge:  33 minutes were spent in patient examination, evaluation, counseling as well as medication reconciliation, prescriptions for required medications, discharge plan and follow up.       Surgeries/Procedures Performed:        Treatments:   anticoagulation: Eliquis, steroids: prednisone, and insulin: Humalog    Significant Diagnostic Studies:   Recent Labs:  CBC:   Lab Results   Component Value Date/Time    WBC 6.7 10/24/2022 04:29 AM    RBC 4.14 10/24/2022 04:29 AM    HGB 12.6 10/24/2022 04:29 AM    HCT 39.0 10/24/2022 04:29 AM    HCT 32.1 09/02/2011 02:11 AM    MCV 94.2 10/24/2022 04:29 AM    MCH 30.4 10/24/2022 04:29 AM    MCHC 32.3 10/24/2022 04:29 AM    RDW 13.7 10/24/2022 04:29 AM     10/24/2022 04:29 AM     09/02/2011 02:11 AM     CMP:    Lab Results   Component Value Date/Time    GLUCOSE 199 10/26/2022 04:17 AM     10/26/2022 04:17 AM     09/02/2011 02:11 AM    K 4.4 10/26/2022 04:17 AM    K 4.1 09/02/2011 02:11 AM    CL 92 10/26/2022 04:17 AM     09/02/2011 02:11 AM    CO2 33 10/26/2022 04:17 AM    BUN 26 10/26/2022 04:17 AM    CREATININE 0.9 10/26/2022 04:17 AM    CREATININE 0.6 09/02/2011 02:11 AM    ANIONGAP 10 10/26/2022 04:17 AM    ALKPHOS 71 10/23/2022 11:00 AM    ALKPHOS 57 09/02/2011 02:11 AM    ALT 12 10/23/2022 11:00 AM    AST 16 10/23/2022 11:00 AM    BILITOT 0.3 10/23/2022 11:00 AM    LABALBU 4.1 10/23/2022 11:00 AM    LABALBU 3.3 09/02/2011 02:11 AM    LABGLOM >60 10/26/2022 04:17 AM    GFRAA >59 09/02/2022 02:12 AM    PROT 7.0 10/23/2022 11:00 AM    PROT 7.5 01/18/2013 10:35 AM    CALCIUM 9.7 10/26/2022 04:17 AM       Radiology Last 7 Days:  XR CHEST PORTABLE    Result Date: 10/23/2022  No acute abnormality. No interval change. Recommendation: Follow up as clinically indicated. Electronically Signed by Ferdinand Saul MD at 23-Oct-2022 01:26:57 PM EST             CTA PULMONARY W CONTRAST    Result Date: 10/23/2022   1. No pulmonary embolism. 2. 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 Left-sided pacemaker. Tricuspid valve calcifications. 3. Centrilobular and paraseptal emphysema. Right lower lobe 4 mm nodule series 6, image 87. An intraparenchymal cyst is at the right lower lobe measuring 6 mm. 4. Left hilar calcifications, likely lymph nodes.   Prominent soft tissue lymph node left hilum measuring 10 mm INCREASE*               Where to Get Your Medications        These medications were sent to [facility], [address]  [address] , [address]      Phone: [phone]   predniSONE 20 MG tablet         Electronically signed by Ny Taylor MD on 10/26/22 at 8:23 AM CDT

## 2022-10-26 NOTE — CARE COORDINATION
10/26/22 1441   IMM Letter   IMM Letter given to Patient/Family/Significant other/Guardian/POA/by: Given to and explained to patient by Beulah Sandoval RN CM. Patient verbalized understanding.    IMM Letter date given: 10/26/22   IMM Letter time given: 0815

## 2022-10-26 NOTE — CARE COORDINATION
[facility] to bring patient portable oxygen tank for transport home.   They will arrive with tank by 12:45 pm.  [facility]   P [phone]  F [phone]  Electronically signed by Tal Steiner RN on 10/26/2022 at 12:19 PM

## 2022-10-26 NOTE — PLAN OF CARE
Problem: Safety - Adult  Goal: Free from fall injury  10/26/2022 0957 by Katy Ng RN  Outcome: Progressing  10/25/2022 2348 by Evan Sullivan RN  Outcome: Progressing     Problem: Discharge Planning  Goal: Discharge to home or other facility with appropriate resources  10/26/2022 0957 by Katy Ng RN  Outcome: Progressing  10/25/2022 2348 by Evan Sullivan RN  Outcome: Progressing     Problem: Chronic Conditions and Co-morbidities  Goal: Patient's chronic conditions and co-morbidity symptoms are monitored and maintained or improved  10/26/2022 0957 by Katy Ng RN  Outcome: Progressing  10/25/2022 2348 by Evan Sullivan RN  Outcome: Progressing

## 2022-10-27 LAB
EKG P AXIS: NORMAL DEGREES
EKG P-R INTERVAL: NORMAL MS
EKG Q-T INTERVAL: 400 MS
EKG QRS DURATION: 92 MS
EKG QTC CALCULATION (BAZETT): 431 MS
EKG T AXIS: 93 DEGREES

## 2022-10-31 DIAGNOSIS — I49.5 SA NODE DYSFUNCTION (HCC): ICD-10-CM

## 2022-10-31 DIAGNOSIS — Z95.0 CARDIAC PACEMAKER: Primary | ICD-10-CM

## 2022-11-02 NOTE — PROGRESS NOTES
Physician Progress Note      Katie Karimi  [facility] #:                  [id number]  :                       1950  ADMIT DATE:       10/23/2022 10:45 AM  [address] DATE:        10/26/2022 1:28 PM  RESPONDING  PROVIDER #:        Lilian Nino MD          QUERY TEXT:    Patient admitted with COPD exacerbation. Noted documentation of acute   respiratory failure in  H & P and chronic respiratory failure by pulmonology. In order to support the diagnosis of acute respiratory failure, please include   additional clinical indicators in your documentation. Or please document if   the diagnosis of acute respiratory failure has been ruled out after further   study. The medical record reflects the following:  Risk Factors: COPD, DM, CAD, Smoker,  Clinical Indicators: Shortness of breath and chest tightness. Wears home   oxygen. pc02 58, p02 66. Pulmonology has chronic hypoxic, hypercapnic   respiratory failure. Treatment: Oxygen  trilogy 2 liters, labs,    Thank you  Samia George RN, BSN, [facility]  [phone]    Acute Respiratory Failure Clinical Indicators per [employer] Training Guide and   Quick Reference Guide:  pO2 < 60 mmHg or SpO2 (pulse oximetry) < 91% breathing room air  pCO2 > 50 and pH < 7.35  P/F ratio (pO2 / FIO2) < 300  pO2 decrease or pCO2 increase by 10 mmHg from baseline (if known)  Supplemental oxygen of 40% or more  Presence of respiratory distress, tachypnea, dyspnea, shortness of breath,   wheezing  Unable to speak in complete sentences  Use of accessory muscles to breathe  Extreme anxiety and feeling of impending doom  Tripod position  Confusion/altered mental status/obtunded  Options provided:  -- Acute Respiratory Failure as evidenced by, Please document evidence.   -- Acute Respiratory Failure ruled out after study  -- Acute Respiratory Failure ruled out after study and Chronic Respiratory   Failure confirmed  -- Chronic respiratory failure  -- Other - I will add my own diagnosis  -- Disagree - Not applicable / Not valid  -- Disagree - Clinically unable to determine / Unknown  -- Refer to Clinical Documentation Reviewer    PROVIDER RESPONSE TEXT:    This patient has chronic respiratory failure.     Query created by: Sathish La on 11/1/2022 1:58 PM      Electronically signed by:  Basilia Key MD 11/2/2022 9:24 AM

## 2022-11-28 DIAGNOSIS — Z95.0 CARDIAC PACEMAKER: Primary | ICD-10-CM

## 2022-11-28 DIAGNOSIS — I49.5 SA NODE DYSFUNCTION (HCC): ICD-10-CM

## 2022-12-01 ENCOUNTER — OFFICE VISIT (OUTPATIENT)
Dept: CARDIOLOGY CLINIC | Age: 72
End: 2022-12-01
Payer: MEDICARE

## 2022-12-01 VITALS
WEIGHT: 153 LBS | HEIGHT: 66 IN | DIASTOLIC BLOOD PRESSURE: 60 MMHG | OXYGEN SATURATION: 100 % | HEART RATE: 80 BPM | SYSTOLIC BLOOD PRESSURE: 110 MMHG | BODY MASS INDEX: 24.59 KG/M2

## 2022-12-01 DIAGNOSIS — I10 ESSENTIAL HYPERTENSION: ICD-10-CM

## 2022-12-01 DIAGNOSIS — E78.2 MIXED HYPERLIPIDEMIA: ICD-10-CM

## 2022-12-01 DIAGNOSIS — Z95.0 PACEMAKER: Primary | ICD-10-CM

## 2022-12-01 DIAGNOSIS — I25.10 CORONARY ARTERY DISEASE INVOLVING NATIVE CORONARY ARTERY OF NATIVE HEART WITHOUT ANGINA PECTORIS: Primary | ICD-10-CM

## 2022-12-01 DIAGNOSIS — Z95.1 S/P CABG X 4: ICD-10-CM

## 2022-12-01 DIAGNOSIS — Z95.5 HISTORY OF CORONARY ARTERY STENT PLACEMENT: ICD-10-CM

## 2022-12-01 DIAGNOSIS — I48.0 PAF (PAROXYSMAL ATRIAL FIBRILLATION) (HCC): ICD-10-CM

## 2022-12-01 DIAGNOSIS — I49.5 SA NODE DYSFUNCTION (HCC): ICD-10-CM

## 2022-12-01 DIAGNOSIS — Z95.0 PACEMAKER: ICD-10-CM

## 2022-12-01 PROCEDURE — 3074F SYST BP LT 130 MM HG: CPT | Performed by: NURSE PRACTITIONER

## 2022-12-01 PROCEDURE — 1123F ACP DISCUSS/DSCN MKR DOCD: CPT | Performed by: NURSE PRACTITIONER

## 2022-12-01 PROCEDURE — 3078F DIAST BP <80 MM HG: CPT | Performed by: NURSE PRACTITIONER

## 2022-12-01 PROCEDURE — 3017F COLORECTAL CA SCREEN DOC REV: CPT | Performed by: NURSE PRACTITIONER

## 2022-12-01 PROCEDURE — 93294 REM INTERROG EVL PM/LDLS PM: CPT | Performed by: NURSE PRACTITIONER

## 2022-12-01 PROCEDURE — 99214 OFFICE O/P EST MOD 30 MIN: CPT | Performed by: NURSE PRACTITIONER

## 2022-12-01 PROCEDURE — G8420 CALC BMI NORM PARAMETERS: HCPCS | Performed by: NURSE PRACTITIONER

## 2022-12-01 PROCEDURE — 93296 REM INTERROG EVL PM/IDS: CPT | Performed by: NURSE PRACTITIONER

## 2022-12-01 PROCEDURE — 4004F PT TOBACCO SCREEN RCVD TLK: CPT | Performed by: NURSE PRACTITIONER

## 2022-12-01 PROCEDURE — 1090F PRES/ABSN URINE INCON ASSESS: CPT | Performed by: NURSE PRACTITIONER

## 2022-12-01 PROCEDURE — G8427 DOCREV CUR MEDS BY ELIG CLIN: HCPCS | Performed by: NURSE PRACTITIONER

## 2022-12-01 PROCEDURE — G8484 FLU IMMUNIZE NO ADMIN: HCPCS | Performed by: NURSE PRACTITIONER

## 2022-12-01 PROCEDURE — G8400 PT W/DXA NO RESULTS DOC: HCPCS | Performed by: NURSE PRACTITIONER

## 2022-12-01 RX ORDER — CLOPIDOGREL BISULFATE 75 MG/1
75 TABLET ORAL DAILY
Qty: 90 TABLET | Refills: 3 | Status: SHIPPED | OUTPATIENT
Start: 2022-12-01

## 2022-12-01 RX ORDER — FUROSEMIDE 40 MG/1
40 TABLET ORAL DAILY
Qty: 90 TABLET | Refills: 3 | Status: SHIPPED | OUTPATIENT
Start: 2022-12-01

## 2022-12-01 RX ORDER — ROSUVASTATIN CALCIUM 10 MG/1
TABLET, COATED ORAL
Qty: 90 TABLET | Refills: 3 | Status: SHIPPED | OUTPATIENT
Start: 2022-12-01

## 2022-12-01 RX ORDER — SOTALOL HYDROCHLORIDE 120 MG/1
TABLET ORAL
Qty: 180 TABLET | Refills: 3 | Status: SHIPPED | OUTPATIENT
Start: 2022-12-01

## 2022-12-01 RX ORDER — RAMIPRIL 5 MG/1
CAPSULE ORAL
Qty: 180 CAPSULE | Refills: 3 | Status: SHIPPED | OUTPATIENT
Start: 2022-12-01

## 2022-12-01 NOTE — PATIENT INSTRUCTIONS
New instructions for today:  Eliquis can increase your risk of bleeding. If you notice blood in urine or stool, bleeding gums, excessive bruising or cough productive of bloody sputum, notify the office. Information on this blood thinner has been included in your after visit summary. Patient Instructions:  Continue current medications as prescribed. Always keep a current medication list. Bring your medications to every office visit. Continue to follow up with primary care provider for non cardiac medical problems. Call the office with any problems, questions or concerns at [phone]. If you have been asked to keep a blood pressure log, do so for 2 weeks. Call the office to report readings to the triage nurse at [phone]. Follow up with cardiologist as scheduled. The following educational material has been included in this after visit summary for your review: Life simple 7. Heart health. Life simple 7  1) Manage blood pressure - high blood pressure is a major risk factor for heart disease and stroke. Keeping blood pressure in health range reduces strain on your heart, arteries and kidneys. Blood pressure goal is less than 130/80. 2) Control cholesterol - contributes to plaque, which can clog arteries and lead to heart disease and stroke. When you control your cholesterol you are giving your arteries their best chance to remain clear. It is recommended that you get cholesterol lab work done once a year. 3) Reduce blood sugar - most of the food we eat is turning into glucose or blood sugar that our body uses for energy. Over time, high levels of blood sugar can damage your heart, kidneys, eyes and nerves. 4) Get active - living an active life is one of the most rewarding gifts you can give yourself and those you love. Simply put, daily physical activity increases your length and quality of life. Strive to exercise 15 minutes most days of the week.   5)  Eat better - A healthy diet is one of your best weapons for fighting cardiovascular disease. When you eat a heart healthy diet, you improve your chances for feeling good and staying healthy for life. 6)  Lose weight - when you shed extra fat an unnecessary pounds, you reduce the burden on your hear, lungs, blood vessels and skeleton. You give yourself the gift of active living, you lower your blood pressure and help yourself feel better. 7) Stop smoking - cigarette smokers have a higher risk of developing cardiovascular disease. If  You smoke, quitting is the best thing you can do for your health. Check American Heart Association on line for more information on Life's Simple 7 and tips for healthy living. A Healthy Heart: Care Instructions  Your Care Instructions     Coronary artery disease, also called heart disease, occurs when a substance called plaque builds up in the vessels that supply oxygen-rich blood to your heart muscle. This can narrow the blood vessels and reduce blood flow. A heart attack happens when blood flow is completely blocked. A high-fat diet, smoking, and other factors increase the risk of heart disease. Your doctor has found that you have a chance of having heart disease. You can do lots of things to keep your heart healthy. It may not be easy, but you can change your diet, exercise more, and quit smoking. These steps really work to lower your chance of heart disease. Follow-up care is a key part of your treatment and safety. Be sure to make and go to all appointments, and call your doctor if you are having problems. It's also a good idea to know your test results and keep a list of the medicines you take. How can you care for yourself at home? Diet  Use less salt when you cook and eat. This helps lower your blood pressure. Taste food before salting. Add only a little salt when you think you need it. With time, your taste buds will adjust to less salt.   Eat fewer snack items, fast foods, canned soups, and other high-salt, high-fat, processed foods. Read food labels and try to avoid saturated and trans fats. They increase your risk of heart disease by raising cholesterol levels. Limit the amount of solid fat-butter, margarine, and shortening-you eat. Use olive, peanut, or canola oil when you cook. Bake, broil, and steam foods instead of frying them. Eat a variety of fruit and vegetables every day. Dark green, deep orange, red, or yellow fruits and vegetables are especially good for you. Examples include spinach, carrots, peaches, and berries. Foods high in fiber can reduce your cholesterol and provide important vitamins and minerals. High-fiber foods include whole-grain cereals and breads, oatmeal, beans, brown rice, citrus fruits, and apples. Eat lean proteins. Heart-healthy proteins include seafood, lean meats and poultry, eggs, beans, peas, nuts, seeds, and soy products. Limit drinks and foods with added sugar. These include candy, desserts, and soda pop. Lifestyle changes  If your doctor recommends it, get more exercise. Walking is a good choice. Bit by bit, increase the amount you walk every day. Try for at least 30 minutes on most days of the week. You also may want to swim, bike, or do other activities. Do not smoke. If you need help quitting, talk to your doctor about stop-smoking programs and medicines. These can increase your chances of quitting for good. Quitting smoking may be the most important step you can take to protect your heart. It is never too late to quit. Limit alcohol to 2 drinks a day for men and 1 drink a day for women. Too much alcohol can cause health problems. Manage other health problems such as diabetes, high blood pressure, and high cholesterol. If you think you may have a problem with alcohol or drug use, talk to your doctor. Medicines  Take your medicines exactly as prescribed. Call your doctor if you think you are having a problem with your medicine.   If your doctor recommends aspirin, take the amount directed each day. Make sure you take aspirin and not another kind of pain reliever, such as acetaminophen (Tylenol). When should you call for help? [id number] if you have symptoms of a heart attack. These may include:  Chest pain or pressure, or a strange feeling in the chest.  Sweating. Shortness of breath. Pain, pressure, or a strange feeling in the back, neck, jaw, or upper belly or in one or both shoulders or arms. Lightheadedness or sudden weakness. A fast or irregular heartbeat. After you call 911, the  may tell you to chew 1 adult-strength or 2 to 4 low-dose aspirin. Wait for an ambulance. Do not try to drive yourself. Watch closely for changes in your health, and be sure to contact your doctor if you have any problems. Where can you learn more? Go to https://[employer]. org and sign in to your [employer] account. Enter F727 in the [employer] box to learn more about \"A Healthy Heart: Care Instructions. \"     If you do not have an account, please click on the \"Sign Up Now\" link. Current as of: December 16, 2019               Content Version: 12.5  © 0463-2194 Healthwise, Incorporated. Care instructions adapted under license by [facility] ([facility]). If you have questions about a medical condition or this instruction, always ask your healthcare professional. [place] [address] any warranty or liability for your use of this information.

## 2023-01-19 DIAGNOSIS — E78.2 MIXED HYPERLIPIDEMIA: ICD-10-CM

## 2023-01-19 RX ORDER — ROSUVASTATIN CALCIUM 10 MG/1
TABLET, COATED ORAL
Qty: 90 TABLET | Refills: 1 | OUTPATIENT
Start: 2023-01-19

## 2023-03-02 DIAGNOSIS — Z95.0 PACEMAKER: Primary | ICD-10-CM

## 2023-03-02 DIAGNOSIS — I49.5 SA NODE DYSFUNCTION (HCC): ICD-10-CM

## 2023-03-02 PROCEDURE — 93296 REM INTERROG EVL PM/IDS: CPT | Performed by: NURSE PRACTITIONER

## 2023-03-02 PROCEDURE — 93294 REM INTERROG EVL PM/LDLS PM: CPT | Performed by: NURSE PRACTITIONER

## 2023-06-01 ENCOUNTER — OFFICE VISIT (OUTPATIENT)
Dept: CARDIOLOGY CLINIC | Age: 73
End: 2023-06-01
Payer: MEDICARE

## 2023-06-01 VITALS
HEIGHT: 66 IN | WEIGHT: 162 LBS | BODY MASS INDEX: 26.03 KG/M2 | HEART RATE: 73 BPM | DIASTOLIC BLOOD PRESSURE: 64 MMHG | SYSTOLIC BLOOD PRESSURE: 134 MMHG | OXYGEN SATURATION: 95 %

## 2023-06-01 DIAGNOSIS — Z79.01 CHRONIC ANTICOAGULATION: ICD-10-CM

## 2023-06-01 DIAGNOSIS — I49.5 SA NODE DYSFUNCTION (HCC): ICD-10-CM

## 2023-06-01 DIAGNOSIS — Z95.0 PACEMAKER: ICD-10-CM

## 2023-06-01 DIAGNOSIS — Z95.5 HISTORY OF CORONARY ARTERY STENT PLACEMENT: ICD-10-CM

## 2023-06-01 DIAGNOSIS — I48.0 PAF (PAROXYSMAL ATRIAL FIBRILLATION) (HCC): ICD-10-CM

## 2023-06-01 DIAGNOSIS — I10 ESSENTIAL HYPERTENSION: ICD-10-CM

## 2023-06-01 DIAGNOSIS — I25.10 CORONARY ARTERY DISEASE INVOLVING NATIVE CORONARY ARTERY OF NATIVE HEART WITHOUT ANGINA PECTORIS: Primary | ICD-10-CM

## 2023-06-01 DIAGNOSIS — Z95.1 S/P CABG X 4: ICD-10-CM

## 2023-06-01 DIAGNOSIS — E78.2 MIXED HYPERLIPIDEMIA: ICD-10-CM

## 2023-06-01 PROCEDURE — 93280 PM DEVICE PROGR EVAL DUAL: CPT | Performed by: NURSE PRACTITIONER

## 2023-06-01 PROCEDURE — 4004F PT TOBACCO SCREEN RCVD TLK: CPT | Performed by: NURSE PRACTITIONER

## 2023-06-01 PROCEDURE — 3078F DIAST BP <80 MM HG: CPT | Performed by: NURSE PRACTITIONER

## 2023-06-01 PROCEDURE — G8419 CALC BMI OUT NRM PARAM NOF/U: HCPCS | Performed by: NURSE PRACTITIONER

## 2023-06-01 PROCEDURE — 3017F COLORECTAL CA SCREEN DOC REV: CPT | Performed by: NURSE PRACTITIONER

## 2023-06-01 PROCEDURE — G8427 DOCREV CUR MEDS BY ELIG CLIN: HCPCS | Performed by: NURSE PRACTITIONER

## 2023-06-01 PROCEDURE — 1090F PRES/ABSN URINE INCON ASSESS: CPT | Performed by: NURSE PRACTITIONER

## 2023-06-01 PROCEDURE — 3075F SYST BP GE 130 - 139MM HG: CPT | Performed by: NURSE PRACTITIONER

## 2023-06-01 PROCEDURE — 99214 OFFICE O/P EST MOD 30 MIN: CPT | Performed by: NURSE PRACTITIONER

## 2023-06-01 PROCEDURE — 1123F ACP DISCUSS/DSCN MKR DOCD: CPT | Performed by: NURSE PRACTITIONER

## 2023-06-01 PROCEDURE — G8400 PT W/DXA NO RESULTS DOC: HCPCS | Performed by: NURSE PRACTITIONER

## 2023-06-01 NOTE — PROGRESS NOTES
[phone]. BP goal 130/80 or less. Call the office with any problems, questions or concerns at [phone]. Cardiology follow up as scheduled in [address] appointments. Educational included in patient instructions. Heart health.       Fozia Krause, APRN

## 2023-07-11 DIAGNOSIS — I48.0 PAF (PAROXYSMAL ATRIAL FIBRILLATION) (HCC): ICD-10-CM

## 2023-07-11 DIAGNOSIS — I49.5 SA NODE DYSFUNCTION (HCC): ICD-10-CM

## 2023-07-11 DIAGNOSIS — Z95.0 PACEMAKER: Primary | ICD-10-CM

## 2023-09-05 DIAGNOSIS — I49.5 SA NODE DYSFUNCTION (HCC): ICD-10-CM

## 2023-09-05 DIAGNOSIS — I48.0 PAF (PAROXYSMAL ATRIAL FIBRILLATION) (HCC): ICD-10-CM

## 2023-09-05 DIAGNOSIS — Z95.0 PACEMAKER: Primary | ICD-10-CM

## 2023-09-05 PROCEDURE — 93296 REM INTERROG EVL PM/IDS: CPT | Performed by: NURSE PRACTITIONER

## 2023-09-05 PROCEDURE — 93294 REM INTERROG EVL PM/LDLS PM: CPT | Performed by: NURSE PRACTITIONER

## 2023-10-20 ENCOUNTER — APPOINTMENT (OUTPATIENT)
Dept: CT IMAGING | Age: 73
DRG: 641 | End: 2023-10-20
Payer: MEDICARE

## 2023-10-20 ENCOUNTER — HOSPITAL ENCOUNTER (INPATIENT)
Age: 73
LOS: 5 days | Discharge: HOME | DRG: 641 | End: 2023-10-26
Attending: PEDIATRICS | Admitting: FAMILY MEDICINE
Payer: MEDICARE
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
## 2023-10-20 DIAGNOSIS — R41.82 ALTERED MENTAL STATUS, UNSPECIFIED ALTERED MENTAL STATUS TYPE: Primary | ICD-10-CM

## 2023-10-20 DIAGNOSIS — S00.83XA FACIAL BRUISING, INITIAL ENCOUNTER: ICD-10-CM

## 2023-10-20 DIAGNOSIS — E87.1 HYPONATREMIA: ICD-10-CM

## 2023-10-20 DIAGNOSIS — S00.83XA CONTUSION OF FACE, INITIAL ENCOUNTER: ICD-10-CM

## 2023-10-20 LAB
ALBUMIN SERPL-MCNC: 4.3 G/DL (ref 3.5–5.2)
ALP SERPL-CCNC: 105 U/L (ref 35–104)
ALT SERPL-CCNC: 11 U/L (ref 5–33)
ANION GAP SERPL CALCULATED.3IONS-SCNC: 10 MMOL/L (ref 7–19)
APTT PPP: 31.7 SEC (ref 26–36.2)
AST SERPL-CCNC: 21 U/L (ref 5–32)
BASOPHILS # BLD: 0 K/UL (ref 0–0.2)
BASOPHILS NFR BLD: 0.2 % (ref 0–1)
BILIRUB SERPL-MCNC: 0.4 MG/DL (ref 0.2–1.2)
BILIRUB UR QL STRIP: NEGATIVE
BUN SERPL-MCNC: 17 MG/DL (ref 8–23)
CALCIUM SERPL-MCNC: 8.9 MG/DL (ref 8.8–10.2)
CHLORIDE SERPL-SCNC: 75 MMOL/L (ref 98–111)
CLARITY UR: CLEAR
CO2 SERPL-SCNC: 29 MMOL/L (ref 22–29)
COLOR UR: YELLOW
CREAT SERPL-MCNC: 0.9 MG/DL (ref 0.5–0.9)
EOSINOPHIL # BLD: 0 K/UL (ref 0–0.6)
EOSINOPHIL NFR BLD: 0.2 % (ref 0–5)
ERYTHROCYTE [DISTWIDTH] IN BLOOD BY AUTOMATED COUNT: 15.2 % (ref 11.5–14.5)
GLUCOSE BLD-MCNC: 144 MG/DL (ref 70–99)
GLUCOSE BLD-MCNC: 145 MG/DL (ref 70–99)
GLUCOSE SERPL-MCNC: 264 MG/DL (ref 74–109)
GLUCOSE UR STRIP.AUTO-MCNC: NEGATIVE MG/DL
HBA1C MFR BLD: 9.1 % (ref 4–6)
HCT VFR BLD AUTO: 34.5 % (ref 37–47)
HGB BLD-MCNC: 11.9 G/DL (ref 12–16)
HGB UR STRIP.AUTO-MCNC: NEGATIVE MG/L
IMM GRANULOCYTES # BLD: 0.1 K/UL
INR PPP: 1.19 (ref 0.88–1.18)
KETONES UR STRIP.AUTO-MCNC: NEGATIVE MG/DL
LEUKOCYTE ESTERASE UR QL STRIP.AUTO: NEGATIVE
LYMPHOCYTES # BLD: 1.1 K/UL (ref 1.1–4.5)
LYMPHOCYTES NFR BLD: 10.5 % (ref 20–40)
MCH RBC QN AUTO: 30.7 PG (ref 27–31)
MCHC RBC AUTO-ENTMCNC: 34.5 G/DL (ref 33–37)
MCV RBC AUTO: 88.9 FL (ref 81–99)
MONOCYTES # BLD: 1.1 K/UL (ref 0–0.9)
MONOCYTES NFR BLD: 10.5 % (ref 0–10)
NEUTROPHILS # BLD: 8.3 K/UL (ref 1.5–7.5)
NEUTS SEG NFR BLD: 78 % (ref 50–65)
NITRITE UR QL STRIP.AUTO: NEGATIVE
OSMOLALITY URINE: 146 MOSM/KG (ref 250–1200)
PERFORMED ON: ABNORMAL
PERFORMED ON: ABNORMAL
PH UR STRIP.AUTO: 7 [PH] (ref 5–8)
PLATELET # BLD AUTO: 260 K/UL (ref 130–400)
PMV BLD AUTO: 8.9 FL (ref 9.4–12.3)
POTASSIUM SERPL-SCNC: 5.3 MMOL/L (ref 3.5–5)
PROT SERPL-MCNC: 6.9 G/DL (ref 6.6–8.7)
PROT UR STRIP.AUTO-MCNC: NEGATIVE MG/DL
PROTHROMBIN TIME: 14.8 SEC (ref 12–14.6)
RBC # BLD AUTO: 3.88 M/UL (ref 4.2–5.4)
SODIUM SERPL-SCNC: 114 MMOL/L (ref 136–145)
SODIUM SERPL-SCNC: 122 MMOL/L (ref 136–145)
SODIUM UR-SCNC: 45 MMOL/L
SP GR UR STRIP.AUTO: 1 (ref 1–1.03)
UROBILINOGEN UR STRIP.AUTO-MCNC: 0.2 E.U./DL
WBC # BLD AUTO: 10.6 K/UL (ref 4.8–10.8)

## 2023-10-20 PROCEDURE — 84300 ASSAY OF URINE SODIUM: CPT

## 2023-10-20 PROCEDURE — G0378 HOSPITAL OBSERVATION PER HR: HCPCS

## 2023-10-20 PROCEDURE — 84295 ASSAY OF SERUM SODIUM: CPT

## 2023-10-20 PROCEDURE — 2580000003 HC RX 258: Performed by: STUDENT IN AN ORGANIZED HEALTH CARE EDUCATION/TRAINING PROGRAM

## 2023-10-20 PROCEDURE — 82962 GLUCOSE BLOOD TEST: CPT

## 2023-10-20 PROCEDURE — 2000000000 HC ICU R&B

## 2023-10-20 PROCEDURE — 96372 THER/PROPH/DIAG INJ SC/IM: CPT

## 2023-10-20 PROCEDURE — 85025 COMPLETE CBC W/AUTO DIFF WBC: CPT

## 2023-10-20 PROCEDURE — 72125 CT NECK SPINE W/O DYE: CPT

## 2023-10-20 PROCEDURE — 36415 COLL VENOUS BLD VENIPUNCTURE: CPT

## 2023-10-20 PROCEDURE — 99285 EMERGENCY DEPT VISIT HI MDM: CPT

## 2023-10-20 PROCEDURE — 6370000000 HC RX 637 (ALT 250 FOR IP): Performed by: STUDENT IN AN ORGANIZED HEALTH CARE EDUCATION/TRAINING PROGRAM

## 2023-10-20 PROCEDURE — 80053 COMPREHEN METABOLIC PANEL: CPT

## 2023-10-20 PROCEDURE — 85730 THROMBOPLASTIN TIME PARTIAL: CPT

## 2023-10-20 PROCEDURE — 94640 AIRWAY INHALATION TREATMENT: CPT

## 2023-10-20 PROCEDURE — 70486 CT MAXILLOFACIAL W/O DYE: CPT

## 2023-10-20 PROCEDURE — 85610 PROTHROMBIN TIME: CPT

## 2023-10-20 PROCEDURE — 70450 CT HEAD/BRAIN W/O DYE: CPT

## 2023-10-20 PROCEDURE — 83935 ASSAY OF URINE OSMOLALITY: CPT

## 2023-10-20 PROCEDURE — 6360000002 HC RX W HCPCS: Performed by: STUDENT IN AN ORGANIZED HEALTH CARE EDUCATION/TRAINING PROGRAM

## 2023-10-20 PROCEDURE — 81003 URINALYSIS AUTO W/O SCOPE: CPT

## 2023-10-20 PROCEDURE — 2700000000 HC OXYGEN THERAPY PER DAY

## 2023-10-20 PROCEDURE — 94660 CPAP INITIATION&MGMT: CPT

## 2023-10-20 PROCEDURE — 83036 HEMOGLOBIN GLYCOSYLATED A1C: CPT

## 2023-10-20 RX ORDER — SODIUM CHLORIDE 0.9 % (FLUSH) 0.9 %
5-40 SYRINGE (ML) INJECTION PRN
Status: DISCONTINUED | OUTPATIENT
Start: 2023-10-20 | End: 2023-10-26 | Stop reason: HOSPADM

## 2023-10-20 RX ORDER — ACETAMINOPHEN 325 MG/1
650 TABLET ORAL EVERY 4 HOURS PRN
Status: DISCONTINUED | OUTPATIENT
Start: 2023-10-20 | End: 2023-10-26 | Stop reason: HOSPADM

## 2023-10-20 RX ORDER — LISINOPRIL 5 MG/1
5 TABLET ORAL DAILY
Status: DISCONTINUED | OUTPATIENT
Start: 2023-10-20 | End: 2023-10-24

## 2023-10-20 RX ORDER — DEXTROSE MONOHYDRATE 50 MG/ML
INJECTION, SOLUTION INTRAVENOUS CONTINUOUS
Status: ACTIVE | OUTPATIENT
Start: 2023-10-20 | End: 2023-10-21

## 2023-10-20 RX ORDER — SOTALOL HYDROCHLORIDE 120 MG/1
120 TABLET ORAL 2 TIMES DAILY
Status: DISCONTINUED | OUTPATIENT
Start: 2023-10-20 | End: 2023-10-26 | Stop reason: HOSPADM

## 2023-10-20 RX ORDER — IPRATROPIUM BROMIDE AND ALBUTEROL SULFATE 2.5; .5 MG/3ML; MG/3ML
1 SOLUTION RESPIRATORY (INHALATION) EVERY 4 HOURS PRN
Status: DISCONTINUED | OUTPATIENT
Start: 2023-10-20 | End: 2023-10-21

## 2023-10-20 RX ORDER — FAMOTIDINE 20 MG/1
20 TABLET, FILM COATED ORAL 2 TIMES DAILY PRN
Status: DISCONTINUED | OUTPATIENT
Start: 2023-10-20 | End: 2023-10-24 | Stop reason: DRUGHIGH

## 2023-10-20 RX ORDER — ONDANSETRON 4 MG/1
4 TABLET, ORALLY DISINTEGRATING ORAL EVERY 8 HOURS PRN
Status: DISCONTINUED | OUTPATIENT
Start: 2023-10-20 | End: 2023-10-26 | Stop reason: HOSPADM

## 2023-10-20 RX ORDER — DEXTROSE MONOHYDRATE 100 MG/ML
INJECTION, SOLUTION INTRAVENOUS CONTINUOUS PRN
Status: DISCONTINUED | OUTPATIENT
Start: 2023-10-20 | End: 2023-10-26 | Stop reason: HOSPADM

## 2023-10-20 RX ORDER — SODIUM CHLORIDE 0.9 % (FLUSH) 0.9 %
5-40 SYRINGE (ML) INJECTION EVERY 12 HOURS SCHEDULED
Status: DISCONTINUED | OUTPATIENT
Start: 2023-10-20 | End: 2023-10-26 | Stop reason: HOSPADM

## 2023-10-20 RX ORDER — DESMOPRESSIN ACETATE 4 UG/ML
1 INJECTION, SOLUTION INTRAVENOUS; SUBCUTANEOUS ONCE
Status: COMPLETED | OUTPATIENT
Start: 2023-10-20 | End: 2023-10-20

## 2023-10-20 RX ORDER — SODIUM CHLORIDE 9 MG/ML
INJECTION, SOLUTION INTRAVENOUS CONTINUOUS
Status: DISCONTINUED | OUTPATIENT
Start: 2023-10-20 | End: 2023-10-21

## 2023-10-20 RX ORDER — INSULIN LISPRO 100 [IU]/ML
0-4 INJECTION, SOLUTION INTRAVENOUS; SUBCUTANEOUS NIGHTLY
Status: DISCONTINUED | OUTPATIENT
Start: 2023-10-20 | End: 2023-10-26 | Stop reason: HOSPADM

## 2023-10-20 RX ORDER — ONDANSETRON 2 MG/ML
4 INJECTION INTRAMUSCULAR; INTRAVENOUS EVERY 6 HOURS PRN
Status: DISCONTINUED | OUTPATIENT
Start: 2023-10-20 | End: 2023-10-26 | Stop reason: HOSPADM

## 2023-10-20 RX ORDER — SODIUM CHLORIDE 9 MG/ML
INJECTION, SOLUTION INTRAVENOUS PRN
Status: DISCONTINUED | OUTPATIENT
Start: 2023-10-20 | End: 2023-10-26 | Stop reason: HOSPADM

## 2023-10-20 RX ORDER — ROSUVASTATIN CALCIUM 10 MG/1
5 TABLET, COATED ORAL NIGHTLY
Status: DISCONTINUED | OUTPATIENT
Start: 2023-10-20 | End: 2023-10-26 | Stop reason: HOSPADM

## 2023-10-20 RX ORDER — INSULIN LISPRO 100 [IU]/ML
0-4 INJECTION, SOLUTION INTRAVENOUS; SUBCUTANEOUS
Status: DISCONTINUED | OUTPATIENT
Start: 2023-10-20 | End: 2023-10-26 | Stop reason: HOSPADM

## 2023-10-20 RX ORDER — CLOPIDOGREL BISULFATE 75 MG/1
75 TABLET ORAL DAILY
Status: DISCONTINUED | OUTPATIENT
Start: 2023-10-20 | End: 2023-10-26 | Stop reason: HOSPADM

## 2023-10-20 RX ORDER — GABAPENTIN 100 MG/1
100 CAPSULE ORAL 3 TIMES DAILY PRN
Status: DISCONTINUED | OUTPATIENT
Start: 2023-10-20 | End: 2023-10-26 | Stop reason: HOSPADM

## 2023-10-20 RX ADMIN — SODIUM CHLORIDE: 9 INJECTION, SOLUTION INTRAVENOUS at 18:51

## 2023-10-20 RX ADMIN — IPRATROPIUM BROMIDE AND ALBUTEROL SULFATE 1 DOSE: 2.5; .5 SOLUTION RESPIRATORY (INHALATION) at 21:19

## 2023-10-20 RX ADMIN — METFORMIN HYDROCHLORIDE 500 MG: 500 TABLET, FILM COATED ORAL at 21:58

## 2023-10-20 RX ADMIN — SODIUM CHLORIDE, PRESERVATIVE FREE 10 ML: 5 INJECTION INTRAVENOUS at 22:10

## 2023-10-20 RX ADMIN — DEXTROSE MONOHYDRATE: 50 INJECTION, SOLUTION INTRAVENOUS at 22:52

## 2023-10-20 RX ADMIN — ROSUVASTATIN 5 MG: 10 TABLET, FILM COATED ORAL at 21:58

## 2023-10-20 RX ADMIN — DESMOPRESSIN ACETATE 1 MCG: 4 INJECTION, SOLUTION INTRAVENOUS; SUBCUTANEOUS at 22:52

## 2023-10-20 SDOH — HEALTH STABILITY: PHYSICAL HEALTH: ON AVERAGE, HOW MANY MINUTES DO YOU ENGAGE IN EXERCISE AT THIS LEVEL?: 0 MIN

## 2023-10-20 SDOH — ECONOMIC STABILITY: FOOD INSECURITY: WITHIN THE PAST 12 MONTHS, YOU WORRIED THAT YOUR FOOD WOULD RUN OUT BEFORE YOU GOT MONEY TO BUY MORE.: NEVER TRUE

## 2023-10-20 SDOH — ECONOMIC STABILITY: INCOME INSECURITY: IN THE LAST 12 MONTHS, WAS THERE A TIME WHEN YOU WERE NOT ABLE TO PAY THE MORTGAGE OR RENT ON TIME?: NO

## 2023-10-20 SDOH — ECONOMIC STABILITY: HOUSING INSECURITY: IN THE LAST 12 MONTHS, HOW MANY PLACES HAVE YOU LIVED?: 1

## 2023-10-20 SDOH — HEALTH STABILITY: PHYSICAL HEALTH: ON AVERAGE, HOW MANY DAYS PER WEEK DO YOU ENGAGE IN MODERATE TO STRENUOUS EXERCISE (LIKE A BRISK WALK)?: 0 DAYS

## 2023-10-20 SDOH — ECONOMIC STABILITY: FOOD INSECURITY: WITHIN THE PAST 12 MONTHS, THE FOOD YOU BOUGHT JUST DIDN'T LAST AND YOU DIDN'T HAVE MONEY TO GET MORE.: NEVER TRUE

## 2023-10-20 ASSESSMENT — SOCIAL DETERMINANTS OF HEALTH (SDOH)
WITHIN THE LAST YEAR, HAVE TO BEEN RAPED OR FORCED TO HAVE ANY KIND OF SEXUAL ACTIVITY BY YOUR PARTNER OR EX-PARTNER?: NO
HOW OFTEN DO YOU GET TOGETHER WITH FRIENDS OR RELATIVES?: MORE THAN THREE TIMES A WEEK
WITHIN THE LAST YEAR, HAVE YOU BEEN AFRAID OF YOUR PARTNER OR EX-PARTNER?: NO
DO YOU BELONG TO ANY CLUBS OR ORGANIZATIONS SUCH AS CHURCH GROUPS UNIONS, FRATERNAL OR ATHLETIC GROUPS, OR SCHOOL GROUPS?: YES
HOW OFTEN DO YOU ATTEND CHURCH OR RELIGIOUS SERVICES?: 1 TO 4 TIMES PER YEAR
WITHIN THE LAST YEAR, HAVE YOU BEEN KICKED, HIT, SLAPPED, OR OTHERWISE PHYSICALLY HURT BY YOUR PARTNER OR EX-PARTNER?: NO
HOW HARD IS IT FOR YOU TO PAY FOR THE VERY BASICS LIKE FOOD, HOUSING, MEDICAL CARE, AND HEATING?: NOT HARD AT ALL
WITHIN THE LAST YEAR, HAVE YOU BEEN HUMILIATED OR EMOTIONALLY ABUSED IN OTHER WAYS BY YOUR PARTNER OR EX-PARTNER?: NO
HOW OFTEN DO YOU ATTENT MEETINGS OF THE CLUB OR ORGANIZATION YOU BELONG TO?: 1 TO 4 TIMES PER YEAR
IN A TYPICAL WEEK, HOW MANY TIMES DO YOU TALK ON THE PHONE WITH FAMILY, FRIENDS, OR NEIGHBORS?: MORE THAN THREE TIMES A WEEK

## 2023-10-20 ASSESSMENT — LIFESTYLE VARIABLES
HOW OFTEN DO YOU HAVE A DRINK CONTAINING ALCOHOL: NEVER
HOW MANY STANDARD DRINKS CONTAINING ALCOHOL DO YOU HAVE ON A TYPICAL DAY: PATIENT DOES NOT DRINK

## 2023-10-20 ASSESSMENT — PATIENT HEALTH QUESTIONNAIRE - PHQ9
2. FEELING DOWN, DEPRESSED OR HOPELESS: NOT AT ALL
SUM OF ALL RESPONSES TO PHQ9 QUESTIONS 1 & 2: 0
1. LITTLE INTEREST OR PLEASURE IN DOING THINGS: NOT AT ALL

## 2023-10-20 NOTE — ED NOTES
Clinical house contacted about bed assignment pt will now go to ICU instead of medical      Lesley Lopez RN  10/20/23 5234

## 2023-10-20 NOTE — ED NOTES
Paged Dr. Ping Bashir for Dr. Jones Gutierrez.   Will return call      Светлана [facility]  10/20/23 9956

## 2023-10-21 LAB
ANION GAP SERPL CALCULATED.3IONS-SCNC: 5 MMOL/L (ref 7–19)
ANION GAP SERPL CALCULATED.3IONS-SCNC: 8 MMOL/L (ref 7–19)
ANION GAP SERPL CALCULATED.3IONS-SCNC: 9 MMOL/L (ref 7–19)
ANION GAP SERPL CALCULATED.3IONS-SCNC: 9 MMOL/L (ref 7–19)
BUN SERPL-MCNC: 16 MG/DL (ref 8–23)
BUN SERPL-MCNC: 16 MG/DL (ref 8–23)
BUN SERPL-MCNC: 19 MG/DL (ref 8–23)
BUN SERPL-MCNC: 19 MG/DL (ref 8–23)
CALCIUM SERPL-MCNC: 8.1 MG/DL (ref 8.8–10.2)
CALCIUM SERPL-MCNC: 8.3 MG/DL (ref 8.8–10.2)
CALCIUM SERPL-MCNC: 8.4 MG/DL (ref 8.8–10.2)
CALCIUM SERPL-MCNC: 8.8 MG/DL (ref 8.8–10.2)
CHLORIDE SERPL-SCNC: 75 MMOL/L (ref 98–111)
CHLORIDE SERPL-SCNC: 77 MMOL/L (ref 98–111)
CHLORIDE SERPL-SCNC: 80 MMOL/L (ref 98–111)
CHLORIDE SERPL-SCNC: 82 MMOL/L (ref 98–111)
CO2 SERPL-SCNC: 31 MMOL/L (ref 22–29)
CO2 SERPL-SCNC: 33 MMOL/L (ref 22–29)
CO2 SERPL-SCNC: 34 MMOL/L (ref 22–29)
CO2 SERPL-SCNC: 35 MMOL/L (ref 22–29)
CREAT SERPL-MCNC: 1 MG/DL (ref 0.5–0.9)
CREAT SERPL-MCNC: 1 MG/DL (ref 0.5–0.9)
CREAT SERPL-MCNC: 1.2 MG/DL (ref 0.5–0.9)
CREAT SERPL-MCNC: 1.3 MG/DL (ref 0.5–0.9)
GLUCOSE BLD-MCNC: 139 MG/DL (ref 70–99)
GLUCOSE BLD-MCNC: 216 MG/DL (ref 70–99)
GLUCOSE BLD-MCNC: 241 MG/DL (ref 70–99)
GLUCOSE SERPL-MCNC: 131 MG/DL (ref 74–109)
GLUCOSE SERPL-MCNC: 183 MG/DL (ref 74–109)
GLUCOSE SERPL-MCNC: 200 MG/DL (ref 74–109)
GLUCOSE SERPL-MCNC: 227 MG/DL (ref 74–109)
OSMOLALITY SERPL CALC.SUM OF ELEC: 254 MOSM/KG (ref 275–300)
PERFORMED ON: ABNORMAL
POTASSIUM SERPL-SCNC: 3.9 MMOL/L (ref 3.5–5)
POTASSIUM SERPL-SCNC: 4.1 MMOL/L (ref 3.5–5)
POTASSIUM SERPL-SCNC: 4.1 MMOL/L (ref 3.5–5)
POTASSIUM SERPL-SCNC: 4.2 MMOL/L (ref 3.5–5)
SODIUM SERPL-SCNC: 114 MMOL/L (ref 136–145)
SODIUM SERPL-SCNC: 119 MMOL/L (ref 136–145)
SODIUM SERPL-SCNC: 121 MMOL/L (ref 136–145)

## 2023-10-21 PROCEDURE — 6370000000 HC RX 637 (ALT 250 FOR IP): Performed by: STUDENT IN AN ORGANIZED HEALTH CARE EDUCATION/TRAINING PROGRAM

## 2023-10-21 PROCEDURE — 2700000000 HC OXYGEN THERAPY PER DAY

## 2023-10-21 PROCEDURE — 83930 ASSAY OF BLOOD OSMOLALITY: CPT

## 2023-10-21 PROCEDURE — 82962 GLUCOSE BLOOD TEST: CPT

## 2023-10-21 PROCEDURE — 2000000000 HC ICU R&B

## 2023-10-21 PROCEDURE — 84295 ASSAY OF SERUM SODIUM: CPT

## 2023-10-21 PROCEDURE — 80048 BASIC METABOLIC PNL TOTAL CA: CPT

## 2023-10-21 PROCEDURE — 2580000003 HC RX 258: Performed by: STUDENT IN AN ORGANIZED HEALTH CARE EDUCATION/TRAINING PROGRAM

## 2023-10-21 PROCEDURE — 36415 COLL VENOUS BLD VENIPUNCTURE: CPT

## 2023-10-21 PROCEDURE — 94640 AIRWAY INHALATION TREATMENT: CPT

## 2023-10-21 RX ORDER — 3% SODIUM CHLORIDE 3 G/100ML
50 INJECTION, SOLUTION INTRAVENOUS CONTINUOUS
Status: DISPENSED | OUTPATIENT
Start: 2023-10-21 | End: 2023-10-22

## 2023-10-21 RX ORDER — IPRATROPIUM BROMIDE AND ALBUTEROL SULFATE 2.5; .5 MG/3ML; MG/3ML
1 SOLUTION RESPIRATORY (INHALATION)
Status: DISCONTINUED | OUTPATIENT
Start: 2023-10-21 | End: 2023-10-26 | Stop reason: HOSPADM

## 2023-10-21 RX ORDER — SPIRONOLACTONE 50 MG/1
50 TABLET, FILM COATED ORAL DAILY
COMMUNITY

## 2023-10-21 RX ORDER — BUMETANIDE 1 MG/1
2 TABLET ORAL DAILY
Status: ON HOLD | COMMUNITY
End: 2023-10-26 | Stop reason: HOSPADM

## 2023-10-21 RX ORDER — PREDNISOLONE 5 MG/1
5 TABLET ORAL DAILY
COMMUNITY

## 2023-10-21 RX ADMIN — IPRATROPIUM BROMIDE AND ALBUTEROL SULFATE 1 DOSE: 2.5; .5 SOLUTION RESPIRATORY (INHALATION) at 14:37

## 2023-10-21 RX ADMIN — SODIUM CHLORIDE 50 ML/HR: 3 INJECTION, SOLUTION INTRAVENOUS at 19:08

## 2023-10-21 RX ADMIN — IPRATROPIUM BROMIDE AND ALBUTEROL SULFATE 1 DOSE: 2.5; .5 SOLUTION RESPIRATORY (INHALATION) at 19:00

## 2023-10-21 RX ADMIN — SODIUM CHLORIDE, PRESERVATIVE FREE 10 ML: 5 INJECTION INTRAVENOUS at 20:02

## 2023-10-21 RX ADMIN — SODIUM CHLORIDE 50 ML/HR: 3 INJECTION, SOLUTION INTRAVENOUS at 09:01

## 2023-10-21 RX ADMIN — METFORMIN HYDROCHLORIDE 500 MG: 500 TABLET, FILM COATED ORAL at 09:04

## 2023-10-21 RX ADMIN — METFORMIN HYDROCHLORIDE 500 MG: 500 TABLET, FILM COATED ORAL at 18:06

## 2023-10-21 RX ADMIN — GABAPENTIN 100 MG: 100 CAPSULE ORAL at 13:42

## 2023-10-21 RX ADMIN — SODIUM CHLORIDE, PRESERVATIVE FREE 10 ML: 5 INJECTION INTRAVENOUS at 08:59

## 2023-10-21 RX ADMIN — IPRATROPIUM BROMIDE AND ALBUTEROL SULFATE 1 DOSE: 2.5; .5 SOLUTION RESPIRATORY (INHALATION) at 11:39

## 2023-10-21 RX ADMIN — ROSUVASTATIN 5 MG: 10 TABLET, FILM COATED ORAL at 20:01

## 2023-10-21 ASSESSMENT — PAIN DESCRIPTION - ONSET: ONSET: GRADUAL

## 2023-10-21 ASSESSMENT — ENCOUNTER SYMPTOMS
COLOR CHANGE: 1
DIARRHEA: 0
VOMITING: 0
SHORTNESS OF BREATH: 0
FACIAL SWELLING: 1
BLOOD IN STOOL: 0

## 2023-10-21 ASSESSMENT — PAIN DESCRIPTION - PAIN TYPE: TYPE: CHRONIC PAIN

## 2023-10-21 ASSESSMENT — PAIN DESCRIPTION - DESCRIPTORS: DESCRIPTORS: TINGLING

## 2023-10-21 ASSESSMENT — PAIN SCALES - GENERAL
PAINLEVEL_OUTOF10: 4
PAINLEVEL_OUTOF10: 6

## 2023-10-21 ASSESSMENT — PAIN DESCRIPTION - LOCATION: LOCATION: LEG

## 2023-10-21 ASSESSMENT — PAIN DESCRIPTION - FREQUENCY: FREQUENCY: INTERMITTENT

## 2023-10-21 ASSESSMENT — PAIN DESCRIPTION - ORIENTATION: ORIENTATION: LOWER

## 2023-10-21 ASSESSMENT — PAIN - FUNCTIONAL ASSESSMENT: PAIN_FUNCTIONAL_ASSESSMENT: ACTIVITIES ARE NOT PREVENTED

## 2023-10-21 NOTE — PLAN OF CARE
Problem: Skin/Tissue Integrity  Goal: Absence of new skin breakdown  Description: 1. Monitor for areas of redness and/or skin breakdown  2. Assess vascular access sites hourly  3. Every 4-6 hours minimum:  Change oxygen saturation probe site  4. Every 4-6 hours:  If on nasal continuous positive airway pressure, respiratory therapy assess nares and determine need for appliance change or resting period.   Outcome: Progressing     Problem: Pain  Goal: Verbalizes/displays adequate comfort level or baseline comfort level  Outcome: Progressing     Problem: Chronic Conditions and Co-morbidities  Goal: Patient's chronic conditions and co-morbidity symptoms are monitored and maintained or improved  Outcome: Progressing

## 2023-10-21 NOTE — ED PROVIDER NOTES
note that portions of this note were completed with a voice recognition program.  Efforts were made to edit thedictations but occasionally words are mis-transcribed.)    Makayla Tenorio MD (electronically signed)  Attending Emergency Physician          Makayla Tenorio MD  10/21/23 0000

## 2023-10-21 NOTE — H&P
Status:    Intimate Partner Violence: Not At Risk (10/20/2023)    Humiliation, Afraid, Rape, and Kick questionnaire     Fear of Current or Ex-Partner: No     Emotionally Abused: No     Physically Abused: No     Sexually Abused: No   Housing Stability: Low Risk  (10/20/2023)    Housing Stability Vital Sign     Unable to Pay for Housing in the Last Year: No     Number of Places Lived in the Last Year: 1     Unstable Housing in the Last Year: No       Family History:   Family History   Problem Relation Age of Onset    Cancer Mother     Coronary Art Dis Father     Mult Sclerosis Sister     Cancer Brother        REVIEW OF SYSTEMS:    CONSTITUTIONAL: As stated in HPI  EYES:  negative for eye dryness, icterus and redness, no significant changes in vision  HEENT:   negative for dental problems, epistaxis or sinus congestion , no history of facial trauma or difficulty swallowing  RESPIRATORY:  negative for chest tightness, cough, dyspnea on exertion, pneumonia or worse sputum production  CARDIOVASCULAR: No history of chest pain, dyspnea, exertional chest pressure/discomfort, irregular heart beat, or PND  GASTROINTESTINAL:  negative for abdominal pain, nausea or vomiting, no history of hematemesis, jaundice, melena or rectal bleeding  MUSCULOSKELETAL: Sequela of fall  NEUROLOGICAL:   negative for dizziness, headaches, seizures, speech problems, tremors and vertigo, mentation has been at baseline  INTEGUMENT: negative for pruritus, rash, skin color change and skin lesion(s)             Physical Exam:  Constitutional: The patient is oriented to person, place, and time. They appear well-developed. Able to answer questions appropriately  HEENT: Grossly normal sitting up in bed  Head: Normocephalic and atraumatic. Bruising on face. Eyes: Pupils are equal, round, and reactive to light. Extraocular muscles appear to be intact  Neck: Neck supple without masses or carotid bruit.   Cardiovascular: Regular rhythm and normal

## 2023-10-22 ENCOUNTER — APPOINTMENT (OUTPATIENT)
Dept: GENERAL RADIOLOGY | Age: 73
DRG: 641 | End: 2023-10-22
Payer: MEDICARE

## 2023-10-22 LAB
ANION GAP SERPL CALCULATED.3IONS-SCNC: 7 MMOL/L (ref 7–19)
ANION GAP SERPL CALCULATED.3IONS-SCNC: 7 MMOL/L (ref 7–19)
ANION GAP SERPL CALCULATED.3IONS-SCNC: 8 MMOL/L (ref 7–19)
BUN SERPL-MCNC: 14 MG/DL (ref 8–23)
BUN SERPL-MCNC: 15 MG/DL (ref 8–23)
BUN SERPL-MCNC: 17 MG/DL (ref 8–23)
CALCIUM SERPL-MCNC: 8.1 MG/DL (ref 8.8–10.2)
CALCIUM SERPL-MCNC: 8.3 MG/DL (ref 8.8–10.2)
CALCIUM SERPL-MCNC: 8.4 MG/DL (ref 8.8–10.2)
CHLORIDE SERPL-SCNC: 81 MMOL/L (ref 98–111)
CHLORIDE SERPL-SCNC: 81 MMOL/L (ref 98–111)
CHLORIDE SERPL-SCNC: 82 MMOL/L (ref 98–111)
CO2 SERPL-SCNC: 30 MMOL/L (ref 22–29)
CO2 SERPL-SCNC: 30 MMOL/L (ref 22–29)
CO2 SERPL-SCNC: 31 MMOL/L (ref 22–29)
CREAT SERPL-MCNC: 0.8 MG/DL (ref 0.5–0.9)
CREAT SERPL-MCNC: 0.9 MG/DL (ref 0.5–0.9)
CREAT SERPL-MCNC: 1.1 MG/DL (ref 0.5–0.9)
GLUCOSE BLD-MCNC: 156 MG/DL (ref 70–99)
GLUCOSE BLD-MCNC: 208 MG/DL (ref 70–99)
GLUCOSE BLD-MCNC: 242 MG/DL (ref 70–99)
GLUCOSE BLD-MCNC: 407 MG/DL (ref 70–99)
GLUCOSE SERPL-MCNC: 129 MG/DL (ref 74–109)
GLUCOSE SERPL-MCNC: 183 MG/DL (ref 74–109)
GLUCOSE SERPL-MCNC: 232 MG/DL (ref 74–109)
PERFORMED ON: ABNORMAL
POTASSIUM SERPL-SCNC: 3.9 MMOL/L (ref 3.5–5)
POTASSIUM SERPL-SCNC: 4 MMOL/L (ref 3.5–5)
POTASSIUM SERPL-SCNC: 4.2 MMOL/L (ref 3.5–5)
SODIUM SERPL-SCNC: 119 MMOL/L (ref 136–145)
SODIUM SERPL-SCNC: 120 MMOL/L (ref 136–145)
SODIUM SERPL-SCNC: 122 MMOL/L (ref 136–145)
SODIUM SERPL-SCNC: 122 MMOL/L (ref 136–145)
SODIUM SERPL-SCNC: 124 MMOL/L (ref 136–145)
SODIUM SERPL-SCNC: 124 MMOL/L (ref 136–145)
SODIUM SERPL-SCNC: 127 MMOL/L (ref 136–145)

## 2023-10-22 PROCEDURE — 71045 X-RAY EXAM CHEST 1 VIEW: CPT

## 2023-10-22 PROCEDURE — 80048 BASIC METABOLIC PNL TOTAL CA: CPT

## 2023-10-22 PROCEDURE — 6360000002 HC RX W HCPCS: Performed by: STUDENT IN AN ORGANIZED HEALTH CARE EDUCATION/TRAINING PROGRAM

## 2023-10-22 PROCEDURE — 84295 ASSAY OF SERUM SODIUM: CPT

## 2023-10-22 PROCEDURE — 94640 AIRWAY INHALATION TREATMENT: CPT

## 2023-10-22 PROCEDURE — 2000000000 HC ICU R&B

## 2023-10-22 PROCEDURE — 82962 GLUCOSE BLOOD TEST: CPT

## 2023-10-22 PROCEDURE — 6370000000 HC RX 637 (ALT 250 FOR IP): Performed by: STUDENT IN AN ORGANIZED HEALTH CARE EDUCATION/TRAINING PROGRAM

## 2023-10-22 PROCEDURE — 93005 ELECTROCARDIOGRAM TRACING: CPT | Performed by: STUDENT IN AN ORGANIZED HEALTH CARE EDUCATION/TRAINING PROGRAM

## 2023-10-22 PROCEDURE — 36415 COLL VENOUS BLD VENIPUNCTURE: CPT

## 2023-10-22 PROCEDURE — 2700000000 HC OXYGEN THERAPY PER DAY

## 2023-10-22 PROCEDURE — 2580000003 HC RX 258: Performed by: STUDENT IN AN ORGANIZED HEALTH CARE EDUCATION/TRAINING PROGRAM

## 2023-10-22 RX ORDER — 3% SODIUM CHLORIDE 3 G/100ML
50 INJECTION, SOLUTION INTRAVENOUS CONTINUOUS
Status: DISCONTINUED | OUTPATIENT
Start: 2023-10-22 | End: 2023-10-23

## 2023-10-22 RX ORDER — BUDESONIDE 0.25 MG/2ML
250 INHALANT ORAL 2 TIMES DAILY
Status: DISCONTINUED | OUTPATIENT
Start: 2023-10-22 | End: 2023-10-26 | Stop reason: HOSPADM

## 2023-10-22 RX ORDER — GUAIFENESIN 600 MG/1
600 TABLET, EXTENDED RELEASE ORAL 2 TIMES DAILY
Status: DISCONTINUED | OUTPATIENT
Start: 2023-10-22 | End: 2023-10-25

## 2023-10-22 RX ORDER — PREDNISONE 20 MG/1
40 TABLET ORAL DAILY
Status: DISCONTINUED | OUTPATIENT
Start: 2023-10-22 | End: 2023-10-25

## 2023-10-22 RX ADMIN — GUAIFENESIN 600 MG: 600 TABLET ORAL at 20:43

## 2023-10-22 RX ADMIN — INSULIN LISPRO 4 UNITS: 100 INJECTION, SOLUTION INTRAVENOUS; SUBCUTANEOUS at 20:44

## 2023-10-22 RX ADMIN — APIXABAN 5 MG: 5 TABLET, FILM COATED ORAL at 20:43

## 2023-10-22 RX ADMIN — SOTALOL HYDROCHLORIDE 120 MG: 120 TABLET ORAL at 08:32

## 2023-10-22 RX ADMIN — SODIUM CHLORIDE 50 ML/HR: 3 INJECTION, SOLUTION INTRAVENOUS at 06:40

## 2023-10-22 RX ADMIN — INSULIN LISPRO 1 UNITS: 100 INJECTION, SOLUTION INTRAVENOUS; SUBCUTANEOUS at 17:49

## 2023-10-22 RX ADMIN — IPRATROPIUM BROMIDE AND ALBUTEROL SULFATE 1 DOSE: 2.5; .5 SOLUTION RESPIRATORY (INHALATION) at 18:40

## 2023-10-22 RX ADMIN — APIXABAN 5 MG: 5 TABLET, FILM COATED ORAL at 08:32

## 2023-10-22 RX ADMIN — IPRATROPIUM BROMIDE AND ALBUTEROL SULFATE 1 DOSE: 2.5; .5 SOLUTION RESPIRATORY (INHALATION) at 10:51

## 2023-10-22 RX ADMIN — ACETAMINOPHEN 650 MG: 325 TABLET ORAL at 05:52

## 2023-10-22 RX ADMIN — ACETAMINOPHEN 650 MG: 325 TABLET ORAL at 15:03

## 2023-10-22 RX ADMIN — METFORMIN HYDROCHLORIDE 500 MG: 500 TABLET, FILM COATED ORAL at 08:32

## 2023-10-22 RX ADMIN — PREDNISONE 40 MG: 20 TABLET ORAL at 12:30

## 2023-10-22 RX ADMIN — ROSUVASTATIN 5 MG: 10 TABLET, FILM COATED ORAL at 20:44

## 2023-10-22 RX ADMIN — ACETAMINOPHEN 650 MG: 325 TABLET ORAL at 08:46

## 2023-10-22 RX ADMIN — GUAIFENESIN 600 MG: 600 TABLET ORAL at 12:30

## 2023-10-22 RX ADMIN — IPRATROPIUM BROMIDE AND ALBUTEROL SULFATE 1 DOSE: 2.5; .5 SOLUTION RESPIRATORY (INHALATION) at 06:12

## 2023-10-22 RX ADMIN — GABAPENTIN 100 MG: 100 CAPSULE ORAL at 23:19

## 2023-10-22 RX ADMIN — IPRATROPIUM BROMIDE AND ALBUTEROL SULFATE 1 DOSE: 2.5; .5 SOLUTION RESPIRATORY (INHALATION) at 14:14

## 2023-10-22 RX ADMIN — BUDESONIDE 250 MCG: 0.25 SUSPENSION RESPIRATORY (INHALATION) at 18:40

## 2023-10-22 RX ADMIN — ACETAMINOPHEN 650 MG: 325 TABLET ORAL at 22:14

## 2023-10-22 RX ADMIN — INSULIN LISPRO 1 UNITS: 100 INJECTION, SOLUTION INTRAVENOUS; SUBCUTANEOUS at 09:05

## 2023-10-22 RX ADMIN — CLOPIDOGREL BISULFATE 75 MG: 75 TABLET ORAL at 08:33

## 2023-10-22 RX ADMIN — SODIUM CHLORIDE 50 ML/HR: 3 INJECTION, SOLUTION INTRAVENOUS at 15:13

## 2023-10-22 RX ADMIN — SOTALOL HYDROCHLORIDE 120 MG: 120 TABLET ORAL at 20:43

## 2023-10-22 RX ADMIN — METFORMIN HYDROCHLORIDE 500 MG: 500 TABLET, FILM COATED ORAL at 17:49

## 2023-10-22 ASSESSMENT — PAIN SCALES - GENERAL
PAINLEVEL_OUTOF10: 9
PAINLEVEL_OUTOF10: 3
PAINLEVEL_OUTOF10: 9

## 2023-10-22 ASSESSMENT — ENCOUNTER SYMPTOMS
CHEST TIGHTNESS: 0
WHEEZING: 1
COUGH: 1
NAUSEA: 0
DIARRHEA: 0
CONSTIPATION: 0
ABDOMINAL PAIN: 0
SHORTNESS OF BREATH: 1

## 2023-10-22 ASSESSMENT — PAIN DESCRIPTION - DESCRIPTORS
DESCRIPTORS: ACHING
DESCRIPTORS: ACHING
DESCRIPTORS: ACHING;DISCOMFORT

## 2023-10-22 ASSESSMENT — PAIN DESCRIPTION - LOCATION
LOCATION: GENERALIZED
LOCATION: HIP

## 2023-10-22 ASSESSMENT — PAIN DESCRIPTION - ORIENTATION
ORIENTATION: RIGHT;ANTERIOR
ORIENTATION: ANTERIOR
ORIENTATION: RIGHT

## 2023-10-22 ASSESSMENT — PAIN - FUNCTIONAL ASSESSMENT: PAIN_FUNCTIONAL_ASSESSMENT: PREVENTS OR INTERFERES SOME ACTIVE ACTIVITIES AND ADLS

## 2023-10-23 LAB
ANION GAP SERPL CALCULATED.3IONS-SCNC: 5 MMOL/L (ref 7–19)
BUN SERPL-MCNC: 14 MG/DL (ref 8–23)
CALCIUM SERPL-MCNC: 8.5 MG/DL (ref 8.8–10.2)
CHLORIDE SERPL-SCNC: 92 MMOL/L (ref 98–111)
CO2 SERPL-SCNC: 29 MMOL/L (ref 22–29)
CREAT SERPL-MCNC: 1 MG/DL (ref 0.5–0.9)
EKG P AXIS: 79 DEGREES
EKG P-R INTERVAL: 276 MS
EKG Q-T INTERVAL: 372 MS
EKG QRS DURATION: 98 MS
EKG QTC CALCULATION (BAZETT): 431 MS
EKG T AXIS: 78 DEGREES
GLUCOSE BLD-MCNC: 145 MG/DL (ref 70–99)
GLUCOSE BLD-MCNC: 217 MG/DL (ref 70–99)
GLUCOSE BLD-MCNC: 308 MG/DL (ref 70–99)
GLUCOSE BLD-MCNC: 343 MG/DL (ref 70–99)
GLUCOSE SERPL-MCNC: 171 MG/DL (ref 74–109)
PERFORMED ON: ABNORMAL
POTASSIUM SERPL-SCNC: 4.3 MMOL/L (ref 3.5–5)
SODIUM SERPL-SCNC: 126 MMOL/L (ref 136–145)
SODIUM SERPL-SCNC: 132 MMOL/L (ref 136–145)
SODIUM SERPL-SCNC: 132 MMOL/L (ref 136–145)

## 2023-10-23 PROCEDURE — 6370000000 HC RX 637 (ALT 250 FOR IP): Performed by: STUDENT IN AN ORGANIZED HEALTH CARE EDUCATION/TRAINING PROGRAM

## 2023-10-23 PROCEDURE — 2580000003 HC RX 258: Performed by: STUDENT IN AN ORGANIZED HEALTH CARE EDUCATION/TRAINING PROGRAM

## 2023-10-23 PROCEDURE — 82962 GLUCOSE BLOOD TEST: CPT

## 2023-10-23 PROCEDURE — 94760 N-INVAS EAR/PLS OXIMETRY 1: CPT

## 2023-10-23 PROCEDURE — 2580000003 HC RX 258: Performed by: FAMILY MEDICINE

## 2023-10-23 PROCEDURE — 1210000000 HC MED SURG R&B

## 2023-10-23 PROCEDURE — 80048 BASIC METABOLIC PNL TOTAL CA: CPT

## 2023-10-23 PROCEDURE — 6360000002 HC RX W HCPCS: Performed by: STUDENT IN AN ORGANIZED HEALTH CARE EDUCATION/TRAINING PROGRAM

## 2023-10-23 PROCEDURE — 36415 COLL VENOUS BLD VENIPUNCTURE: CPT

## 2023-10-23 PROCEDURE — 94640 AIRWAY INHALATION TREATMENT: CPT

## 2023-10-23 PROCEDURE — 2700000000 HC OXYGEN THERAPY PER DAY

## 2023-10-23 PROCEDURE — 93010 ELECTROCARDIOGRAM REPORT: CPT | Performed by: INTERNAL MEDICINE

## 2023-10-23 PROCEDURE — 84295 ASSAY OF SERUM SODIUM: CPT

## 2023-10-23 RX ORDER — SODIUM CHLORIDE 9 MG/ML
INJECTION, SOLUTION INTRAVENOUS CONTINUOUS
Status: DISCONTINUED | OUTPATIENT
Start: 2023-10-23 | End: 2023-10-26 | Stop reason: HOSPADM

## 2023-10-23 RX ADMIN — SODIUM CHLORIDE: 9 INJECTION, SOLUTION INTRAVENOUS at 09:41

## 2023-10-23 RX ADMIN — SOTALOL HYDROCHLORIDE 120 MG: 120 TABLET ORAL at 09:34

## 2023-10-23 RX ADMIN — ROSUVASTATIN 5 MG: 10 TABLET, FILM COATED ORAL at 20:49

## 2023-10-23 RX ADMIN — INSULIN LISPRO 4 UNITS: 100 INJECTION, SOLUTION INTRAVENOUS; SUBCUTANEOUS at 20:51

## 2023-10-23 RX ADMIN — BUDESONIDE 250 MCG: 0.25 SUSPENSION RESPIRATORY (INHALATION) at 18:17

## 2023-10-23 RX ADMIN — BUDESONIDE 250 MCG: 0.25 SUSPENSION RESPIRATORY (INHALATION) at 06:26

## 2023-10-23 RX ADMIN — INSULIN LISPRO 3 UNITS: 100 INJECTION, SOLUTION INTRAVENOUS; SUBCUTANEOUS at 17:30

## 2023-10-23 RX ADMIN — INSULIN LISPRO 1 UNITS: 100 INJECTION, SOLUTION INTRAVENOUS; SUBCUTANEOUS at 12:29

## 2023-10-23 RX ADMIN — IPRATROPIUM BROMIDE AND ALBUTEROL SULFATE 1 DOSE: 2.5; .5 SOLUTION RESPIRATORY (INHALATION) at 06:26

## 2023-10-23 RX ADMIN — PREDNISONE 40 MG: 20 TABLET ORAL at 09:35

## 2023-10-23 RX ADMIN — IPRATROPIUM BROMIDE AND ALBUTEROL SULFATE 1 DOSE: 2.5; .5 SOLUTION RESPIRATORY (INHALATION) at 10:35

## 2023-10-23 RX ADMIN — METFORMIN HYDROCHLORIDE 500 MG: 500 TABLET, FILM COATED ORAL at 09:35

## 2023-10-23 RX ADMIN — GABAPENTIN 100 MG: 100 CAPSULE ORAL at 20:53

## 2023-10-23 RX ADMIN — SODIUM CHLORIDE, PRESERVATIVE FREE 10 ML: 5 INJECTION INTRAVENOUS at 20:52

## 2023-10-23 RX ADMIN — GUAIFENESIN 600 MG: 600 TABLET ORAL at 20:49

## 2023-10-23 RX ADMIN — GUAIFENESIN 600 MG: 600 TABLET ORAL at 09:35

## 2023-10-23 RX ADMIN — METFORMIN HYDROCHLORIDE 500 MG: 500 TABLET, FILM COATED ORAL at 17:30

## 2023-10-23 RX ADMIN — APIXABAN 5 MG: 5 TABLET, FILM COATED ORAL at 09:35

## 2023-10-23 RX ADMIN — IPRATROPIUM BROMIDE AND ALBUTEROL SULFATE 1 DOSE: 2.5; .5 SOLUTION RESPIRATORY (INHALATION) at 18:17

## 2023-10-23 RX ADMIN — CLOPIDOGREL BISULFATE 75 MG: 75 TABLET ORAL at 09:34

## 2023-10-23 RX ADMIN — IPRATROPIUM BROMIDE AND ALBUTEROL SULFATE 1 DOSE: 2.5; .5 SOLUTION RESPIRATORY (INHALATION) at 14:12

## 2023-10-23 RX ADMIN — SODIUM CHLORIDE, PRESERVATIVE FREE 10 ML: 5 INJECTION INTRAVENOUS at 09:35

## 2023-10-23 RX ADMIN — SODIUM CHLORIDE: 9 INJECTION, SOLUTION INTRAVENOUS at 13:29

## 2023-10-23 RX ADMIN — SODIUM CHLORIDE 50 ML/HR: 3 INJECTION, SOLUTION INTRAVENOUS at 01:13

## 2023-10-23 RX ADMIN — SOTALOL HYDROCHLORIDE 120 MG: 120 TABLET ORAL at 20:49

## 2023-10-23 RX ADMIN — APIXABAN 5 MG: 5 TABLET, FILM COATED ORAL at 20:49

## 2023-10-23 NOTE — PLAN OF CARE
Problem: Discharge Planning  Goal: Discharge to home or other facility with appropriate resources  10/23/2023 0736 by Kaila Harris RN  Outcome: Progressing  10/23/2023 0613 by Saturnino Vogt RN  Outcome: Progressing  10/22/2023 1826 by Paula Alas RN  Outcome: Progressing     Problem: Safety - Adult  Goal: Free from fall injury  10/23/2023 0736 by Kaila Harris RN  Outcome: Progressing  10/23/2023 0613 by Saturnino Vogt RN  Outcome: Progressing  10/22/2023 1826 by Paula Alas RN  Outcome: Progressing     Problem: ABCDS Injury Assessment  Goal: Absence of physical injury  10/23/2023 0736 by Kaila Harris RN  Outcome: Progressing  10/23/2023 0613 by Saturnino Vogt RN  Outcome: Progressing  10/22/2023 1826 by Paula Alas RN  Outcome: Progressing     Problem: Skin/Tissue Integrity  Goal: Absence of new skin breakdown  10/23/2023 0736 by Kaila Harris RN  Outcome: Progressing  10/23/2023 0613 by Saturnino Vogt RN  Outcome: Progressing  10/22/2023 1826 by Paula Alas RN  Outcome: Progressing     Problem: Pain  Goal: Verbalizes/displays adequate comfort level or baseline comfort level  10/23/2023 0736 by Kaila Harris RN  Outcome: Progressing  10/23/2023 0613 by Saturnino Vogt RN  Outcome: Progressing  10/22/2023 1826 by Paula Alas RN  Outcome: Progressing     Problem: Chronic Conditions and Co-morbidities  Goal: Patient's chronic conditions and co-morbidity symptoms are monitored and maintained or improved  10/23/2023 0736 by Kaila Harris RN  Outcome: Progressing  10/23/2023 0613 by Saturnino Vogt RN  Outcome: Progressing  10/22/2023 1826 by Paula Alas RN  Outcome: Progressing     Problem: Neurosensory - Adult  Goal: Achieves stable or improved neurological status  Outcome: Progressing     Problem: Respiratory - Adult  Goal: Achieves optimal ventilation and oxygenation  Outcome: Progressing     Problem: Cardiovascular - Adult  Goal: Maintains optimal cardiac output and hemodynamic

## 2023-10-23 NOTE — PLAN OF CARE
Problem: Discharge Planning  Goal: Discharge to home or other facility with appropriate resources  10/23/2023 0613 by David Serrano RN  Outcome: Progressing  10/22/2023 1826 by Brielle Durand RN  Outcome: Progressing     Problem: Safety - Adult  Goal: Free from fall injury  10/23/2023 0613 by David Serrano RN  Outcome: Progressing  10/22/2023 1826 by Brielle Durand RN  Outcome: Progressing     Problem: ABCDS Injury Assessment  Goal: Absence of physical injury  10/23/2023 0613 by David Serrano RN  Outcome: Progressing  10/22/2023 1826 by Brielle Durand RN  Outcome: Progressing     Problem: Skin/Tissue Integrity  Goal: Absence of new skin breakdown  Description: 1. Monitor for areas of redness and/or skin breakdown  2. Assess vascular access sites hourly  3. Every 4-6 hours minimum:  Change oxygen saturation probe site  4. Every 4-6 hours:  If on nasal continuous positive airway pressure, respiratory therapy assess nares and determine need for appliance change or resting period.   10/23/2023 4910 by David Serrano RN  Outcome: Progressing  10/22/2023 1826 by Brielle Durand RN  Outcome: Progressing     Problem: Pain  Goal: Verbalizes/displays adequate comfort level or baseline comfort level  10/23/2023 0613 by David Serrano RN  Outcome: Progressing  10/22/2023 1826 by Brielle Durand RN  Outcome: Progressing     Problem: Chronic Conditions and Co-morbidities  Goal: Patient's chronic conditions and co-morbidity symptoms are monitored and maintained or improved  10/23/2023 0613 by David Serrano RN  Outcome: Progressing  10/22/2023 1826 by Brielle Durand RN  Outcome: Progressing

## 2023-10-23 NOTE — ACP (ADVANCE CARE PLANNING)
Advance Care Planning     Advance Care Planning Activator (Inpatient)  Conversation Note      Date of ACP Conversation: 10/23/2023     [employer] with: Pt with capacity. ACP Activator: Callie Vasquez RN      Health Care Decision Maker:     Current Designated Health Care Decision Maker:     Primary Decision Maker (Active): Shreyas Groves Child - [phone]      Care Preferences    Ventilation: \"If you were in your present state of health and suddenly became very ill and were unable to breathe on your own, what would your preference be about the use of a ventilator (breathing machine) if it were available to you? \"      Would the patient desire the use of ventilator (breathing machine)?: Yes        Resuscitation  \"CPR works best to restart the heart when there is a sudden event, like a heart attack, in someone who is otherwise healthy. Unfortunately, CPR does not typically restart the heart for people who have serious health conditions or who are very sick. \"    \"In the event your heart stopped as a result of an underlying serious health condition, would you want attempts to be made to restart your heart (answer \"yes\" for attempt to resuscitate) or would you prefer a natural death (answer \"no\" for do not attempt to resuscitate)? \" Yes            Conversation Outcomes:  ACP discussion completed    Follow-up plan:    [] Schedule follow-up conversation to continue planning  [] Referred individual to Provider for additional questions/concerns   [] Advised patient/agent/surrogate to review completed ACP document and update if needed with changes in condition, patient preferences or care setting    [] This note routed to one or more involved healthcare providers        Electronically signed by Callie Vasquez RN on 10/23/2023 at 1:22 PM

## 2023-10-23 NOTE — CONSULTS
Palliative Care:   Pt is known to palliative care. 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 Pt is resting in bed. Facial bruising/swelling noted. Pt alert and oriented. Past Medical History:        Past Medical History:   Diagnosis Date    ASHD (arteriosclerotic heart disease)     s/p PTCA and stent of circumflex, as well as intermediate and mid LAD     COPD (chronic obstructive pulmonary disease) (HCC)     Coronary atherosclerosis     Diabetes mellitus ([address])     diet controlled, type 2    Encounter for wound care     FOR [facility]LL WOUND    Fall 10/29/2020    Ganglion cyst     RT HAND    Hematoma 10/2020    hematoma LLL from fall injury    Hx of blood clots     Hypercholesteremia     Hypertension     Pacemaker 03/02/2021    Palliative care patient 03/16/2022    Unstable angina [facility])        Advance Directives:  Full Code  Kylie Rowell, son, is pt's HCS. Pain/Other Symptoms:    Denies                  Psychological/Spiritual:   Limited family support. Does not attend [employer] d/t COVID closing her [employer]. States she watches services on television. Plan:  Move to floor today. Monitor O2 d/t COPD, possible med adj, home on [place]          Patient/family discussion r/t goals:           Pt  plans to return to her home on [place] where she lives alone. States she is ind , able to manage most things at home. She has a cg 4-5 day a week to help with some cooking, cleaning, errands as she does not drive any longer      Pt states she is able to manage her self care. Pt will move to the floor today. Possible dc soon.             Palliative Performance Scale:        60                     Electronically signed by Kaya Young RN on 10/23/2023 at 11:38 AM

## 2023-10-24 LAB
ALBUMIN SERPL-MCNC: 3.5 G/DL (ref 3.5–5.2)
ALP SERPL-CCNC: 75 U/L (ref 35–104)
ALT SERPL-CCNC: 10 U/L (ref 5–33)
ANION GAP SERPL CALCULATED.3IONS-SCNC: 11 MMOL/L (ref 7–19)
AST SERPL-CCNC: 12 U/L (ref 5–32)
BILIRUB SERPL-MCNC: 0.3 MG/DL (ref 0.2–1.2)
BUN SERPL-MCNC: 18 MG/DL (ref 8–23)
CALCIUM SERPL-MCNC: 8.6 MG/DL (ref 8.8–10.2)
CHLORIDE SERPL-SCNC: 93 MMOL/L (ref 98–111)
CO2 SERPL-SCNC: 26 MMOL/L (ref 22–29)
CREAT SERPL-MCNC: 1.1 MG/DL (ref 0.5–0.9)
GLUCOSE BLD-MCNC: 127 MG/DL (ref 70–99)
GLUCOSE BLD-MCNC: 132 MG/DL (ref 70–99)
GLUCOSE BLD-MCNC: 243 MG/DL (ref 70–99)
GLUCOSE BLD-MCNC: 289 MG/DL (ref 70–99)
GLUCOSE SERPL-MCNC: 251 MG/DL (ref 74–109)
PERFORMED ON: ABNORMAL
POTASSIUM SERPL-SCNC: 4.8 MMOL/L (ref 3.5–5)
PROT SERPL-MCNC: 6.1 G/DL (ref 6.6–8.7)
SODIUM SERPL-SCNC: 128 MMOL/L (ref 136–145)
SODIUM SERPL-SCNC: 130 MMOL/L (ref 136–145)
SODIUM SERPL-SCNC: 130 MMOL/L (ref 136–145)

## 2023-10-24 PROCEDURE — 84295 ASSAY OF SERUM SODIUM: CPT

## 2023-10-24 PROCEDURE — 6370000000 HC RX 637 (ALT 250 FOR IP): Performed by: STUDENT IN AN ORGANIZED HEALTH CARE EDUCATION/TRAINING PROGRAM

## 2023-10-24 PROCEDURE — 2700000000 HC OXYGEN THERAPY PER DAY

## 2023-10-24 PROCEDURE — 1210000000 HC MED SURG R&B

## 2023-10-24 PROCEDURE — 94640 AIRWAY INHALATION TREATMENT: CPT

## 2023-10-24 PROCEDURE — 6370000000 HC RX 637 (ALT 250 FOR IP): Performed by: FAMILY MEDICINE

## 2023-10-24 PROCEDURE — 94760 N-INVAS EAR/PLS OXIMETRY 1: CPT

## 2023-10-24 PROCEDURE — 97116 GAIT TRAINING THERAPY: CPT

## 2023-10-24 PROCEDURE — 36415 COLL VENOUS BLD VENIPUNCTURE: CPT

## 2023-10-24 PROCEDURE — 97165 OT EVAL LOW COMPLEX 30 MIN: CPT

## 2023-10-24 PROCEDURE — 82962 GLUCOSE BLOOD TEST: CPT

## 2023-10-24 PROCEDURE — 97530 THERAPEUTIC ACTIVITIES: CPT

## 2023-10-24 PROCEDURE — 2580000003 HC RX 258: Performed by: STUDENT IN AN ORGANIZED HEALTH CARE EDUCATION/TRAINING PROGRAM

## 2023-10-24 PROCEDURE — 6360000002 HC RX W HCPCS: Performed by: STUDENT IN AN ORGANIZED HEALTH CARE EDUCATION/TRAINING PROGRAM

## 2023-10-24 PROCEDURE — 80053 COMPREHEN METABOLIC PANEL: CPT

## 2023-10-24 PROCEDURE — 97161 PT EVAL LOW COMPLEX 20 MIN: CPT

## 2023-10-24 RX ORDER — AMLODIPINE BESYLATE 5 MG/1
5 TABLET ORAL DAILY
Status: DISCONTINUED | OUTPATIENT
Start: 2023-10-24 | End: 2023-10-24

## 2023-10-24 RX ORDER — FAMOTIDINE 20 MG/1
20 TABLET, FILM COATED ORAL DAILY PRN
Status: DISCONTINUED | OUTPATIENT
Start: 2023-10-24 | End: 2023-10-26 | Stop reason: HOSPADM

## 2023-10-24 RX ORDER — LOSARTAN POTASSIUM 25 MG/1
25 TABLET ORAL DAILY
Status: DISCONTINUED | OUTPATIENT
Start: 2023-10-24 | End: 2023-10-26 | Stop reason: HOSPADM

## 2023-10-24 RX ADMIN — BUDESONIDE 250 MCG: 0.25 SUSPENSION RESPIRATORY (INHALATION) at 18:14

## 2023-10-24 RX ADMIN — GUAIFENESIN 600 MG: 600 TABLET ORAL at 21:16

## 2023-10-24 RX ADMIN — IPRATROPIUM BROMIDE AND ALBUTEROL SULFATE 1 DOSE: 2.5; .5 SOLUTION RESPIRATORY (INHALATION) at 15:05

## 2023-10-24 RX ADMIN — SODIUM CHLORIDE, PRESERVATIVE FREE 10 ML: 5 INJECTION INTRAVENOUS at 21:17

## 2023-10-24 RX ADMIN — ROSUVASTATIN 5 MG: 10 TABLET, FILM COATED ORAL at 21:16

## 2023-10-24 RX ADMIN — SOTALOL HYDROCHLORIDE 120 MG: 120 TABLET ORAL at 08:37

## 2023-10-24 RX ADMIN — PREDNISONE 40 MG: 20 TABLET ORAL at 08:37

## 2023-10-24 RX ADMIN — INSULIN LISPRO 1 UNITS: 100 INJECTION, SOLUTION INTRAVENOUS; SUBCUTANEOUS at 17:06

## 2023-10-24 RX ADMIN — SOTALOL HYDROCHLORIDE 120 MG: 120 TABLET ORAL at 21:16

## 2023-10-24 RX ADMIN — ACETAMINOPHEN 650 MG: 325 TABLET ORAL at 10:35

## 2023-10-24 RX ADMIN — APIXABAN 5 MG: 5 TABLET, FILM COATED ORAL at 21:16

## 2023-10-24 RX ADMIN — METFORMIN HYDROCHLORIDE 500 MG: 500 TABLET, FILM COATED ORAL at 17:06

## 2023-10-24 RX ADMIN — IPRATROPIUM BROMIDE AND ALBUTEROL SULFATE 1 DOSE: 2.5; .5 SOLUTION RESPIRATORY (INHALATION) at 18:13

## 2023-10-24 RX ADMIN — IPRATROPIUM BROMIDE AND ALBUTEROL SULFATE 1 DOSE: 2.5; .5 SOLUTION RESPIRATORY (INHALATION) at 10:58

## 2023-10-24 RX ADMIN — IPRATROPIUM BROMIDE AND ALBUTEROL SULFATE 1 DOSE: 2.5; .5 SOLUTION RESPIRATORY (INHALATION) at 07:34

## 2023-10-24 RX ADMIN — METFORMIN HYDROCHLORIDE 500 MG: 500 TABLET, FILM COATED ORAL at 08:37

## 2023-10-24 RX ADMIN — BUDESONIDE 250 MCG: 0.25 SUSPENSION RESPIRATORY (INHALATION) at 07:35

## 2023-10-24 RX ADMIN — LOSARTAN POTASSIUM 25 MG: 50 TABLET, FILM COATED ORAL at 08:42

## 2023-10-24 RX ADMIN — SODIUM CHLORIDE, PRESERVATIVE FREE 10 ML: 5 INJECTION INTRAVENOUS at 08:40

## 2023-10-24 RX ADMIN — CLOPIDOGREL BISULFATE 75 MG: 75 TABLET ORAL at 08:37

## 2023-10-24 RX ADMIN — APIXABAN 5 MG: 5 TABLET, FILM COATED ORAL at 08:37

## 2023-10-24 RX ADMIN — GUAIFENESIN 600 MG: 600 TABLET ORAL at 08:37

## 2023-10-24 ASSESSMENT — PAIN DESCRIPTION - PAIN TYPE: TYPE: ACUTE PAIN

## 2023-10-24 ASSESSMENT — PAIN DESCRIPTION - FREQUENCY: FREQUENCY: CONTINUOUS

## 2023-10-24 ASSESSMENT — PAIN SCALES - GENERAL
PAINLEVEL_OUTOF10: 10
PAINLEVEL_OUTOF10: 3

## 2023-10-24 ASSESSMENT — PAIN DESCRIPTION - ONSET: ONSET: GRADUAL

## 2023-10-24 ASSESSMENT — PAIN DESCRIPTION - ORIENTATION: ORIENTATION: MID

## 2023-10-24 ASSESSMENT — PAIN DESCRIPTION - LOCATION: LOCATION: HEAD;GENERALIZED

## 2023-10-24 ASSESSMENT — PAIN DESCRIPTION - DESCRIPTORS: DESCRIPTORS: ACHING;DISCOMFORT

## 2023-10-24 NOTE — PLAN OF CARE
Problem: Discharge Planning  Goal: Discharge to home or other facility with appropriate resources  Outcome: Progressing  Flowsheets (Taken 10/23/2023 2045)  Discharge to home or other facility with appropriate resources: Identify barriers to discharge with patient and caregiver     Problem: Safety - Adult  Goal: Free from fall injury  Outcome: Progressing     Problem: ABCDS Injury Assessment  Goal: Absence of physical injury  Outcome: Progressing     Problem: Skin/Tissue Integrity  Goal: Absence of new skin breakdown  Description: 1. Monitor for areas of redness and/or skin breakdown  2. Assess vascular access sites hourly  3. Every 4-6 hours minimum:  Change oxygen saturation probe site  4. Every 4-6 hours:  If on nasal continuous positive airway pressure, respiratory therapy assess nares and determine need for appliance change or resting period.   Outcome: Progressing     Problem: Pain  Goal: Verbalizes/displays adequate comfort level or baseline comfort level  Outcome: Progressing     Problem: Chronic Conditions and Co-morbidities  Goal: Patient's chronic conditions and co-morbidity symptoms are monitored and maintained or improved  Outcome: Progressing     Problem: Neurosensory - Adult  Goal: Achieves stable or improved neurological status  Outcome: Progressing     Problem: Respiratory - Adult  Goal: Achieves optimal ventilation and oxygenation  Outcome: Progressing     Problem: Cardiovascular - Adult  Goal: Maintains optimal cardiac output and hemodynamic stability  Outcome: Progressing     Problem: Skin/Tissue Integrity - Adult  Goal: Skin integrity remains intact  Outcome: Progressing  Flowsheets (Taken 10/23/2023 2045)  Skin Integrity Remains Intact: Monitor for areas of redness and/or skin breakdown     Problem: Musculoskeletal - Adult  Goal: Return mobility to safest level of function  Outcome: Progressing     Problem: Gastrointestinal - Adult  Goal: Minimal or absence of nausea and vomiting  Outcome:

## 2023-10-24 NOTE — PLAN OF CARE
Problem: Discharge Planning  Goal: Discharge to home or other facility with appropriate resources  10/24/2023 1014 by Charmaine Shook RN  Outcome: Progressing  10/24/2023 0044 by Raj Rivas RN  Outcome: Progressing  Flowsheets (Taken 10/23/2023 2045)  Discharge to home or other facility with appropriate resources: Identify barriers to discharge with patient and caregiver     Problem: Safety - Adult  Goal: Free from fall injury  10/24/2023 1014 by Charmaine Shook RN  Outcome: Progressing  10/24/2023 0044 by Raj Rivas RN  Outcome: Progressing     Problem: ABCDS Injury Assessment  Goal: Absence of physical injury  10/24/2023 1014 by Cahrmaine Shook RN  Outcome: Progressing  10/24/2023 0044 by Raj Rivas RN  Outcome: Progressing     Problem: Skin/Tissue Integrity  Goal: Absence of new skin breakdown  Description: 1. Monitor for areas of redness and/or skin breakdown  2. Assess vascular access sites hourly  3. Every 4-6 hours minimum:  Change oxygen saturation probe site  4. Every 4-6 hours:  If on nasal continuous positive airway pressure, respiratory therapy assess nares and determine need for appliance change or resting period.   10/24/2023 1014 by Charmaine Shook RN  Outcome: Progressing  10/24/2023 0044 by Raj Rivas RN  Outcome: Progressing

## 2023-10-25 ENCOUNTER — APPOINTMENT (OUTPATIENT)
Dept: GENERAL RADIOLOGY | Age: 73
DRG: 641 | End: 2023-10-25
Payer: MEDICARE

## 2023-10-25 LAB
GLUCOSE BLD-MCNC: 217 MG/DL (ref 70–99)
GLUCOSE BLD-MCNC: 259 MG/DL (ref 70–99)
GLUCOSE BLD-MCNC: 276 MG/DL (ref 70–99)
GLUCOSE BLD-MCNC: 93 MG/DL (ref 70–99)
PERFORMED ON: ABNORMAL
PERFORMED ON: NORMAL
SODIUM SERPL-SCNC: 125 MMOL/L (ref 136–145)

## 2023-10-25 PROCEDURE — 97530 THERAPEUTIC ACTIVITIES: CPT

## 2023-10-25 PROCEDURE — 94760 N-INVAS EAR/PLS OXIMETRY 1: CPT

## 2023-10-25 PROCEDURE — 36415 COLL VENOUS BLD VENIPUNCTURE: CPT

## 2023-10-25 PROCEDURE — 6370000000 HC RX 637 (ALT 250 FOR IP): Performed by: STUDENT IN AN ORGANIZED HEALTH CARE EDUCATION/TRAINING PROGRAM

## 2023-10-25 PROCEDURE — 6370000000 HC RX 637 (ALT 250 FOR IP): Performed by: FAMILY MEDICINE

## 2023-10-25 PROCEDURE — 84295 ASSAY OF SERUM SODIUM: CPT

## 2023-10-25 PROCEDURE — 97535 SELF CARE MNGMENT TRAINING: CPT

## 2023-10-25 PROCEDURE — 6360000002 HC RX W HCPCS: Performed by: STUDENT IN AN ORGANIZED HEALTH CARE EDUCATION/TRAINING PROGRAM

## 2023-10-25 PROCEDURE — 2700000000 HC OXYGEN THERAPY PER DAY

## 2023-10-25 PROCEDURE — 1210000000 HC MED SURG R&B

## 2023-10-25 PROCEDURE — 94640 AIRWAY INHALATION TREATMENT: CPT

## 2023-10-25 PROCEDURE — 2580000003 HC RX 258: Performed by: STUDENT IN AN ORGANIZED HEALTH CARE EDUCATION/TRAINING PROGRAM

## 2023-10-25 PROCEDURE — 71046 X-RAY EXAM CHEST 2 VIEWS: CPT

## 2023-10-25 PROCEDURE — 82962 GLUCOSE BLOOD TEST: CPT

## 2023-10-25 RX ORDER — PREDNISONE 20 MG/1
20 TABLET ORAL DAILY
Status: DISCONTINUED | OUTPATIENT
Start: 2023-10-25 | End: 2023-10-26 | Stop reason: HOSPADM

## 2023-10-25 RX ORDER — GUAIFENESIN 600 MG/1
1200 TABLET, EXTENDED RELEASE ORAL 2 TIMES DAILY
Status: DISCONTINUED | OUTPATIENT
Start: 2023-10-25 | End: 2023-10-26 | Stop reason: HOSPADM

## 2023-10-25 RX ORDER — FUROSEMIDE 20 MG/1
20 TABLET ORAL DAILY
Status: DISCONTINUED | OUTPATIENT
Start: 2023-10-25 | End: 2023-10-26 | Stop reason: HOSPADM

## 2023-10-25 RX ADMIN — METFORMIN HYDROCHLORIDE 500 MG: 500 TABLET, FILM COATED ORAL at 08:18

## 2023-10-25 RX ADMIN — SODIUM CHLORIDE, PRESERVATIVE FREE 10 ML: 5 INJECTION INTRAVENOUS at 20:46

## 2023-10-25 RX ADMIN — SOTALOL HYDROCHLORIDE 120 MG: 120 TABLET ORAL at 08:18

## 2023-10-25 RX ADMIN — APIXABAN 5 MG: 5 TABLET, FILM COATED ORAL at 08:18

## 2023-10-25 RX ADMIN — IPRATROPIUM BROMIDE AND ALBUTEROL SULFATE 1 DOSE: 2.5; .5 SOLUTION RESPIRATORY (INHALATION) at 07:09

## 2023-10-25 RX ADMIN — SODIUM CHLORIDE, PRESERVATIVE FREE 10 ML: 5 INJECTION INTRAVENOUS at 08:18

## 2023-10-25 RX ADMIN — APIXABAN 5 MG: 5 TABLET, FILM COATED ORAL at 20:45

## 2023-10-25 RX ADMIN — ACETAMINOPHEN 650 MG: 325 TABLET ORAL at 08:17

## 2023-10-25 RX ADMIN — IPRATROPIUM BROMIDE AND ALBUTEROL SULFATE 1 DOSE: 2.5; .5 SOLUTION RESPIRATORY (INHALATION) at 19:21

## 2023-10-25 RX ADMIN — METFORMIN HYDROCHLORIDE 500 MG: 500 TABLET, FILM COATED ORAL at 16:47

## 2023-10-25 RX ADMIN — FUROSEMIDE 20 MG: 20 TABLET ORAL at 08:18

## 2023-10-25 RX ADMIN — INSULIN LISPRO 2 UNITS: 100 INJECTION, SOLUTION INTRAVENOUS; SUBCUTANEOUS at 16:47

## 2023-10-25 RX ADMIN — BUDESONIDE 250 MCG: 0.25 SUSPENSION RESPIRATORY (INHALATION) at 07:09

## 2023-10-25 RX ADMIN — ROSUVASTATIN 5 MG: 10 TABLET, FILM COATED ORAL at 20:45

## 2023-10-25 RX ADMIN — CLOPIDOGREL BISULFATE 75 MG: 75 TABLET ORAL at 08:18

## 2023-10-25 RX ADMIN — IPRATROPIUM BROMIDE AND ALBUTEROL SULFATE 1 DOSE: 2.5; .5 SOLUTION RESPIRATORY (INHALATION) at 14:21

## 2023-10-25 RX ADMIN — GUAIFENESIN 1200 MG: 600 TABLET ORAL at 20:45

## 2023-10-25 RX ADMIN — SOTALOL HYDROCHLORIDE 120 MG: 120 TABLET ORAL at 20:45

## 2023-10-25 RX ADMIN — GUAIFENESIN 1200 MG: 600 TABLET ORAL at 08:21

## 2023-10-25 RX ADMIN — BUDESONIDE 250 MCG: 0.25 SUSPENSION RESPIRATORY (INHALATION) at 19:21

## 2023-10-25 RX ADMIN — PREDNISONE 20 MG: 20 TABLET ORAL at 08:18

## 2023-10-25 RX ADMIN — IPRATROPIUM BROMIDE AND ALBUTEROL SULFATE 1 DOSE: 2.5; .5 SOLUTION RESPIRATORY (INHALATION) at 10:00

## 2023-10-25 ASSESSMENT — PAIN DESCRIPTION - DESCRIPTORS: DESCRIPTORS: DISCOMFORT

## 2023-10-25 ASSESSMENT — PAIN SCALES - GENERAL: PAINLEVEL_OUTOF10: 5

## 2023-10-25 ASSESSMENT — PAIN DESCRIPTION - LOCATION: LOCATION: GENERALIZED

## 2023-10-25 NOTE — PLAN OF CARE
Problem: Discharge Planning  Goal: Discharge to home or other facility with appropriate resources  Outcome: Progressing     Problem: Safety - Adult  Goal: Free from fall injury  Outcome: Progressing     Problem: ABCDS Injury Assessment  Goal: Absence of physical injury  Outcome: Progressing     Problem: Skin/Tissue Integrity  Goal: Absence of new skin breakdown  Description: 1. Monitor for areas of redness and/or skin breakdown  2. Assess vascular access sites hourly  3. Every 4-6 hours minimum:  Change oxygen saturation probe site  4. Every 4-6 hours:  If on nasal continuous positive airway pressure, respiratory therapy assess nares and determine need for appliance change or resting period.   Outcome: Progressing     Problem: Musculoskeletal - Adult  Goal: Return mobility to safest level of function  Outcome: Progressing     Problem: Infection - Adult  Goal: Absence of infection at discharge  Outcome: Progressing

## 2023-10-26 VITALS
WEIGHT: 167.9 LBS | SYSTOLIC BLOOD PRESSURE: 121 MMHG | HEIGHT: 62 IN | BODY MASS INDEX: 30.9 KG/M2 | TEMPERATURE: 97.3 F | HEART RATE: 76 BPM | OXYGEN SATURATION: 97 % | RESPIRATION RATE: 16 BRPM | DIASTOLIC BLOOD PRESSURE: 68 MMHG

## 2023-10-26 LAB
ANION GAP SERPL CALCULATED.3IONS-SCNC: 6 MMOL/L (ref 7–19)
BUN SERPL-MCNC: 20 MG/DL (ref 8–23)
CALCIUM SERPL-MCNC: 9 MG/DL (ref 8.8–10.2)
CHLORIDE SERPL-SCNC: 93 MMOL/L (ref 98–111)
CO2 SERPL-SCNC: 31 MMOL/L (ref 22–29)
CREAT SERPL-MCNC: 0.9 MG/DL (ref 0.5–0.9)
GLUCOSE BLD-MCNC: 122 MG/DL (ref 70–99)
GLUCOSE BLD-MCNC: 164 MG/DL (ref 70–99)
GLUCOSE SERPL-MCNC: 141 MG/DL (ref 74–109)
MAGNESIUM SERPL-MCNC: 1.9 MG/DL (ref 1.6–2.4)
PERFORMED ON: ABNORMAL
PERFORMED ON: ABNORMAL
POTASSIUM SERPL-SCNC: 4.6 MMOL/L (ref 3.5–5)
SODIUM SERPL-SCNC: 130 MMOL/L (ref 136–145)

## 2023-10-26 PROCEDURE — 6370000000 HC RX 637 (ALT 250 FOR IP): Performed by: STUDENT IN AN ORGANIZED HEALTH CARE EDUCATION/TRAINING PROGRAM

## 2023-10-26 PROCEDURE — 94760 N-INVAS EAR/PLS OXIMETRY 1: CPT

## 2023-10-26 PROCEDURE — 6360000002 HC RX W HCPCS: Performed by: STUDENT IN AN ORGANIZED HEALTH CARE EDUCATION/TRAINING PROGRAM

## 2023-10-26 PROCEDURE — 36415 COLL VENOUS BLD VENIPUNCTURE: CPT

## 2023-10-26 PROCEDURE — 2580000003 HC RX 258: Performed by: STUDENT IN AN ORGANIZED HEALTH CARE EDUCATION/TRAINING PROGRAM

## 2023-10-26 PROCEDURE — 83735 ASSAY OF MAGNESIUM: CPT

## 2023-10-26 PROCEDURE — 80048 BASIC METABOLIC PNL TOTAL CA: CPT

## 2023-10-26 PROCEDURE — 82962 GLUCOSE BLOOD TEST: CPT

## 2023-10-26 PROCEDURE — 2700000000 HC OXYGEN THERAPY PER DAY

## 2023-10-26 PROCEDURE — 6370000000 HC RX 637 (ALT 250 FOR IP): Performed by: FAMILY MEDICINE

## 2023-10-26 PROCEDURE — 94640 AIRWAY INHALATION TREATMENT: CPT

## 2023-10-26 RX ADMIN — BUDESONIDE 250 MCG: 0.25 SUSPENSION RESPIRATORY (INHALATION) at 07:12

## 2023-10-26 RX ADMIN — IPRATROPIUM BROMIDE AND ALBUTEROL SULFATE 1 DOSE: 2.5; .5 SOLUTION RESPIRATORY (INHALATION) at 15:00

## 2023-10-26 RX ADMIN — METFORMIN HYDROCHLORIDE 500 MG: 500 TABLET, FILM COATED ORAL at 07:37

## 2023-10-26 RX ADMIN — APIXABAN 5 MG: 5 TABLET, FILM COATED ORAL at 08:43

## 2023-10-26 RX ADMIN — CLOPIDOGREL BISULFATE 75 MG: 75 TABLET ORAL at 08:42

## 2023-10-26 RX ADMIN — SOTALOL HYDROCHLORIDE 120 MG: 120 TABLET ORAL at 08:43

## 2023-10-26 RX ADMIN — GUAIFENESIN 1200 MG: 600 TABLET ORAL at 08:43

## 2023-10-26 RX ADMIN — FUROSEMIDE 20 MG: 20 TABLET ORAL at 08:42

## 2023-10-26 RX ADMIN — PREDNISONE 20 MG: 20 TABLET ORAL at 08:43

## 2023-10-26 RX ADMIN — IPRATROPIUM BROMIDE AND ALBUTEROL SULFATE 1 DOSE: 2.5; .5 SOLUTION RESPIRATORY (INHALATION) at 10:52

## 2023-10-26 RX ADMIN — IPRATROPIUM BROMIDE AND ALBUTEROL SULFATE 1 DOSE: 2.5; .5 SOLUTION RESPIRATORY (INHALATION) at 07:12

## 2023-10-26 RX ADMIN — SODIUM CHLORIDE, PRESERVATIVE FREE 10 ML: 5 INJECTION INTRAVENOUS at 08:44

## 2023-10-26 NOTE — CARE COORDINATION
10/21/23 1028   IMM Letter   IMM Letter given to Patient/Family/Significant other/Guardian/POA/by: ALENA Langley   IMM Letter date given: 10/21/23   IMM Letter time given: 200     First IMM given and explained to patient. All questions answered. Patient upgraded from observation to inpatient. Signed copy placed in patient soft chart.    Electronically signed by ALENA Langley on 10/21/2023 at 10:29 AM
Important Message from [facility] letter given to  Zane Hairston  by Tiburcio Albright. All questions answered,  Zane Hairston  voiced understanding. Signed copy of IMM placed in patient's soft chart. Zane Hairston given a copy of the IMM.       10/26/23 1134   IMM Letter   IMM Letter given to Patient/Family/Significant other/Guardian/POA/by: Tiburcio Albright   IMM Letter date given: 10/26/23   IMM Letter time given: 80
Pt is s/p fall at home hitting her head; lives at home alone; has oxygen with [employer]; trilogy at bedside; has multiple DME items at home (shower chair, walker, rollator, w/c, cane), oxygen, trilogy); ramped entrance; has a c/g 4-5 times per week to help with IADL needs. SW visited with Pt  re: d/c planning; lengthy discussion; Pt with significant bruising to face/neckchest re: fall; stated she lives at home and between her hired c/g's Zack Salazar and Ruthann Causey, along with Pt's son, Pt is rarely left alone. Pt denies need for [facility] or rehab/SNF; she stated she has made many adaptations to be able to remain at home and that her c/gs and son help with items that she can't manage IND.     Electronically signed by WAI Cardoza on 10/24/2023 at 3:52 PM
Alert-The patient is alert, awake and responds to voice. The patient is oriented to time, place, and person. The triage nurse is able to obtain subjective information.

## 2023-10-26 NOTE — PLAN OF CARE
Problem: Discharge Planning  Goal: Discharge to home or other facility with appropriate resources  Outcome: Adequate for Discharge     Problem: Safety - Adult  Goal: Free from fall injury  Outcome: Adequate for Discharge     Problem: ABCDS Injury Assessment  Goal: Absence of physical injury  Outcome: Adequate for Discharge     Problem: Skin/Tissue Integrity  Goal: Absence of new skin breakdown  Description: 1. Monitor for areas of redness and/or skin breakdown  2. Assess vascular access sites hourly  3. Every 4-6 hours minimum:  Change oxygen saturation probe site  4. Every 4-6 hours:  If on nasal continuous positive airway pressure, respiratory therapy assess nares and determine need for appliance change or resting period.   Outcome: Adequate for Discharge     Problem: Pain  Goal: Verbalizes/displays adequate comfort level or baseline comfort level  Outcome: Adequate for Discharge     Problem: Chronic Conditions and Co-morbidities  Goal: Patient's chronic conditions and co-morbidity symptoms are monitored and maintained or improved  Outcome: Adequate for Discharge     Problem: Neurosensory - Adult  Goal: Achieves stable or improved neurological status  Outcome: Adequate for Discharge     Problem: Respiratory - Adult  Goal: Achieves optimal ventilation and oxygenation  Outcome: Adequate for Discharge     Problem: Cardiovascular - Adult  Goal: Maintains optimal cardiac output and hemodynamic stability  Outcome: Adequate for Discharge     Problem: Skin/Tissue Integrity - Adult  Goal: Skin integrity remains intact  Outcome: Adequate for Discharge     Problem: Musculoskeletal - Adult  Goal: Return mobility to safest level of function  Outcome: Adequate for Discharge     Problem: Gastrointestinal - Adult  Goal: Minimal or absence of nausea and vomiting  Outcome: Adequate for Discharge     Problem: Genitourinary - Adult  Goal: Absence of urinary retention  Outcome: Adequate for Discharge     Problem: Infection -

## 2023-10-26 NOTE — DISCHARGE SUMMARY
capsule  Commonly known as: ALTACE              Electronically signed by Carmenza Angela MD on 10/26/23 at 2:32 PM CDT

## 2023-11-20 ENCOUNTER — APPOINTMENT (OUTPATIENT)
Dept: GENERAL RADIOLOGY | Age: 73
DRG: 190 | End: 2023-11-20
Payer: MEDICARE

## 2023-11-20 ENCOUNTER — HOSPITAL ENCOUNTER (INPATIENT)
Age: 73
LOS: 1 days | Discharge: HOME | DRG: 190 | End: 2023-11-22
Attending: EMERGENCY MEDICINE | Admitting: FAMILY MEDICINE
Payer: MEDICARE

## 2023-11-20 DIAGNOSIS — J96.22 ACUTE ON CHRONIC RESPIRATORY FAILURE WITH HYPOXIA AND HYPERCAPNIA (HCC): Primary | ICD-10-CM

## 2023-11-20 DIAGNOSIS — J96.21 ACUTE ON CHRONIC RESPIRATORY FAILURE WITH HYPOXIA AND HYPERCAPNIA (HCC): Primary | ICD-10-CM

## 2023-11-20 DIAGNOSIS — J44.1 COPD WITH ACUTE EXACERBATION (HCC): ICD-10-CM

## 2023-11-20 DIAGNOSIS — J44.9 CHRONIC OBSTRUCTIVE PULMONARY DISEASE, UNSPECIFIED COPD TYPE (HCC): ICD-10-CM

## 2023-11-20 LAB
ALBUMIN SERPL-MCNC: 3.4 G/DL (ref 3.5–5.2)
ALP SERPL-CCNC: 107 U/L (ref 35–104)
ALT SERPL-CCNC: 10 U/L (ref 5–33)
ANION GAP SERPL CALCULATED.3IONS-SCNC: 5 MMOL/L (ref 7–19)
AST SERPL-CCNC: 13 U/L (ref 5–32)
B PARAP IS1001 DNA NPH QL NAA+NON-PROBE: NOT DETECTED
B PERT.PT PRMT NPH QL NAA+NON-PROBE: NOT DETECTED
BASE EXCESS VENOUS: 16 MMOL/L
BASOPHILS # BLD: 0 K/UL (ref 0–0.2)
BASOPHILS NFR BLD: 0.2 % (ref 0–1)
BILIRUB SERPL-MCNC: 0.3 MG/DL (ref 0.2–1.2)
BNP BLD-MCNC: 5551 PG/ML (ref 0–124)
BUN SERPL-MCNC: 16 MG/DL (ref 8–23)
C PNEUM DNA NPH QL NAA+NON-PROBE: NOT DETECTED
CALCIUM SERPL-MCNC: 8.7 MG/DL (ref 8.8–10.2)
CARBOXYHEMOGLOBIN: 8.4 %
CHLORIDE SERPL-SCNC: 87 MMOL/L (ref 98–111)
CO2 SERPL-SCNC: 36 MMOL/L (ref 22–29)
CREAT SERPL-MCNC: 1.1 MG/DL (ref 0.5–0.9)
EOSINOPHIL # BLD: 0 K/UL (ref 0–0.6)
EOSINOPHIL NFR BLD: 0.5 % (ref 0–5)
ERYTHROCYTE [DISTWIDTH] IN BLOOD BY AUTOMATED COUNT: 15.6 % (ref 11.5–14.5)
FLUAV RNA NPH QL NAA+NON-PROBE: NOT DETECTED
FLUBV RNA NPH QL NAA+NON-PROBE: NOT DETECTED
GLUCOSE SERPL-MCNC: 168 MG/DL (ref 74–109)
HADV DNA NPH QL NAA+NON-PROBE: NOT DETECTED
HCO3 VENOUS: 45 MMOL/L (ref 23–29)
HCOV 229E RNA NPH QL NAA+NON-PROBE: NOT DETECTED
HCOV HKU1 RNA NPH QL NAA+NON-PROBE: NOT DETECTED
HCOV NL63 RNA NPH QL NAA+NON-PROBE: NOT DETECTED
HCOV OC43 RNA NPH QL NAA+NON-PROBE: NOT DETECTED
HCT VFR BLD AUTO: 34.8 % (ref 37–47)
HGB BLD-MCNC: 10.8 G/DL (ref 12–16)
HMPV RNA NPH QL NAA+NON-PROBE: NOT DETECTED
HPIV1 RNA NPH QL NAA+NON-PROBE: NOT DETECTED
HPIV2 RNA NPH QL NAA+NON-PROBE: NOT DETECTED
HPIV3 RNA NPH QL NAA+NON-PROBE: NOT DETECTED
HPIV4 RNA NPH QL NAA+NON-PROBE: NOT DETECTED
IMM GRANULOCYTES # BLD: 0 K/UL
LYMPHOCYTES # BLD: 1.5 K/UL (ref 1.1–4.5)
LYMPHOCYTES NFR BLD: 18 % (ref 20–40)
M PNEUMO DNA NPH QL NAA+NON-PROBE: NOT DETECTED
MCH RBC QN AUTO: 29.7 PG (ref 27–31)
MCHC RBC AUTO-ENTMCNC: 31 G/DL (ref 33–37)
MCV RBC AUTO: 95.6 FL (ref 81–99)
METHEMOGLOBIN VENOUS: 0.8 %
MONOCYTES # BLD: 1.1 K/UL (ref 0–0.9)
MONOCYTES NFR BLD: 12.3 % (ref 0–10)
NEUTROPHILS # BLD: 5.9 K/UL (ref 1.5–7.5)
NEUTS SEG NFR BLD: 68.8 % (ref 50–65)
O2 CONTENT, VEN: 10 ML/DL
O2 SAT, VEN: 60 %
PCO2, VEN: 86 MMHG (ref 40–50)
PH VENOUS: 7.33 (ref 7.35–7.45)
PLATELET # BLD AUTO: 240 K/UL (ref 130–400)
PMV BLD AUTO: 8.6 FL (ref 9.4–12.3)
PO2, VEN: 35 MMHG
POTASSIUM SERPL-SCNC: 4.7 MMOL/L (ref 3.5–5)
PROCALCITONIN: 0.04 NG/ML (ref 0–0.09)
PROT SERPL-MCNC: 6.7 G/DL (ref 6.6–8.7)
RBC # BLD AUTO: 3.64 M/UL (ref 4.2–5.4)
RSV RNA NPH QL NAA+NON-PROBE: NOT DETECTED
RV+EV RNA NPH QL NAA+NON-PROBE: NOT DETECTED
SARS-COV-2 RNA NPH QL NAA+NON-PROBE: NOT DETECTED
SODIUM SERPL-SCNC: 128 MMOL/L (ref 136–145)
WBC # BLD AUTO: 8.5 K/UL (ref 4.8–10.8)

## 2023-11-20 PROCEDURE — 82800 BLOOD PH: CPT

## 2023-11-20 PROCEDURE — 94640 AIRWAY INHALATION TREATMENT: CPT

## 2023-11-20 PROCEDURE — 71045 X-RAY EXAM CHEST 1 VIEW: CPT

## 2023-11-20 PROCEDURE — 99285 EMERGENCY DEPT VISIT HI MDM: CPT

## 2023-11-20 PROCEDURE — 96374 THER/PROPH/DIAG INJ IV PUSH: CPT

## 2023-11-20 PROCEDURE — 80053 COMPREHEN METABOLIC PANEL: CPT

## 2023-11-20 PROCEDURE — 84145 PROCALCITONIN (PCT): CPT

## 2023-11-20 PROCEDURE — 82803 BLOOD GASES ANY COMBINATION: CPT

## 2023-11-20 PROCEDURE — 6370000000 HC RX 637 (ALT 250 FOR IP): Performed by: EMERGENCY MEDICINE

## 2023-11-20 PROCEDURE — 93005 ELECTROCARDIOGRAM TRACING: CPT | Performed by: EMERGENCY MEDICINE

## 2023-11-20 PROCEDURE — 83880 ASSAY OF NATRIURETIC PEPTIDE: CPT

## 2023-11-20 PROCEDURE — G0378 HOSPITAL OBSERVATION PER HR: HCPCS

## 2023-11-20 PROCEDURE — 0202U NFCT DS 22 TRGT SARS-COV-2: CPT

## 2023-11-20 PROCEDURE — 96375 TX/PRO/DX INJ NEW DRUG ADDON: CPT

## 2023-11-20 PROCEDURE — 6360000002 HC RX W HCPCS: Performed by: EMERGENCY MEDICINE

## 2023-11-20 PROCEDURE — 85025 COMPLETE CBC W/AUTO DIFF WBC: CPT

## 2023-11-20 PROCEDURE — 36415 COLL VENOUS BLD VENIPUNCTURE: CPT

## 2023-11-20 PROCEDURE — 96376 TX/PRO/DX INJ SAME DRUG ADON: CPT

## 2023-11-20 RX ORDER — IPRATROPIUM BROMIDE AND ALBUTEROL SULFATE 2.5; .5 MG/3ML; MG/3ML
1 SOLUTION RESPIRATORY (INHALATION) ONCE
Status: COMPLETED | OUTPATIENT
Start: 2023-11-20 | End: 2023-11-20

## 2023-11-20 RX ORDER — HALOPERIDOL 5 MG/ML
5 INJECTION INTRAMUSCULAR ONCE
Status: COMPLETED | OUTPATIENT
Start: 2023-11-20 | End: 2023-11-20

## 2023-11-20 RX ORDER — FUROSEMIDE 10 MG/ML
40 INJECTION INTRAMUSCULAR; INTRAVENOUS ONCE
Status: COMPLETED | OUTPATIENT
Start: 2023-11-20 | End: 2023-11-20

## 2023-11-20 RX ORDER — HALOPERIDOL 5 MG/ML
2.5 INJECTION INTRAMUSCULAR ONCE
Status: COMPLETED | OUTPATIENT
Start: 2023-11-20 | End: 2023-11-20

## 2023-11-20 RX ADMIN — HALOPERIDOL LACTATE 2.5 MG: 5 INJECTION, SOLUTION INTRAMUSCULAR at 20:27

## 2023-11-20 RX ADMIN — FUROSEMIDE 40 MG: 10 INJECTION, SOLUTION INTRAMUSCULAR; INTRAVENOUS at 19:52

## 2023-11-20 RX ADMIN — HALOPERIDOL LACTATE 5 MG: 5 INJECTION, SOLUTION INTRAMUSCULAR at 21:01

## 2023-11-20 RX ADMIN — PREDNISONE 50 MG: 20 TABLET ORAL at 20:55

## 2023-11-20 RX ADMIN — IPRATROPIUM BROMIDE AND ALBUTEROL SULFATE 1 DOSE: 2.5; .5 SOLUTION RESPIRATORY (INHALATION) at 20:20

## 2023-11-20 ASSESSMENT — ENCOUNTER SYMPTOMS
COUGH: 0
GASTROINTESTINAL NEGATIVE: 1
SHORTNESS OF BREATH: 1
EYES NEGATIVE: 1

## 2023-11-20 ASSESSMENT — PAIN - FUNCTIONAL ASSESSMENT: PAIN_FUNCTIONAL_ASSESSMENT: NONE - DENIES PAIN

## 2023-11-21 LAB
BASE EXCESS ARTERIAL: 17.5 MMOL/L (ref -2–2)
CARBOXYHEMOGLOBIN ARTERIAL: 6.2 % (ref 0–5)
EKG P AXIS: NORMAL DEGREES
EKG P-R INTERVAL: NORMAL MS
EKG Q-T INTERVAL: 428 MS
EKG QRS DURATION: 88 MS
EKG QTC CALCULATION (BAZETT): 463 MS
EKG T AXIS: 83 DEGREES
GLUCOSE BLD-MCNC: 187 MG/DL (ref 70–99)
GLUCOSE BLD-MCNC: 284 MG/DL (ref 70–99)
GLUCOSE BLD-MCNC: 359 MG/DL (ref 70–99)
HCO3 ARTERIAL: 45.6 MMOL/L (ref 22–26)
HEMOGLOBIN, ART, EXTENDED: 12.1 G/DL (ref 12–16)
METHEMOGLOBIN ARTERIAL: 0.7 %
O2 CONTENT ARTERIAL: 14.1 ML/DL
O2 DELIVERY DEVICE: ABNORMAL
O2 SAT, ARTERIAL: 82.8 %
O2 THERAPY: ABNORMAL
OXYGEN FLOW: 3
PCO2 ARTERIAL: 72 MMHG (ref 35–45)
PERFORMED ON: ABNORMAL
PH ARTERIAL: 7.41 (ref 7.35–7.45)
PO2 ARTERIAL: 51 MMHG (ref 80–100)
POTASSIUM BLD-SCNC: 3.9 MMOL/L
SAMPLE SOURCE: ABNORMAL

## 2023-11-21 PROCEDURE — 6370000000 HC RX 637 (ALT 250 FOR IP): Performed by: FAMILY MEDICINE

## 2023-11-21 PROCEDURE — 36600 WITHDRAWAL OF ARTERIAL BLOOD: CPT

## 2023-11-21 PROCEDURE — 94761 N-INVAS EAR/PLS OXIMETRY MLT: CPT

## 2023-11-21 PROCEDURE — C8929 TTE W OR WO FOL WCON,DOPPLER: HCPCS

## 2023-11-21 PROCEDURE — 6360000002 HC RX W HCPCS: Performed by: FAMILY MEDICINE

## 2023-11-21 PROCEDURE — 1210000000 HC MED SURG R&B

## 2023-11-21 PROCEDURE — 94640 AIRWAY INHALATION TREATMENT: CPT

## 2023-11-21 PROCEDURE — 6360000004 HC RX CONTRAST MEDICATION: Performed by: FAMILY MEDICINE

## 2023-11-21 PROCEDURE — 2580000003 HC RX 258: Performed by: FAMILY MEDICINE

## 2023-11-21 PROCEDURE — 93010 ELECTROCARDIOGRAM REPORT: CPT | Performed by: INTERNAL MEDICINE

## 2023-11-21 PROCEDURE — 5A09357 ASSISTANCE WITH RESPIRATORY VENTILATION, LESS THAN 24 CONSECUTIVE HOURS, CONTINUOUS POSITIVE AIRWAY PRESSURE: ICD-10-PCS | Performed by: FAMILY MEDICINE

## 2023-11-21 PROCEDURE — 96375 TX/PRO/DX INJ NEW DRUG ADDON: CPT

## 2023-11-21 PROCEDURE — G0378 HOSPITAL OBSERVATION PER HR: HCPCS

## 2023-11-21 PROCEDURE — 99223 1ST HOSP IP/OBS HIGH 75: CPT | Performed by: INTERNAL MEDICINE

## 2023-11-21 PROCEDURE — 82962 GLUCOSE BLOOD TEST: CPT

## 2023-11-21 PROCEDURE — 82803 BLOOD GASES ANY COMBINATION: CPT

## 2023-11-21 PROCEDURE — 2700000000 HC OXYGEN THERAPY PER DAY

## 2023-11-21 RX ORDER — DEXTROSE MONOHYDRATE 100 MG/ML
INJECTION, SOLUTION INTRAVENOUS CONTINUOUS PRN
Status: DISCONTINUED | OUTPATIENT
Start: 2023-11-21 | End: 2023-11-22 | Stop reason: HOSPADM

## 2023-11-21 RX ORDER — FUROSEMIDE 40 MG/1
40 TABLET ORAL DAILY
Status: DISCONTINUED | OUTPATIENT
Start: 2023-11-21 | End: 2023-11-22 | Stop reason: HOSPADM

## 2023-11-21 RX ORDER — INSULIN LISPRO 100 [IU]/ML
0-4 INJECTION, SOLUTION INTRAVENOUS; SUBCUTANEOUS NIGHTLY
Status: DISCONTINUED | OUTPATIENT
Start: 2023-11-21 | End: 2023-11-22 | Stop reason: HOSPADM

## 2023-11-21 RX ORDER — CLOPIDOGREL BISULFATE 75 MG/1
75 TABLET ORAL DAILY
Status: DISCONTINUED | OUTPATIENT
Start: 2023-11-21 | End: 2023-11-22 | Stop reason: HOSPADM

## 2023-11-21 RX ORDER — MECOBALAMIN 5000 MCG
5 TABLET,DISINTEGRATING ORAL NIGHTLY PRN
Status: DISCONTINUED | OUTPATIENT
Start: 2023-11-21 | End: 2023-11-22 | Stop reason: HOSPADM

## 2023-11-21 RX ORDER — SODIUM CHLORIDE 0.9 % (FLUSH) 0.9 %
5-40 SYRINGE (ML) INJECTION EVERY 12 HOURS SCHEDULED
Status: DISCONTINUED | OUTPATIENT
Start: 2023-11-21 | End: 2023-11-22 | Stop reason: HOSPADM

## 2023-11-21 RX ORDER — SPIRONOLACTONE 50 MG/1
50 TABLET, FILM COATED ORAL DAILY
Status: DISCONTINUED | OUTPATIENT
Start: 2023-11-21 | End: 2023-11-22 | Stop reason: HOSPADM

## 2023-11-21 RX ORDER — NITROGLYCERIN 0.4 MG/1
0.4 TABLET SUBLINGUAL EVERY 5 MIN PRN
Status: DISCONTINUED | OUTPATIENT
Start: 2023-11-21 | End: 2023-11-22 | Stop reason: HOSPADM

## 2023-11-21 RX ORDER — INSULIN GLARGINE 100 [IU]/ML
0.15 INJECTION, SOLUTION SUBCUTANEOUS NIGHTLY
Status: DISCONTINUED | OUTPATIENT
Start: 2023-11-21 | End: 2023-11-22 | Stop reason: HOSPADM

## 2023-11-21 RX ORDER — IPRATROPIUM BROMIDE AND ALBUTEROL SULFATE 2.5; .5 MG/3ML; MG/3ML
1 SOLUTION RESPIRATORY (INHALATION)
Status: DISCONTINUED | OUTPATIENT
Start: 2023-11-21 | End: 2023-11-22 | Stop reason: HOSPADM

## 2023-11-21 RX ORDER — POTASSIUM CHLORIDE 1.5 G/1.58G
20 POWDER, FOR SOLUTION ORAL DAILY
Status: DISCONTINUED | OUTPATIENT
Start: 2023-11-21 | End: 2023-11-21 | Stop reason: CLARIF

## 2023-11-21 RX ORDER — SODIUM CHLORIDE 0.9 % (FLUSH) 0.9 %
5-40 SYRINGE (ML) INJECTION EVERY 12 HOURS SCHEDULED
Status: DISCONTINUED | OUTPATIENT
Start: 2023-11-21 | End: 2023-11-21 | Stop reason: SDUPTHER

## 2023-11-21 RX ORDER — INSULIN LISPRO 100 [IU]/ML
0-4 INJECTION, SOLUTION INTRAVENOUS; SUBCUTANEOUS
Status: DISCONTINUED | OUTPATIENT
Start: 2023-11-21 | End: 2023-11-22 | Stop reason: HOSPADM

## 2023-11-21 RX ORDER — SODIUM CHLORIDE 0.9 % (FLUSH) 0.9 %
5-40 SYRINGE (ML) INJECTION PRN
Status: DISCONTINUED | OUTPATIENT
Start: 2023-11-21 | End: 2023-11-22 | Stop reason: HOSPADM

## 2023-11-21 RX ORDER — ACETAMINOPHEN 325 MG/1
650 TABLET ORAL EVERY 6 HOURS PRN
Status: DISCONTINUED | OUTPATIENT
Start: 2023-11-21 | End: 2023-11-22 | Stop reason: HOSPADM

## 2023-11-21 RX ORDER — ONDANSETRON 2 MG/ML
4 INJECTION INTRAMUSCULAR; INTRAVENOUS EVERY 6 HOURS PRN
Status: DISCONTINUED | OUTPATIENT
Start: 2023-11-21 | End: 2023-11-22 | Stop reason: HOSPADM

## 2023-11-21 RX ORDER — PREDNISOLONE 15 MG/5ML
5 SOLUTION ORAL DAILY
Status: DISCONTINUED | OUTPATIENT
Start: 2023-11-21 | End: 2023-11-22 | Stop reason: HOSPADM

## 2023-11-21 RX ORDER — SODIUM CHLORIDE 9 MG/ML
INJECTION, SOLUTION INTRAVENOUS PRN
Status: DISCONTINUED | OUTPATIENT
Start: 2023-11-21 | End: 2023-11-21 | Stop reason: SDUPTHER

## 2023-11-21 RX ORDER — ONDANSETRON 4 MG/1
4 TABLET, ORALLY DISINTEGRATING ORAL EVERY 8 HOURS PRN
Status: DISCONTINUED | OUTPATIENT
Start: 2023-11-21 | End: 2023-11-22 | Stop reason: HOSPADM

## 2023-11-21 RX ORDER — SOTALOL HYDROCHLORIDE 80 MG/1
120 TABLET ORAL EVERY 12 HOURS SCHEDULED
Status: DISCONTINUED | OUTPATIENT
Start: 2023-11-21 | End: 2023-11-22 | Stop reason: HOSPADM

## 2023-11-21 RX ORDER — ENOXAPARIN SODIUM 100 MG/ML
40 INJECTION SUBCUTANEOUS DAILY
Status: DISCONTINUED | OUTPATIENT
Start: 2023-11-21 | End: 2023-11-21 | Stop reason: ALTCHOICE

## 2023-11-21 RX ORDER — ROSUVASTATIN CALCIUM 10 MG/1
10 TABLET, COATED ORAL NIGHTLY
Status: DISCONTINUED | OUTPATIENT
Start: 2023-11-21 | End: 2023-11-22 | Stop reason: HOSPADM

## 2023-11-21 RX ORDER — SODIUM CHLORIDE 9 MG/ML
INJECTION, SOLUTION INTRAVENOUS PRN
Status: DISCONTINUED | OUTPATIENT
Start: 2023-11-21 | End: 2023-11-22 | Stop reason: HOSPADM

## 2023-11-21 RX ORDER — ENOXAPARIN SODIUM 100 MG/ML
40 INJECTION SUBCUTANEOUS DAILY
Status: DISCONTINUED | OUTPATIENT
Start: 2023-11-21 | End: 2023-11-21 | Stop reason: SDUPTHER

## 2023-11-21 RX ORDER — ACETAMINOPHEN 650 MG/1
650 SUPPOSITORY RECTAL EVERY 6 HOURS PRN
Status: DISCONTINUED | OUTPATIENT
Start: 2023-11-21 | End: 2023-11-22 | Stop reason: HOSPADM

## 2023-11-21 RX ORDER — ACETAMINOPHEN 325 MG/1
650 TABLET ORAL EVERY 4 HOURS PRN
Status: DISCONTINUED | OUTPATIENT
Start: 2023-11-21 | End: 2023-11-21 | Stop reason: SDUPTHER

## 2023-11-21 RX ORDER — POLYETHYLENE GLYCOL 3350 17 G/17G
17 POWDER, FOR SOLUTION ORAL DAILY PRN
Status: DISCONTINUED | OUTPATIENT
Start: 2023-11-21 | End: 2023-11-22 | Stop reason: HOSPADM

## 2023-11-21 RX ORDER — SODIUM CHLORIDE 0.9 % (FLUSH) 0.9 %
5-40 SYRINGE (ML) INJECTION PRN
Status: DISCONTINUED | OUTPATIENT
Start: 2023-11-21 | End: 2023-11-21 | Stop reason: SDUPTHER

## 2023-11-21 RX ADMIN — Medication 5 MG: at 19:49

## 2023-11-21 RX ADMIN — INSULIN LISPRO 4 UNITS: 100 INJECTION, SOLUTION INTRAVENOUS; SUBCUTANEOUS at 17:31

## 2023-11-21 RX ADMIN — APIXABAN 5 MG: 5 TABLET, FILM COATED ORAL at 19:49

## 2023-11-21 RX ADMIN — METFORMIN HYDROCHLORIDE 500 MG: 500 TABLET ORAL at 12:17

## 2023-11-21 RX ADMIN — PERFLUTREN 1.5 ML: 6.52 INJECTION, SUSPENSION INTRAVENOUS at 11:13

## 2023-11-21 RX ADMIN — SODIUM CHLORIDE, PRESERVATIVE FREE 10 ML: 5 INJECTION INTRAVENOUS at 12:18

## 2023-11-21 RX ADMIN — CLOPIDOGREL BISULFATE 75 MG: 75 TABLET ORAL at 12:17

## 2023-11-21 RX ADMIN — METHYLPREDNISOLONE SODIUM SUCCINATE 125 MG: 125 INJECTION, POWDER, FOR SOLUTION INTRAMUSCULAR; INTRAVENOUS at 03:41

## 2023-11-21 RX ADMIN — APIXABAN 5 MG: 5 TABLET, FILM COATED ORAL at 12:18

## 2023-11-21 RX ADMIN — ROSUVASTATIN 10 MG: 10 TABLET, FILM COATED ORAL at 19:49

## 2023-11-21 RX ADMIN — SOTALOL HYDROCHLORIDE 120 MG: 80 TABLET ORAL at 19:49

## 2023-11-21 RX ADMIN — POTASSIUM BICARBONATE 20 MEQ: 782 TABLET, EFFERVESCENT ORAL at 12:17

## 2023-11-21 RX ADMIN — IPRATROPIUM BROMIDE AND ALBUTEROL SULFATE 1 DOSE: 2.5; .5 SOLUTION RESPIRATORY (INHALATION) at 06:19

## 2023-11-21 RX ADMIN — METFORMIN HYDROCHLORIDE 500 MG: 500 TABLET ORAL at 15:42

## 2023-11-21 RX ADMIN — INSULIN GLARGINE 11 UNITS: 100 INJECTION, SOLUTION SUBCUTANEOUS at 19:49

## 2023-11-21 RX ADMIN — IPRATROPIUM BROMIDE AND ALBUTEROL SULFATE 1 DOSE: 2.5; .5 SOLUTION RESPIRATORY (INHALATION) at 14:12

## 2023-11-21 RX ADMIN — SOTALOL HYDROCHLORIDE 120 MG: 80 TABLET ORAL at 12:17

## 2023-11-21 RX ADMIN — SPIRONOLACTONE 50 MG: 50 TABLET ORAL at 12:17

## 2023-11-21 RX ADMIN — SODIUM CHLORIDE, PRESERVATIVE FREE 10 ML: 5 INJECTION INTRAVENOUS at 19:50

## 2023-11-21 RX ADMIN — IPRATROPIUM BROMIDE AND ALBUTEROL SULFATE 1 DOSE: 2.5; .5 SOLUTION RESPIRATORY (INHALATION) at 20:23

## 2023-11-21 RX ADMIN — FUROSEMIDE 40 MG: 40 TABLET ORAL at 12:17

## 2023-11-21 RX ADMIN — Medication 5 MG: at 17:10

## 2023-11-21 ASSESSMENT — LIFESTYLE VARIABLES
HOW MANY STANDARD DRINKS CONTAINING ALCOHOL DO YOU HAVE ON A TYPICAL DAY: PATIENT DOES NOT DRINK
HOW OFTEN DO YOU HAVE A DRINK CONTAINING ALCOHOL: NEVER

## 2023-11-21 NOTE — PROGRESS NOTES
4 Eyes Skin Assessment     NAME:  Cady Ramirez  YOB: 1950  MEDICAL RECORD NUMBER:  [id number]    The patient is being assessed for  Admission    I agree that at least one RN has performed a thorough Head to Toe Skin Assessment on the patient. ALL assessment sites listed below have been assessed. Areas assessed by both nurses:    Head, Face, Ears, Shoulders, Back, Chest, Arms, Elbows, Hands, Sacrum. Buttock, Coccyx, Ischium, Legs. Feet and Heels, Under Medical Devices , and Other          Does the Patient have a Wound?  No noted wound(s)       Pratik Prevention initiated by RN: No  Wound Care Orders initiated by RN: No    Pressure Injury (Stage 3,4, Unstageable, DTI, NWPT, and Complex wounds) if present, place Wound referral order by RN under : No    New Ostomies, if present place, Ostomy referral order under : No     Nurse 1 eSignature: Electronically signed by Anirudh Kennedy RN on 11/21/23 at 3:16 AM CST    **SHARE this note so that the co-signing nurse can place an eSignature**    Nurse 2 eSignature: {Esignature:[id number]}

## 2023-11-21 NOTE — ED NOTES
Called Dr. Renan Morales answering service. They are paging him to call us back.      Ulysses Ricardo  11/20/23 2047 Electrodesiccation And Curettage Text: The wound bed was treated with electrodesiccation and curettage after the biopsy was performed. Consent: Written consent was obtained and risks were reviewed including but not limited to scarring, infection, bleeding, scabbing, incomplete removal, nerve damage and allergy to anesthesia. Silver Nitrate Text: The wound bed was treated with silver nitrate after the biopsy was performed. Electrodesiccation Text: The wound bed was treated with electrodesiccation after the biopsy was performed. Post-Care Instructions: I reviewed with the patient in detail post-care instructions. Patient is to keep the biopsy site dry overnight, and then apply bacitracin twice daily until healed. Patient may apply hydrogen peroxide soaks to remove any crusting. Wound Care: Vaseline Destruction After The Procedure: No Billing Type: Third-Party Bill X Size Of Lesion In Cm: 0.1 Type Of Destruction Used: Curettage Notification Instructions: Patient will be notified of biopsy results. However, patient instructed to call the office if not contacted within 2 weeks. Hemostasis: Aluminum Chloride Curettage Text: The wound bed was treated with curettage after the biopsy was performed. Cryotherapy Text: The wound bed was treated with cryotherapy after the biopsy was performed. Dressing: bandage Anesthesia Type: 1% lidocaine with epinephrine Biopsy Method: Dermablade Biopsy Type: H and E Additional Anesthesia Volume In Cc (Will Not Render If 0): 0 Detail Level: Simple Anesthesia Volume In Cc (Will Not Render If 0): 0.5

## 2023-11-21 NOTE — CARE COORDINATION
11/21/23 1211   IMM Letter   IMM Letter given to Patient/Family/Significant other/Guardian/POA/by: letter explained to and signed by ptLorena Ho   IMM Letter date given: 11/21/23   IMM Letter time given: 46     Upgraded from obs to inpt. Important Message from [facility] letter explained and provided to patient by Svetlana Cha RN CM. All questions answered. Patient voiced understanding. Copy placed in soft chart.

## 2023-11-21 NOTE — ED PROVIDER NOTES
[facility] EMERGENCY DEPT  EMERGENCY DEPARTMENT ENCOUNTER      Pt Name: Gely Maurice  MRN: [id number]  [facility] [facility] 1950  Date of evaluation: 11/20/2023  Provider: Roscoe Harman MD    CHIEF COMPLAINT       Chief Complaint   Patient presents with    Shortness of Breath     Patient arrive by [facility] from home with c/o shortness of breath. She is on 2.5 L of o2 at home. HISTORY OF PRESENT ILLNESS   (Location/Symptom, Timing/Onset,Context/Setting, Quality, Duration, Modifying Factors, Severity)  Note limiting factors. Geyl Maurice is a 75 y.o. female who presents to the emergency department with complaint of shortness of breath that she says started this afternoon around 2 PM.  Says that she feels like her legs been swollen more over the last couple days. Has been taking her diuretic without relief. Has had breathing treatments without significant improvement either. Has not had any cough, congestion, fever, or other specific preceding symptoms. Wears oxygen as needed at home at 2.5 L. Has history of COPD, nonischemic cardiomyopathy, prior DVT on anticoagulation, type 2 diabetes  Has had similar presentations and hospitalizations several times this year    Most recent echo on file was 6/22/2021    HPI    NursingNotes were reviewed. REVIEW OF SYSTEMS    (2-9 systems for level 4, 10 or more for level 5)     Review of Systems   Constitutional:  Positive for chills. Negative for fever. HENT: Negative. Negative for congestion. Eyes: Negative. Respiratory:  Positive for shortness of breath. Negative for cough. Cardiovascular:  Positive for leg swelling. Negative for chest pain. Gastrointestinal: Negative. Genitourinary: Negative. Musculoskeletal: Negative. Skin: Negative. Neurological: Negative. Hematological: Negative. Psychiatric/Behavioral: Negative. A complete review of systems was performed and is negative except as noted above in the HPI.        PAST Vitals:    11/20/23 2112 11/20/23 2237 11/20/23 2256 11/21/23 0101   BP: 134/79   (!) 118/57   Pulse: 72 72 76 74   Resp: 24      Temp:       TempSrc:       SpO2: 96%  94% 91%     EKG: All EKG's are interpreted by the Emergency Department Physician who either signs or Co-signs this chart in the absence of a cardiologist.      [facility]      ED Course as of 11/21/23 0106 Mon Nov 20, 2023 1949 WBC: 8.5 [RAFI]   2005 Pro-BNP(!): 5,551 [RAFI]   2058 Patient hypercapnic with CO2 86 with mild respiratory acidosis with pH 7.33. Patient has her home trilogy machine here which was started by respiratory therapy after breathing treatment and additional Lasix for diuresis. Gave oral dose of steroids. Patient has had problems with significant hyperglycemia in the past when on steroids. Viral panel negative. Chest x-ray with no pneumonia or pulmonary edema. [RAFI]   2100 Patient has become anxious and says that she cannot keep her legs still. Refusing her BiPAP/trilogy. Giving Haldol to try and help with anxiolysis without suppressing respiratory drive [RAFI]      ED Course User Index  [RAFI] Jenn Vital MD     Based on the evaluation and work-up here patient is felt to require further monitoring, work-up, or treatment that is available in the emergency department. Case was discussed with Dr. Samantha Cruz who agrees for observation or admission for further management. Treatment and stabilization as necessary were provided in the emergency department prior to transfer of care to the [facility] service. CONSULTS:  IP CONSULT TO PULMONOLOGY    PROCEDURES:  Unless otherwise notedbelow, none     Procedures      FINAL IMPRESSION     1. Acute on chronic respiratory failure with hypoxia and hypercapnia (HCC)    2. Chronic obstructive pulmonary disease, unspecified COPD type [facility])          DISPOSITION/PLAN   DISPOSITION Admitted 11/20/2023 09:07:32 PM      No notes of EC Admission Criteria type on file.     PATIENT REFERRED TO:  No

## 2023-11-21 NOTE — PROGRESS NOTES
Pt arrived from ER via stretcher. Pt was placed on home Bipap. Scheduled ABGs were drawn. O2 sat was in upper 80s. Pt is more lethargic. ABG's were abnormal. pCo2 72, pO2 51. Notified Dr. Shantell Phelps of blood gas results. He believes the lethargy could be contributed to the Haldol received in ER. Pt placed on continuous pulse oximetry. Pt is arousable, but slow to respond.

## 2023-11-21 NOTE — PROGRESS NOTES
Palliative Care/Spiritual Care: Met with pt to initiate palliative care. Pt says she was having trouble breathing. She says she is on oxygen at home. ED note says she is on 2.5 L of O2 at home. Pt has a history of COPD. She also has other diagnoses in past medical history. Pt is known to palliative care. Advance Directives: Pt has a LW and her son Jarvis Banuelos is her primary decision maker. She says she wants CPR and Ventilator. Pt has an ACP note less than a month ago with no changes. Pt does say if she is on a ventilator and her health worsens she does not want to remain on the ventilator. SEE ACP NOTE. Pain/other symptoms: Pt says she is not having pain. Social/Spiritual: Pt says she attended [address] [place] [address],[address].         Pt/family discussion r/t goals: Pt lives at home alone. She has friends who help her at home. She says she ambulates with a walker, and can take her self to the bathroom. She needs assistance with bathing. Pt's goal is to return home. Pt's friend spoke with this  in the hallway and said \"pt's son will need to make some decisions about his mom soon. \" Pt's friends are Leanor Christian. Valdemar Escalante has been helping pt at home. Provided spiritual care with sustaining presence, nurtured hope, and prayer. Pt expressed gratitude for spiritual care.      Electronically signed by Jennifer Damon on 11/21/2023 at 1:41 PM

## 2023-11-21 NOTE — PLAN OF CARE
Problem: Discharge Planning  Goal: Discharge to home or other facility with appropriate resources  Outcome: Progressing  Flowsheets (Taken 11/21/2023 0158)  Discharge to home or other facility with appropriate resources:   Identify barriers to discharge with patient and caregiver   Arrange for needed discharge resources and transportation as appropriate     Problem: Safety - Adult  Goal: Free from fall injury  Outcome: Progressing     Problem: ABCDS Injury Assessment  Goal: Absence of physical injury  Outcome: Progressing  Flowsheets (Taken 11/21/2023 0216)  Absence of Physical Injury: Implement safety measures based on patient assessment     Problem: Skin/Tissue Integrity  Goal: Absence of new skin breakdown  Description: 1. Monitor for areas of redness and/or skin breakdown  2. Assess vascular access sites hourly  3. Every 4-6 hours minimum:  Change oxygen saturation probe site  4. Every 4-6 hours:  If on nasal continuous positive airway pressure, respiratory therapy assess nares and determine need for appliance change or resting period.   Outcome: Progressing

## 2023-11-21 NOTE — PLAN OF CARE
Problem: Discharge Planning  Goal: Discharge to home or other facility with appropriate resources  11/21/2023 1413 by Sonal Green RN  Outcome: Progressing  Flowsheets (Taken 11/21/2023 1339)  Discharge to home or other facility with appropriate resources:   Identify barriers to discharge with patient and caregiver   Arrange for needed discharge resources and transportation as appropriate   Identify discharge learning needs (meds, wound care, etc)  11/21/2023 0312 by Fidelia Salvador  Outcome: Progressing  Flowsheets (Taken 11/21/2023 0158)  Discharge to home or other facility with appropriate resources:   Identify barriers to discharge with patient and caregiver   Arrange for needed discharge resources and transportation as appropriate     Problem: Safety - Adult  Goal: Free from fall injury  11/21/2023 1413 by Sonal Green RN  Outcome: Progressing  11/21/2023 0312 by Fidelia Salvador  Outcome: Progressing

## 2023-11-21 NOTE — PROGRESS NOTES
Placed pt on home Trilogy per V/O Dr Karely Avila  AVAPS-AE  Vt 500  Auto Rate  Pressure 3 cm H20  EPAP MAX 15 cm H20 EPAP min 6 cm H20  Pressure support max 15 cm H20 min 4 cm H20  3 lpm Fi02

## 2023-11-21 NOTE — CARE COORDINATION
Previous admit was for fall at home. Pt has chf exac this time. Tried to treat at home  and thru pcp, unsuccessfully. 11/21/23 0881   Readmission Assessment   Number of Days since last admission? 8-30 days   Previous Disposition Home with Family   Who is being Interviewed Patient   What was the patient's/caregiver's perception as to why they think they needed to return back to the hospital? Other (Comment)  (increased sob and swelling of the legs)   Did you visit your Primary Care Physician after you left the hospital, before you returned this time? Yes   Did you see a specialist, such as Cardiac, Pulmonary, Orthopedic Physician, etc. after you left the hospital? No   Who advised the patient to return to the hospital? Self-referral   Does the patient report anything that got in the way of taking their medications? No   In our efforts to provide the best possible care to you and others like you, can you think of anything that we could have done to help you after you left the hospital the first time, so that you might not have needed to return so soon? Identify patient's health literacy needs; Improved written discharge instructions; Teaching during hospitalization regarding your illness     Electronically signed by Jayla Banerjee RN on 11/21/2023 at 8:41 AM

## 2023-11-21 NOTE — ED NOTES
Patient placed on cardiac monitor, continuous pulse oximeter, and NIBP monitor. Monitor alarms on.         Stu Bell RN  11/20/23 1939

## 2023-11-21 NOTE — ED NOTES
Pt refusing to wear home bipap. Pt pulled mask off.  Pt placed back on nasal cannula at 2.5L     Nicolasa Rom, RN  11/20/23 2666

## 2023-11-22 VITALS
HEIGHT: 66 IN | RESPIRATION RATE: 18 BRPM | BODY MASS INDEX: 26.52 KG/M2 | SYSTOLIC BLOOD PRESSURE: 119 MMHG | WEIGHT: 165 LBS | TEMPERATURE: 97.7 F | DIASTOLIC BLOOD PRESSURE: 75 MMHG | HEART RATE: 77 BPM | OXYGEN SATURATION: 99 %

## 2023-11-22 LAB
GLUCOSE BLD-MCNC: 130 MG/DL (ref 70–99)
GLUCOSE BLD-MCNC: 204 MG/DL (ref 70–99)
PERFORMED ON: ABNORMAL
PERFORMED ON: ABNORMAL

## 2023-11-22 PROCEDURE — 6370000000 HC RX 637 (ALT 250 FOR IP): Performed by: FAMILY MEDICINE

## 2023-11-22 PROCEDURE — 94760 N-INVAS EAR/PLS OXIMETRY 1: CPT

## 2023-11-22 PROCEDURE — 94640 AIRWAY INHALATION TREATMENT: CPT

## 2023-11-22 PROCEDURE — 2700000000 HC OXYGEN THERAPY PER DAY

## 2023-11-22 PROCEDURE — 94761 N-INVAS EAR/PLS OXIMETRY MLT: CPT

## 2023-11-22 PROCEDURE — 2580000003 HC RX 258: Performed by: FAMILY MEDICINE

## 2023-11-22 PROCEDURE — 82962 GLUCOSE BLOOD TEST: CPT

## 2023-11-22 RX ADMIN — SODIUM CHLORIDE, PRESERVATIVE FREE 10 ML: 5 INJECTION INTRAVENOUS at 08:45

## 2023-11-22 RX ADMIN — FUROSEMIDE 40 MG: 40 TABLET ORAL at 08:44

## 2023-11-22 RX ADMIN — SPIRONOLACTONE 50 MG: 50 TABLET ORAL at 08:44

## 2023-11-22 RX ADMIN — POTASSIUM BICARBONATE 20 MEQ: 782 TABLET, EFFERVESCENT ORAL at 08:44

## 2023-11-22 RX ADMIN — Medication 5 MG: at 08:44

## 2023-11-22 RX ADMIN — CLOPIDOGREL BISULFATE 75 MG: 75 TABLET ORAL at 08:44

## 2023-11-22 RX ADMIN — IPRATROPIUM BROMIDE AND ALBUTEROL SULFATE 1 DOSE: 2.5; .5 SOLUTION RESPIRATORY (INHALATION) at 07:49

## 2023-11-22 RX ADMIN — IPRATROPIUM BROMIDE AND ALBUTEROL SULFATE 1 DOSE: 2.5; .5 SOLUTION RESPIRATORY (INHALATION) at 11:30

## 2023-11-22 RX ADMIN — SOTALOL HYDROCHLORIDE 120 MG: 80 TABLET ORAL at 08:44

## 2023-11-22 RX ADMIN — METFORMIN HYDROCHLORIDE 500 MG: 500 TABLET ORAL at 08:44

## 2023-11-22 RX ADMIN — APIXABAN 5 MG: 5 TABLET, FILM COATED ORAL at 08:44

## 2023-11-22 NOTE — PLAN OF CARE
Problem: Discharge Planning  Goal: Discharge to home or other facility with appropriate resources  11/21/2023 2152 by Cheryle Emmanuel RN  Outcome: Progressing  11/21/2023 1413 by Razia Ambriz RN  Outcome: Progressing  Flowsheets (Taken 11/21/2023 1339)  Discharge to home or other facility with appropriate resources:   Identify barriers to discharge with patient and caregiver   Arrange for needed discharge resources and transportation as appropriate   Identify discharge learning needs (meds, wound care, etc)     Problem: Safety - Adult  Goal: Free from fall injury  11/21/2023 2152 by Cheryle Emmanuel RN  Outcome: Progressing  11/21/2023 1413 by Razia Ambriz RN  Outcome: Progressing     Problem: ABCDS Injury Assessment  Goal: Absence of physical injury  11/21/2023 2152 by Cheryle Emmanuel RN  Outcome: Progressing  11/21/2023 1413 by Razia Ambriz RN  Outcome: Progressing     Problem: Skin/Tissue Integrity  Goal: Absence of new skin breakdown  Description: 1. Monitor for areas of redness and/or skin breakdown  2. Assess vascular access sites hourly  3. Every 4-6 hours minimum:  Change oxygen saturation probe site  4. Every 4-6 hours:  If on nasal continuous positive airway pressure, respiratory therapy assess nares and determine need for appliance change or resting period. 11/21/2023 2152 by Cheryle Emmanuel RN  Outcome: Progressing  11/21/2023 1413 by Razia Ambriz RN  Outcome: Progressing     Problem: Confusion  Goal: Confusion, delirium, dementia, or psychosis is improved or at baseline  Description: INTERVENTIONS:  1. Assess for possible contributors to thought disturbance, including medications, impaired vision or hearing, underlying metabolic abnormalities, dehydration, psychiatric diagnoses, and notify attending LIP  2. [place] high risk fall precautions, as indicated  3. Provide frequent short contacts to provide reality reorientation, refocusing and direction  4.  Decrease environmental stimuli, including noise as appropriate  5. Monitor and intervene to maintain adequate nutrition, hydration, elimination, sleep and activity  6. If unable to ensure safety without constant attention obtain sitter and review sitter guidelines with assigned personnel  7.  Initiate Psychosocial CNS and Spiritual Care consult, as indicated  11/21/2023 2152 by Barbie Marcos RN  Outcome: Progressing  11/21/2023 1413 by Geremias Storey RN  Outcome: Progressing  Flowsheets (Taken 11/21/2023 1221)  Effect of thought disturbance (confusion, delirium, dementia, or psychosis) are managed with adequate functional status:   Decrease environmental stimuli, including noise as appropriate   Monitor and intervene to maintain adequate nutrition, hydration, elimination, sleep and activity     Problem: Chronic Conditions and Co-morbidities  Goal: Patient's chronic conditions and co-morbidity symptoms are monitored and maintained or improved  11/21/2023 2152 by Barbie Marcos RN  Outcome: Progressing  11/21/2023 1413 by Geremias Storey RN  Outcome: Progressing  Flowsheets  Taken 11/21/2023 1221 by Geremias Storey RN  Care Plan - Patient's Chronic Conditions and Co-Morbidity Symptoms are Monitored and Maintained or Improved:   Monitor and assess patient's chronic conditions and comorbid symptoms for stability, deterioration, or improvement   Collaborate with multidisciplinary team to address chronic and comorbid conditions and prevent exacerbation or deterioration   Update acute care plan with appropriate goals if chronic or comorbid symptoms are exacerbated and prevent overall improvement and discharge  Taken 11/21/2023 0158 by Tavo Jeffries, [address] [place] [address] - Patient's Chronic Conditions and Co-Morbidity Symptoms are Monitored and Maintained or Improved:   Monitor and assess patient's chronic conditions and comorbid symptoms for stability, deterioration, or improvement   Collaborate with multidisciplinary team to address chronic and comorbid conditions

## 2023-11-22 NOTE — H&P
CHIEF COMPLAINT:  shortness of breath    History Obtained From:  patient, electronic medical record     HISTORY OF PRESENT ILLNESS:      The patient is a 75 y.o. female with significant past medical history of COPD who presents with increased shortness of breath. Patient difficult to get much history from. Was given haldol in the ER so very sedate this morning. Not sure why given (seen early this morning but not able to complete documentation until later this weekend).       Past Medical History:   Diagnosis Date    ASHD (arteriosclerotic heart disease)     s/p PTCA and stent of circumflex, as well as intermediate and mid LAD     COPD (chronic obstructive pulmonary disease) (HCC)     Coronary atherosclerosis     Diabetes mellitus ([address])     diet controlled, type 2    Encounter for wound care     FOR LLL WOUND    Fall 10/29/2020    Ganglion cyst     RT HAND    Hematoma 10/2020    hematoma LLL from fall injury    Hx of blood clots     Hypercholesteremia     Hypertension     Pacemaker 03/02/2021    Palliative care patient 03/16/2022    Unstable angina [facility])        Past Surgical History:   Procedure Laterality Date    CARDIAC CATHETERIZATION  12/10/00    selective left heart and coronary arteriography with left ventriculography     CARDIAC CATHETERIZATION  01/23/98    left heart cath, left ventriculography, slective coronary arteriography, direct infarct angioplasty and stent placement to proximal left anterior descending coronary artery    CARDIAC CATHETERIZATION  03/05/94    left heart cath, selective coronary arteriography, left ventriculography    CARDIAC CATHETERIZATION  8/27/2011    [place]    CHOLECYSTECTOMY      CORONARY ANGIOPLASTY WITH STENT PLACEMENT  12/12/00    PTCA and stent placement to the mid LAD/ptca and stent placement first circumflex marginal (intermediate)     CORONARY ANGIOPLASTY WITH STENT PLACEMENT  08/2021    CORONARY ARTERY BYPASS GRAFT  8/29/2011    PACABG X 4 LIMA-LAD, SVG-DIAG, SVG-PDA,

## 2023-11-22 NOTE — DISCHARGE SUMMARY
Discharge Summary     Date:11/22/2023        Patient Name:Renata Mendez     YOB: 1950     Age:73 y.o. Admit Date:11/20/2023   Admission Condition:fair   Discharged Condition:stable  Discharge Date: 11/22/23     Discharge Diagnoses   Principal Problem:    COPD (chronic obstructive pulmonary disease) ([address])  Active Problems:    Acute on chronic respiratory failure with hypoxia and hypercapnia (HCC)  Resolved Problems:    * No resolved hospital problems. [facility] CLINICS Stay   Narrative of Hospital Course: 70-year-old well-known to me presenting to our emergency room with increased shortness of breath. It appears that she also had some anxiety about her shortness of breath while being treated. Was doing reasonably well on her home oxygen until the night prior to admission. Try to use her Trelegy device as well but had increasing symptoms. Eventually sought treatment emergency room. I guess due to her anxiety and restlessness she was given Haldol which significantly sedated her. Really unable to get her CO2 down using her home Trelegy and felt she would benefit from admission at least observation. Upon my assessment in the room she was significantly sedate and appeared under the influence of medication. We did use a hospital positive airway pressure device. Pulmonology consulted. Apparently by the afternoon she was doing much better. She is maintaining sats on 2 L nasal cannula and near her baseline. Pulmonology recommending continued steroids. They are recommending a CT scan of the chest which can either be done prior to her discharge or we can arrange for her to do as outpatient. Patient does feel wants to go home. Her family was apprehensive about her going home alone given she has no assistance. I did talk her into excepting home health and will get that working prior to her going home.     Consultants:   IP CONSULT TO PULMONOLOGY  PALLIATIVE CARE EVAL    Time Spent on Discharge:  34

## 2023-11-22 NOTE — PLAN OF CARE
Problem: Discharge Planning  Goal: Discharge to home or other facility with appropriate resources  11/22/2023 1100 by Onel Umanzor RN  Outcome: Adequate for Discharge  11/21/2023 2152 by Vickey Davenport RN  Outcome: Progressing     Problem: Safety - Adult  Goal: Free from fall injury  11/22/2023 1100 by Onel Umanzor RN  Outcome: Adequate for Discharge  11/21/2023 2152 by Vickey Davenport RN  Outcome: Progressing     Problem: ABCDS Injury Assessment  Goal: Absence of physical injury  11/22/2023 1100 by Onel Umanzor RN  Outcome: Adequate for Discharge  11/21/2023 2152 by Vickey Davenport RN  Outcome: Progressing     Problem: Skin/Tissue Integrity  Goal: Absence of new skin breakdown  Description: 1. Monitor for areas of redness and/or skin breakdown  2. Assess vascular access sites hourly  3. Every 4-6 hours minimum:  Change oxygen saturation probe site  4. Every 4-6 hours:  If on nasal continuous positive airway pressure, respiratory therapy assess nares and determine need for appliance change or resting period. 11/22/2023 1100 by Onel Umanzor RN  Outcome: Adequate for Discharge  11/21/2023 2152 by Vickey Davenport RN  Outcome: Progressing     Problem: Confusion  Goal: Confusion, delirium, dementia, or psychosis is improved or at baseline  Description: INTERVENTIONS:  1. Assess for possible contributors to thought disturbance, including medications, impaired vision or hearing, underlying metabolic abnormalities, dehydration, psychiatric diagnoses, and notify attending LIP  2. [place] high risk fall precautions, as indicated  3. Provide frequent short contacts to provide reality reorientation, refocusing and direction  4. Decrease environmental stimuli, including noise as appropriate  5. Monitor and intervene to maintain adequate nutrition, hydration, elimination, sleep and activity  6.  If unable to ensure safety without constant attention obtain sitter and review sitter guidelines with assigned personnel  7.  Initiate Psychosocial CNS and Spiritual Care consult, as indicated  11/22/2023 1100 by Obie Hartley RN  Outcome: Adequate for Discharge  11/21/2023 2152 by Dede Dee RN  Outcome: Progressing     Problem: Chronic Conditions and Co-morbidities  Goal: Patient's chronic conditions and co-morbidity symptoms are monitored and maintained or improved  11/22/2023 1100 by Obie Hartley RN  Outcome: Adequate for Discharge  11/21/2023 2152 by Dede Dee RN  Outcome: Progressing

## 2023-11-22 NOTE — CONSULTS
Pulmonary and Critical Care Consult Note    [facility] Calvin Tolliver    MRN# [id number]    Acct# [de-identified]  11/21/2023   6:09 PM CST    Referring Santi Hendrix MD      Chief Complaint: Shortness of breath    Requesting physician: Dr. Katie Wells    Reason for consult: COPD exacerbation      HPI: We have been consulted to see this 75y.o. year old female born on 1950. The patient is known to have severe COPD and chronic respiratory failure on noninvasive ventilation at home using a trilogy machine. She is also on supplemental oxygen. She apparently fell yesterday at home. She does not remember much about her fall. She presented to the hospital where her sodium was 114. She was started on tonic saline. She continues to smoke. He was feeling more short of breath than her baseline. I was asked to see her regarding the above.       Past Medical History      Past Medical History:   Diagnosis Date    ASHD (arteriosclerotic heart disease)     s/p PTCA and stent of circumflex, as well as intermediate and mid LAD     COPD (chronic obstructive pulmonary disease) (HCC)     Coronary atherosclerosis     Diabetes mellitus ([address])     diet controlled, type 2    Encounter for wound care     FOR LLL WOUND    Fall 10/29/2020    Ganglion cyst     RT HAND    Hematoma 10/2020    hematoma LLL from fall injury    Hx of blood clots     Hypercholesteremia     Hypertension     Pacemaker 03/02/2021    Palliative care patient 03/16/2022    Unstable angina ([address])      SurgicalHistory  Past Surgical History:   Procedure Laterality Date    CARDIAC CATHETERIZATION  12/10/00    selective left heart and coronary arteriography with left ventriculography     CARDIAC CATHETERIZATION  01/23/98    left heart cath, left ventriculography, slective coronary arteriography, direct infarct angioplasty and stent placement evidence of acute cardiopulmonary disease. Problem list generated by [facility] HOSPITAL Problems             Last Modified POA    * (Principal) COPD (chronic obstructive pulmonary disease) ([address]) 11/20/2023 Yes    Acute on chronic respiratory failure with hypoxia and hypercapnia ([address]) 11/21/2023 Yes          Pulmonary Assessment/plan:    Underlying severe COPD with severe exacerbation. Continue bronchodilators. Continue pulmonary toilet. Agree with steroids orally. Continue noninvasive ventilation using the patient's trilogy machine. Hyponatremia etiology not very clear. She did have hyponatremia on prior admission. We will do a CT of the chest to rule out thoracic malignancy as a cause since she continues to smoke. Management of hyponatremia otherwise per Dr. Blanca Notice. Status post fall. Stable. Benefit of chronic anticoagulation should be evaluated against the risk of bleeding secondary to fall if her falls become recurrent. Tobacco abuse she needs to quit smoking. Dyspnea due to the above supportive care at her baseline. DVT prophylaxis. She is on Eliquis. Krzysztof Recinos MD, [facility], [facility]    The above note was generated using voice recognition software. Inadvertent typographical errors in transcription may have occurred.     Electronically signed by Angel Seals MD on 11/21/23 at 6:15 PM

## 2023-11-23 ENCOUNTER — APPOINTMENT (OUTPATIENT)
Dept: GENERAL RADIOLOGY | Age: 73
DRG: 871 | End: 2023-11-23
Payer: MEDICARE

## 2023-11-23 ENCOUNTER — APPOINTMENT (OUTPATIENT)
Dept: CT IMAGING | Age: 73
DRG: 871 | End: 2023-11-23
Payer: MEDICARE

## 2023-11-23 ENCOUNTER — HOSPITAL ENCOUNTER (INPATIENT)
Age: 73
LOS: 12 days | Discharge: HOME HEALTH | DRG: 871 | End: 2023-12-05
Attending: EMERGENCY MEDICINE | Admitting: FAMILY MEDICINE
Payer: MEDICARE

## 2023-11-23 DIAGNOSIS — U07.1 COVID-19 VIRUS INFECTION: ICD-10-CM

## 2023-11-23 DIAGNOSIS — J96.02 ACUTE RESPIRATORY FAILURE WITH HYPOXIA AND HYPERCAPNIA (HCC): Primary | ICD-10-CM

## 2023-11-23 DIAGNOSIS — I50.33 ACUTE ON CHRONIC DIASTOLIC (CONGESTIVE) HEART FAILURE (HCC): ICD-10-CM

## 2023-11-23 DIAGNOSIS — J44.1 COPD WITH ACUTE EXACERBATION (HCC): ICD-10-CM

## 2023-11-23 DIAGNOSIS — I63.522: ICD-10-CM

## 2023-11-23 DIAGNOSIS — J96.01 ACUTE RESPIRATORY FAILURE WITH HYPOXIA AND HYPERCAPNIA (HCC): Primary | ICD-10-CM

## 2023-11-23 LAB
ALBUMIN SERPL-MCNC: 3.8 G/DL (ref 3.5–5.2)
ALLENS TEST: ABNORMAL
ALP SERPL-CCNC: 105 U/L (ref 35–104)
ALT SERPL-CCNC: 12 U/L (ref 5–33)
AMPHET UR QL SCN: NEGATIVE
ANION GAP SERPL CALCULATED.3IONS-SCNC: 7 MMOL/L (ref 7–19)
APAP SERPL-MCNC: <5 UG/ML (ref 10–30)
AST SERPL-CCNC: 13 U/L (ref 5–32)
B PARAP IS1001 DNA NPH QL NAA+NON-PROBE: NOT DETECTED
B PERT.PT PRMT NPH QL NAA+NON-PROBE: NOT DETECTED
BACTERIA URNS QL MICRO: NEGATIVE /HPF
BARBITURATES UR QL SCN: NEGATIVE
BASE EXCESS ARTERIAL: 14.9 MMOL/L (ref -2–2)
BASOPHILS # BLD: 0 K/UL (ref 0–0.2)
BASOPHILS NFR BLD: 0.1 % (ref 0–1)
BENZODIAZ UR QL SCN: NEGATIVE
BILIRUB SERPL-MCNC: 0.4 MG/DL (ref 0.2–1.2)
BILIRUB UR QL STRIP: NEGATIVE
BNP BLD-MCNC: 6514 PG/ML (ref 0–124)
BUN SERPL-MCNC: 28 MG/DL (ref 8–23)
BUPRENORPHINE URINE: NEGATIVE
C PNEUM DNA NPH QL NAA+NON-PROBE: NOT DETECTED
CALCIUM SERPL-MCNC: 8.7 MG/DL (ref 8.8–10.2)
CANNABINOIDS UR QL SCN: NEGATIVE
CARBOXYHEMOGLOBIN ARTERIAL: 4.7 % (ref 0–5)
CHLORIDE SERPL-SCNC: 87 MMOL/L (ref 98–111)
CLARITY UR: CLEAR
CO2 SERPL-SCNC: 35 MMOL/L (ref 22–29)
COCAINE UR QL SCN: NEGATIVE
COLOR UR: YELLOW
CREAT SERPL-MCNC: 1.1 MG/DL (ref 0.5–0.9)
CRYSTALS URNS MICRO: ABNORMAL /HPF
DRUG SCREEN COMMENT UR-IMP: NORMAL
EOSINOPHIL # BLD: 0 K/UL (ref 0–0.6)
EOSINOPHIL NFR BLD: 0 % (ref 0–5)
EPI CELLS #/AREA URNS AUTO: 2 /HPF (ref 0–5)
ERYTHROCYTE [DISTWIDTH] IN BLOOD BY AUTOMATED COUNT: 15.8 % (ref 11.5–14.5)
ETHANOLAMINE SERPL-MCNC: <10 MG/DL (ref 0–0.08)
FENTANYL SCREEN, URINE: NEGATIVE
FIO2: 100 %
FLUAV RNA NPH QL NAA+NON-PROBE: NOT DETECTED
FLUBV RNA NPH QL NAA+NON-PROBE: NOT DETECTED
GLUCOSE BLD-MCNC: 349 MG/DL (ref 70–99)
GLUCOSE SERPL-MCNC: 335 MG/DL (ref 74–109)
GLUCOSE UR STRIP.AUTO-MCNC: =>1000 MG/DL
HADV DNA NPH QL NAA+NON-PROBE: NOT DETECTED
HCO3 ARTERIAL: 43.9 MMOL/L (ref 22–26)
HCOV 229E RNA NPH QL NAA+NON-PROBE: NOT DETECTED
HCOV HKU1 RNA NPH QL NAA+NON-PROBE: NOT DETECTED
HCOV NL63 RNA NPH QL NAA+NON-PROBE: NOT DETECTED
HCOV OC43 RNA NPH QL NAA+NON-PROBE: NOT DETECTED
HCT VFR BLD AUTO: 41.7 % (ref 37–47)
HEMOGLOBIN, ART, EXTENDED: 13.1 G/DL (ref 12–16)
HGB BLD-MCNC: 12.3 G/DL (ref 12–16)
HGB UR STRIP.AUTO-MCNC: NEGATIVE MG/L
HMPV RNA NPH QL NAA+NON-PROBE: NOT DETECTED
HPIV1 RNA NPH QL NAA+NON-PROBE: NOT DETECTED
HPIV2 RNA NPH QL NAA+NON-PROBE: NOT DETECTED
HPIV3 RNA NPH QL NAA+NON-PROBE: NOT DETECTED
HPIV4 RNA NPH QL NAA+NON-PROBE: NOT DETECTED
HYALINE CASTS #/AREA URNS AUTO: 5 /HPF (ref 0–8)
IMM GRANULOCYTES # BLD: 0 K/UL
INR PPP: 1.18 (ref 0.88–1.18)
KETONES UR STRIP.AUTO-MCNC: NEGATIVE MG/DL
LACTATE BLDV-SCNC: 1.3 MG/DL (ref 0.5–1.9)
LEUKOCYTE ESTERASE UR QL STRIP.AUTO: NEGATIVE
LIPASE SERPL-CCNC: 27 U/L (ref 13–60)
LYMPHOCYTES # BLD: 0.9 K/UL (ref 1.1–4.5)
LYMPHOCYTES NFR BLD: 9.8 % (ref 20–40)
M PNEUMO DNA NPH QL NAA+NON-PROBE: NOT DETECTED
MCH RBC QN AUTO: 29.4 PG (ref 27–31)
MCHC RBC AUTO-ENTMCNC: 29.5 G/DL (ref 33–37)
MCV RBC AUTO: 99.5 FL (ref 81–99)
MECHANICAL RATE IN BPM: 16
METHADONE UR QL SCN: NEGATIVE
METHAMPHETAMINE, URINE: NEGATIVE
METHEMOGLOBIN ARTERIAL: 1.1 %
MODE: ABNORMAL
MONOCYTES # BLD: 0.7 K/UL (ref 0–0.9)
MONOCYTES NFR BLD: 7 % (ref 0–10)
NEUTROPHILS # BLD: 7.7 K/UL (ref 1.5–7.5)
NEUTS SEG NFR BLD: 82.7 % (ref 50–65)
NITRITE UR QL STRIP.AUTO: NEGATIVE
O2 CONTENT ARTERIAL: 18.5 ML/DL
O2 DELIVERY DEVICE: ABNORMAL
O2 SAT, ARTERIAL: 93.5 %
O2 THERAPY: ABNORMAL
OPIATES UR QL SCN: NEGATIVE
OXYCODONE UR QL SCN: NEGATIVE
PCO2 ARTERIAL: 76 MMHG (ref 35–45)
PCP UR QL SCN: NEGATIVE
PERFORMED ON: ABNORMAL
PH ARTERIAL: 7.37 (ref 7.35–7.45)
PH UR STRIP.AUTO: 6 [PH] (ref 5–8)
PLATELET # BLD AUTO: 294 K/UL (ref 130–400)
PMV BLD AUTO: 8.9 FL (ref 9.4–12.3)
PO2 ARTERIAL: 460 MMHG (ref 80–100)
POSITIVE END EXP PRESS: 5
POTASSIUM BLD-SCNC: 5.2 MMOL/L
POTASSIUM SERPL-SCNC: 5.6 MMOL/L (ref 3.5–5)
PROCALCITONIN: 0.04 NG/ML (ref 0–0.09)
PROT SERPL-MCNC: 7.4 G/DL (ref 6.6–8.7)
PROT UR STRIP.AUTO-MCNC: 100 MG/DL
PROTHROMBIN TIME: 14.7 SEC (ref 12–14.6)
RBC # BLD AUTO: 4.19 M/UL (ref 4.2–5.4)
RBC #/AREA URNS AUTO: 3 /HPF (ref 0–4)
RSV RNA NPH QL NAA+NON-PROBE: NOT DETECTED
RV+EV RNA NPH QL NAA+NON-PROBE: NOT DETECTED
SALICYLATES SERPL-MCNC: <0.3 MG/DL (ref 3–10)
SAMPLE SOURCE: ABNORMAL
SARS-COV-2 RNA NPH QL NAA+NON-PROBE: DETECTED
SODIUM SERPL-SCNC: 129 MMOL/L (ref 136–145)
SP GR UR STRIP.AUTO: 1.02 (ref 1–1.03)
TRICYCLIC, URINE: NEGATIVE
UROBILINOGEN UR STRIP.AUTO-MCNC: 1 E.U./DL
VT MECHANICAL: 410 %
WBC # BLD AUTO: 9.3 K/UL (ref 4.8–10.8)
WBC #/AREA URNS AUTO: 2 /HPF (ref 0–5)

## 2023-11-23 PROCEDURE — 85610 PROTHROMBIN TIME: CPT

## 2023-11-23 PROCEDURE — 2500000003 HC RX 250 WO HCPCS: Performed by: EMERGENCY MEDICINE

## 2023-11-23 PROCEDURE — 84145 PROCALCITONIN (PCT): CPT

## 2023-11-23 PROCEDURE — 85025 COMPLETE CBC W/AUTO DIFF WBC: CPT

## 2023-11-23 PROCEDURE — 31500 INSERT EMERGENCY AIRWAY: CPT

## 2023-11-23 PROCEDURE — 2580000003 HC RX 258: Performed by: EMERGENCY MEDICINE

## 2023-11-23 PROCEDURE — 36600 WITHDRAWAL OF ARTERIAL BLOOD: CPT

## 2023-11-23 PROCEDURE — G0480 DRUG TEST DEF 1-7 CLASSES: HCPCS

## 2023-11-23 PROCEDURE — 83605 ASSAY OF LACTIC ACID: CPT

## 2023-11-23 PROCEDURE — 80179 DRUG ASSAY SALICYLATE: CPT

## 2023-11-23 PROCEDURE — 87040 BLOOD CULTURE FOR BACTERIA: CPT

## 2023-11-23 PROCEDURE — 96365 THER/PROPH/DIAG IV INF INIT: CPT

## 2023-11-23 PROCEDURE — 2700000000 HC OXYGEN THERAPY PER DAY

## 2023-11-23 PROCEDURE — 82803 BLOOD GASES ANY COMBINATION: CPT

## 2023-11-23 PROCEDURE — 0202U NFCT DS 22 TRGT SARS-COV-2: CPT

## 2023-11-23 PROCEDURE — 6360000002 HC RX W HCPCS: Performed by: EMERGENCY MEDICINE

## 2023-11-23 PROCEDURE — 87086 URINE CULTURE/COLONY COUNT: CPT

## 2023-11-23 PROCEDURE — 82077 ASSAY SPEC XCP UR&BREATH IA: CPT

## 2023-11-23 PROCEDURE — 96374 THER/PROPH/DIAG INJ IV PUSH: CPT

## 2023-11-23 PROCEDURE — 81001 URINALYSIS AUTO W/SCOPE: CPT

## 2023-11-23 PROCEDURE — 80143 DRUG ASSAY ACETAMINOPHEN: CPT

## 2023-11-23 PROCEDURE — 93005 ELECTROCARDIOGRAM TRACING: CPT

## 2023-11-23 PROCEDURE — 80053 COMPREHEN METABOLIC PANEL: CPT

## 2023-11-23 PROCEDURE — 36415 COLL VENOUS BLD VENIPUNCTURE: CPT

## 2023-11-23 PROCEDURE — 80307 DRUG TEST PRSMV CHEM ANLYZR: CPT

## 2023-11-23 PROCEDURE — 96368 THER/DIAG CONCURRENT INF: CPT

## 2023-11-23 PROCEDURE — 5A1945Z RESPIRATORY VENTILATION, 24-96 CONSECUTIVE HOURS: ICD-10-PCS | Performed by: EMERGENCY MEDICINE

## 2023-11-23 PROCEDURE — 82962 GLUCOSE BLOOD TEST: CPT

## 2023-11-23 PROCEDURE — 2000000000 HC ICU R&B

## 2023-11-23 PROCEDURE — 83880 ASSAY OF NATRIURETIC PEPTIDE: CPT

## 2023-11-23 PROCEDURE — 51702 INSERT TEMP BLADDER CATH: CPT

## 2023-11-23 PROCEDURE — 99285 EMERGENCY DEPT VISIT HI MDM: CPT

## 2023-11-23 PROCEDURE — 83690 ASSAY OF LIPASE: CPT

## 2023-11-23 PROCEDURE — 0BH17EZ INSERTION OF ENDOTRACHEAL AIRWAY INTO TRACHEA, VIA NATURAL OR ARTIFICIAL OPENING: ICD-10-PCS | Performed by: EMERGENCY MEDICINE

## 2023-11-23 PROCEDURE — 71045 X-RAY EXAM CHEST 1 VIEW: CPT

## 2023-11-23 RX ORDER — FENTANYL CITRATE-0.9 % NACL/PF 10 MCG/ML
25-200 PLASTIC BAG, INJECTION (ML) INTRAVENOUS CONTINUOUS
Status: DISCONTINUED | OUTPATIENT
Start: 2023-11-23 | End: 2023-11-27 | Stop reason: HOSPADM

## 2023-11-23 RX ORDER — SODIUM CHLORIDE, SODIUM LACTATE, POTASSIUM CHLORIDE, AND CALCIUM CHLORIDE .6; .31; .03; .02 G/100ML; G/100ML; G/100ML; G/100ML
30 INJECTION, SOLUTION INTRAVENOUS ONCE
Status: DISCONTINUED | OUTPATIENT
Start: 2023-11-23 | End: 2023-11-23

## 2023-11-23 RX ORDER — SODIUM CHLORIDE 0.9 % (FLUSH) 0.9 %
5-40 SYRINGE (ML) INJECTION EVERY 12 HOURS SCHEDULED
Status: DISCONTINUED | OUTPATIENT
Start: 2023-11-23 | End: 2023-11-28 | Stop reason: SDUPTHER

## 2023-11-23 RX ORDER — VECURONIUM BROMIDE 1 MG/ML
10 INJECTION, POWDER, LYOPHILIZED, FOR SOLUTION INTRAVENOUS ONCE
Status: COMPLETED | OUTPATIENT
Start: 2023-11-23 | End: 2023-11-23

## 2023-11-23 RX ORDER — ETOMIDATE 2 MG/ML
20 INJECTION INTRAVENOUS ONCE
Status: COMPLETED | OUTPATIENT
Start: 2023-11-23 | End: 2023-11-23

## 2023-11-23 RX ORDER — SODIUM CHLORIDE 9 MG/ML
INJECTION, SOLUTION INTRAVENOUS PRN
Status: DISCONTINUED | OUTPATIENT
Start: 2023-11-23 | End: 2023-11-28 | Stop reason: SDUPTHER

## 2023-11-23 RX ORDER — FUROSEMIDE 10 MG/ML
40 INJECTION INTRAMUSCULAR; INTRAVENOUS ONCE
Status: COMPLETED | OUTPATIENT
Start: 2023-11-23 | End: 2023-11-24

## 2023-11-23 RX ORDER — DEXMEDETOMIDINE HYDROCHLORIDE 4 UG/ML
.1-1.5 INJECTION, SOLUTION INTRAVENOUS CONTINUOUS
Status: DISCONTINUED | OUTPATIENT
Start: 2023-11-23 | End: 2023-11-27 | Stop reason: HOSPADM

## 2023-11-23 RX ORDER — SODIUM CHLORIDE 0.9 % (FLUSH) 0.9 %
5-40 SYRINGE (ML) INJECTION PRN
Status: DISCONTINUED | OUTPATIENT
Start: 2023-11-23 | End: 2023-11-28 | Stop reason: SDUPTHER

## 2023-11-23 RX ADMIN — Medication 50 MCG/HR: at 23:18

## 2023-11-23 RX ADMIN — CEFEPIME 2000 MG: 2 INJECTION, POWDER, FOR SOLUTION INTRAVENOUS at 22:54

## 2023-11-23 RX ADMIN — DEXMEDETOMIDINE HYDROCHLORIDE 0.5 MCG/KG/HR: 400 INJECTION, SOLUTION INTRAVENOUS at 23:08

## 2023-11-23 RX ADMIN — VECURONIUM BROMIDE 10 MG: 1 INJECTION, POWDER, LYOPHILIZED, FOR SOLUTION INTRAVENOUS at 22:29

## 2023-11-23 RX ADMIN — VANCOMYCIN HYDROCHLORIDE 1750 MG: 5 INJECTION, POWDER, LYOPHILIZED, FOR SOLUTION INTRAVENOUS at 22:57

## 2023-11-23 RX ADMIN — ETOMIDATE 20 MG: 2 INJECTION, SOLUTION INTRAVENOUS at 22:28

## 2023-11-23 ASSESSMENT — PULMONARY FUNCTION TESTS
PIF_VALUE: 44
PIF_VALUE: 45
PIF_VALUE: 40
PIF_VALUE: 45
PIF_VALUE: 44
PIF_VALUE: 47
PIF_VALUE: 44

## 2023-11-24 ENCOUNTER — APPOINTMENT (OUTPATIENT)
Dept: GENERAL RADIOLOGY | Age: 73
DRG: 871 | End: 2023-11-24
Payer: MEDICARE

## 2023-11-24 ENCOUNTER — APPOINTMENT (OUTPATIENT)
Dept: CT IMAGING | Age: 73
DRG: 871 | End: 2023-11-24
Payer: MEDICARE

## 2023-11-24 LAB
ALLENS TEST: ABNORMAL
ANION GAP SERPL CALCULATED.3IONS-SCNC: 14 MMOL/L (ref 7–19)
BASE EXCESS ARTERIAL: 13.9 MMOL/L (ref -2–2)
BASOPHILS # BLD: 0 K/UL (ref 0–0.2)
BASOPHILS NFR BLD: 0 % (ref 0–1)
BUN SERPL-MCNC: 30 MG/DL (ref 8–23)
CALCIUM SERPL-MCNC: 9 MG/DL (ref 8.8–10.2)
CARBOXYHEMOGLOBIN ARTERIAL: 3.8 % (ref 0–5)
CHLORIDE SERPL-SCNC: 89 MMOL/L (ref 98–111)
CO2 SERPL-SCNC: 34 MMOL/L (ref 22–29)
CREAT SERPL-MCNC: 1 MG/DL (ref 0.5–0.9)
CRP SERPL HS-MCNC: <0.3 MG/DL (ref 0–0.5)
D DIMER PPP FEU-MCNC: 0.54 UG/ML FEU (ref 0–0.48)
EOSINOPHIL # BLD: 0 K/UL (ref 0–0.6)
EOSINOPHIL NFR BLD: 0 % (ref 0–5)
ERYTHROCYTE [DISTWIDTH] IN BLOOD BY AUTOMATED COUNT: 15.7 % (ref 11.5–14.5)
FERRITIN SERPL-MCNC: 38.5 NG/ML (ref 13–150)
FIO2: 35 %
GLUCOSE BLD-MCNC: 109 MG/DL (ref 70–99)
GLUCOSE BLD-MCNC: 213 MG/DL (ref 70–99)
GLUCOSE BLD-MCNC: 55 MG/DL (ref 70–99)
GLUCOSE BLD-MCNC: 87 MG/DL (ref 70–99)
GLUCOSE BLD-MCNC: 93 MG/DL (ref 70–99)
GLUCOSE SERPL-MCNC: 230 MG/DL (ref 74–109)
HBA1C MFR BLD: 7.8 % (ref 4–6)
HCO3 ARTERIAL: 42.9 MMOL/L (ref 22–26)
HCT VFR BLD AUTO: 40.4 % (ref 37–47)
HEMOGLOBIN, ART, EXTENDED: 13.2 G/DL (ref 12–16)
HGB BLD-MCNC: 12.6 G/DL (ref 12–16)
IMM GRANULOCYTES # BLD: 0.1 K/UL
LACTATE BLDV-SCNC: 2.6 MG/DL (ref 0.5–1.9)
LDH SERPL-CCNC: 189 U/L (ref 91–215)
LYMPHOCYTES # BLD: 0.6 K/UL (ref 1.1–4.5)
LYMPHOCYTES NFR BLD: 5.2 % (ref 20–40)
MCH RBC QN AUTO: 29.9 PG (ref 27–31)
MCHC RBC AUTO-ENTMCNC: 31.2 G/DL (ref 33–37)
MCV RBC AUTO: 95.7 FL (ref 81–99)
MECHANICAL RATE IN BPM: 16
METHEMOGLOBIN ARTERIAL: 1 %
MODE: ABNORMAL
MONOCYTES # BLD: 0.9 K/UL (ref 0–0.9)
MONOCYTES NFR BLD: 8.2 % (ref 0–10)
NEUTROPHILS # BLD: 9.9 K/UL (ref 1.5–7.5)
NEUTS SEG NFR BLD: 86.1 % (ref 50–65)
O2 CONTENT ARTERIAL: 16.5 ML/DL
O2 SAT, ARTERIAL: 88.9 %
O2 THERAPY: ABNORMAL
PCO2 ARTERIAL: 76 MMHG (ref 35–45)
PERFORMED ON: ABNORMAL
PERFORMED ON: NORMAL
PERFORMED ON: NORMAL
PH ARTERIAL: 7.36 (ref 7.35–7.45)
PLATELET # BLD AUTO: 283 K/UL (ref 130–400)
PMV BLD AUTO: 9.1 FL (ref 9.4–12.3)
PO2 ARTERIAL: 69 MMHG (ref 80–100)
POSITIVE END EXP PRESS: 5
POTASSIUM BLD-SCNC: 3.9 MMOL/L
POTASSIUM SERPL-SCNC: 4.8 MMOL/L (ref 3.5–5)
RBC # BLD AUTO: 4.22 M/UL (ref 4.2–5.4)
SAMPLE SOURCE: ABNORMAL
SODIUM SERPL-SCNC: 137 MMOL/L (ref 136–145)
VT MECHANICAL: 410 %
WBC # BLD AUTO: 11.5 K/UL (ref 4.8–10.8)

## 2023-11-24 PROCEDURE — 2500000003 HC RX 250 WO HCPCS: Performed by: EMERGENCY MEDICINE

## 2023-11-24 PROCEDURE — 83615 LACTATE (LD) (LDH) ENZYME: CPT

## 2023-11-24 PROCEDURE — 3E033XZ INTRODUCTION OF VASOPRESSOR INTO PERIPHERAL VEIN, PERCUTANEOUS APPROACH: ICD-10-PCS | Performed by: FAMILY MEDICINE

## 2023-11-24 PROCEDURE — 82728 ASSAY OF FERRITIN: CPT

## 2023-11-24 PROCEDURE — 6370000000 HC RX 637 (ALT 250 FOR IP): Performed by: FAMILY MEDICINE

## 2023-11-24 PROCEDURE — 86140 C-REACTIVE PROTEIN: CPT

## 2023-11-24 PROCEDURE — 70450 CT HEAD/BRAIN W/O DYE: CPT

## 2023-11-24 PROCEDURE — 6360000002 HC RX W HCPCS: Performed by: FAMILY MEDICINE

## 2023-11-24 PROCEDURE — 05H933Z INSERTION OF INFUSION DEVICE INTO RIGHT BRACHIAL VEIN, PERCUTANEOUS APPROACH: ICD-10-PCS | Performed by: INTERNAL MEDICINE

## 2023-11-24 PROCEDURE — 36410 VNPNXR 3YR/> PHY/QHP DX/THER: CPT

## 2023-11-24 PROCEDURE — A4216 STERILE WATER/SALINE, 10 ML: HCPCS | Performed by: FAMILY MEDICINE

## 2023-11-24 PROCEDURE — 82803 BLOOD GASES ANY COMBINATION: CPT

## 2023-11-24 PROCEDURE — 2700000000 HC OXYGEN THERAPY PER DAY

## 2023-11-24 PROCEDURE — 2500000003 HC RX 250 WO HCPCS: Performed by: FAMILY MEDICINE

## 2023-11-24 PROCEDURE — C1751 CATH, INF, PER/CENT/MIDLINE: HCPCS

## 2023-11-24 PROCEDURE — 94002 VENT MGMT INPAT INIT DAY: CPT

## 2023-11-24 PROCEDURE — 6370000000 HC RX 637 (ALT 250 FOR IP): Performed by: INTERNAL MEDICINE

## 2023-11-24 PROCEDURE — 76937 US GUIDE VASCULAR ACCESS: CPT

## 2023-11-24 PROCEDURE — 51702 INSERT TEMP BLADDER CATH: CPT

## 2023-11-24 PROCEDURE — 85379 FIBRIN DEGRADATION QUANT: CPT

## 2023-11-24 PROCEDURE — 83605 ASSAY OF LACTIC ACID: CPT

## 2023-11-24 PROCEDURE — 80048 BASIC METABOLIC PNL TOTAL CA: CPT

## 2023-11-24 PROCEDURE — 2580000003 HC RX 258: Performed by: FAMILY MEDICINE

## 2023-11-24 PROCEDURE — 36415 COLL VENOUS BLD VENIPUNCTURE: CPT

## 2023-11-24 PROCEDURE — 6360000002 HC RX W HCPCS: Performed by: INTERNAL MEDICINE

## 2023-11-24 PROCEDURE — 94760 N-INVAS EAR/PLS OXIMETRY 1: CPT

## 2023-11-24 PROCEDURE — 36600 WITHDRAWAL OF ARTERIAL BLOOD: CPT

## 2023-11-24 PROCEDURE — 6360000002 HC RX W HCPCS: Performed by: EMERGENCY MEDICINE

## 2023-11-24 PROCEDURE — 2000000000 HC ICU R&B

## 2023-11-24 PROCEDURE — 71045 X-RAY EXAM CHEST 1 VIEW: CPT

## 2023-11-24 PROCEDURE — 82962 GLUCOSE BLOOD TEST: CPT

## 2023-11-24 PROCEDURE — 85025 COMPLETE CBC W/AUTO DIFF WBC: CPT

## 2023-11-24 PROCEDURE — 83036 HEMOGLOBIN GLYCOSYLATED A1C: CPT

## 2023-11-24 RX ORDER — SODIUM CHLORIDE 0.9 % (FLUSH) 0.9 %
5-40 SYRINGE (ML) INJECTION EVERY 12 HOURS SCHEDULED
Status: DISCONTINUED | OUTPATIENT
Start: 2023-11-24 | End: 2023-12-05 | Stop reason: HOSPADM

## 2023-11-24 RX ORDER — INSULIN LISPRO 100 [IU]/ML
0-4 INJECTION, SOLUTION INTRAVENOUS; SUBCUTANEOUS EVERY 4 HOURS
Status: DISCONTINUED | OUTPATIENT
Start: 2023-11-24 | End: 2023-11-27

## 2023-11-24 RX ORDER — ACETAMINOPHEN 325 MG/1
650 TABLET ORAL EVERY 4 HOURS PRN
Status: DISCONTINUED | OUTPATIENT
Start: 2023-11-24 | End: 2023-12-05 | Stop reason: HOSPADM

## 2023-11-24 RX ORDER — FLUVOXAMINE MALEATE 50 MG/1
50 TABLET, COATED ORAL 2 TIMES DAILY
Status: DISCONTINUED | OUTPATIENT
Start: 2023-11-24 | End: 2023-11-29

## 2023-11-24 RX ORDER — ZINC SULFATE 50(220)MG
50 CAPSULE ORAL DAILY
Status: DISCONTINUED | OUTPATIENT
Start: 2023-11-24 | End: 2023-12-05 | Stop reason: HOSPADM

## 2023-11-24 RX ORDER — SODIUM CHLORIDE 9 MG/ML
INJECTION, SOLUTION INTRAVENOUS CONTINUOUS
Status: DISCONTINUED | OUTPATIENT
Start: 2023-11-24 | End: 2023-12-05 | Stop reason: HOSPADM

## 2023-11-24 RX ORDER — MONTELUKAST SODIUM 10 MG/1
10 TABLET ORAL NIGHTLY
Status: DISCONTINUED | OUTPATIENT
Start: 2023-11-24 | End: 2023-12-05 | Stop reason: HOSPADM

## 2023-11-24 RX ORDER — DEXTROSE MONOHYDRATE 100 MG/ML
INJECTION, SOLUTION INTRAVENOUS CONTINUOUS PRN
Status: DISCONTINUED | OUTPATIENT
Start: 2023-11-24 | End: 2023-12-05 | Stop reason: HOSPADM

## 2023-11-24 RX ORDER — ASCORBIC ACID 500 MG
500 TABLET ORAL 3 TIMES DAILY
Status: DISCONTINUED | OUTPATIENT
Start: 2023-11-24 | End: 2023-12-05 | Stop reason: HOSPADM

## 2023-11-24 RX ORDER — ONDANSETRON 2 MG/ML
4 INJECTION INTRAMUSCULAR; INTRAVENOUS EVERY 6 HOURS PRN
Status: DISCONTINUED | OUTPATIENT
Start: 2023-11-24 | End: 2023-12-05 | Stop reason: HOSPADM

## 2023-11-24 RX ORDER — SODIUM CHLORIDE 0.9 % (FLUSH) 0.9 %
5-40 SYRINGE (ML) INJECTION PRN
Status: DISCONTINUED | OUTPATIENT
Start: 2023-11-24 | End: 2023-12-05 | Stop reason: HOSPADM

## 2023-11-24 RX ORDER — NOREPINEPHRINE BITARTRATE 0.06 MG/ML
1-100 INJECTION, SOLUTION INTRAVENOUS CONTINUOUS
Status: DISCONTINUED | OUTPATIENT
Start: 2023-11-24 | End: 2023-11-27 | Stop reason: HOSPADM

## 2023-11-24 RX ORDER — DEXAMETHASONE SODIUM PHOSPHATE 10 MG/ML
6 INJECTION, SOLUTION INTRAMUSCULAR; INTRAVENOUS EVERY 24 HOURS
Status: COMPLETED | OUTPATIENT
Start: 2023-11-24 | End: 2023-12-03

## 2023-11-24 RX ORDER — ONDANSETRON 4 MG/1
4 TABLET, ORALLY DISINTEGRATING ORAL EVERY 8 HOURS PRN
Status: DISCONTINUED | OUTPATIENT
Start: 2023-11-24 | End: 2023-12-05 | Stop reason: HOSPADM

## 2023-11-24 RX ORDER — 0.9 % SODIUM CHLORIDE 0.9 %
500 INTRAVENOUS SOLUTION INTRAVENOUS ONCE
Status: COMPLETED | OUTPATIENT
Start: 2023-11-24 | End: 2023-11-24

## 2023-11-24 RX ORDER — SODIUM CHLORIDE 9 MG/ML
INJECTION, SOLUTION INTRAVENOUS PRN
Status: DISCONTINUED | OUTPATIENT
Start: 2023-11-24 | End: 2023-12-05 | Stop reason: HOSPADM

## 2023-11-24 RX ORDER — CYPROHEPTADINE HYDROCHLORIDE 4 MG/1
8 TABLET ORAL 3 TIMES DAILY
Status: DISCONTINUED | OUTPATIENT
Start: 2023-11-24 | End: 2023-12-05 | Stop reason: HOSPADM

## 2023-11-24 RX ORDER — THIAMINE HYDROCHLORIDE 100 MG/ML
200 INJECTION, SOLUTION INTRAMUSCULAR; INTRAVENOUS EVERY 12 HOURS
Status: DISCONTINUED | OUTPATIENT
Start: 2023-11-24 | End: 2023-12-05 | Stop reason: HOSPADM

## 2023-11-24 RX ORDER — ERGOCALCIFEROL 1.25 MG/1
50000 CAPSULE ORAL WEEKLY
Status: DISCONTINUED | OUTPATIENT
Start: 2023-11-24 | End: 2023-12-05 | Stop reason: HOSPADM

## 2023-11-24 RX ORDER — ENOXAPARIN SODIUM 100 MG/ML
40 INJECTION SUBCUTANEOUS DAILY
Status: DISCONTINUED | OUTPATIENT
Start: 2023-11-24 | End: 2023-11-26

## 2023-11-24 RX ADMIN — Medication 75 MCG/HR: at 16:43

## 2023-11-24 RX ADMIN — SODIUM CHLORIDE 500 ML: 9 INJECTION, SOLUTION INTRAVENOUS at 05:08

## 2023-11-24 RX ADMIN — ZINC SULFATE 220 MG (50 MG) CAPSULE 50 MG: CAPSULE at 21:28

## 2023-11-24 RX ADMIN — ENOXAPARIN SODIUM 40 MG: 100 INJECTION SUBCUTANEOUS at 09:10

## 2023-11-24 RX ADMIN — FUROSEMIDE 40 MG: 10 INJECTION, SOLUTION INTRAMUSCULAR; INTRAVENOUS at 01:33

## 2023-11-24 RX ADMIN — OXYCODONE HYDROCHLORIDE AND ACETAMINOPHEN 500 MG: 500 TABLET ORAL at 21:28

## 2023-11-24 RX ADMIN — INSULIN LISPRO 2 UNITS: 100 INJECTION, SOLUTION INTRAVENOUS; SUBCUTANEOUS at 08:32

## 2023-11-24 RX ADMIN — DEXMEDETOMIDINE HYDROCHLORIDE 1.3 MCG/KG/HR: 400 INJECTION, SOLUTION INTRAVENOUS at 20:47

## 2023-11-24 RX ADMIN — INSULIN LISPRO 1 UNITS: 100 INJECTION, SOLUTION INTRAVENOUS; SUBCUTANEOUS at 04:19

## 2023-11-24 RX ADMIN — Medication 1000 MG: at 21:05

## 2023-11-24 RX ADMIN — SODIUM CHLORIDE: 9 INJECTION, SOLUTION INTRAVENOUS at 05:14

## 2023-11-24 RX ADMIN — THIAMINE HYDROCHLORIDE 200 MG: 100 INJECTION, SOLUTION INTRAMUSCULAR; INTRAVENOUS at 20:58

## 2023-11-24 RX ADMIN — SODIUM CHLORIDE, PRESERVATIVE FREE 20 MG: 5 INJECTION INTRAVENOUS at 20:58

## 2023-11-24 RX ADMIN — SODIUM CHLORIDE, PRESERVATIVE FREE 20 MG: 5 INJECTION INTRAVENOUS at 09:11

## 2023-11-24 RX ADMIN — FLUVOXAMINE MALEATE 50 MG: 50 TABLET, COATED ORAL at 21:01

## 2023-11-24 RX ADMIN — DEXAMETHASONE SODIUM PHOSPHATE 6 MG: 10 INJECTION, SOLUTION INTRAMUSCULAR; INTRAVENOUS at 01:33

## 2023-11-24 RX ADMIN — CEFEPIME 2000 MG: 2 INJECTION, POWDER, FOR SOLUTION INTRAVENOUS at 12:02

## 2023-11-24 RX ADMIN — CEFEPIME 2000 MG: 2 INJECTION, POWDER, FOR SOLUTION INTRAVENOUS at 23:09

## 2023-11-24 RX ADMIN — MONTELUKAST 10 MG: 10 TABLET, FILM COATED ORAL at 21:01

## 2023-11-24 RX ADMIN — Medication 5 MCG/MIN: at 02:28

## 2023-11-24 RX ADMIN — DEXMEDETOMIDINE HYDROCHLORIDE 1.3 MCG/KG/HR: 400 INJECTION, SOLUTION INTRAVENOUS at 16:48

## 2023-11-24 RX ADMIN — Medication 5000 UNITS: at 21:29

## 2023-11-24 RX ADMIN — ERGOCALCIFEROL 50000 UNITS: 1.25 CAPSULE ORAL at 21:29

## 2023-11-24 RX ADMIN — DEXMEDETOMIDINE HYDROCHLORIDE 1.5 MCG/KG/HR: 400 INJECTION, SOLUTION INTRAVENOUS at 09:10

## 2023-11-24 RX ADMIN — CYPROHEPTADINE HYDROCHLORIDE 8 MG: 4 TABLET ORAL at 21:01

## 2023-11-24 ASSESSMENT — PULMONARY FUNCTION TESTS
PIF_VALUE: 49
PIF_VALUE: 43
PIF_VALUE: 32
PIF_VALUE: 35
PIF_VALUE: 44
PIF_VALUE: 44
PIF_VALUE: 32
PIF_VALUE: 43
PIF_VALUE: 52
PIF_VALUE: 37
PIF_VALUE: 31
PIF_VALUE: 44
PIF_VALUE: 34
PIF_VALUE: 37
PIF_VALUE: 34
PIF_VALUE: 36
PIF_VALUE: 31
PIF_VALUE: 34
PIF_VALUE: 44
PIF_VALUE: 35
PIF_VALUE: 26
PIF_VALUE: 38
PIF_VALUE: 39
PIF_VALUE: 34
PIF_VALUE: 41
PIF_VALUE: 38
PIF_VALUE: 35
PIF_VALUE: 39
PIF_VALUE: 50
PIF_VALUE: 37
PIF_VALUE: 48
PIF_VALUE: 37
PIF_VALUE: 59
PIF_VALUE: 27
PIF_VALUE: 45

## 2023-11-24 NOTE — ED PROVIDER NOTES
[facility] ICU  EMERGENCY DEPARTMENT ENCOUNTER      Pt Name: Frank Morris  MRN: [id number]  [address] [facility] 1950  Date of evaluation: 11/23/2023  Provider: Dale Abrams MD    CHIEF COMPLAINT       Chief Complaint   Patient presents with    Respiratory Distress     EMS got to scene and couldn't get SpO2 reading; Pt being bagged upon arrival          HISTORY OF PRESENT ILLNESS   (Location/Symptom, Timing/Onset,Context/Setting, Quality, Duration, Modifying Factors, Severity)  Note limiting factors. Frank Morris is a 75 y.o. female who presents to the emergency department for evaluation after she was brought to the hospital by EMS tonight after they were called for shortness of breath and altered mental status. On their arrival patient was responsive to painful stimuli only. Was hypoxic to the 70s on their arrival.  En route, patient had decline in mental status. Bag-valve-mask ventilation was initiated and was continued en route. With assisted ventilation, oxygen saturation came up to the 90s but there was no significant improvement in mental status. Patient was hospitalized here several days ago with acute on chronic hypoxic and hypercapnic respiratory failure. Discharged yesterday and at that time was maintaining oxygen saturation on 2 L nasal cannula. HPI    NursingNotes were reviewed. REVIEW OF SYSTEMS    (2-9 systems for level 4, 10 or more for level 5)     Review of Systems   Unable to perform ROS: Patient unresponsive       A complete review of systems was performed and is negative except as noted above in the HPI.        PAST MEDICAL HISTORY     Past Medical History:   Diagnosis Date    ASHD (arteriosclerotic heart disease)     s/p PTCA and stent of circumflex, as well as intermediate and mid LAD     COPD (chronic obstructive pulmonary disease) ([facility])     Coronary atherosclerosis     Diabetes mellitus ([address] [facility])     diet controlled, type 2    Encounter for wound care     FOR LLL WOUND    Fall 10/29/2020

## 2023-11-24 NOTE — H&P
History and Physical      CHIEF COMPLAINT:  AMS    Reason for Admission:  Acute on Chronic Rersp Failure, COVID +, Sepsis    History Obtained From:  chart, Family, Staff    HISTORY OF PRESENT ILLNESS:      The patient is a 75 y.o. female who was admitted from ER with above. She has a h/o CRF, and was recently d/c with acute resp failure. Her Son, says she began napping after her afternoon meal. He kept checking on her, and called th ambulance when she was less responsive and having more issues with her resp status. She was hypoxic and bagged by EMS. She was intubated in ER. She is COVID +. Son does not relay any sick contacts. She has had no fever. In ICU, she is not responsive to pain, and has no gag per Nursing. She has been hypotensive and hypothermic. I did talk to her Son and informed him of how ill she was, and he did decide to make her a DNR, but to continue treatment at this time, with antibiotics, pressors, and vent care.      Past Medical History:        Diagnosis Date    ASHD (arteriosclerotic heart disease)     s/p PTCA and stent of circumflex, as well as intermediate and mid LAD     COPD (chronic obstructive pulmonary disease) (HCC)     Coronary atherosclerosis     Diabetes mellitus ([address])     diet controlled, type 2    Encounter for wound care     FOR LLL WOUND    Fall 10/29/2020    Ganglion cyst     RT HAND    Hematoma 10/2020    hematoma LLL from fall injury    Hx of blood clots     Hypercholesteremia     Hypertension     Pacemaker 03/02/2021    Palliative care patient 03/16/2022    Unstable angina [facility])      Past Surgical History:        Procedure Laterality Date    CARDIAC CATHETERIZATION  12/10/00    selective left heart and coronary arteriography with left ventriculography     CARDIAC CATHETERIZATION  01/23/98    left heart cath, left ventriculography, slective coronary arteriography, direct infarct angioplasty and stent placement to proximal left anterior descending coronary artery    CARDIAC

## 2023-11-24 NOTE — ED NOTES
Upon arriving to ER, patient was being bagged by EMS. Per EMS patient was in the 70s on their arrival to scene. Patient was intubated at 2230, 7.5 mm and measured 23cm at the lips. OG tube placed at 2240 that measures 53cm at the teeth.       Natividad Rosales RN  11/24/23 0001

## 2023-11-25 LAB
25(OH)D3 SERPL-MCNC: 8.1 NG/ML
ALLENS TEST: ABNORMAL
ANION GAP SERPL CALCULATED.3IONS-SCNC: 10 MMOL/L (ref 7–19)
BASE EXCESS ARTERIAL: 13.4 MMOL/L (ref -2–2)
BASOPHILS # BLD: 0 K/UL (ref 0–0.2)
BASOPHILS NFR BLD: 0 % (ref 0–1)
BUN SERPL-MCNC: 32 MG/DL (ref 8–23)
CALCIUM SERPL-MCNC: 8.8 MG/DL (ref 8.8–10.2)
CARBOXYHEMOGLOBIN ARTERIAL: 2.4 % (ref 0–5)
CHLORIDE SERPL-SCNC: 99 MMOL/L (ref 98–111)
CO2 SERPL-SCNC: 32 MMOL/L (ref 22–29)
CREAT SERPL-MCNC: 1 MG/DL (ref 0.5–0.9)
CRP SERPL HS-MCNC: 1.2 MG/DL (ref 0–0.5)
EOSINOPHIL # BLD: 0 K/UL (ref 0–0.6)
EOSINOPHIL NFR BLD: 0 % (ref 0–5)
ERYTHROCYTE [DISTWIDTH] IN BLOOD BY AUTOMATED COUNT: 15.7 % (ref 11.5–14.5)
FIO2: 30 %
GLUCOSE BLD-MCNC: 138 MG/DL (ref 70–99)
GLUCOSE BLD-MCNC: 157 MG/DL (ref 70–99)
GLUCOSE BLD-MCNC: 159 MG/DL (ref 70–99)
GLUCOSE BLD-MCNC: 164 MG/DL (ref 70–99)
GLUCOSE BLD-MCNC: 173 MG/DL (ref 70–99)
GLUCOSE BLD-MCNC: 203 MG/DL (ref 70–99)
GLUCOSE BLD-MCNC: 94 MG/DL (ref 70–99)
GLUCOSE SERPL-MCNC: 147 MG/DL (ref 74–109)
HCO3 ARTERIAL: 40.6 MMOL/L (ref 22–26)
HCT VFR BLD AUTO: 38.2 % (ref 37–47)
HEMOGLOBIN, ART, EXTENDED: 11.6 G/DL (ref 12–16)
HGB BLD-MCNC: 11.8 G/DL (ref 12–16)
IMM GRANULOCYTES # BLD: 0.1 K/UL
LYMPHOCYTES # BLD: 0.6 K/UL (ref 1.1–4.5)
LYMPHOCYTES NFR BLD: 6.9 % (ref 20–40)
MCH RBC QN AUTO: 29.4 PG (ref 27–31)
MCHC RBC AUTO-ENTMCNC: 30.9 G/DL (ref 33–37)
MCV RBC AUTO: 95 FL (ref 81–99)
METHEMOGLOBIN ARTERIAL: 0.8 %
MODE: ABNORMAL
MONOCYTES # BLD: 0.3 K/UL (ref 0–0.9)
MONOCYTES NFR BLD: 3.8 % (ref 0–10)
NEUTROPHILS # BLD: 8 K/UL (ref 1.5–7.5)
NEUTS SEG NFR BLD: 88.6 % (ref 50–65)
O2 CONTENT ARTERIAL: 14.7 ML/DL
O2 SAT, ARTERIAL: 89.7 %
O2 THERAPY: ABNORMAL
PCO2 ARTERIAL: 64 MMHG (ref 35–45)
PERFORMED ON: ABNORMAL
PERFORMED ON: NORMAL
PH ARTERIAL: 7.41 (ref 7.35–7.45)
PLATELET # BLD AUTO: 206 K/UL (ref 130–400)
PMV BLD AUTO: 9.2 FL (ref 9.4–12.3)
PO2 ARTERIAL: 61 MMHG (ref 80–100)
POSITIVE END EXP PRESS: 5
POTASSIUM BLD-SCNC: 4.5 MMOL/L
POTASSIUM SERPL-SCNC: 4.8 MMOL/L (ref 3.5–5)
RBC # BLD AUTO: 4.02 M/UL (ref 4.2–5.4)
SAMPLE SOURCE: ABNORMAL
SODIUM SERPL-SCNC: 141 MMOL/L (ref 136–145)
VANCOMYCIN TROUGH SERPL-MCNC: 11.6 UG/ML (ref 10–20)
WBC # BLD AUTO: 9 K/UL (ref 4.8–10.8)

## 2023-11-25 PROCEDURE — 36600 WITHDRAWAL OF ARTERIAL BLOOD: CPT

## 2023-11-25 PROCEDURE — 94660 CPAP INITIATION&MGMT: CPT

## 2023-11-25 PROCEDURE — 2000000000 HC ICU R&B

## 2023-11-25 PROCEDURE — 6370000000 HC RX 637 (ALT 250 FOR IP): Performed by: INTERNAL MEDICINE

## 2023-11-25 PROCEDURE — 6370000000 HC RX 637 (ALT 250 FOR IP): Performed by: FAMILY MEDICINE

## 2023-11-25 PROCEDURE — 85025 COMPLETE CBC W/AUTO DIFF WBC: CPT

## 2023-11-25 PROCEDURE — 5A09457 ASSISTANCE WITH RESPIRATORY VENTILATION, 24-96 CONSECUTIVE HOURS, CONTINUOUS POSITIVE AIRWAY PRESSURE: ICD-10-PCS | Performed by: INTERNAL MEDICINE

## 2023-11-25 PROCEDURE — 82962 GLUCOSE BLOOD TEST: CPT

## 2023-11-25 PROCEDURE — 2580000003 HC RX 258: Performed by: FAMILY MEDICINE

## 2023-11-25 PROCEDURE — 36415 COLL VENOUS BLD VENIPUNCTURE: CPT

## 2023-11-25 PROCEDURE — 94760 N-INVAS EAR/PLS OXIMETRY 1: CPT

## 2023-11-25 PROCEDURE — 82803 BLOOD GASES ANY COMBINATION: CPT

## 2023-11-25 PROCEDURE — 94003 VENT MGMT INPAT SUBQ DAY: CPT

## 2023-11-25 PROCEDURE — 6360000002 HC RX W HCPCS: Performed by: EMERGENCY MEDICINE

## 2023-11-25 PROCEDURE — 86140 C-REACTIVE PROTEIN: CPT

## 2023-11-25 PROCEDURE — 2500000003 HC RX 250 WO HCPCS: Performed by: FAMILY MEDICINE

## 2023-11-25 PROCEDURE — 2500000003 HC RX 250 WO HCPCS: Performed by: EMERGENCY MEDICINE

## 2023-11-25 PROCEDURE — 80202 ASSAY OF VANCOMYCIN: CPT

## 2023-11-25 PROCEDURE — 6360000002 HC RX W HCPCS: Performed by: INTERNAL MEDICINE

## 2023-11-25 PROCEDURE — 6360000002 HC RX W HCPCS: Performed by: FAMILY MEDICINE

## 2023-11-25 PROCEDURE — 2580000003 HC RX 258: Performed by: EMERGENCY MEDICINE

## 2023-11-25 PROCEDURE — 82306 VITAMIN D 25 HYDROXY: CPT

## 2023-11-25 PROCEDURE — 80048 BASIC METABOLIC PNL TOTAL CA: CPT

## 2023-11-25 RX ADMIN — Medication 5000 UNITS: at 07:10

## 2023-11-25 RX ADMIN — INSULIN LISPRO 1 UNITS: 100 INJECTION, SOLUTION INTRAVENOUS; SUBCUTANEOUS at 11:06

## 2023-11-25 RX ADMIN — DEXAMETHASONE SODIUM PHOSPHATE 6 MG: 10 INJECTION, SOLUTION INTRAMUSCULAR; INTRAVENOUS at 22:50

## 2023-11-25 RX ADMIN — CEFEPIME 2000 MG: 2 INJECTION, POWDER, FOR SOLUTION INTRAVENOUS at 10:34

## 2023-11-25 RX ADMIN — SODIUM CHLORIDE, PRESERVATIVE FREE 10 ML: 5 INJECTION INTRAVENOUS at 19:17

## 2023-11-25 RX ADMIN — DEXMEDETOMIDINE HYDROCHLORIDE 1.3 MCG/KG/HR: 400 INJECTION, SOLUTION INTRAVENOUS at 12:27

## 2023-11-25 RX ADMIN — SODIUM CHLORIDE, PRESERVATIVE FREE 10 ML: 5 INJECTION INTRAVENOUS at 07:11

## 2023-11-25 RX ADMIN — THIAMINE HYDROCHLORIDE 200 MG: 100 INJECTION, SOLUTION INTRAMUSCULAR; INTRAVENOUS at 19:41

## 2023-11-25 RX ADMIN — Medication 75 MCG/HR: at 06:14

## 2023-11-25 RX ADMIN — DEXMEDETOMIDINE HYDROCHLORIDE 1.3 MCG/KG/HR: 400 INJECTION, SOLUTION INTRAVENOUS at 00:48

## 2023-11-25 RX ADMIN — DEXAMETHASONE SODIUM PHOSPHATE 6 MG: 10 INJECTION, SOLUTION INTRAMUSCULAR; INTRAVENOUS at 00:16

## 2023-11-25 RX ADMIN — DEXMEDETOMIDINE HYDROCHLORIDE 1.3 MCG/KG/HR: 400 INJECTION, SOLUTION INTRAVENOUS at 05:32

## 2023-11-25 RX ADMIN — Medication 1000 MG: at 21:06

## 2023-11-25 RX ADMIN — SODIUM CHLORIDE, PRESERVATIVE FREE 20 MG: 5 INJECTION INTRAVENOUS at 07:10

## 2023-11-25 RX ADMIN — DEXMEDETOMIDINE HYDROCHLORIDE 1.5 MCG/KG/HR: 400 INJECTION, SOLUTION INTRAVENOUS at 22:29

## 2023-11-25 RX ADMIN — OXYCODONE HYDROCHLORIDE AND ACETAMINOPHEN 500 MG: 500 TABLET ORAL at 07:10

## 2023-11-25 RX ADMIN — CYPROHEPTADINE HYDROCHLORIDE 8 MG: 4 TABLET ORAL at 13:14

## 2023-11-25 RX ADMIN — CEFEPIME 2000 MG: 2 INJECTION, POWDER, FOR SOLUTION INTRAVENOUS at 22:48

## 2023-11-25 RX ADMIN — THIAMINE HYDROCHLORIDE 200 MG: 100 INJECTION, SOLUTION INTRAMUSCULAR; INTRAVENOUS at 07:10

## 2023-11-25 RX ADMIN — CYPROHEPTADINE HYDROCHLORIDE 8 MG: 4 TABLET ORAL at 07:10

## 2023-11-25 RX ADMIN — DEXMEDETOMIDINE HYDROCHLORIDE 1.4 MCG/KG/HR: 400 INJECTION, SOLUTION INTRAVENOUS at 19:14

## 2023-11-25 RX ADMIN — OXYCODONE HYDROCHLORIDE AND ACETAMINOPHEN 500 MG: 500 TABLET ORAL at 13:14

## 2023-11-25 RX ADMIN — DEXMEDETOMIDINE HYDROCHLORIDE 1.3 MCG/KG/HR: 400 INJECTION, SOLUTION INTRAVENOUS at 08:42

## 2023-11-25 RX ADMIN — FLUVOXAMINE MALEATE 50 MG: 50 TABLET, COATED ORAL at 07:10

## 2023-11-25 RX ADMIN — SODIUM CHLORIDE, PRESERVATIVE FREE 20 MG: 5 INJECTION INTRAVENOUS at 19:41

## 2023-11-25 RX ADMIN — ENOXAPARIN SODIUM 40 MG: 100 INJECTION SUBCUTANEOUS at 09:02

## 2023-11-25 RX ADMIN — ZINC SULFATE 220 MG (50 MG) CAPSULE 50 MG: CAPSULE at 07:10

## 2023-11-25 ASSESSMENT — PULMONARY FUNCTION TESTS
PIF_VALUE: 21
PIF_VALUE: 27
PIF_VALUE: 11
PIF_VALUE: 26
PIF_VALUE: 27
PIF_VALUE: 22
PIF_VALUE: 24
PIF_VALUE: 27
PIF_VALUE: 23
PIF_VALUE: 11
PIF_VALUE: 10
PIF_VALUE: 29
PIF_VALUE: 25
PIF_VALUE: 10
PIF_VALUE: 25
PIF_VALUE: 11

## 2023-11-25 ASSESSMENT — PAIN SCALES - GENERAL
PAINLEVEL_OUTOF10: 0
PAINLEVEL_OUTOF10: 0

## 2023-11-26 ENCOUNTER — APPOINTMENT (OUTPATIENT)
Dept: GENERAL RADIOLOGY | Age: 73
DRG: 871 | End: 2023-11-26
Payer: MEDICARE

## 2023-11-26 LAB
ALLENS TEST: ABNORMAL
ANION GAP SERPL CALCULATED.3IONS-SCNC: 11 MMOL/L (ref 7–19)
BACTERIA UR CULT: NORMAL
BASE EXCESS ARTERIAL: 9.9 MMOL/L (ref -2–2)
BASOPHILS # BLD: 0 K/UL (ref 0–0.2)
BASOPHILS # BLD: 0 K/UL (ref 0–0.2)
BASOPHILS NFR BLD: 0.1 % (ref 0–1)
BASOPHILS NFR BLD: 0.1 % (ref 0–1)
BI-LEVEL POS AIRWAY PRESSURE(EXPIRATORY): 6
BI-LEVEL POS AIRWAY PRESSURE(INSPIRATORY): 14
BUN SERPL-MCNC: 31 MG/DL (ref 8–23)
CALCIUM SERPL-MCNC: 8.8 MG/DL (ref 8.8–10.2)
CARBOXYHEMOGLOBIN ARTERIAL: 2.4 % (ref 0–5)
CHLORIDE SERPL-SCNC: 96 MMOL/L (ref 98–111)
CO2 SERPL-SCNC: 29 MMOL/L (ref 22–29)
CREAT SERPL-MCNC: 0.8 MG/DL (ref 0.5–0.9)
EOSINOPHIL # BLD: 0 K/UL (ref 0–0.6)
EOSINOPHIL # BLD: 0 K/UL (ref 0–0.6)
EOSINOPHIL NFR BLD: 0 % (ref 0–5)
EOSINOPHIL NFR BLD: 0.1 % (ref 0–5)
ERYTHROCYTE [DISTWIDTH] IN BLOOD BY AUTOMATED COUNT: 15.3 % (ref 11.5–14.5)
ERYTHROCYTE [DISTWIDTH] IN BLOOD BY AUTOMATED COUNT: 15.4 % (ref 11.5–14.5)
GLUCOSE BLD-MCNC: 154 MG/DL (ref 70–99)
GLUCOSE BLD-MCNC: 155 MG/DL (ref 70–99)
GLUCOSE BLD-MCNC: 166 MG/DL (ref 70–99)
GLUCOSE BLD-MCNC: 174 MG/DL (ref 70–99)
GLUCOSE BLD-MCNC: 200 MG/DL (ref 70–99)
GLUCOSE BLD-MCNC: 216 MG/DL (ref 70–99)
GLUCOSE SERPL-MCNC: 183 MG/DL (ref 74–109)
HCO3 ARTERIAL: 36.3 MMOL/L (ref 22–26)
HCT VFR BLD AUTO: 37.9 % (ref 37–47)
HCT VFR BLD AUTO: 42.2 % (ref 37–47)
HEMOGLOBIN, ART, EXTENDED: 12.4 G/DL (ref 12–16)
HGB BLD-MCNC: 11.8 G/DL (ref 12–16)
HGB BLD-MCNC: 12.7 G/DL (ref 12–16)
IMM GRANULOCYTES # BLD: 0 K/UL
IMM GRANULOCYTES # BLD: 0 K/UL
LYMPHOCYTES # BLD: 0.6 K/UL (ref 1.1–4.5)
LYMPHOCYTES # BLD: 0.7 K/UL (ref 1.1–4.5)
LYMPHOCYTES NFR BLD: 6.6 % (ref 20–40)
LYMPHOCYTES NFR BLD: 8.1 % (ref 20–40)
MCH RBC QN AUTO: 29.5 PG (ref 27–31)
MCH RBC QN AUTO: 29.8 PG (ref 27–31)
MCHC RBC AUTO-ENTMCNC: 30.1 G/DL (ref 33–37)
MCHC RBC AUTO-ENTMCNC: 31.1 G/DL (ref 33–37)
MCV RBC AUTO: 95.7 FL (ref 81–99)
MCV RBC AUTO: 97.9 FL (ref 81–99)
METHEMOGLOBIN ARTERIAL: 1 %
MODE: ABNORMAL
MONOCYTES # BLD: 0.4 K/UL (ref 0–0.9)
MONOCYTES # BLD: 0.5 K/UL (ref 0–0.9)
MONOCYTES NFR BLD: 4.1 % (ref 0–10)
MONOCYTES NFR BLD: 7 % (ref 0–10)
NEUTROPHILS # BLD: 6.6 K/UL (ref 1.5–7.5)
NEUTROPHILS # BLD: 9.4 K/UL (ref 1.5–7.5)
NEUTS SEG NFR BLD: 84.5 % (ref 50–65)
NEUTS SEG NFR BLD: 88.7 % (ref 50–65)
O2 CONTENT ARTERIAL: 15.5 ML/DL
O2 SAT, ARTERIAL: 89.1 %
O2 THERAPY: ABNORMAL
OXYGEN FLOW: 5
PCO2 ARTERIAL: 56 MMHG (ref 35–45)
PERFORMED ON: ABNORMAL
PH ARTERIAL: 7.42 (ref 7.35–7.45)
PLATELET # BLD AUTO: 166 K/UL (ref 130–400)
PLATELET # BLD AUTO: 181 K/UL (ref 130–400)
PMV BLD AUTO: 8.8 FL (ref 9.4–12.3)
PMV BLD AUTO: 9 FL (ref 9.4–12.3)
PO2 ARTERIAL: 61 MMHG (ref 80–100)
POTASSIUM BLD-SCNC: 4.3 MMOL/L
POTASSIUM SERPL-SCNC: 4.5 MMOL/L (ref 3.5–5)
RBC # BLD AUTO: 3.96 M/UL (ref 4.2–5.4)
RBC # BLD AUTO: 4.31 M/UL (ref 4.2–5.4)
SAMPLE SOURCE: ABNORMAL
SODIUM SERPL-SCNC: 136 MMOL/L (ref 136–145)
WBC # BLD AUTO: 10.6 K/UL (ref 4.8–10.8)
WBC # BLD AUTO: 7.8 K/UL (ref 4.8–10.8)

## 2023-11-26 PROCEDURE — 80048 BASIC METABOLIC PNL TOTAL CA: CPT

## 2023-11-26 PROCEDURE — 85025 COMPLETE CBC W/AUTO DIFF WBC: CPT

## 2023-11-26 PROCEDURE — 6370000000 HC RX 637 (ALT 250 FOR IP): Performed by: INTERNAL MEDICINE

## 2023-11-26 PROCEDURE — 71045 X-RAY EXAM CHEST 1 VIEW: CPT

## 2023-11-26 PROCEDURE — 2580000003 HC RX 258: Performed by: INTERNAL MEDICINE

## 2023-11-26 PROCEDURE — 94640 AIRWAY INHALATION TREATMENT: CPT

## 2023-11-26 PROCEDURE — 2580000003 HC RX 258: Performed by: EMERGENCY MEDICINE

## 2023-11-26 PROCEDURE — 2500000003 HC RX 250 WO HCPCS: Performed by: EMERGENCY MEDICINE

## 2023-11-26 PROCEDURE — 82803 BLOOD GASES ANY COMBINATION: CPT

## 2023-11-26 PROCEDURE — 6360000002 HC RX W HCPCS: Performed by: EMERGENCY MEDICINE

## 2023-11-26 PROCEDURE — 36600 WITHDRAWAL OF ARTERIAL BLOOD: CPT

## 2023-11-26 PROCEDURE — 2500000003 HC RX 250 WO HCPCS: Performed by: FAMILY MEDICINE

## 2023-11-26 PROCEDURE — 82962 GLUCOSE BLOOD TEST: CPT

## 2023-11-26 PROCEDURE — 94760 N-INVAS EAR/PLS OXIMETRY 1: CPT

## 2023-11-26 PROCEDURE — 2700000000 HC OXYGEN THERAPY PER DAY

## 2023-11-26 PROCEDURE — 6370000000 HC RX 637 (ALT 250 FOR IP): Performed by: FAMILY MEDICINE

## 2023-11-26 PROCEDURE — 36415 COLL VENOUS BLD VENIPUNCTURE: CPT

## 2023-11-26 PROCEDURE — 6360000002 HC RX W HCPCS: Performed by: INTERNAL MEDICINE

## 2023-11-26 PROCEDURE — 73060 X-RAY EXAM OF HUMERUS: CPT

## 2023-11-26 PROCEDURE — 2580000003 HC RX 258: Performed by: FAMILY MEDICINE

## 2023-11-26 PROCEDURE — 94660 CPAP INITIATION&MGMT: CPT

## 2023-11-26 PROCEDURE — 2000000000 HC ICU R&B

## 2023-11-26 PROCEDURE — 6360000002 HC RX W HCPCS: Performed by: FAMILY MEDICINE

## 2023-11-26 RX ORDER — FUROSEMIDE 40 MG/1
40 TABLET ORAL DAILY
Status: DISCONTINUED | OUTPATIENT
Start: 2023-11-26 | End: 2023-12-05 | Stop reason: HOSPADM

## 2023-11-26 RX ORDER — IPRATROPIUM BROMIDE AND ALBUTEROL SULFATE 2.5; .5 MG/3ML; MG/3ML
1 SOLUTION RESPIRATORY (INHALATION)
Status: DISCONTINUED | OUTPATIENT
Start: 2023-11-26 | End: 2023-11-26

## 2023-11-26 RX ORDER — ROSUVASTATIN CALCIUM 10 MG/1
10 TABLET, COATED ORAL NIGHTLY
Status: DISCONTINUED | OUTPATIENT
Start: 2023-11-26 | End: 2023-12-05 | Stop reason: HOSPADM

## 2023-11-26 RX ORDER — ALBUTEROL SULFATE 90 UG/1
2 AEROSOL, METERED RESPIRATORY (INHALATION)
Status: DISCONTINUED | OUTPATIENT
Start: 2023-11-26 | End: 2023-12-05 | Stop reason: HOSPADM

## 2023-11-26 RX ORDER — SPIRONOLACTONE 25 MG/1
50 TABLET ORAL DAILY
Status: DISCONTINUED | OUTPATIENT
Start: 2023-11-26 | End: 2023-12-05 | Stop reason: HOSPADM

## 2023-11-26 RX ORDER — SOTALOL HYDROCHLORIDE 120 MG/1
120 TABLET ORAL EVERY 12 HOURS SCHEDULED
Status: DISCONTINUED | OUTPATIENT
Start: 2023-11-26 | End: 2023-12-05 | Stop reason: HOSPADM

## 2023-11-26 RX ORDER — CLOPIDOGREL BISULFATE 75 MG/1
75 TABLET ORAL DAILY
Status: DISCONTINUED | OUTPATIENT
Start: 2023-11-26 | End: 2023-12-05 | Stop reason: HOSPADM

## 2023-11-26 RX ADMIN — ENOXAPARIN SODIUM 40 MG: 100 INJECTION SUBCUTANEOUS at 09:16

## 2023-11-26 RX ADMIN — DEXMEDETOMIDINE HYDROCHLORIDE 0.5 MCG/KG/HR: 400 INJECTION, SOLUTION INTRAVENOUS at 10:32

## 2023-11-26 RX ADMIN — APIXABAN 5 MG: 5 TABLET, FILM COATED ORAL at 19:33

## 2023-11-26 RX ADMIN — ROSUVASTATIN 10 MG: 10 TABLET, FILM COATED ORAL at 19:34

## 2023-11-26 RX ADMIN — INSULIN LISPRO 1 UNITS: 100 INJECTION, SOLUTION INTRAVENOUS; SUBCUTANEOUS at 03:06

## 2023-11-26 RX ADMIN — SODIUM CHLORIDE: 9 INJECTION, SOLUTION INTRAVENOUS at 07:07

## 2023-11-26 RX ADMIN — CEFEPIME 2000 MG: 2 INJECTION, POWDER, FOR SOLUTION INTRAVENOUS at 11:09

## 2023-11-26 RX ADMIN — SPIRONOLACTONE 50 MG: 50 TABLET ORAL at 13:15

## 2023-11-26 RX ADMIN — FLUVOXAMINE MALEATE 50 MG: 50 TABLET, COATED ORAL at 19:32

## 2023-11-26 RX ADMIN — DEXMEDETOMIDINE HYDROCHLORIDE 1.5 MCG/KG/HR: 400 INJECTION, SOLUTION INTRAVENOUS at 02:20

## 2023-11-26 RX ADMIN — ZINC SULFATE 220 MG (50 MG) CAPSULE 50 MG: CAPSULE at 07:21

## 2023-11-26 RX ADMIN — FLUVOXAMINE MALEATE 50 MG: 50 TABLET, COATED ORAL at 07:13

## 2023-11-26 RX ADMIN — CYPROHEPTADINE HYDROCHLORIDE 8 MG: 4 TABLET ORAL at 19:32

## 2023-11-26 RX ADMIN — SODIUM CHLORIDE, PRESERVATIVE FREE 10 ML: 5 INJECTION INTRAVENOUS at 07:22

## 2023-11-26 RX ADMIN — FUROSEMIDE 40 MG: 40 TABLET ORAL at 13:15

## 2023-11-26 RX ADMIN — SODIUM CHLORIDE, PRESERVATIVE FREE 10 ML: 5 INJECTION INTRAVENOUS at 07:13

## 2023-11-26 RX ADMIN — THIAMINE HYDROCHLORIDE 200 MG: 100 INJECTION, SOLUTION INTRAMUSCULAR; INTRAVENOUS at 19:34

## 2023-11-26 RX ADMIN — CLOPIDOGREL BISULFATE 75 MG: 75 TABLET ORAL at 13:15

## 2023-11-26 RX ADMIN — CEFEPIME 2000 MG: 2 INJECTION, POWDER, FOR SOLUTION INTRAVENOUS at 23:30

## 2023-11-26 RX ADMIN — DEXAMETHASONE SODIUM PHOSPHATE 6 MG: 10 INJECTION, SOLUTION INTRAMUSCULAR; INTRAVENOUS at 23:44

## 2023-11-26 RX ADMIN — SODIUM CHLORIDE, PRESERVATIVE FREE 10 ML: 5 INJECTION INTRAVENOUS at 19:35

## 2023-11-26 RX ADMIN — SODIUM CHLORIDE, PRESERVATIVE FREE 20 MG: 5 INJECTION INTRAVENOUS at 07:13

## 2023-11-26 RX ADMIN — ALBUTEROL SULFATE 2 PUFF: 90 AEROSOL, METERED RESPIRATORY (INHALATION) at 20:11

## 2023-11-26 RX ADMIN — INSULIN LISPRO 1 UNITS: 100 INJECTION, SOLUTION INTRAVENOUS; SUBCUTANEOUS at 19:33

## 2023-11-26 RX ADMIN — ALBUTEROL SULFATE 2 PUFF: 90 AEROSOL, METERED RESPIRATORY (INHALATION) at 15:54

## 2023-11-26 RX ADMIN — THIAMINE HYDROCHLORIDE 200 MG: 100 INJECTION, SOLUTION INTRAMUSCULAR; INTRAVENOUS at 07:13

## 2023-11-26 RX ADMIN — DEXMEDETOMIDINE HYDROCHLORIDE 0.8 MCG/KG/HR: 400 INJECTION, SOLUTION INTRAVENOUS at 17:26

## 2023-11-26 RX ADMIN — MONTELUKAST 10 MG: 10 TABLET, FILM COATED ORAL at 19:33

## 2023-11-26 RX ADMIN — OXYCODONE HYDROCHLORIDE AND ACETAMINOPHEN 500 MG: 500 TABLET ORAL at 13:15

## 2023-11-26 RX ADMIN — OXYCODONE HYDROCHLORIDE AND ACETAMINOPHEN 500 MG: 500 TABLET ORAL at 07:13

## 2023-11-26 RX ADMIN — CYPROHEPTADINE HYDROCHLORIDE 8 MG: 4 TABLET ORAL at 07:13

## 2023-11-26 RX ADMIN — Medication 1000 MG: at 21:00

## 2023-11-26 RX ADMIN — APIXABAN 5 MG: 5 TABLET, FILM COATED ORAL at 13:15

## 2023-11-26 RX ADMIN — Medication 5000 UNITS: at 07:13

## 2023-11-26 RX ADMIN — OXYCODONE HYDROCHLORIDE AND ACETAMINOPHEN 500 MG: 500 TABLET ORAL at 19:33

## 2023-11-26 RX ADMIN — CYPROHEPTADINE HYDROCHLORIDE 8 MG: 4 TABLET ORAL at 13:32

## 2023-11-26 RX ADMIN — SOTALOL HYDROCHLORIDE 120 MG: 120 TABLET ORAL at 19:31

## 2023-11-26 RX ADMIN — SODIUM CHLORIDE, PRESERVATIVE FREE 20 MG: 5 INJECTION INTRAVENOUS at 19:34

## 2023-11-27 LAB
ALBUMIN SERPL-MCNC: 3.4 G/DL (ref 3.5–5.2)
ALLENS TEST: ABNORMAL
ALP SERPL-CCNC: 82 U/L (ref 35–104)
ALT SERPL-CCNC: 11 U/L (ref 5–33)
ANION GAP SERPL CALCULATED.3IONS-SCNC: 11 MMOL/L (ref 7–19)
AST SERPL-CCNC: 12 U/L (ref 5–32)
BASE EXCESS ARTERIAL: 13.3 MMOL/L (ref -2–2)
BILIRUB SERPL-MCNC: 0.6 MG/DL (ref 0.2–1.2)
BUN SERPL-MCNC: 27 MG/DL (ref 8–23)
CALCIUM SERPL-MCNC: 8.9 MG/DL (ref 8.8–10.2)
CARBOXYHEMOGLOBIN ARTERIAL: 2.8 % (ref 0–5)
CHLORIDE SERPL-SCNC: 94 MMOL/L (ref 98–111)
CO2 SERPL-SCNC: 31 MMOL/L (ref 22–29)
CREAT SERPL-MCNC: 0.8 MG/DL (ref 0.5–0.9)
EKG P AXIS: NORMAL DEGREES
EKG P-R INTERVAL: NORMAL MS
EKG Q-T INTERVAL: 450 MS
EKG QRS DURATION: 98 MS
EKG QTC CALCULATION (BAZETT): 472 MS
EKG T AXIS: 81 DEGREES
GLUCOSE BLD-MCNC: 135 MG/DL (ref 70–99)
GLUCOSE BLD-MCNC: 136 MG/DL (ref 70–99)
GLUCOSE BLD-MCNC: 243 MG/DL (ref 70–99)
GLUCOSE BLD-MCNC: 248 MG/DL (ref 70–99)
GLUCOSE SERPL-MCNC: 205 MG/DL (ref 74–109)
HCO3 ARTERIAL: 39.6 MMOL/L (ref 22–26)
HEMOGLOBIN, ART, EXTENDED: 12.3 G/DL (ref 12–16)
METHEMOGLOBIN ARTERIAL: 0.7 %
O2 CONTENT ARTERIAL: 14.8 ML/DL
O2 SAT, ARTERIAL: 85.4 %
O2 THERAPY: ABNORMAL
OXYGEN FLOW: 1
PCO2 ARTERIAL: 57 MMHG (ref 35–45)
PERFORMED ON: ABNORMAL
PH ARTERIAL: 7.45 (ref 7.35–7.45)
PO2 ARTERIAL: 54 MMHG (ref 80–100)
POTASSIUM BLD-SCNC: 3.9 MMOL/L
POTASSIUM SERPL-SCNC: 4.3 MMOL/L (ref 3.5–5)
PROT SERPL-MCNC: 6.6 G/DL (ref 6.6–8.7)
SAMPLE SOURCE: ABNORMAL
SODIUM SERPL-SCNC: 136 MMOL/L (ref 136–145)

## 2023-11-27 PROCEDURE — 2500000003 HC RX 250 WO HCPCS: Performed by: EMERGENCY MEDICINE

## 2023-11-27 PROCEDURE — 94760 N-INVAS EAR/PLS OXIMETRY 1: CPT

## 2023-11-27 PROCEDURE — 6360000002 HC RX W HCPCS: Performed by: INTERNAL MEDICINE

## 2023-11-27 PROCEDURE — 2580000003 HC RX 258: Performed by: FAMILY MEDICINE

## 2023-11-27 PROCEDURE — 6370000000 HC RX 637 (ALT 250 FOR IP): Performed by: FAMILY MEDICINE

## 2023-11-27 PROCEDURE — 82803 BLOOD GASES ANY COMBINATION: CPT

## 2023-11-27 PROCEDURE — 82962 GLUCOSE BLOOD TEST: CPT

## 2023-11-27 PROCEDURE — 36415 COLL VENOUS BLD VENIPUNCTURE: CPT

## 2023-11-27 PROCEDURE — 6370000000 HC RX 637 (ALT 250 FOR IP): Performed by: INTERNAL MEDICINE

## 2023-11-27 PROCEDURE — 1210000000 HC MED SURG R&B

## 2023-11-27 PROCEDURE — 2700000000 HC OXYGEN THERAPY PER DAY

## 2023-11-27 PROCEDURE — 6360000002 HC RX W HCPCS: Performed by: FAMILY MEDICINE

## 2023-11-27 PROCEDURE — 6360000002 HC RX W HCPCS: Performed by: EMERGENCY MEDICINE

## 2023-11-27 PROCEDURE — 2500000003 HC RX 250 WO HCPCS: Performed by: FAMILY MEDICINE

## 2023-11-27 PROCEDURE — 36600 WITHDRAWAL OF ARTERIAL BLOOD: CPT

## 2023-11-27 PROCEDURE — 99223 1ST HOSP IP/OBS HIGH 75: CPT | Performed by: INTERNAL MEDICINE

## 2023-11-27 PROCEDURE — 80053 COMPREHEN METABOLIC PANEL: CPT

## 2023-11-27 PROCEDURE — 2580000003 HC RX 258: Performed by: INTERNAL MEDICINE

## 2023-11-27 PROCEDURE — 94660 CPAP INITIATION&MGMT: CPT

## 2023-11-27 PROCEDURE — 94640 AIRWAY INHALATION TREATMENT: CPT

## 2023-11-27 RX ORDER — INSULIN LISPRO 100 [IU]/ML
0-4 INJECTION, SOLUTION INTRAVENOUS; SUBCUTANEOUS
Status: DISCONTINUED | OUTPATIENT
Start: 2023-11-27 | End: 2023-12-05 | Stop reason: HOSPADM

## 2023-11-27 RX ADMIN — APIXABAN 5 MG: 5 TABLET, FILM COATED ORAL at 09:10

## 2023-11-27 RX ADMIN — CYPROHEPTADINE HYDROCHLORIDE 8 MG: 4 TABLET ORAL at 16:25

## 2023-11-27 RX ADMIN — SODIUM CHLORIDE, PRESERVATIVE FREE 20 MG: 5 INJECTION INTRAVENOUS at 21:57

## 2023-11-27 RX ADMIN — THIAMINE HYDROCHLORIDE 200 MG: 100 INJECTION, SOLUTION INTRAMUSCULAR; INTRAVENOUS at 09:12

## 2023-11-27 RX ADMIN — SODIUM CHLORIDE, PRESERVATIVE FREE 10 ML: 5 INJECTION INTRAVENOUS at 22:13

## 2023-11-27 RX ADMIN — Medication 5000 UNITS: at 09:10

## 2023-11-27 RX ADMIN — SOTALOL HYDROCHLORIDE 120 MG: 120 TABLET ORAL at 21:56

## 2023-11-27 RX ADMIN — MONTELUKAST 10 MG: 10 TABLET, FILM COATED ORAL at 21:56

## 2023-11-27 RX ADMIN — ALBUTEROL SULFATE 2 PUFF: 90 AEROSOL, METERED RESPIRATORY (INHALATION) at 14:45

## 2023-11-27 RX ADMIN — Medication 1000 MG: at 22:12

## 2023-11-27 RX ADMIN — ZINC SULFATE 220 MG (50 MG) CAPSULE 50 MG: CAPSULE at 09:10

## 2023-11-27 RX ADMIN — APIXABAN 5 MG: 5 TABLET, FILM COATED ORAL at 21:56

## 2023-11-27 RX ADMIN — CEFEPIME 2000 MG: 2 INJECTION, POWDER, FOR SOLUTION INTRAVENOUS at 09:36

## 2023-11-27 RX ADMIN — CLOPIDOGREL BISULFATE 75 MG: 75 TABLET ORAL at 09:09

## 2023-11-27 RX ADMIN — OXYCODONE HYDROCHLORIDE AND ACETAMINOPHEN 500 MG: 500 TABLET ORAL at 09:10

## 2023-11-27 RX ADMIN — INSULIN LISPRO 1 UNITS: 100 INJECTION, SOLUTION INTRAVENOUS; SUBCUTANEOUS at 12:33

## 2023-11-27 RX ADMIN — THIAMINE HYDROCHLORIDE 200 MG: 100 INJECTION, SOLUTION INTRAMUSCULAR; INTRAVENOUS at 23:22

## 2023-11-27 RX ADMIN — SPIRONOLACTONE 50 MG: 50 TABLET ORAL at 09:11

## 2023-11-27 RX ADMIN — CEFEPIME 2000 MG: 2 INJECTION, POWDER, FOR SOLUTION INTRAVENOUS at 16:48

## 2023-11-27 RX ADMIN — OXYCODONE HYDROCHLORIDE AND ACETAMINOPHEN 500 MG: 500 TABLET ORAL at 16:25

## 2023-11-27 RX ADMIN — SODIUM CHLORIDE, PRESERVATIVE FREE 10 ML: 5 INJECTION INTRAVENOUS at 09:15

## 2023-11-27 RX ADMIN — ROSUVASTATIN 10 MG: 10 TABLET, FILM COATED ORAL at 21:56

## 2023-11-27 RX ADMIN — SODIUM CHLORIDE, PRESERVATIVE FREE 20 MG: 5 INJECTION INTRAVENOUS at 09:12

## 2023-11-27 RX ADMIN — ALBUTEROL SULFATE 2 PUFF: 90 AEROSOL, METERED RESPIRATORY (INHALATION) at 18:19

## 2023-11-27 RX ADMIN — FLUVOXAMINE MALEATE 50 MG: 50 TABLET, COATED ORAL at 09:10

## 2023-11-27 RX ADMIN — ALBUTEROL SULFATE 2 PUFF: 90 AEROSOL, METERED RESPIRATORY (INHALATION) at 06:34

## 2023-11-27 RX ADMIN — CYPROHEPTADINE HYDROCHLORIDE 8 MG: 4 TABLET ORAL at 09:10

## 2023-11-27 RX ADMIN — SOTALOL HYDROCHLORIDE 120 MG: 120 TABLET ORAL at 09:11

## 2023-11-27 RX ADMIN — DEXMEDETOMIDINE HYDROCHLORIDE 0.8 MCG/KG/HR: 400 INJECTION, SOLUTION INTRAVENOUS at 00:53

## 2023-11-27 RX ADMIN — SODIUM CHLORIDE: 9 INJECTION, SOLUTION INTRAVENOUS at 15:50

## 2023-11-27 RX ADMIN — CYPROHEPTADINE HYDROCHLORIDE 8 MG: 4 TABLET ORAL at 21:56

## 2023-11-27 RX ADMIN — FLUVOXAMINE MALEATE 50 MG: 50 TABLET, COATED ORAL at 21:56

## 2023-11-27 RX ADMIN — DEXAMETHASONE SODIUM PHOSPHATE 6 MG: 10 INJECTION, SOLUTION INTRAMUSCULAR; INTRAVENOUS at 23:22

## 2023-11-27 RX ADMIN — ALBUTEROL SULFATE 2 PUFF: 90 AEROSOL, METERED RESPIRATORY (INHALATION) at 11:39

## 2023-11-27 RX ADMIN — FUROSEMIDE 40 MG: 40 TABLET ORAL at 09:09

## 2023-11-27 RX ADMIN — OXYCODONE HYDROCHLORIDE AND ACETAMINOPHEN 500 MG: 500 TABLET ORAL at 21:55

## 2023-11-27 RX ADMIN — INSULIN LISPRO 1 UNITS: 100 INJECTION, SOLUTION INTRAVENOUS; SUBCUTANEOUS at 03:51

## 2023-11-28 LAB
ALBUMIN SERPL-MCNC: 3.7 G/DL (ref 3.5–5.2)
ALP SERPL-CCNC: 82 U/L (ref 35–104)
ALT SERPL-CCNC: 12 U/L (ref 5–33)
ANION GAP SERPL CALCULATED.3IONS-SCNC: 9 MMOL/L (ref 7–19)
AST SERPL-CCNC: 14 U/L (ref 5–32)
BASOPHILS # BLD: 0 K/UL (ref 0–0.2)
BASOPHILS NFR BLD: 0.1 % (ref 0–1)
BILIRUB SERPL-MCNC: 0.7 MG/DL (ref 0.2–1.2)
BUN SERPL-MCNC: 30 MG/DL (ref 8–23)
CALCIUM SERPL-MCNC: 8.9 MG/DL (ref 8.8–10.2)
CHLORIDE SERPL-SCNC: 98 MMOL/L (ref 98–111)
CO2 SERPL-SCNC: 32 MMOL/L (ref 22–29)
CREAT SERPL-MCNC: 1.1 MG/DL (ref 0.5–0.9)
EOSINOPHIL # BLD: 0 K/UL (ref 0–0.6)
EOSINOPHIL NFR BLD: 0 % (ref 0–5)
ERYTHROCYTE [DISTWIDTH] IN BLOOD BY AUTOMATED COUNT: 15.4 % (ref 11.5–14.5)
GLUCOSE BLD-MCNC: 168 MG/DL (ref 70–99)
GLUCOSE BLD-MCNC: 195 MG/DL (ref 70–99)
GLUCOSE SERPL-MCNC: 189 MG/DL (ref 74–109)
HCT VFR BLD AUTO: 39.3 % (ref 37–47)
HGB BLD-MCNC: 12.1 G/DL (ref 12–16)
IMM GRANULOCYTES # BLD: 0.1 K/UL
LYMPHOCYTES # BLD: 0.5 K/UL (ref 1.1–4.5)
LYMPHOCYTES NFR BLD: 5.3 % (ref 20–40)
MCH RBC QN AUTO: 29.2 PG (ref 27–31)
MCHC RBC AUTO-ENTMCNC: 30.8 G/DL (ref 33–37)
MCV RBC AUTO: 94.7 FL (ref 81–99)
MONOCYTES # BLD: 0.2 K/UL (ref 0–0.9)
MONOCYTES NFR BLD: 2.2 % (ref 0–10)
MRSA DNA SPEC QL NAA+PROBE: NOT DETECTED
NEUTROPHILS # BLD: 9.3 K/UL (ref 1.5–7.5)
NEUTS SEG NFR BLD: 91.8 % (ref 50–65)
PERFORMED ON: ABNORMAL
PERFORMED ON: ABNORMAL
PLATELET # BLD AUTO: 198 K/UL (ref 130–400)
PMV BLD AUTO: 9.8 FL (ref 9.4–12.3)
POTASSIUM SERPL-SCNC: 3.8 MMOL/L (ref 3.5–5)
PROT SERPL-MCNC: 7.2 G/DL (ref 6.6–8.7)
RBC # BLD AUTO: 4.15 M/UL (ref 4.2–5.4)
SODIUM SERPL-SCNC: 139 MMOL/L (ref 136–145)
WBC # BLD AUTO: 10.2 K/UL (ref 4.8–10.8)

## 2023-11-28 PROCEDURE — 2500000003 HC RX 250 WO HCPCS: Performed by: FAMILY MEDICINE

## 2023-11-28 PROCEDURE — 97116 GAIT TRAINING THERAPY: CPT

## 2023-11-28 PROCEDURE — 97530 THERAPEUTIC ACTIVITIES: CPT

## 2023-11-28 PROCEDURE — 2580000003 HC RX 258: Performed by: EMERGENCY MEDICINE

## 2023-11-28 PROCEDURE — 2580000003 HC RX 258: Performed by: FAMILY MEDICINE

## 2023-11-28 PROCEDURE — 2700000000 HC OXYGEN THERAPY PER DAY

## 2023-11-28 PROCEDURE — 97165 OT EVAL LOW COMPLEX 30 MIN: CPT

## 2023-11-28 PROCEDURE — 2580000003 HC RX 258: Performed by: INTERNAL MEDICINE

## 2023-11-28 PROCEDURE — 80053 COMPREHEN METABOLIC PANEL: CPT

## 2023-11-28 PROCEDURE — 94660 CPAP INITIATION&MGMT: CPT

## 2023-11-28 PROCEDURE — 6360000002 HC RX W HCPCS: Performed by: INTERNAL MEDICINE

## 2023-11-28 PROCEDURE — 6360000002 HC RX W HCPCS: Performed by: FAMILY MEDICINE

## 2023-11-28 PROCEDURE — 6370000000 HC RX 637 (ALT 250 FOR IP): Performed by: FAMILY MEDICINE

## 2023-11-28 PROCEDURE — 97535 SELF CARE MNGMENT TRAINING: CPT

## 2023-11-28 PROCEDURE — 97161 PT EVAL LOW COMPLEX 20 MIN: CPT

## 2023-11-28 PROCEDURE — 6370000000 HC RX 637 (ALT 250 FOR IP): Performed by: INTERNAL MEDICINE

## 2023-11-28 PROCEDURE — 99232 SBSQ HOSP IP/OBS MODERATE 35: CPT | Performed by: INTERNAL MEDICINE

## 2023-11-28 PROCEDURE — 1210000000 HC MED SURG R&B

## 2023-11-28 PROCEDURE — 85025 COMPLETE CBC W/AUTO DIFF WBC: CPT

## 2023-11-28 PROCEDURE — 36415 COLL VENOUS BLD VENIPUNCTURE: CPT

## 2023-11-28 PROCEDURE — 82962 GLUCOSE BLOOD TEST: CPT

## 2023-11-28 PROCEDURE — 94760 N-INVAS EAR/PLS OXIMETRY 1: CPT

## 2023-11-28 PROCEDURE — 87641 MR-STAPH DNA AMP PROBE: CPT

## 2023-11-28 PROCEDURE — 94640 AIRWAY INHALATION TREATMENT: CPT

## 2023-11-28 RX ADMIN — ZINC SULFATE 220 MG (50 MG) CAPSULE 50 MG: CAPSULE at 10:38

## 2023-11-28 RX ADMIN — APIXABAN 5 MG: 5 TABLET, FILM COATED ORAL at 21:29

## 2023-11-28 RX ADMIN — CLOPIDOGREL BISULFATE 75 MG: 75 TABLET ORAL at 10:38

## 2023-11-28 RX ADMIN — APIXABAN 5 MG: 5 TABLET, FILM COATED ORAL at 10:38

## 2023-11-28 RX ADMIN — FLUVOXAMINE MALEATE 50 MG: 50 TABLET, COATED ORAL at 10:38

## 2023-11-28 RX ADMIN — Medication 5000 UNITS: at 10:38

## 2023-11-28 RX ADMIN — SOTALOL HYDROCHLORIDE 120 MG: 120 TABLET ORAL at 21:29

## 2023-11-28 RX ADMIN — CYPROHEPTADINE HYDROCHLORIDE 8 MG: 4 TABLET ORAL at 16:46

## 2023-11-28 RX ADMIN — CEFEPIME 2000 MG: 2 INJECTION, POWDER, FOR SOLUTION INTRAVENOUS at 01:49

## 2023-11-28 RX ADMIN — SODIUM CHLORIDE, PRESERVATIVE FREE 10 ML: 5 INJECTION INTRAVENOUS at 12:02

## 2023-11-28 RX ADMIN — SODIUM CHLORIDE: 9 INJECTION, SOLUTION INTRAVENOUS at 18:00

## 2023-11-28 RX ADMIN — CEFEPIME 2000 MG: 2 INJECTION, POWDER, FOR SOLUTION INTRAVENOUS at 10:42

## 2023-11-28 RX ADMIN — CEFEPIME 2000 MG: 2 INJECTION, POWDER, FOR SOLUTION INTRAVENOUS at 21:29

## 2023-11-28 RX ADMIN — MONTELUKAST 10 MG: 10 TABLET, FILM COATED ORAL at 21:29

## 2023-11-28 RX ADMIN — ALBUTEROL SULFATE 2 PUFF: 90 AEROSOL, METERED RESPIRATORY (INHALATION) at 12:08

## 2023-11-28 RX ADMIN — ROSUVASTATIN 10 MG: 10 TABLET, FILM COATED ORAL at 21:29

## 2023-11-28 RX ADMIN — ALBUTEROL SULFATE 2 PUFF: 90 AEROSOL, METERED RESPIRATORY (INHALATION) at 16:00

## 2023-11-28 RX ADMIN — OXYCODONE HYDROCHLORIDE AND ACETAMINOPHEN 500 MG: 500 TABLET ORAL at 10:38

## 2023-11-28 RX ADMIN — THIAMINE HYDROCHLORIDE 200 MG: 100 INJECTION, SOLUTION INTRAMUSCULAR; INTRAVENOUS at 05:59

## 2023-11-28 RX ADMIN — CYPROHEPTADINE HYDROCHLORIDE 8 MG: 4 TABLET ORAL at 21:29

## 2023-11-28 RX ADMIN — CYPROHEPTADINE HYDROCHLORIDE 8 MG: 4 TABLET ORAL at 12:11

## 2023-11-28 RX ADMIN — SODIUM CHLORIDE, PRESERVATIVE FREE 20 MG: 5 INJECTION INTRAVENOUS at 10:38

## 2023-11-28 RX ADMIN — OXYCODONE HYDROCHLORIDE AND ACETAMINOPHEN 500 MG: 500 TABLET ORAL at 16:46

## 2023-11-28 RX ADMIN — SOTALOL HYDROCHLORIDE 120 MG: 120 TABLET ORAL at 10:38

## 2023-11-28 RX ADMIN — Medication 1000 MG: at 21:23

## 2023-11-28 RX ADMIN — ALBUTEROL SULFATE 2 PUFF: 90 AEROSOL, METERED RESPIRATORY (INHALATION) at 07:51

## 2023-11-28 RX ADMIN — FUROSEMIDE 40 MG: 40 TABLET ORAL at 10:38

## 2023-11-28 RX ADMIN — SODIUM CHLORIDE, PRESERVATIVE FREE 10 ML: 5 INJECTION INTRAVENOUS at 12:03

## 2023-11-28 RX ADMIN — OXYCODONE HYDROCHLORIDE AND ACETAMINOPHEN 500 MG: 500 TABLET ORAL at 21:29

## 2023-11-28 RX ADMIN — THIAMINE HYDROCHLORIDE 200 MG: 100 INJECTION, SOLUTION INTRAMUSCULAR; INTRAVENOUS at 17:54

## 2023-11-28 RX ADMIN — SPIRONOLACTONE 50 MG: 50 TABLET ORAL at 10:38

## 2023-11-28 RX ADMIN — ALBUTEROL SULFATE 2 PUFF: 90 AEROSOL, METERED RESPIRATORY (INHALATION) at 18:46

## 2023-11-28 ASSESSMENT — PAIN DESCRIPTION - ORIENTATION: ORIENTATION: RIGHT;LEFT

## 2023-11-28 ASSESSMENT — PAIN DESCRIPTION - DESCRIPTORS: DESCRIPTORS: ACHING

## 2023-11-28 ASSESSMENT — PAIN DESCRIPTION - LOCATION: LOCATION: LEG

## 2023-11-28 ASSESSMENT — PAIN SCALES - GENERAL: PAINLEVEL_OUTOF10: 3

## 2023-11-29 LAB
AMMONIA PLAS-SCNC: 30 UMOL/L (ref 11–51)
BACTERIA BLD CULT ORG #2: NORMAL
BACTERIA BLD CULT: NORMAL
FOLATE SERPL-MCNC: 10.8 NG/ML (ref 4.8–37.3)
GLUCOSE BLD-MCNC: 121 MG/DL (ref 70–99)
GLUCOSE BLD-MCNC: 176 MG/DL (ref 70–99)
GLUCOSE BLD-MCNC: 217 MG/DL (ref 70–99)
GLUCOSE BLD-MCNC: 225 MG/DL (ref 70–99)
PERFORMED ON: ABNORMAL
VIT B12 SERPL-MCNC: 541 PG/ML (ref 211–946)

## 2023-11-29 PROCEDURE — 6360000002 HC RX W HCPCS: Performed by: FAMILY MEDICINE

## 2023-11-29 PROCEDURE — 99222 1ST HOSP IP/OBS MODERATE 55: CPT | Performed by: PSYCHIATRY & NEUROLOGY

## 2023-11-29 PROCEDURE — 82962 GLUCOSE BLOOD TEST: CPT

## 2023-11-29 PROCEDURE — 95816 EEG AWAKE AND DROWSY: CPT | Performed by: PSYCHIATRY & NEUROLOGY

## 2023-11-29 PROCEDURE — 94760 N-INVAS EAR/PLS OXIMETRY 1: CPT

## 2023-11-29 PROCEDURE — 99232 SBSQ HOSP IP/OBS MODERATE 35: CPT | Performed by: INTERNAL MEDICINE

## 2023-11-29 PROCEDURE — 82607 VITAMIN B-12: CPT

## 2023-11-29 PROCEDURE — 95816 EEG AWAKE AND DROWSY: CPT

## 2023-11-29 PROCEDURE — 6370000000 HC RX 637 (ALT 250 FOR IP): Performed by: PSYCHIATRY & NEUROLOGY

## 2023-11-29 PROCEDURE — 2580000003 HC RX 258: Performed by: FAMILY MEDICINE

## 2023-11-29 PROCEDURE — 6370000000 HC RX 637 (ALT 250 FOR IP): Performed by: FAMILY MEDICINE

## 2023-11-29 PROCEDURE — 2700000000 HC OXYGEN THERAPY PER DAY

## 2023-11-29 PROCEDURE — 94660 CPAP INITIATION&MGMT: CPT

## 2023-11-29 PROCEDURE — 1210000000 HC MED SURG R&B

## 2023-11-29 PROCEDURE — 36415 COLL VENOUS BLD VENIPUNCTURE: CPT

## 2023-11-29 PROCEDURE — 82746 ASSAY OF FOLIC ACID SERUM: CPT

## 2023-11-29 PROCEDURE — 82140 ASSAY OF AMMONIA: CPT

## 2023-11-29 PROCEDURE — 94669 MECHANICAL CHEST WALL OSCILL: CPT

## 2023-11-29 PROCEDURE — 94640 AIRWAY INHALATION TREATMENT: CPT

## 2023-11-29 PROCEDURE — 6360000002 HC RX W HCPCS: Performed by: INTERNAL MEDICINE

## 2023-11-29 PROCEDURE — 2500000003 HC RX 250 WO HCPCS: Performed by: FAMILY MEDICINE

## 2023-11-29 PROCEDURE — 6360000002 HC RX W HCPCS: Performed by: EMERGENCY MEDICINE

## 2023-11-29 PROCEDURE — 6370000000 HC RX 637 (ALT 250 FOR IP): Performed by: INTERNAL MEDICINE

## 2023-11-29 RX ORDER — RISPERIDONE 0.5 MG/1
0.5 TABLET ORAL NIGHTLY
Status: DISCONTINUED | OUTPATIENT
Start: 2023-11-29 | End: 2023-11-30

## 2023-11-29 RX ADMIN — THIAMINE HYDROCHLORIDE 200 MG: 100 INJECTION, SOLUTION INTRAMUSCULAR; INTRAVENOUS at 09:51

## 2023-11-29 RX ADMIN — ALBUTEROL SULFATE 2 PUFF: 90 AEROSOL, METERED RESPIRATORY (INHALATION) at 14:48

## 2023-11-29 RX ADMIN — ALBUTEROL SULFATE 2 PUFF: 90 AEROSOL, METERED RESPIRATORY (INHALATION) at 11:07

## 2023-11-29 RX ADMIN — ZINC SULFATE 220 MG (50 MG) CAPSULE 50 MG: CAPSULE at 09:50

## 2023-11-29 RX ADMIN — SODIUM CHLORIDE, PRESERVATIVE FREE 20 MG: 5 INJECTION INTRAVENOUS at 09:50

## 2023-11-29 RX ADMIN — CEFEPIME 2000 MG: 2 INJECTION, POWDER, FOR SOLUTION INTRAVENOUS at 22:26

## 2023-11-29 RX ADMIN — OXYCODONE HYDROCHLORIDE AND ACETAMINOPHEN 500 MG: 500 TABLET ORAL at 20:08

## 2023-11-29 RX ADMIN — OXYCODONE HYDROCHLORIDE AND ACETAMINOPHEN 500 MG: 500 TABLET ORAL at 15:11

## 2023-11-29 RX ADMIN — CEFEPIME 2000 MG: 2 INJECTION, POWDER, FOR SOLUTION INTRAVENOUS at 11:47

## 2023-11-29 RX ADMIN — INSULIN LISPRO 1 UNITS: 100 INJECTION, SOLUTION INTRAVENOUS; SUBCUTANEOUS at 18:37

## 2023-11-29 RX ADMIN — ALBUTEROL SULFATE 2 PUFF: 90 AEROSOL, METERED RESPIRATORY (INHALATION) at 19:42

## 2023-11-29 RX ADMIN — SODIUM CHLORIDE, PRESERVATIVE FREE 10 ML: 5 INJECTION INTRAVENOUS at 09:51

## 2023-11-29 RX ADMIN — OXYCODONE HYDROCHLORIDE AND ACETAMINOPHEN 500 MG: 500 TABLET ORAL at 09:50

## 2023-11-29 RX ADMIN — CYPROHEPTADINE HYDROCHLORIDE 8 MG: 4 TABLET ORAL at 15:11

## 2023-11-29 RX ADMIN — Medication 5000 UNITS: at 12:59

## 2023-11-29 RX ADMIN — ROSUVASTATIN 10 MG: 10 TABLET, FILM COATED ORAL at 20:08

## 2023-11-29 RX ADMIN — SODIUM CHLORIDE, PRESERVATIVE FREE 10 ML: 5 INJECTION INTRAVENOUS at 20:10

## 2023-11-29 RX ADMIN — SPIRONOLACTONE 50 MG: 50 TABLET ORAL at 09:50

## 2023-11-29 RX ADMIN — MONTELUKAST 10 MG: 10 TABLET, FILM COATED ORAL at 20:09

## 2023-11-29 RX ADMIN — FUROSEMIDE 40 MG: 40 TABLET ORAL at 09:50

## 2023-11-29 RX ADMIN — ACETAMINOPHEN 650 MG: 325 TABLET ORAL at 22:49

## 2023-11-29 RX ADMIN — CLOPIDOGREL BISULFATE 75 MG: 75 TABLET ORAL at 09:50

## 2023-11-29 RX ADMIN — DEXAMETHASONE SODIUM PHOSPHATE 6 MG: 10 INJECTION, SOLUTION INTRAMUSCULAR; INTRAVENOUS at 01:28

## 2023-11-29 RX ADMIN — RISPERIDONE 0.5 MG: 0.5 TABLET ORAL at 20:08

## 2023-11-29 RX ADMIN — APIXABAN 5 MG: 5 TABLET, FILM COATED ORAL at 09:50

## 2023-11-29 RX ADMIN — ALBUTEROL SULFATE 2 PUFF: 90 AEROSOL, METERED RESPIRATORY (INHALATION) at 07:45

## 2023-11-29 RX ADMIN — CYPROHEPTADINE HYDROCHLORIDE 8 MG: 4 TABLET ORAL at 09:50

## 2023-11-29 RX ADMIN — THIAMINE HYDROCHLORIDE 200 MG: 100 INJECTION, SOLUTION INTRAMUSCULAR; INTRAVENOUS at 20:09

## 2023-11-29 RX ADMIN — INSULIN LISPRO 1 UNITS: 100 INJECTION, SOLUTION INTRAVENOUS; SUBCUTANEOUS at 12:59

## 2023-11-29 RX ADMIN — SOTALOL HYDROCHLORIDE 120 MG: 120 TABLET ORAL at 20:09

## 2023-11-29 RX ADMIN — DEXAMETHASONE SODIUM PHOSPHATE 6 MG: 10 INJECTION, SOLUTION INTRAMUSCULAR; INTRAVENOUS at 22:24

## 2023-11-29 RX ADMIN — CYPROHEPTADINE HYDROCHLORIDE 8 MG: 4 TABLET ORAL at 20:08

## 2023-11-29 RX ADMIN — SOTALOL HYDROCHLORIDE 120 MG: 120 TABLET ORAL at 09:50

## 2023-11-29 RX ADMIN — APIXABAN 5 MG: 5 TABLET, FILM COATED ORAL at 20:08

## 2023-11-29 ASSESSMENT — PAIN SCALES - GENERAL: PAINLEVEL_OUTOF10: 3

## 2023-11-29 ASSESSMENT — PAIN DESCRIPTION - DESCRIPTORS: DESCRIPTORS: ACHING;DISCOMFORT

## 2023-11-29 ASSESSMENT — PAIN DESCRIPTION - ORIENTATION: ORIENTATION: LEFT;RIGHT

## 2023-11-29 ASSESSMENT — PAIN DESCRIPTION - LOCATION: LOCATION: LEG

## 2023-11-29 NOTE — CONSULTS
Hospital Medicine Consult    Patient:  Zane Hairston  MRN: [id number]    Consulting Physician: Elpidio Wright MD  Reason for Consult: Medical Management  [facility] Care Physician: Elpidio Wright MD    HISTORY OF PRESENT ILLNESS:   The patient is a 75 y.o. female was admitted early this morning with hypoxemic respiratory failure. She has very advanced COPD. She also tested positive for COVID. She required endotracheal intubation in the emergency department. We have been asked to follow for ventilator management. She is currently on assist-control rate of 16, tidal volume of 410, 5 of PEEP, FiO2 of 0.3.     Past Medical History:        Diagnosis Date    ASHD (arteriosclerotic heart disease)     s/p PTCA and stent of circumflex, as well as intermediate and mid LAD     COPD (chronic obstructive pulmonary disease) ([facility])     Coronary atherosclerosis     Diabetes mellitus ([address])     diet controlled, type 2    Encounter for wound care     FOR LLL WOUND    Fall 10/29/2020    Ganglion cyst     RT HAND    Hematoma 10/2020    hematoma LLL from fall injury    Hx of blood clots     Hypercholesteremia     Hypertension     Pacemaker 03/02/2021    Palliative care patient 03/16/2022    Unstable angina [facility])        Past Surgical History:        Procedure Laterality Date    CARDIAC CATHETERIZATION  12/10/00    selective left heart and coronary arteriography with left ventriculography     CARDIAC CATHETERIZATION  01/23/98    left heart cath, left ventriculography, slective coronary arteriography, direct infarct angioplasty and stent placement to proximal left anterior descending coronary artery    CARDIAC CATHETERIZATION  03/05/94    left heart cath, selective coronary arteriography, left ventriculography    CARDIAC CATHETERIZATION  8/27/2011    [place]    CHOLECYSTECTOMY      CORONARY ANGIOPLASTY WITH STENT PLACEMENT  12/12/00    PTCA and stent placement to the mid LAD/ptca and stent placement first circumflex marginal
Pt known to palliative care. She was just dc'd from this facility on 11/22 with [facility] orders to follow. She returned on 11/23 in resp distress and intubated in ED. Pt is a DNR. She has a living will on file. Her son, Elian Armstrong, is he HCS. Pt has home O2 and trelogy. 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 He has some things to take care of this morning and will be here a bit later to see his mother. Report from RN this morning, pt awakens and follows commands. On Levophed gtt for BP support, but feels low BP is d/t sedation. Son states oal is home and he will need to make arrangements to care for her. Palliative following for support, goc discussions as needed.       Electronically signed by Nallely Hawthorne RN on 11/24/2023 at 10:57 AM
[facility] Neurology Consult  ? ? Patient: Luis E Longoria  MR#: [id number]  Account Number: [de-identified]   Room: 6703/286-86   YOB: 1950  Date of Progress Note: 11/29/2023  Time of Note 10:21 AM  Attending Physician: Claudio Calles MD  Consulting Physician: Genaro Miles M.D.  ?  ? CHIEF COMPLAINT: Altered mental status  ? HISTORY OF PRESENT ILLNESS:   This 75 y.o. female who presents with acute on chronic respiratory failure secondary to advanced COPD complicated by [id number] infection. Today is day 6. She was intubated until she was extubated a couple of days ago. Has not slept well in the past day or 2 and was noted to have some delusional thinking and some hallucinations over the past 24 hours. Currently doing some better. Takes low-dose prednisone chronically and currently on Decadron and Periactin. Has had hypoxemia at times. Currently has a nasal cannula. [place] any focal signs or symptoms. REVIEW OF SYSTEMS:  Constitutional - No fever or chills. No diaphoresis or significant fatigue. HENT - No tinnitus or significant hearing loss. Eyes - no sudden vision change or eye pain  Respiratory - no significant shortness of breath or cough  Cardiovascular - no chest pain No palpitations or significant leg swelling  Gastrointestinal - no abdominal swelling or pain. Genitourinary - No difficulty urinating, dysuria  Musculoskeletal - no back pain or myalgia. Skin - no color change or rash  Neurologic - No seizures. No lateralizing weakness. Hematologic - no easy bruising or excessive bleeding. Psychiatric - no severe anxiety or nervousness. All other review of systems are negative. ?   Past Medical History:      Diagnosis Date    ASHD (arteriosclerotic heart disease)     s/p PTCA and stent of circumflex, as well as intermediate and mid LAD     COPD (chronic obstructive pulmonary disease) (HCC)     Coronary atherosclerosis     Diabetes mellitus ([address])     diet controlled, type 2    Encounter for
contrast    Result Date: 11/24/2023   No acute intracranial abnormality. Old lacunar infarct in the left cerebellum. All CT scans are performed using dose optimization techniques as appropriate to the performed exam and include at least one of the following: Automated exposure control, adjustment of the mA and/or kV according to size, and the use of iterative reconstruction technique. ______________________________________ Electronically signed by: Tremayne Baugh M.D. Date:     11/24/2023 Time:    01:18        My radiograph interpretation/independent review of imaging: I personally reviewed her chest x-ray which shows no acute findings in the chest.  She does have evidence of emphysema and COPD with hyperinflation. Problem list generated by [facility] Problems             Last Modified POA    * (Principal) Acute hypoxemic respiratory failure due to COVID-19 [facility]) 11/23/2023 Yes    NICM (nonischemic cardiomyopathy) ([address]) 11/24/2023 Yes    Essential hypertension 11/24/2023 Yes    Diabetes mellitus ([address]) 11/24/2023 Yes    Overview Signed 6/2/2011  1:19 PM by Ashely muñoz controlled         Mixed hyperlipidemia 11/24/2023 Yes    Abdominal aortic aneurysm (AAA) without rupture ([address]) 11/24/2023 Yes    Diabetic ulcer of left lower leg associated with type 2 diabetes mellitus, with fat layer exposed ([address]) 11/24/2023 Yes    Smoker 11/24/2023 Yes    Traumatic hematoma 11/24/2023 Yes    Hyponatremia 11/24/2023 Yes    COPD (chronic obstructive pulmonary disease) ([address]) 11/24/2023 Yes          Pulmonary Assessment/plan:    Acute on chronic hypoxic respiratory failure requiring intubation and mechanical ventilation on admission since then the patient has been liberated from mechanical ventilation through the day. She is using noninvasive ventilation via BiPAP during sleep. She continues to require supplemental oxygen as her baseline. Continue bronchodilators. Continue pulmonary toilet.   Underlying

## 2023-11-30 LAB
ALBUMIN SERPL-MCNC: 3.3 G/DL (ref 3.5–5.2)
ALP SERPL-CCNC: 70 U/L (ref 35–104)
ALT SERPL-CCNC: 15 U/L (ref 5–33)
ANION GAP SERPL CALCULATED.3IONS-SCNC: 10 MMOL/L (ref 7–19)
AST SERPL-CCNC: 17 U/L (ref 5–32)
BACTERIA #/AREA URNS HPF: ABNORMAL /HPF
BASOPHILS # BLD: 0 K/UL (ref 0–0.2)
BASOPHILS NFR BLD: 0 % (ref 0–1)
BILIRUB SERPL-MCNC: 0.5 MG/DL (ref 0.2–1.2)
BILIRUB UR STRIP.AUTO-MCNC: NEGATIVE MG/DL
BUN SERPL-MCNC: 23 MG/DL (ref 8–23)
CALCIUM SERPL-MCNC: 8.8 MG/DL (ref 8.8–10.2)
CHLORIDE SERPL-SCNC: 95 MMOL/L (ref 98–111)
CLARITY UR: CLEAR
CO2 SERPL-SCNC: 31 MMOL/L (ref 22–29)
COLOR UR: YELLOW
CREAT SERPL-MCNC: 0.9 MG/DL (ref 0.5–0.9)
EOSINOPHIL # BLD: 0 K/UL (ref 0–0.6)
EOSINOPHIL NFR BLD: 0 % (ref 0–5)
ERYTHROCYTE [DISTWIDTH] IN BLOOD BY AUTOMATED COUNT: 15.2 % (ref 11.5–14.5)
GLUCOSE BLD-MCNC: 152 MG/DL (ref 70–99)
GLUCOSE BLD-MCNC: 231 MG/DL (ref 70–99)
GLUCOSE BLD-MCNC: 231 MG/DL (ref 70–99)
GLUCOSE BLD-MCNC: 99 MG/DL (ref 70–99)
GLUCOSE SERPL-MCNC: 201 MG/DL (ref 74–109)
GLUCOSE UR STRIP.AUTO-MCNC: 250 MG/DL
HCT VFR BLD AUTO: 35.2 % (ref 37–47)
HGB BLD-MCNC: 11 G/DL (ref 12–16)
HGB UR STRIP.AUTO-MCNC: ABNORMAL MG/L
HYALINE CASTS #/AREA URNS LPF: ABNORMAL /LPF (ref 0–5)
IMM GRANULOCYTES # BLD: 0 K/UL
KETONES UR STRIP.AUTO-MCNC: ABNORMAL MG/DL
LEUKOCYTE ESTERASE UR QL STRIP.AUTO: NEGATIVE
LYMPHOCYTES # BLD: 0.5 K/UL (ref 1.1–4.5)
LYMPHOCYTES NFR BLD: 6.8 % (ref 20–40)
MCH RBC QN AUTO: 29.3 PG (ref 27–31)
MCHC RBC AUTO-ENTMCNC: 31.3 G/DL (ref 33–37)
MCV RBC AUTO: 93.6 FL (ref 81–99)
MONOCYTES # BLD: 0.3 K/UL (ref 0–0.9)
MONOCYTES NFR BLD: 3.5 % (ref 0–10)
NEUTROPHILS # BLD: 6.8 K/UL (ref 1.5–7.5)
NEUTS SEG NFR BLD: 89.3 % (ref 50–65)
NITRITE UR QL STRIP.AUTO: NEGATIVE
PERFORMED ON: ABNORMAL
PERFORMED ON: NORMAL
PH UR STRIP.AUTO: 6 [PH] (ref 5–8)
PLATELET # BLD AUTO: 191 K/UL (ref 130–400)
PMV BLD AUTO: 10.4 FL (ref 9.4–12.3)
POTASSIUM SERPL-SCNC: 3.8 MMOL/L (ref 3.5–5)
PROT SERPL-MCNC: 6.1 G/DL (ref 6.6–8.7)
PROT UR STRIP.AUTO-MCNC: 100 MG/DL
RBC # BLD AUTO: 3.76 M/UL (ref 4.2–5.4)
RBC #/AREA URNS HPF: ABNORMAL /HPF (ref 0–2)
SODIUM SERPL-SCNC: 136 MMOL/L (ref 136–145)
SP GR UR STRIP.AUTO: 1.01 (ref 1–1.03)
SQUAMOUS #/AREA URNS HPF: ABNORMAL /HPF
UROBILINOGEN UR STRIP.AUTO-MCNC: 1 E.U./DL
WBC # BLD AUTO: 7.7 K/UL (ref 4.8–10.8)
WBC #/AREA URNS HPF: ABNORMAL /HPF (ref 0–5)

## 2023-11-30 PROCEDURE — 6360000002 HC RX W HCPCS: Performed by: FAMILY MEDICINE

## 2023-11-30 PROCEDURE — 2580000003 HC RX 258: Performed by: FAMILY MEDICINE

## 2023-11-30 PROCEDURE — 82962 GLUCOSE BLOOD TEST: CPT

## 2023-11-30 PROCEDURE — 36415 COLL VENOUS BLD VENIPUNCTURE: CPT

## 2023-11-30 PROCEDURE — 1210000000 HC MED SURG R&B

## 2023-11-30 PROCEDURE — 80053 COMPREHEN METABOLIC PANEL: CPT

## 2023-11-30 PROCEDURE — 81001 URINALYSIS AUTO W/SCOPE: CPT

## 2023-11-30 PROCEDURE — 97530 THERAPEUTIC ACTIVITIES: CPT

## 2023-11-30 PROCEDURE — 97116 GAIT TRAINING THERAPY: CPT

## 2023-11-30 PROCEDURE — 85025 COMPLETE CBC W/AUTO DIFF WBC: CPT

## 2023-11-30 PROCEDURE — 2500000003 HC RX 250 WO HCPCS: Performed by: FAMILY MEDICINE

## 2023-11-30 PROCEDURE — 6370000000 HC RX 637 (ALT 250 FOR IP): Performed by: FAMILY MEDICINE

## 2023-11-30 PROCEDURE — 94660 CPAP INITIATION&MGMT: CPT

## 2023-11-30 PROCEDURE — 6360000002 HC RX W HCPCS: Performed by: EMERGENCY MEDICINE

## 2023-11-30 PROCEDURE — 6360000002 HC RX W HCPCS: Performed by: INTERNAL MEDICINE

## 2023-11-30 PROCEDURE — 6370000000 HC RX 637 (ALT 250 FOR IP): Performed by: PSYCHIATRY & NEUROLOGY

## 2023-11-30 PROCEDURE — 99232 SBSQ HOSP IP/OBS MODERATE 35: CPT | Performed by: PSYCHIATRY & NEUROLOGY

## 2023-11-30 PROCEDURE — 94760 N-INVAS EAR/PLS OXIMETRY 1: CPT

## 2023-11-30 PROCEDURE — 99232 SBSQ HOSP IP/OBS MODERATE 35: CPT | Performed by: INTERNAL MEDICINE

## 2023-11-30 PROCEDURE — 94640 AIRWAY INHALATION TREATMENT: CPT

## 2023-11-30 PROCEDURE — 6370000000 HC RX 637 (ALT 250 FOR IP): Performed by: INTERNAL MEDICINE

## 2023-11-30 RX ORDER — QUETIAPINE FUMARATE 50 MG/1
50 TABLET, FILM COATED ORAL NIGHTLY
Status: DISCONTINUED | OUTPATIENT
Start: 2023-11-30 | End: 2023-12-05 | Stop reason: HOSPADM

## 2023-11-30 RX ORDER — MECOBALAMIN 5000 MCG
5 TABLET,DISINTEGRATING ORAL NIGHTLY
Status: DISCONTINUED | OUTPATIENT
Start: 2023-11-30 | End: 2023-12-05 | Stop reason: HOSPADM

## 2023-11-30 RX ORDER — FAMOTIDINE 20 MG/1
20 TABLET, FILM COATED ORAL 2 TIMES DAILY
Status: DISCONTINUED | OUTPATIENT
Start: 2023-11-30 | End: 2023-12-05 | Stop reason: HOSPADM

## 2023-11-30 RX ORDER — TRAZODONE HYDROCHLORIDE 100 MG/1
100 TABLET ORAL NIGHTLY
Status: DISCONTINUED | OUTPATIENT
Start: 2023-11-30 | End: 2023-12-05 | Stop reason: HOSPADM

## 2023-11-30 RX ADMIN — APIXABAN 5 MG: 5 TABLET, FILM COATED ORAL at 19:57

## 2023-11-30 RX ADMIN — CYPROHEPTADINE HYDROCHLORIDE 8 MG: 4 TABLET ORAL at 20:04

## 2023-11-30 RX ADMIN — SOTALOL HYDROCHLORIDE 120 MG: 120 TABLET ORAL at 08:50

## 2023-11-30 RX ADMIN — ZINC SULFATE 220 MG (50 MG) CAPSULE 50 MG: CAPSULE at 08:49

## 2023-11-30 RX ADMIN — Medication 5 MG: at 19:57

## 2023-11-30 RX ADMIN — INSULIN LISPRO 1 UNITS: 100 INJECTION, SOLUTION INTRAVENOUS; SUBCUTANEOUS at 08:49

## 2023-11-30 RX ADMIN — SODIUM CHLORIDE, PRESERVATIVE FREE 10 ML: 5 INJECTION INTRAVENOUS at 08:51

## 2023-11-30 RX ADMIN — ROSUVASTATIN 10 MG: 10 TABLET, FILM COATED ORAL at 19:57

## 2023-11-30 RX ADMIN — THIAMINE HYDROCHLORIDE 200 MG: 100 INJECTION, SOLUTION INTRAMUSCULAR; INTRAVENOUS at 08:51

## 2023-11-30 RX ADMIN — SODIUM CHLORIDE, PRESERVATIVE FREE 20 MG: 5 INJECTION INTRAVENOUS at 08:50

## 2023-11-30 RX ADMIN — CYPROHEPTADINE HYDROCHLORIDE 8 MG: 4 TABLET ORAL at 08:50

## 2023-11-30 RX ADMIN — FAMOTIDINE 20 MG: 20 TABLET ORAL at 20:04

## 2023-11-30 RX ADMIN — OXYCODONE HYDROCHLORIDE AND ACETAMINOPHEN 500 MG: 500 TABLET ORAL at 12:17

## 2023-11-30 RX ADMIN — CYPROHEPTADINE HYDROCHLORIDE 8 MG: 4 TABLET ORAL at 12:17

## 2023-11-30 RX ADMIN — APIXABAN 5 MG: 5 TABLET, FILM COATED ORAL at 08:50

## 2023-11-30 RX ADMIN — SOTALOL HYDROCHLORIDE 120 MG: 120 TABLET ORAL at 20:04

## 2023-11-30 RX ADMIN — Medication 5000 UNITS: at 08:50

## 2023-11-30 RX ADMIN — ALBUTEROL SULFATE 2 PUFF: 90 AEROSOL, METERED RESPIRATORY (INHALATION) at 19:36

## 2023-11-30 RX ADMIN — ALBUTEROL SULFATE 2 PUFF: 90 AEROSOL, METERED RESPIRATORY (INHALATION) at 10:56

## 2023-11-30 RX ADMIN — THIAMINE HYDROCHLORIDE 200 MG: 100 INJECTION, SOLUTION INTRAMUSCULAR; INTRAVENOUS at 20:05

## 2023-11-30 RX ADMIN — ALBUTEROL SULFATE 2 PUFF: 90 AEROSOL, METERED RESPIRATORY (INHALATION) at 14:52

## 2023-11-30 RX ADMIN — INSULIN LISPRO 1 UNITS: 100 INJECTION, SOLUTION INTRAVENOUS; SUBCUTANEOUS at 12:18

## 2023-11-30 RX ADMIN — TRAZODONE HYDROCHLORIDE 100 MG: 100 TABLET ORAL at 19:57

## 2023-11-30 RX ADMIN — OXYCODONE HYDROCHLORIDE AND ACETAMINOPHEN 500 MG: 500 TABLET ORAL at 08:50

## 2023-11-30 RX ADMIN — CEFEPIME 2000 MG: 2 INJECTION, POWDER, FOR SOLUTION INTRAVENOUS at 12:18

## 2023-11-30 RX ADMIN — ALBUTEROL SULFATE 2 PUFF: 90 AEROSOL, METERED RESPIRATORY (INHALATION) at 07:05

## 2023-11-30 RX ADMIN — SPIRONOLACTONE 50 MG: 50 TABLET ORAL at 08:50

## 2023-11-30 RX ADMIN — FUROSEMIDE 40 MG: 40 TABLET ORAL at 08:49

## 2023-11-30 RX ADMIN — CLOPIDOGREL BISULFATE 75 MG: 75 TABLET ORAL at 08:49

## 2023-11-30 RX ADMIN — QUETIAPINE FUMARATE 50 MG: 50 TABLET ORAL at 19:57

## 2023-11-30 RX ADMIN — OXYCODONE HYDROCHLORIDE AND ACETAMINOPHEN 500 MG: 500 TABLET ORAL at 19:57

## 2023-11-30 RX ADMIN — DEXAMETHASONE SODIUM PHOSPHATE 6 MG: 10 INJECTION, SOLUTION INTRAMUSCULAR; INTRAVENOUS at 22:18

## 2023-11-30 RX ADMIN — CEFEPIME 2000 MG: 2 INJECTION, POWDER, FOR SOLUTION INTRAVENOUS at 22:18

## 2023-11-30 RX ADMIN — MONTELUKAST 10 MG: 10 TABLET, FILM COATED ORAL at 19:57

## 2023-11-30 NOTE — PROCEDURES
[facility] [address] [address]                          ELECTROENCEPHALOGRAM REPORT    PATIENT NAME: Starr Doherty                    :        1950  MED REC NO:   [id number]                              ROOM:       [facility]  ACCOUNT NO:   [de-identified]                           ADMIT DATE: 2023  PROVIDER:     Jarrett Avila MD    DATE OF EE2023    INDICATION FOR TEST:  Altered mental status. DESCRIPTION:  The waking background consists of a rhythmic and symmetric  7 Hz activity at best.  There is copious myogenic artifact throughout  the tracing. No focal slowing nor any epileptiform activity is noted  within the confines of the technical difficulties. IMPRESSION:  Mildly slow EEG consistent with nonspecific encephalopathy  versus drowsiness. Correlate clinically and consider follow-up study if  clinically indicated.         Jarrett Avila MD    D: 2023 13:26:10      T: 2023 13:49:53     [id number]  Job#: [id number]     Doc#: [id number]    CC:
securement device. A 3M CHG Tegaderm was placed over the securement device and insertion site. Dressing was dated and initialed with external measurement marked. Patient did tolerate procedure well.         Electronically signed by Conor Lemon RN on 11/24/2023 at 4:14 PM

## 2023-12-01 LAB
GLUCOSE BLD-MCNC: 125 MG/DL (ref 70–99)
GLUCOSE BLD-MCNC: 189 MG/DL (ref 70–99)
GLUCOSE BLD-MCNC: 250 MG/DL (ref 70–99)
GLUCOSE BLD-MCNC: 374 MG/DL (ref 70–99)
PERFORMED ON: ABNORMAL

## 2023-12-01 PROCEDURE — 94640 AIRWAY INHALATION TREATMENT: CPT

## 2023-12-01 PROCEDURE — 6360000002 HC RX W HCPCS: Performed by: FAMILY MEDICINE

## 2023-12-01 PROCEDURE — 6370000000 HC RX 637 (ALT 250 FOR IP): Performed by: PSYCHIATRY & NEUROLOGY

## 2023-12-01 PROCEDURE — 99233 SBSQ HOSP IP/OBS HIGH 50: CPT | Performed by: PSYCHIATRY & NEUROLOGY

## 2023-12-01 PROCEDURE — 6370000000 HC RX 637 (ALT 250 FOR IP): Performed by: FAMILY MEDICINE

## 2023-12-01 PROCEDURE — 82962 GLUCOSE BLOOD TEST: CPT

## 2023-12-01 PROCEDURE — 6370000000 HC RX 637 (ALT 250 FOR IP): Performed by: INTERNAL MEDICINE

## 2023-12-01 PROCEDURE — 94660 CPAP INITIATION&MGMT: CPT

## 2023-12-01 PROCEDURE — 97116 GAIT TRAINING THERAPY: CPT

## 2023-12-01 PROCEDURE — 2580000003 HC RX 258: Performed by: INTERNAL MEDICINE

## 2023-12-01 PROCEDURE — 2580000003 HC RX 258: Performed by: FAMILY MEDICINE

## 2023-12-01 PROCEDURE — 1210000000 HC MED SURG R&B

## 2023-12-01 PROCEDURE — 94760 N-INVAS EAR/PLS OXIMETRY 1: CPT

## 2023-12-01 PROCEDURE — 6360000002 HC RX W HCPCS: Performed by: INTERNAL MEDICINE

## 2023-12-01 RX ADMIN — SODIUM CHLORIDE: 9 INJECTION, SOLUTION INTRAVENOUS at 20:50

## 2023-12-01 RX ADMIN — OXYCODONE HYDROCHLORIDE AND ACETAMINOPHEN 500 MG: 500 TABLET ORAL at 09:27

## 2023-12-01 RX ADMIN — SOTALOL HYDROCHLORIDE 120 MG: 120 TABLET ORAL at 09:27

## 2023-12-01 RX ADMIN — CYPROHEPTADINE HYDROCHLORIDE 8 MG: 4 TABLET ORAL at 09:27

## 2023-12-01 RX ADMIN — QUETIAPINE FUMARATE 50 MG: 50 TABLET ORAL at 20:54

## 2023-12-01 RX ADMIN — THIAMINE HYDROCHLORIDE 200 MG: 100 INJECTION, SOLUTION INTRAMUSCULAR; INTRAVENOUS at 20:54

## 2023-12-01 RX ADMIN — MONTELUKAST 10 MG: 10 TABLET, FILM COATED ORAL at 20:54

## 2023-12-01 RX ADMIN — CLOPIDOGREL BISULFATE 75 MG: 75 TABLET ORAL at 09:27

## 2023-12-01 RX ADMIN — ROSUVASTATIN 10 MG: 10 TABLET, FILM COATED ORAL at 20:53

## 2023-12-01 RX ADMIN — INSULIN LISPRO 2 UNITS: 100 INJECTION, SOLUTION INTRAVENOUS; SUBCUTANEOUS at 09:32

## 2023-12-01 RX ADMIN — CYPROHEPTADINE HYDROCHLORIDE 8 MG: 4 TABLET ORAL at 20:54

## 2023-12-01 RX ADMIN — CYPROHEPTADINE HYDROCHLORIDE 8 MG: 4 TABLET ORAL at 13:53

## 2023-12-01 RX ADMIN — APIXABAN 5 MG: 5 TABLET, FILM COATED ORAL at 20:54

## 2023-12-01 RX ADMIN — Medication 5000 UNITS: at 09:27

## 2023-12-01 RX ADMIN — FAMOTIDINE 20 MG: 20 TABLET ORAL at 20:55

## 2023-12-01 RX ADMIN — Medication 5 MG: at 20:54

## 2023-12-01 RX ADMIN — ERGOCALCIFEROL 50000 UNITS: 1.25 CAPSULE ORAL at 09:27

## 2023-12-01 RX ADMIN — THIAMINE HYDROCHLORIDE 200 MG: 100 INJECTION, SOLUTION INTRAMUSCULAR; INTRAVENOUS at 09:27

## 2023-12-01 RX ADMIN — FUROSEMIDE 40 MG: 40 TABLET ORAL at 09:27

## 2023-12-01 RX ADMIN — INSULIN LISPRO 4 UNITS: 100 INJECTION, SOLUTION INTRAVENOUS; SUBCUTANEOUS at 13:53

## 2023-12-01 RX ADMIN — ZINC SULFATE 220 MG (50 MG) CAPSULE 50 MG: CAPSULE at 09:27

## 2023-12-01 RX ADMIN — OXYCODONE HYDROCHLORIDE AND ACETAMINOPHEN 500 MG: 500 TABLET ORAL at 20:54

## 2023-12-01 RX ADMIN — ALBUTEROL SULFATE 2 PUFF: 90 AEROSOL, METERED RESPIRATORY (INHALATION) at 10:46

## 2023-12-01 RX ADMIN — CEFEPIME 2000 MG: 2 INJECTION, POWDER, FOR SOLUTION INTRAVENOUS at 20:53

## 2023-12-01 RX ADMIN — FAMOTIDINE 20 MG: 20 TABLET ORAL at 09:27

## 2023-12-01 RX ADMIN — SOTALOL HYDROCHLORIDE 120 MG: 120 TABLET ORAL at 20:54

## 2023-12-01 RX ADMIN — SODIUM CHLORIDE, PRESERVATIVE FREE 10 ML: 5 INJECTION INTRAVENOUS at 09:27

## 2023-12-01 RX ADMIN — APIXABAN 5 MG: 5 TABLET, FILM COATED ORAL at 09:27

## 2023-12-01 RX ADMIN — OXYCODONE HYDROCHLORIDE AND ACETAMINOPHEN 500 MG: 500 TABLET ORAL at 13:53

## 2023-12-01 RX ADMIN — CEFEPIME 2000 MG: 2 INJECTION, POWDER, FOR SOLUTION INTRAVENOUS at 09:26

## 2023-12-01 RX ADMIN — SPIRONOLACTONE 50 MG: 50 TABLET ORAL at 09:27

## 2023-12-01 RX ADMIN — ALBUTEROL SULFATE 2 PUFF: 90 AEROSOL, METERED RESPIRATORY (INHALATION) at 07:26

## 2023-12-01 RX ADMIN — ALBUTEROL SULFATE 2 PUFF: 90 AEROSOL, METERED RESPIRATORY (INHALATION) at 18:36

## 2023-12-02 PROBLEM — R41.82 ALTERED MENTAL STATE: Status: ACTIVE | Noted: 2023-12-02

## 2023-12-02 LAB
ALBUMIN SERPL-MCNC: 3.5 G/DL (ref 3.5–5.2)
ALP SERPL-CCNC: 68 U/L (ref 35–104)
ALT SERPL-CCNC: 17 U/L (ref 5–33)
ANION GAP SERPL CALCULATED.3IONS-SCNC: 9 MMOL/L (ref 7–19)
AST SERPL-CCNC: 15 U/L (ref 5–32)
BASOPHILS # BLD: 0 K/UL (ref 0–0.2)
BASOPHILS NFR BLD: 0.1 % (ref 0–1)
BILIRUB SERPL-MCNC: 0.3 MG/DL (ref 0.2–1.2)
BUN SERPL-MCNC: 28 MG/DL (ref 8–23)
CALCIUM SERPL-MCNC: 8.9 MG/DL (ref 8.8–10.2)
CHLORIDE SERPL-SCNC: 100 MMOL/L (ref 98–111)
CO2 SERPL-SCNC: 30 MMOL/L (ref 22–29)
CREAT SERPL-MCNC: 1.1 MG/DL (ref 0.5–0.9)
EOSINOPHIL # BLD: 0 K/UL (ref 0–0.6)
EOSINOPHIL NFR BLD: 0.5 % (ref 0–5)
ERYTHROCYTE [DISTWIDTH] IN BLOOD BY AUTOMATED COUNT: 15.8 % (ref 11.5–14.5)
GLUCOSE BLD-MCNC: 167 MG/DL (ref 70–99)
GLUCOSE BLD-MCNC: 172 MG/DL (ref 70–99)
GLUCOSE BLD-MCNC: 245 MG/DL (ref 70–99)
GLUCOSE BLD-MCNC: 266 MG/DL (ref 70–99)
GLUCOSE SERPL-MCNC: 121 MG/DL (ref 74–109)
HCT VFR BLD AUTO: 36.2 % (ref 37–47)
HGB BLD-MCNC: 11.2 G/DL (ref 12–16)
IMM GRANULOCYTES # BLD: 0 K/UL
LYMPHOCYTES # BLD: 1.8 K/UL (ref 1.1–4.5)
LYMPHOCYTES NFR BLD: 20.2 % (ref 20–40)
MCH RBC QN AUTO: 29.1 PG (ref 27–31)
MCHC RBC AUTO-ENTMCNC: 30.9 G/DL (ref 33–37)
MCV RBC AUTO: 94 FL (ref 81–99)
MONOCYTES # BLD: 0.9 K/UL (ref 0–0.9)
MONOCYTES NFR BLD: 9.7 % (ref 0–10)
NEUTROPHILS # BLD: 6.1 K/UL (ref 1.5–7.5)
NEUTS SEG NFR BLD: 69.2 % (ref 50–65)
PERFORMED ON: ABNORMAL
PLATELET # BLD AUTO: 202 K/UL (ref 130–400)
PMV BLD AUTO: 9.3 FL (ref 9.4–12.3)
POTASSIUM SERPL-SCNC: 3.6 MMOL/L (ref 3.5–5)
PROT SERPL-MCNC: 6 G/DL (ref 6.6–8.7)
RBC # BLD AUTO: 3.85 M/UL (ref 4.2–5.4)
SODIUM SERPL-SCNC: 139 MMOL/L (ref 136–145)
WBC # BLD AUTO: 8.8 K/UL (ref 4.8–10.8)

## 2023-12-02 PROCEDURE — 6370000000 HC RX 637 (ALT 250 FOR IP): Performed by: FAMILY MEDICINE

## 2023-12-02 PROCEDURE — 85025 COMPLETE CBC W/AUTO DIFF WBC: CPT

## 2023-12-02 PROCEDURE — 94640 AIRWAY INHALATION TREATMENT: CPT

## 2023-12-02 PROCEDURE — 6370000000 HC RX 637 (ALT 250 FOR IP): Performed by: PSYCHIATRY & NEUROLOGY

## 2023-12-02 PROCEDURE — 6360000002 HC RX W HCPCS: Performed by: EMERGENCY MEDICINE

## 2023-12-02 PROCEDURE — 6370000000 HC RX 637 (ALT 250 FOR IP): Performed by: INTERNAL MEDICINE

## 2023-12-02 PROCEDURE — 1210000000 HC MED SURG R&B

## 2023-12-02 PROCEDURE — 82962 GLUCOSE BLOOD TEST: CPT

## 2023-12-02 PROCEDURE — 6360000002 HC RX W HCPCS: Performed by: INTERNAL MEDICINE

## 2023-12-02 PROCEDURE — 99233 SBSQ HOSP IP/OBS HIGH 50: CPT | Performed by: PSYCHIATRY & NEUROLOGY

## 2023-12-02 PROCEDURE — 6360000002 HC RX W HCPCS: Performed by: FAMILY MEDICINE

## 2023-12-02 PROCEDURE — 80053 COMPREHEN METABOLIC PANEL: CPT

## 2023-12-02 PROCEDURE — 94660 CPAP INITIATION&MGMT: CPT

## 2023-12-02 PROCEDURE — 2580000003 HC RX 258: Performed by: FAMILY MEDICINE

## 2023-12-02 PROCEDURE — 36415 COLL VENOUS BLD VENIPUNCTURE: CPT

## 2023-12-02 RX ADMIN — FAMOTIDINE 20 MG: 20 TABLET ORAL at 21:55

## 2023-12-02 RX ADMIN — CYPROHEPTADINE HYDROCHLORIDE 8 MG: 4 TABLET ORAL at 21:55

## 2023-12-02 RX ADMIN — CEFEPIME 2000 MG: 2 INJECTION, POWDER, FOR SOLUTION INTRAVENOUS at 11:25

## 2023-12-02 RX ADMIN — CYPROHEPTADINE HYDROCHLORIDE 8 MG: 4 TABLET ORAL at 11:14

## 2023-12-02 RX ADMIN — CEFEPIME 2000 MG: 2 INJECTION, POWDER, FOR SOLUTION INTRAVENOUS at 22:00

## 2023-12-02 RX ADMIN — MONTELUKAST 10 MG: 10 TABLET, FILM COATED ORAL at 21:56

## 2023-12-02 RX ADMIN — APIXABAN 5 MG: 5 TABLET, FILM COATED ORAL at 11:20

## 2023-12-02 RX ADMIN — FAMOTIDINE 20 MG: 20 TABLET ORAL at 11:14

## 2023-12-02 RX ADMIN — INSULIN LISPRO 2 UNITS: 100 INJECTION, SOLUTION INTRAVENOUS; SUBCUTANEOUS at 14:59

## 2023-12-02 RX ADMIN — INSULIN LISPRO 1 UNITS: 100 INJECTION, SOLUTION INTRAVENOUS; SUBCUTANEOUS at 11:19

## 2023-12-02 RX ADMIN — SOTALOL HYDROCHLORIDE 120 MG: 120 TABLET ORAL at 21:55

## 2023-12-02 RX ADMIN — CYPROHEPTADINE HYDROCHLORIDE 8 MG: 4 TABLET ORAL at 16:10

## 2023-12-02 RX ADMIN — APIXABAN 5 MG: 5 TABLET, FILM COATED ORAL at 21:55

## 2023-12-02 RX ADMIN — SPIRONOLACTONE 50 MG: 50 TABLET ORAL at 11:14

## 2023-12-02 RX ADMIN — ZINC SULFATE 220 MG (50 MG) CAPSULE 50 MG: CAPSULE at 11:14

## 2023-12-02 RX ADMIN — OXYCODONE HYDROCHLORIDE AND ACETAMINOPHEN 500 MG: 500 TABLET ORAL at 21:55

## 2023-12-02 RX ADMIN — THIAMINE HYDROCHLORIDE 200 MG: 100 INJECTION, SOLUTION INTRAMUSCULAR; INTRAVENOUS at 18:29

## 2023-12-02 RX ADMIN — QUETIAPINE FUMARATE 50 MG: 50 TABLET ORAL at 21:55

## 2023-12-02 RX ADMIN — TRAZODONE HYDROCHLORIDE 100 MG: 100 TABLET ORAL at 21:55

## 2023-12-02 RX ADMIN — ALBUTEROL SULFATE 2 PUFF: 90 AEROSOL, METERED RESPIRATORY (INHALATION) at 21:04

## 2023-12-02 RX ADMIN — ROSUVASTATIN 10 MG: 10 TABLET, FILM COATED ORAL at 21:55

## 2023-12-02 RX ADMIN — OXYCODONE HYDROCHLORIDE AND ACETAMINOPHEN 500 MG: 500 TABLET ORAL at 16:10

## 2023-12-02 RX ADMIN — FUROSEMIDE 40 MG: 40 TABLET ORAL at 11:14

## 2023-12-02 RX ADMIN — CLOPIDOGREL BISULFATE 75 MG: 75 TABLET ORAL at 11:14

## 2023-12-02 RX ADMIN — Medication 5000 UNITS: at 11:14

## 2023-12-02 RX ADMIN — OXYCODONE HYDROCHLORIDE AND ACETAMINOPHEN 500 MG: 500 TABLET ORAL at 11:15

## 2023-12-02 RX ADMIN — ALBUTEROL SULFATE 2 PUFF: 90 AEROSOL, METERED RESPIRATORY (INHALATION) at 07:34

## 2023-12-02 RX ADMIN — THIAMINE HYDROCHLORIDE 200 MG: 100 INJECTION, SOLUTION INTRAMUSCULAR; INTRAVENOUS at 11:15

## 2023-12-02 RX ADMIN — DEXAMETHASONE SODIUM PHOSPHATE 6 MG: 10 INJECTION, SOLUTION INTRAMUSCULAR; INTRAVENOUS at 02:24

## 2023-12-02 RX ADMIN — Medication 5 MG: at 21:55

## 2023-12-02 RX ADMIN — SOTALOL HYDROCHLORIDE 120 MG: 120 TABLET ORAL at 11:20

## 2023-12-03 LAB
GLUCOSE BLD-MCNC: 141 MG/DL (ref 70–99)
GLUCOSE BLD-MCNC: 190 MG/DL (ref 70–99)
GLUCOSE BLD-MCNC: 274 MG/DL (ref 70–99)
GLUCOSE BLD-MCNC: 288 MG/DL (ref 70–99)
PERFORMED ON: ABNORMAL

## 2023-12-03 PROCEDURE — 99232 SBSQ HOSP IP/OBS MODERATE 35: CPT | Performed by: PSYCHIATRY & NEUROLOGY

## 2023-12-03 PROCEDURE — 6370000000 HC RX 637 (ALT 250 FOR IP): Performed by: FAMILY MEDICINE

## 2023-12-03 PROCEDURE — 6360000002 HC RX W HCPCS: Performed by: INTERNAL MEDICINE

## 2023-12-03 PROCEDURE — 94640 AIRWAY INHALATION TREATMENT: CPT

## 2023-12-03 PROCEDURE — 94760 N-INVAS EAR/PLS OXIMETRY 1: CPT

## 2023-12-03 PROCEDURE — 2580000003 HC RX 258: Performed by: FAMILY MEDICINE

## 2023-12-03 PROCEDURE — 6370000000 HC RX 637 (ALT 250 FOR IP): Performed by: PSYCHIATRY & NEUROLOGY

## 2023-12-03 PROCEDURE — 6370000000 HC RX 637 (ALT 250 FOR IP): Performed by: INTERNAL MEDICINE

## 2023-12-03 PROCEDURE — 94660 CPAP INITIATION&MGMT: CPT

## 2023-12-03 PROCEDURE — 6360000002 HC RX W HCPCS: Performed by: EMERGENCY MEDICINE

## 2023-12-03 PROCEDURE — 6360000002 HC RX W HCPCS: Performed by: FAMILY MEDICINE

## 2023-12-03 PROCEDURE — 1210000000 HC MED SURG R&B

## 2023-12-03 PROCEDURE — 82962 GLUCOSE BLOOD TEST: CPT

## 2023-12-03 RX ADMIN — ALBUTEROL SULFATE 2 PUFF: 90 AEROSOL, METERED RESPIRATORY (INHALATION) at 20:34

## 2023-12-03 RX ADMIN — CEFEPIME 2000 MG: 2 INJECTION, POWDER, FOR SOLUTION INTRAVENOUS at 09:50

## 2023-12-03 RX ADMIN — CEFEPIME 2000 MG: 2 INJECTION, POWDER, FOR SOLUTION INTRAVENOUS at 20:58

## 2023-12-03 RX ADMIN — SOTALOL HYDROCHLORIDE 120 MG: 120 TABLET ORAL at 09:55

## 2023-12-03 RX ADMIN — OXYCODONE HYDROCHLORIDE AND ACETAMINOPHEN 500 MG: 500 TABLET ORAL at 14:08

## 2023-12-03 RX ADMIN — CLOPIDOGREL BISULFATE 75 MG: 75 TABLET ORAL at 09:48

## 2023-12-03 RX ADMIN — ALBUTEROL SULFATE 2 PUFF: 90 AEROSOL, METERED RESPIRATORY (INHALATION) at 07:13

## 2023-12-03 RX ADMIN — DEXAMETHASONE SODIUM PHOSPHATE 6 MG: 10 INJECTION, SOLUTION INTRAMUSCULAR; INTRAVENOUS at 02:07

## 2023-12-03 RX ADMIN — APIXABAN 5 MG: 5 TABLET, FILM COATED ORAL at 20:49

## 2023-12-03 RX ADMIN — Medication 5000 UNITS: at 09:48

## 2023-12-03 RX ADMIN — ZINC SULFATE 220 MG (50 MG) CAPSULE 50 MG: CAPSULE at 09:48

## 2023-12-03 RX ADMIN — FAMOTIDINE 20 MG: 20 TABLET ORAL at 20:49

## 2023-12-03 RX ADMIN — INSULIN LISPRO 2 UNITS: 100 INJECTION, SOLUTION INTRAVENOUS; SUBCUTANEOUS at 09:55

## 2023-12-03 RX ADMIN — INSULIN LISPRO 2 UNITS: 100 INJECTION, SOLUTION INTRAVENOUS; SUBCUTANEOUS at 13:12

## 2023-12-03 RX ADMIN — QUETIAPINE FUMARATE 50 MG: 50 TABLET ORAL at 20:58

## 2023-12-03 RX ADMIN — CYPROHEPTADINE HYDROCHLORIDE 8 MG: 4 TABLET ORAL at 09:48

## 2023-12-03 RX ADMIN — OXYCODONE HYDROCHLORIDE AND ACETAMINOPHEN 500 MG: 500 TABLET ORAL at 20:49

## 2023-12-03 RX ADMIN — SODIUM CHLORIDE, PRESERVATIVE FREE 10 ML: 5 INJECTION INTRAVENOUS at 09:48

## 2023-12-03 RX ADMIN — Medication 5 MG: at 20:49

## 2023-12-03 RX ADMIN — CYPROHEPTADINE HYDROCHLORIDE 8 MG: 4 TABLET ORAL at 20:49

## 2023-12-03 RX ADMIN — FUROSEMIDE 40 MG: 40 TABLET ORAL at 09:48

## 2023-12-03 RX ADMIN — ROSUVASTATIN 10 MG: 10 TABLET, FILM COATED ORAL at 20:58

## 2023-12-03 RX ADMIN — SPIRONOLACTONE 50 MG: 50 TABLET ORAL at 09:48

## 2023-12-03 RX ADMIN — ACETAMINOPHEN 650 MG: 325 TABLET ORAL at 22:50

## 2023-12-03 RX ADMIN — OXYCODONE HYDROCHLORIDE AND ACETAMINOPHEN 500 MG: 500 TABLET ORAL at 09:48

## 2023-12-03 RX ADMIN — ALBUTEROL SULFATE 2 PUFF: 90 AEROSOL, METERED RESPIRATORY (INHALATION) at 14:52

## 2023-12-03 RX ADMIN — SOTALOL HYDROCHLORIDE 120 MG: 120 TABLET ORAL at 20:59

## 2023-12-03 RX ADMIN — ALBUTEROL SULFATE 2 PUFF: 90 AEROSOL, METERED RESPIRATORY (INHALATION) at 11:46

## 2023-12-03 RX ADMIN — THIAMINE HYDROCHLORIDE 200 MG: 100 INJECTION, SOLUTION INTRAMUSCULAR; INTRAVENOUS at 09:48

## 2023-12-03 RX ADMIN — MONTELUKAST 10 MG: 10 TABLET, FILM COATED ORAL at 20:49

## 2023-12-03 RX ADMIN — THIAMINE HYDROCHLORIDE 200 MG: 100 INJECTION, SOLUTION INTRAMUSCULAR; INTRAVENOUS at 20:49

## 2023-12-03 RX ADMIN — CYPROHEPTADINE HYDROCHLORIDE 8 MG: 4 TABLET ORAL at 14:08

## 2023-12-03 RX ADMIN — TRAZODONE HYDROCHLORIDE 100 MG: 100 TABLET ORAL at 20:49

## 2023-12-03 RX ADMIN — FAMOTIDINE 20 MG: 20 TABLET ORAL at 09:48

## 2023-12-03 RX ADMIN — APIXABAN 5 MG: 5 TABLET, FILM COATED ORAL at 09:48

## 2023-12-04 ENCOUNTER — APPOINTMENT (OUTPATIENT)
Dept: MRI IMAGING | Age: 73
DRG: 871 | End: 2023-12-04
Payer: MEDICARE

## 2023-12-04 PROBLEM — T14.8XXA TRAUMATIC HEMATOMA: Status: RESOLVED | Noted: 2021-01-05 | Resolved: 2023-12-04

## 2023-12-04 PROBLEM — U07.1 ACUTE HYPOXEMIC RESPIRATORY FAILURE DUE TO COVID-19 (HCC): Status: RESOLVED | Noted: 2023-11-23 | Resolved: 2023-12-04

## 2023-12-04 PROBLEM — J96.01 ACUTE HYPOXEMIC RESPIRATORY FAILURE DUE TO COVID-19 (HCC): Status: RESOLVED | Noted: 2023-11-23 | Resolved: 2023-12-04

## 2023-12-04 PROBLEM — R41.82 ALTERED MENTAL STATE: Status: RESOLVED | Noted: 2023-12-02 | Resolved: 2023-12-04

## 2023-12-04 LAB
GLUCOSE BLD-MCNC: 138 MG/DL (ref 70–99)
GLUCOSE BLD-MCNC: 145 MG/DL (ref 70–99)
GLUCOSE BLD-MCNC: 182 MG/DL (ref 70–99)
GLUCOSE BLD-MCNC: 213 MG/DL (ref 70–99)
PERFORMED ON: ABNORMAL

## 2023-12-04 PROCEDURE — 6360000002 HC RX W HCPCS: Performed by: INTERNAL MEDICINE

## 2023-12-04 PROCEDURE — 94760 N-INVAS EAR/PLS OXIMETRY 1: CPT

## 2023-12-04 PROCEDURE — 99233 SBSQ HOSP IP/OBS HIGH 50: CPT | Performed by: PSYCHIATRY & NEUROLOGY

## 2023-12-04 PROCEDURE — 1210000000 HC MED SURG R&B

## 2023-12-04 PROCEDURE — 97116 GAIT TRAINING THERAPY: CPT

## 2023-12-04 PROCEDURE — 2580000003 HC RX 258: Performed by: FAMILY MEDICINE

## 2023-12-04 PROCEDURE — 93880 EXTRACRANIAL BILAT STUDY: CPT

## 2023-12-04 PROCEDURE — 97530 THERAPEUTIC ACTIVITIES: CPT

## 2023-12-04 PROCEDURE — 94640 AIRWAY INHALATION TREATMENT: CPT

## 2023-12-04 PROCEDURE — 82962 GLUCOSE BLOOD TEST: CPT

## 2023-12-04 PROCEDURE — 6370000000 HC RX 637 (ALT 250 FOR IP): Performed by: FAMILY MEDICINE

## 2023-12-04 PROCEDURE — 6370000000 HC RX 637 (ALT 250 FOR IP): Performed by: PSYCHIATRY & NEUROLOGY

## 2023-12-04 PROCEDURE — 94660 CPAP INITIATION&MGMT: CPT

## 2023-12-04 PROCEDURE — 70551 MRI BRAIN STEM W/O DYE: CPT

## 2023-12-04 PROCEDURE — 6370000000 HC RX 637 (ALT 250 FOR IP): Performed by: INTERNAL MEDICINE

## 2023-12-04 RX ADMIN — MONTELUKAST 10 MG: 10 TABLET, FILM COATED ORAL at 20:34

## 2023-12-04 RX ADMIN — THIAMINE HYDROCHLORIDE 200 MG: 100 INJECTION, SOLUTION INTRAMUSCULAR; INTRAVENOUS at 20:33

## 2023-12-04 RX ADMIN — Medication 5000 UNITS: at 09:20

## 2023-12-04 RX ADMIN — APIXABAN 5 MG: 5 TABLET, FILM COATED ORAL at 20:34

## 2023-12-04 RX ADMIN — FUROSEMIDE 40 MG: 40 TABLET ORAL at 09:20

## 2023-12-04 RX ADMIN — SOTALOL HYDROCHLORIDE 120 MG: 120 TABLET ORAL at 09:20

## 2023-12-04 RX ADMIN — FAMOTIDINE 20 MG: 20 TABLET ORAL at 20:34

## 2023-12-04 RX ADMIN — CYPROHEPTADINE HYDROCHLORIDE 8 MG: 4 TABLET ORAL at 09:20

## 2023-12-04 RX ADMIN — SODIUM CHLORIDE, PRESERVATIVE FREE 10 ML: 5 INJECTION INTRAVENOUS at 09:20

## 2023-12-04 RX ADMIN — ALBUTEROL SULFATE 2 PUFF: 90 AEROSOL, METERED RESPIRATORY (INHALATION) at 14:28

## 2023-12-04 RX ADMIN — ALBUTEROL SULFATE 2 PUFF: 90 AEROSOL, METERED RESPIRATORY (INHALATION) at 07:52

## 2023-12-04 RX ADMIN — ROSUVASTATIN 10 MG: 10 TABLET, FILM COATED ORAL at 20:34

## 2023-12-04 RX ADMIN — ALBUTEROL SULFATE 2 PUFF: 90 AEROSOL, METERED RESPIRATORY (INHALATION) at 18:40

## 2023-12-04 RX ADMIN — CYPROHEPTADINE HYDROCHLORIDE 8 MG: 4 TABLET ORAL at 20:34

## 2023-12-04 RX ADMIN — APIXABAN 5 MG: 5 TABLET, FILM COATED ORAL at 09:20

## 2023-12-04 RX ADMIN — THIAMINE HYDROCHLORIDE 200 MG: 100 INJECTION, SOLUTION INTRAMUSCULAR; INTRAVENOUS at 09:20

## 2023-12-04 RX ADMIN — OXYCODONE HYDROCHLORIDE AND ACETAMINOPHEN 500 MG: 500 TABLET ORAL at 09:20

## 2023-12-04 RX ADMIN — CLOPIDOGREL BISULFATE 75 MG: 75 TABLET ORAL at 09:20

## 2023-12-04 RX ADMIN — SPIRONOLACTONE 50 MG: 50 TABLET ORAL at 09:20

## 2023-12-04 RX ADMIN — ZINC SULFATE 220 MG (50 MG) CAPSULE 50 MG: CAPSULE at 09:20

## 2023-12-04 RX ADMIN — QUETIAPINE FUMARATE 50 MG: 50 TABLET ORAL at 20:34

## 2023-12-04 RX ADMIN — SOTALOL HYDROCHLORIDE 120 MG: 120 TABLET ORAL at 20:34

## 2023-12-04 RX ADMIN — Medication 5 MG: at 20:34

## 2023-12-04 RX ADMIN — SODIUM CHLORIDE, PRESERVATIVE FREE 10 ML: 5 INJECTION INTRAVENOUS at 20:34

## 2023-12-04 RX ADMIN — TRAZODONE HYDROCHLORIDE 100 MG: 100 TABLET ORAL at 20:34

## 2023-12-04 RX ADMIN — ALBUTEROL SULFATE 2 PUFF: 90 AEROSOL, METERED RESPIRATORY (INHALATION) at 11:56

## 2023-12-04 RX ADMIN — OXYCODONE HYDROCHLORIDE AND ACETAMINOPHEN 500 MG: 500 TABLET ORAL at 20:34

## 2023-12-04 NOTE — DISCHARGE SUMMARY
with myself on an outpatient basis. Consultants:   IP CONSULT TO CRITICAL CARE  IP CONSULT TO PHARMACY  PALLIATIVE CARE EVAL  IP CONSULT TO PHARMACY  IP CONSULT TO PULMONOLOGY  IP CONSULT TO NEUROLOGY    Time Spent on Discharge:  38 minutes were spent in patient examination, evaluation, counseling as well as medication reconciliation, prescriptions for required medications, discharge plan and follow up. Surgeries/Procedures Performed:        Treatments:   antibiotics: Cefepime, steroids:  Decadron , and insulin: regular    Significant Diagnostic Studies:   Recent Labs:  CBC:   Lab Results   Component Value Date/Time    WBC 8.8 12/02/2023 03:03 AM    RBC 3.85 12/02/2023 03:03 AM    HGB 11.2 12/02/2023 03:03 AM    HCT 36.2 12/02/2023 03:03 AM    HCT 32.1 09/02/2011 02:11 AM    MCV 94.0 12/02/2023 03:03 AM    MCH 29.1 12/02/2023 03:03 AM    MCHC 30.9 12/02/2023 03:03 AM    RDW 15.8 12/02/2023 03:03 AM     12/02/2023 03:03 AM     09/02/2011 02:11 AM     CMP:    Lab Results   Component Value Date/Time    GLUCOSE 121 12/02/2023 03:03 AM     12/02/2023 03:03 AM     09/02/2011 02:11 AM    K 3.6 12/02/2023 03:03 AM    K 5.6 11/23/2023 10:33 PM    K 4.1 09/02/2011 02:11 AM     12/02/2023 03:03 AM     09/02/2011 02:11 AM    CO2 30 12/02/2023 03:03 AM    BUN 28 12/02/2023 03:03 AM    CREATININE 1.1 12/02/2023 03:03 AM    CREATININE 0.6 09/02/2011 02:11 AM    ANIONGAP 9 12/02/2023 03:03 AM    ALKPHOS 68 12/02/2023 03:03 AM    ALKPHOS 57 09/02/2011 02:11 AM    ALT 17 12/02/2023 03:03 AM    AST 15 12/02/2023 03:03 AM    BILITOT 0.3 12/02/2023 03:03 AM    LABALBU 3.5 12/02/2023 03:03 AM    LABALBU 3.3 09/02/2011 02:11 AM    LABGLOM 52 12/02/2023 03:03 AM    GFRAA >59 09/02/2022 02:12 AM    PROT 6.0 12/02/2023 03:03 AM    PROT 7.5 01/18/2013 10:35 AM    CALCIUM 8.9 12/02/2023 03:03 AM       Radiology Last 7 Days:  No results found.     Discharge Plan   Disposition: Home with home

## 2023-12-05 VITALS
BODY MASS INDEX: 26.52 KG/M2 | SYSTOLIC BLOOD PRESSURE: 102 MMHG | HEART RATE: 79 BPM | RESPIRATION RATE: 16 BRPM | TEMPERATURE: 98.1 F | OXYGEN SATURATION: 92 % | WEIGHT: 165 LBS | DIASTOLIC BLOOD PRESSURE: 61 MMHG | HEIGHT: 66 IN

## 2023-12-05 PROBLEM — I63.522: Status: ACTIVE | Noted: 2023-12-05

## 2023-12-05 LAB
GLUCOSE BLD-MCNC: 109 MG/DL (ref 70–99)
GLUCOSE BLD-MCNC: 120 MG/DL (ref 70–99)
PERFORMED ON: ABNORMAL
PERFORMED ON: ABNORMAL

## 2023-12-05 PROCEDURE — 94760 N-INVAS EAR/PLS OXIMETRY 1: CPT

## 2023-12-05 PROCEDURE — 6360000002 HC RX W HCPCS: Performed by: INTERNAL MEDICINE

## 2023-12-05 PROCEDURE — 99233 SBSQ HOSP IP/OBS HIGH 50: CPT | Performed by: PSYCHIATRY & NEUROLOGY

## 2023-12-05 PROCEDURE — 94640 AIRWAY INHALATION TREATMENT: CPT

## 2023-12-05 PROCEDURE — 6370000000 HC RX 637 (ALT 250 FOR IP): Performed by: FAMILY MEDICINE

## 2023-12-05 PROCEDURE — 2580000003 HC RX 258: Performed by: FAMILY MEDICINE

## 2023-12-05 PROCEDURE — 82962 GLUCOSE BLOOD TEST: CPT

## 2023-12-05 PROCEDURE — 6370000000 HC RX 637 (ALT 250 FOR IP): Performed by: INTERNAL MEDICINE

## 2023-12-05 RX ORDER — MONTELUKAST SODIUM 10 MG/1
10 TABLET ORAL NIGHTLY
Qty: 30 TABLET | Refills: 3 | Status: SHIPPED | OUTPATIENT
Start: 2023-12-05

## 2023-12-05 RX ORDER — TRAZODONE HYDROCHLORIDE 100 MG/1
100 TABLET ORAL NIGHTLY
Qty: 30 TABLET | Refills: 0 | Status: SHIPPED | OUTPATIENT
Start: 2023-12-05

## 2023-12-05 RX ORDER — ZINC SULFATE 50(220)MG
50 CAPSULE ORAL DAILY
Qty: 30 CAPSULE | Refills: 3 | COMMUNITY
Start: 2023-12-05

## 2023-12-05 RX ADMIN — SOTALOL HYDROCHLORIDE 120 MG: 120 TABLET ORAL at 09:23

## 2023-12-05 RX ADMIN — CYPROHEPTADINE HYDROCHLORIDE 8 MG: 4 TABLET ORAL at 09:22

## 2023-12-05 RX ADMIN — CLOPIDOGREL BISULFATE 75 MG: 75 TABLET ORAL at 09:23

## 2023-12-05 RX ADMIN — OXYCODONE HYDROCHLORIDE AND ACETAMINOPHEN 500 MG: 500 TABLET ORAL at 09:22

## 2023-12-05 RX ADMIN — THIAMINE HYDROCHLORIDE 200 MG: 100 INJECTION, SOLUTION INTRAMUSCULAR; INTRAVENOUS at 09:23

## 2023-12-05 RX ADMIN — SPIRONOLACTONE 50 MG: 50 TABLET ORAL at 09:22

## 2023-12-05 RX ADMIN — ALBUTEROL SULFATE 2 PUFF: 90 AEROSOL, METERED RESPIRATORY (INHALATION) at 07:10

## 2023-12-05 RX ADMIN — Medication 5000 UNITS: at 09:23

## 2023-12-05 RX ADMIN — ALBUTEROL SULFATE 2 PUFF: 90 AEROSOL, METERED RESPIRATORY (INHALATION) at 10:22

## 2023-12-05 RX ADMIN — ZINC SULFATE 220 MG (50 MG) CAPSULE 50 MG: CAPSULE at 09:23

## 2023-12-05 RX ADMIN — FUROSEMIDE 40 MG: 40 TABLET ORAL at 09:23

## 2023-12-05 RX ADMIN — SODIUM CHLORIDE, PRESERVATIVE FREE 10 ML: 5 INJECTION INTRAVENOUS at 09:23

## 2023-12-05 RX ADMIN — FAMOTIDINE 20 MG: 20 TABLET ORAL at 09:23

## 2023-12-05 RX ADMIN — APIXABAN 5 MG: 5 TABLET, FILM COATED ORAL at 09:22

## 2023-12-05 NOTE — CARE COORDINATION
11/24/23 1442   Readmission Assessment   Number of Days since last admission? 1-7 days   Previous Disposition Home with Family   Who is being Rocio Green  (RN; medical record; pt on vent; covid +)   What was the patient's/caregiver's perception as to why they think they needed to return back to the hospital? Other (Comment)  (SOB; covid +; respiratory distress)   Did you visit your Primary Care Physician after you left the hospital, before you returned this time? No   Why weren't you able to visit your PCP? Other (Comment)  (not enough time; just d/c'd 11/22/23)   Did you see a specialist, such as Cardiac, Pulmonary, Orthopedic Physician, etc. after you left the hospital? No   Who advised the patient to return to the hospital? Self-referral   Does the patient report anything that got in the way of taking their medications? No   In our efforts to provide the best possible care to you and others like you, can you think of anything that we could have done to help you after you left the hospital the first time, so that you might not have needed to return so soon? Arrange for more help when leaving the hospital;Identify patient's health literacy needs; Teaching during hospitalization regarding your illness     Electronically signed by WAI Fiore on 11/24/2023 at 2:44 PM
12/05/23 0904   IMM Letter   IMM Letter given to Patient/Family/Significant other/Guardian/POA/by: Given to and explained to patient by Renetta Betts RN CM. Patient verbalized understanding. IMM not signed due to Covid isolation. Patient elected to not stay additional 4 hours.    IMM Letter date given: 12/05/23   IMM Letter time given: 0845
Patient has Pending Admission with [facility]. Will follow. Please advise when patient discharges. WILL NEED RESUMPTION OF CARE ORDERS TO RESUME HH SERVICES WHEN PATIENT DISCHARGES. Thank you. [address] [facility] [phone]. [phone].     Marciano Ruano RN, Home Care Liaison  [phone] P  Electronically signed by Marciano Ruano on 11/24/2023 at 8:18 AM
[facility]   P [phone]  F [phone]    Patient has the following equipment at home:  cane, walker, Rollator and wheelchairs. She does not drive. Her family members or Caregivers provide transportation. She is currently receiving Home Health services and wishes to continue their services at discharge. Discussed possibility of patient discharging to a SNF for short-term Rehab. Patient adamantly declines SNF. Patient states she wants to call her brother, Malaika Toussaint, but does not remember their phone number. Her sister-in-law, Hyun Hernández, happened to call for an update. She provided Cathi's number, which is [phone]. Aleah Kahn states during prior admission, patient's son, Shefali Thompson, did not want patient to have a phone in her room. Aleah Kahn plans to check with Shefali Thompson to learn if he wants patient to have a phone during this admission. Awaiting call back. The Plan for Transition of Care is related to the following treatment goals of Acute hypoxemic respiratory failure due to COVID-19 ([address]) [U07.1, U81.17]    IF APPLICABLE: The Patient and/or patient representative Chikis Durand and her family were provided with a choice of provider and agrees with the discharge plan. Freedom of choice list with basic dialogue that supports the patient's individualized plan of care/goals and shares the quality data associated with the providers was provided to: Patient   Patient Representative Name:       The Patient and/or Patient Representative Agree with the Discharge Plan?  Yes    Alma Iqbal RN  Case Management Department  Ph: [phone] Fax: [phone]

## 2023-12-05 NOTE — PLAN OF CARE
Nutrition Problem #1: Altered nutrition-related lab values  Intervention: Food and/or Nutrient Delivery: Continue Current Diet  Nutritional
Nutrition Problem #1: Inadequate oral intake, Altered nutrition-related lab values  Intervention: Food and/or Nutrient Delivery: Start Tube Feeding, Continue NPO  Nutritional
Problem: Discharge Planning  Goal: Discharge to home or other facility with appropriate resources  11/28/2023 1526 by Mercer Boeck, RN  Outcome: Progressing  11/28/2023 0317 by Gutierrez Barlow RN  Outcome: Progressing  Flowsheets (Taken 11/27/2023 2000)  Discharge to home or other facility with appropriate resources: Identify barriers to discharge with patient and caregiver     Problem: Safety - Adult  Goal: Free from fall injury  11/28/2023 1526 by Mercer Boeck, RN  Outcome: Progressing  11/28/2023 0317 by Gutierrez Barlow RN  Outcome: Progressing     Problem: Nutrition Deficit:  Goal: Optimize nutritional status  11/28/2023 1526 by Mercer Boeck, RN  Outcome: Progressing  11/28/2023 0742 by Pollo Wesley MS, RD, LD  Outcome: Progressing  Flowsheets (Taken 11/28/2023 0735)  Nutrient intake appropriate for improving, restoring, or maintaining nutritional needs:   Assess nutritional status and recommend course of action   Monitor oral intake, labs, and treatment plans   Recommend appropriate diets, oral nutritional supplements, and vitamin/mineral supplements  11/28/2023 0317 by Gutierrez Barlow RN  Outcome: Progressing     Problem: Chronic Conditions and Co-morbidities  Goal: Patient's chronic conditions and co-morbidity symptoms are monitored and maintained or improved  11/28/2023 1526 by Mercer Boeck, RN  Outcome: Progressing  11/28/2023 0317 by Gutierrez Barlow RN  Outcome: Progressing  Flowsheets (Taken 11/27/2023 2000)  Care Plan - Patient's Chronic Conditions and Co-Morbidity Symptoms are Monitored and Maintained or Improved:   Monitor and assess patient's chronic conditions and comorbid symptoms for stability, deterioration, or improvement   Collaborate with multidisciplinary team to address chronic and comorbid conditions and prevent exacerbation or deterioration   Update acute care plan with appropriate goals if chronic or comorbid symptoms are exacerbated and prevent overall improvement and discharge
Problem: Discharge Planning  Goal: Discharge to home or other facility with appropriate resources  Outcome: Adequate for Discharge     Problem: Safety - Adult  Goal: Free from fall injury  Outcome: Adequate for Discharge  Flowsheets (Taken 12/4/2023 2253 by Nile Guevara RN)  Free From Fall Injury: Instruct family/caregiver on patient safety     Problem: Nutrition Deficit:  Goal: Optimize nutritional status  Outcome: Adequate for Discharge     Problem: Chronic Conditions and Co-morbidities  Goal: Patient's chronic conditions and co-morbidity symptoms are monitored and maintained or improved  Outcome: Adequate for Discharge     Problem: Skin/Tissue Integrity  Goal: Absence of new skin breakdown  Description: 1. Monitor for areas of redness and/or skin breakdown  2. Assess vascular access sites hourly  3. Every 4-6 hours minimum:  Change oxygen saturation probe site  4. Every 4-6 hours:  If on nasal continuous positive airway pressure, respiratory therapy assess nares and determine need for appliance change or resting period.   Outcome: Adequate for Discharge     Problem: Pain  Goal: Verbalizes/displays adequate comfort level or baseline comfort level  Outcome: Adequate for Discharge     Problem: ABCDS Injury Assessment  Goal: Absence of physical injury  Outcome: Adequate for Discharge  Flowsheets (Taken 12/4/2023 2253 by Nile Guevara RN)  Absence of Physical Injury: Implement safety measures based on patient assessment
Problem: Discharge Planning  Goal: Discharge to home or other facility with appropriate resources  Outcome: Progressing     Problem: Safety - Adult  Goal: Free from fall injury  Outcome: Progressing     Problem: Nutrition Deficit:  Goal: Optimize nutritional status  Outcome: Progressing     Problem: Chronic Conditions and Co-morbidities  Goal: Patient's chronic conditions and co-morbidity symptoms are monitored and maintained or improved  Outcome: Progressing     Problem: Skin/Tissue Integrity  Goal: Absence of new skin breakdown  Description: 1. Monitor for areas of redness and/or skin breakdown  2. Assess vascular access sites hourly  3. Every 4-6 hours minimum:  Change oxygen saturation probe site  4. Every 4-6 hours:  If on nasal continuous positive airway pressure, respiratory therapy assess nares and determine need for appliance change or resting period.   Outcome: Progressing     Problem: Pain  Goal: Verbalizes/displays adequate comfort level or baseline comfort level  Outcome: Progressing
Problem: Discharge Planning  Goal: Discharge to home or other facility with appropriate resources  Outcome: Progressing     Problem: Safety - Adult  Goal: Free from fall injury  Outcome: Progressing     Problem: Nutrition Deficit:  Goal: Optimize nutritional status  Outcome: Progressing     Problem: Chronic Conditions and Co-morbidities  Goal: Patient's chronic conditions and co-morbidity symptoms are monitored and maintained or improved  Outcome: Progressing     Problem: Skin/Tissue Integrity  Goal: Absence of new skin breakdown  Description: 1. Monitor for areas of redness and/or skin breakdown  2. Assess vascular access sites hourly  3. Every 4-6 hours minimum:  Change oxygen saturation probe site  4. Every 4-6 hours:  If on nasal continuous positive airway pressure, respiratory therapy assess nares and determine need for appliance change or resting period.   Outcome: Progressing     Problem: Pain  Goal: Verbalizes/displays adequate comfort level or baseline comfort level  Outcome: Progressing     Problem: ABCDS Injury Assessment  Goal: Absence of physical injury  Outcome: Progressing
Problem: Discharge Planning  Goal: Discharge to home or other facility with appropriate resources  Outcome: Progressing  Flowsheets (Taken 11/26/2023 0800 by Sofiya Leger RN)  Discharge to home or other facility with appropriate resources: Identify barriers to discharge with patient and caregiver     Problem: Safety - Adult  Goal: Free from fall injury  Outcome: Progressing  Flowsheets (Taken 11/26/2023 0800 by Sofiya Leger RN)  Free From Fall Injury: Instruct family/caregiver on patient safety     Problem: Nutrition Deficit:  Goal: Optimize nutritional status  Outcome: Progressing     Problem: Chronic Conditions and Co-morbidities  Goal: Patient's chronic conditions and co-morbidity symptoms are monitored and maintained or improved  Outcome: Progressing  Flowsheets (Taken 11/26/2023 0800 by Sofiya Leger RN)  Care Plan - Patient's Chronic Conditions and Co-Morbidity Symptoms are Monitored and Maintained or Improved: Monitor and assess patient's chronic conditions and comorbid symptoms for stability, deterioration, or improvement     Problem: Skin/Tissue Integrity  Goal: Absence of new skin breakdown  Description: 1. Monitor for areas of redness and/or skin breakdown  2. Assess vascular access sites hourly  3. Every 4-6 hours minimum:  Change oxygen saturation probe site  4. Every 4-6 hours:  If on nasal continuous positive airway pressure, respiratory therapy assess nares and determine need for appliance change or resting period.   Outcome: Progressing     Problem: Pain  Goal: Verbalizes/displays adequate comfort level or baseline comfort level  Outcome: Progressing
Problem: Discharge Planning  Goal: Discharge to home or other facility with appropriate resources  Outcome: Progressing  Flowsheets (Taken 11/27/2023 2000)  Discharge to home or other facility with appropriate resources: Identify barriers to discharge with patient and caregiver     Problem: Safety - Adult  Goal: Free from fall injury  Outcome: Progressing     Problem: Nutrition Deficit:  Goal: Optimize nutritional status  Outcome: Progressing     Problem: Chronic Conditions and Co-morbidities  Goal: Patient's chronic conditions and co-morbidity symptoms are monitored and maintained or improved  Outcome: Progressing  Flowsheets (Taken 11/27/2023 2000)  Care Plan - Patient's Chronic Conditions and Co-Morbidity Symptoms are Monitored and Maintained or Improved:   Monitor and assess patient's chronic conditions and comorbid symptoms for stability, deterioration, or improvement   Collaborate with multidisciplinary team to address chronic and comorbid conditions and prevent exacerbation or deterioration   Update acute care plan with appropriate goals if chronic or comorbid symptoms are exacerbated and prevent overall improvement and discharge     Problem: Skin/Tissue Integrity  Goal: Absence of new skin breakdown  Description: 1. Monitor for areas of redness and/or skin breakdown  2. Assess vascular access sites hourly  3. Every 4-6 hours minimum:  Change oxygen saturation probe site  4. Every 4-6 hours:  If on nasal continuous positive airway pressure, respiratory therapy assess nares and determine need for appliance change or resting period.   Outcome: Progressing     Problem: Pain  Goal: Verbalizes/displays adequate comfort level or baseline comfort level  Outcome: Progressing
Problem: Nutrition Deficit:  Goal: Optimize nutritional status  11/28/2023 0742 by Marquise Callejas, MS, RD, LD  Outcome: Progressing  Flowsheets (Taken 11/28/2023 0757)  Nutrient intake appropriate for improving, restoring, or maintaining nutritional needs:   Assess nutritional status and recommend course of action   Monitor oral intake, labs, and treatment plans   Recommend appropriate diets, oral nutritional supplements, and vitamin/mineral supplements  11/28/2023 0317 by rCys Butts RN  Outcome: Progressing
Problem: Safety - Medical Restraint  Goal: Remains free of injury from restraints (Restraint for Interference with Medical Device)  Description: INTERVENTIONS:  1. Determine that other, less restrictive measures have been tried or would not be effective before applying the restraint  2. Evaluate the patient's condition at the time of restraint application  3. Inform patient/family regarding the reason for restraint  4. Q2H: Monitor safety, psychosocial status, comfort, nutrition and hydration  Outcome: Progressing     Problem: Discharge Planning  Goal: Discharge to home or other facility with appropriate resources  Outcome: Progressing     Problem: Safety - Adult  Goal: Free from fall injury  Outcome: Progressing     Problem: Nutrition Deficit:  Goal: Optimize nutritional status  Outcome: Progressing     Problem: Chronic Conditions and Co-morbidities  Goal: Patient's chronic conditions and co-morbidity symptoms are monitored and maintained or improved  Outcome: Progressing     Problem: Skin/Tissue Integrity  Goal: Absence of new skin breakdown  Description: 1. Monitor for areas of redness and/or skin breakdown  2. Assess vascular access sites hourly  3. Every 4-6 hours minimum:  Change oxygen saturation probe site  4. Every 4-6 hours:  If on nasal continuous positive airway pressure, respiratory therapy assess nares and determine need for appliance change or resting period.   Outcome: Progressing
Problem: Safety - Medical Restraint  Goal: Remains free of injury from restraints (Restraint for Interference with Medical Device)  Description: INTERVENTIONS:  1. Determine that other, less restrictive measures have been tried or would not be effective before applying the restraint  2. Evaluate the patient's condition at the time of restraint application  3. Inform patient/family regarding the reason for restraint  4. Q2H: Monitor safety, psychosocial status, comfort, nutrition and hydration  Outcome: Progressing  Flowsheets (Taken 11/23/2023 2244 by Yvette Gomez RN)  Remains free of injury from restraints (restraint for interference with medical device): Every 2 hours: Monitor safety, psychosocial status, comfort, nutrition and hydration     Problem: Discharge Planning  Goal: Discharge to home or other facility with appropriate resources  Outcome: Progressing  Flowsheets (Taken 11/24/2023 0800)  Discharge to home or other facility with appropriate resources: Identify barriers to discharge with patient and caregiver     Problem: Safety - Adult  Goal: Free from fall injury  Outcome: Progressing  Flowsheets (Taken 11/24/2023 1200)  Free From Fall Injury: Instruct family/caregiver on patient safety     Problem: Nutrition Deficit:  Goal: Optimize nutritional status  Outcome: Progressing     Problem: Chronic Conditions and Co-morbidities  Goal: Patient's chronic conditions and co-morbidity symptoms are monitored and maintained or improved  Outcome: Progressing  Flowsheets (Taken 11/24/2023 0800)  Care Plan - Patient's Chronic Conditions and Co-Morbidity Symptoms are Monitored and Maintained or Improved: Monitor and assess patient's chronic conditions and comorbid symptoms for stability, deterioration, or improvement     Problem: Skin/Tissue Integrity  Goal: Absence of new skin breakdown  Description: 1. Monitor for areas of redness and/or skin breakdown  2. Assess vascular access sites hourly  3.   Every 4-6
determine need for appliance change or resting period.   Outcome: Progressing     Problem: Pain  Goal: Verbalizes/displays adequate comfort level or baseline comfort level  Outcome: Progressing  Flowsheets  Taken 11/25/2023 1200 by Ambika Pool RN  Verbalizes/displays adequate comfort level or baseline comfort level:   Assess pain using appropriate pain scale   Encourage patient to monitor pain and request assistance  Taken 11/25/2023 0800 by Ambika Pool RN  Verbalizes/displays adequate comfort level or baseline comfort level:   Encourage patient to monitor pain and request assistance   Assess pain using appropriate pain scale

## 2023-12-06 ENCOUNTER — CARE COORDINATION (OUTPATIENT)
Dept: CASE MANAGEMENT | Age: 73
End: 2023-12-06

## 2023-12-06 DIAGNOSIS — J96.22 ACUTE ON CHRONIC RESPIRATORY FAILURE WITH HYPERCAPNIA (HCC): Primary | ICD-10-CM

## 2023-12-06 DIAGNOSIS — I48.0 PAF (PAROXYSMAL ATRIAL FIBRILLATION) (HCC): ICD-10-CM

## 2023-12-06 DIAGNOSIS — I49.5 SA NODE DYSFUNCTION (HCC): ICD-10-CM

## 2023-12-06 DIAGNOSIS — Z95.0 PACEMAKER: Primary | ICD-10-CM

## 2023-12-06 PROCEDURE — 93294 REM INTERROG EVL PM/LDLS PM: CPT | Performed by: NURSE PRACTITIONER

## 2023-12-06 PROCEDURE — 93296 REM INTERROG EVL PM/IDS: CPT | Performed by: NURSE PRACTITIONER

## 2023-12-06 PROCEDURE — 1111F DSCHRG MED/CURRENT MED MERGE: CPT | Performed by: FAMILY MEDICINE

## 2023-12-06 NOTE — CARE COORDINATION
(Active): Krupa Chacon - [phone]    Patient does not have a signed [address] form (HIPAA form) on file at this time. Medication reconciliation was performed with patient, who verbalizes understanding of administration of home medications. Medications reviewed, 1111F entered: yes    Was patient discharged with a pulse oximeter? Patient reports she has 2 pulse oximeters from prior to this hospitalization. Non-face-to-face services provided:  Reviewed encounter information for continuity of care prior to follow up Care Transitions Coordination phone call - chart notes, consults, progress notes, test results, med list, appointments, AVS, other information. Offered patient enrollment in the Remote Patient Monitoring (RPM) program for in-home monitoring: Patient is not eligible for RPM program.    Care Transitions 24 Hour Call    Schedule Follow Up Appointment with PCP: Completed  Do you have a copy of your discharge instructions?: Yes  Do you have all of your prescriptions and are they filled?: Yes  Have you been contacted by a [facility] Pharmacist?: No  Have you scheduled your follow up appointment?: Yes  How are you going to get to your appointment?: Car - family or friend to transport  Do you feel like you have everything you need to keep you well at home?: Yes  Care Transitions Interventions                                 Discussed follow-up appointments. Is follow up appointment scheduled within 7 days of discharge? Yes. Follow Up  Dr. Mohan Saul, today, 1:45 pm    Placed a call to the number listed for patient for an initial follow up call for Care Transitions Coordination following discharge from the hospital.  Introduced myself and explained the purpose of my call as well as verifying name, date of birth of patient. Spoke with patient regarding hospitalization, discharge and status thus far. Patient reports that she is doing very well.   Said she is really glad

## 2023-12-07 LAB
ALBUMIN SERPL-MCNC: 3.9 G/DL (ref 3.5–5.2)
ALP SERPL-CCNC: 92 U/L (ref 35–104)
ALT SERPL-CCNC: 21 U/L (ref 5–33)
ANION GAP SERPL CALCULATED.3IONS-SCNC: 9 MMOL/L (ref 7–19)
AST SERPL-CCNC: 19 U/L (ref 5–32)
BILIRUB SERPL-MCNC: 0.5 MG/DL (ref 0.2–1.2)
BUN SERPL-MCNC: 18 MG/DL (ref 8–23)
CALCIUM SERPL-MCNC: 9.1 MG/DL (ref 8.8–10.2)
CHLORIDE SERPL-SCNC: 93 MMOL/L (ref 98–111)
CO2 SERPL-SCNC: 30 MMOL/L (ref 22–29)
CREAT SERPL-MCNC: 1.1 MG/DL (ref 0.5–0.9)
GLUCOSE SERPL-MCNC: 170 MG/DL (ref 74–109)
HBA1C MFR BLD: 7.6 % (ref 4–6)
POTASSIUM SERPL-SCNC: 3.8 MMOL/L (ref 3.5–5)
PROT SERPL-MCNC: 7 G/DL (ref 6.6–8.7)
SODIUM SERPL-SCNC: 132 MMOL/L (ref 136–145)

## 2023-12-08 ENCOUNTER — CARE COORDINATION (OUTPATIENT)
Dept: CASE MANAGEMENT | Age: 73
End: 2023-12-08

## 2023-12-08 NOTE — CARE COORDINATION
Care Transitions Follow Up Call    Patient Current Location:  [facility] Transition Nurse contacted the patient by telephone to follow up. Patient: Zane Hairston  Patient : 1950   MRN: [id number]    Discharge Date: 23 RARS: Readmission Risk Score: 21.4    Needs to be reviewed by the provider   Additional needs identified to be addressed with provider: No       Method of communication with provider: none. Addressed changes since last contact:  Has had PCP follow up. Follow Up  Future Appointments   Date Time Provider [address]   2023  2:00 PM JAKOB Pritchett [facility]     The patient agrees to contact the PCP office for questions related to their healthcare. Care Transitions Subsequent and Final Call    Schedule Follow Up Appointment with PCP: Completed  Subsequent and Final Calls  Do you have any ongoing symptoms?: No  Have your medications changed?: No  Do you have any questions related to your medications?: No  Do you have any needs or concerns that I can assist you with?: No  Identified Barriers: None  Care Transitions Interventions                          Other Interventions:           Placed a call to the patient for follow up. She reported that she is doing well. She said that she had her hospital follow up with her PCP. She said that he said she was progressing well. She said he also helped her to understand her medications. She said that I was correct, that she was indeed supposed to be taking the Eliquis and Plavix and the discharge paperwork was correct. She said she is eating well, drinking well. Said she is getting stronger. Denied any abnormal S&S. Discussed what to report as far as abnormal S&S of COPD, DM and other issues. Discussed an appropriate diet related to all diagnoses, caloric needs, carbs, fluid needs. She voiced understanding. Said she is doing well today. Will follow up in a few days.   No other issues noted at this

## 2023-12-11 ENCOUNTER — OFFICE VISIT (OUTPATIENT)
Dept: CARDIOLOGY CLINIC | Age: 73
End: 2023-12-11
Payer: MEDICARE

## 2023-12-11 VITALS
BODY MASS INDEX: 26.2 KG/M2 | DIASTOLIC BLOOD PRESSURE: 84 MMHG | HEIGHT: 66 IN | SYSTOLIC BLOOD PRESSURE: 124 MMHG | WEIGHT: 163 LBS | HEART RATE: 83 BPM

## 2023-12-11 DIAGNOSIS — Z95.5 HISTORY OF CORONARY ARTERY STENT PLACEMENT: ICD-10-CM

## 2023-12-11 DIAGNOSIS — I49.5 SA NODE DYSFUNCTION (HCC): ICD-10-CM

## 2023-12-11 DIAGNOSIS — I25.10 CORONARY ARTERY DISEASE INVOLVING NATIVE CORONARY ARTERY OF NATIVE HEART WITHOUT ANGINA PECTORIS: Primary | ICD-10-CM

## 2023-12-11 DIAGNOSIS — Z95.1 S/P CABG X 4: ICD-10-CM

## 2023-12-11 DIAGNOSIS — I10 ESSENTIAL HYPERTENSION: ICD-10-CM

## 2023-12-11 DIAGNOSIS — I48.0 PAF (PAROXYSMAL ATRIAL FIBRILLATION) (HCC): ICD-10-CM

## 2023-12-11 DIAGNOSIS — E78.2 MIXED HYPERLIPIDEMIA: ICD-10-CM

## 2023-12-11 DIAGNOSIS — Z79.01 CHRONIC ANTICOAGULATION: ICD-10-CM

## 2023-12-11 DIAGNOSIS — Z95.0 PACEMAKER: ICD-10-CM

## 2023-12-11 PROCEDURE — 93280 PM DEVICE PROGR EVAL DUAL: CPT | Performed by: NURSE PRACTITIONER

## 2023-12-11 PROCEDURE — G8484 FLU IMMUNIZE NO ADMIN: HCPCS | Performed by: NURSE PRACTITIONER

## 2023-12-11 PROCEDURE — 99214 OFFICE O/P EST MOD 30 MIN: CPT | Performed by: NURSE PRACTITIONER

## 2023-12-11 PROCEDURE — G8400 PT W/DXA NO RESULTS DOC: HCPCS | Performed by: NURSE PRACTITIONER

## 2023-12-11 PROCEDURE — 3017F COLORECTAL CA SCREEN DOC REV: CPT | Performed by: NURSE PRACTITIONER

## 2023-12-11 PROCEDURE — 1090F PRES/ABSN URINE INCON ASSESS: CPT | Performed by: NURSE PRACTITIONER

## 2023-12-11 PROCEDURE — 1111F DSCHRG MED/CURRENT MED MERGE: CPT | Performed by: NURSE PRACTITIONER

## 2023-12-11 PROCEDURE — 1124F ACP DISCUSS-NO DSCNMKR DOCD: CPT | Performed by: NURSE PRACTITIONER

## 2023-12-11 PROCEDURE — G8427 DOCREV CUR MEDS BY ELIG CLIN: HCPCS | Performed by: NURSE PRACTITIONER

## 2023-12-11 PROCEDURE — G8419 CALC BMI OUT NRM PARAM NOF/U: HCPCS | Performed by: NURSE PRACTITIONER

## 2023-12-11 PROCEDURE — 3079F DIAST BP 80-89 MM HG: CPT | Performed by: NURSE PRACTITIONER

## 2023-12-11 PROCEDURE — 4004F PT TOBACCO SCREEN RCVD TLK: CPT | Performed by: NURSE PRACTITIONER

## 2023-12-11 PROCEDURE — 3074F SYST BP LT 130 MM HG: CPT | Performed by: NURSE PRACTITIONER

## 2023-12-11 NOTE — PATIENT INSTRUCTIONS
New instructions for today:  Eliquis can increase your risk of bleeding. If you notice blood in urine or stool, bleeding gums, excessive bruising or cough productive of bloody sputum, notify the office. Information on this blood thinner has been included in your after visit summary. Patient Instructions:  Continue current medications as prescribed. Always keep a current medication list. Bring your medications to every office visit. Continue to follow up with primary care provider for non cardiac medical problems. Call the office with any problems, questions or concerns at [phone]. If you have been asked to keep a blood pressure log, do so for 2 weeks. Call the office to report readings to the triage nurse at [phone]. Follow up with cardiologist as scheduled. The following educational material has been included in this after visit summary for your review: Life simple 7. Heart health. Life simple 7  1) Manage blood pressure - high blood pressure is a major risk factor for heart disease and stroke. Keeping blood pressure in health range reduces strain on your heart, arteries and kidneys. Blood pressure goal is less than 130/80. 2) Control cholesterol - contributes to plaque, which can clog arteries and lead to heart disease and stroke. When you control your cholesterol you are giving your arteries their best chance to remain clear. It is recommended that you get cholesterol lab work done once a year. 3) Reduce blood sugar - most of the food we eat is turning into glucose or blood sugar that our body uses for energy. Over time, high levels of blood sugar can damage your heart, kidneys, eyes and nerves. 4) Get active - living an active life is one of the most rewarding gifts you can give yourself and those you love. Simply put, daily physical activity increases your length and quality of life. Strive to exercise 15 minutes most days of the week.   5)  Eat better - A healthy diet is one

## 2023-12-11 NOTE — PROGRESS NOTES
[facility]. [facility]  [address] [facility], [facility], [address] [place] [address]  [phone] office  [phone] fax      OFFICE VISIT:  2023    Sihrley Harris - : 1950  Reason For Visit:  Sheila Law is a 75 y.o. female who is here for 6 Month Follow-Up, Coronary Artery Disease, and Hypertension (No cardiac symptoms)    History:   Diagnosis Orders   1. Coronary artery disease involving native coronary artery of native heart without angina pectoris        2. S/P CABG x 4        3. History of coronary artery stent placement        4. PAF (paroxysmal atrial fibrillation) ([facility])        5. SA node dysfunction ([address])        6. Pacemaker        7. Mixed hyperlipidemia        8. Chronic anticoagulation          The patient presents today for cardiology follow up. She is a 75year old with the following history:  1. Coronary artery disease, severe ectatic coronary disease, prior multiple PCI's with bare-metal stents to LAD and circumflex, CABG 2011 with LIMA to LAD (occluded), SVG to diagonal (patent), SVG to PDA (patent), normal LV ejection fraction, status post PCI 2020 to proximal (4.0 x 15 mm resolute) and mid LAD (2.75 x 26 mm resolute) with GABI. 2. Chronic ongoing tobacco use with COPD. 3. Diabetes mellitus not well controlled (HbA1c 8.9). 4. Hypertension. 5. Moderate to large abdominal aortic aneurysm 4.3 x 4.7 cm (noted at 2020)   6. ZIO monitor with 3.8-second pause with high-grade second-degree AV block and possible syncopal episode with fall and injury 10/2020, status post pacemaker placement 3/2/2021.  7.  Acute on chronic diastolic heart failure with new onset atrial flutter. The patient presented to [facility]) ED on 23 due to COPD exacerbation with severe acute on chronic respiratory failure with hypoxia. Initially, an attempt was made to treat with positive airway pressure but was unsuccessful.   Subsequently, the

## 2023-12-13 DIAGNOSIS — Z95.0 PACEMAKER: Primary | ICD-10-CM

## 2023-12-13 DIAGNOSIS — I49.5 SA NODE DYSFUNCTION (HCC): ICD-10-CM

## 2023-12-14 ENCOUNTER — CARE COORDINATION (OUTPATIENT)
Dept: CASE MANAGEMENT | Age: 73
End: 2023-12-14

## 2023-12-14 NOTE — CARE COORDINATION
Care Transitions Follow Up Call    Patient Current Location:  [facility]    Attempted to make contact with patient/caregiver for a routine Care Transitions Coordination follow up call without success. Unable to leave a HIPAA compliant message regarding intent of call and call back information. Multiple rings, no answer. CTN will follow up again at another time. Patient: Naomi Pulliam  Patient : 1950   MRN: [id number]    Discharge Date: 23 RARS: Readmission Risk Score: 21.4    Follow Up  Future Appointments   Date Time Provider [address] [place] [address]   5/15/2024  1:15 PM Sylvia Spear MD [id number]     Plan for next call: Re-attempt at ongoing follow up.     Gretta Gupta RN

## 2023-12-19 ENCOUNTER — APPOINTMENT (OUTPATIENT)
Dept: CT IMAGING | Age: 73
DRG: 193 | End: 2023-12-19
Payer: MEDICARE

## 2023-12-19 ENCOUNTER — HOSPITAL ENCOUNTER (INPATIENT)
Age: 73
LOS: 4 days | Discharge: HOME HEALTH | DRG: 193 | End: 2023-12-23
Attending: PEDIATRICS | Admitting: FAMILY MEDICINE
Payer: MEDICARE

## 2023-12-19 ENCOUNTER — APPOINTMENT (OUTPATIENT)
Dept: GENERAL RADIOLOGY | Age: 73
DRG: 193 | End: 2023-12-19
Payer: MEDICARE

## 2023-12-19 DIAGNOSIS — J44.1 COPD EXACERBATION (HCC): ICD-10-CM

## 2023-12-19 DIAGNOSIS — R41.82 ALTERED MENTAL STATUS, UNSPECIFIED ALTERED MENTAL STATUS TYPE: Primary | ICD-10-CM

## 2023-12-19 DIAGNOSIS — R79.89 ELEVATED TROPONIN: ICD-10-CM

## 2023-12-19 LAB
ALBUMIN SERPL-MCNC: 2.9 G/DL (ref 3.5–5.2)
ALP SERPL-CCNC: 93 U/L (ref 35–104)
ALT SERPL-CCNC: 16 U/L (ref 5–33)
AMMONIA PLAS-SCNC: 19 UMOL/L (ref 11–51)
ANION GAP SERPL CALCULATED.3IONS-SCNC: 7 MMOL/L (ref 7–19)
AST SERPL-CCNC: 16 U/L (ref 5–32)
B PARAP IS1001 DNA NPH QL NAA+NON-PROBE: NOT DETECTED
B PERT.PT PRMT NPH QL NAA+NON-PROBE: NOT DETECTED
BACTERIA URNS QL MICRO: NEGATIVE /HPF
BASE EXCESS ARTERIAL: 11.7 MMOL/L (ref -2–2)
BASOPHILS # BLD: 0 K/UL (ref 0–0.2)
BASOPHILS NFR BLD: 0.4 % (ref 0–1)
BILIRUB SERPL-MCNC: 0.5 MG/DL (ref 0.2–1.2)
BILIRUB UR QL STRIP: NEGATIVE
BNP BLD-MCNC: 2872 PG/ML (ref 0–124)
BUN SERPL-MCNC: 14 MG/DL (ref 8–23)
C PNEUM DNA NPH QL NAA+NON-PROBE: NOT DETECTED
CALCIUM SERPL-MCNC: 8.8 MG/DL (ref 8.8–10.2)
CARBOXYHEMOGLOBIN ARTERIAL: 2.3 % (ref 0–5)
CHLORIDE SERPL-SCNC: 93 MMOL/L (ref 98–111)
CK SERPL-CCNC: 134 U/L (ref 26–192)
CLARITY UR: CLEAR
CO2 SERPL-SCNC: 36 MMOL/L (ref 22–29)
COLOR UR: YELLOW
CREAT SERPL-MCNC: 1 MG/DL (ref 0.5–0.9)
CRYSTALS URNS MICRO: ABNORMAL /HPF
D DIMER PPP FEU-MCNC: 1.11 UG/ML FEU (ref 0–0.48)
EOSINOPHIL # BLD: 0.1 K/UL (ref 0–0.6)
EOSINOPHIL NFR BLD: 1.7 % (ref 0–5)
EPI CELLS #/AREA URNS AUTO: 0 /HPF (ref 0–5)
ERYTHROCYTE [DISTWIDTH] IN BLOOD BY AUTOMATED COUNT: 16.5 % (ref 11.5–14.5)
FLUAV RNA NPH QL NAA+NON-PROBE: NOT DETECTED
FLUBV RNA NPH QL NAA+NON-PROBE: NOT DETECTED
GLUCOSE BLD-MCNC: 193 MG/DL (ref 70–99)
GLUCOSE BLD-MCNC: 206 MG/DL (ref 70–99)
GLUCOSE SERPL-MCNC: 103 MG/DL (ref 74–109)
GLUCOSE UR STRIP.AUTO-MCNC: 500 MG/DL
HADV DNA NPH QL NAA+NON-PROBE: NOT DETECTED
HCO3 ARTERIAL: 39 MMOL/L (ref 22–26)
HCOV 229E RNA NPH QL NAA+NON-PROBE: NOT DETECTED
HCOV HKU1 RNA NPH QL NAA+NON-PROBE: NOT DETECTED
HCOV NL63 RNA NPH QL NAA+NON-PROBE: NOT DETECTED
HCOV OC43 RNA NPH QL NAA+NON-PROBE: NOT DETECTED
HCT VFR BLD AUTO: 35.3 % (ref 37–47)
HEMOGLOBIN, ART, EXTENDED: 10.7 G/DL (ref 12–16)
HGB BLD-MCNC: 10.8 G/DL (ref 12–16)
HGB UR STRIP.AUTO-MCNC: NEGATIVE MG/L
HMPV RNA NPH QL NAA+NON-PROBE: NOT DETECTED
HPIV1 RNA NPH QL NAA+NON-PROBE: NOT DETECTED
HPIV2 RNA NPH QL NAA+NON-PROBE: NOT DETECTED
HPIV3 RNA NPH QL NAA+NON-PROBE: NOT DETECTED
HPIV4 RNA NPH QL NAA+NON-PROBE: NOT DETECTED
HYALINE CASTS #/AREA URNS AUTO: 1 /HPF (ref 0–8)
IMM GRANULOCYTES # BLD: 0 K/UL
KETONES UR STRIP.AUTO-MCNC: NEGATIVE MG/DL
LACTATE BLDV-SCNC: 0.7 MMOL/L (ref 0.5–1.9)
LEUKOCYTE ESTERASE UR QL STRIP.AUTO: NEGATIVE
LYMPHOCYTES # BLD: 1.4 K/UL (ref 1.1–4.5)
LYMPHOCYTES NFR BLD: 30.8 % (ref 20–40)
M PNEUMO DNA NPH QL NAA+NON-PROBE: NOT DETECTED
MAGNESIUM SERPL-MCNC: 1.9 MG/DL (ref 1.6–2.4)
MCH RBC QN AUTO: 29.1 PG (ref 27–31)
MCHC RBC AUTO-ENTMCNC: 30.6 G/DL (ref 33–37)
MCV RBC AUTO: 95.1 FL (ref 81–99)
METHEMOGLOBIN ARTERIAL: 0.5 %
MONOCYTES # BLD: 0.7 K/UL (ref 0–0.9)
MONOCYTES NFR BLD: 14.4 % (ref 0–10)
NEUTROPHILS # BLD: 2.4 K/UL (ref 1.5–7.5)
NEUTS SEG NFR BLD: 52.5 % (ref 50–65)
NITRITE UR QL STRIP.AUTO: NEGATIVE
O2 CONTENT ARTERIAL: 14.4 ML/DL
O2 SAT, ARTERIAL: 97.7 %
O2 THERAPY: ABNORMAL
PCO2 ARTERIAL: 66 MMHG (ref 35–45)
PERFORMED ON: ABNORMAL
PERFORMED ON: ABNORMAL
PH ARTERIAL: 7.38 (ref 7.35–7.45)
PH UR STRIP.AUTO: 7.5 [PH] (ref 5–8)
PLATELET # BLD AUTO: 190 K/UL (ref 130–400)
PMV BLD AUTO: 9.3 FL (ref 9.4–12.3)
PO2 ARTERIAL: 83 MMHG (ref 80–100)
POTASSIUM BLD-SCNC: 4.2 MMOL/L
POTASSIUM SERPL-SCNC: 4.1 MMOL/L (ref 3.5–5)
PROT SERPL-MCNC: 6.2 G/DL (ref 6.6–8.7)
PROT UR STRIP.AUTO-MCNC: 30 MG/DL
RBC # BLD AUTO: 3.71 M/UL (ref 4.2–5.4)
RBC #/AREA URNS AUTO: 0 /HPF (ref 0–4)
RSV RNA NPH QL NAA+NON-PROBE: NOT DETECTED
RV+EV RNA NPH QL NAA+NON-PROBE: NOT DETECTED
SARS-COV-2 RNA NPH QL NAA+NON-PROBE: NOT DETECTED
SODIUM SERPL-SCNC: 136 MMOL/L (ref 136–145)
SP GR UR STRIP.AUTO: 1.01 (ref 1–1.03)
TROPONIN, HIGH SENSITIVITY: 46 NG/L (ref 0–14)
TROPONIN, HIGH SENSITIVITY: 52 NG/L (ref 0–14)
UROBILINOGEN UR STRIP.AUTO-MCNC: 1 E.U./DL
WBC # BLD AUTO: 4.6 K/UL (ref 4.8–10.8)
WBC #/AREA URNS AUTO: 0 /HPF (ref 0–5)

## 2023-12-19 PROCEDURE — 87040 BLOOD CULTURE FOR BACTERIA: CPT

## 2023-12-19 PROCEDURE — 2580000003 HC RX 258: Performed by: PEDIATRICS

## 2023-12-19 PROCEDURE — 96374 THER/PROPH/DIAG INJ IV PUSH: CPT

## 2023-12-19 PROCEDURE — 6360000002 HC RX W HCPCS: Performed by: FAMILY MEDICINE

## 2023-12-19 PROCEDURE — 84484 ASSAY OF TROPONIN QUANT: CPT

## 2023-12-19 PROCEDURE — 2580000003 HC RX 258: Performed by: FAMILY MEDICINE

## 2023-12-19 PROCEDURE — 82803 BLOOD GASES ANY COMBINATION: CPT

## 2023-12-19 PROCEDURE — 36600 WITHDRAWAL OF ARTERIAL BLOOD: CPT

## 2023-12-19 PROCEDURE — 85379 FIBRIN DEGRADATION QUANT: CPT

## 2023-12-19 PROCEDURE — 6360000002 HC RX W HCPCS: Performed by: PEDIATRICS

## 2023-12-19 PROCEDURE — 0202U NFCT DS 22 TRGT SARS-COV-2: CPT

## 2023-12-19 PROCEDURE — 6370000000 HC RX 637 (ALT 250 FOR IP): Performed by: FAMILY MEDICINE

## 2023-12-19 PROCEDURE — 83605 ASSAY OF LACTIC ACID: CPT

## 2023-12-19 PROCEDURE — 93005 ELECTROCARDIOGRAM TRACING: CPT | Performed by: PEDIATRICS

## 2023-12-19 PROCEDURE — 6360000004 HC RX CONTRAST MEDICATION: Performed by: PEDIATRICS

## 2023-12-19 PROCEDURE — 81001 URINALYSIS AUTO W/SCOPE: CPT

## 2023-12-19 PROCEDURE — 83735 ASSAY OF MAGNESIUM: CPT

## 2023-12-19 PROCEDURE — 1210000000 HC MED SURG R&B

## 2023-12-19 PROCEDURE — 99285 EMERGENCY DEPT VISIT HI MDM: CPT

## 2023-12-19 PROCEDURE — 82140 ASSAY OF AMMONIA: CPT

## 2023-12-19 PROCEDURE — 70450 CT HEAD/BRAIN W/O DYE: CPT

## 2023-12-19 PROCEDURE — 94760 N-INVAS EAR/PLS OXIMETRY 1: CPT

## 2023-12-19 PROCEDURE — 2700000000 HC OXYGEN THERAPY PER DAY

## 2023-12-19 PROCEDURE — 80053 COMPREHEN METABOLIC PANEL: CPT

## 2023-12-19 PROCEDURE — 94640 AIRWAY INHALATION TREATMENT: CPT

## 2023-12-19 PROCEDURE — 36415 COLL VENOUS BLD VENIPUNCTURE: CPT

## 2023-12-19 PROCEDURE — 83880 ASSAY OF NATRIURETIC PEPTIDE: CPT

## 2023-12-19 PROCEDURE — 82962 GLUCOSE BLOOD TEST: CPT

## 2023-12-19 PROCEDURE — 71275 CT ANGIOGRAPHY CHEST: CPT

## 2023-12-19 PROCEDURE — 85025 COMPLETE CBC W/AUTO DIFF WBC: CPT

## 2023-12-19 PROCEDURE — 71045 X-RAY EXAM CHEST 1 VIEW: CPT

## 2023-12-19 PROCEDURE — 82550 ASSAY OF CK (CPK): CPT

## 2023-12-19 RX ORDER — SPIRONOLACTONE 25 MG/1
50 TABLET ORAL DAILY
Status: DISCONTINUED | OUTPATIENT
Start: 2023-12-19 | End: 2023-12-23 | Stop reason: HOSPADM

## 2023-12-19 RX ORDER — SODIUM CHLORIDE 0.9 % (FLUSH) 0.9 %
5-40 SYRINGE (ML) INJECTION PRN
Status: DISCONTINUED | OUTPATIENT
Start: 2023-12-19 | End: 2023-12-23 | Stop reason: HOSPADM

## 2023-12-19 RX ORDER — SODIUM CHLORIDE 0.9 % (FLUSH) 0.9 %
5-40 SYRINGE (ML) INJECTION EVERY 12 HOURS SCHEDULED
Status: DISCONTINUED | OUTPATIENT
Start: 2023-12-19 | End: 2023-12-23 | Stop reason: HOSPADM

## 2023-12-19 RX ORDER — SOTALOL HYDROCHLORIDE 120 MG/1
120 TABLET ORAL EVERY 12 HOURS SCHEDULED
Status: DISCONTINUED | OUTPATIENT
Start: 2023-12-19 | End: 2023-12-20

## 2023-12-19 RX ORDER — ONDANSETRON 2 MG/ML
4 INJECTION INTRAMUSCULAR; INTRAVENOUS EVERY 6 HOURS PRN
Status: DISCONTINUED | OUTPATIENT
Start: 2023-12-19 | End: 2023-12-23 | Stop reason: HOSPADM

## 2023-12-19 RX ORDER — DEXTROSE AND SODIUM CHLORIDE 5; .45 G/100ML; G/100ML
INJECTION, SOLUTION INTRAVENOUS CONTINUOUS
Status: ACTIVE | OUTPATIENT
Start: 2023-12-19 | End: 2023-12-21

## 2023-12-19 RX ORDER — MONTELUKAST SODIUM 10 MG/1
10 TABLET ORAL NIGHTLY
Status: DISCONTINUED | OUTPATIENT
Start: 2023-12-19 | End: 2023-12-23 | Stop reason: HOSPADM

## 2023-12-19 RX ORDER — ZINC SULFATE 50(220)MG
50 CAPSULE ORAL DAILY
Status: DISCONTINUED | OUTPATIENT
Start: 2023-12-19 | End: 2023-12-23 | Stop reason: HOSPADM

## 2023-12-19 RX ORDER — IPRATROPIUM BROMIDE AND ALBUTEROL SULFATE 2.5; .5 MG/3ML; MG/3ML
1 SOLUTION RESPIRATORY (INHALATION)
Status: DISCONTINUED | OUTPATIENT
Start: 2023-12-19 | End: 2023-12-19 | Stop reason: SDUPTHER

## 2023-12-19 RX ORDER — ACETAMINOPHEN 325 MG/1
650 TABLET ORAL EVERY 4 HOURS PRN
Status: DISCONTINUED | OUTPATIENT
Start: 2023-12-19 | End: 2023-12-23 | Stop reason: HOSPADM

## 2023-12-19 RX ORDER — ONDANSETRON 4 MG/1
4 TABLET, ORALLY DISINTEGRATING ORAL EVERY 8 HOURS PRN
Status: DISCONTINUED | OUTPATIENT
Start: 2023-12-19 | End: 2023-12-23 | Stop reason: HOSPADM

## 2023-12-19 RX ORDER — FUROSEMIDE 10 MG/ML
40 INJECTION INTRAMUSCULAR; INTRAVENOUS ONCE
Status: COMPLETED | OUTPATIENT
Start: 2023-12-19 | End: 2023-12-19

## 2023-12-19 RX ORDER — CLOPIDOGREL BISULFATE 75 MG/1
75 TABLET ORAL DAILY
Status: DISCONTINUED | OUTPATIENT
Start: 2023-12-19 | End: 2023-12-23 | Stop reason: HOSPADM

## 2023-12-19 RX ORDER — IPRATROPIUM BROMIDE AND ALBUTEROL SULFATE 2.5; .5 MG/3ML; MG/3ML
1 SOLUTION RESPIRATORY (INHALATION)
Status: DISCONTINUED | OUTPATIENT
Start: 2023-12-19 | End: 2023-12-23 | Stop reason: HOSPADM

## 2023-12-19 RX ORDER — SODIUM CHLORIDE 9 MG/ML
INJECTION, SOLUTION INTRAVENOUS PRN
Status: DISCONTINUED | OUTPATIENT
Start: 2023-12-19 | End: 2023-12-23 | Stop reason: HOSPADM

## 2023-12-19 RX ORDER — ROSUVASTATIN CALCIUM 10 MG/1
10 TABLET, COATED ORAL NIGHTLY
Status: DISCONTINUED | OUTPATIENT
Start: 2023-12-19 | End: 2023-12-23 | Stop reason: HOSPADM

## 2023-12-19 RX ORDER — FUROSEMIDE 40 MG/1
40 TABLET ORAL DAILY
Status: DISCONTINUED | OUTPATIENT
Start: 2023-12-19 | End: 2023-12-23 | Stop reason: HOSPADM

## 2023-12-19 RX ORDER — TRAZODONE HYDROCHLORIDE 50 MG/1
100 TABLET ORAL NIGHTLY
Status: DISCONTINUED | OUTPATIENT
Start: 2023-12-19 | End: 2023-12-23 | Stop reason: HOSPADM

## 2023-12-19 RX ADMIN — APIXABAN 5 MG: 5 TABLET, FILM COATED ORAL at 22:34

## 2023-12-19 RX ADMIN — METFORMIN HYDROCHLORIDE 500 MG: 500 TABLET ORAL at 22:33

## 2023-12-19 RX ADMIN — ROSUVASTATIN 10 MG: 10 TABLET, FILM COATED ORAL at 22:31

## 2023-12-19 RX ADMIN — SOTALOL HYDROCHLORIDE 120 MG: 120 TABLET ORAL at 22:33

## 2023-12-19 RX ADMIN — SPIRONOLACTONE 50 MG: 25 TABLET ORAL at 22:34

## 2023-12-19 RX ADMIN — TRAZODONE HYDROCHLORIDE 100 MG: 50 TABLET ORAL at 22:32

## 2023-12-19 RX ADMIN — SODIUM CHLORIDE, PRESERVATIVE FREE 10 ML: 5 INJECTION INTRAVENOUS at 22:34

## 2023-12-19 RX ADMIN — IOPAMIDOL 70 ML: 755 INJECTION, SOLUTION INTRAVENOUS at 14:21

## 2023-12-19 RX ADMIN — DEXTROSE AND SODIUM CHLORIDE: 5; 450 INJECTION, SOLUTION INTRAVENOUS at 15:17

## 2023-12-19 RX ADMIN — MONTELUKAST 10 MG: 10 TABLET, FILM COATED ORAL at 22:33

## 2023-12-19 RX ADMIN — METHYLPREDNISOLONE SODIUM SUCCINATE 60 MG: 125 INJECTION, POWDER, FOR SOLUTION INTRAMUSCULAR; INTRAVENOUS at 15:16

## 2023-12-19 RX ADMIN — IPRATROPIUM BROMIDE AND ALBUTEROL SULFATE 1 DOSE: 2.5; .5 SOLUTION RESPIRATORY (INHALATION) at 19:22

## 2023-12-19 RX ADMIN — ZINC SULFATE 220 MG (50 MG) CAPSULE 50 MG: CAPSULE at 22:32

## 2023-12-19 RX ADMIN — WATER 1000 MG: 1 INJECTION INTRAMUSCULAR; INTRAVENOUS; SUBCUTANEOUS at 15:16

## 2023-12-19 RX ADMIN — FUROSEMIDE 40 MG: 10 INJECTION, SOLUTION INTRAMUSCULAR; INTRAVENOUS at 13:43

## 2023-12-19 RX ADMIN — CLOPIDOGREL BISULFATE 75 MG: 75 TABLET ORAL at 22:31

## 2023-12-19 RX ADMIN — FUROSEMIDE 40 MG: 40 TABLET ORAL at 22:34

## 2023-12-19 NOTE — CARE COORDINATION
Patient is current with [facility]. Will follow. Please advise when patient discharges. WILL NEED RESUMPTION OF CARE ORDERS TO RESUME HH SERVICES WHEN PATIENT DISCHARGES. Thank you. [id number] [facility] [phone]. [phone].     Priscilla Adams RN, Home Care Liaison  [phone] P  Electronically signed by Priscilla Adams on 12/19/2023 at 2:29 PM

## 2023-12-19 NOTE — ED PROVIDER NOTES
[facility] EMERGENCY DEPT  eMERGENCY dEPARTMENT eNCOUnter      Pt Name: Dax Summers  MRN: [id number]  [address] [facility] 1950  Date of evaluation: 12/19/2023  Provider: Laura Moscoso MD    CHIEF COMPLAINT       Chief Complaint   Patient presents with    Shortness of Breath     Hx of COPD         HISTORY OF PRESENT ILLNESS   (Location/Symptom, Timing/Onset,Context/Setting, Quality, Duration, Modifying Factors, Severity)  Note limiting factors. Dax Summers is a 75 y.o. female who presents to the emergency department shortness of breath. EMS was called to patient's home secondary to respiratory distress. Patient has a history of COPD and CHF. Patient was visited by home health today who called EMS. Limited information is available as patient is unable to give history due to acuity of condition. Patient has altered mental status. Information gathered from friend, Bird Mishra, listed on patient's chart. She states that patient called her today to see if she could come over because she was having difficulty breathing. She was unable to come over to patient's home as she was at the 's office. She states that patient was confused and told home health, later, that she was coming over. Bird Mishra states that patient's son, Aparna Nunn, is working until 5:00pm and cannot be reached. Bird Stricklanddustin tells of patient falling 2 to 3 days ago and \"laying there until Aparna Nunn got home. \"    HPI    NursingNotes were reviewed. REVIEW OF SYSTEMS    (2-9 systems for level 4, 10 or more for level 5)     Review of Systems   Unable to perform ROS: Mental status change   Respiratory:  Positive for shortness of breath.              PAST MEDICALHISTORY     Past Medical History:   Diagnosis Date    ASHD (arteriosclerotic heart disease)     s/p PTCA and stent of circumflex, as well as intermediate and mid LAD     COPD (chronic obstructive pulmonary disease) (HCC)     Coronary atherosclerosis     Diabetes mellitus ([address])     diet controlled, type 2 admit patient for further evaluation and treatment. Patient will be admitted to PCU. Bridging orders complete. Patient's son, Christy Barajas, has been contacted but is unable to answer his phone so message was left that his mother was being admitted. PROCEDURES:  Unless otherwise noted below, none     Procedures    FINAL IMPRESSION      1. Altered mental status, unspecified altered mental status type    2.  COPD exacerbation ([address])    3. Elevated troponin          DISPOSITION/PLAN   DISPOSITION Admitted 12/19/2023 02:13:12 PM               (Please note that portions of this note were completed with a voice recognition program.  Efforts were made to edit thedictations but occasionally words are mis-transcribed.)    Jocy Rodríguez MD (electronically signed)  Attending Emergency Physician          Alise Alavrado MD  12/19/23 [id number]       Alise Alvarado MD  12/19/23 [id number]

## 2023-12-19 NOTE — CARE COORDINATION
Update: SW unable to complete Pt assessment and readmission assessment at this time due to Pt asleep while in the ER and medical issues. This writer received an update on Pt at that time. SW/Case Management will need to follow up with the Pt to complete their assessment and readmission assessment.  Electronically signed by Elysia Aparicio on 12/19/2023 at 3:50 PM

## 2023-12-20 LAB
ALBUMIN SERPL-MCNC: 2.8 G/DL (ref 3.5–5.2)
ALLENS TEST: ABNORMAL
ALP SERPL-CCNC: 87 U/L (ref 35–104)
ALT SERPL-CCNC: 14 U/L (ref 5–33)
ANION GAP SERPL CALCULATED.3IONS-SCNC: 12 MMOL/L (ref 7–19)
AST SERPL-CCNC: 13 U/L (ref 5–32)
BASE EXCESS ARTERIAL: 13.7 MMOL/L (ref -2–2)
BASOPHILS # BLD: 0 K/UL (ref 0–0.2)
BASOPHILS NFR BLD: 0 % (ref 0–1)
BILIRUB SERPL-MCNC: 0.4 MG/DL (ref 0.2–1.2)
BUN SERPL-MCNC: 16 MG/DL (ref 8–23)
CALCIUM SERPL-MCNC: 8.7 MG/DL (ref 8.8–10.2)
CARBOXYHEMOGLOBIN ARTERIAL: 2 % (ref 0–5)
CHLORIDE SERPL-SCNC: 93 MMOL/L (ref 98–111)
CO2 SERPL-SCNC: 31 MMOL/L (ref 22–29)
CREAT SERPL-MCNC: 1 MG/DL (ref 0.5–0.9)
EKG P AXIS: -8 DEGREES
EKG P AXIS: 21 DEGREES
EKG P-R INTERVAL: 194 MS
EKG P-R INTERVAL: 208 MS
EKG Q-T INTERVAL: 432 MS
EKG Q-T INTERVAL: 438 MS
EKG QRS DURATION: 86 MS
EKG QRS DURATION: 86 MS
EKG QTC CALCULATION (BAZETT): 453 MS
EKG QTC CALCULATION (BAZETT): 457 MS
EKG T AXIS: 83 DEGREES
EKG T AXIS: 86 DEGREES
EOSINOPHIL # BLD: 0 K/UL (ref 0–0.6)
EOSINOPHIL NFR BLD: 0 % (ref 0–5)
ERYTHROCYTE [DISTWIDTH] IN BLOOD BY AUTOMATED COUNT: 16.1 % (ref 11.5–14.5)
GLUCOSE BLD-MCNC: 196 MG/DL (ref 70–99)
GLUCOSE BLD-MCNC: 220 MG/DL (ref 70–99)
GLUCOSE BLD-MCNC: 243 MG/DL (ref 70–99)
GLUCOSE BLD-MCNC: 292 MG/DL (ref 70–99)
GLUCOSE SERPL-MCNC: 230 MG/DL (ref 74–109)
HBA1C MFR BLD: 7.5 % (ref 4–6)
HCO3 ARTERIAL: 36.5 MMOL/L (ref 22–26)
HCT VFR BLD AUTO: 33.7 % (ref 37–47)
HEMOGLOBIN, ART, EXTENDED: 10.6 G/DL (ref 12–16)
HGB BLD-MCNC: 10.2 G/DL (ref 12–16)
IMM GRANULOCYTES # BLD: 0 K/UL
LYMPHOCYTES # BLD: 1 K/UL (ref 1.1–4.5)
LYMPHOCYTES NFR BLD: 24.4 % (ref 20–40)
MCH RBC QN AUTO: 28.3 PG (ref 27–31)
MCHC RBC AUTO-ENTMCNC: 30.3 G/DL (ref 33–37)
MCV RBC AUTO: 93.6 FL (ref 81–99)
METHEMOGLOBIN ARTERIAL: 0.8 %
MONOCYTES # BLD: 0.2 K/UL (ref 0–0.9)
MONOCYTES NFR BLD: 4.2 % (ref 0–10)
NEUTROPHILS # BLD: 2.8 K/UL (ref 1.5–7.5)
NEUTS SEG NFR BLD: 70.9 % (ref 50–65)
O2 CONTENT ARTERIAL: 14 ML/DL
O2 DELIVERY DEVICE: ABNORMAL
O2 SAT, ARTERIAL: 93.7 %
O2 THERAPY: ABNORMAL
OXYGEN FLOW: 3
PCO2 ARTERIAL: 38 MMHG (ref 35–45)
PERFORMED ON: ABNORMAL
PH ARTERIAL: 7.59 (ref 7.35–7.45)
PLATELET # BLD AUTO: 210 K/UL (ref 130–400)
PMV BLD AUTO: 9.8 FL (ref 9.4–12.3)
PO2 ARTERIAL: 67 MMHG (ref 80–100)
POTASSIUM BLD-SCNC: 3.3 MMOL/L
POTASSIUM SERPL-SCNC: 3.9 MMOL/L (ref 3.5–5)
PROT SERPL-MCNC: 6 G/DL (ref 6.6–8.7)
RBC # BLD AUTO: 3.6 M/UL (ref 4.2–5.4)
SAMPLE SOURCE: ABNORMAL
SODIUM SERPL-SCNC: 136 MMOL/L (ref 136–145)
SPONT RATE(BPM): 16
WBC # BLD AUTO: 4 K/UL (ref 4.8–10.8)

## 2023-12-20 PROCEDURE — 82962 GLUCOSE BLOOD TEST: CPT

## 2023-12-20 PROCEDURE — 80053 COMPREHEN METABOLIC PANEL: CPT

## 2023-12-20 PROCEDURE — 36600 WITHDRAWAL OF ARTERIAL BLOOD: CPT

## 2023-12-20 PROCEDURE — 6370000000 HC RX 637 (ALT 250 FOR IP): Performed by: FAMILY MEDICINE

## 2023-12-20 PROCEDURE — 6360000002 HC RX W HCPCS: Performed by: INTERNAL MEDICINE

## 2023-12-20 PROCEDURE — 2700000000 HC OXYGEN THERAPY PER DAY

## 2023-12-20 PROCEDURE — 2580000003 HC RX 258: Performed by: FAMILY MEDICINE

## 2023-12-20 PROCEDURE — 2580000003 HC RX 258: Performed by: INTERNAL MEDICINE

## 2023-12-20 PROCEDURE — 82803 BLOOD GASES ANY COMBINATION: CPT

## 2023-12-20 PROCEDURE — 1210000000 HC MED SURG R&B

## 2023-12-20 PROCEDURE — 36415 COLL VENOUS BLD VENIPUNCTURE: CPT

## 2023-12-20 PROCEDURE — 93010 ELECTROCARDIOGRAM REPORT: CPT | Performed by: INTERNAL MEDICINE

## 2023-12-20 PROCEDURE — 94640 AIRWAY INHALATION TREATMENT: CPT

## 2023-12-20 PROCEDURE — 85025 COMPLETE CBC W/AUTO DIFF WBC: CPT

## 2023-12-20 PROCEDURE — 94760 N-INVAS EAR/PLS OXIMETRY 1: CPT

## 2023-12-20 PROCEDURE — 99222 1ST HOSP IP/OBS MODERATE 55: CPT | Performed by: INTERNAL MEDICINE

## 2023-12-20 PROCEDURE — 83036 HEMOGLOBIN GLYCOSYLATED A1C: CPT

## 2023-12-20 PROCEDURE — 6360000002 HC RX W HCPCS: Performed by: FAMILY MEDICINE

## 2023-12-20 RX ORDER — SOTALOL HYDROCHLORIDE 120 MG/1
120 TABLET ORAL DAILY
Status: DISCONTINUED | OUTPATIENT
Start: 2023-12-21 | End: 2023-12-23 | Stop reason: HOSPADM

## 2023-12-20 RX ORDER — INSULIN LISPRO 100 [IU]/ML
0-4 INJECTION, SOLUTION INTRAVENOUS; SUBCUTANEOUS NIGHTLY
Status: DISCONTINUED | OUTPATIENT
Start: 2023-12-20 | End: 2023-12-23 | Stop reason: HOSPADM

## 2023-12-20 RX ORDER — INSULIN LISPRO 100 [IU]/ML
0-4 INJECTION, SOLUTION INTRAVENOUS; SUBCUTANEOUS
Status: DISCONTINUED | OUTPATIENT
Start: 2023-12-20 | End: 2023-12-23 | Stop reason: HOSPADM

## 2023-12-20 RX ADMIN — TRAZODONE HYDROCHLORIDE 100 MG: 50 TABLET ORAL at 20:59

## 2023-12-20 RX ADMIN — INSULIN LISPRO 1 UNITS: 100 INJECTION, SOLUTION INTRAVENOUS; SUBCUTANEOUS at 11:59

## 2023-12-20 RX ADMIN — ZINC SULFATE 220 MG (50 MG) CAPSULE 50 MG: CAPSULE at 08:54

## 2023-12-20 RX ADMIN — DEXTROSE AND SODIUM CHLORIDE: 5; 450 INJECTION, SOLUTION INTRAVENOUS at 17:16

## 2023-12-20 RX ADMIN — ROSUVASTATIN 10 MG: 10 TABLET, FILM COATED ORAL at 20:59

## 2023-12-20 RX ADMIN — DEXTROSE AND SODIUM CHLORIDE: 5; 450 INJECTION, SOLUTION INTRAVENOUS at 02:46

## 2023-12-20 RX ADMIN — CLOPIDOGREL BISULFATE 75 MG: 75 TABLET ORAL at 08:53

## 2023-12-20 RX ADMIN — SOTALOL HYDROCHLORIDE 120 MG: 120 TABLET ORAL at 08:53

## 2023-12-20 RX ADMIN — IPRATROPIUM BROMIDE AND ALBUTEROL SULFATE 1 DOSE: 2.5; .5 SOLUTION RESPIRATORY (INHALATION) at 18:55

## 2023-12-20 RX ADMIN — APIXABAN 5 MG: 5 TABLET, FILM COATED ORAL at 08:53

## 2023-12-20 RX ADMIN — METFORMIN HYDROCHLORIDE 500 MG: 500 TABLET ORAL at 08:54

## 2023-12-20 RX ADMIN — MONTELUKAST 10 MG: 10 TABLET, FILM COATED ORAL at 20:59

## 2023-12-20 RX ADMIN — SODIUM CHLORIDE, PRESERVATIVE FREE 10 ML: 5 INJECTION INTRAVENOUS at 08:54

## 2023-12-20 RX ADMIN — IPRATROPIUM BROMIDE AND ALBUTEROL SULFATE 1 DOSE: 2.5; .5 SOLUTION RESPIRATORY (INHALATION) at 11:23

## 2023-12-20 RX ADMIN — IPRATROPIUM BROMIDE AND ALBUTEROL SULFATE 1 DOSE: 2.5; .5 SOLUTION RESPIRATORY (INHALATION) at 07:11

## 2023-12-20 RX ADMIN — FUROSEMIDE 40 MG: 40 TABLET ORAL at 08:53

## 2023-12-20 RX ADMIN — METFORMIN HYDROCHLORIDE 500 MG: 500 TABLET ORAL at 17:09

## 2023-12-20 RX ADMIN — APIXABAN 5 MG: 5 TABLET, FILM COATED ORAL at 20:59

## 2023-12-20 RX ADMIN — INSULIN LISPRO 2 UNITS: 100 INJECTION, SOLUTION INTRAVENOUS; SUBCUTANEOUS at 08:54

## 2023-12-20 RX ADMIN — WATER 1000 MG: 1 INJECTION INTRAMUSCULAR; INTRAVENOUS; SUBCUTANEOUS at 15:42

## 2023-12-20 RX ADMIN — PIPERACILLIN AND TAZOBACTAM 4500 MG: 4; .5 INJECTION, POWDER, FOR SOLUTION INTRAVENOUS; PARENTERAL at 21:02

## 2023-12-20 RX ADMIN — IPRATROPIUM BROMIDE AND ALBUTEROL SULFATE 1 DOSE: 2.5; .5 SOLUTION RESPIRATORY (INHALATION) at 14:54

## 2023-12-20 RX ADMIN — METHYLPREDNISOLONE SODIUM SUCCINATE 60 MG: 125 INJECTION, POWDER, FOR SOLUTION INTRAMUSCULAR; INTRAVENOUS at 02:46

## 2023-12-20 RX ADMIN — SPIRONOLACTONE 50 MG: 25 TABLET ORAL at 08:54

## 2023-12-20 RX ADMIN — METHYLPREDNISOLONE SODIUM SUCCINATE 60 MG: 125 INJECTION, POWDER, FOR SOLUTION INTRAMUSCULAR; INTRAVENOUS at 14:05

## 2023-12-20 NOTE — PROGRESS NOTES
4 Eyes Skin Assessment     NAME:  Ericka Corbin  YOB: 1950  MEDICAL RECORD NUMBER:  [id number]    The patient is being assessed for  Admission    I agree that at least one RN has performed a thorough Head to Toe Skin Assessment on the patient. ALL assessment sites listed below have been assessed. Areas assessed by both nurses:    Head, Face, Ears, Shoulders, Back, Chest, Arms, Elbows, Hands, Sacrum. Buttock, Coccyx, Ischium, and Legs. Feet and Heels        Does the Patient have a Wound?  No noted wound(s)       Pratik Prevention initiated by RN: Yes  Wound Care Orders initiated by RN: No    Pressure Injury (Stage 3,4, Unstageable, DTI, NWPT, and Complex wounds) if present, place Wound referral order by RN under : No    New Ostomies, if present place, Ostomy referral order under : No     Nurse 1 eSignature: Electronically signed by Aracelis Romeo RN on 12/19/23 at 6:19 PM CST    **SHARE this note so that the co-signing nurse can place an eSignature**    Nurse 2 eSignature: {Esignature:[id number]}

## 2023-12-20 NOTE — CONSULTS
Pulmonary and Critical Care Consult Note    [facility] Tomasa Barrera    MRN# [id number]    Acct# [de-identified]  12/20/2023   1:08 PM CST    Referring Milla Brady MD      Chief Complaint: shortness of breath. Requesting physician: Dr. Lucas Aguirre. Reason for consult: COPD vs Pneumonia. HPI: We have been consulted to see this 75y.o. year old female born on 1950. Patient presented to the hospital due to altered mental status. She was brought by EMS due to inability of her family and friends to wake her up. She does have a history of chronic respiratory failure requiring oxygen supplementation and noninvasive ventilation at home due to underlying severe COPD. She did have COVID infection last month during which she was hospitalized. The patient does not able to participate effectively in the interview. She cannot find words. She has to be prompted for the correct word with every sentence. She believes that she fell at home while leaning on her walker. She had a CT of the chest done in the emergency room that showed no evidence of PE but she did have a new right lower lobe infiltrate. At this time she is not requiring more supplemental oxygen than her usual.  Denies shortness of breath. Her trilogy machine is at the bedside.       Past Medical History      Past Medical History:   Diagnosis Date    ASHD (arteriosclerotic heart disease)     s/p PTCA and stent of circumflex, as well as intermediate and mid LAD     COPD (chronic obstructive pulmonary disease) (HCC)     Coronary atherosclerosis     Diabetes mellitus ([address])     diet controlled, type 2    Encounter for wound care     FOR LLL WOUND    Fall 10/29/2020    Ganglion cyst     RT HAND    Hematoma 10/2020    hematoma LLL from fall injury    Hx of blood clots     Hypercholesteremia     Hypertension     Pacemaker

## 2023-12-20 NOTE — CONSULTS
Palliative Care:   Known to Palliative Care.     76 y/o female with hx of COPD presented with respiratory changes and lethargy. Pt is asleep but rouses and says she is cold. Replaced blankets. Her mouth is very dry and she requests something to drink. Pt is NPO for now but her nurse aware and will provide her mouth care . Review of notes and report from staff. She was on bipap, but transitioned to nasal cannula for now. Pt continues to live at home with caregivers and help from her son. She has needed DME in the home including oxygen. [facility] is current for nursing and therapy. Pulmonary consult pending. Will continue to follow POC and discuss goals. Past Medical History:        Past Medical History:   Diagnosis Date    ASHD (arteriosclerotic heart disease)     s/p PTCA and stent of circumflex, as well as intermediate and mid LAD     COPD (chronic obstructive pulmonary disease) (HCC)     Coronary atherosclerosis     Diabetes mellitus ([address])     diet controlled, type 2    Encounter for wound care     FOR LLL WOUND    Fall 10/29/2020    Ganglion cyst     RT HAND    Hematoma 10/2020    hematoma LLL from fall injury    Hx of blood clots     Hypercholesteremia     Hypertension     Pacemaker 03/02/2021    Palliative care patient 03/16/2022    Stroke due to occlusion of left anterior cerebral artery ([address]) 12/5/2023    Unstable angina ([address])        Advance Directives:    Full code  Reviewed ACP note from last stay and no changes. Pt has a living will on file and her son Funmi Joseph is her HCS. Palliative Care team will continue to follow and support, with ongoing review of pt's goals of care.           Electronically signed by Hanna Carrillo RN on 12/20/2023 at 10:25 AM

## 2023-12-20 NOTE — CARE COORDINATION
12/20/23 0856   Readmission Assessment   Number of Days since last admission? 8-30 days   Previous Disposition Home with Home Health   Who is being Angelica Acevedo  (RN; medical records)   What was the patient's/caregiver's perception as to why they think they needed to return back to the hospital? Other (Comment)  (SOB; respiratory distress)   Did you visit your Primary Care Physician after you left the hospital, before you returned this time? No  (went to cardiology appt)   Why weren't you able to visit your PCP? Other (Comment)  (had cardiology appt)   Did you see a specialist, such as Cardiac, Pulmonary, Orthopedic Physician, etc. after you left the hospital? Yes   Who advised the patient to return to the hospital? [facility] Staff   Does the patient report anything that got in the way of taking their medications? No   In our efforts to provide the best possible care to you and others like you, can you think of anything that we could have done to help you after you left the hospital the first time, so that you might not have needed to return so soon? Arrange for more help when leaving the hospital;Identify patient's health literacy needs; Teaching during hospitalization regarding your illness; Additional Community resources available for illness support     Electronically signed by WAI Cohen on 12/20/2023 at 9:01 AM

## 2023-12-20 NOTE — PLAN OF CARE
Problem: Discharge Planning  Goal: Discharge to home or other facility with appropriate resources  Outcome: Progressing     Problem: ABCDS Injury Assessment  Goal: Absence of physical injury  Outcome: Progressing     Problem: Safety - Adult  Goal: Free from fall injury  Outcome: Progressing     Problem: Neurosensory - Adult  Goal: Achieves stable or improved neurological status  Outcome: Progressing  Goal: Absence of seizures  Outcome: Progressing  Goal: Remains free of injury related to seizures activity  Outcome: Progressing  Goal: Achieves maximal functionality and self care  Outcome: Progressing     Problem: Respiratory - Adult  Goal: Achieves optimal ventilation and oxygenation  Outcome: Progressing     Problem: Cardiovascular - Adult  Goal: Maintains optimal cardiac output and hemodynamic stability  Outcome: Progressing  Goal: Absence of cardiac dysrhythmias or at baseline  Outcome: Progressing     Problem: Skin/Tissue Integrity - Adult  Goal: Skin integrity remains intact  Outcome: Progressing  Goal: Incisions, wounds, or drain sites healing without S/S of infection  Outcome: Progressing  Goal: Oral mucous membranes remain intact  Outcome: Progressing     Problem: Musculoskeletal - Adult  Goal: Return mobility to safest level of function  Outcome: Progressing  Goal: Maintain proper alignment of affected body part  Outcome: Progressing  Goal: Return ADL status to a safe level of function  Outcome: Progressing     Problem: Gastrointestinal - Adult  Goal: Minimal or absence of nausea and vomiting  Outcome: Progressing  Goal: Maintains or returns to baseline bowel function  Outcome: Progressing  Goal: Maintains adequate nutritional intake  Outcome: Progressing  Goal: Establish and maintain optimal ostomy function  Outcome: Progressing     Problem: Genitourinary - Adult  Goal: Absence of urinary retention  Outcome: Progressing  Goal: Urinary catheter remains patent  Outcome: Progressing     Problem: Infection -

## 2023-12-20 NOTE — PLAN OF CARE
Problem: Safety - Adult  Goal: Free from fall injury  12/20/2023 0939 by Shreyas Crawley RN  Outcome: Progressing  Flowsheets (Taken 12/20/2023 0238 by Jennifer Macdonald RN)  Free From Fall Injury:   Instruct family/caregiver on patient safety   Based on caregiver fall risk screen, instruct family/caregiver to ask for assistance with transferring infant if caregiver noted to have fall risk factors  12/20/2023 0234 by Jennifer Macdonald RN  Outcome: Progressing     Problem: Cardiovascular - Adult  Goal: Maintains optimal cardiac output and hemodynamic stability  12/20/2023 0939 by Shreyas Crawley RN  Outcome: Progressing  Flowsheets  Taken 12/20/2023 0855 by Shreyas Crawley RN  Maintains optimal cardiac output and hemodynamic stability:   Monitor blood pressure and heart rate   Monitor urine output and notify Licensed Independent Practitioner for values outside of normal range  Taken 12/20/2023 0252 by Jennifer Macdonald RN  Maintains optimal cardiac output and hemodynamic stability:   Monitor blood pressure and heart rate   Monitor urine output and notify Licensed Independent Practitioner for values outside of normal range   Assess for signs of decreased cardiac output   Administer fluid and/or volume expanders as ordered   Administer vasoactive medications as ordered   For PPHN infants, administer sedation as ordered and minimize all controllable stressors. Taken 12/20/2023 0236 by Jennifer Macdonald RN  Maintains optimal cardiac output and hemodynamic stability:   Monitor blood pressure and heart rate   Monitor urine output and notify Licensed Independent Practitioner for values outside of normal range   Assess for signs of decreased cardiac output   Administer fluid and/or volume expanders as ordered   Administer vasoactive medications as ordered   For PPHN infants, administer sedation as ordered and minimize all controllable stressors.   12/20/2023 0234 by Jennifer Macdonald RN  Outcome: Progressing  Goal: Absence of cardiac

## 2023-12-20 NOTE — H&P
CHIEF COMPLAINT: Altered mental status    History Obtained From:  electronic medical record     HISTORY OF PRESENT ILLNESS:      The patient is a 75 y.o. female with significant past medical history of very advanced COPD who presents with some alteration in respiratory status as well as mental status prior to arrival to the emergency room. Apparently EMS was called due to inability for friends and family to really get patient aroused. Per their report was not breathing overly well but did not require bagging or other aggressive interventions. Is treated with supplemental oxygen and actually upon arrival to emergency room her oxygen saturations were extremely good for her. She is really not able to give me much in terms of recollection of events prior to the hospital.  While in the ER she was very difficult to arouse. Multiple tests were done with really no clear etiology.   This morning she still a little bit hard to follow and not really able to accurately give me many details about events prior to the hospital    Past Medical History:   Diagnosis Date    ASHD (arteriosclerotic heart disease)     s/p PTCA and stent of circumflex, as well as intermediate and mid LAD     COPD (chronic obstructive pulmonary disease) ([address])     Coronary atherosclerosis     Diabetes mellitus ([address])     diet controlled, type 2    Encounter for wound care     FOR LLL WOUND    Fall 10/29/2020    Ganglion cyst     RT HAND    Hematoma 10/2020    hematoma LLL from fall injury    Hx of blood clots     Hypercholesteremia     Hypertension     Pacemaker 03/02/2021    Palliative care patient 03/16/2022    Stroke due to occlusion of left anterior cerebral artery ([address]) 12/5/2023    Unstable angina [facility])        Past Surgical History:   Procedure Laterality Date    CARDIAC CATHETERIZATION  12/10/00    selective left heart and coronary arteriography with left ventriculography     CARDIAC CATHETERIZATION  01/23/98    left heart cath, left

## 2023-12-21 LAB
ALBUMIN SERPL-MCNC: 3.1 G/DL (ref 3.5–5.2)
ALP SERPL-CCNC: 81 U/L (ref 35–104)
ALT SERPL-CCNC: 14 U/L (ref 5–33)
ANION GAP SERPL CALCULATED.3IONS-SCNC: 14 MMOL/L (ref 7–19)
AST SERPL-CCNC: 14 U/L (ref 5–32)
BASOPHILS # BLD: 0 K/UL (ref 0–0.2)
BASOPHILS NFR BLD: 0 % (ref 0–1)
BILIRUB SERPL-MCNC: 0.3 MG/DL (ref 0.2–1.2)
BUN SERPL-MCNC: 18 MG/DL (ref 8–23)
CALCIUM SERPL-MCNC: 8.4 MG/DL (ref 8.8–10.2)
CHLORIDE SERPL-SCNC: 94 MMOL/L (ref 98–111)
CO2 SERPL-SCNC: 28 MMOL/L (ref 22–29)
CREAT SERPL-MCNC: 1.2 MG/DL (ref 0.5–0.9)
EOSINOPHIL # BLD: 0 K/UL (ref 0–0.6)
EOSINOPHIL NFR BLD: 0 % (ref 0–5)
ERYTHROCYTE [DISTWIDTH] IN BLOOD BY AUTOMATED COUNT: 16.3 % (ref 11.5–14.5)
GLUCOSE BLD-MCNC: 175 MG/DL (ref 70–99)
GLUCOSE BLD-MCNC: 210 MG/DL (ref 70–99)
GLUCOSE BLD-MCNC: 247 MG/DL (ref 70–99)
GLUCOSE BLD-MCNC: 263 MG/DL (ref 70–99)
GLUCOSE SERPL-MCNC: 285 MG/DL (ref 74–109)
HCT VFR BLD AUTO: 33.2 % (ref 37–47)
HGB BLD-MCNC: 10.2 G/DL (ref 12–16)
IMM GRANULOCYTES # BLD: 0 K/UL
LYMPHOCYTES # BLD: 0.7 K/UL (ref 1.1–4.5)
LYMPHOCYTES NFR BLD: 10.9 % (ref 20–40)
MCH RBC QN AUTO: 28.4 PG (ref 27–31)
MCHC RBC AUTO-ENTMCNC: 30.7 G/DL (ref 33–37)
MCV RBC AUTO: 92.5 FL (ref 81–99)
MONOCYTES # BLD: 0.6 K/UL (ref 0–0.9)
MONOCYTES NFR BLD: 10.6 % (ref 0–10)
NEUTROPHILS # BLD: 4.6 K/UL (ref 1.5–7.5)
NEUTS SEG NFR BLD: 78 % (ref 50–65)
PERFORMED ON: ABNORMAL
PLATELET # BLD AUTO: 204 K/UL (ref 130–400)
PMV BLD AUTO: 10.1 FL (ref 9.4–12.3)
POTASSIUM SERPL-SCNC: 3.4 MMOL/L (ref 3.5–5)
PROT SERPL-MCNC: 5.9 G/DL (ref 6.6–8.7)
RBC # BLD AUTO: 3.59 M/UL (ref 4.2–5.4)
SODIUM SERPL-SCNC: 136 MMOL/L (ref 136–145)
WBC # BLD AUTO: 5.9 K/UL (ref 4.8–10.8)

## 2023-12-21 PROCEDURE — 94760 N-INVAS EAR/PLS OXIMETRY 1: CPT

## 2023-12-21 PROCEDURE — 6360000002 HC RX W HCPCS: Performed by: INTERNAL MEDICINE

## 2023-12-21 PROCEDURE — 36415 COLL VENOUS BLD VENIPUNCTURE: CPT

## 2023-12-21 PROCEDURE — 2580000003 HC RX 258: Performed by: INTERNAL MEDICINE

## 2023-12-21 PROCEDURE — 80053 COMPREHEN METABOLIC PANEL: CPT

## 2023-12-21 PROCEDURE — 6370000000 HC RX 637 (ALT 250 FOR IP): Performed by: FAMILY MEDICINE

## 2023-12-21 PROCEDURE — 2700000000 HC OXYGEN THERAPY PER DAY

## 2023-12-21 PROCEDURE — 1210000000 HC MED SURG R&B

## 2023-12-21 PROCEDURE — 99232 SBSQ HOSP IP/OBS MODERATE 35: CPT | Performed by: INTERNAL MEDICINE

## 2023-12-21 PROCEDURE — 82962 GLUCOSE BLOOD TEST: CPT

## 2023-12-21 PROCEDURE — 85025 COMPLETE CBC W/AUTO DIFF WBC: CPT

## 2023-12-21 PROCEDURE — 97530 THERAPEUTIC ACTIVITIES: CPT

## 2023-12-21 PROCEDURE — 6360000002 HC RX W HCPCS: Performed by: FAMILY MEDICINE

## 2023-12-21 PROCEDURE — 97165 OT EVAL LOW COMPLEX 30 MIN: CPT

## 2023-12-21 PROCEDURE — 94640 AIRWAY INHALATION TREATMENT: CPT

## 2023-12-21 RX ORDER — POTASSIUM CHLORIDE 20 MEQ/1
40 TABLET, EXTENDED RELEASE ORAL PRN
Status: DISCONTINUED | OUTPATIENT
Start: 2023-12-21 | End: 2023-12-23 | Stop reason: HOSPADM

## 2023-12-21 RX ORDER — POTASSIUM CHLORIDE 7.45 MG/ML
10 INJECTION INTRAVENOUS PRN
Status: DISCONTINUED | OUTPATIENT
Start: 2023-12-21 | End: 2023-12-23 | Stop reason: HOSPADM

## 2023-12-21 RX ADMIN — IPRATROPIUM BROMIDE AND ALBUTEROL SULFATE 1 DOSE: 2.5; .5 SOLUTION RESPIRATORY (INHALATION) at 19:27

## 2023-12-21 RX ADMIN — TRAZODONE HYDROCHLORIDE 100 MG: 50 TABLET ORAL at 21:43

## 2023-12-21 RX ADMIN — METFORMIN HYDROCHLORIDE 500 MG: 500 TABLET ORAL at 09:57

## 2023-12-21 RX ADMIN — PIPERACILLIN AND TAZOBACTAM 3375 MG: 3; .375 INJECTION, POWDER, LYOPHILIZED, FOR SOLUTION INTRAVENOUS at 02:27

## 2023-12-21 RX ADMIN — CLOPIDOGREL BISULFATE 75 MG: 75 TABLET ORAL at 09:37

## 2023-12-21 RX ADMIN — INSULIN LISPRO 1 UNITS: 100 INJECTION, SOLUTION INTRAVENOUS; SUBCUTANEOUS at 17:18

## 2023-12-21 RX ADMIN — IPRATROPIUM BROMIDE AND ALBUTEROL SULFATE 1 DOSE: 2.5; .5 SOLUTION RESPIRATORY (INHALATION) at 06:08

## 2023-12-21 RX ADMIN — ZINC SULFATE 220 MG (50 MG) CAPSULE 50 MG: CAPSULE at 09:37

## 2023-12-21 RX ADMIN — PIPERACILLIN AND TAZOBACTAM 3375 MG: 3; .375 INJECTION, POWDER, LYOPHILIZED, FOR SOLUTION INTRAVENOUS at 17:22

## 2023-12-21 RX ADMIN — IPRATROPIUM BROMIDE AND ALBUTEROL SULFATE 1 DOSE: 2.5; .5 SOLUTION RESPIRATORY (INHALATION) at 15:20

## 2023-12-21 RX ADMIN — APIXABAN 5 MG: 5 TABLET, FILM COATED ORAL at 09:37

## 2023-12-21 RX ADMIN — FUROSEMIDE 40 MG: 40 TABLET ORAL at 09:37

## 2023-12-21 RX ADMIN — INSULIN LISPRO 2 UNITS: 100 INJECTION, SOLUTION INTRAVENOUS; SUBCUTANEOUS at 09:38

## 2023-12-21 RX ADMIN — MONTELUKAST 10 MG: 10 TABLET, FILM COATED ORAL at 21:43

## 2023-12-21 RX ADMIN — SOTALOL HYDROCHLORIDE 120 MG: 120 TABLET ORAL at 09:37

## 2023-12-21 RX ADMIN — METHYLPREDNISOLONE SODIUM SUCCINATE 60 MG: 125 INJECTION, POWDER, FOR SOLUTION INTRAMUSCULAR; INTRAVENOUS at 02:29

## 2023-12-21 RX ADMIN — SPIRONOLACTONE 50 MG: 25 TABLET ORAL at 09:37

## 2023-12-21 RX ADMIN — APIXABAN 5 MG: 5 TABLET, FILM COATED ORAL at 21:43

## 2023-12-21 RX ADMIN — PIPERACILLIN AND TAZOBACTAM 3375 MG: 3; .375 INJECTION, POWDER, LYOPHILIZED, FOR SOLUTION INTRAVENOUS at 09:57

## 2023-12-21 RX ADMIN — ROSUVASTATIN 10 MG: 10 TABLET, FILM COATED ORAL at 21:43

## 2023-12-21 RX ADMIN — IPRATROPIUM BROMIDE AND ALBUTEROL SULFATE 1 DOSE: 2.5; .5 SOLUTION RESPIRATORY (INHALATION) at 11:06

## 2023-12-21 RX ADMIN — METFORMIN HYDROCHLORIDE 500 MG: 500 TABLET ORAL at 17:24

## 2023-12-21 RX ADMIN — INSULIN LISPRO 1 UNITS: 100 INJECTION, SOLUTION INTRAVENOUS; SUBCUTANEOUS at 12:57

## 2023-12-21 NOTE — PROGRESS NOTES
Pharmacy Adjustment per [facility] [address] protocol    Vincent Fleming is a 75 y.o. female. Pharm[facility]. Recent Labs     12/19/23  1138 12/20/23  0159   BUN 14 16       Recent Labs     12/19/23  1138 12/20/23  0159   CREATININE 1.0* 1.0*       Estimated Creatinine Clearance: 51 mL/min (A) (based on SCr of 1 mg/dL (H)). Height:   Ht Readings from Last 1 Encounters:   12/19/23 1.676 m (5' 6\")     Weight:  Wt Readings from Last 1 Encounters:   12/19/23 74.8 kg (165 lb)         Plan: Adjust the following medications based [facility] protocol:           Zosyn to include a loading dose of 4500mg IV over 30 minutes then 3375mg IV over 4 hour infusion every 8 hours for total of 5 days.     Electronically signed by MYRTLE Vergara [facility] on 12/20/2023 at 7:39 PM

## 2023-12-21 NOTE — PLAN OF CARE
Problem: Discharge Planning  Goal: Discharge to home or other facility with appropriate resources  Outcome: Progressing  Flowsheets (Taken 12/21/2023 1653)  Discharge to home or other facility with appropriate resources: Identify barriers to discharge with patient and caregiver     Problem: ABCDS Injury Assessment  Goal: Absence of physical injury  Outcome: Progressing     Problem: Safety - Adult  Goal: Free from fall injury  Outcome: Progressing     Problem: Neurosensory - Adult  Goal: Achieves stable or improved neurological status  Outcome: Progressing  Flowsheets (Taken 12/21/2023 1653)  Achieves stable or improved neurological status: Assess for and report changes in neurological status  Goal: Absence of seizures  Outcome: Progressing  Goal: Remains free of injury related to seizures activity  Outcome: Progressing  Goal: Achieves maximal functionality and self care  Outcome: Progressing     Problem: Respiratory - Adult  Goal: Achieves optimal ventilation and oxygenation  Outcome: Progressing     Problem: Cardiovascular - Adult  Goal: Maintains optimal cardiac output and hemodynamic stability  Outcome: Progressing  Flowsheets (Taken 12/21/2023 1653)  Maintains optimal cardiac output and hemodynamic stability: Monitor blood pressure and heart rate  Goal: Absence of cardiac dysrhythmias or at baseline  Outcome: Progressing  Flowsheets (Taken 12/21/2023 1653)  Absence of cardiac dysrhythmias or at baseline: Monitor cardiac rate and rhythm     Problem: Skin/Tissue Integrity - Adult  Goal: Skin integrity remains intact  Outcome: Progressing  Flowsheets (Taken 12/21/2023 1653)  Skin Integrity Remains Intact: Monitor for areas of redness and/or skin breakdown  Goal: Incisions, wounds, or drain sites healing without S/S of infection  Outcome: Progressing  Flowsheets (Taken 12/21/2023 1653)  Incisions, Wounds, or Drain Sites Healing Without Sign and Symptoms of Infection: ADMISSION and DAILY: Assess and document risk for stability, deterioration, or improvement     Problem: Pain  Goal: Verbalizes/displays adequate comfort level or baseline comfort level  Outcome: Progressing

## 2023-12-21 NOTE — PROGRESS NOTES
Occupational Therapy  Facility/Department: [facility] 3 SURI/VAS/MED  Occupational Therapy Initial Assessment    Name: Yany Birmingham  : 1950  MRN: [id number]  Date of Service: 2023    Discharge Recommendations:  Patient would benefit from continued therapy after discharge, Home with assist PRN          Patient Diagnosis(es): The primary encounter diagnosis was Altered mental status, unspecified altered mental status type. Diagnoses of COPD exacerbation ([address]) and Elevated troponin were also pertinent to this visit. Past Medical History:  has a past medical history of ASHD (arteriosclerotic heart disease), COPD (chronic obstructive pulmonary disease) ([address]), Coronary atherosclerosis, Diabetes mellitus ([address]), Encounter for wound care, Fall, Ganglion cyst, Hematoma, Hx of blood clots, Hypercholesteremia, Hypertension, Pacemaker, Palliative care patient, Stroke due to occlusion of left anterior cerebral artery ([address]), and Unstable angina ([address]). Past Surgical History:  has a past surgical history that includes Cholecystectomy; Hysterectomy; Coronary angioplasty with stent (00); Cardiac catheterization (12/10/00); Cardiac catheterization (98); Cardiac catheterization (94); Cardiac catheterization (2011); Coronary artery bypass graft (2011); Neck surgery; Coronary angioplasty with stent (2021); cyst removal; Parotidectomy (Bilateral); and pacemaker placement. Treatment Diagnosis: Altered mental status, COPD exacerbation      Assessment   Performance deficits / Impairments: Decreased functional mobility ; Decreased ADL status; Decreased balance  Assessment: Patient participates well in therapy. Patient states that she has a personal caregiver and that someone else stays with her. She would benefit from from continued skilled therapy services to increase independence with ADLS and functional mobility to return to prior level of function.   Treatment Diagnosis: Altered mental status, COPD exacerbation  Prognosis: Good  Decision Making: Low Complexity  REQUIRES OT FOLLOW-UP: Yes  Activity Tolerance  Activity Tolerance: Patient Tolerated treatment well        Plan   Occupational Therapy Plan  Times Per Week: 3-5  Times Per Day: Once a day     Restrictions  Restrictions/Precautions  Restrictions/Precautions: Fall Risk  Required Braces or Orthoses?: No    Subjective   General  Chart Reviewed: Yes  Patient assessed for rehabilitation services?: Yes  Additional Pertinent Hx: COPD, Pacemaker  Family / Caregiver Present: No  Diagnosis: Altered mental status, COPD exacerbation     Social/Functional History  Social/Functional History  Lives With: Other (comment)  Type of Home: House  Home Layout: One level  Home Access: Level entry  Bathroom Shower/Tub: Walk-in shower  Bathroom Toilet: Handicap height  Bathroom Equipment: Shower chair  Home Equipment: Miryam Sofya, rolling, [address] Help From: Personal care attendant  ADL Assistance: Independent  Ambulation Assistance: Independent  Transfer Assistance: Independent  Active : No  Occupation: Retired        Observation/Palpation  Posture: Good  Safety Devices  Type of Devices: Left in bed;Call light within reach; Bed alarm in place     Toilet Transfers  Equipment Used: Standard toilet  Toilet Transfer: Minimal assistance  Toilet Transfers Comments: Use of RW  AROM: Within functional limits (B UE shoulder flexion seated EOB 90 degrees)  Strength:  Within functional limits  ADL  Feeding: Independent  Grooming: Independent  UE Bathing: Stand by assistance  LE Bathing: Minimal assistance  UE Dressing: Stand by assistance  LE Dressing: Minimal assistance  Toileting: Minimal assistance  Skin Care: N/A        Bed mobility  Supine to Sit: Stand by assistance  Sit to Supine: Stand by assistance  Transfers  Stand Step Transfers: Minimal assistance  Stand to sit: Contact guard assistance  Transfer Comments: Use of RW  Hearing  Hearing: Within functional

## 2023-12-21 NOTE — PROGRESS NOTES
Last 3 Encounters:   12/19/23 74.8 kg (165 lb)   12/11/23 73.9 kg (163 lb)   11/23/23 74.8 kg (165 lb)       Physical Exam:    General Appearance:  in no acute distress, alert, and cooperative  Skin:  Skin color, texture, turgor normal. No rashes or lesions. Eyes:  No gross abnormalities. Neck:  neck- supple, no mass, non-tender  Lungs:  Breathing Pattern: regular, no distress, Breath sounds: wheezing- diffuse  Heart:  Heart regular rate and rhythm  Abdomen:  Soft, non-tender, normal bowel sounds. No bruits, organomegaly or masses. Extremities: Extremities warm to touch, pink, with no edema. Musculoskeletal:  No joint swelling, deformity, or tenderness.   Neurologic:  negative    CBC with Differential:    Lab Results   Component Value Date/Time    WBC 5.9 12/21/2023 01:25 AM    RBC 3.59 12/21/2023 01:25 AM    HGB 10.2 12/21/2023 01:25 AM    HCT 33.2 12/21/2023 01:25 AM    HCT 32.1 09/02/2011 02:11 AM     12/21/2023 01:25 AM     09/02/2011 02:11 AM    MCV 92.5 12/21/2023 01:25 AM    MCH 28.4 12/21/2023 01:25 AM    MCHC 30.7 12/21/2023 01:25 AM    RDW 16.3 12/21/2023 01:25 AM    LYMPHOPCT 10.9 12/21/2023 01:25 AM    MONOPCT 10.6 12/21/2023 01:25 AM    EOSPCT 0.5 09/02/2011 02:11 AM    BASOPCT 0.0 12/21/2023 01:25 AM    MONOSABS 0.60 12/21/2023 01:25 AM    LYMPHSABS 0.7 12/21/2023 01:25 AM    EOSABS 0.00 12/21/2023 01:25 AM    BASOSABS 0.00 12/21/2023 01:25 AM     CMP:    Lab Results   Component Value Date/Time     12/21/2023 01:25 AM     09/02/2011 02:11 AM    K 3.4 12/21/2023 01:25 AM    K 5.6 11/23/2023 10:33 PM    K 4.1 09/02/2011 02:11 AM    CL 94 12/21/2023 01:25 AM     09/02/2011 02:11 AM    CO2 28 12/21/2023 01:25 AM    BUN 18 12/21/2023 01:25 AM    CREATININE 1.2 12/21/2023 01:25 AM    CREATININE 0.6 09/02/2011 02:11 AM    GFRAA >59 09/02/2022 02:12 AM    LABGLOM 47 12/21/2023 01:25 AM    GLUCOSE 285 12/21/2023 01:25 AM    PROT 5.9 12/21/2023 01:25 AM    PROT 7.5 01/18/2013

## 2023-12-22 LAB
ALBUMIN SERPL-MCNC: 3.1 G/DL (ref 3.5–5.2)
ALP SERPL-CCNC: 73 U/L (ref 35–104)
ALT SERPL-CCNC: 13 U/L (ref 5–33)
ANION GAP SERPL CALCULATED.3IONS-SCNC: 9 MMOL/L (ref 7–19)
AST SERPL-CCNC: 16 U/L (ref 5–32)
BASOPHILS # BLD: 0 K/UL (ref 0–0.2)
BASOPHILS NFR BLD: 0.2 % (ref 0–1)
BILIRUB SERPL-MCNC: 0.4 MG/DL (ref 0.2–1.2)
BUN SERPL-MCNC: 17 MG/DL (ref 8–23)
CALCIUM SERPL-MCNC: 8.7 MG/DL (ref 8.8–10.2)
CHLORIDE SERPL-SCNC: 98 MMOL/L (ref 98–111)
CO2 SERPL-SCNC: 27 MMOL/L (ref 22–29)
CREAT SERPL-MCNC: 1.1 MG/DL (ref 0.5–0.9)
EOSINOPHIL # BLD: 0 K/UL (ref 0–0.6)
EOSINOPHIL NFR BLD: 0.2 % (ref 0–5)
ERYTHROCYTE [DISTWIDTH] IN BLOOD BY AUTOMATED COUNT: 16.1 % (ref 11.5–14.5)
GLUCOSE BLD-MCNC: 113 MG/DL (ref 70–99)
GLUCOSE BLD-MCNC: 117 MG/DL (ref 70–99)
GLUCOSE BLD-MCNC: 141 MG/DL (ref 70–99)
GLUCOSE BLD-MCNC: 95 MG/DL (ref 70–99)
GLUCOSE SERPL-MCNC: 100 MG/DL (ref 74–109)
HCT VFR BLD AUTO: 34.4 % (ref 37–47)
HGB BLD-MCNC: 10.6 G/DL (ref 12–16)
IMM GRANULOCYTES # BLD: 0 K/UL
LYMPHOCYTES # BLD: 1.3 K/UL (ref 1.1–4.5)
LYMPHOCYTES NFR BLD: 23 % (ref 20–40)
MCH RBC QN AUTO: 28.3 PG (ref 27–31)
MCHC RBC AUTO-ENTMCNC: 30.8 G/DL (ref 33–37)
MCV RBC AUTO: 92 FL (ref 81–99)
MONOCYTES # BLD: 0.8 K/UL (ref 0–0.9)
MONOCYTES NFR BLD: 13.3 % (ref 0–10)
NEUTROPHILS # BLD: 3.6 K/UL (ref 1.5–7.5)
NEUTS SEG NFR BLD: 62.6 % (ref 50–65)
PERFORMED ON: ABNORMAL
PERFORMED ON: NORMAL
PLATELET # BLD AUTO: 201 K/UL (ref 130–400)
PMV BLD AUTO: 9.8 FL (ref 9.4–12.3)
POTASSIUM SERPL-SCNC: 3.8 MMOL/L (ref 3.5–5)
PROT SERPL-MCNC: 6 G/DL (ref 6.6–8.7)
RBC # BLD AUTO: 3.74 M/UL (ref 4.2–5.4)
SODIUM SERPL-SCNC: 134 MMOL/L (ref 136–145)
WBC # BLD AUTO: 5.7 K/UL (ref 4.8–10.8)

## 2023-12-22 PROCEDURE — 6370000000 HC RX 637 (ALT 250 FOR IP): Performed by: FAMILY MEDICINE

## 2023-12-22 PROCEDURE — 94640 AIRWAY INHALATION TREATMENT: CPT

## 2023-12-22 PROCEDURE — 85025 COMPLETE CBC W/AUTO DIFF WBC: CPT

## 2023-12-22 PROCEDURE — 94760 N-INVAS EAR/PLS OXIMETRY 1: CPT

## 2023-12-22 PROCEDURE — 2580000003 HC RX 258: Performed by: INTERNAL MEDICINE

## 2023-12-22 PROCEDURE — 1210000000 HC MED SURG R&B

## 2023-12-22 PROCEDURE — 97161 PT EVAL LOW COMPLEX 20 MIN: CPT

## 2023-12-22 PROCEDURE — 2580000003 HC RX 258: Performed by: FAMILY MEDICINE

## 2023-12-22 PROCEDURE — 97116 GAIT TRAINING THERAPY: CPT

## 2023-12-22 PROCEDURE — 82962 GLUCOSE BLOOD TEST: CPT

## 2023-12-22 PROCEDURE — 36415 COLL VENOUS BLD VENIPUNCTURE: CPT

## 2023-12-22 PROCEDURE — 97530 THERAPEUTIC ACTIVITIES: CPT

## 2023-12-22 PROCEDURE — 80053 COMPREHEN METABOLIC PANEL: CPT

## 2023-12-22 PROCEDURE — 2700000000 HC OXYGEN THERAPY PER DAY

## 2023-12-22 PROCEDURE — 6360000002 HC RX W HCPCS: Performed by: INTERNAL MEDICINE

## 2023-12-22 RX ORDER — LACTOBACILLUS RHAMNOSUS GG 10B CELL
1 CAPSULE ORAL
Status: DISCONTINUED | OUTPATIENT
Start: 2023-12-23 | End: 2023-12-23 | Stop reason: HOSPADM

## 2023-12-22 RX ADMIN — PIPERACILLIN AND TAZOBACTAM 3375 MG: 3; .375 INJECTION, POWDER, LYOPHILIZED, FOR SOLUTION INTRAVENOUS at 10:42

## 2023-12-22 RX ADMIN — SODIUM CHLORIDE, PRESERVATIVE FREE 10 ML: 5 INJECTION INTRAVENOUS at 08:59

## 2023-12-22 RX ADMIN — TRAZODONE HYDROCHLORIDE 100 MG: 50 TABLET ORAL at 20:59

## 2023-12-22 RX ADMIN — IPRATROPIUM BROMIDE AND ALBUTEROL SULFATE 1 DOSE: 2.5; .5 SOLUTION RESPIRATORY (INHALATION) at 10:00

## 2023-12-22 RX ADMIN — ZINC SULFATE 220 MG (50 MG) CAPSULE 50 MG: CAPSULE at 08:59

## 2023-12-22 RX ADMIN — SPIRONOLACTONE 50 MG: 25 TABLET ORAL at 08:59

## 2023-12-22 RX ADMIN — PIPERACILLIN AND TAZOBACTAM 3375 MG: 3; .375 INJECTION, POWDER, LYOPHILIZED, FOR SOLUTION INTRAVENOUS at 18:32

## 2023-12-22 RX ADMIN — MONTELUKAST 10 MG: 10 TABLET, FILM COATED ORAL at 20:59

## 2023-12-22 RX ADMIN — SODIUM CHLORIDE 1000 ML: 9 INJECTION, SOLUTION INTRAVENOUS at 03:54

## 2023-12-22 RX ADMIN — APIXABAN 5 MG: 5 TABLET, FILM COATED ORAL at 20:59

## 2023-12-22 RX ADMIN — METFORMIN HYDROCHLORIDE 500 MG: 500 TABLET ORAL at 08:59

## 2023-12-22 RX ADMIN — IPRATROPIUM BROMIDE AND ALBUTEROL SULFATE 1 DOSE: 2.5; .5 SOLUTION RESPIRATORY (INHALATION) at 15:40

## 2023-12-22 RX ADMIN — IPRATROPIUM BROMIDE AND ALBUTEROL SULFATE 1 DOSE: 2.5; .5 SOLUTION RESPIRATORY (INHALATION) at 18:57

## 2023-12-22 RX ADMIN — SODIUM CHLORIDE, PRESERVATIVE FREE 10 ML: 5 INJECTION INTRAVENOUS at 20:59

## 2023-12-22 RX ADMIN — IPRATROPIUM BROMIDE AND ALBUTEROL SULFATE 1 DOSE: 2.5; .5 SOLUTION RESPIRATORY (INHALATION) at 07:09

## 2023-12-22 RX ADMIN — APIXABAN 5 MG: 5 TABLET, FILM COATED ORAL at 08:59

## 2023-12-22 RX ADMIN — CLOPIDOGREL BISULFATE 75 MG: 75 TABLET ORAL at 08:59

## 2023-12-22 RX ADMIN — ACETAMINOPHEN 650 MG: 325 TABLET ORAL at 18:36

## 2023-12-22 RX ADMIN — ROSUVASTATIN 10 MG: 10 TABLET, FILM COATED ORAL at 20:59

## 2023-12-22 RX ADMIN — SOTALOL HYDROCHLORIDE 120 MG: 120 TABLET ORAL at 08:59

## 2023-12-22 RX ADMIN — PIPERACILLIN AND TAZOBACTAM 3375 MG: 3; .375 INJECTION, POWDER, LYOPHILIZED, FOR SOLUTION INTRAVENOUS at 03:57

## 2023-12-22 RX ADMIN — FUROSEMIDE 40 MG: 40 TABLET ORAL at 08:59

## 2023-12-22 RX ADMIN — METFORMIN HYDROCHLORIDE 500 MG: 500 TABLET ORAL at 18:32

## 2023-12-22 NOTE — PLAN OF CARE
1653)  Absence of infection at discharge: Assess and monitor for signs and symptoms of infection  Goal: Absence of infection during hospitalization  12/22/2023 0150 by Eun Moore LPN  Outcome: Progressing  12/21/2023 1659 by Dilan Richmond RN  Outcome: Progressing  Flowsheets (Taken 12/21/2023 1653)  Absence of infection during hospitalization: Assess and monitor for signs and symptoms of infection  Goal: Absence of fever/infection during anticipated neutropenic period  12/22/2023 0150 by Eun Moore LPN  Outcome: Progressing  12/21/2023 1659 by Dilan Richmond RN  Outcome: Progressing  Flowsheets (Taken 12/21/2023 1653)  Absence of fever/infection during anticipated neutropenic period: Monitor white blood cell count     Problem: Metabolic/Fluid and Electrolytes - Adult  Goal: Electrolytes maintained within normal limits  12/22/2023 0150 by Eun Moore LPN  Outcome: Progressing  12/21/2023 1659 by Dilan Richmond RN  Outcome: Progressing  Flowsheets (Taken 12/21/2023 1653)  Electrolytes maintained within normal limits: Monitor labs and assess patient for signs and symptoms of electrolyte imbalances  Goal: Hemodynamic stability and optimal renal function maintained  12/22/2023 0150 by Eun Moore LPN  Outcome: Progressing  12/21/2023 1659 by Dilan Richmond RN  Outcome: Progressing  Flowsheets (Taken 12/21/2023 1653)  Hemodynamic stability and optimal renal function maintained: Monitor labs and assess for signs and symptoms of volume excess or deficit  Goal: Glucose maintained within prescribed range  12/22/2023 0150 by Eun Moore LPN  Outcome: Progressing  12/21/2023 1659 by Dilan Richmond RN  Outcome: Progressing  Flowsheets (Taken 12/21/2023 1653)  Glucose maintained within prescribed range: Monitor blood glucose as ordered     Problem: Hematologic - Adult  Goal: Maintains hematologic stability  12/22/2023 0150 by Eun Moore LPN  Outcome: Progressing  12/21/2023 1659 by Dilan Richmond RN  Outcome: Progressing  Flowsheets (Taken 12/21/2023 1653)  Maintains hematologic stability: Assess for signs and symptoms of bleeding or hemorrhage     Problem: Skin/Tissue Integrity  Goal: Absence of new skin breakdown  Description: 1. Monitor for areas of redness and/or skin breakdown  2. Assess vascular access sites hourly  3. Every 4-6 hours minimum:  Change oxygen saturation probe site  4. Every 4-6 hours:  If on nasal continuous positive airway pressure, respiratory therapy assess nares and determine need for appliance change or resting period.   12/22/2023 0150 by Sierra Solano LPN  Outcome: Progressing  12/21/2023 1659 by Gerber Mg RN  Outcome: Progressing     Problem: Chronic Conditions and Co-morbidities  Goal: Patient's chronic conditions and co-morbidity symptoms are monitored and maintained or improved  12/22/2023 0150 by Sierra Solano LPN  Outcome: Progressing  12/21/2023 1659 by Gerber Mg RN  Outcome: Progressing  Flowsheets (Taken 12/21/2023 1653)  Care Plan - Patient's Chronic Conditions and Co-Morbidity Symptoms are Monitored and Maintained or Improved: Monitor and assess patient's chronic conditions and comorbid symptoms for stability, deterioration, or improvement     Problem: Pain  Goal: Verbalizes/displays adequate comfort level or baseline comfort level  12/22/2023 0150 by Sierra Solano LPN  Outcome: Progressing  12/21/2023 1659 by Gerber Mg RN  Outcome: Progressing

## 2023-12-22 NOTE — PLAN OF CARE
Problem: Discharge Planning  Goal: Discharge to home or other facility with appropriate resources  12/22/2023 0931 by Elijah Mckeon RN  Outcome: Progressing  Flowsheets (Taken 12/22/2023 6212)  Discharge to home or other facility with appropriate resources: Identify barriers to discharge with patient and caregiver  12/22/2023 0150 by Zena Bettencourt LPN  Outcome: Progressing     Problem: Safety - Adult  Goal: Free from fall injury  12/22/2023 0931 by Elijah Mckeon RN  Outcome: Progressing  12/22/2023 0150 by Zena Bettencourt LPN  Outcome: Progressing     Problem: Respiratory - Adult  Goal: Achieves optimal ventilation and oxygenation  12/22/2023 0931 by Elijah Mckeon RN  Outcome: Progressing  12/22/2023 0150 by Zena Bettencourt LPN  Outcome: Progressing     Problem: Musculoskeletal - Adult  Goal: Return mobility to safest level of function  12/22/2023 0931 by Elijah Mckeon RN  Outcome: Progressing  Flowsheets (Taken 12/22/2023 9178)  Return Mobility to Safest Level of Function: Assess patient stability and activity tolerance for standing, transferring and ambulating with or without assistive devices  12/22/2023 0150 by Zena Bettencourt LPN  Outcome: Progressing  Goal: Maintain proper alignment of affected body part  12/22/2023 0931 by Elijah Mckeon RN  Outcome: Progressing  Flowsheets (Taken 12/22/2023 5672)  Maintain proper alignment of affected body part: Support and protect limb and body alignment per provider's orders  12/22/2023 0150 by Zena Bettencourt LPN  Outcome: Progressing  Goal: Return ADL status to a safe level of function  12/22/2023 0931 by Elijah Mckeon RN  Outcome: Progressing  Flowsheets (Taken 12/22/2023 8703)  Return ADL Status to a Safe Level of Function: Administer medication as ordered  12/22/2023 0150 by Zena Bettencourt LPN  Outcome: Progressing     Problem: Infection - Adult  Goal: Absence of infection at discharge  12/22/2023 0931 by Elijah Mckeon RN  Outcome:

## 2023-12-22 NOTE — CARE COORDINATION
Case Management Assessment  Initial Evaluation    Date/Time of Evaluation: 12/21/2023 8:07 PM  Assessment Completed by: WAI Vásquez    If patient is discharged prior to next notation, then this note serves as note for discharge by case management. Patient Name: Lia Romero                   YOB: 1950  Diagnosis: Altered mental status [R41.82]  Elevated troponin [R79.89]  COPD exacerbation ([address]) [J44.1]  Altered mental status, unspecified altered mental status type [R41.82]                   Date / Time: 12/19/2023 11:15 AM    Patient Admission Status: Inpatient   Readmission Risk (Low < 19, Mod (19-27), High > 27): Readmission Risk Score: 25.1    Current PCP: Olinda Duval MD  PCP verified by CM? Yes    Chart Reviewed: Yes      History Provided by: Patient, Medical Record  Patient Orientation: Alert and Oriented, Person, Place, Situation    Patient Cognition: Alert    Hospitalization in the last 30 days (Readmission):  Yes    If yes, Readmission Assessment in  Navigator will be completed. Advance Directives:      Code Status: Full Code   Patient's Primary Decision Maker is: Named in [address]     Primary Decision Maker (Active): Marybeth Bethea - Child - [phone]    Discharge Planning:    Patient lives with: Children, Other (Comment) (spouse or c/g's stay with her; lives in her own home) Type of Home: House  Primary Care Giver: Self (son and private duty caregivers also help with ADL and IADL needs)  Patient Support Systems include: Children, Family Members, Friends/Neighbors, Home Care Staff, Homemaker   Current Financial resources: Medicare  Current community resources: ECF/Home Care  Current services prior to admission: Durable Medical Equipment, Home Care, Oxygen Therapy, Private Duty Homecare, Other (Comment) (Trilogy (NIV); [facility])            Current DME: [employer], Oxygen Therapy (Comment), Shower Chair, Walker, Other (Comment) (trilogy (NIV);  [facility])            Type

## 2023-12-22 NOTE — PROGRESS NOTES
Physician Progress Note      Denise Slaughter  CSN #:                  [id number]  :                       1950  ADMIT DATE:       2023 11:15 AM  [facility] DATE:  RESPONDING  PROVIDER #:        Carmela Carvalho MD          QUERY TEXT:    Pt admitted with AMS. Pt noted to have shortness of breath and COPD with AE. If possible, please document in the progress notes and discharge summary if   you are evaluating and / or treating any of the following: The medical record reflects the following:  Risk Factors: COPD w/AE, acute on chronic respiratory failure, questioning how   she is taking her medications  Clinical Indicators: presented with shortness of breath and AMS; RR 12-20 with   O2 sat 82-98%; ABG's showed pH 7.380, pCO2 66, pO2 83.0; per IM pnotes    \"questions on how she is taking her medications. Foye Chino ... which can contribute to   her mentation prior to arrival.  Will need to reinforce medication usage as   well as usage of trilogy device regularly prior to dc\";  CT head-no acute   intracranial abnormality  Treatment: CT head, O2 at 2l/NC, NS 1000 ml/IV bolus, ABG's, bipap/c pap    Thank you,  [address] [place] [address], CCDS  [phone]  Options provided:  -- Metabolic encephalopathy  -- Toxic encephalopathy  -- Toxic metabolic encephalopathy  -- Delirium due to, Please specify cause. -- Delirium  -- Other - I will add my own diagnosis  -- Disagree - Not applicable / Not valid  -- Disagree - Clinically unable to determine / Unknown  -- Refer to Clinical Documentation Reviewer    PROVIDER RESPONSE TEXT:    This patient has metabolic encephalopathy.     Query created by: Philippe Law on 2023 10:16 AM      Electronically signed by:  Carmela Carvalho MD 2023 4:07 PM

## 2023-12-23 VITALS
RESPIRATION RATE: 16 BRPM | BODY MASS INDEX: 26.52 KG/M2 | HEIGHT: 66 IN | WEIGHT: 165 LBS | HEART RATE: 72 BPM | DIASTOLIC BLOOD PRESSURE: 64 MMHG | TEMPERATURE: 97.2 F | SYSTOLIC BLOOD PRESSURE: 120 MMHG | OXYGEN SATURATION: 92 %

## 2023-12-23 LAB
ALBUMIN SERPL-MCNC: 3.4 G/DL (ref 3.5–5.2)
ALP SERPL-CCNC: 77 U/L (ref 35–104)
ALT SERPL-CCNC: 14 U/L (ref 5–33)
ANION GAP SERPL CALCULATED.3IONS-SCNC: 9 MMOL/L (ref 7–19)
AST SERPL-CCNC: 13 U/L (ref 5–32)
BASOPHILS # BLD: 0 K/UL (ref 0–0.2)
BASOPHILS NFR BLD: 0.2 % (ref 0–1)
BILIRUB SERPL-MCNC: 0.5 MG/DL (ref 0.2–1.2)
BUN SERPL-MCNC: 16 MG/DL (ref 8–23)
CALCIUM SERPL-MCNC: 8.5 MG/DL (ref 8.8–10.2)
CHLORIDE SERPL-SCNC: 93 MMOL/L (ref 98–111)
CO2 SERPL-SCNC: 31 MMOL/L (ref 22–29)
CREAT SERPL-MCNC: 1 MG/DL (ref 0.5–0.9)
EOSINOPHIL # BLD: 0 K/UL (ref 0–0.6)
EOSINOPHIL NFR BLD: 0.7 % (ref 0–5)
ERYTHROCYTE [DISTWIDTH] IN BLOOD BY AUTOMATED COUNT: 15.9 % (ref 11.5–14.5)
GLUCOSE BLD-MCNC: 110 MG/DL (ref 70–99)
GLUCOSE BLD-MCNC: 122 MG/DL (ref 70–99)
GLUCOSE SERPL-MCNC: 86 MG/DL (ref 74–109)
HCT VFR BLD AUTO: 34.4 % (ref 37–47)
HGB BLD-MCNC: 10.9 G/DL (ref 12–16)
IMM GRANULOCYTES # BLD: 0 K/UL
LYMPHOCYTES # BLD: 1.4 K/UL (ref 1.1–4.5)
LYMPHOCYTES NFR BLD: 24.7 % (ref 20–40)
MAGNESIUM SERPL-MCNC: 1.6 MG/DL (ref 1.6–2.4)
MCH RBC QN AUTO: 28.6 PG (ref 27–31)
MCHC RBC AUTO-ENTMCNC: 31.7 G/DL (ref 33–37)
MCV RBC AUTO: 90.3 FL (ref 81–99)
MONOCYTES # BLD: 0.8 K/UL (ref 0–0.9)
MONOCYTES NFR BLD: 14.2 % (ref 0–10)
NEUTROPHILS # BLD: 3.4 K/UL (ref 1.5–7.5)
NEUTS SEG NFR BLD: 59.7 % (ref 50–65)
PERFORMED ON: ABNORMAL
PERFORMED ON: ABNORMAL
PLATELET # BLD AUTO: 200 K/UL (ref 130–400)
PMV BLD AUTO: 9.8 FL (ref 9.4–12.3)
POTASSIUM SERPL-SCNC: 3.2 MMOL/L (ref 3.5–5)
PROT SERPL-MCNC: 5.8 G/DL (ref 6.6–8.7)
RBC # BLD AUTO: 3.81 M/UL (ref 4.2–5.4)
SODIUM SERPL-SCNC: 133 MMOL/L (ref 136–145)
WBC # BLD AUTO: 5.7 K/UL (ref 4.8–10.8)

## 2023-12-23 PROCEDURE — 6370000000 HC RX 637 (ALT 250 FOR IP): Performed by: FAMILY MEDICINE

## 2023-12-23 PROCEDURE — 80053 COMPREHEN METABOLIC PANEL: CPT

## 2023-12-23 PROCEDURE — 6370000000 HC RX 637 (ALT 250 FOR IP): Performed by: HOSPITALIST

## 2023-12-23 PROCEDURE — 85025 COMPLETE CBC W/AUTO DIFF WBC: CPT

## 2023-12-23 PROCEDURE — 82962 GLUCOSE BLOOD TEST: CPT

## 2023-12-23 PROCEDURE — 2580000003 HC RX 258: Performed by: FAMILY MEDICINE

## 2023-12-23 PROCEDURE — 83735 ASSAY OF MAGNESIUM: CPT

## 2023-12-23 PROCEDURE — 6360000002 HC RX W HCPCS: Performed by: INTERNAL MEDICINE

## 2023-12-23 PROCEDURE — 2580000003 HC RX 258: Performed by: INTERNAL MEDICINE

## 2023-12-23 PROCEDURE — 36415 COLL VENOUS BLD VENIPUNCTURE: CPT

## 2023-12-23 PROCEDURE — 94640 AIRWAY INHALATION TREATMENT: CPT

## 2023-12-23 PROCEDURE — 94760 N-INVAS EAR/PLS OXIMETRY 1: CPT

## 2023-12-23 RX ORDER — LACTOBACILLUS RHAMNOSUS GG 10B CELL
1 CAPSULE ORAL
Qty: 90 CAPSULE | Refills: 0 | Status: SHIPPED | OUTPATIENT
Start: 2023-12-24

## 2023-12-23 RX ORDER — CALCIUM CARBONATE 500 MG/1
500 TABLET, CHEWABLE ORAL 3 TIMES DAILY PRN
Status: DISCONTINUED | OUTPATIENT
Start: 2023-12-23 | End: 2023-12-23 | Stop reason: HOSPADM

## 2023-12-23 RX ORDER — AMOXICILLIN AND CLAVULANATE POTASSIUM 875; 125 MG/1; MG/1
1 TABLET, FILM COATED ORAL 2 TIMES DAILY
Qty: 20 TABLET | Refills: 0 | Status: SHIPPED | OUTPATIENT
Start: 2023-12-23 | End: 2024-01-02

## 2023-12-23 RX ORDER — LOPERAMIDE HYDROCHLORIDE 2 MG/1
2 CAPSULE ORAL 4 TIMES DAILY PRN
Status: DISCONTINUED | OUTPATIENT
Start: 2023-12-23 | End: 2023-12-23 | Stop reason: HOSPADM

## 2023-12-23 RX ADMIN — PIPERACILLIN AND TAZOBACTAM 3375 MG: 3; .375 INJECTION, POWDER, LYOPHILIZED, FOR SOLUTION INTRAVENOUS at 01:42

## 2023-12-23 RX ADMIN — LOPERAMIDE HYDROCHLORIDE 2 MG: 2 CAPSULE ORAL at 10:49

## 2023-12-23 RX ADMIN — CLOPIDOGREL BISULFATE 75 MG: 75 TABLET ORAL at 08:12

## 2023-12-23 RX ADMIN — BISMUTH SUBSALICYLATE 30 ML: 525 LIQUID ORAL at 08:17

## 2023-12-23 RX ADMIN — SPIRONOLACTONE 50 MG: 25 TABLET ORAL at 08:12

## 2023-12-23 RX ADMIN — Medication 1 CAPSULE: at 08:12

## 2023-12-23 RX ADMIN — BISMUTH SUBSALICYLATE 30 ML: 525 LIQUID ORAL at 01:40

## 2023-12-23 RX ADMIN — ZINC SULFATE 220 MG (50 MG) CAPSULE 50 MG: CAPSULE at 08:12

## 2023-12-23 RX ADMIN — LOPERAMIDE HYDROCHLORIDE 2 MG: 2 CAPSULE ORAL at 01:39

## 2023-12-23 RX ADMIN — SOTALOL HYDROCHLORIDE 120 MG: 120 TABLET ORAL at 08:12

## 2023-12-23 RX ADMIN — IPRATROPIUM BROMIDE AND ALBUTEROL SULFATE 1 DOSE: 2.5; .5 SOLUTION RESPIRATORY (INHALATION) at 09:44

## 2023-12-23 RX ADMIN — FUROSEMIDE 40 MG: 40 TABLET ORAL at 08:12

## 2023-12-23 RX ADMIN — METFORMIN HYDROCHLORIDE 500 MG: 500 TABLET ORAL at 08:12

## 2023-12-23 RX ADMIN — APIXABAN 5 MG: 5 TABLET, FILM COATED ORAL at 08:12

## 2023-12-23 RX ADMIN — PIPERACILLIN AND TAZOBACTAM 3375 MG: 3; .375 INJECTION, POWDER, LYOPHILIZED, FOR SOLUTION INTRAVENOUS at 10:45

## 2023-12-23 RX ADMIN — IPRATROPIUM BROMIDE AND ALBUTEROL SULFATE 1 DOSE: 2.5; .5 SOLUTION RESPIRATORY (INHALATION) at 06:14

## 2023-12-23 RX ADMIN — SODIUM CHLORIDE, PRESERVATIVE FREE 10 ML: 5 INJECTION INTRAVENOUS at 08:19

## 2023-12-23 NOTE — PROGRESS NOTES
Reviewed discharge instructions, follow-up appointments, and medications with patient and her son. Both patient and son verbalized understanding and are agreeable to discharge. Patient already set up with [facility] services. Patient discharged home via private vehicle.

## 2023-12-23 NOTE — DISCHARGE SUMMARY
Hospital Discharge Summary    Luis E Longoria  :  1950  MRN:  [id number]    Admit date:  2023  Discharge date:  2023    Admitting Physician:    Claudio Calles MD    Discharge Diagnoses:    Principal Problem:    Altered mental status  Active Problems:    NICM (nonischemic cardiomyopathy) ([address] )    Essential hypertension    Coronary artery disease involving native coronary artery of native heart without angina pectoris    Chronic obstructive pulmonary disease ([address])  Resolved Problems:    * No resolved hospital problems. [facility] Course: The patient is a 75 y.o. female with significant past medical history of very advanced COPD who presents with some alteration in respiratory status as well as mental status prior to arrival to the emergency room. Apparently EMS was called due to inability for friends and family to really get patient aroused. Per their report was not breathing overly well but did not require bagging or other aggressive interventions. Treated with supplemental oxygen and actually upon arrival to emergency room her oxygen saturations were extremely good for her. While in the ER she was very difficult to arouse. Multiple tests were done with really no clear etiology. Was treated for pneumonia with improvement in respiratory and mental status. On RA at time of discharge. Will be discharged on oral antibiotics. The patient was admitted for the above noted medical/surgical issues. Please see daily progress note for further details concerning their stay. The patient improved throughout their stay and reached maximum medical improvement on the day of discharge. The patient/family agree with the treatment plan as outlined above. All questions concerning their stay were answered prior to discharge. They understand the importance of follow up concerning any abnormal test results. Physical Exam  Vitals reviewed. Constitutional:       General: She is not in acute distress. Appearance: Normal appearance. She is not ill-appearing. HENT:      Head: Normocephalic and atraumatic. Nose: Nose normal.   Eyes:      General:         Right eye: No discharge. Left eye: No discharge. Extraocular Movements: Extraocular movements intact. Conjunctiva/sclera: Conjunctivae normal.   Cardiovascular:      Rate and Rhythm: Normal rate and regular rhythm. Pulses: Normal pulses. Heart sounds: No murmur heard. Pulmonary:      Effort: Pulmonary effort is normal. No respiratory distress. Breath sounds: Wheezing present. Abdominal:      General: Bowel sounds are normal. There is no distension. Skin:     Capillary Refill: Capillary refill takes less than 2 seconds. Coloration: Skin is not jaundiced. Neurological:      General: No focal deficit present. Mental Status: She is alert and oriented to person, place, and time.    Psychiatric:         Mood and Affect: Mood normal.           Discharge Medications:         Medication List        START taking these medications      amoxicillin-clavulanate 875-125 MG per tablet  Commonly known as: AUGMENTIN  Take 1 tablet by mouth 2 times daily for 10 days     lactobacillus capsule  Take 1 capsule by mouth daily (with breakfast)  Start taking on: December 24, 2023            CONTINUE taking these medications      apixaban 5 MG Tabs tablet  Commonly known as: ELIQUIS  Take 1 tablet by mouth 2 times daily     clopidogrel 75 MG tablet  Commonly known as: Plavix  Take 1 tablet by mouth daily     furosemide 40 MG tablet  Commonly known as: LASIX  Take 1 tablet by mouth daily     ipratropium 0.5 mg-albuterol 2.5 mg 0.5-2.5 (3) MG/3ML Soln nebulizer solution  Commonly known as: DUONEB  Inhale 3 mLs into the lungs every 4 hours (while awake)     metFORMIN 500 MG tablet  Commonly known as: GLUCOPHAGE     montelukast 10 MG tablet  Commonly known as: SINGULAIR  Take 1 tablet by mouth nightly     nitroGLYCERIN 0.4 MG SL

## 2023-12-23 NOTE — PLAN OF CARE
Problem: Discharge Planning  Goal: Discharge to home or other facility with appropriate resources  12/23/2023 1131 by Yohannes Hudson RN  Outcome: Completed  12/23/2023 1121 by Yohannes Hudson RN  Outcome: Progressing  Flowsheets (Taken 12/23/2023 9168)  Discharge to home or other facility with appropriate resources: Identify barriers to discharge with patient and caregiver     Problem: ABCDS Injury Assessment  Goal: Absence of physical injury  12/23/2023 1131 by Yohannes Hudson RN  Outcome: Completed  12/23/2023 1121 by Yohannes Hudson RN  Outcome: Progressing     Problem: Safety - Adult  Goal: Free from fall injury  12/23/2023 1131 by Yohannes Hudson RN  Outcome: Completed  12/23/2023 1121 by Yohannes Hudson RN  Outcome: Progressing     Problem: Neurosensory - Adult  Goal: Achieves stable or improved neurological status  12/23/2023 1131 by Yohannes Hudson RN  Outcome: Completed  12/23/2023 1121 by Yohannes Hudson RN  Outcome: Progressing  Flowsheets (Taken 12/23/2023 6113)  Achieves stable or improved neurological status: Assess for and report changes in neurological status  Goal: Absence of seizures  12/23/2023 1131 by Yohannes Hudson RN  Outcome: Completed  12/23/2023 1121 by Yohannes Hudson RN  Outcome: Progressing  Flowsheets (Taken 12/23/2023 0819)  Absence of seizures: Monitor for seizure activity.   If seizure occurs, document type and location of movements and any associated apnea  Goal: Remains free of injury related to seizures activity  12/23/2023 1131 by Yohannes Hudson RN  Outcome: Completed  12/23/2023 1121 by Yohannes Hudson RN  Outcome: Progressing  Flowsheets (Taken 12/23/2023 3863)  Remains free of injury related to seizure activity: Monitor patient for seizure activity, document and report duration and description of seizure to Licensed Independent Practitioner  Goal: Achieves maximal functionality and self care  12/23/2023 1131 by Yohannes Hudson

## 2023-12-23 NOTE — DISCHARGE INSTR - DIET

## 2023-12-23 NOTE — PLAN OF CARE
Progressing  Flowsheets (Taken 12/23/2023 2863)  Maintains or returns to baseline bowel function: Assess bowel function  Goal: Maintains adequate nutritional intake  Outcome: Progressing  Flowsheets (Taken 12/23/2023 0819)  Maintains adequate nutritional intake: Monitor percentage of each meal consumed  Goal: Establish and maintain optimal ostomy function  Outcome: Progressing  Flowsheets (Taken 12/23/2023 0819)  Establish and maintain optimal ostomy function: Administer IV fluids and TPN as ordered     Problem: Genitourinary - Adult  Goal: Absence of urinary retention  Outcome: Progressing  Goal: Urinary catheter remains patent  Outcome: Progressing     Problem: Infection - Adult  Goal: Absence of infection at discharge  Outcome: Progressing  Flowsheets (Taken 12/23/2023 0819)  Absence of infection at discharge: Assess and monitor for signs and symptoms of infection  Goal: Absence of infection during hospitalization  Outcome: Progressing  Flowsheets (Taken 12/23/2023 0819)  Absence of infection during hospitalization: Assess and monitor for signs and symptoms of infection  Goal: Absence of fever/infection during anticipated neutropenic period  Outcome: Progressing  Flowsheets (Taken 12/23/2023 0819)  Absence of fever/infection during anticipated neutropenic period: Monitor white blood cell count     Problem: Metabolic/Fluid and Electrolytes - Adult  Goal: Electrolytes maintained within normal limits  Outcome: Progressing  Flowsheets (Taken 12/23/2023 0819)  Electrolytes maintained within normal limits: Monitor labs and assess patient for signs and symptoms of electrolyte imbalances  Goal: Hemodynamic stability and optimal renal function maintained  Outcome: Progressing  Flowsheets (Taken 12/23/2023 0819)  Hemodynamic stability and optimal renal function maintained: Monitor labs and assess for signs and symptoms of volume excess or deficit  Goal: Glucose maintained within prescribed range  Outcome:

## 2023-12-24 LAB
BACTERIA BLD CULT ORG #2: NORMAL
BACTERIA BLD CULT: NORMAL

## 2023-12-28 NOTE — PROGRESS NOTES
Physician Progress Note      Liv Mcdonald  CSN #:                  [id number]  :                       1950  ADMIT DATE:       2023 11:15 AM  [address] [place] [address] DATE:        2023 1:33 PM  RESPONDING  PROVIDER #:        Marianela Lange MD          QUERY TEXT:    Pt admitted with AMS and alteration in respiratory status. Pt noted to have   pneumonia. If possible, please document in the progress notes and discharge   summary if you are evaluating and/or treating any of the following:    Note: CAP and HCAP indicate where the pneumonia was acquired, not a specific   type. The medical record reflects the following:  Risk Factors: COPD, CHF, DM  Clinical Indicators: pulmonary consult noted \"likely aspiration vs community   acquired pneumonia\"; O2 sat 82 % upon arrival with RR 14-20; ABG's pH 7.380,   pCO2 66, pO2 83; CXR-mild central vascular congestion; WBC 4.6; breath sounds   noted wheezing and rales  Treatment: Rocephin IV, Zosyn IV, pulm consult, [facility]  [phone]  Options provided:  -- Gram negative pneumonia  -- Aspiration pneumonia  -- Streptococcal pneumonia  -- Other - I will add my own diagnosis  -- Disagree - Not applicable / Not valid  -- Disagree - Clinically unable to determine / Unknown  -- Refer to Clinical Documentation Reviewer    PROVIDER RESPONSE TEXT:    Provider is clinically unable to determine a response to this query.     Query created by: Michelle Sanderson on 2023 6:28 AM      Electronically signed by:  Marianela Lange MD 2023 8:53 AM

## 2024-01-29 DIAGNOSIS — Z95.0 PACEMAKER: Primary | ICD-10-CM

## 2024-01-29 DIAGNOSIS — I48.0 PAF (PAROXYSMAL ATRIAL FIBRILLATION) (HCC): ICD-10-CM

## 2024-01-29 DIAGNOSIS — I49.5 SA NODE DYSFUNCTION (HCC): ICD-10-CM

## 2024-01-31 RX ORDER — SOTALOL HYDROCHLORIDE 120 MG/1
TABLET ORAL
Qty: 180 TABLET | Refills: 3 | Status: SHIPPED | OUTPATIENT
Start: 2024-01-31

## 2024-01-31 NOTE — TELEPHONE ENCOUNTER
Renata is requesting a refill of their   Requested Prescriptions     Pending Prescriptions Disp Refills    sotalol (BETAPACE) 120 MG tablet 180 tablet 3     Sig: TAKE 1 TABLET TWICE A DAY  *DOSE INCREASE*   . Please advise.      Last Appt:  12/11/2023  Next Appt:   5/15/2024  Preferred pharmacy: [id number] - [facility] [place] - [address] [phone] - F [phone]

## 2024-02-14 ENCOUNTER — APPOINTMENT (OUTPATIENT)
Dept: GENERAL RADIOLOGY | Age: 74
DRG: 189 | End: 2024-02-14
Payer: MEDICARE

## 2024-02-14 ENCOUNTER — HOSPITAL ENCOUNTER (INPATIENT)
Age: 74
LOS: 8 days | Discharge: HOME HEALTH | DRG: 189 | End: 2024-02-22
Attending: STUDENT IN AN ORGANIZED HEALTH CARE EDUCATION/TRAINING PROGRAM | Admitting: FAMILY MEDICINE
Payer: MEDICARE

## 2024-02-14 DIAGNOSIS — J44.1 COPD EXACERBATION (HCC): Primary | ICD-10-CM

## 2024-02-14 DIAGNOSIS — J44.1 COPD WITH ACUTE EXACERBATION (HCC): ICD-10-CM

## 2024-02-14 DIAGNOSIS — E87.1 HYPONATREMIA: ICD-10-CM

## 2024-02-14 DIAGNOSIS — J96.01 ACUTE HYPOXEMIC RESPIRATORY FAILURE (HCC): ICD-10-CM

## 2024-02-14 PROBLEM — J12.82 PNEUMONIA DUE TO COVID-19 VIRUS: Status: ACTIVE | Noted: 2024-02-14

## 2024-02-14 PROBLEM — U07.1 PNEUMONIA DUE TO COVID-19 VIRUS: Status: ACTIVE | Noted: 2024-02-14

## 2024-02-14 LAB
ALBUMIN SERPL-MCNC: 3.4 G/DL (ref 3.5–5.2)
ALBUMIN SERPL-MCNC: 3.5 G/DL (ref 3.5–5.2)
ALLENS TEST: ABNORMAL
ALLENS TEST: ABNORMAL
ALP SERPL-CCNC: 100 U/L (ref 35–104)
ALP SERPL-CCNC: 105 U/L (ref 35–104)
ALT SERPL-CCNC: 14 U/L (ref 5–33)
ALT SERPL-CCNC: 18 U/L (ref 5–33)
ANION GAP SERPL CALCULATED.3IONS-SCNC: 10 MMOL/L (ref 7–19)
ANION GAP SERPL CALCULATED.3IONS-SCNC: 10 MMOL/L (ref 7–19)
ANION GAP SERPL CALCULATED.3IONS-SCNC: 11 MMOL/L (ref 7–19)
AST SERPL-CCNC: 18 U/L (ref 5–32)
AST SERPL-CCNC: 20 U/L (ref 5–32)
B PARAP IS1001 DNA NPH QL NAA+NON-PROBE: NOT DETECTED
B PERT.PT PRMT NPH QL NAA+NON-PROBE: NOT DETECTED
BASE EXCESS ARTERIAL: 6.8 MMOL/L (ref -2–2)
BASE EXCESS ARTERIAL: 8.5 MMOL/L (ref -2–2)
BASOPHILS # BLD: 0 K/UL (ref 0–0.2)
BASOPHILS NFR BLD: 0.1 % (ref 0–1)
BI-LEVEL POS AIRWAY PRESSURE(EXPIRATORY): 6
BI-LEVEL POS AIRWAY PRESSURE(EXPIRATORY): 6
BI-LEVEL POS AIRWAY PRESSURE(INSPIRATORY): 15
BI-LEVEL POS AIRWAY PRESSURE(INSPIRATORY): 15
BILIRUB SERPL-MCNC: 0.4 MG/DL (ref 0.2–1.2)
BILIRUB SERPL-MCNC: 0.5 MG/DL (ref 0.2–1.2)
BUN SERPL-MCNC: 17 MG/DL (ref 8–23)
BUN SERPL-MCNC: 17 MG/DL (ref 8–23)
BUN SERPL-MCNC: 21 MG/DL (ref 8–23)
C PNEUM DNA NPH QL NAA+NON-PROBE: NOT DETECTED
CALCIUM SERPL-MCNC: 8.5 MG/DL (ref 8.8–10.2)
CALCIUM SERPL-MCNC: 8.7 MG/DL (ref 8.8–10.2)
CALCIUM SERPL-MCNC: 9 MG/DL (ref 8.8–10.2)
CARBOXYHEMOGLOBIN ARTERIAL: 3.1 % (ref 0–5)
CARBOXYHEMOGLOBIN ARTERIAL: 3.3 % (ref 0–5)
CHLORIDE SERPL-SCNC: 80 MMOL/L (ref 98–111)
CHLORIDE SERPL-SCNC: 83 MMOL/L (ref 98–111)
CHLORIDE SERPL-SCNC: 85 MMOL/L (ref 98–111)
CO2 SERPL-SCNC: 28 MMOL/L (ref 22–29)
CO2 SERPL-SCNC: 28 MMOL/L (ref 22–29)
CO2 SERPL-SCNC: 29 MMOL/L (ref 22–29)
CREAT SERPL-MCNC: 1 MG/DL (ref 0.5–0.9)
EKG P AXIS: NORMAL DEGREES
EKG P-R INTERVAL: NORMAL MS
EKG Q-T INTERVAL: 342 MS
EKG QRS DURATION: 86 MS
EKG QTC CALCULATION (BAZETT): 413 MS
EKG T AXIS: 82 DEGREES
EOSINOPHIL # BLD: 0 K/UL (ref 0–0.6)
EOSINOPHIL NFR BLD: 0 % (ref 0–5)
ERYTHROCYTE [DISTWIDTH] IN BLOOD BY AUTOMATED COUNT: 16.8 % (ref 11.5–14.5)
FLUAV RNA NPH QL NAA+NON-PROBE: NOT DETECTED
FLUBV RNA NPH QL NAA+NON-PROBE: NOT DETECTED
GLUCOSE SERPL-MCNC: 243 MG/DL (ref 74–109)
GLUCOSE SERPL-MCNC: 271 MG/DL (ref 74–109)
GLUCOSE SERPL-MCNC: 272 MG/DL (ref 74–109)
HADV DNA NPH QL NAA+NON-PROBE: NOT DETECTED
HCO3 ARTERIAL: 33.1 MMOL/L (ref 22–26)
HCO3 ARTERIAL: 34.7 MMOL/L (ref 22–26)
HCOV 229E RNA NPH QL NAA+NON-PROBE: NOT DETECTED
HCOV HKU1 RNA NPH QL NAA+NON-PROBE: NOT DETECTED
HCOV NL63 RNA NPH QL NAA+NON-PROBE: NOT DETECTED
HCOV OC43 RNA NPH QL NAA+NON-PROBE: NOT DETECTED
HCT VFR BLD AUTO: 30.1 % (ref 37–47)
HEMOGLOBIN, ART, EXTENDED: 10 G/DL (ref 12–16)
HEMOGLOBIN, ART, EXTENDED: 10.4 G/DL (ref 12–16)
HGB BLD-MCNC: 9.4 G/DL (ref 12–16)
HMPV RNA NPH QL NAA+NON-PROBE: NOT DETECTED
HPIV1 RNA NPH QL NAA+NON-PROBE: NOT DETECTED
HPIV2 RNA NPH QL NAA+NON-PROBE: NOT DETECTED
HPIV3 RNA NPH QL NAA+NON-PROBE: NOT DETECTED
HPIV4 RNA NPH QL NAA+NON-PROBE: NOT DETECTED
IMM GRANULOCYTES # BLD: 0 K/UL
LYMPHOCYTES # BLD: 0.5 K/UL (ref 1.1–4.5)
LYMPHOCYTES NFR BLD: 4.6 % (ref 20–40)
M PNEUMO DNA NPH QL NAA+NON-PROBE: NOT DETECTED
MAGNESIUM SERPL-MCNC: 2.2 MG/DL (ref 1.6–2.4)
MCH RBC QN AUTO: 25.9 PG (ref 27–31)
MCHC RBC AUTO-ENTMCNC: 31.2 G/DL (ref 33–37)
MCV RBC AUTO: 82.9 FL (ref 81–99)
METHEMOGLOBIN ARTERIAL: 0.8 %
METHEMOGLOBIN ARTERIAL: 1 %
MODE: ABNORMAL
MODE: ABNORMAL
MONOCYTES # BLD: 1.2 K/UL (ref 0–0.9)
MONOCYTES NFR BLD: 10.4 % (ref 0–10)
NEUTROPHILS # BLD: 9.7 K/UL (ref 1.5–7.5)
NEUTS SEG NFR BLD: 84.6 % (ref 50–65)
O2 CONTENT ARTERIAL: 12.3 ML/DL
O2 CONTENT ARTERIAL: 12.8 ML/DL
O2 SAT, ARTERIAL: 87.4 %
O2 SAT, ARTERIAL: 87.6 %
O2 THERAPY: ABNORMAL
O2 THERAPY: ABNORMAL
OXYGEN FLOW: 1.5
OXYGEN FLOW: 2
PCO2 ARTERIAL: 56 MMHG (ref 35–45)
PCO2 ARTERIAL: 56 MMHG (ref 35–45)
PH ARTERIAL: 7.38 (ref 7.35–7.45)
PH ARTERIAL: 7.4 (ref 7.35–7.45)
PLATELET # BLD AUTO: 231 K/UL (ref 130–400)
PMV BLD AUTO: 9.9 FL (ref 9.4–12.3)
PO2 ARTERIAL: 59 MMHG (ref 80–100)
PO2 ARTERIAL: 59 MMHG (ref 80–100)
POTASSIUM BLD-SCNC: 4.7 MMOL/L
POTASSIUM BLD-SCNC: 4.8 MMOL/L
POTASSIUM SERPL-SCNC: 4.9 MMOL/L (ref 3.5–5)
POTASSIUM SERPL-SCNC: 5.3 MMOL/L (ref 3.5–5)
POTASSIUM SERPL-SCNC: 5.6 MMOL/L (ref 3.5–5)
PROT SERPL-MCNC: 7.3 G/DL (ref 6.6–8.7)
PROT SERPL-MCNC: 7.8 G/DL (ref 6.6–8.7)
RBC # BLD AUTO: 3.63 M/UL (ref 4.2–5.4)
REASON FOR REJECTION: NORMAL
REJECTED TEST: NORMAL
RSV RNA NPH QL NAA+NON-PROBE: NOT DETECTED
RV+EV RNA NPH QL NAA+NON-PROBE: NOT DETECTED
SAMPLE SOURCE: ABNORMAL
SAMPLE SOURCE: ABNORMAL
SARS-COV-2 RNA NPH QL NAA+NON-PROBE: DETECTED
SODIUM SERPL-SCNC: 119 MMOL/L (ref 136–145)
SODIUM SERPL-SCNC: 122 MMOL/L (ref 136–145)
SODIUM SERPL-SCNC: 123 MMOL/L (ref 136–145)
SPONT RATE(BPM): 22
SPONT RATE(BPM): 24
TSH SERPL DL<=0.005 MIU/L-ACNC: 0.73 UIU/ML (ref 0.35–5.5)
URATE SERPL-MCNC: 4.7 MG/DL (ref 2.4–5.7)
WBC # BLD AUTO: 11.5 K/UL (ref 4.8–10.8)

## 2024-02-14 PROCEDURE — 84443 ASSAY THYROID STIM HORMONE: CPT

## 2024-02-14 PROCEDURE — 83735 ASSAY OF MAGNESIUM: CPT

## 2024-02-14 PROCEDURE — 6370000000 HC RX 637 (ALT 250 FOR IP): Performed by: STUDENT IN AN ORGANIZED HEALTH CARE EDUCATION/TRAINING PROGRAM

## 2024-02-14 PROCEDURE — 93005 ELECTROCARDIOGRAM TRACING: CPT | Performed by: STUDENT IN AN ORGANIZED HEALTH CARE EDUCATION/TRAINING PROGRAM

## 2024-02-14 PROCEDURE — 84295 ASSAY OF SERUM SODIUM: CPT

## 2024-02-14 PROCEDURE — 2500000003 HC RX 250 WO HCPCS: Performed by: INTERNAL MEDICINE

## 2024-02-14 PROCEDURE — 94660 CPAP INITIATION&MGMT: CPT

## 2024-02-14 PROCEDURE — 71045 X-RAY EXAM CHEST 1 VIEW: CPT

## 2024-02-14 PROCEDURE — 94760 N-INVAS EAR/PLS OXIMETRY 1: CPT

## 2024-02-14 PROCEDURE — 6370000000 HC RX 637 (ALT 250 FOR IP): Performed by: INTERNAL MEDICINE

## 2024-02-14 PROCEDURE — 84550 ASSAY OF BLOOD/URIC ACID: CPT

## 2024-02-14 PROCEDURE — 99285 EMERGENCY DEPT VISIT HI MDM: CPT

## 2024-02-14 PROCEDURE — 6360000002 HC RX W HCPCS: Performed by: STUDENT IN AN ORGANIZED HEALTH CARE EDUCATION/TRAINING PROGRAM

## 2024-02-14 PROCEDURE — 85025 COMPLETE CBC W/AUTO DIFF WBC: CPT

## 2024-02-14 PROCEDURE — 0202U NFCT DS 22 TRGT SARS-COV-2: CPT

## 2024-02-14 PROCEDURE — 82803 BLOOD GASES ANY COMBINATION: CPT

## 2024-02-14 PROCEDURE — 2700000000 HC OXYGEN THERAPY PER DAY

## 2024-02-14 PROCEDURE — 80053 COMPREHEN METABOLIC PANEL: CPT

## 2024-02-14 PROCEDURE — 2000000000 HC ICU R&B

## 2024-02-14 PROCEDURE — 93010 ELECTROCARDIOGRAM REPORT: CPT | Performed by: INTERNAL MEDICINE

## 2024-02-14 PROCEDURE — 99291 CRITICAL CARE FIRST HOUR: CPT | Performed by: INTERNAL MEDICINE

## 2024-02-14 PROCEDURE — 36600 WITHDRAWAL OF ARTERIAL BLOOD: CPT

## 2024-02-14 PROCEDURE — 2580000003 HC RX 258: Performed by: INTERNAL MEDICINE

## 2024-02-14 PROCEDURE — 2580000003 HC RX 258: Performed by: FAMILY MEDICINE

## 2024-02-14 PROCEDURE — 94640 AIRWAY INHALATION TREATMENT: CPT

## 2024-02-14 PROCEDURE — 2580000003 HC RX 258: Performed by: STUDENT IN AN ORGANIZED HEALTH CARE EDUCATION/TRAINING PROGRAM

## 2024-02-14 PROCEDURE — 83930 ASSAY OF BLOOD OSMOLALITY: CPT

## 2024-02-14 PROCEDURE — 36415 COLL VENOUS BLD VENIPUNCTURE: CPT

## 2024-02-14 PROCEDURE — 6360000002 HC RX W HCPCS: Performed by: INTERNAL MEDICINE

## 2024-02-14 PROCEDURE — 96365 THER/PROPH/DIAG IV INF INIT: CPT

## 2024-02-14 PROCEDURE — 96375 TX/PRO/DX INJ NEW DRUG ADDON: CPT

## 2024-02-14 PROCEDURE — 96374 THER/PROPH/DIAG INJ IV PUSH: CPT

## 2024-02-14 RX ORDER — ACETAMINOPHEN 325 MG/1
650 TABLET ORAL EVERY 6 HOURS PRN
Status: DISCONTINUED | OUTPATIENT
Start: 2024-02-14 | End: 2024-02-14

## 2024-02-14 RX ORDER — ENOXAPARIN SODIUM 100 MG/ML
40 INJECTION SUBCUTANEOUS DAILY
Status: DISCONTINUED | OUTPATIENT
Start: 2024-02-14 | End: 2024-02-14

## 2024-02-14 RX ORDER — IPRATROPIUM BROMIDE AND ALBUTEROL SULFATE 2.5; .5 MG/3ML; MG/3ML
1 SOLUTION RESPIRATORY (INHALATION)
Status: DISCONTINUED | OUTPATIENT
Start: 2024-02-14 | End: 2024-02-14

## 2024-02-14 RX ORDER — NITROGLYCERIN 0.4 MG/1
0.4 TABLET SUBLINGUAL EVERY 5 MIN PRN
Status: DISCONTINUED | OUTPATIENT
Start: 2024-02-14 | End: 2024-02-22 | Stop reason: HOSPADM

## 2024-02-14 RX ORDER — MAGNESIUM SULFATE IN WATER 40 MG/ML
2000 INJECTION, SOLUTION INTRAVENOUS PRN
Status: DISCONTINUED | OUTPATIENT
Start: 2024-02-14 | End: 2024-02-22 | Stop reason: HOSPADM

## 2024-02-14 RX ORDER — POLYETHYLENE GLYCOL 3350 17 G/17G
17 POWDER, FOR SOLUTION ORAL DAILY PRN
Status: DISCONTINUED | OUTPATIENT
Start: 2024-02-14 | End: 2024-02-14

## 2024-02-14 RX ORDER — SODIUM CHLORIDE 0.9 % (FLUSH) 0.9 %
5-40 SYRINGE (ML) INJECTION EVERY 12 HOURS SCHEDULED
Status: DISCONTINUED | OUTPATIENT
Start: 2024-02-14 | End: 2024-02-15 | Stop reason: SDUPTHER

## 2024-02-14 RX ORDER — 3% SODIUM CHLORIDE 3 G/100ML
50 INJECTION, SOLUTION INTRAVENOUS CONTINUOUS
Status: DISCONTINUED | OUTPATIENT
Start: 2024-02-14 | End: 2024-02-15

## 2024-02-14 RX ORDER — MONTELUKAST SODIUM 10 MG/1
10 TABLET ORAL NIGHTLY
Status: DISCONTINUED | OUTPATIENT
Start: 2024-02-14 | End: 2024-02-22 | Stop reason: HOSPADM

## 2024-02-14 RX ORDER — 0.9 % SODIUM CHLORIDE 0.9 %
1000 INTRAVENOUS SOLUTION INTRAVENOUS ONCE
Status: COMPLETED | OUTPATIENT
Start: 2024-02-14 | End: 2024-02-14

## 2024-02-14 RX ORDER — POLYETHYLENE GLYCOL 3350 17 G/17G
17 POWDER, FOR SOLUTION ORAL DAILY PRN
Status: DISCONTINUED | OUTPATIENT
Start: 2024-02-14 | End: 2024-02-22 | Stop reason: HOSPADM

## 2024-02-14 RX ORDER — ONDANSETRON 2 MG/ML
4 INJECTION INTRAMUSCULAR; INTRAVENOUS EVERY 6 HOURS PRN
Status: DISCONTINUED | OUTPATIENT
Start: 2024-02-14 | End: 2024-02-15 | Stop reason: SDUPTHER

## 2024-02-14 RX ORDER — SODIUM CHLORIDE 9 MG/ML
INJECTION, SOLUTION INTRAVENOUS PRN
Status: DISCONTINUED | OUTPATIENT
Start: 2024-02-14 | End: 2024-02-22 | Stop reason: HOSPADM

## 2024-02-14 RX ORDER — SOTALOL HYDROCHLORIDE 80 MG/1
120 TABLET ORAL EVERY 12 HOURS SCHEDULED
Status: DISCONTINUED | OUTPATIENT
Start: 2024-02-14 | End: 2024-02-22 | Stop reason: HOSPADM

## 2024-02-14 RX ORDER — POTASSIUM CHLORIDE 29.8 MG/ML
20 INJECTION INTRAVENOUS PRN
Status: DISCONTINUED | OUTPATIENT
Start: 2024-02-14 | End: 2024-02-22 | Stop reason: HOSPADM

## 2024-02-14 RX ORDER — IPRATROPIUM BROMIDE AND ALBUTEROL SULFATE 2.5; .5 MG/3ML; MG/3ML
1 SOLUTION RESPIRATORY (INHALATION) ONCE
Status: COMPLETED | OUTPATIENT
Start: 2024-02-14 | End: 2024-02-14

## 2024-02-14 RX ORDER — ROSUVASTATIN CALCIUM 10 MG/1
10 TABLET, COATED ORAL NIGHTLY
Status: DISCONTINUED | OUTPATIENT
Start: 2024-02-14 | End: 2024-02-22 | Stop reason: HOSPADM

## 2024-02-14 RX ORDER — LORAZEPAM 2 MG/ML
0.5 INJECTION INTRAMUSCULAR ONCE
Status: COMPLETED | OUTPATIENT
Start: 2024-02-14 | End: 2024-02-14

## 2024-02-14 RX ORDER — ACETAMINOPHEN 650 MG/1
650 SUPPOSITORY RECTAL EVERY 6 HOURS PRN
Status: DISCONTINUED | OUTPATIENT
Start: 2024-02-14 | End: 2024-02-22 | Stop reason: HOSPADM

## 2024-02-14 RX ORDER — ONDANSETRON 4 MG/1
4 TABLET, ORALLY DISINTEGRATING ORAL EVERY 8 HOURS PRN
Status: DISCONTINUED | OUTPATIENT
Start: 2024-02-14 | End: 2024-02-22 | Stop reason: HOSPADM

## 2024-02-14 RX ORDER — POTASSIUM CHLORIDE 7.45 MG/ML
10 INJECTION INTRAVENOUS PRN
Status: DISCONTINUED | OUTPATIENT
Start: 2024-02-14 | End: 2024-02-22 | Stop reason: HOSPADM

## 2024-02-14 RX ORDER — SODIUM CHLORIDE 9 MG/ML
INJECTION, SOLUTION INTRAVENOUS PRN
Status: DISCONTINUED | OUTPATIENT
Start: 2024-02-14 | End: 2024-02-15 | Stop reason: SDUPTHER

## 2024-02-14 RX ORDER — ACETAMINOPHEN 325 MG/1
650 TABLET ORAL EVERY 6 HOURS PRN
Status: DISCONTINUED | OUTPATIENT
Start: 2024-02-14 | End: 2024-02-22 | Stop reason: HOSPADM

## 2024-02-14 RX ORDER — DEXMEDETOMIDINE HYDROCHLORIDE 4 UG/ML
.1-1.5 INJECTION, SOLUTION INTRAVENOUS CONTINUOUS
Status: DISCONTINUED | OUTPATIENT
Start: 2024-02-14 | End: 2024-02-18

## 2024-02-14 RX ORDER — ONDANSETRON 4 MG/1
4 TABLET, ORALLY DISINTEGRATING ORAL EVERY 8 HOURS PRN
Status: DISCONTINUED | OUTPATIENT
Start: 2024-02-14 | End: 2024-02-15 | Stop reason: SDUPTHER

## 2024-02-14 RX ORDER — CLOPIDOGREL BISULFATE 75 MG/1
75 TABLET ORAL DAILY
Status: DISCONTINUED | OUTPATIENT
Start: 2024-02-14 | End: 2024-02-22 | Stop reason: HOSPADM

## 2024-02-14 RX ORDER — LACTOBACILLUS RHAMNOSUS GG 10B CELL
1 CAPSULE ORAL
Status: DISCONTINUED | OUTPATIENT
Start: 2024-02-14 | End: 2024-02-22 | Stop reason: HOSPADM

## 2024-02-14 RX ORDER — SODIUM CHLORIDE 0.9 % (FLUSH) 0.9 %
5-40 SYRINGE (ML) INJECTION EVERY 12 HOURS SCHEDULED
Status: DISCONTINUED | OUTPATIENT
Start: 2024-02-14 | End: 2024-02-22 | Stop reason: HOSPADM

## 2024-02-14 RX ORDER — LORAZEPAM 2 MG/ML
0.5 INJECTION INTRAMUSCULAR EVERY 4 HOURS PRN
Status: DISCONTINUED | OUTPATIENT
Start: 2024-02-14 | End: 2024-02-15

## 2024-02-14 RX ORDER — SODIUM CHLORIDE 0.9 % (FLUSH) 0.9 %
5-40 SYRINGE (ML) INJECTION PRN
Status: DISCONTINUED | OUTPATIENT
Start: 2024-02-14 | End: 2024-02-15 | Stop reason: SDUPTHER

## 2024-02-14 RX ORDER — ACETAMINOPHEN 650 MG/1
650 SUPPOSITORY RECTAL EVERY 6 HOURS PRN
Status: DISCONTINUED | OUTPATIENT
Start: 2024-02-14 | End: 2024-02-14

## 2024-02-14 RX ORDER — ALBUTEROL SULFATE 90 UG/1
2 AEROSOL, METERED RESPIRATORY (INHALATION)
Status: DISCONTINUED | OUTPATIENT
Start: 2024-02-14 | End: 2024-02-22 | Stop reason: HOSPADM

## 2024-02-14 RX ORDER — ONDANSETRON 2 MG/ML
4 INJECTION INTRAMUSCULAR; INTRAVENOUS EVERY 6 HOURS PRN
Status: DISCONTINUED | OUTPATIENT
Start: 2024-02-14 | End: 2024-02-22 | Stop reason: HOSPADM

## 2024-02-14 RX ORDER — SODIUM CHLORIDE 0.9 % (FLUSH) 0.9 %
5-40 SYRINGE (ML) INJECTION PRN
Status: DISCONTINUED | OUTPATIENT
Start: 2024-02-14 | End: 2024-02-22 | Stop reason: HOSPADM

## 2024-02-14 RX ADMIN — WATER 1000 MG: 1 INJECTION INTRAMUSCULAR; INTRAVENOUS; SUBCUTANEOUS at 06:09

## 2024-02-14 RX ADMIN — IPRATROPIUM BROMIDE AND ALBUTEROL SULFATE 1 DOSE: .5; 3 SOLUTION RESPIRATORY (INHALATION) at 05:21

## 2024-02-14 RX ADMIN — ALBUTEROL SULFATE 2 PUFF: 90 AEROSOL, METERED RESPIRATORY (INHALATION) at 22:19

## 2024-02-14 RX ADMIN — IPRATROPIUM BROMIDE 2 PUFF: 17 AEROSOL, METERED RESPIRATORY (INHALATION) at 15:53

## 2024-02-14 RX ADMIN — SODIUM CHLORIDE, PRESERVATIVE FREE 10 ML: 5 INJECTION INTRAVENOUS at 19:57

## 2024-02-14 RX ADMIN — DEXMEDETOMIDINE HYDROCHLORIDE 0.2 MCG/KG/HR: 400 INJECTION, SOLUTION INTRAVENOUS at 18:22

## 2024-02-14 RX ADMIN — SODIUM CHLORIDE 50 ML/HR: 3 INJECTION, SOLUTION INTRAVENOUS at 20:14

## 2024-02-14 RX ADMIN — WATER 60 MG: 1 INJECTION INTRAMUSCULAR; INTRAVENOUS; SUBCUTANEOUS at 17:21

## 2024-02-14 RX ADMIN — WATER 125 MG: 1 INJECTION INTRAMUSCULAR; INTRAVENOUS; SUBCUTANEOUS at 05:28

## 2024-02-14 RX ADMIN — SODIUM CHLORIDE 1000 ML: 9 INJECTION, SOLUTION INTRAVENOUS at 07:58

## 2024-02-14 RX ADMIN — LORAZEPAM 0.5 MG: 2 INJECTION INTRAMUSCULAR; INTRAVENOUS at 06:09

## 2024-02-14 RX ADMIN — BARICITINIB 2 MG: 2 TABLET, FILM COATED ORAL at 19:57

## 2024-02-14 RX ADMIN — APIXABAN 5 MG: 5 TABLET, FILM COATED ORAL at 17:23

## 2024-02-14 RX ADMIN — ALBUTEROL SULFATE 2 PUFF: 90 AEROSOL, METERED RESPIRATORY (INHALATION) at 18:40

## 2024-02-14 RX ADMIN — IPRATROPIUM BROMIDE 2 PUFF: 17 AEROSOL, METERED RESPIRATORY (INHALATION) at 22:19

## 2024-02-14 RX ADMIN — ROSUVASTATIN 10 MG: 10 TABLET, FILM COATED ORAL at 19:57

## 2024-02-14 RX ADMIN — CEFEPIME 2000 MG: 2 INJECTION, POWDER, FOR SOLUTION INTRAVENOUS at 19:38

## 2024-02-14 RX ADMIN — IPRATROPIUM BROMIDE 2 PUFF: 17 AEROSOL, METERED RESPIRATORY (INHALATION) at 18:40

## 2024-02-14 RX ADMIN — DEXMEDETOMIDINE HYDROCHLORIDE 1.2 MCG/KG/HR: 400 INJECTION, SOLUTION INTRAVENOUS at 23:28

## 2024-02-14 RX ADMIN — AZITHROMYCIN MONOHYDRATE 500 MG: 500 INJECTION, POWDER, LYOPHILIZED, FOR SOLUTION INTRAVENOUS at 05:31

## 2024-02-14 RX ADMIN — MONTELUKAST 10 MG: 10 TABLET, FILM COATED ORAL at 19:57

## 2024-02-14 RX ADMIN — ALBUTEROL SULFATE 2 PUFF: 90 AEROSOL, METERED RESPIRATORY (INHALATION) at 15:53

## 2024-02-14 RX ADMIN — WATER 60 MG: 1 INJECTION INTRAMUSCULAR; INTRAVENOUS; SUBCUTANEOUS at 23:38

## 2024-02-14 ASSESSMENT — PAIN - FUNCTIONAL ASSESSMENT: PAIN_FUNCTIONAL_ASSESSMENT: NONE - DENIES PAIN

## 2024-02-14 NOTE — ED NOTES
ED TO INPATIENT SBAR HANDOFF    Patient Name: Renata Mendez   : 1950  74 y.o.   Family/Caregiver Present: NO  Code Status Order: Full Code    C-SSRS: Risk of Suicide: No Risk  Sitter No  Restraints:         Situation  Chief Complaint   Patient presents with    Shortness of Breath     Brief Description of Patient's Condition: Pt presents to the Er with Soa and confusion. Pt stated that she has been around multiple sick people and has felt like she has had a fever and chills. Pt Placed on Bipap upon arrival and o2 sats improved.    Mental Status: disoriented  Arrived from: home    Imaging:   XR CHEST PORTABLE   Final Result   Impression: Bibasilar atelectasis and/or pneumonia.       Cardiomegaly.                   ______________________________________    Electronically signed by: CATHERINE REESE M.D.   Date:     2024   Time:    05:30         COVID-19 Results:   Internal Administration   First Dose COVID-19, PFIZER PURPLE top, DILUTE for use, (age 12 y+), 30mcg/0.3mL  2021   Second Dose COVID-19, PFIZER PURPLE top, DILUTE for use, (age 12 y+), 30mcg/0.3mL   2021       Last COVID Lab SARS-CoV-2, PCR (no units)   Date Value   2024 DETECTED (A)     SARS-CoV-2, CAYDEN (no units)   Date Value   2020 NOT DETECTED     SARS-CoV-2, NAAT (no units)   Date Value   2022 Not Detected     POC Gaston's Test (no units)   Date Value   2011 POS     POC Creatinine (mg/dL)   Date Value   2020 1.3     POC Glucose (mg/dl)   Date Value   2023 122 (H)     Hemoglobin (g/dL)   Date Value   2011 8.2     POC Ionized Calcium (mmol/L)   Date Value   2011 1.36     Sample Type (no units)   Date Value   2020 Unspecified           Abnormal labs:   Abnormal Labs Reviewed   RESPIRATORY PANEL, MOLECULAR, WITH COVID-19 - Abnormal; Notable for the following components:       Result Value    SARS-CoV-2, PCR DETECTED (*)     All other components within normal limits   CBC WITH AUTO

## 2024-02-14 NOTE — CARE COORDINATION
Update: ROSS Winkler met with Pt at bedside. Pt is currently asleep. This writer received an update on Pt by Pt Nurse and they reported Pt currently has altered mental status at this time. This assessment with Pt is currently incomplete. ROSS/Case Management will need to follow up with Pt to complete the Pt assessment.

## 2024-02-14 NOTE — CONSULTS
Nephrology ([facility]) Consult Note      Patient:  Renata Mendez  YOB: 1950  Date of Service: 2/14/2024  MRN: [id number]   Acct: [id number]   Primary Care Physician: Greg Sandoval MD  Advance Directive: Full Code  Admit Date: 2/14/2024       Hospital Day: 0  Referring Provider: Greg Sandoval MD    Patient independently seen and examined, Chart, Consults, Notes, Operative notes, Labs, Cardiology, and Radiology studies reviewed as available.    Chief complaint: Abnormal labs.    Subjective:  Renata Mendez is a 74 y.o. female for whom we were consulted for evaluation and treatment of HYPONATREMIA.  She has history of COPD/active tobacco use, type 2 diabetes, hyperlipidemia, pacemaker implant and CVA.  Presented with confusion/disoriented.  Patient has fever and chills with congestion.  Chest x-ray consistent with possible pneumonia.  In ambulance she was found to have an O2 saturation 71%.  Routine lab data indicated her serum sodium is 122 mmol.  She denies any history of nausea vomiting or diarrhea.  She takes trazodone as well as Aldactone.  Patient is not being admitted to ICU for close monitoring and treatment of hyponatremia    Allergies:  Codeine    Medicines:  Current Facility-Administered Medications   Medication Dose Route Frequency Provider Last Rate Last Admin    apixaban (ELIQUIS) tablet 5 mg  5 mg Oral BID Junior Aviles MD        clopidogrel (PLAVIX) tablet 75 mg  75 mg Oral Daily Junior Aviles MD        lactobacillus (CULTURELLE) capsule 1 capsule  1 capsule Oral Daily with breakfast Junior Aviles MD        montelukast (SINGULAIR) tablet 10 mg  10 mg Oral Nightly Junior Aviles MD        nitroGLYCERIN (NITROSTAT) SL tablet 0.4 mg  0.4 mg SubLINGual Q5 Min PRN Junior Aviles MD        rosuvastatin (CRESTOR) tablet 10 mg  10 mg Oral Nightly Junior Aviles MD        sotalol (BETAPACE) tablet 120 mg  120 mg Oral 2 times per day Traci  mg IntraVENous Q6H PRN Greg Sandoval MD        ipratropium (ATROVENT HFA) 17 MCG/ACT inhaler 2 puff  2 puff Inhalation Q4H RT Junior Aviles MD        And    albuterol sulfate HFA (PROVENTIL;VENTOLIN;PROAIR) 108 (90 Base) MCG/ACT inhaler 2 puff  2 puff Inhalation Q4H RT Junior Aviles MD         Current Outpatient Medications   Medication Sig Dispense Refill    sotalol (BETAPACE) 120 MG tablet TAKE 1 TABLET TWICE A DAY  *DOSE INCREASE* 180 tablet 3    lactobacillus (CULTURELLE) capsule Take 1 capsule by mouth daily (with breakfast) 90 capsule 0    montelukast (SINGULAIR) 10 MG tablet Take 1 tablet by mouth nightly 30 tablet 3    traZODone (DESYREL) 100 MG tablet Take 1 tablet by mouth nightly 30 tablet 0    zinc sulfate (ZINCATE) 220 (50 Zn)  mg capsule - elemental zinc Take 1 capsule by mouth daily 30 capsule 3    spironolactone (ALDACTONE) 50 MG tablet Take 1 tablet by mouth daily      apixaban (ELIQUIS) 5 MG TABS tablet Take 1 tablet by mouth 2 times daily 180 tablet 3    rosuvastatin (CRESTOR) 10 MG tablet TAKE 1 TABLET DAILY 90 tablet 3    clopidogrel (PLAVIX) 75 MG tablet Take 1 tablet by mouth daily 90 tablet 3    furosemide (LASIX) 40 MG tablet Take 1 tablet by mouth daily 90 tablet 3    metFORMIN (GLUCOPHAGE) 500 MG tablet Take 1 tablet by mouth 2 times daily (with meals)      ipratropium-albuterol (DUONEB) 0.5-2.5 (3) MG/3ML SOLN nebulizer solution Inhale 3 mLs into the lungs every 4 hours (while awake) 360 mL 0    nitroGLYCERIN (NITROSTAT) 0.4 MG SL tablet Place 1 tablet under the tongue every 5 minutes as needed for Chest pain 25 tablet 3       Past Medical History:  Past Medical History:   Diagnosis Date    ASHD (arteriosclerotic heart disease)     s/p PTCA and stent of circumflex, as well as intermediate and mid LAD     COPD (chronic obstructive pulmonary disease) (HCC)     Coronary atherosclerosis     Diabetes mellitus (HCC)     diet controlled, type 2    Encounter for wound care     FOR

## 2024-02-14 NOTE — ED PROVIDER NOTES
[facility] EMERGENCY DEPT  eMERGENCY dEPARTMENT eNCOUnter      Pt Name: Renata Mendez  MRN: [id number]  Birthdate 1950  Date of evaluation: 2/14/2024  Provider: Marta Plunkett MD    CHIEF COMPLAINT       Chief Complaint   Patient presents with    Shortness of Breath         HISTORY OF PRESENT ILLNESS   (Location/Symptom, Timing/Onset,Context/Setting, Quality, Duration, Modifying Factors, Severity)  Note limiting factors.     HPI    Renata Mendez is a 74 y.o. female with PMH of nonischemic cardiomyopathy, severe COPD on 2.5 L nasal cannula at baseline, hypertension, hyperlipidemia, coronary artery disease status post CABG, AAA, type 2 diabetes, DVT on Eliquis, hyponatremia, stroke who presents to the emergency department with CC of shortness of breath.  Patient reports symptoms ongoing for the last 2 to 3 days, progressive in nature.  Reports subjective fevers and chills, cough, congestion, shortness of breath and wheezing.  Reports that she has been around multiple sick contacts.  She states that her symptoms for started on Sunday with a mild sore throat.  She denies any vomiting or diarrhea.  She denies any associated chest pain.  Patient was noted to be 71% on her baseline 2.5 L upon EMS arrival.  She was placed on her home trilogy 15/6 and saturations improved.  She was also given a DuoNeb in route.        NursingNotes were reviewed.    REVIEW OF SYSTEMS    (2-9 systems for level 4, 10 or more for level 5)     Review of Systems   Constitutional:  Positive for chills, fatigue and fever.   HENT:  Positive for congestion and sore throat.    Eyes:  Negative for pain and redness.   Respiratory:  Positive for cough and shortness of breath. Negative for chest tightness.    Cardiovascular:  Negative for chest pain and leg swelling.   Gastrointestinal:  Negative for abdominal pain, diarrhea, nausea and vomiting.   Genitourinary:  Negative for dysuria and urgency.   Musculoskeletal:  Negative for neck pain and neck  home trilogy upon EMS arrival, and symptoms are improved and saturations improved upon arrival to the ED.    Patient was transition to BiPAP 15/6 and continued to do well.  She was given Solu-Medrol, DuoNebs.  Chest x-ray was obtained which was notable for bibasilar atelectasis or pneumonia.  Patient was given Rocephin and azithromycin for possible CAP.  Respiratory panel was obtained and is pending.    CBC with white count of 11.5, hemoglobin of 9.4, slightly less than baseline but no reported bleeding.  CMP hemolyzed x3 and still pending results    Patient to go to ICU for acute on chronic hypoxemic respiratory failure secondary to COPD exacerbation.    CONSULTS:  None    :  Unless otherwise noted below, none     Procedures    FINAL IMPRESSION      1. COPD exacerbation (HCC)    2. Acute hypoxemic respiratory failure (HCC)          DISPOSITION/PLAN   DISPOSITION Decision To Admit 02/14/2024 05:36:23 AM         (Please note that portions of this note were completed with a voice recognition program.  Efforts were made to edit thedictations but occasionally words are mis-transcribed.)    Marta Plunkett MD (electronically signed)Emergency Physician         Marta Plunkett MD  02/14/24 0671

## 2024-02-14 NOTE — H&P
CHIEF COMPLAINT: Shortness of breath and altered mental status    History Obtained From:  patient, electronic medical record     HISTORY OF PRESENT ILLNESS:      The patient is a 74 y.o. female with significant past medical history of severe and late stages COPD with ongoing tobacco use who presents with 2 to 3-day history of increasing shortness of breath.  Patient currently is arousable but not able to give any subjective information.  Apparently had fevers and chills with cough and congestion at home.  Multiple sick contacts.  Symptoms started on Saturday with a mild sore throat.  Denied any nausea and vomiting at that time.  She was found to have an oxygen saturation of 71% on her baseline supplemental oxygen per EMS on their arrival.  Was placed on her home trilogy device with improved saturations but has been somewhat obtunded since that time.    Past Medical History:   Diagnosis Date    ASHD (arteriosclerotic heart disease)     s/p PTCA and stent of circumflex, as well as intermediate and mid LAD     COPD (chronic obstructive pulmonary disease) (HCC)     Coronary atherosclerosis     Diabetes mellitus ([facility])     diet controlled, type 2    Encounter for wound care     FOR LLL WOUND    Fall 10/29/2020    Ganglion cyst     RT HAND    Hematoma 10/2020    hematoma LLL from fall injury    Hx of blood clots     Hypercholesteremia     Hypertension     Pacemaker 03/02/2021    Palliative care patient 03/16/2022    Pneumonia due to COVID-19 virus 2/14/2024    Stroke due to occlusion of left anterior cerebral artery ([facility]) 12/5/2023    Unstable angina ([facility])        Past Surgical History:   Procedure Laterality Date    CARDIAC CATHETERIZATION  12/10/00    selective left heart and coronary arteriography with left ventriculography     CARDIAC CATHETERIZATION  01/23/98    left heart cath, left ventriculography, slective coronary arteriography, direct infarct angioplasty and stent placement to proximal left anterior descending  or organomegally.  Extremities: pulses present in all extremities  Musculoskeletal:  No joint swelling, deformity, or tenderness.  Neurologic: Limited exam due to arousability.    DATA:  CBC with Differential:    Lab Results   Component Value Date/Time    WBC 11.5 02/14/2024 05:13 AM    RBC 3.63 02/14/2024 05:13 AM    HGB 9.4 02/14/2024 05:13 AM    HCT 30.1 02/14/2024 05:13 AM    HCT 32.1 09/02/2011 02:11 AM     02/14/2024 05:13 AM     09/02/2011 02:11 AM    MCV 82.9 02/14/2024 05:13 AM    MCH 25.9 02/14/2024 05:13 AM    MCHC 31.2 02/14/2024 05:13 AM    RDW 16.8 02/14/2024 05:13 AM    LYMPHOPCT 4.6 02/14/2024 05:13 AM    MONOPCT 10.4 02/14/2024 05:13 AM    EOSPCT 0.5 09/02/2011 02:11 AM    BASOPCT 0.1 02/14/2024 05:13 AM    MONOSABS 1.20 02/14/2024 05:13 AM    LYMPHSABS 0.5 02/14/2024 05:13 AM    EOSABS 0.00 02/14/2024 05:13 AM    BASOSABS 0.00 02/14/2024 05:13 AM     CMP:    Lab Results   Component Value Date/Time     02/14/2024 06:33 AM     09/02/2011 02:11 AM    K 4.8 02/14/2024 06:41 AM    K 5.3 02/14/2024 06:33 AM    K 3.2 12/23/2023 02:14 AM    K 4.1 09/02/2011 02:11 AM    CL 80 02/14/2024 06:33 AM     09/02/2011 02:11 AM    CO2 28 02/14/2024 06:33 AM    BUN 17 02/14/2024 06:33 AM    CREATININE 1.0 02/14/2024 06:33 AM    CREATININE 0.6 09/02/2011 02:11 AM    GFRAA >59 09/02/2022 02:12 AM    LABGLOM 59 02/14/2024 06:33 AM    GLUCOSE 271 02/14/2024 06:33 AM    PROT 7.8 02/14/2024 06:33 AM    PROT 7.5 01/18/2013 10:35 AM    LABALBU 3.5 02/14/2024 06:33 AM    LABALBU 3.3 09/02/2011 02:11 AM    CALCIUM 9.0 02/14/2024 06:33 AM    BILITOT 0.5 02/14/2024 06:33 AM    ALKPHOS 105 02/14/2024 06:33 AM    ALKPHOS 57 09/02/2011 02:11 AM    AST 18 02/14/2024 06:33 AM    ALT 14 02/14/2024 06:33 AM     Sputum Culture:  No components found for: \"CSPUTUM\"  Respiratory PCR positive for SARS-CoV-2    Radiology Review: Chest x-ray showing bilateral basilar atelectasis versus pneumonia as well as

## 2024-02-14 NOTE — CONSULTS
Pulmonary and Critical Care Consult Note    [facility] STEWART Mendez    MRN# [id number]    Acct# [id number]  2/14/2024   5:08 PM CST    Referring Provider:Greg Sandoval MD      Chief Complaint: shortness of breath.     Requesting physician: Dr. Sandoval.     Reason for consult: advanced copd with exacerbation.       HPI: We have been consulted to see this 74 y.o. year old female born on 1950.  The patient was brought to the hospital due to shortness of breath and altered mental status.  She endorses a fever.  She was hypoxic when EMS arrived at the scene with an oxygen saturation of 71%.  She is known to have severe COPD and chronic hypoxic hypercapnic respiratory failure for which she is on oxygen supplementation and trilogy machine at home.  Her COVID screen was positive in the emergency room.  She was admitted to the intensive care unit on noninvasive ventilation.  I was asked to see her regarding the above.  Her ABGs done in the emergency room showed chronic compensated hypoxic hypercapnic respiratory failure.      Past Medical History      Past Medical History:   Diagnosis Date    ASHD (arteriosclerotic heart disease)     s/p PTCA and stent of circumflex, as well as intermediate and mid LAD     COPD (chronic obstructive pulmonary disease) (HCC)     Coronary atherosclerosis     Diabetes mellitus (HCC)     diet controlled, type 2    Encounter for wound care     FOR LLL WOUND    Fall 10/29/2020    Ganglion cyst     RT HAND    Hematoma 10/2020    hematoma LLL from fall injury    Hx of blood clots     Hypercholesteremia     Hypertension     Pacemaker 03/02/2021    Palliative care patient 03/16/2022    Pneumonia due to COVID-19 virus 2/14/2024    Stroke due to occlusion of left anterior cerebral artery (HCC) 12/5/2023    Unstable angina ([facility])      SurgicalHistory  Past Surgical History:  Eliquis.  Hyponatremia etiology not clear most likely nutritional.  Nephrology consulted.      Critical care time 36 min       Wes Vega MD, [facility], [facility]    The above note was generated using voice recognition software.  Inadvertent typographical errors in transcription may have occurred.    Electronically signed by Wes Vega MD on 02/14/24 at 5:08 PM

## 2024-02-15 ENCOUNTER — APPOINTMENT (OUTPATIENT)
Dept: GENERAL RADIOLOGY | Age: 74
DRG: 189 | End: 2024-02-15
Payer: MEDICARE

## 2024-02-15 LAB
ALBUMIN SERPL-MCNC: 3.1 G/DL (ref 3.5–5.2)
ALBUMIN SERPL-MCNC: 3.1 G/DL (ref 3.5–5.2)
ALBUMIN SERPL-MCNC: 3.2 G/DL (ref 3.5–5.2)
ALLENS TEST: ABNORMAL
ALLENS TEST: ABNORMAL
ALP SERPL-CCNC: 109 U/L (ref 35–104)
ALT SERPL-CCNC: 104 U/L (ref 5–33)
ALT SERPL-CCNC: 91 U/L (ref 5–33)
ALT SERPL-CCNC: 92 U/L (ref 5–33)
ANION GAP SERPL CALCULATED.3IONS-SCNC: 10 MMOL/L (ref 7–19)
ANION GAP SERPL CALCULATED.3IONS-SCNC: 13 MMOL/L (ref 7–19)
ANION GAP SERPL CALCULATED.3IONS-SCNC: 13 MMOL/L (ref 7–19)
ANION GAP SERPL CALCULATED.3IONS-SCNC: 14 MMOL/L (ref 7–19)
ANION GAP SERPL CALCULATED.3IONS-SCNC: 7 MMOL/L (ref 7–19)
ANION GAP SERPL CALCULATED.3IONS-SCNC: 7 MMOL/L (ref 7–19)
ANION GAP SERPL CALCULATED.3IONS-SCNC: 8 MMOL/L (ref 7–19)
ANION GAP SERPL CALCULATED.3IONS-SCNC: 9 MMOL/L (ref 7–19)
ANISOCYTOSIS BLD QL SMEAR: ABNORMAL
ANISOCYTOSIS BLD QL SMEAR: ABNORMAL
AST SERPL-CCNC: 133 U/L (ref 5–32)
AST SERPL-CCNC: 71 U/L (ref 5–32)
AST SERPL-CCNC: 71 U/L (ref 5–32)
BASE EXCESS ARTERIAL: 6.4 MMOL/L (ref -2–2)
BASE EXCESS ARTERIAL: 7 MMOL/L (ref -2–2)
BASOPHILS # BLD: 0 K/UL (ref 0–0.2)
BASOPHILS # BLD: 0 K/UL (ref 0–0.2)
BASOPHILS NFR BLD: 0 % (ref 0–1)
BASOPHILS NFR BLD: 0 % (ref 0–1)
BI-LEVEL POS AIRWAY PRESSURE(EXPIRATORY): 4
BI-LEVEL POS AIRWAY PRESSURE(EXPIRATORY): 4
BI-LEVEL POS AIRWAY PRESSURE(INSPIRATORY): 14
BILIRUB DIRECT SERPL-MCNC: 0.2 MG/DL (ref 0–0.3)
BILIRUB DIRECT SERPL-MCNC: 0.3 MG/DL (ref 0–0.3)
BILIRUB INDIRECT SERPL-MCNC: 0.1 MG/DL (ref 0.1–1)
BILIRUB INDIRECT SERPL-MCNC: 0.2 MG/DL (ref 0.1–1)
BILIRUB SERPL-MCNC: 0.4 MG/DL (ref 0.2–1.2)
BUN SERPL-MCNC: 28 MG/DL (ref 8–23)
BUN SERPL-MCNC: 36 MG/DL (ref 8–23)
BUN SERPL-MCNC: 37 MG/DL (ref 8–23)
BUN SERPL-MCNC: 38 MG/DL (ref 8–23)
CALCIUM SERPL-MCNC: 8 MG/DL (ref 8.8–10.2)
CALCIUM SERPL-MCNC: 8.2 MG/DL (ref 8.8–10.2)
CALCIUM SERPL-MCNC: 8.3 MG/DL (ref 8.8–10.2)
CALCIUM SERPL-MCNC: 8.3 MG/DL (ref 8.8–10.2)
CALCIUM SERPL-MCNC: 8.4 MG/DL (ref 8.8–10.2)
CALCIUM SERPL-MCNC: 8.4 MG/DL (ref 8.8–10.2)
CARBOXYHEMOGLOBIN ARTERIAL: 2 % (ref 0–5)
CARBOXYHEMOGLOBIN ARTERIAL: 2.2 % (ref 0–5)
CHLORIDE SERPL-SCNC: 89 MMOL/L (ref 98–111)
CHLORIDE SERPL-SCNC: 89 MMOL/L (ref 98–111)
CHLORIDE SERPL-SCNC: 90 MMOL/L (ref 98–111)
CHLORIDE SERPL-SCNC: 92 MMOL/L (ref 98–111)
CHLORIDE SERPL-SCNC: 93 MMOL/L (ref 98–111)
CHLORIDE SERPL-SCNC: 94 MMOL/L (ref 98–111)
CHLORIDE SERPL-SCNC: 95 MMOL/L (ref 98–111)
CHLORIDE SERPL-SCNC: 98 MMOL/L (ref 98–111)
CO2 SERPL-SCNC: 25 MMOL/L (ref 22–29)
CO2 SERPL-SCNC: 27 MMOL/L (ref 22–29)
CO2 SERPL-SCNC: 27 MMOL/L (ref 22–29)
CO2 SERPL-SCNC: 28 MMOL/L (ref 22–29)
CO2 SERPL-SCNC: 28 MMOL/L (ref 22–29)
CO2 SERPL-SCNC: 30 MMOL/L (ref 22–29)
CO2 SERPL-SCNC: 32 MMOL/L (ref 22–29)
CO2 SERPL-SCNC: 33 MMOL/L (ref 22–29)
CONTINUOUS POSITIVE AIRWAY PRESSURE: 14
CREAT SERPL-MCNC: 0.8 MG/DL (ref 0.5–0.9)
CREAT SERPL-MCNC: 0.9 MG/DL (ref 0.5–0.9)
CREAT SERPL-MCNC: 1 MG/DL (ref 0.5–0.9)
CREAT UR-MCNC: 8.8 MG/DL (ref 28–217)
EOSINOPHIL # BLD: 0 K/UL (ref 0–0.6)
EOSINOPHIL # BLD: 0 K/UL (ref 0–0.6)
EOSINOPHIL NFR BLD: 0 % (ref 0–5)
EOSINOPHIL NFR BLD: 0 % (ref 0–5)
ERYTHROCYTE [DISTWIDTH] IN BLOOD BY AUTOMATED COUNT: 16.9 % (ref 11.5–14.5)
ERYTHROCYTE [DISTWIDTH] IN BLOOD BY AUTOMATED COUNT: 17 % (ref 11.5–14.5)
GLUCOSE SERPL-MCNC: 210 MG/DL (ref 74–109)
GLUCOSE SERPL-MCNC: 258 MG/DL (ref 74–109)
GLUCOSE SERPL-MCNC: 293 MG/DL (ref 74–109)
GLUCOSE SERPL-MCNC: 329 MG/DL (ref 74–109)
GLUCOSE SERPL-MCNC: 332 MG/DL (ref 74–109)
GLUCOSE SERPL-MCNC: 341 MG/DL (ref 74–109)
GLUCOSE SERPL-MCNC: 357 MG/DL (ref 74–109)
GLUCOSE SERPL-MCNC: 360 MG/DL (ref 74–109)
HCO3 ARTERIAL: 34 MMOL/L (ref 22–26)
HCO3 ARTERIAL: 34.2 MMOL/L (ref 22–26)
HCT VFR BLD AUTO: 32.4 % (ref 37–47)
HCT VFR BLD AUTO: 35.3 % (ref 37–47)
HEMOGLOBIN, ART, EXTENDED: 10.1 G/DL (ref 12–16)
HEMOGLOBIN, ART, EXTENDED: 10.6 G/DL (ref 12–16)
HGB BLD-MCNC: 10.2 G/DL (ref 12–16)
HGB BLD-MCNC: 9.8 G/DL (ref 12–16)
HYPOCHROMIA BLD QL SMEAR: ABNORMAL
HYPOCHROMIA BLD QL SMEAR: ABNORMAL
IMM GRANULOCYTES # BLD: 0 K/UL
IMM GRANULOCYTES # BLD: 0 K/UL
LYMPHOCYTES # BLD: 0.3 K/UL (ref 1.1–4.5)
LYMPHOCYTES # BLD: 0.6 K/UL (ref 1.1–4.5)
LYMPHOCYTES NFR BLD: 9 % (ref 20–40)
LYMPHOCYTES NFR BLD: 9 % (ref 20–40)
MCH RBC QN AUTO: 25.2 PG (ref 27–31)
MCH RBC QN AUTO: 25.9 PG (ref 27–31)
MCHC RBC AUTO-ENTMCNC: 28.9 G/DL (ref 33–37)
MCHC RBC AUTO-ENTMCNC: 30.2 G/DL (ref 33–37)
MCV RBC AUTO: 85.7 FL (ref 81–99)
MCV RBC AUTO: 87.2 FL (ref 81–99)
METHEMOGLOBIN ARTERIAL: 0.9 %
METHEMOGLOBIN ARTERIAL: 1 %
MODE: ABNORMAL
MODE: ABNORMAL
MONOCYTES # BLD: 0.1 K/UL (ref 0–0.9)
MONOCYTES # BLD: 0.2 K/UL (ref 0–0.9)
MONOCYTES NFR BLD: 2 % (ref 0–10)
MONOCYTES NFR BLD: 5 % (ref 0–10)
NEUTROPHILS # BLD: 2.7 K/UL (ref 1.5–7.5)
NEUTROPHILS # BLD: 2.8 K/UL (ref 1.5–7.5)
NEUTS BAND NFR BLD MANUAL: 2 % (ref 0–5)
NEUTS SEG NFR BLD: 77 % (ref 50–65)
NEUTS SEG NFR BLD: 87 % (ref 50–65)
O2 CONTENT ARTERIAL: 13.1 ML/DL
O2 CONTENT ARTERIAL: 13.1 ML/DL
O2 SAT, ARTERIAL: 87.7 %
O2 SAT, ARTERIAL: 91.5 %
O2 THERAPY: ABNORMAL
O2 THERAPY: ABNORMAL
OSMOLALITY SERPL CALC.SUM OF ELEC: 278 MOSM/KG (ref 275–300)
OSMOLALITY URINE: 357 MOSM/KG (ref 250–1200)
OVALOCYTES BLD QL SMEAR: ABNORMAL
OVALOCYTES BLD QL SMEAR: ABNORMAL
OXYGEN FLOW: 2
OXYGEN FLOW: 2
PCO2 ARTERIAL: 62 MMHG (ref 35–45)
PCO2 ARTERIAL: 66 MMHG (ref 35–45)
PH ARTERIAL: 7.32 (ref 7.35–7.45)
PH ARTERIAL: 7.35 (ref 7.35–7.45)
PLATELET # BLD AUTO: 193 K/UL (ref 130–400)
PLATELET # BLD AUTO: 212 K/UL (ref 130–400)
PLATELET SLIDE REVIEW: ADEQUATE
PLATELET SLIDE REVIEW: ADEQUATE
PMV BLD AUTO: 10 FL (ref 9.4–12.3)
PMV BLD AUTO: 10.7 FL (ref 9.4–12.3)
PO2 ARTERIAL: 56 MMHG (ref 80–100)
PO2 ARTERIAL: 71 MMHG (ref 80–100)
POTASSIUM BLD-SCNC: 3.8 MMOL/L
POTASSIUM BLD-SCNC: 4.6 MMOL/L
POTASSIUM SERPL-SCNC: 4 MMOL/L (ref 3.5–5)
POTASSIUM SERPL-SCNC: 4.5 MMOL/L (ref 3.5–5)
POTASSIUM SERPL-SCNC: 4.6 MMOL/L (ref 3.5–5)
POTASSIUM SERPL-SCNC: 4.8 MMOL/L (ref 3.5–5)
POTASSIUM SERPL-SCNC: 5.3 MMOL/L (ref 3.5–5)
POTASSIUM SERPL-SCNC: 5.7 MMOL/L (ref 3.5–5)
PROT SERPL-MCNC: 6.6 G/DL (ref 6.6–8.7)
PROT SERPL-MCNC: 6.7 G/DL (ref 6.6–8.7)
PROT SERPL-MCNC: 6.8 G/DL (ref 6.6–8.7)
RBC # BLD AUTO: 3.78 M/UL (ref 4.2–5.4)
RBC # BLD AUTO: 4.05 M/UL (ref 4.2–5.4)
SAMPLE SOURCE: ABNORMAL
SAMPLE SOURCE: ABNORMAL
SODIUM SERPL-SCNC: 126 MMOL/L (ref 136–145)
SODIUM SERPL-SCNC: 128 MMOL/L (ref 136–145)
SODIUM SERPL-SCNC: 130 MMOL/L (ref 136–145)
SODIUM SERPL-SCNC: 132 MMOL/L (ref 136–145)
SODIUM SERPL-SCNC: 132 MMOL/L (ref 136–145)
SODIUM SERPL-SCNC: 133 MMOL/L (ref 136–145)
SODIUM SERPL-SCNC: 134 MMOL/L (ref 136–145)
SODIUM SERPL-SCNC: 136 MMOL/L (ref 136–145)
SODIUM UR-SCNC: 117 MMOL/L
SPONT RATE(BPM): 14
STOMATOCYTES BLD QL SMEAR: ABNORMAL
VARIANT LYMPHS NFR BLD: 2 % (ref 0–8)
VARIANT LYMPHS NFR BLD: 7 % (ref 0–8)
WBC # BLD AUTO: 3.1 K/UL (ref 4.8–10.8)
WBC # BLD AUTO: 3.6 K/UL (ref 4.8–10.8)

## 2024-02-15 PROCEDURE — 36415 COLL VENOUS BLD VENIPUNCTURE: CPT

## 2024-02-15 PROCEDURE — 82803 BLOOD GASES ANY COMBINATION: CPT

## 2024-02-15 PROCEDURE — 2580000003 HC RX 258: Performed by: STUDENT IN AN ORGANIZED HEALTH CARE EDUCATION/TRAINING PROGRAM

## 2024-02-15 PROCEDURE — 2580000003 HC RX 258: Performed by: FAMILY MEDICINE

## 2024-02-15 PROCEDURE — 6360000002 HC RX W HCPCS: Performed by: FAMILY MEDICINE

## 2024-02-15 PROCEDURE — 6360000002 HC RX W HCPCS: Performed by: NURSE PRACTITIONER

## 2024-02-15 PROCEDURE — 84300 ASSAY OF URINE SODIUM: CPT

## 2024-02-15 PROCEDURE — 94660 CPAP INITIATION&MGMT: CPT

## 2024-02-15 PROCEDURE — 2700000000 HC OXYGEN THERAPY PER DAY

## 2024-02-15 PROCEDURE — 6360000002 HC RX W HCPCS: Performed by: INTERNAL MEDICINE

## 2024-02-15 PROCEDURE — 2580000003 HC RX 258: Performed by: INTERNAL MEDICINE

## 2024-02-15 PROCEDURE — 94760 N-INVAS EAR/PLS OXIMETRY 1: CPT

## 2024-02-15 PROCEDURE — 94640 AIRWAY INHALATION TREATMENT: CPT

## 2024-02-15 PROCEDURE — 80053 COMPREHEN METABOLIC PANEL: CPT

## 2024-02-15 PROCEDURE — 71045 X-RAY EXAM CHEST 1 VIEW: CPT

## 2024-02-15 PROCEDURE — 2000000000 HC ICU R&B

## 2024-02-15 PROCEDURE — 85025 COMPLETE CBC W/AUTO DIFF WBC: CPT

## 2024-02-15 PROCEDURE — 2500000003 HC RX 250 WO HCPCS: Performed by: INTERNAL MEDICINE

## 2024-02-15 PROCEDURE — 36600 WITHDRAWAL OF ARTERIAL BLOOD: CPT

## 2024-02-15 PROCEDURE — 6360000002 HC RX W HCPCS: Performed by: STUDENT IN AN ORGANIZED HEALTH CARE EDUCATION/TRAINING PROGRAM

## 2024-02-15 PROCEDURE — 82570 ASSAY OF URINE CREATININE: CPT

## 2024-02-15 PROCEDURE — 83935 ASSAY OF URINE OSMOLALITY: CPT

## 2024-02-15 PROCEDURE — 6370000000 HC RX 637 (ALT 250 FOR IP): Performed by: STUDENT IN AN ORGANIZED HEALTH CARE EDUCATION/TRAINING PROGRAM

## 2024-02-15 PROCEDURE — 99291 CRITICAL CARE FIRST HOUR: CPT | Performed by: INTERNAL MEDICINE

## 2024-02-15 RX ORDER — DEXTROSE MONOHYDRATE 50 MG/ML
INJECTION, SOLUTION INTRAVENOUS CONTINUOUS
Status: DISCONTINUED | OUTPATIENT
Start: 2024-02-15 | End: 2024-02-15

## 2024-02-15 RX ORDER — DESMOPRESSIN ACETATE 4 UG/ML
1 INJECTION, SOLUTION INTRAVENOUS; SUBCUTANEOUS 2 TIMES DAILY
Status: COMPLETED | OUTPATIENT
Start: 2024-02-15 | End: 2024-02-15

## 2024-02-15 RX ORDER — ENOXAPARIN SODIUM 100 MG/ML
1 INJECTION SUBCUTANEOUS 2 TIMES DAILY
Status: DISCONTINUED | OUTPATIENT
Start: 2024-02-15 | End: 2024-02-18 | Stop reason: DRUGHIGH

## 2024-02-15 RX ORDER — LORAZEPAM 2 MG/ML
0.25 INJECTION INTRAMUSCULAR EVERY 4 HOURS PRN
Status: DISCONTINUED | OUTPATIENT
Start: 2024-02-15 | End: 2024-02-22 | Stop reason: HOSPADM

## 2024-02-15 RX ORDER — DESMOPRESSIN ACETATE 4 UG/ML
1 INJECTION, SOLUTION INTRAVENOUS; SUBCUTANEOUS 2 TIMES DAILY
Status: DISCONTINUED | OUTPATIENT
Start: 2024-02-15 | End: 2024-02-15

## 2024-02-15 RX ADMIN — ALBUTEROL SULFATE 2 PUFF: 90 AEROSOL, METERED RESPIRATORY (INHALATION) at 02:00

## 2024-02-15 RX ADMIN — IPRATROPIUM BROMIDE 2 PUFF: 17 AEROSOL, METERED RESPIRATORY (INHALATION) at 14:40

## 2024-02-15 RX ADMIN — WATER 60 MG: 1 INJECTION INTRAMUSCULAR; INTRAVENOUS; SUBCUTANEOUS at 05:12

## 2024-02-15 RX ADMIN — ALBUTEROL SULFATE 2 PUFF: 90 AEROSOL, METERED RESPIRATORY (INHALATION) at 14:40

## 2024-02-15 RX ADMIN — IPRATROPIUM BROMIDE 2 PUFF: 17 AEROSOL, METERED RESPIRATORY (INHALATION) at 10:44

## 2024-02-15 RX ADMIN — DEXMEDETOMIDINE HYDROCHLORIDE 1.2 MCG/KG/HR: 400 INJECTION, SOLUTION INTRAVENOUS at 04:09

## 2024-02-15 RX ADMIN — ALBUTEROL SULFATE 2 PUFF: 90 AEROSOL, METERED RESPIRATORY (INHALATION) at 10:44

## 2024-02-15 RX ADMIN — ALBUTEROL SULFATE 2 PUFF: 90 AEROSOL, METERED RESPIRATORY (INHALATION) at 06:43

## 2024-02-15 RX ADMIN — DEXTROSE MONOHYDRATE: 50 INJECTION, SOLUTION INTRAVENOUS at 16:56

## 2024-02-15 RX ADMIN — CEFEPIME 2000 MG: 2 INJECTION, POWDER, FOR SOLUTION INTRAVENOUS at 06:27

## 2024-02-15 RX ADMIN — IPRATROPIUM BROMIDE 2 PUFF: 17 AEROSOL, METERED RESPIRATORY (INHALATION) at 02:00

## 2024-02-15 RX ADMIN — DESMOPRESSIN ACETATE 1 MCG: 4 INJECTION INTRAVENOUS; SUBCUTANEOUS at 10:02

## 2024-02-15 RX ADMIN — IPRATROPIUM BROMIDE 2 PUFF: 17 AEROSOL, METERED RESPIRATORY (INHALATION) at 06:42

## 2024-02-15 RX ADMIN — CEFEPIME 2000 MG: 2 INJECTION, POWDER, FOR SOLUTION INTRAVENOUS at 19:02

## 2024-02-15 RX ADMIN — IPRATROPIUM BROMIDE 2 PUFF: 17 AEROSOL, METERED RESPIRATORY (INHALATION) at 20:22

## 2024-02-15 RX ADMIN — WATER 60 MG: 1 INJECTION INTRAMUSCULAR; INTRAVENOUS; SUBCUTANEOUS at 18:57

## 2024-02-15 RX ADMIN — DEXMEDETOMIDINE HYDROCHLORIDE 0.8 MCG/KG/HR: 400 INJECTION, SOLUTION INTRAVENOUS at 22:22

## 2024-02-15 RX ADMIN — DESMOPRESSIN ACETATE 1 MCG: 4 INJECTION INTRAVENOUS; SUBCUTANEOUS at 20:46

## 2024-02-15 RX ADMIN — ALBUTEROL SULFATE 2 PUFF: 90 AEROSOL, METERED RESPIRATORY (INHALATION) at 20:22

## 2024-02-15 RX ADMIN — WATER 60 MG: 1 INJECTION INTRAMUSCULAR; INTRAVENOUS; SUBCUTANEOUS at 12:11

## 2024-02-15 RX ADMIN — AZITHROMYCIN MONOHYDRATE 500 MG: 500 INJECTION, POWDER, LYOPHILIZED, FOR SOLUTION INTRAVENOUS at 05:11

## 2024-02-15 RX ADMIN — SODIUM CHLORIDE, PRESERVATIVE FREE 10 ML: 5 INJECTION INTRAVENOUS at 10:18

## 2024-02-15 RX ADMIN — DEXTROSE MONOHYDRATE: 50 INJECTION, SOLUTION INTRAVENOUS at 08:20

## 2024-02-15 RX ADMIN — ENOXAPARIN SODIUM 80 MG: 100 INJECTION SUBCUTANEOUS at 20:47

## 2024-02-15 ASSESSMENT — PAIN SCALES - GENERAL
PAINLEVEL_OUTOF10: 0
PAINLEVEL_OUTOF10: 0

## 2024-02-15 NOTE — PROGRESS NOTES
Family Medicine Progress Note    Patient:  Renata Mendez  YOB: 1950    MRN: [id number]     Acct: [id number]     Admit date: 2/14/2024    Patient Seen, Chart, Consults notes, Labs, Radiology studies reviewed.    Subjective: Day 1 of stay with acute on chronic respiratory failure with hypoxia and hypercapnia testing positive for COVID 19 and most recent (in last 24 hours) has had continuous use of BiPAP.  Somnolent this morning.  Did have some issues yesterday with agitation treated with relatively low-dose of benzodiazepine.  Unsure when she got her last dose of that but is barely arousable this morning.    Past, Family, Social History unchanged from admission.    Diet:  Diet NPO    Medications:  Scheduled Meds:   desmopressin PF  1 mcg SubCUTAneous BID    apixaban  5 mg Oral BID    clopidogrel  75 mg Oral Daily    lactobacillus  1 capsule Oral Daily with breakfast    montelukast  10 mg Oral Nightly    rosuvastatin  10 mg Oral Nightly    sotalol  120 mg Oral 2 times per day    methylPREDNISolone  60 mg IntraVENous Q6H    azithromycin  500 mg IntraVENous Q24H    sodium chloride flush  5-40 mL IntraVENous 2 times per day    ipratropium  2 puff Inhalation Q4H RT    And    albuterol sulfate HFA  2 puff Inhalation Q4H RT    cefepime  2,000 mg IntraVENous Q12H    baricitinib  2 mg Oral Daily     Continuous Infusions:   dextrose      sodium chloride      dexmedeTOMIDine HCl in NaCl 0.7 mcg/kg/hr (02/15/24 0627)     PRN Meds:nitroGLYCERIN, potassium chloride **OR** potassium chloride, magnesium sulfate, polyethylene glycol, acetaminophen **OR** acetaminophen, sodium chloride flush, sodium chloride, ondansetron **OR** ondansetron, LORazepam    Objective:    Vitals: /78   Pulse 72   Temp 97.2 °F (36.2 °C)   Resp 13   Ht 1.676 m (5' 6\")   Wt 77.6 kg (171 lb)   SpO2 (!) 89%   BMI 27.60 kg/m²   24 hour intake/output:  Intake/Output Summary (Last 24 hours) at 2/15/2024 0818  Last data filed at  02:11 AM    GFRAA >59 09/02/2022 02:12 AM    LABGLOM >60 02/15/2024 06:42 AM    GLUCOSE 258 02/15/2024 06:42 AM    PROT 6.8 02/15/2024 06:42 AM    PROT 7.5 01/18/2013 10:35 AM    LABALBU 3.1 02/15/2024 06:42 AM    LABALBU 3.3 09/02/2011 02:11 AM    CALCIUM 8.3 02/15/2024 06:42 AM    BILITOT 0.4 02/15/2024 06:42 AM    ALKPHOS 109 02/15/2024 06:42 AM    ALKPHOS 57 09/02/2011 02:11 AM     02/15/2024 06:42 AM     02/15/2024 06:42 AM     Last 3 Troponin:    Lab Results   Component Value Date/Time    TROPONINI <0.01 10/23/2022 11:00 AM    TROPONINI <0.01 08/30/2022 10:15 AM    TROPONINI <0.01 08/30/2022 04:49 AM     Urine Culture:  No components found for: \"CURINE\"  Blood Culture:  No components found for: \"CBLOOD\", \"CFUNGUSBL\"  Stool Culture:  No components found for: \"CSTOOL\"    Assessment:    Principal Problem:    Acute on chronic respiratory failure with hypoxia and hypercapnia (HCC)  Active Problems:    NICM (nonischemic cardiomyopathy) (HCC)    COPD exacerbation (HCC)    Deep vein thrombosis (HCC)    Essential hypertension    Diabetes mellitus (HCC)    S/P CABG x 4    History of coronary artery stent placement    Coronary artery disease involving native coronary artery of native heart without angina pectoris    Abdominal aortic aneurysm (AAA) without rupture (HCC)    Hyponatremia    Stroke due to occlusion of left anterior cerebral artery (HCC)    Altered mental status    Pneumonia due to COVID-19 virus  Resolved Problems:    * No resolved hospital problems. *          Plan:  Appreciate assistance from pulmonology and nephrology.  Electrolytes are improving in particular her sodium.  Has had an elevation of her liver enzymes.  I believe that related to her respiratory failure and may be some degree of hypotension during that period of time.  Will try to limit use of benzodiazepine as is safe for the patient.  I will cut the dose slightly to maybe avoid as much somnolence that she has now.  Remains

## 2024-02-15 NOTE — PROGRESS NOTES
Pharmacy Consult      Renata Mendez is a 74 y.o. female for whom pharmacy has been consulted to dose baricitinib.    Patient Active Problem List   Diagnosis    Deep vein thrombosis (HCC)    Essential hypertension    Diabetes mellitus (HCC)    Hypercholesteremia    S/P CABG x 4    History of coronary artery stent placement    Coronary artery disease involving native coronary artery of native heart without angina pectoris    Mixed hyperlipidemia    History of diabetes mellitus    Chronic obstructive pulmonary disease (HCC)    NUNEZ (dyspnea on exertion)    Abdominal aortic aneurysm (AAA) without rupture (HCC)    Diabetic ulcer of left lower leg associated with type 2 diabetes mellitus, with fat layer exposed (HCC)    Smoker    NICM (nonischemic cardiomyopathy) (HCC)    Hyponatremia    Acute on chronic respiratory failure with hypercapnia (HCC)    Palliative care patient    Leg weakness, bilateral    COPD exacerbation (HCC)    COPD with acute exacerbation (HCC)    Acute on chronic respiratory failure with hypoxia and hypercapnia (HCC)    COPD (chronic obstructive pulmonary disease) (HCC)    Stroke due to occlusion of left anterior cerebral artery (HCC)    Altered mental status    Pneumonia due to COVID-19 virus       Allergies:  Codeine     Ht/Wt:   Ht Readings from Last 1 Encounters:   02/14/24 1.676 m (5' 6\")        Wt Readings from Last 1 Encounters:   02/14/24 75.8 kg (167 lb)         Does the patient meet all of the criteria for receiving baricitinib (see below)?Yes.  Has a daily CMP (or LFTs) and CBC with auto differential been ordered? No.  If either are not ordered, the pharmacist should enter CMP and CBC with auto differential daily (per pharmacy practice) under the physician authorizing baricitinib.    Recent Labs     02/14/24  0513 02/14/24  0633 02/14/24  1106   LABGLOM  --  59* 59*   LYMPHSABS 0.5*  --   --    NEUTROABS 9.7*  --   --    ALT  --  14 18   AST  --  18 20           Assessment/Plan:    Start

## 2024-02-15 NOTE — PROGRESS NOTES
Pharmacy Adjustment per [facility] protocol    Renata Mendez is a 74 y.o. female. Pharmacy has adjusted medications per [facility] protocol.    Recent Labs     02/14/24  0633 02/14/24  1106   BUN 17 17       Recent Labs     02/14/24  0633 02/14/24  1106   CREATININE 1.0* 1.0*       Estimated Creatinine Clearance: 51 mL/min (A) (based on SCr of 1 mg/dL (H)).    Height:   Ht Readings from Last 1 Encounters:   02/14/24 1.676 m (5' 6\")     Weight:  Wt Readings from Last 1 Encounters:   02/14/24 75.8 kg (167 lb)         Plan: Adjust the following medications based on [facility] protocol:           Cefepime to 2000 mg IV once over 30 minutes followed by 2000 mg IV every 12 hours extended infusion over 240 minutes for 7 days    LEIA LIPSCOMB, PHARM D, 2/14/2024, 6:23 PM

## 2024-02-15 NOTE — PROGRESS NOTES
Pulmonary and Critical Care Progress note.    [facility] STEWART Mendez    MRN# [id number]    Acct# [id number]  2/15/2024   5:57 PM CST    Referring Provider:Greg Sandoval MD      Chief Complaint: Respiratory failure noninvasive ventilation    HPI: The patient continues to be on noninvasive ventilation she continues to be lethargic occasionally.  Her LFTs are worse.    Medications:  The following medications were reviewed and adjustments made when necessary.    desmopressin, 1 mcg, SubCUTAneous, BID    apixaban, 5 mg, Oral, BID    clopidogrel, 75 mg, Oral, Daily    lactobacillus, 1 capsule, Oral, Daily with breakfast    montelukast, 10 mg, Oral, Nightly    rosuvastatin, 10 mg, Oral, Nightly    sotalol, 120 mg, Oral, 2 times per day    methylPREDNISolone, 60 mg, IntraVENous, Q6H    azithromycin, 500 mg, IntraVENous, Q24H    sodium chloride flush, 5-40 mL, IntraVENous, 2 times per day    ipratropium, 2 puff, Inhalation, Q4H RT **AND** albuterol sulfate HFA, 2 puff, Inhalation, Q4H RT    cefepime, 2,000 mg, IntraVENous, Q12H    [Held by provider] baricitinib, 2 mg, Oral, Daily         Physical Exam:  /63   Pulse 72   Temp 97.6 °F (36.4 °C) (Temporal)   Resp 16   Ht 1.676 m (5' 6\")   Wt 77.6 kg (171 lb)   SpO2 94%   BMI 27.60 kg/m²   Intake/Output Summary (Last 24 hours) at 2/15/2024 1757  Last data filed at 2/15/2024 1600  Gross per 24 hour   Intake 2057.1 ml   Output 3600 ml   Net -1542.9 ml       General appearance: Elderly female on noninvasive ventilation.   HEENT:normocephalic atraumatic]  Heart:S1S2 no murmurs  Lungs:diminished bilaterally no rubs or tenderness or dullness to percussion  Abdomen:Soft non tender no organomegaly  Extremities:no clubbing cyanosis or edema  Neuro:no focal findings  Skin:intact        The following lab data was reviewed:  CBC   Recent Labs     02/14/24  0513 02/15/24  0642 02/15/24  1331   WBC 11.5* 3.1* 3.6*   RBC 3.63* 3.78* 4.05*   HGB 9.4* 9.8* 10.2*  Time:    05:30        My radiograph interpretation/independent review of imaging:     Problem list generated by [employer]:  Hospital Problems             Last Modified POA    * (Principal) Acute on chronic respiratory failure with hypoxia and hypercapnia (HCC) 2/14/2024 Yes    NICM (nonischemic cardiomyopathy) ([facility]) 2/14/2024 Yes    COPD exacerbation ([facility]) 2/14/2024 Yes    Deep vein thrombosis (HCC) 2/14/2024 Yes    Essential hypertension 2/14/2024 Yes    Diabetes mellitus (HCC) 2/14/2024 Yes    Overview Signed 6/2/2011  1:19 PM by Mirna Palma controlled         S/P CABG x 4 2/14/2024 Yes    Overview Signed 10/11/2016 11:15 AM by Teresa Zimmer APRN     8/29/11 EF 60%         History of coronary artery stent placement 2/14/2024 Yes    Coronary artery disease involving native coronary artery of native heart without angina pectoris 2/14/2024 Yes    Abdominal aortic aneurysm (AAA) without rupture ([facility]) 2/14/2024 Yes    Hyponatremia 2/14/2024 Yes    Stroke due to occlusion of left anterior cerebral artery (HCC) 2/14/2024 Yes    Altered mental status 2/14/2024 Yes    Pneumonia due to COVID-19 virus 2/14/2024 Yes        Pulmonary Assessment/Plan:        acute on chronic hypoxic respiratory failure requiring increased of her oxygen supplementation and noninvasive ventilation.  Continue current management with noninvasive ventilation.  Adjust settings as necessary.  Wean as feasible.  Underlying severe COPD with acute exacerbation.  Continue pulmonary toilet and bronchodilators continue noninvasive ventilation.  Recurrent COVID-19 infection.  Barcitinib on hold due to worsening liver function.  Transaminitis likely due to barcitinib versus other etiologies.  Will repeat LFTs.  Dyspnea due to the above supportive care.  Likely pneumonia agree with empirical antibiotic therapy.  DVT prophylaxis she is on Eliquis.  Hyponatremia etiology not clear most likely nutritional.  Nephrology following.        Critical care

## 2024-02-15 NOTE — CONSULTS
Palliative Care:  Known to Palliative Care.    75 y/o female with multiple chronic condition, late stages COPD and continues to smoke,  She is on oxygen at home and uses a trilogy.  Pt presented to ED with oxygen saturation of 71%.  She is currently in ICU. Pt is not very alert and could not have a conversation.    Report from nursing and review of chart.  Call placed to her son Twan to review life at home since last admission.  Twan says pt has caregivers with her nearly all the time. He reports she has been nervous and upset about a caregiver moving away and had begun to smoke heavily.   Pt lives in her own home.  Has had [employer] in the past.  She has a walker, BSC, oxygen and trilogy.             Past Medical History:        Past Medical History:   Diagnosis Date    ASHD (arteriosclerotic heart disease)     s/p PTCA and stent of circumflex, as well as intermediate and mid LAD     COPD (chronic obstructive pulmonary disease) (HCC)     Coronary atherosclerosis     Diabetes mellitus ([facility])     diet controlled, type 2    Encounter for wound care     FOR LLL WOUND    Fall 10/29/2020    Ganglion cyst     RT HAND    Hematoma 10/2020    hematoma LLL from fall injury    Hx of blood clots     Hypercholesteremia     Hypertension     Pacemaker 03/02/2021    Palliative care patient 03/16/2022    Pneumonia due to COVID-19 virus 2/14/2024    Stroke due to occlusion of left anterior cerebral artery (HCC) 12/5/2023    Unstable angina ([facility])        Advance Directives:   Full code  Son Twan Mendez is primary decision maker.  Living will on file.        ACP note reviewed and no changes.      Plan:   Continue medical treatment and monitor for improvement.         Patient/family discussion r/t goals:   Son states goal of pt returning home.  He says he will be able to provide caregivers for her.                       Palliative Performance Scale:   60         Palliative Care team will continue to follow and support,

## 2024-02-15 NOTE — PROGRESS NOTES
Nephrology ([facility]) Progress Note      Patient:  Renata Mendez  YOB: 1950  Date of Service: 2/15/2024  MRN: [id number]   Acct: [id number]   Primary Care Physician: Greg Sandoval MD  Advance Directive: Full Code  Admit Date: 2/14/2024       Hospital Day: 1  Referring Provider: Greg Sandoval MD    Patient independently seen and examined, Chart, Consults, Notes, Operative notes, Labs, Cardiology, and Radiology studies reviewed as available.    Chief complaint: Abnormal labs.    Subjective:  Renata Mendez is a 74 y.o. female for whom we were consulted for evaluation and treatment of HYPONATREMIA.  She has history of COPD/active tobacco use, type 2 diabetes, hyperlipidemia, pacemaker implant and CVA.  Presented with confusion/disoriented.  Patient has fever and chills with congestion.  Chest x-ray consistent with possible pneumonia.  In ambulance she was found to have an O2 saturation 71%.  Routine lab data indicated her serum sodium is 122 mmol.  She denies any history of nausea vomiting or diarrhea.  She takes trazodone as well as Aldactone.  Patient is not being admitted to ICU for close monitoring and treatment of hyponatremia.    She was treated with 3% saline, total of 250 cc.  Hypertonic saline was given as she has mental status change with hyponatremia.  However she has rapid correction of hyponatremia.  This morning she is currently on a BiPAP.    Allergies:  Codeine    Medicines:  Current Facility-Administered Medications   Medication Dose Route Frequency Provider Last Rate Last Admin    dextrose 5 % solution   IntraVENous Continuous Gray Galicia  mL/hr at 02/15/24 0820 New Bag at 02/15/24 0820    LORazepam (ATIVAN) injection 0.26 mg  0.26 mg IntraVENous Q4H PRN Greg Sandoval MD        desmopressin (DDAVP) injection 1 mcg  1 mcg SubCUTAneous BID Gray Galicia MD        apixaban (ELIQUIS) tablet 5 mg  5 mg Oral BID Junior Aviles MD   5 mg at 02/14/24  last 72 hours.  Magnesium:  Recent Labs     02/14/24  1815   MG 2.2     Phosphorus:No results for input(s): \"PHOS\" in the last 72 hours.  HgbA1C: No results for input(s): \"LABA1C\" in the last 72 hours.  Hepatic:   Recent Labs     02/14/24  0633 02/14/24  1106 02/15/24  0642   ALKPHOS 105* 100 109*   ALT 14 18 104*   AST 18 20 133*   PROT 7.8 7.3 6.8   BILITOT 0.5 0.4 0.4   BILIDIR  --   --  0.3   LABALBU 3.5 3.4* 3.1*     Lactic Acid: No results for input(s): \"LACTA\" in the last 72 hours.  Troponin: No results for input(s): \"CKTOTAL\", \"CKMB\", \"TROPONINT\" in the last 72 hours.  ABGs: No results for input(s): \"PH\", \"PCO2\", \"PO2\", \"HCO3\", \"O2SAT\" in the last 72 hours.  CRP:  No results for input(s): \"CRP\" in the last 72 hours.  Sed Rate:  No results for input(s): \"SEDRATE\" in the last 72 hours.      Cultures:   No results for input(s): \"CULTURE\" in the last 72 hours.  No results for input(s): \"BC\", \"BLOODCULT2\" in the last 72 hours.  No results for input(s): \"CXSURG\" in the last 72 hours.    Radiology reports as per the Radiologist  Radiology: XR CHEST PORTABLE    Result Date: 2/14/2024  EXAM:  AP CHEST.  HISTORY:  Shortness of breath.  FINDINGS: Comparison made with Chest x-ray of 12/19/2023.  There is a two lead pacemaker.  There are multiple sternotomy wires.  The cardiac silhouette is enlarged.  The pulmonary vasculature is within normal limits.  The costophrenic angles are clear.  There is bibasilar atelectasis and/or pneumonia.  There are calcified granulomas.      Impression: Bibasilar atelectasis and/or pneumonia.  Cardiomegaly.     ______________________________________ Electronically signed by: CATHERINE REESE M.D. Date:     02/14/2024 Time:    05:30        Assessment   1.  HYPONATREMIA/symptomatic/encephalopathy  2.  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  3.  COPD exacerbation.  4.  Right lower lobe pneumonia  5.  Hypoxemic respiratory failure  6.  Rapid correction of

## 2024-02-16 LAB
ALBUMIN SERPL-MCNC: 2.7 G/DL (ref 3.5–5.2)
ALP SERPL-CCNC: 100 U/L (ref 35–104)
ALT SERPL-CCNC: 84 U/L (ref 5–33)
ANION GAP SERPL CALCULATED.3IONS-SCNC: 10 MMOL/L (ref 7–19)
ANION GAP SERPL CALCULATED.3IONS-SCNC: 10 MMOL/L (ref 7–19)
ANION GAP SERPL CALCULATED.3IONS-SCNC: 8 MMOL/L (ref 7–19)
ANION GAP SERPL CALCULATED.3IONS-SCNC: 9 MMOL/L (ref 7–19)
ANISOCYTOSIS BLD QL SMEAR: ABNORMAL
AST SERPL-CCNC: 60 U/L (ref 5–32)
BASOPHILS # BLD: 0 K/UL (ref 0–0.2)
BASOPHILS NFR BLD: 0 % (ref 0–1)
BILIRUB SERPL-MCNC: 0.4 MG/DL (ref 0.2–1.2)
BUN SERPL-MCNC: 39 MG/DL (ref 8–23)
BUN SERPL-MCNC: 40 MG/DL (ref 8–23)
BUN SERPL-MCNC: 40 MG/DL (ref 8–23)
BUN SERPL-MCNC: 41 MG/DL (ref 8–23)
CALCIUM SERPL-MCNC: 7.9 MG/DL (ref 8.8–10.2)
CALCIUM SERPL-MCNC: 8.1 MG/DL (ref 8.8–10.2)
CHLORIDE SERPL-SCNC: 90 MMOL/L (ref 98–111)
CHLORIDE SERPL-SCNC: 92 MMOL/L (ref 98–111)
CHLORIDE SERPL-SCNC: 92 MMOL/L (ref 98–111)
CHLORIDE SERPL-SCNC: 95 MMOL/L (ref 98–111)
CO2 SERPL-SCNC: 28 MMOL/L (ref 22–29)
CO2 SERPL-SCNC: 29 MMOL/L (ref 22–29)
CO2 SERPL-SCNC: 30 MMOL/L (ref 22–29)
CO2 SERPL-SCNC: 30 MMOL/L (ref 22–29)
CREAT SERPL-MCNC: 0.9 MG/DL (ref 0.5–0.9)
CREAT SERPL-MCNC: 1 MG/DL (ref 0.5–0.9)
EOSINOPHIL # BLD: 0 K/UL (ref 0–0.6)
EOSINOPHIL NFR BLD: 0 % (ref 0–5)
ERYTHROCYTE [DISTWIDTH] IN BLOOD BY AUTOMATED COUNT: 17.1 % (ref 11.5–14.5)
GLUCOSE BLD-MCNC: 219 MG/DL (ref 70–99)
GLUCOSE BLD-MCNC: 245 MG/DL (ref 70–99)
GLUCOSE BLD-MCNC: 252 MG/DL (ref 70–99)
GLUCOSE BLD-MCNC: 310 MG/DL (ref 70–99)
GLUCOSE SERPL-MCNC: 254 MG/DL (ref 74–109)
GLUCOSE SERPL-MCNC: 290 MG/DL (ref 74–109)
GLUCOSE SERPL-MCNC: 300 MG/DL (ref 74–109)
GLUCOSE SERPL-MCNC: 314 MG/DL (ref 74–109)
HCT VFR BLD AUTO: 31.7 % (ref 37–47)
HGB BLD-MCNC: 9.7 G/DL (ref 12–16)
IMM GRANULOCYTES # BLD: 0 K/UL
LYMPHOCYTES # BLD: 0.4 K/UL (ref 1.1–4.5)
LYMPHOCYTES NFR BLD: 10 % (ref 20–40)
MCH RBC QN AUTO: 25.9 PG (ref 27–31)
MCHC RBC AUTO-ENTMCNC: 30.6 G/DL (ref 33–37)
MCV RBC AUTO: 84.5 FL (ref 81–99)
MONOCYTES # BLD: 0.4 K/UL (ref 0–0.9)
MONOCYTES NFR BLD: 10 % (ref 0–10)
NEUTROPHILS # BLD: 3.4 K/UL (ref 1.5–7.5)
NEUTS SEG NFR BLD: 80 % (ref 50–65)
OVALOCYTES BLD QL SMEAR: ABNORMAL
PERFORMED ON: ABNORMAL
PLATELET # BLD AUTO: 230 K/UL (ref 130–400)
PLATELET SLIDE REVIEW: ADEQUATE
PMV BLD AUTO: 10.3 FL (ref 9.4–12.3)
POTASSIUM SERPL-SCNC: 3.9 MMOL/L (ref 3.5–5)
POTASSIUM SERPL-SCNC: 3.9 MMOL/L (ref 3.5–5)
POTASSIUM SERPL-SCNC: 4 MMOL/L (ref 3.5–5)
POTASSIUM SERPL-SCNC: 4.1 MMOL/L (ref 3.5–5)
POTASSIUM SERPL-SCNC: 4.2 MMOL/L (ref 3.5–5)
PROT SERPL-MCNC: 6.3 G/DL (ref 6.6–8.7)
RBC # BLD AUTO: 3.75 M/UL (ref 4.2–5.4)
SODIUM SERPL-SCNC: 129 MMOL/L (ref 136–145)
SODIUM SERPL-SCNC: 130 MMOL/L (ref 136–145)
SODIUM SERPL-SCNC: 131 MMOL/L (ref 136–145)
SODIUM SERPL-SCNC: 131 MMOL/L (ref 136–145)
SODIUM SERPL-SCNC: 132 MMOL/L (ref 136–145)
SODIUM SERPL-SCNC: 133 MMOL/L (ref 136–145)
SODIUM SERPL-SCNC: 134 MMOL/L (ref 136–145)
WBC # BLD AUTO: 4.3 K/UL (ref 4.8–10.8)

## 2024-02-16 PROCEDURE — 99232 SBSQ HOSP IP/OBS MODERATE 35: CPT | Performed by: INTERNAL MEDICINE

## 2024-02-16 PROCEDURE — 6360000002 HC RX W HCPCS: Performed by: FAMILY MEDICINE

## 2024-02-16 PROCEDURE — 51702 INSERT TEMP BLADDER CATH: CPT

## 2024-02-16 PROCEDURE — 2580000003 HC RX 258: Performed by: INTERNAL MEDICINE

## 2024-02-16 PROCEDURE — 6370000000 HC RX 637 (ALT 250 FOR IP): Performed by: STUDENT IN AN ORGANIZED HEALTH CARE EDUCATION/TRAINING PROGRAM

## 2024-02-16 PROCEDURE — 94760 N-INVAS EAR/PLS OXIMETRY 1: CPT

## 2024-02-16 PROCEDURE — 6360000002 HC RX W HCPCS: Performed by: STUDENT IN AN ORGANIZED HEALTH CARE EDUCATION/TRAINING PROGRAM

## 2024-02-16 PROCEDURE — 2000000000 HC ICU R&B

## 2024-02-16 PROCEDURE — 2580000003 HC RX 258: Performed by: FAMILY MEDICINE

## 2024-02-16 PROCEDURE — 6370000000 HC RX 637 (ALT 250 FOR IP): Performed by: FAMILY MEDICINE

## 2024-02-16 PROCEDURE — 82962 GLUCOSE BLOOD TEST: CPT

## 2024-02-16 PROCEDURE — 2500000003 HC RX 250 WO HCPCS: Performed by: INTERNAL MEDICINE

## 2024-02-16 PROCEDURE — 94660 CPAP INITIATION&MGMT: CPT

## 2024-02-16 PROCEDURE — 84295 ASSAY OF SERUM SODIUM: CPT

## 2024-02-16 PROCEDURE — 6360000002 HC RX W HCPCS: Performed by: NURSE PRACTITIONER

## 2024-02-16 PROCEDURE — 36415 COLL VENOUS BLD VENIPUNCTURE: CPT

## 2024-02-16 PROCEDURE — 85025 COMPLETE CBC W/AUTO DIFF WBC: CPT

## 2024-02-16 PROCEDURE — 92610 EVALUATE SWALLOWING FUNCTION: CPT

## 2024-02-16 PROCEDURE — 2700000000 HC OXYGEN THERAPY PER DAY

## 2024-02-16 PROCEDURE — 80053 COMPREHEN METABOLIC PANEL: CPT

## 2024-02-16 PROCEDURE — 6360000002 HC RX W HCPCS: Performed by: INTERNAL MEDICINE

## 2024-02-16 PROCEDURE — 92522 EVALUATE SPEECH PRODUCTION: CPT

## 2024-02-16 PROCEDURE — 2580000003 HC RX 258: Performed by: STUDENT IN AN ORGANIZED HEALTH CARE EDUCATION/TRAINING PROGRAM

## 2024-02-16 PROCEDURE — 94640 AIRWAY INHALATION TREATMENT: CPT

## 2024-02-16 RX ORDER — INSULIN GLARGINE 100 [IU]/ML
0.15 INJECTION, SOLUTION SUBCUTANEOUS NIGHTLY
Status: DISCONTINUED | OUTPATIENT
Start: 2024-02-16 | End: 2024-02-22 | Stop reason: HOSPADM

## 2024-02-16 RX ORDER — INSULIN LISPRO 100 [IU]/ML
0-4 INJECTION, SOLUTION INTRAVENOUS; SUBCUTANEOUS
Status: DISCONTINUED | OUTPATIENT
Start: 2024-02-16 | End: 2024-02-22 | Stop reason: HOSPADM

## 2024-02-16 RX ORDER — DEXTROSE MONOHYDRATE 100 MG/ML
INJECTION, SOLUTION INTRAVENOUS CONTINUOUS PRN
Status: DISCONTINUED | OUTPATIENT
Start: 2024-02-16 | End: 2024-02-22 | Stop reason: HOSPADM

## 2024-02-16 RX ORDER — INSULIN LISPRO 100 [IU]/ML
0-4 INJECTION, SOLUTION INTRAVENOUS; SUBCUTANEOUS NIGHTLY
Status: DISCONTINUED | OUTPATIENT
Start: 2024-02-16 | End: 2024-02-22 | Stop reason: HOSPADM

## 2024-02-16 RX ADMIN — WATER 60 MG: 1 INJECTION INTRAMUSCULAR; INTRAVENOUS; SUBCUTANEOUS at 05:34

## 2024-02-16 RX ADMIN — WATER 60 MG: 1 INJECTION INTRAMUSCULAR; INTRAVENOUS; SUBCUTANEOUS at 12:16

## 2024-02-16 RX ADMIN — CEFEPIME 2000 MG: 2 INJECTION, POWDER, FOR SOLUTION INTRAVENOUS at 06:37

## 2024-02-16 RX ADMIN — IPRATROPIUM BROMIDE 2 PUFF: 17 AEROSOL, METERED RESPIRATORY (INHALATION) at 07:02

## 2024-02-16 RX ADMIN — ALBUTEROL SULFATE 2 PUFF: 90 AEROSOL, METERED RESPIRATORY (INHALATION) at 02:12

## 2024-02-16 RX ADMIN — LORAZEPAM 0.26 MG: 2 INJECTION INTRAMUSCULAR; INTRAVENOUS at 17:09

## 2024-02-16 RX ADMIN — ALBUTEROL SULFATE 2 PUFF: 90 AEROSOL, METERED RESPIRATORY (INHALATION) at 07:02

## 2024-02-16 RX ADMIN — CLOPIDOGREL BISULFATE 75 MG: 75 TABLET ORAL at 10:56

## 2024-02-16 RX ADMIN — ENOXAPARIN SODIUM 80 MG: 100 INJECTION SUBCUTANEOUS at 21:35

## 2024-02-16 RX ADMIN — INSULIN LISPRO 3 UNITS: 100 INJECTION, SOLUTION INTRAVENOUS; SUBCUTANEOUS at 13:02

## 2024-02-16 RX ADMIN — ALBUTEROL SULFATE 2 PUFF: 90 AEROSOL, METERED RESPIRATORY (INHALATION) at 23:19

## 2024-02-16 RX ADMIN — IPRATROPIUM BROMIDE 2 PUFF: 17 AEROSOL, METERED RESPIRATORY (INHALATION) at 10:50

## 2024-02-16 RX ADMIN — ALBUTEROL SULFATE 2 PUFF: 90 AEROSOL, METERED RESPIRATORY (INHALATION) at 19:34

## 2024-02-16 RX ADMIN — CEFEPIME 2000 MG: 2 INJECTION, POWDER, FOR SOLUTION INTRAVENOUS at 18:18

## 2024-02-16 RX ADMIN — WATER 60 MG: 1 INJECTION INTRAMUSCULAR; INTRAVENOUS; SUBCUTANEOUS at 00:12

## 2024-02-16 RX ADMIN — SODIUM CHLORIDE, PRESERVATIVE FREE 10 ML: 5 INJECTION INTRAVENOUS at 10:55

## 2024-02-16 RX ADMIN — DEXMEDETOMIDINE HYDROCHLORIDE 0.7 MCG/KG/HR: 400 INJECTION, SOLUTION INTRAVENOUS at 23:15

## 2024-02-16 RX ADMIN — SODIUM CHLORIDE, PRESERVATIVE FREE 10 ML: 5 INJECTION INTRAVENOUS at 21:36

## 2024-02-16 RX ADMIN — DEXMEDETOMIDINE HYDROCHLORIDE 0.2 MCG/KG/HR: 400 INJECTION, SOLUTION INTRAVENOUS at 14:06

## 2024-02-16 RX ADMIN — IPRATROPIUM BROMIDE 2 PUFF: 17 AEROSOL, METERED RESPIRATORY (INHALATION) at 23:19

## 2024-02-16 RX ADMIN — LORAZEPAM 0.26 MG: 2 INJECTION INTRAMUSCULAR; INTRAVENOUS at 13:33

## 2024-02-16 RX ADMIN — IPRATROPIUM BROMIDE 2 PUFF: 17 AEROSOL, METERED RESPIRATORY (INHALATION) at 02:12

## 2024-02-16 RX ADMIN — ENOXAPARIN SODIUM 80 MG: 100 INJECTION SUBCUTANEOUS at 10:55

## 2024-02-16 RX ADMIN — IPRATROPIUM BROMIDE 2 PUFF: 17 AEROSOL, METERED RESPIRATORY (INHALATION) at 19:33

## 2024-02-16 RX ADMIN — INSULIN GLARGINE 12 UNITS: 100 INJECTION, SOLUTION SUBCUTANEOUS at 21:36

## 2024-02-16 RX ADMIN — AZITHROMYCIN MONOHYDRATE 500 MG: 500 INJECTION, POWDER, LYOPHILIZED, FOR SOLUTION INTRAVENOUS at 05:35

## 2024-02-16 RX ADMIN — WATER 60 MG: 1 INJECTION INTRAMUSCULAR; INTRAVENOUS; SUBCUTANEOUS at 18:18

## 2024-02-16 RX ADMIN — WATER 60 MG: 1 INJECTION INTRAMUSCULAR; INTRAVENOUS; SUBCUTANEOUS at 23:52

## 2024-02-16 RX ADMIN — ALBUTEROL SULFATE 2 PUFF: 90 AEROSOL, METERED RESPIRATORY (INHALATION) at 10:50

## 2024-02-16 ASSESSMENT — PAIN SCALES - GENERAL
PAINLEVEL_OUTOF10: 0
PAINLEVEL_OUTOF10: 0

## 2024-02-16 NOTE — PROGRESS NOTES
This nurse went in with two other nurses. Assess patient. Patient was wet due to external sylvester not working. Patient bathed and new bedding placed. During bath this nurse assessed and labial abscess noted and charted. Sylvester placed due to retention and help with wound healing.

## 2024-02-16 NOTE — PROGRESS NOTES
Facility/Department: [facility] ICU   CLINICAL BEDSIDE SWALLOW EVALUATION   SPEECH PRODUCTION EVALUATION     NAME: Renata Mendez  : 1950  MRN: [id number]    ADMISSION DATE: 2024  ADMITTING DIAGNOSIS: has Deep vein thrombosis (HCC); Essential hypertension; Diabetes mellitus (HCC); Hypercholesteremia; S/P CABG x 4; History of coronary artery stent placement; Coronary artery disease involving native coronary artery of native heart without angina pectoris; Mixed hyperlipidemia; History of diabetes mellitus; Chronic obstructive pulmonary disease (HCC); NUNEZ (dyspnea on exertion); Abdominal aortic aneurysm (AAA) without rupture (HCC); 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    Date of Eval: 2024  Evaluating Therapist: MATTI Velazco    Current Diet level:  NPO    Pain:  Pain Assessment  Pain Assessment: Face, Legs, Activity, Cry, and Consolability (FLACC)  Pain Level: 0  Faces, Legs, Activity, Cry, and Consolability (FLACC)  Face (F): no particular expression or smile  Legs (L): normal position or relaxed  Activity (A): lying quietly, normal position, moves easily  Cry (C): no cry (awake or asleep)  Consolability (C): content, relaxed  FLACC Score : 0    Reason for Referral  Renata Mendez was referred for a bedside swallow evaluation to assess the efficiency of her swallow function, identify signs and symptoms of aspiration and make recommendations  watery eyes were observed with ice chip trials, puree consistency trials, and honey thick liquid trials.     At this time, patient with decreased management of secretions. Do also suspect delayed epiglottic inversion and residue in the throat post swallows. Would continue NPO status. If patient receives mouth care prior to intake, okay for ice chips and swabs water for comfort. Will continue to follow.     Electronically signed by MATTI Velazco on 2/16/2024 at 1:35 PM

## 2024-02-16 NOTE — PROGRESS NOTES
Patient yelling continuously. This nurse entered patients room. Patient stated there is a fire. This nurse educated patient there is no fire.

## 2024-02-16 NOTE — PROGRESS NOTES
Pulmonary and Critical Care Progress note.    [facility] STEWART Mendez    MRN# [id number]    Acct# [id number]  2/16/2024   5:57 PM CST    Referring Provider:Greg Sandoval MD      Chief Complaint: Respiratory failure noninvasive ventilation    HPI: The patient continues to be on noninvasive ventilation intermittently.  She is confused    Medications:  The following medications were reviewed and adjustments made when necessary.    insulin glargine, 0.15 Units/kg, SubCUTAneous, Nightly    insulin lispro, 0-4 Units, SubCUTAneous, TID WC    insulin lispro, 0-4 Units, SubCUTAneous, Nightly    enoxaparin, 1 mg/kg, SubCUTAneous, BID    clopidogrel, 75 mg, Oral, Daily    lactobacillus, 1 capsule, Oral, Daily with breakfast    montelukast, 10 mg, Oral, Nightly    rosuvastatin, 10 mg, Oral, Nightly    [Held by provider] sotalol, 120 mg, Oral, 2 times per day    methylPREDNISolone, 60 mg, IntraVENous, Q6H    sodium chloride flush, 5-40 mL, IntraVENous, 2 times per day    ipratropium, 2 puff, Inhalation, Q4H RT **AND** albuterol sulfate HFA, 2 puff, Inhalation, Q4H RT    cefepime, 2,000 mg, IntraVENous, Q12H    [Held by provider] baricitinib, 2 mg, Oral, Daily         Physical Exam:  /66   Pulse 81   Temp 97.3 °F (36.3 °C) (Temporal)   Resp 18   Ht 1.676 m (5' 6\")   Wt 81 kg (178 lb 9.6 oz)   SpO2 96%   BMI 28.83 kg/m²   Intake/Output Summary (Last 24 hours) at 2/16/2024 1444  Last data filed at 2/16/2024 1230  Gross per 24 hour   Intake 1332.54 ml   Output 2415 ml   Net -1082.46 ml         General appearance: Elderly female on nasal cannula oxygen.  HEENT:normocephalic atraumatic]  Heart:S1S2 no murmurs  Lungs:diminished bilaterally no rubs or tenderness or dullness to percussion  Abdomen:Soft non tender no organomegaly  Extremities:no clubbing cyanosis or edema  Neuro:no focal findings confused  Skin:intact        The following lab data was reviewed:  CBC   Recent Labs     02/14/24  4589  02/15/24  0642 02/15/24  1331 02/16/24  0127   WBC 11.5* 3.1* 3.6* 4.3*   RBC 3.63* 3.78* 4.05* 3.75*   HGB 9.4* 9.8* 10.2* 9.7*   HCT 30.1* 32.4* 35.3* 31.7*    212 193 230   MCV 82.9 85.7 87.2 84.5   MCH 25.9* 25.9* 25.2* 25.9*   MCHC 31.2* 30.2* 28.9* 30.6*   RDW 16.8* 16.9* 17.0* 17.1*   BANDSPCT  --   --  2  --         BMP   Recent Labs     02/15/24  1926 02/15/24  2117 02/16/24  0127 02/16/24  0518 02/16/24  0911 02/16/24  1322   * 128* 129* 130* 131*  132* 131*   K 4.8 4.0 3.9  3.9 4.0 4.2  --    CL 90* 89* 90* 92* 92*  --    CO2 33* 32* 29 30* 30*  --    BUN 37* 38* 40* 39* 41*  --    CREATININE 0.9 1.0* 1.0* 0.9 1.0*  --    CALCIUM 8.4* 8.0* 8.1* 7.9* 8.1*  --    GLUCOSE 360* 357* 314* 300* 290*  --         ABG   Recent Labs     02/14/24  0641 02/14/24  1007 02/15/24  0430 02/15/24  1715   PHART 7.400 7.380 7.320* 7.350   MSS3GQX 56.0* 56.0* 66.0* 62.0*   PO2ART 59.0* 59.0* 71.0* 56.0*   HCO5GBG 34.7* 33.1* 34.0* 34.2*   P1CYLIFF 87.6* 87.4* 91.5 87.7*   BEART 8.5* 6.8* 6.4* 7.0*       Liver function studies and cardiac markers   Recent Labs     02/14/24  1815 02/14/24  2356 02/16/24  0127 02/16/24  0518 02/16/24  0911   AST  --    < > 60*  --   --    ALT  --    < > 84*  --   --    ALKPHOS  --    < > 100  --   --    BILITOT  --    < > 0.4  --   --    MG 2.2  --   --   --   --    CALCIUM 8.7*   < > 8.1*   < > 8.1*    < > = values in this interval not displayed.       Microbiology and Cultures No results for input(s): \"BC\", \"LABGRAM\", \"CULTRESP\", \"BFCX\" in the last 72 hours.      Radiographs reviewed:     XR CHEST PORTABLE    Result Date: 2/15/2024  Impression:  Right basilar atelectasis and/or pneumonia as described.  Cardiomegaly.     ______________________________________ Electronically signed by: CATHERINE REESE M.D. Date:     02/15/2024 Time:    05:48     XR CHEST PORTABLE    Result Date: 2/14/2024  Impression: Bibasilar atelectasis and/or pneumonia.  Cardiomegaly.

## 2024-02-16 NOTE — PROGRESS NOTES
Pt remains on bipap; she has been lethargic throughout majority of today's shift; became easier to arouse near end of shift. Pt alert to self only; intermittenly follows commands when alert at random times.   Precedex off at start of today's shift; pt has received no sedating meds/PRNs. Unable to safely give pt any PO pills.     Electronically signed by Pratibha Alba RN on 2/15/2024 at 7:09 PM

## 2024-02-16 NOTE — PROGRESS NOTES
ondansetron    Objective:    Vitals: /83   Pulse 72   Temp 97.6 °F (36.4 °C) (Temporal)   Resp 14   Ht 1.676 m (5' 6\")   Wt 81 kg (178 lb 9.6 oz)   SpO2 98%   BMI 28.83 kg/m²   24 hour intake/output:  Intake/Output Summary (Last 24 hours) at 2/16/2024 0826  Last data filed at 2/16/2024 0800  Gross per 24 hour   Intake 2174.98 ml   Output 3575 ml   Net -1400.02 ml     Last 3 weights:  Wt Readings from Last 3 Encounters:   02/16/24 81 kg (178 lb 9.6 oz)   12/19/23 74.8 kg (165 lb)   12/11/23 73.9 kg (163 lb)       Physical Exam:    General Appearance: Easily arouses to voice and answer most questions appropriately.  Does appear somnolent.  Skin:  Skin color, texture, turgor normal. No rashes or lesions.  Eyes:  No gross abnormalities.  Neck:  neck- supple, no mass, non-tender  Lungs:  Breathing Pattern: Slightly tachypneic but not in distress, Breath sounds: wheezing- diffuse  Heart:  Heart regular rate and rhythm  Abdomen:  Auscultation: Normal bowel sounds.  No bruits.  Palpation: No masses, tenderness or organomegally.  Extremities: pulses present in all extremities  Musculoskeletal:  No joint swelling, deformity, or tenderness.  Neurologic: Again more arousable but still somnolent.  No gross focal deficits on limited exam    CBC with Differential:    Lab Results   Component Value Date/Time    WBC 4.3 02/16/2024 01:27 AM    RBC 3.75 02/16/2024 01:27 AM    HGB 9.7 02/16/2024 01:27 AM    HCT 31.7 02/16/2024 01:27 AM    HCT 32.1 09/02/2011 02:11 AM     02/16/2024 01:27 AM     09/02/2011 02:11 AM    MCV 84.5 02/16/2024 01:27 AM    MCH 25.9 02/16/2024 01:27 AM    MCHC 30.6 02/16/2024 01:27 AM    RDW 17.1 02/16/2024 01:27 AM    BANDSPCT 2 02/15/2024 01:31 PM    LYMPHOPCT 10.0 02/16/2024 01:27 AM    MONOPCT 10.0 02/16/2024 01:27 AM    EOSPCT 0.5 09/02/2011 02:11 AM    BASOPCT 0.0 02/16/2024 01:27 AM    MONOSABS 0.40 02/16/2024 01:27 AM    LYMPHSABS 0.4 02/16/2024 01:27 AM    EOSABS 0.00  02/16/2024 01:27 AM    BASOSABS 0.00 02/16/2024 01:27 AM     CMP:    Lab Results   Component Value Date/Time     02/16/2024 05:18 AM     09/02/2011 02:11 AM    K 4.0 02/16/2024 05:18 AM    K 3.9 02/16/2024 01:27 AM    K 4.1 09/02/2011 02:11 AM    CL 92 02/16/2024 05:18 AM     09/02/2011 02:11 AM    CO2 30 02/16/2024 05:18 AM    BUN 39 02/16/2024 05:18 AM    CREATININE 0.9 02/16/2024 05:18 AM    CREATININE 0.6 09/02/2011 02:11 AM    GFRAA >59 09/02/2022 02:12 AM    LABGLOM >60 02/16/2024 05:18 AM    GLUCOSE 300 02/16/2024 05:18 AM    PROT 6.3 02/16/2024 01:27 AM    PROT 7.5 01/18/2013 10:35 AM    LABALBU 2.7 02/16/2024 01:27 AM    LABALBU 3.3 09/02/2011 02:11 AM    CALCIUM 7.9 02/16/2024 05:18 AM    BILITOT 0.4 02/16/2024 01:27 AM    ALKPHOS 100 02/16/2024 01:27 AM    ALKPHOS 57 09/02/2011 02:11 AM    AST 60 02/16/2024 01:27 AM    ALT 84 02/16/2024 01:27 AM     Last 3 Troponin:    Lab Results   Component Value Date/Time    TROPONINI <0.01 10/23/2022 11:00 AM    TROPONINI <0.01 08/30/2022 10:15 AM    TROPONINI <0.01 08/30/2022 04:49 AM     Urine Culture:  No components found for: \"CURINE\"  Blood Culture:  No components found for: \"CBLOOD\", \"CFUNGUSBL\"  Stool Culture:  No components found for: \"CSTOOL\"    Assessment:    Principal Problem:    Acute on chronic respiratory failure with hypoxia and hypercapnia (HCC)  Active Problems:    NICM (nonischemic cardiomyopathy) (HCC)    COPD exacerbation (HCC)    Deep vein thrombosis (HCC)    Essential hypertension    Diabetes mellitus (HCC)    S/P CABG x 4    History of coronary artery stent placement    Coronary artery disease involving native coronary artery of native heart without angina pectoris    Abdominal aortic aneurysm (AAA) without rupture (HCC)    Hyponatremia    Stroke due to occlusion of left anterior cerebral artery (HCC)    Altered mental status    Pneumonia due to COVID-19 virus  Resolved Problems:    * No resolved hospital problems.

## 2024-02-16 NOTE — PROGRESS NOTES
Follow up visit.    Pt is more alert today, but still wanders in conversation and says she is scared.  Wanted the lights left on when staff turned them out.  She is currently on nasal cannula at 2L/m.    Hopefully pt's mental status will improve.  She will likely need a higher level of care, perhaps rehab prior to returning home.  Pt's son says pt has helpers, but not 24 hour care.    Will continue to follow and support.  Ongoing review of goals/plans.    Electronically signed by Abi Falk RN on 2/16/2024 at 11:34 AM

## 2024-02-16 NOTE — PROGRESS NOTES
Pt sodium checks increasing throughout afternoon. Per nephro during AM rounds, goal sodium is 127. Last sodium check 132.   Called nephro to notify; spoke with   Moni, explained trend in wrong direction. Will pass along to night shift RN to anticipate call back with different orders.     Electronically signed by Pratibha Alba RN on 2/15/2024 at 7:14 PM

## 2024-02-16 NOTE — PROGRESS NOTES
Nephrology ([facility]) Progress Note      Patient:  Renata Mendez  YOB: 1950  Date of Service: 2/16/2024  MRN: [id number]   Acct: [id number]   Primary Care Physician: Greg Sandoval MD  Advance Directive: Full Code  Admit Date: 2/14/2024       Hospital Day: 2  Referring Provider: Greg Sandoval MD    Patient independently seen and examined, Chart, Consults, Notes, Operative notes, Labs, Cardiology, and Radiology studies reviewed as available.    Chief complaint: Abnormal labs.    Subjective:  Renata Mendez is a 74 y.o. female for whom we were consulted for evaluation and treatment of HYPONATREMIA.  She has history of COPD/active tobacco use, type 2 diabetes, hyperlipidemia, pacemaker implant and CVA.  Presented with confusion/disoriented.  Patient has fever and chills with congestion.  Chest x-ray consistent with possible pneumonia.  In ambulance she was found to have an O2 saturation 71%.  Routine lab data indicated her serum sodium is 122 mmol.  She denies any history of nausea vomiting or diarrhea.  She takes trazodone as well as Aldactone.  Patient is not being admitted to ICU for close monitoring and treatment of hyponatremia.    This morning patient is fully alert and awake, seen in ICU.  She was able to answer all the question appropriately.  Her serum sodium is now stabilized.  She is currently off the BiPAP  Allergies:  Codeine    Medicines:  Current Facility-Administered Medications   Medication Dose Route Frequency Provider Last Rate Last Admin    glucose chewable tablet 16 g  4 tablet Oral PRN Greg Sandoval MD        dextrose bolus 10% 125 mL  125 mL IntraVENous PRN Greg Sandoval MD        Or    dextrose bolus 10% 250 mL  250 mL IntraVENous PRN Greg Sandoval MD        glucagon injection 1 mg  1 mg SubCUTAneous PRN Greg Sandoval MD        dextrose 10 % infusion   IntraVENous Continuous PRN Greg Sandoval MD        insulin glargine (LANTUS) injection

## 2024-02-16 NOTE — PLAN OF CARE
Problem: Discharge Planning  Goal: Discharge to home or other facility with appropriate resources  Outcome: Not Progressing     Problem: Skin/Tissue Integrity  Goal: Absence of new skin breakdown  Description: 1.  Monitor for areas of redness and/or skin breakdown  2.  Assess vascular access sites hourly  3.  Every 4-6 hours minimum:  Change oxygen saturation probe site  4.  Every 4-6 hours:  If on nasal continuous positive airway pressure, respiratory therapy assess nares and determine need for appliance change or resting period.  Outcome: Not Progressing     Problem: Chronic Conditions and Co-morbidities  Goal: Patient's chronic conditions and co-morbidity symptoms are monitored and maintained or improved  Outcome: Not Progressing     Problem: Safety - Adult  Goal: Free from fall injury  Outcome: Not Progressing     Problem: Pain  Goal: Verbalizes/displays adequate comfort level or baseline comfort level  Outcome: Not Progressing

## 2024-02-16 NOTE — PROGRESS NOTES
Abi Falk, Palliative care RN, asked this  to visit with pt to provide support and spiritual care. Pt talked about someone that was mean to her and that she was scared. This  reassured her, provided sustaining presence, support, and a prayer. Pt also wanted her nurses and this  asked them to come into the room.       Electronically signed by Mary Behrens on 2/16/2024 at 1:27 PM

## 2024-02-16 NOTE — CARE COORDINATION
Case Management Assessment  Initial Evaluation    Date/Time of Evaluation: 2/16/2024 2:28 PM  Assessment Completed by: JEET DE SOUZA    If patient is discharged prior to next notation, then this note serves as note for discharge by case management.    Patient Name: Renata Mendez                   YOB: 1950  Diagnosis: Hyponatremia [E87.1]  COPD exacerbation (HCC) [J44.1]  Acute on chronic respiratory failure with hypoxia and hypercapnia (HCC) [J96.21, J96.22]  Acute hypoxemic respiratory failure (HCC) [J96.01]                   Date / Time: 2/14/2024  5:07 AM    Patient Admission Status: Inpatient   Readmission Risk (Low < 19, Mod (19-27), High > 27): Readmission Risk Score: 28.2    Current PCP: Greg Sandoval MD  PCP verified by CM? (P) Yes    Chart Reviewed: Yes      History Provided by: (P) Patient  Patient Orientation: (P) Alert and Oriented, Person, Place, Situation    Patient Cognition: (P) Alert    Hospitalization in the last 30 days (Readmission):  No    If yes, Readmission Assessment in  Navigator will be completed.    Advance Directives:      Code Status: Full Code   Patient's Primary Decision Maker is: (P) Legal Next of Kin    Primary Decision Maker (Active): Robel Mendez  Ines - [phone]    Discharge Planning:    Patient lives with: (P) Alone Type of Home: (P) House  Primary Care Giver: (P) Self  Patient Support Systems include: (P) Family Members, Children   Current Financial resources: (P) Medicare  Current community resources: (P) None  Current services prior to admission: (P) None            Current DME:              Type of Home Care services:  (P) OT, PT    ADLS  Prior functional level: (P) Assistance with the following:  Current functional level: (P) Assistance with the following:    PT AM-PAC:   /24  OT AM-PAC:   /24    Family can provide assistance at DC: (P) Yes  Would you like Case Management to discuss the discharge plan with any other family members/significant

## 2024-02-16 NOTE — PROGRESS NOTES
Called and spoke with on call provider due patient npo and unable to take BETAPACE and eliquis. Provider ordered full dose lovenox and hold BETAPACE.     Nurse presented to room after orders placed. Another nurse at bedside due patient pulling at lines and tubes. Yelling and threatening staff. Nurse tried to talk with patient. Patient unable to understand. Bipap taken off. Patient yelling and unable to understand what patient stated. Precedex dtt restarted.

## 2024-02-17 LAB
ALBUMIN SERPL-MCNC: 3.2 G/DL (ref 3.5–5.2)
ALP SERPL-CCNC: 97 U/L (ref 35–104)
ALT SERPL-CCNC: 70 U/L (ref 5–33)
ANION GAP SERPL CALCULATED.3IONS-SCNC: 12 MMOL/L (ref 7–19)
ANION GAP SERPL CALCULATED.3IONS-SCNC: 9 MMOL/L (ref 7–19)
ANION GAP SERPL CALCULATED.3IONS-SCNC: 9 MMOL/L (ref 7–19)
AST SERPL-CCNC: 30 U/L (ref 5–32)
BASOPHILS # BLD: 0 K/UL (ref 0–0.2)
BASOPHILS NFR BLD: 0 % (ref 0–1)
BILIRUB SERPL-MCNC: 0.4 MG/DL (ref 0.2–1.2)
BUN SERPL-MCNC: 34 MG/DL (ref 8–23)
BUN SERPL-MCNC: 37 MG/DL (ref 8–23)
BUN SERPL-MCNC: 37 MG/DL (ref 8–23)
CALCIUM SERPL-MCNC: 8.1 MG/DL (ref 8.8–10.2)
CALCIUM SERPL-MCNC: 8.3 MG/DL (ref 8.8–10.2)
CALCIUM SERPL-MCNC: 8.5 MG/DL (ref 8.8–10.2)
CHLORIDE SERPL-SCNC: 93 MMOL/L (ref 98–111)
CHLORIDE SERPL-SCNC: 94 MMOL/L (ref 98–111)
CHLORIDE SERPL-SCNC: 95 MMOL/L (ref 98–111)
CO2 SERPL-SCNC: 30 MMOL/L (ref 22–29)
CO2 SERPL-SCNC: 31 MMOL/L (ref 22–29)
CO2 SERPL-SCNC: 31 MMOL/L (ref 22–29)
CREAT SERPL-MCNC: 0.8 MG/DL (ref 0.5–0.9)
CREAT SERPL-MCNC: 0.8 MG/DL (ref 0.5–0.9)
CREAT SERPL-MCNC: 0.9 MG/DL (ref 0.5–0.9)
EOSINOPHIL # BLD: 0 K/UL (ref 0–0.6)
EOSINOPHIL NFR BLD: 0 % (ref 0–5)
ERYTHROCYTE [DISTWIDTH] IN BLOOD BY AUTOMATED COUNT: 17.1 % (ref 11.5–14.5)
GLUCOSE BLD-MCNC: 201 MG/DL (ref 70–99)
GLUCOSE BLD-MCNC: 233 MG/DL (ref 70–99)
GLUCOSE BLD-MCNC: 243 MG/DL (ref 70–99)
GLUCOSE BLD-MCNC: 308 MG/DL (ref 70–99)
GLUCOSE SERPL-MCNC: 224 MG/DL (ref 74–109)
GLUCOSE SERPL-MCNC: 255 MG/DL (ref 74–109)
GLUCOSE SERPL-MCNC: 265 MG/DL (ref 74–109)
HCT VFR BLD AUTO: 34 % (ref 37–47)
HGB BLD-MCNC: 10.2 G/DL (ref 12–16)
IMM GRANULOCYTES # BLD: 0 K/UL
LYMPHOCYTES # BLD: 0.3 K/UL (ref 1.1–4.5)
LYMPHOCYTES NFR BLD: 8.1 % (ref 20–40)
MCH RBC QN AUTO: 25.1 PG (ref 27–31)
MCHC RBC AUTO-ENTMCNC: 30 G/DL (ref 33–37)
MCV RBC AUTO: 83.5 FL (ref 81–99)
MONOCYTES # BLD: 0.2 K/UL (ref 0–0.9)
MONOCYTES NFR BLD: 5.5 % (ref 0–10)
NEUTROPHILS # BLD: 3.6 K/UL (ref 1.5–7.5)
NEUTS SEG NFR BLD: 85.9 % (ref 50–65)
PERFORMED ON: ABNORMAL
PLATELET # BLD AUTO: 232 K/UL (ref 130–400)
PMV BLD AUTO: 9.3 FL (ref 9.4–12.3)
POTASSIUM SERPL-SCNC: 3.3 MMOL/L (ref 3.5–5)
POTASSIUM SERPL-SCNC: 3.4 MMOL/L (ref 3.5–5)
POTASSIUM SERPL-SCNC: 3.8 MMOL/L (ref 3.5–5)
POTASSIUM SERPL-SCNC: 3.8 MMOL/L (ref 3.5–5)
PROT SERPL-MCNC: 6.5 G/DL (ref 6.6–8.7)
RBC # BLD AUTO: 4.07 M/UL (ref 4.2–5.4)
SODIUM SERPL-SCNC: 129 MMOL/L (ref 136–145)
SODIUM SERPL-SCNC: 133 MMOL/L (ref 136–145)
SODIUM SERPL-SCNC: 134 MMOL/L (ref 136–145)
SODIUM SERPL-SCNC: 135 MMOL/L (ref 136–145)
SODIUM SERPL-SCNC: 136 MMOL/L (ref 136–145)
SODIUM SERPL-SCNC: 136 MMOL/L (ref 136–145)
WBC # BLD AUTO: 4.2 K/UL (ref 4.8–10.8)

## 2024-02-17 PROCEDURE — 6370000000 HC RX 637 (ALT 250 FOR IP): Performed by: FAMILY MEDICINE

## 2024-02-17 PROCEDURE — 1210000000 HC MED SURG R&B

## 2024-02-17 PROCEDURE — 6360000002 HC RX W HCPCS: Performed by: NURSE PRACTITIONER

## 2024-02-17 PROCEDURE — 6360000002 HC RX W HCPCS: Performed by: STUDENT IN AN ORGANIZED HEALTH CARE EDUCATION/TRAINING PROGRAM

## 2024-02-17 PROCEDURE — 2580000003 HC RX 258: Performed by: INTERNAL MEDICINE

## 2024-02-17 PROCEDURE — 84295 ASSAY OF SERUM SODIUM: CPT

## 2024-02-17 PROCEDURE — 2580000003 HC RX 258: Performed by: STUDENT IN AN ORGANIZED HEALTH CARE EDUCATION/TRAINING PROGRAM

## 2024-02-17 PROCEDURE — 36415 COLL VENOUS BLD VENIPUNCTURE: CPT

## 2024-02-17 PROCEDURE — 94760 N-INVAS EAR/PLS OXIMETRY 1: CPT

## 2024-02-17 PROCEDURE — 80053 COMPREHEN METABOLIC PANEL: CPT

## 2024-02-17 PROCEDURE — 94660 CPAP INITIATION&MGMT: CPT

## 2024-02-17 PROCEDURE — 82962 GLUCOSE BLOOD TEST: CPT

## 2024-02-17 PROCEDURE — 6360000002 HC RX W HCPCS: Performed by: INTERNAL MEDICINE

## 2024-02-17 PROCEDURE — 85025 COMPLETE CBC W/AUTO DIFF WBC: CPT

## 2024-02-17 PROCEDURE — 94640 AIRWAY INHALATION TREATMENT: CPT

## 2024-02-17 PROCEDURE — 6360000002 HC RX W HCPCS

## 2024-02-17 PROCEDURE — 94669 MECHANICAL CHEST WALL OSCILL: CPT

## 2024-02-17 PROCEDURE — 6360000002 HC RX W HCPCS: Performed by: FAMILY MEDICINE

## 2024-02-17 PROCEDURE — 2580000003 HC RX 258: Performed by: FAMILY MEDICINE

## 2024-02-17 PROCEDURE — 6370000000 HC RX 637 (ALT 250 FOR IP): Performed by: STUDENT IN AN ORGANIZED HEALTH CARE EDUCATION/TRAINING PROGRAM

## 2024-02-17 PROCEDURE — 2700000000 HC OXYGEN THERAPY PER DAY

## 2024-02-17 RX ORDER — HYDRALAZINE HYDROCHLORIDE 20 MG/ML
INJECTION INTRAMUSCULAR; INTRAVENOUS
Status: COMPLETED
Start: 2024-02-17 | End: 2024-02-17

## 2024-02-17 RX ORDER — HYDRALAZINE HYDROCHLORIDE 20 MG/ML
5 INJECTION INTRAMUSCULAR; INTRAVENOUS EVERY 6 HOURS PRN
Status: DISCONTINUED | OUTPATIENT
Start: 2024-02-17 | End: 2024-02-22 | Stop reason: HOSPADM

## 2024-02-17 RX ADMIN — ALBUTEROL SULFATE 2 PUFF: 90 AEROSOL, METERED RESPIRATORY (INHALATION) at 18:59

## 2024-02-17 RX ADMIN — ALBUTEROL SULFATE 2 PUFF: 90 AEROSOL, METERED RESPIRATORY (INHALATION) at 02:01

## 2024-02-17 RX ADMIN — IPRATROPIUM BROMIDE 2 PUFF: 17 AEROSOL, METERED RESPIRATORY (INHALATION) at 15:00

## 2024-02-17 RX ADMIN — IPRATROPIUM BROMIDE 2 PUFF: 17 AEROSOL, METERED RESPIRATORY (INHALATION) at 02:01

## 2024-02-17 RX ADMIN — INSULIN LISPRO 4 UNITS: 100 INJECTION, SOLUTION INTRAVENOUS; SUBCUTANEOUS at 21:47

## 2024-02-17 RX ADMIN — IPRATROPIUM BROMIDE 2 PUFF: 17 AEROSOL, METERED RESPIRATORY (INHALATION) at 18:59

## 2024-02-17 RX ADMIN — ALBUTEROL SULFATE 2 PUFF: 90 AEROSOL, METERED RESPIRATORY (INHALATION) at 23:33

## 2024-02-17 RX ADMIN — WATER 60 MG: 1 INJECTION INTRAMUSCULAR; INTRAVENOUS; SUBCUTANEOUS at 18:27

## 2024-02-17 RX ADMIN — Medication 1 CAPSULE: at 07:45

## 2024-02-17 RX ADMIN — WATER 60 MG: 1 INJECTION INTRAMUSCULAR; INTRAVENOUS; SUBCUTANEOUS at 13:02

## 2024-02-17 RX ADMIN — ENOXAPARIN SODIUM 80 MG: 100 INJECTION SUBCUTANEOUS at 21:49

## 2024-02-17 RX ADMIN — ALBUTEROL SULFATE 2 PUFF: 90 AEROSOL, METERED RESPIRATORY (INHALATION) at 06:39

## 2024-02-17 RX ADMIN — HYDRALAZINE HYDROCHLORIDE 5 MG: 20 INJECTION, SOLUTION INTRAMUSCULAR; INTRAVENOUS at 01:27

## 2024-02-17 RX ADMIN — CEFEPIME 2000 MG: 2 INJECTION, POWDER, FOR SOLUTION INTRAVENOUS at 13:05

## 2024-02-17 RX ADMIN — MONTELUKAST 10 MG: 10 TABLET, FILM COATED ORAL at 21:49

## 2024-02-17 RX ADMIN — ROSUVASTATIN 10 MG: 10 TABLET, FILM COATED ORAL at 21:49

## 2024-02-17 RX ADMIN — IPRATROPIUM BROMIDE 2 PUFF: 17 AEROSOL, METERED RESPIRATORY (INHALATION) at 23:33

## 2024-02-17 RX ADMIN — CEFEPIME 2000 MG: 2 INJECTION, POWDER, FOR SOLUTION INTRAVENOUS at 21:46

## 2024-02-17 RX ADMIN — INSULIN LISPRO 1 UNITS: 100 INJECTION, SOLUTION INTRAVENOUS; SUBCUTANEOUS at 07:45

## 2024-02-17 RX ADMIN — WATER 60 MG: 1 INJECTION INTRAMUSCULAR; INTRAVENOUS; SUBCUTANEOUS at 06:11

## 2024-02-17 RX ADMIN — IPRATROPIUM BROMIDE 2 PUFF: 17 AEROSOL, METERED RESPIRATORY (INHALATION) at 10:43

## 2024-02-17 RX ADMIN — SODIUM CHLORIDE, PRESERVATIVE FREE 10 ML: 5 INJECTION INTRAVENOUS at 21:47

## 2024-02-17 RX ADMIN — IPRATROPIUM BROMIDE 2 PUFF: 17 AEROSOL, METERED RESPIRATORY (INHALATION) at 06:39

## 2024-02-17 RX ADMIN — INSULIN LISPRO 1 UNITS: 100 INJECTION, SOLUTION INTRAVENOUS; SUBCUTANEOUS at 18:29

## 2024-02-17 RX ADMIN — HYDRALAZINE HYDROCHLORIDE 5 MG: 20 INJECTION INTRAMUSCULAR; INTRAVENOUS at 01:27

## 2024-02-17 RX ADMIN — CEFEPIME 2000 MG: 2 INJECTION, POWDER, FOR SOLUTION INTRAVENOUS at 06:11

## 2024-02-17 RX ADMIN — CLOPIDOGREL BISULFATE 75 MG: 75 TABLET ORAL at 07:45

## 2024-02-17 RX ADMIN — LORAZEPAM 0.26 MG: 2 INJECTION INTRAMUSCULAR; INTRAVENOUS at 18:45

## 2024-02-17 RX ADMIN — ALBUTEROL SULFATE 2 PUFF: 90 AEROSOL, METERED RESPIRATORY (INHALATION) at 10:43

## 2024-02-17 RX ADMIN — INSULIN GLARGINE 12 UNITS: 100 INJECTION, SOLUTION SUBCUTANEOUS at 21:47

## 2024-02-17 RX ADMIN — ENOXAPARIN SODIUM 80 MG: 100 INJECTION SUBCUTANEOUS at 07:45

## 2024-02-17 RX ADMIN — ALBUTEROL SULFATE 2 PUFF: 90 AEROSOL, METERED RESPIRATORY (INHALATION) at 14:59

## 2024-02-17 NOTE — PLAN OF CARE
Problem: Discharge Planning  Goal: Discharge to home or other facility with appropriate resources  Outcome: Progressing  Flowsheets (Taken 2/16/2024 2000)  Discharge to home or other facility with appropriate resources:   Identify barriers to discharge with patient and caregiver   Arrange for needed discharge resources and transportation as appropriate   Identify discharge learning needs (meds, wound care, etc)   Arrange for interpreters to assist at discharge as needed   Refer to discharge planning if patient needs post-hospital services based on physician order or complex needs related to functional status, cognitive ability or social support system     Problem: Skin/Tissue Integrity  Goal: Absence of new skin breakdown  Description: 1.  Monitor for areas of redness and/or skin breakdown  2.  Assess vascular access sites hourly  3.  Every 4-6 hours minimum:  Change oxygen saturation probe site  4.  Every 4-6 hours:  If on nasal continuous positive airway pressure, respiratory therapy assess nares and determine need for appliance change or resting period.  Outcome: Progressing     Problem: Chronic Conditions and Co-morbidities  Goal: Patient's chronic conditions and co-morbidity symptoms are monitored and maintained or improved  Outcome: Progressing  Flowsheets (Taken 2/16/2024 2000)  Care Plan - Patient's Chronic Conditions and Co-Morbidity Symptoms are Monitored and Maintained or Improved:   Monitor and assess patient's chronic conditions and comorbid symptoms for stability, deterioration, or improvement   Collaborate with multidisciplinary team to address chronic and comorbid conditions and prevent exacerbation or deterioration   Update acute care plan with appropriate goals if chronic or comorbid symptoms are exacerbated and prevent overall improvement and discharge     Problem: Safety - Adult  Goal: Free from fall injury  Outcome: Progressing     Problem: Pain  Goal: Verbalizes/displays adequate comfort  level or baseline comfort level  Outcome: Progressing     Problem: ABCDS Injury Assessment  Goal: Absence of physical injury  Outcome: Progressing

## 2024-02-17 NOTE — PROGRESS NOTES
Pharmacy Adjustment per [facility] protocol    Renata Mendez is a 74 y.o. female. Pharmacy has adjusted medications per [facility] protocol.    Recent Labs     02/17/24  0128 02/17/24  0920   BUN 37* 34*       Recent Labs     02/17/24  0128 02/17/24  0920   CREATININE 0.8 0.8       Estimated Creatinine Clearance: 64 mL/min (based on SCr of 0.8 mg/dL).    Height:   Ht Readings from Last 1 Encounters:   02/14/24 1.676 m (5' 6\")     Weight:  Wt Readings from Last 1 Encounters:   02/17/24 77 kg (169 lb 12.8 oz)         Plan: Adjust the following medications based on [facility] protocol:           Cefepime 2 gm IV every 12 hours extended infusion adjusted to Cefepime 2 gm IV every 8 hours extended infusion.     Electronically signed by Antionette [facility] on 2/17/2024 at 10:47 AM

## 2024-02-17 NOTE — PROGRESS NOTES
Daily Progress Note  Renata Mendez  MRN: [id number] LOS: 3    Admit Date: 2/14/2024 2/17/2024 7:10 AM    Subjective:          Chief Complaint:  Chief Complaint   Patient presents with    Shortness of Breath       Interval History:    Reviewed overnight events and nursing notes.   Status:  improved  Pain:  some relief        Review of Systems   Constitutional:  Negative for chills and fever.   Respiratory:  Positive for cough, shortness of breath and wheezing.    Cardiovascular:  Negative for palpitations.   Gastrointestinal:  Negative for abdominal pain, nausea and vomiting.   Neurological:  Positive for weakness.       DIET:  Diet NPO    Medications:      dextrose      sodium chloride      dexmedeTOMIDine HCl in NaCl Stopped (02/17/24 0621)      insulin glargine  0.15 Units/kg SubCUTAneous Nightly    insulin lispro  0-4 Units SubCUTAneous TID WC    insulin lispro  0-4 Units SubCUTAneous Nightly    enoxaparin  1 mg/kg SubCUTAneous BID    clopidogrel  75 mg Oral Daily    lactobacillus  1 capsule Oral Daily with breakfast    montelukast  10 mg Oral Nightly    rosuvastatin  10 mg Oral Nightly    [Held by provider] sotalol  120 mg Oral 2 times per day    methylPREDNISolone  60 mg IntraVENous Q6H    sodium chloride flush  5-40 mL IntraVENous 2 times per day    ipratropium  2 puff Inhalation Q4H RT    And    albuterol sulfate HFA  2 puff Inhalation Q4H RT    cefepime  2,000 mg IntraVENous Q12H    [Held by provider] baricitinib  2 mg Oral Daily       Data:     Code Status: Full Code    Family History   Problem Relation Age of Onset    Cancer Mother     Coronary Art Dis Father     Mult Sclerosis Sister     Cancer Brother      Social History     Socioeconomic History    Marital status:      Spouse name: Not on file    Number of children: Not on file    Years of education: Not on file    Highest education level: Not on file   Occupational History    Not on file   Tobacco Use    Smoking status: Some Days     Current

## 2024-02-17 NOTE — PROGRESS NOTES
Nephrology ([facility]) Progress Note      Patient:  Renata Mendez  YOB: 1950  Date of Service: 2/17/2024  MRN: [id number]   Acct: [id number]   Primary Care Physician: Greg Sandoval MD  Advance Directive: Full Code  Admit Date: 2/14/2024       Hospital Day: 3  Referring Provider: Greg Sandoval MD    Patient independently seen and examined, Chart, Consults, Notes, Operative notes, Labs, Cardiology, and Radiology studies reviewed as available.    Chief complaint: Abnormal labs.    Subjective:  Renata Mendez is a 74 y.o. female for whom we were consulted for evaluation and treatment of HYPONATREMIA.  She has history of COPD/active tobacco use, type 2 diabetes, hyperlipidemia, pacemaker implant and CVA.  Presented with confusion/disoriented.  Patient has fever and chills with congestion.  Chest x-ray consistent with possible pneumonia.  In ambulance she was found to have an O2 saturation 71%.  Routine lab data indicated her serum sodium is 122 mmol.  She denies any history of nausea vomiting or diarrhea.  She takes trazodone as well as Aldactone.  Patient is not being admitted to ICU for close monitoring and treatment of hyponatremia.    This morning patient is fully alert and awake.  Her serum sodium is now back to normal values.  BiPAP has been weaned off.  She is very hungry and wants to eat and drink water.    Allergies:  Codeine    Medicines:  Current Facility-Administered Medications   Medication Dose Route Frequency Provider Last Rate Last Admin    hydrALAZINE (APRESOLINE) injection 5 mg  5 mg IntraVENous Q6H PRN Greg Sandoval MD   5 mg at 02/17/24 0127    ceFEPIme (MAXIPIME) 2,000 mg in sodium chloride 0.9 % 100 mL IVPB ([id number])  2,000 mg IntraVENous Q8H Wes Vega MD        glucose chewable tablet 16 g  4 tablet Oral PRN Greg Sandoval MD        dextrose bolus 10% 125 mL  125 mL IntraVENous PRN Greg Sandoval MD        Or    dextrose bolus 10%  patient 2022    Pneumonia due to COVID-19 virus 2024    Stroke due to occlusion of left anterior cerebral artery (HCC) 2023    Unstable angina (HCC)        Past Surgical History:  Past Surgical History:   Procedure Laterality Date    CARDIAC CATHETERIZATION  12/10/00    selective left heart and coronary arteriography with left ventriculography     CARDIAC CATHETERIZATION  98    left heart cath, left ventriculography, slective coronary arteriography, direct infarct angioplasty and stent placement to proximal left anterior descending coronary artery    CARDIAC CATHETERIZATION  94    left heart cath, selective coronary arteriography, left ventriculography    CARDIAC CATHETERIZATION  2011    [place]    CHOLECYSTECTOMY      CORONARY ANGIOPLASTY WITH STENT PLACEMENT  00    PTCA and stent placement to the mid LAD/ptca and stent placement first circumflex marginal (intermediate)     CORONARY ANGIOPLASTY WITH STENT PLACEMENT  2021    CORONARY ARTERY BYPASS GRAFT  2011    PACABG X 4 LIMA-LAD, SVG-DIAG, SVG-PDA, RT EVH, LT OPEN VEIN HARVEST, DR FENG    CYST REMOVAL      GANGLION CYST REMOVED RT HAND    HYSTERECTOMY (CERVIX STATUS UNKNOWN)      NECK SURGERY      parotid artery tumor removed bilaterally    PACEMAKER PLACEMENT      PAROTIDECTOMY Bilateral        Family History  Family History   Problem Relation Age of Onset    Cancer Mother     Coronary Art Dis Father     Mult Sclerosis Sister     Cancer Brother        Social History  Social History     Socioeconomic History    Marital status:      Spouse name: Not on file    Number of children: Not on file    Years of education: Not on file    Highest education level: Not on file   Occupational History    Not on file   Tobacco Use    Smoking status: Some Days     Current packs/day: 0.00     Types: Cigarettes     Last attempt to quit: 3/29/2021     Years since quittin.8    Smokeless tobacco: Never    Tobacco

## 2024-02-18 LAB
ALBUMIN SERPL-MCNC: 3.7 G/DL (ref 3.5–5.2)
ALP SERPL-CCNC: 93 U/L (ref 35–104)
ALT SERPL-CCNC: 58 U/L (ref 5–33)
ANION GAP SERPL CALCULATED.3IONS-SCNC: 10 MMOL/L (ref 7–19)
ANION GAP SERPL CALCULATED.3IONS-SCNC: 12 MMOL/L (ref 7–19)
ANION GAP SERPL CALCULATED.3IONS-SCNC: 13 MMOL/L (ref 7–19)
AST SERPL-CCNC: 24 U/L (ref 5–32)
BASOPHILS # BLD: 0 K/UL (ref 0–0.2)
BASOPHILS NFR BLD: 0 % (ref 0–1)
BILIRUB SERPL-MCNC: 0.6 MG/DL (ref 0.2–1.2)
BUN SERPL-MCNC: 31 MG/DL (ref 8–23)
BUN SERPL-MCNC: 31 MG/DL (ref 8–23)
BUN SERPL-MCNC: 34 MG/DL (ref 8–23)
CALCIUM SERPL-MCNC: 8.3 MG/DL (ref 8.8–10.2)
CALCIUM SERPL-MCNC: 8.5 MG/DL (ref 8.8–10.2)
CALCIUM SERPL-MCNC: 8.7 MG/DL (ref 8.8–10.2)
CHLORIDE SERPL-SCNC: 90 MMOL/L (ref 98–111)
CHLORIDE SERPL-SCNC: 92 MMOL/L (ref 98–111)
CHLORIDE SERPL-SCNC: 98 MMOL/L (ref 98–111)
CO2 SERPL-SCNC: 30 MMOL/L (ref 22–29)
CO2 SERPL-SCNC: 30 MMOL/L (ref 22–29)
CO2 SERPL-SCNC: 31 MMOL/L (ref 22–29)
CREAT SERPL-MCNC: 0.8 MG/DL (ref 0.5–0.9)
CREAT SERPL-MCNC: 0.9 MG/DL (ref 0.5–0.9)
CREAT SERPL-MCNC: 0.9 MG/DL (ref 0.5–0.9)
EOSINOPHIL # BLD: 0 K/UL (ref 0–0.6)
EOSINOPHIL NFR BLD: 0 % (ref 0–5)
ERYTHROCYTE [DISTWIDTH] IN BLOOD BY AUTOMATED COUNT: 17.2 % (ref 11.5–14.5)
GLUCOSE BLD-MCNC: 169 MG/DL (ref 70–99)
GLUCOSE BLD-MCNC: 174 MG/DL (ref 70–99)
GLUCOSE BLD-MCNC: 238 MG/DL (ref 70–99)
GLUCOSE BLD-MCNC: 330 MG/DL (ref 70–99)
GLUCOSE SERPL-MCNC: 157 MG/DL (ref 74–109)
GLUCOSE SERPL-MCNC: 280 MG/DL (ref 74–109)
GLUCOSE SERPL-MCNC: 314 MG/DL (ref 74–109)
HCT VFR BLD AUTO: 33.8 % (ref 37–47)
HGB BLD-MCNC: 10.6 G/DL (ref 12–16)
IMM GRANULOCYTES # BLD: 0 K/UL
LYMPHOCYTES # BLD: 0.4 K/UL (ref 1.1–4.5)
LYMPHOCYTES NFR BLD: 7.3 % (ref 20–40)
MAGNESIUM SERPL-MCNC: 2.5 MG/DL (ref 1.6–2.4)
MCH RBC QN AUTO: 25.7 PG (ref 27–31)
MCHC RBC AUTO-ENTMCNC: 31.4 G/DL (ref 33–37)
MCV RBC AUTO: 82 FL (ref 81–99)
MONOCYTES # BLD: 0.4 K/UL (ref 0–0.9)
MONOCYTES NFR BLD: 6.8 % (ref 0–10)
NEUTROPHILS # BLD: 4.8 K/UL (ref 1.5–7.5)
NEUTS SEG NFR BLD: 85.5 % (ref 50–65)
PERFORMED ON: ABNORMAL
PLATELET # BLD AUTO: 275 K/UL (ref 130–400)
PMV BLD AUTO: 9.9 FL (ref 9.4–12.3)
POTASSIUM SERPL-SCNC: 3 MMOL/L (ref 3.5–5)
POTASSIUM SERPL-SCNC: 3.1 MMOL/L (ref 3.5–5)
POTASSIUM SERPL-SCNC: 3.5 MMOL/L (ref 3.5–5)
POTASSIUM SERPL-SCNC: 3.5 MMOL/L (ref 3.5–5)
PROT SERPL-MCNC: 7 G/DL (ref 6.6–8.7)
RBC # BLD AUTO: 4.12 M/UL (ref 4.2–5.4)
SODIUM SERPL-SCNC: 133 MMOL/L (ref 136–145)
SODIUM SERPL-SCNC: 133 MMOL/L (ref 136–145)
SODIUM SERPL-SCNC: 136 MMOL/L (ref 136–145)
SODIUM SERPL-SCNC: 138 MMOL/L (ref 136–145)
SODIUM SERPL-SCNC: 140 MMOL/L (ref 136–145)
WBC # BLD AUTO: 5.6 K/UL (ref 4.8–10.8)

## 2024-02-18 PROCEDURE — 94760 N-INVAS EAR/PLS OXIMETRY 1: CPT

## 2024-02-18 PROCEDURE — 94669 MECHANICAL CHEST WALL OSCILL: CPT

## 2024-02-18 PROCEDURE — 6360000002 HC RX W HCPCS: Performed by: INTERNAL MEDICINE

## 2024-02-18 PROCEDURE — 94660 CPAP INITIATION&MGMT: CPT

## 2024-02-18 PROCEDURE — 2580000003 HC RX 258: Performed by: FAMILY MEDICINE

## 2024-02-18 PROCEDURE — 94640 AIRWAY INHALATION TREATMENT: CPT

## 2024-02-18 PROCEDURE — 85025 COMPLETE CBC W/AUTO DIFF WBC: CPT

## 2024-02-18 PROCEDURE — 6360000002 HC RX W HCPCS: Performed by: FAMILY MEDICINE

## 2024-02-18 PROCEDURE — 84295 ASSAY OF SERUM SODIUM: CPT

## 2024-02-18 PROCEDURE — 6360000002 HC RX W HCPCS: Performed by: NURSE PRACTITIONER

## 2024-02-18 PROCEDURE — 83735 ASSAY OF MAGNESIUM: CPT

## 2024-02-18 PROCEDURE — 1210000000 HC MED SURG R&B

## 2024-02-18 PROCEDURE — 2580000003 HC RX 258: Performed by: INTERNAL MEDICINE

## 2024-02-18 PROCEDURE — 2700000000 HC OXYGEN THERAPY PER DAY

## 2024-02-18 PROCEDURE — 6370000000 HC RX 637 (ALT 250 FOR IP): Performed by: FAMILY MEDICINE

## 2024-02-18 PROCEDURE — 6360000002 HC RX W HCPCS: Performed by: STUDENT IN AN ORGANIZED HEALTH CARE EDUCATION/TRAINING PROGRAM

## 2024-02-18 PROCEDURE — 2580000003 HC RX 258: Performed by: STUDENT IN AN ORGANIZED HEALTH CARE EDUCATION/TRAINING PROGRAM

## 2024-02-18 PROCEDURE — 6370000000 HC RX 637 (ALT 250 FOR IP): Performed by: STUDENT IN AN ORGANIZED HEALTH CARE EDUCATION/TRAINING PROGRAM

## 2024-02-18 PROCEDURE — 36415 COLL VENOUS BLD VENIPUNCTURE: CPT

## 2024-02-18 PROCEDURE — 80053 COMPREHEN METABOLIC PANEL: CPT

## 2024-02-18 PROCEDURE — 82962 GLUCOSE BLOOD TEST: CPT

## 2024-02-18 RX ORDER — ENOXAPARIN SODIUM 100 MG/ML
1 INJECTION SUBCUTANEOUS 2 TIMES DAILY
Status: DISCONTINUED | OUTPATIENT
Start: 2024-02-18 | End: 2024-02-22 | Stop reason: HOSPADM

## 2024-02-18 RX ADMIN — Medication 1 CAPSULE: at 09:41

## 2024-02-18 RX ADMIN — CEFEPIME 2000 MG: 2 INJECTION, POWDER, FOR SOLUTION INTRAVENOUS at 15:00

## 2024-02-18 RX ADMIN — SODIUM CHLORIDE, PRESERVATIVE FREE 10 ML: 5 INJECTION INTRAVENOUS at 09:41

## 2024-02-18 RX ADMIN — ALBUTEROL SULFATE 2 PUFF: 90 AEROSOL, METERED RESPIRATORY (INHALATION) at 10:26

## 2024-02-18 RX ADMIN — ALBUTEROL SULFATE 2 PUFF: 90 AEROSOL, METERED RESPIRATORY (INHALATION) at 22:30

## 2024-02-18 RX ADMIN — WATER 60 MG: 1 INJECTION INTRAMUSCULAR; INTRAVENOUS; SUBCUTANEOUS at 06:20

## 2024-02-18 RX ADMIN — INSULIN GLARGINE 12 UNITS: 100 INJECTION, SOLUTION SUBCUTANEOUS at 21:17

## 2024-02-18 RX ADMIN — ALBUTEROL SULFATE 2 PUFF: 90 AEROSOL, METERED RESPIRATORY (INHALATION) at 19:28

## 2024-02-18 RX ADMIN — INSULIN LISPRO 4 UNITS: 100 INJECTION, SOLUTION INTRAVENOUS; SUBCUTANEOUS at 21:17

## 2024-02-18 RX ADMIN — ENOXAPARIN SODIUM 80 MG: 100 INJECTION SUBCUTANEOUS at 09:41

## 2024-02-18 RX ADMIN — INSULIN LISPRO 1 UNITS: 100 INJECTION, SOLUTION INTRAVENOUS; SUBCUTANEOUS at 16:23

## 2024-02-18 RX ADMIN — WATER 60 MG: 1 INJECTION INTRAMUSCULAR; INTRAVENOUS; SUBCUTANEOUS at 23:40

## 2024-02-18 RX ADMIN — CLOPIDOGREL BISULFATE 75 MG: 75 TABLET ORAL at 09:41

## 2024-02-18 RX ADMIN — ONDANSETRON 4 MG: 2 INJECTION INTRAMUSCULAR; INTRAVENOUS at 15:35

## 2024-02-18 RX ADMIN — WATER 60 MG: 1 INJECTION INTRAMUSCULAR; INTRAVENOUS; SUBCUTANEOUS at 00:04

## 2024-02-18 RX ADMIN — MONTELUKAST 10 MG: 10 TABLET, FILM COATED ORAL at 21:16

## 2024-02-18 RX ADMIN — WATER 60 MG: 1 INJECTION INTRAMUSCULAR; INTRAVENOUS; SUBCUTANEOUS at 17:37

## 2024-02-18 RX ADMIN — IPRATROPIUM BROMIDE 2 PUFF: 17 AEROSOL, METERED RESPIRATORY (INHALATION) at 10:28

## 2024-02-18 RX ADMIN — IPRATROPIUM BROMIDE 2 PUFF: 17 AEROSOL, METERED RESPIRATORY (INHALATION) at 06:45

## 2024-02-18 RX ADMIN — ENOXAPARIN SODIUM 70 MG: 100 INJECTION SUBCUTANEOUS at 21:17

## 2024-02-18 RX ADMIN — ROSUVASTATIN 10 MG: 10 TABLET, FILM COATED ORAL at 21:16

## 2024-02-18 RX ADMIN — IPRATROPIUM BROMIDE 2 PUFF: 17 AEROSOL, METERED RESPIRATORY (INHALATION) at 19:25

## 2024-02-18 RX ADMIN — ALBUTEROL SULFATE 2 PUFF: 90 AEROSOL, METERED RESPIRATORY (INHALATION) at 06:43

## 2024-02-18 RX ADMIN — ALBUTEROL SULFATE 2 PUFF: 90 AEROSOL, METERED RESPIRATORY (INHALATION) at 02:53

## 2024-02-18 RX ADMIN — ALBUTEROL SULFATE 2 PUFF: 90 AEROSOL, METERED RESPIRATORY (INHALATION) at 15:11

## 2024-02-18 RX ADMIN — WATER 60 MG: 1 INJECTION INTRAMUSCULAR; INTRAVENOUS; SUBCUTANEOUS at 14:58

## 2024-02-18 RX ADMIN — IPRATROPIUM BROMIDE 2 PUFF: 17 AEROSOL, METERED RESPIRATORY (INHALATION) at 15:14

## 2024-02-18 RX ADMIN — CEFEPIME 2000 MG: 2 INJECTION, POWDER, FOR SOLUTION INTRAVENOUS at 06:49

## 2024-02-18 RX ADMIN — IPRATROPIUM BROMIDE 2 PUFF: 17 AEROSOL, METERED RESPIRATORY (INHALATION) at 02:54

## 2024-02-18 RX ADMIN — IPRATROPIUM BROMIDE 2 PUFF: 17 AEROSOL, METERED RESPIRATORY (INHALATION) at 22:30

## 2024-02-18 NOTE — PROGRESS NOTES
Nephrology ([facility]) Progress Note      Patient:  Renata Mendez  YOB: 1950  Date of Service: 2/18/2024  MRN: [id number]   Acct: [id number]   Primary Care Physician: Greg Sandoval MD  Advance Directive: Full Code  Admit Date: 2/14/2024       Hospital Day: 4  Referring Provider: Greg Sandoval MD    Patient independently seen and examined, Chart, Consults, Notes, Operative notes, Labs, Cardiology, and Radiology studies reviewed as available.    Chief complaint: Abnormal labs.    Subjective:  Renata Mendez is a 74 y.o. female for whom we were consulted for evaluation and treatment of HYPONATREMIA.  She has history of COPD/active tobacco use, type 2 diabetes, hyperlipidemia, pacemaker implant and CVA.  Presented with confusion/disoriented.  Patient has fever and chills with congestion.  Chest x-ray consistent with possible pneumonia.  In ambulance she was found to have an O2 saturation 71%.  Routine lab data indicated her serum sodium is 122 mmol.  She denies any history of nausea vomiting or diarrhea.  She takes trazodone as well as Aldactone.  Patient is not being admitted to ICU for close monitoring and treatment of hyponatremia.    This morning patient feels well.  He is fully alert and awake.  She denies any shortness of breath and serum sodium is back to normal.    Allergies:  Codeine    Medicines:  Current Facility-Administered Medications   Medication Dose Route Frequency Provider Last Rate Last Admin    hydrALAZINE (APRESOLINE) injection 5 mg  5 mg IntraVENous Q6H PRN Greg Sandoval MD   5 mg at 02/17/24 0127    ceFEPIme (MAXIPIME) 2,000 mg in sodium chloride 0.9 % 100 mL IVPB ([id number])  2,000 mg IntraVENous Q8H Wes Vega MD 25 mL/hr at 02/18/24 0649 2,000 mg at 02/18/24 0649    [Held by provider] baricitinib (OLUMIANT) tablet 2 mg  2 mg Oral Daily Wes Vega MD        glucose chewable tablet 16 g  4 tablet Oral PRN Greg Sandoval,

## 2024-02-18 NOTE — PROGRESS NOTES
Daily Progress Note  Renata Mendez  MRN: [id number] LOS: 4    Admit Date: 2/14/2024 2/18/2024 5:41 AM    Subjective:          Chief Complaint:  Chief Complaint   Patient presents with    Shortness of Breath       Interval History:    Reviewed overnight events and nursing notes.   Status:  improved  Pain:  some relief        Review of Systems   Constitutional:  Negative for chills and fever.   Respiratory:  Positive for cough, shortness of breath and wheezing.    Cardiovascular:  Negative for palpitations.   Gastrointestinal:  Negative for abdominal pain, nausea and vomiting.   Neurological:  Positive for weakness.       DIET:  ADULT DIET; Regular; 4 carb choices (60 gm/meal); 1200 ml    Medications:      dextrose      sodium chloride      dexmedeTOMIDine HCl in NaCl Stopped (02/17/24 0601)      cefepime  2,000 mg IntraVENous Q8H    [Held by provider] baricitinib  2 mg Oral Daily    insulin glargine  0.15 Units/kg SubCUTAneous Nightly    insulin lispro  0-4 Units SubCUTAneous TID WC    insulin lispro  0-4 Units SubCUTAneous Nightly    enoxaparin  1 mg/kg SubCUTAneous BID    clopidogrel  75 mg Oral Daily    lactobacillus  1 capsule Oral Daily with breakfast    montelukast  10 mg Oral Nightly    rosuvastatin  10 mg Oral Nightly    [Held by provider] sotalol  120 mg Oral 2 times per day    methylPREDNISolone  60 mg IntraVENous Q6H    sodium chloride flush  5-40 mL IntraVENous 2 times per day    ipratropium  2 puff Inhalation Q4H RT    And    albuterol sulfate HFA  2 puff Inhalation Q4H RT       Data:     Code Status: Full Code    Family History   Problem Relation Age of Onset    Cancer Mother     Coronary Art Dis Father     Mult Sclerosis Sister     Cancer Brother      Social History     Socioeconomic History    Marital status:      Spouse name: Not on file    Number of children: Not on file    Years of education: Not on file    Highest education level: Not on file   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     Appearance: Normal appearance. She is not ill-appearing.   HENT:      Head: Normocephalic and atraumatic.      Nose: Nose normal.   Eyes:      General:         Right eye: No discharge.         Left eye: No discharge.      Extraocular Movements: Extraocular movements intact.      Conjunctiva/sclera: Conjunctivae normal.   Cardiovascular:      Rate and Rhythm: Normal rate and regular rhythm.      Pulses: Normal pulses.      Heart sounds: No murmur heard.  Pulmonary:      Effort: Pulmonary effort is normal. No respiratory distress.      Breath sounds: Wheezing and rhonchi present.   Abdominal:      General: Bowel sounds are normal. There is no distension.   Skin:     Capillary Refill: Capillary refill takes less than 2 seconds.      Coloration: Skin is not jaundiced.   Neurological:      General: No focal deficit present.      Mental Status: She is alert and oriented to person, place, and time.   Psychiatric:         Mood and Affect: Mood normal.           Assessment and Plan:     Primary Problem:  Acute on chronic respiratory failure with hypoxia and hypercapnia (HCC)    Hospital Problem list:  Principal Problem:    Acute on chronic respiratory failure with hypoxia and hypercapnia (HCC)  Active Problems:    NICM (nonischemic cardiomyopathy) (HCC)    COPD exacerbation (HCC)    Deep vein thrombosis (HCC)    Essential hypertension    Diabetes mellitus (HCC)    S/P CABG x 4    History of coronary artery stent placement    Coronary artery disease involving native coronary artery of native heart without angina pectoris    Abdominal aortic aneurysm (AAA) without rupture (HCC)    Hyponatremia    Stroke due to occlusion of left anterior cerebral artery (HCC)    Altered mental status    Pneumonia due to COVID-19 virus  Resolved Problems:    * No resolved hospital problems. *      PMH:  Past Medical History:   Diagnosis Date    ASHD (arteriosclerotic heart disease)     s/p PTCA and stent of circumflex, as well as

## 2024-02-18 NOTE — PROGRESS NOTES
Pharmacy Renal Adjustment    Renata Mendez is a 74 y.o. female. Pharmacy has renally adjusted medications per protocol.    Recent Labs     02/17/24 2103 02/18/24  0459   BUN 37* 34*       Recent Labs     02/17/24 2103 02/18/24  0459   CREATININE 0.9 0.8       Estimated Creatinine Clearance: 57 mL/min (based on SCr of 0.8 mg/dL).    Height:   Ht Readings from Last 1 Encounters:   02/14/24 1.676 m (5' 6\")     Weight:  Wt Readings from Last 1 Encounters:   02/18/24 67.1 kg (148 lb)         Plan: Adjust the following medications based on renal function:           Cefepime to 2000 mg iv every 12 hours for PNA over 4 hr infusion. Is actually a change in weight value (new weight verified by nursing as true) that calculates to a new CrCl.    Electronically signed by Maddy Robertson RPH on 2/18/2024 at 4:29 PM

## 2024-02-19 LAB
ALBUMIN SERPL-MCNC: 3.5 G/DL (ref 3.5–5.2)
ALP SERPL-CCNC: 87 U/L (ref 35–104)
ALT SERPL-CCNC: 46 U/L (ref 5–33)
ANION GAP SERPL CALCULATED.3IONS-SCNC: 12 MMOL/L (ref 7–19)
AST SERPL-CCNC: 17 U/L (ref 5–32)
BASOPHILS # BLD: 0 K/UL (ref 0–0.2)
BASOPHILS NFR BLD: 0 % (ref 0–1)
BILIRUB SERPL-MCNC: 0.6 MG/DL (ref 0.2–1.2)
BUN SERPL-MCNC: 31 MG/DL (ref 8–23)
CALCIUM SERPL-MCNC: 8.2 MG/DL (ref 8.8–10.2)
CHLORIDE SERPL-SCNC: 93 MMOL/L (ref 98–111)
CO2 SERPL-SCNC: 29 MMOL/L (ref 22–29)
CREAT SERPL-MCNC: 0.8 MG/DL (ref 0.5–0.9)
EOSINOPHIL # BLD: 0 K/UL (ref 0–0.6)
EOSINOPHIL NFR BLD: 0 % (ref 0–5)
ERYTHROCYTE [DISTWIDTH] IN BLOOD BY AUTOMATED COUNT: 17.1 % (ref 11.5–14.5)
GLUCOSE BLD-MCNC: 159 MG/DL (ref 70–99)
GLUCOSE BLD-MCNC: 233 MG/DL (ref 70–99)
GLUCOSE BLD-MCNC: 269 MG/DL (ref 70–99)
GLUCOSE BLD-MCNC: 278 MG/DL (ref 70–99)
GLUCOSE SERPL-MCNC: 267 MG/DL (ref 74–109)
HCT VFR BLD AUTO: 32.9 % (ref 37–47)
HGB BLD-MCNC: 10.3 G/DL (ref 12–16)
IMM GRANULOCYTES # BLD: 0 K/UL
LYMPHOCYTES # BLD: 0.2 K/UL (ref 1.1–4.5)
LYMPHOCYTES NFR BLD: 3.5 % (ref 20–40)
MAGNESIUM SERPL-MCNC: 2.2 MG/DL (ref 1.6–2.4)
MCH RBC QN AUTO: 25.8 PG (ref 27–31)
MCHC RBC AUTO-ENTMCNC: 31.3 G/DL (ref 33–37)
MCV RBC AUTO: 82.5 FL (ref 81–99)
MONOCYTES # BLD: 0.3 K/UL (ref 0–0.9)
MONOCYTES NFR BLD: 4.7 % (ref 0–10)
NEUTROPHILS # BLD: 6.1 K/UL (ref 1.5–7.5)
NEUTS SEG NFR BLD: 91.3 % (ref 50–65)
PERFORMED ON: ABNORMAL
PLATELET # BLD AUTO: 233 K/UL (ref 130–400)
PMV BLD AUTO: 10 FL (ref 9.4–12.3)
POTASSIUM SERPL-SCNC: 3.5 MMOL/L (ref 3.5–5)
POTASSIUM SERPL-SCNC: 3.5 MMOL/L (ref 3.5–5)
PROT SERPL-MCNC: 6.8 G/DL (ref 6.6–8.7)
RBC # BLD AUTO: 3.99 M/UL (ref 4.2–5.4)
SODIUM SERPL-SCNC: 134 MMOL/L (ref 136–145)
WBC # BLD AUTO: 6.7 K/UL (ref 4.8–10.8)

## 2024-02-19 PROCEDURE — 94760 N-INVAS EAR/PLS OXIMETRY 1: CPT

## 2024-02-19 PROCEDURE — 80053 COMPREHEN METABOLIC PANEL: CPT

## 2024-02-19 PROCEDURE — 2580000003 HC RX 258: Performed by: INTERNAL MEDICINE

## 2024-02-19 PROCEDURE — 83735 ASSAY OF MAGNESIUM: CPT

## 2024-02-19 PROCEDURE — 6360000002 HC RX W HCPCS: Performed by: NURSE PRACTITIONER

## 2024-02-19 PROCEDURE — 97530 THERAPEUTIC ACTIVITIES: CPT

## 2024-02-19 PROCEDURE — 85025 COMPLETE CBC W/AUTO DIFF WBC: CPT

## 2024-02-19 PROCEDURE — 6370000000 HC RX 637 (ALT 250 FOR IP): Performed by: STUDENT IN AN ORGANIZED HEALTH CARE EDUCATION/TRAINING PROGRAM

## 2024-02-19 PROCEDURE — 82962 GLUCOSE BLOOD TEST: CPT

## 2024-02-19 PROCEDURE — 1210000000 HC MED SURG R&B

## 2024-02-19 PROCEDURE — 94640 AIRWAY INHALATION TREATMENT: CPT

## 2024-02-19 PROCEDURE — 97161 PT EVAL LOW COMPLEX 20 MIN: CPT

## 2024-02-19 PROCEDURE — 94660 CPAP INITIATION&MGMT: CPT

## 2024-02-19 PROCEDURE — 2580000003 HC RX 258: Performed by: FAMILY MEDICINE

## 2024-02-19 PROCEDURE — 94669 MECHANICAL CHEST WALL OSCILL: CPT

## 2024-02-19 PROCEDURE — 36415 COLL VENOUS BLD VENIPUNCTURE: CPT

## 2024-02-19 PROCEDURE — 97165 OT EVAL LOW COMPLEX 30 MIN: CPT

## 2024-02-19 PROCEDURE — 99232 SBSQ HOSP IP/OBS MODERATE 35: CPT | Performed by: INTERNAL MEDICINE

## 2024-02-19 PROCEDURE — 6360000002 HC RX W HCPCS: Performed by: STUDENT IN AN ORGANIZED HEALTH CARE EDUCATION/TRAINING PROGRAM

## 2024-02-19 PROCEDURE — 2700000000 HC OXYGEN THERAPY PER DAY

## 2024-02-19 PROCEDURE — 6370000000 HC RX 637 (ALT 250 FOR IP): Performed by: FAMILY MEDICINE

## 2024-02-19 PROCEDURE — 2580000003 HC RX 258: Performed by: STUDENT IN AN ORGANIZED HEALTH CARE EDUCATION/TRAINING PROGRAM

## 2024-02-19 PROCEDURE — 6360000002 HC RX W HCPCS: Performed by: INTERNAL MEDICINE

## 2024-02-19 RX ADMIN — WATER 60 MG: 1 INJECTION INTRAMUSCULAR; INTRAVENOUS; SUBCUTANEOUS at 16:35

## 2024-02-19 RX ADMIN — Medication 1 CAPSULE: at 08:21

## 2024-02-19 RX ADMIN — ENOXAPARIN SODIUM 70 MG: 100 INJECTION SUBCUTANEOUS at 20:30

## 2024-02-19 RX ADMIN — ALBUTEROL SULFATE 2 PUFF: 90 AEROSOL, METERED RESPIRATORY (INHALATION) at 11:05

## 2024-02-19 RX ADMIN — IPRATROPIUM BROMIDE 2 PUFF: 17 AEROSOL, METERED RESPIRATORY (INHALATION) at 06:42

## 2024-02-19 RX ADMIN — INSULIN LISPRO 2 UNITS: 100 INJECTION, SOLUTION INTRAVENOUS; SUBCUTANEOUS at 08:21

## 2024-02-19 RX ADMIN — CEFEPIME 2000 MG: 2 INJECTION, POWDER, FOR SOLUTION INTRAVENOUS at 03:40

## 2024-02-19 RX ADMIN — INSULIN GLARGINE 12 UNITS: 100 INJECTION, SOLUTION SUBCUTANEOUS at 20:30

## 2024-02-19 RX ADMIN — IPRATROPIUM BROMIDE 2 PUFF: 17 AEROSOL, METERED RESPIRATORY (INHALATION) at 14:50

## 2024-02-19 RX ADMIN — ROSUVASTATIN 10 MG: 10 TABLET, FILM COATED ORAL at 20:29

## 2024-02-19 RX ADMIN — CEFEPIME 2000 MG: 2 INJECTION, POWDER, FOR SOLUTION INTRAVENOUS at 16:37

## 2024-02-19 RX ADMIN — ALBUTEROL SULFATE 2 PUFF: 90 AEROSOL, METERED RESPIRATORY (INHALATION) at 18:45

## 2024-02-19 RX ADMIN — WATER 60 MG: 1 INJECTION INTRAMUSCULAR; INTRAVENOUS; SUBCUTANEOUS at 12:19

## 2024-02-19 RX ADMIN — ENOXAPARIN SODIUM 70 MG: 100 INJECTION SUBCUTANEOUS at 08:22

## 2024-02-19 RX ADMIN — WATER 60 MG: 1 INJECTION INTRAMUSCULAR; INTRAVENOUS; SUBCUTANEOUS at 05:39

## 2024-02-19 RX ADMIN — CLOPIDOGREL BISULFATE 75 MG: 75 TABLET ORAL at 08:22

## 2024-02-19 RX ADMIN — ALBUTEROL SULFATE 2 PUFF: 90 AEROSOL, METERED RESPIRATORY (INHALATION) at 06:39

## 2024-02-19 RX ADMIN — IPRATROPIUM BROMIDE 2 PUFF: 17 AEROSOL, METERED RESPIRATORY (INHALATION) at 18:45

## 2024-02-19 RX ADMIN — IPRATROPIUM BROMIDE 2 PUFF: 17 AEROSOL, METERED RESPIRATORY (INHALATION) at 11:05

## 2024-02-19 RX ADMIN — WATER 60 MG: 1 INJECTION INTRAMUSCULAR; INTRAVENOUS; SUBCUTANEOUS at 23:36

## 2024-02-19 RX ADMIN — MONTELUKAST 10 MG: 10 TABLET, FILM COATED ORAL at 20:29

## 2024-02-19 RX ADMIN — SODIUM CHLORIDE, PRESERVATIVE FREE 10 ML: 5 INJECTION INTRAVENOUS at 12:21

## 2024-02-19 RX ADMIN — INSULIN LISPRO 2 UNITS: 100 INJECTION, SOLUTION INTRAVENOUS; SUBCUTANEOUS at 12:19

## 2024-02-19 RX ADMIN — ALBUTEROL SULFATE 2 PUFF: 90 AEROSOL, METERED RESPIRATORY (INHALATION) at 14:50

## 2024-02-19 ASSESSMENT — PAIN SCALES - GENERAL: PAINLEVEL_OUTOF10: 0

## 2024-02-19 NOTE — PROGRESS NOTES
PHYSICAL THERAPY    Unable to see pt at this time due to an active bedrest order. Please discontinue to begin mobility assessment.    Electronically signed by Nora Zapata PT on 2/19/2024 at 7:14 AM

## 2024-02-19 NOTE — PROGRESS NOTES
Physical Therapy  Facility/Department: [facility] ONCOLOGY UNIT  Physical Therapy Initial Assessment    Name: Renata Mendez  : 1950  MRN: [id number]  Date of Service: 2024    Discharge Recommendations:  Continue to assess pending progress, 24 hour supervision or assist          Patient Diagnosis(es): The primary encounter diagnosis was COPD exacerbation (HCC). Diagnoses of Acute hypoxemic respiratory failure (HCC) and Hyponatremia were also pertinent to this visit.  Past Medical History:  has a past medical history of ASHD (arteriosclerotic heart disease), COPD (chronic obstructive pulmonary disease) (HCC), Coronary atherosclerosis, Diabetes mellitus (HCC), Encounter for wound care, Fall, Ganglion cyst, Hematoma, Hx of blood clots, Hypercholesteremia, Hypertension, Pacemaker, Palliative care patient, Pneumonia due to COVID-19 virus, Stroke due to occlusion of left anterior cerebral artery (HCC), and Unstable angina (HCC).  Past Surgical History:  has a past surgical history that includes Cholecystectomy; Hysterectomy; Coronary angioplasty with stent (00); Cardiac catheterization (12/10/00); Cardiac catheterization (98); Cardiac catheterization (94); Cardiac catheterization (2011); Coronary artery bypass graft (2011); Neck surgery; Coronary angioplasty with stent (2021); cyst removal; Parotidectomy (Bilateral); and pacemaker placement.    Assessment   Body Structures, Functions, Activity Limitations Requiring Skilled Therapeutic Intervention: Decreased functional mobility ;Decreased ADL status;Decreased strength;Decreased balance;Decreased endurance  Assessment: Pt SITTING ON EOB, ABLE TO TRANSFER TO CHAIR WITH ASSIST. WILL PROGRESS WITH GT AS TOLERATED.  Requires PT Follow-Up: Yes  Activity Tolerance  Activity Tolerance: Patient tolerated evaluation without incident     Plan   Physical Therapy Plan  General Plan: 3-5 times per week  Current Treatment Recommendations:

## 2024-02-19 NOTE — PROGRESS NOTES
Occupational Therapy  Facility/Department: [facility] ONCOLOGY UNIT  Occupational Therapy Initial Assessment    Name: Renata Mendez  : 1950  MRN: [id number]  Date of Service: 2024    Discharge Recommendations:             Patient Diagnosis(es): The primary encounter diagnosis was COPD exacerbation (HCC). Diagnoses of Acute hypoxemic respiratory failure (HCC) and Hyponatremia were also pertinent to this visit.  Past Medical History:  has a past medical history of ASHD (arteriosclerotic heart disease), COPD (chronic obstructive pulmonary disease) (HCC), Coronary atherosclerosis, Diabetes mellitus (HCC), Encounter for wound care, Fall, Ganglion cyst, Hematoma, Hx of blood clots, Hypercholesteremia, Hypertension, Pacemaker, Palliative care patient, Pneumonia due to COVID-19 virus, Stroke due to occlusion of left anterior cerebral artery (HCC), and Unstable angina (HCC).  Past Surgical History:  has a past surgical history that includes Cholecystectomy; Hysterectomy; Coronary angioplasty with stent (00); Cardiac catheterization (12/10/00); Cardiac catheterization (98); Cardiac catheterization (94); Cardiac catheterization (2011); Coronary artery bypass graft (2011); Neck surgery; Coronary angioplasty with stent (2021); cyst removal; Parotidectomy (Bilateral); and pacemaker placement.    Treatment Diagnosis: Chronic respiratory failure      Assessment   Performance deficits / Impairments: Decreased functional mobility ;Decreased endurance;Decreased ADL status;Decreased balance  Assessment: Will progress as tolerated  Treatment Diagnosis: Chronic respiratory failure  Prognosis: Good  Decision Making: Low Complexity  REQUIRES OT FOLLOW-UP: Yes  Activity Tolerance  Activity Tolerance: Patient limited by fatigue        Plan   Occupational Therapy Plan  Times Per Week: 3-5x/week  Times Per Day: Once a day     Restrictions  Restrictions/Precautions  Restrictions/Precautions: Fall Risk,

## 2024-02-19 NOTE — CARE COORDINATION
Met with patient to discuss Discharge Plan.  Patient confirms that she continues to have paid Caregivers 24x7 Monday through Thursday.  Her son, Twan, lives next door.  Friday through Sunday Twan assumes care of patient.    Discussed option of Home Health.  Patient is agreeable to Home Health at discharge.  Sticky note placed on chart for MD.  Electronically signed by Peggy Lima RN on 2/19/2024 at 4:05 PM

## 2024-02-19 NOTE — PLAN OF CARE
Problem: Discharge Planning  Goal: Discharge to home or other facility with appropriate resources  Outcome: Progressing     Problem: Skin/Tissue Integrity  Goal: Absence of new skin breakdown  Description: 1.  Monitor for areas of redness and/or skin breakdown  2.  Assess vascular access sites hourly  3.  Every 4-6 hours minimum:  Change oxygen saturation probe site  4.  Every 4-6 hours:  If on nasal continuous positive airway pressure, respiratory therapy assess nares and determine need for appliance change or resting period.  Outcome: Progressing     Problem: Chronic Conditions and Co-morbidities  Goal: Patient's chronic conditions and co-morbidity symptoms are monitored and maintained or improved  Outcome: Progressing     Problem: Safety - Adult  Goal: Free from fall injury  Outcome: Progressing  Flowsheets (Taken 2/18/2024 2123)  Free From Fall Injury: Instruct family/caregiver on patient safety     Problem: Pain  Goal: Verbalizes/displays adequate comfort level or baseline comfort level  Outcome: Progressing     Problem: ABCDS Injury Assessment  Goal: Absence of physical injury  Outcome: Progressing  Flowsheets (Taken 2/18/2024 2123)  Absence of Physical Injury: Implement safety measures based on patient assessment

## 2024-02-19 NOTE — PROGRESS NOTES
Family Medicine Progress Note    Patient:  Renata Mendez  YOB: 1950    MRN: [id number]     Acct: [id number]     Admit date: 2/14/2024    Patient Seen, Chart, Consults notes, Labs, Radiology studies reviewed.    Subjective: Day 5 of stay with acute on chronic respiratory failure with hypoxia and hypercapnia and most recent (in last 24 hours) has had improved breathing.  States she overall just feels tired.  Much more alert and conversational than when I left on Friday.  Reports sleeping well overnight with no issues.    Past, Family, Social History unchanged from admission.    Diet:  ADULT DIET; Regular; 4 carb choices (60 gm/meal); 1200 ml    Medications:  Scheduled Meds:   enoxaparin  1 mg/kg SubCUTAneous BID    cefepime  2,000 mg IntraVENous Q12H    [Held by provider] baricitinib  2 mg Oral Daily    insulin glargine  0.15 Units/kg SubCUTAneous Nightly    insulin lispro  0-4 Units SubCUTAneous TID WC    insulin lispro  0-4 Units SubCUTAneous Nightly    clopidogrel  75 mg Oral Daily    lactobacillus  1 capsule Oral Daily with breakfast    montelukast  10 mg Oral Nightly    rosuvastatin  10 mg Oral Nightly    [Held by provider] sotalol  120 mg Oral 2 times per day    methylPREDNISolone  60 mg IntraVENous Q6H    sodium chloride flush  5-40 mL IntraVENous 2 times per day    ipratropium  2 puff Inhalation Q4H RT    And    albuterol sulfate HFA  2 puff Inhalation Q4H RT     Continuous Infusions:   dextrose      sodium chloride       PRN Meds:hydrALAZINE, glucose, dextrose bolus **OR** dextrose bolus, glucagon (rDNA), dextrose, LORazepam, nitroGLYCERIN, potassium chloride **OR** potassium chloride, magnesium sulfate, polyethylene glycol, acetaminophen **OR** acetaminophen, sodium chloride flush, sodium chloride, ondansetron **OR** ondansetron    Objective:    Vitals: /81   Pulse 88   Temp 97.2 °F (36.2 °C) (Temporal)   Resp 18   Ht 1.676 m (5' 6\")   Wt 67.6 kg (149 lb)   SpO2 96%   BMI 24.05  kg/m²   24 hour intake/output:  Intake/Output Summary (Last 24 hours) at 2/19/2024 1034  Last data filed at 2/18/2024 2123  Gross per 24 hour   Intake 1095.04 ml   Output 900 ml   Net 195.04 ml     Last 3 weights:  Wt Readings from Last 3 Encounters:   02/19/24 67.6 kg (149 lb)   12/19/23 74.8 kg (165 lb)   12/11/23 73.9 kg (163 lb)       Physical Exam:    General Appearance:  alert, cooperative, and appears frail  Skin:  negatives: texture normal  Eyes:  No gross abnormalities.  Neck:  neck- supple, no mass, non-tender  Lungs:  Breathing Pattern: regular, no distress, Breath sounds: wheezing- scattered  Heart:  Heart regular rate and rhythm  Abdomen:  Auscultation: Normal bowel sounds.  No bruits.  Palpation: No masses, tenderness or organomegally.  Extremities: pulses present in all extremities  Musculoskeletal:  No joint swelling, deformity, or tenderness.  Neurologic:  negative    CBC with Differential:    Lab Results   Component Value Date/Time    WBC 6.7 02/19/2024 04:14 AM    RBC 3.99 02/19/2024 04:14 AM    HGB 10.3 02/19/2024 04:14 AM    HCT 32.9 02/19/2024 04:14 AM    HCT 32.1 09/02/2011 02:11 AM     02/19/2024 04:14 AM     09/02/2011 02:11 AM    MCV 82.5 02/19/2024 04:14 AM    MCH 25.8 02/19/2024 04:14 AM    MCHC 31.3 02/19/2024 04:14 AM    RDW 17.1 02/19/2024 04:14 AM    BANDSPCT 2 02/15/2024 01:31 PM    LYMPHOPCT 3.5 02/19/2024 04:14 AM    MONOPCT 4.7 02/19/2024 04:14 AM    EOSPCT 0.5 09/02/2011 02:11 AM    BASOPCT 0.0 02/19/2024 04:14 AM    MONOSABS 0.30 02/19/2024 04:14 AM    LYMPHSABS 0.2 02/19/2024 04:14 AM    EOSABS 0.00 02/19/2024 04:14 AM    BASOSABS 0.00 02/19/2024 04:14 AM     CMP:    Lab Results   Component Value Date/Time     02/19/2024 04:14 AM     09/02/2011 02:11 AM    K 3.5 02/19/2024 04:14 AM    K 3.5 02/19/2024 04:14 AM    K 4.1 09/02/2011 02:11 AM    CL 93 02/19/2024 04:14 AM     09/02/2011 02:11 AM    CO2 29 02/19/2024 04:14 AM    BUN 31 02/19/2024 04:14

## 2024-02-19 NOTE — PROGRESS NOTES
Order for WOCN eval and treat for \"abscess on labia.\" Assessed with primary nurse. There is an area of possible induration to the lateral of the labia on right side. No redness, no fluctuance, no drainage, no pain, not warm to touch at the area. Patient can not recall any history of abscess or issue at the area. If abscess develops to area recommend surgical consult for I&D. No need for wound care at this time.    Electronically signed by Louis Villanueva RN, [facility] on 2/19/2024 at 11:27 AM

## 2024-02-20 PROBLEM — E43 SEVERE MALNUTRITION (HCC): Status: ACTIVE | Noted: 2024-02-20

## 2024-02-20 LAB
ALBUMIN SERPL-MCNC: 3.5 G/DL (ref 3.5–5.2)
ALP SERPL-CCNC: 75 U/L (ref 35–104)
ALT SERPL-CCNC: 39 U/L (ref 5–33)
ANION GAP SERPL CALCULATED.3IONS-SCNC: 7 MMOL/L (ref 7–19)
AST SERPL-CCNC: 15 U/L (ref 5–32)
BASOPHILS # BLD: 0 K/UL (ref 0–0.2)
BASOPHILS NFR BLD: 0 % (ref 0–1)
BILIRUB SERPL-MCNC: 0.5 MG/DL (ref 0.2–1.2)
BUN SERPL-MCNC: 29 MG/DL (ref 8–23)
CALCIUM SERPL-MCNC: 8.3 MG/DL (ref 8.8–10.2)
CHLORIDE SERPL-SCNC: 91 MMOL/L (ref 98–111)
CO2 SERPL-SCNC: 34 MMOL/L (ref 22–29)
CREAT SERPL-MCNC: 0.7 MG/DL (ref 0.5–0.9)
EOSINOPHIL # BLD: 0 K/UL (ref 0–0.6)
EOSINOPHIL NFR BLD: 0 % (ref 0–5)
ERYTHROCYTE [DISTWIDTH] IN BLOOD BY AUTOMATED COUNT: 17.1 % (ref 11.5–14.5)
GLUCOSE BLD-MCNC: 213 MG/DL (ref 70–99)
GLUCOSE BLD-MCNC: 241 MG/DL (ref 70–99)
GLUCOSE BLD-MCNC: 253 MG/DL (ref 70–99)
GLUCOSE BLD-MCNC: 323 MG/DL (ref 70–99)
GLUCOSE SERPL-MCNC: 231 MG/DL (ref 74–109)
HCT VFR BLD AUTO: 33.3 % (ref 37–47)
HGB BLD-MCNC: 9.9 G/DL (ref 12–16)
IMM GRANULOCYTES # BLD: 0 K/UL
LYMPHOCYTES # BLD: 0.2 K/UL (ref 1.1–4.5)
LYMPHOCYTES NFR BLD: 2.4 % (ref 20–40)
MCH RBC QN AUTO: 25.6 PG (ref 27–31)
MCHC RBC AUTO-ENTMCNC: 29.7 G/DL (ref 33–37)
MCV RBC AUTO: 86.3 FL (ref 81–99)
MONOCYTES # BLD: 0.4 K/UL (ref 0–0.9)
MONOCYTES NFR BLD: 4.7 % (ref 0–10)
NEUTROPHILS # BLD: 7.7 K/UL (ref 1.5–7.5)
NEUTS SEG NFR BLD: 92.4 % (ref 50–65)
PERFORMED ON: ABNORMAL
PLATELET # BLD AUTO: 209 K/UL (ref 130–400)
PMV BLD AUTO: 9.7 FL (ref 9.4–12.3)
POTASSIUM SERPL-SCNC: 3.6 MMOL/L (ref 3.5–5)
PROT SERPL-MCNC: 6.6 G/DL (ref 6.6–8.7)
RBC # BLD AUTO: 3.86 M/UL (ref 4.2–5.4)
SODIUM SERPL-SCNC: 132 MMOL/L (ref 136–145)
WBC # BLD AUTO: 8.3 K/UL (ref 4.8–10.8)

## 2024-02-20 PROCEDURE — 99232 SBSQ HOSP IP/OBS MODERATE 35: CPT | Performed by: INTERNAL MEDICINE

## 2024-02-20 PROCEDURE — 97530 THERAPEUTIC ACTIVITIES: CPT

## 2024-02-20 PROCEDURE — 2580000003 HC RX 258: Performed by: INTERNAL MEDICINE

## 2024-02-20 PROCEDURE — 94640 AIRWAY INHALATION TREATMENT: CPT

## 2024-02-20 PROCEDURE — 6370000000 HC RX 637 (ALT 250 FOR IP): Performed by: FAMILY MEDICINE

## 2024-02-20 PROCEDURE — 80053 COMPREHEN METABOLIC PANEL: CPT

## 2024-02-20 PROCEDURE — 94760 N-INVAS EAR/PLS OXIMETRY 1: CPT

## 2024-02-20 PROCEDURE — 5A0935Z ASSISTANCE WITH RESPIRATORY VENTILATION, LESS THAN 24 CONSECUTIVE HOURS: ICD-10-PCS | Performed by: FAMILY MEDICINE

## 2024-02-20 PROCEDURE — 6360000002 HC RX W HCPCS: Performed by: INTERNAL MEDICINE

## 2024-02-20 PROCEDURE — 97110 THERAPEUTIC EXERCISES: CPT

## 2024-02-20 PROCEDURE — 36415 COLL VENOUS BLD VENIPUNCTURE: CPT

## 2024-02-20 PROCEDURE — 6370000000 HC RX 637 (ALT 250 FOR IP): Performed by: STUDENT IN AN ORGANIZED HEALTH CARE EDUCATION/TRAINING PROGRAM

## 2024-02-20 PROCEDURE — 94669 MECHANICAL CHEST WALL OSCILL: CPT

## 2024-02-20 PROCEDURE — 82962 GLUCOSE BLOOD TEST: CPT

## 2024-02-20 PROCEDURE — 1210000000 HC MED SURG R&B

## 2024-02-20 PROCEDURE — 2580000003 HC RX 258: Performed by: FAMILY MEDICINE

## 2024-02-20 PROCEDURE — 2700000000 HC OXYGEN THERAPY PER DAY

## 2024-02-20 PROCEDURE — 6360000002 HC RX W HCPCS: Performed by: FAMILY MEDICINE

## 2024-02-20 PROCEDURE — 85025 COMPLETE CBC W/AUTO DIFF WBC: CPT

## 2024-02-20 PROCEDURE — 6360000002 HC RX W HCPCS: Performed by: STUDENT IN AN ORGANIZED HEALTH CARE EDUCATION/TRAINING PROGRAM

## 2024-02-20 PROCEDURE — 6360000002 HC RX W HCPCS: Performed by: NURSE PRACTITIONER

## 2024-02-20 PROCEDURE — 2580000003 HC RX 258: Performed by: STUDENT IN AN ORGANIZED HEALTH CARE EDUCATION/TRAINING PROGRAM

## 2024-02-20 RX ADMIN — INSULIN GLARGINE 12 UNITS: 100 INJECTION, SOLUTION SUBCUTANEOUS at 21:14

## 2024-02-20 RX ADMIN — ENOXAPARIN SODIUM 70 MG: 100 INJECTION SUBCUTANEOUS at 08:27

## 2024-02-20 RX ADMIN — IPRATROPIUM BROMIDE 2 PUFF: 17 AEROSOL, METERED RESPIRATORY (INHALATION) at 10:33

## 2024-02-20 RX ADMIN — ENOXAPARIN SODIUM 70 MG: 100 INJECTION SUBCUTANEOUS at 21:16

## 2024-02-20 RX ADMIN — Medication 1 CAPSULE: at 08:27

## 2024-02-20 RX ADMIN — ALBUTEROL SULFATE 2 PUFF: 90 AEROSOL, METERED RESPIRATORY (INHALATION) at 10:33

## 2024-02-20 RX ADMIN — WATER 60 MG: 1 INJECTION INTRAMUSCULAR; INTRAVENOUS; SUBCUTANEOUS at 05:51

## 2024-02-20 RX ADMIN — INSULIN LISPRO 1 UNITS: 100 INJECTION, SOLUTION INTRAVENOUS; SUBCUTANEOUS at 13:09

## 2024-02-20 RX ADMIN — IPRATROPIUM BROMIDE 2 PUFF: 17 AEROSOL, METERED RESPIRATORY (INHALATION) at 06:36

## 2024-02-20 RX ADMIN — IPRATROPIUM BROMIDE 2 PUFF: 17 AEROSOL, METERED RESPIRATORY (INHALATION) at 01:55

## 2024-02-20 RX ADMIN — CEFEPIME 2000 MG: 2 INJECTION, POWDER, FOR SOLUTION INTRAVENOUS at 21:14

## 2024-02-20 RX ADMIN — ALBUTEROL SULFATE 2 PUFF: 90 AEROSOL, METERED RESPIRATORY (INHALATION) at 14:30

## 2024-02-20 RX ADMIN — IPRATROPIUM BROMIDE 2 PUFF: 17 AEROSOL, METERED RESPIRATORY (INHALATION) at 18:49

## 2024-02-20 RX ADMIN — SODIUM CHLORIDE, PRESERVATIVE FREE 10 ML: 5 INJECTION INTRAVENOUS at 08:27

## 2024-02-20 RX ADMIN — INSULIN LISPRO 3 UNITS: 100 INJECTION, SOLUTION INTRAVENOUS; SUBCUTANEOUS at 16:31

## 2024-02-20 RX ADMIN — MONTELUKAST 10 MG: 10 TABLET, FILM COATED ORAL at 21:17

## 2024-02-20 RX ADMIN — ROSUVASTATIN 10 MG: 10 TABLET, FILM COATED ORAL at 21:17

## 2024-02-20 RX ADMIN — CLOPIDOGREL BISULFATE 75 MG: 75 TABLET ORAL at 08:27

## 2024-02-20 RX ADMIN — CEFEPIME 2000 MG: 2 INJECTION, POWDER, FOR SOLUTION INTRAVENOUS at 03:29

## 2024-02-20 RX ADMIN — INSULIN LISPRO 2 UNITS: 100 INJECTION, SOLUTION INTRAVENOUS; SUBCUTANEOUS at 08:32

## 2024-02-20 RX ADMIN — ONDANSETRON 4 MG: 2 INJECTION INTRAMUSCULAR; INTRAVENOUS at 22:46

## 2024-02-20 RX ADMIN — WATER 60 MG: 1 INJECTION INTRAMUSCULAR; INTRAVENOUS; SUBCUTANEOUS at 16:31

## 2024-02-20 RX ADMIN — WATER 60 MG: 1 INJECTION INTRAMUSCULAR; INTRAVENOUS; SUBCUTANEOUS at 13:08

## 2024-02-20 RX ADMIN — ALBUTEROL SULFATE 2 PUFF: 90 AEROSOL, METERED RESPIRATORY (INHALATION) at 06:36

## 2024-02-20 RX ADMIN — IPRATROPIUM BROMIDE 2 PUFF: 17 AEROSOL, METERED RESPIRATORY (INHALATION) at 14:30

## 2024-02-20 RX ADMIN — ALBUTEROL SULFATE 2 PUFF: 90 AEROSOL, METERED RESPIRATORY (INHALATION) at 01:55

## 2024-02-20 RX ADMIN — IPRATROPIUM BROMIDE 2 PUFF: 17 AEROSOL, METERED RESPIRATORY (INHALATION) at 22:44

## 2024-02-20 RX ADMIN — ALBUTEROL SULFATE 2 PUFF: 90 AEROSOL, METERED RESPIRATORY (INHALATION) at 18:49

## 2024-02-20 RX ADMIN — CEFEPIME 2000 MG: 2 INJECTION, POWDER, FOR SOLUTION INTRAVENOUS at 14:31

## 2024-02-20 RX ADMIN — WATER 60 MG: 1 INJECTION INTRAMUSCULAR; INTRAVENOUS; SUBCUTANEOUS at 22:47

## 2024-02-20 RX ADMIN — ALBUTEROL SULFATE 2 PUFF: 90 AEROSOL, METERED RESPIRATORY (INHALATION) at 22:44

## 2024-02-20 NOTE — PROGRESS NOTES
Pharmacy Adjustment per [facility] protocol    Rentaa Mendez is a 74 y.o. female. Pharmacy has adjusted medications per [facility] protocol.    Recent Labs     02/19/24  0414 02/20/24  0411   BUN 31* 29*       Recent Labs     02/19/24  0414 02/20/24  0411   CREATININE 0.8 0.7       Estimated Creatinine Clearance: 65 mL/min (based on SCr of 0.7 mg/dL).    Height:   Ht Readings from Last 1 Encounters:   02/20/24 1.676 m (5' 5.98\")     Weight:  Wt Readings from Last 1 Encounters:   02/19/24 67.6 kg (149 lb)         Plan: Adjust the following medications based on [facility] protocol:           Cefepime to 2000 mg IV every 8 hours extended infusion over 240 minutes      Electronically signed by Isaiah Guevara RPH on 2/20/2024 at 1:29 PM

## 2024-02-20 NOTE — PLAN OF CARE
Problem: Discharge Planning  Goal: Discharge to home or other facility with appropriate resources  Outcome: Progressing     Problem: Skin/Tissue Integrity  Goal: Absence of new skin breakdown  Description: 1.  Monitor for areas of redness and/or skin breakdown  2.  Assess vascular access sites hourly  3.  Every 4-6 hours minimum:  Change oxygen saturation probe site  4.  Every 4-6 hours:  If on nasal continuous positive airway pressure, respiratory therapy assess nares and determine need for appliance change or resting period.  Outcome: Progressing     Problem: Chronic Conditions and Co-morbidities  Goal: Patient's chronic conditions and co-morbidity symptoms are monitored and maintained or improved  Outcome: Progressing     Problem: Safety - Adult  Goal: Free from fall injury  Outcome: Progressing  Flowsheets (Taken 2/19/2024 2228)  Free From Fall Injury: Instruct family/caregiver on patient safety     Problem: Pain  Goal: Verbalizes/displays adequate comfort level or baseline comfort level  Outcome: Progressing     Problem: ABCDS Injury Assessment  Goal: Absence of physical injury  Outcome: Progressing  Flowsheets (Taken 2/19/2024 2228)  Absence of Physical Injury: Implement safety measures based on patient assessment

## 2024-02-20 NOTE — PROGRESS NOTES
Pulmonary and Critical Care Progress note.    [facility] STEWART Mendez    MRN# [id number]    Acct# [id number]  2/20/2024   5:57 PM CST    Referring Provider:Greg Sandoval MD      Chief Complaint: Respiratory failure noninvasive ventilation    HPI: The patient continues to be on noninvasive ventilation during sleep per her home routine.  She is on nasal cannula oxygen.     Medications:  The following medications were reviewed and adjustments made when necessary.    cefepime, 2,000 mg, IntraVENous, Q8H    enoxaparin, 1 mg/kg, SubCUTAneous, BID    [Held by provider] baricitinib, 2 mg, Oral, Daily    insulin glargine, 0.15 Units/kg, SubCUTAneous, Nightly    insulin lispro, 0-4 Units, SubCUTAneous, TID WC    insulin lispro, 0-4 Units, SubCUTAneous, Nightly    clopidogrel, 75 mg, Oral, Daily    lactobacillus, 1 capsule, Oral, Daily with breakfast    montelukast, 10 mg, Oral, Nightly    rosuvastatin, 10 mg, Oral, Nightly    [Held by provider] sotalol, 120 mg, Oral, 2 times per day    methylPREDNISolone, 60 mg, IntraVENous, Q6H    sodium chloride flush, 5-40 mL, IntraVENous, 2 times per day    ipratropium, 2 puff, Inhalation, Q4H RT **AND** albuterol sulfate HFA, 2 puff, Inhalation, Q4H RT         Physical Exam:  BP (!) 147/79   Pulse 96   Temp 97.6 °F (36.4 °C) (Temporal)   Resp 20   Ht 1.676 m (5' 5.98\")   Wt 67.6 kg (149 lb)   SpO2 99%   BMI 24.06 kg/m²   Intake/Output Summary (Last 24 hours) at 2/20/2024 1729  Last data filed at 2/20/2024 0935  Gross per 24 hour   Intake 210 ml   Output 1250 ml   Net -1040 ml         General appearance: Elderly female on nasal cannula oxygen.  HEENT:normocephalic atraumatic]  Heart:S1S2 no murmurs  Lungs:diminished bilaterally no rubs or tenderness or dullness to percussion  Abdomen:Soft non tender no organomegaly  Extremities:no clubbing cyanosis or edema  Neuro:no focal findings  Skin:intact        The following lab data was reviewed:  CBC   Recent Labs

## 2024-02-20 NOTE — PROGRESS NOTES
Occupational Therapy     02/20/24 1200   Subjective   Subjective Pt found sitting EOB with bedside table in front of her. Pt complains of pain in her nose from dryness. Pt agreeable to participate.   Vitals   O2 Device Nasal cannula   Comment Need humidity with supplemental O2 for nasal dryness.   Cognition   Overall Cognitive Status WFL   Orientation   Overall Orientation Status WFL   Bed Mobility Training   Bed Mobility Training No   Transfer Training   Transfer Training Yes   Overall Level of Assistance Contact-guard assistance   Interventions Verbal cues;Tactile cues   Sit to Stand Contact-guard assistance   Stand to Sit Contact-guard assistance   Bed to Chair Contact-guard assistance   Balance   Sitting Intact   Standing With support  (RW)   ADL   Feeding Independent   Grooming Supervision   UE Bathing Supervision   LE Bathing Minimal assistance   UE Dressing Supervision   LE Dressing Minimal assistance   Toileting Minimal assistance;Contact guard assistance   Functional Mobility Minimal assistance;Contact guard assistance   Assessment   Assessment Tx focused on functional mobility in room and transfer to recliner with CGA. Pt instructed on standing balance activity including reaching side to side and forward with CGA. Pt had no loss of balance and was able to self correct with hands on RW. Pt instructed on BUE AROM strengthening exercises as tolerated. Pt took rest breaks as needed. Pt agreeable to sit up in her recliner for lunch this date. Needs in reach and nursing notified.   Activity Tolerance Patient tolerated treatment well   Discharge Recommendations Patient would benefit from continued therapy after discharge   Occupational Therapy Plan   Times Per Week 3-5x/week   Times Per Day Once a day   OT Plan of Care   Tuesday X     Electronically signed by EMMA Everett on 2/20/2024 at 12:41 PM

## 2024-02-20 NOTE — PROGRESS NOTES
Comprehensive Nutrition Assessment    Type and Reason for Visit:  RD Nutrition Re-Screen/LOS, Initial    Nutrition Recommendations/Plan:   D/C 1200 mL fluid restriction.  Food preferences updated.  Add severe malnutrition to problem list.     Malnutrition Assessment:  Malnutrition Status:  Severe malnutrition (02/20/24 1141)    Context:  Acute Illness     Findings of the 6 clinical characteristics of malnutrition:  Energy Intake:  50% or less of estimated energy requirements for 5 or more days  Weight Loss:  Greater than 7.5% over 3 months     Body Fat Loss:  Unable to assess     Muscle Mass Loss:  Unable to assess    Fluid Accumulation:  No significant fluid accumulation     Strength:  Not Performed    Nutrition Assessment:    Pt seen for LOS day 6. Intake record shows 1-50% intake. Pt states she's not been hungry since admission and she's had some nausea and vomiting. Does have orders for Zofran prn. She states she only ate 2 slices of ribeiro for breakfast today. Obtained food preferences. Pt likes scrambled eggs. She also requested to have unsweet tea w/2 Sweet 'n Low for lunch today, and to have orange sherbet w/lunch and dinner as she thinks she'll tolerate it. Currently on 4 CHO diet w/1200 mL fluid restriction for hyponatremia. Past MD note shows trazodone and aldactone d/c. Current Na 132 mg/dL. Pt also does not have edema. Determined to liberalize diet to accomodate pt for her lunch order d/t poor po intake. Wt hx reviewed. Shows 10.77% wt loss since admission (167#) and 9.69% wt loss in 3 months (165#), but wt stable since yesterday (148#). Noted that admission wt measured via bedscale, and wt yesterday taken via standing scale. Pt meets criteria for severe malnutrition AEB poor po intake >5 days and wt loss >7.5% in 3 months. Will add dx and continue to monitor.    Nutrition Related Findings:    No edema. Na 132. -357 mg/dL.         Current Nutrition Intake & Therapies:    Average Meal Intake:

## 2024-02-20 NOTE — PLAN OF CARE
Problem: Discharge Planning  Goal: Discharge to home or other facility with appropriate resources  2/20/2024 0933 by Salome Swan RN  Outcome: Progressing  2/19/2024 2230 by Siria Whipple RN  Outcome: Progressing     Problem: Skin/Tissue Integrity  Goal: Absence of new skin breakdown  Description: 1.  Monitor for areas of redness and/or skin breakdown  2.  Assess vascular access sites hourly  3.  Every 4-6 hours minimum:  Change oxygen saturation probe site  4.  Every 4-6 hours:  If on nasal continuous positive airway pressure, respiratory therapy assess nares and determine need for appliance change or resting period.  2/20/2024 0933 by Salome Swan RN  Outcome: Progressing  2/19/2024 2230 by Siria Whipple RN  Outcome: Progressing     Problem: Chronic Conditions and Co-morbidities  Goal: Patient's chronic conditions and co-morbidity symptoms are monitored and maintained or improved  2/20/2024 0933 by Salome Swan RN  Outcome: Progressing  2/19/2024 2230 by Siria Whipple RN  Outcome: Progressing     Problem: Safety - Adult  Goal: Free from fall injury  2/20/2024 0933 by Salome Swan RN  Outcome: Progressing  Flowsheets (Taken 2/20/2024 0932)  Free From Fall Injury: Instruct family/caregiver on patient safety  2/19/2024 2230 by Siria Whipple RN  Outcome: Progressing  Flowsheets (Taken 2/19/2024 2228)  Free From Fall Injury: Instruct family/caregiver on patient safety     Problem: Pain  Goal: Verbalizes/displays adequate comfort level or baseline comfort level  2/20/2024 0933 by Salome Swan RN  Outcome: Progressing  2/19/2024 2230 by Siria Whipple RN  Outcome: Progressing     Problem: ABCDS Injury Assessment  Goal: Absence of physical injury  2/20/2024 0933 by Salome Sawn RN  Outcome: Progressing  Flowsheets (Taken 2/20/2024 0932)  Absence of Physical Injury: Implement safety measures based on patient assessment  2/19/2024 2230 by Siria Whipple

## 2024-02-20 NOTE — CARE COORDINATION
This CM was aware patient had home oxygen and Trilogy at home, supplied by [employer].  [employer]   P [phone]  F [phone]    Dr. Sandoval noted patient was benefiting from chest physiotherapy.  [facility] learn if patient qualifies for Percussion Vest.  Informed that patient also has a Percussion Vest at home that was covered by her insurance.  Electronically signed by Peggy Lima RN on 2/20/2024 at 2:57 PM

## 2024-02-20 NOTE — PROGRESS NOTES
Pulmonary and Critical Care Progress note.    [facility] STEWART Mendez    MRN# [id number]    Acct# [id number]  2/19/2024   5:57 PM CST    Referring Provider:Greg Sandoval MD      Chief Complaint: Respiratory failure noninvasive ventilation    HPI: The patient continues to be on noninvasive ventilation during sleep per her home routine.  She is on nasal cannula oxygen.  She is on the floor.    Medications:  The following medications were reviewed and adjustments made when necessary.    enoxaparin, 1 mg/kg, SubCUTAneous, BID    cefepime, 2,000 mg, IntraVENous, Q12H    [Held by provider] baricitinib, 2 mg, Oral, Daily    insulin glargine, 0.15 Units/kg, SubCUTAneous, Nightly    insulin lispro, 0-4 Units, SubCUTAneous, TID WC    insulin lispro, 0-4 Units, SubCUTAneous, Nightly    clopidogrel, 75 mg, Oral, Daily    lactobacillus, 1 capsule, Oral, Daily with breakfast    montelukast, 10 mg, Oral, Nightly    rosuvastatin, 10 mg, Oral, Nightly    [Held by provider] sotalol, 120 mg, Oral, 2 times per day    methylPREDNISolone, 60 mg, IntraVENous, Q6H    sodium chloride flush, 5-40 mL, IntraVENous, 2 times per day    ipratropium, 2 puff, Inhalation, Q4H RT **AND** albuterol sulfate HFA, 2 puff, Inhalation, Q4H RT         Physical Exam:  /81   Pulse 86   Temp 97.2 °F (36.2 °C) (Temporal)   Resp 16   Ht 1.676 m (5' 6\")   Wt 67.6 kg (149 lb)   SpO2 95%   BMI 24.05 kg/m²   Intake/Output Summary (Last 24 hours) at 2/19/2024 1811  Last data filed at 2/18/2024 2123  Gross per 24 hour   Intake 975.04 ml   Output 900 ml   Net 75.04 ml         General appearance: Elderly female on nasal cannula oxygen.  HEENT:normocephalic atraumatic]  Heart:S1S2 no murmurs  Lungs:diminished bilaterally no rubs or tenderness or dullness to percussion  Abdomen:Soft non tender no organomegaly  Extremities:no clubbing cyanosis or edema  Neuro:no focal findings  Skin:intact        The following lab data was reviewed:  CBC

## 2024-02-20 NOTE — PROGRESS NOTES
Family Medicine Progress Note    Patient:  Renata Mendez  YOB: 1950    MRN: [id number]     Acct: [id number]     Admit date: 2/14/2024    Patient Seen, Chart, Consults notes, Labs, Radiology studies reviewed.    Subjective: Day 6 of stay with acute on chronic respiratory failure with hypoxia and hypercapnia testing positive for COVID-19 only a few short months after having a similar episode with COVID-19 positivity and most recent (in last 24 hours) has had continued gradual improvement.  States she is still tired but she is up this morning sitting at bedside.  Much more interactive and conversational today.  States she is having a cough that is productive.  Doing well on her home trilogy device.  Feels as though she is benefiting from the chest physiotherapy she is receiving.    Past, Family, Social History unchanged from admission.    Diet:  ADULT DIET; Regular; 4 carb choices (60 gm/meal); 1200 ml    Medications:  Scheduled Meds:   enoxaparin  1 mg/kg SubCUTAneous BID    cefepime  2,000 mg IntraVENous Q12H    [Held by provider] baricitinib  2 mg Oral Daily    insulin glargine  0.15 Units/kg SubCUTAneous Nightly    insulin lispro  0-4 Units SubCUTAneous TID WC    insulin lispro  0-4 Units SubCUTAneous Nightly    clopidogrel  75 mg Oral Daily    lactobacillus  1 capsule Oral Daily with breakfast    montelukast  10 mg Oral Nightly    rosuvastatin  10 mg Oral Nightly    [Held by provider] sotalol  120 mg Oral 2 times per day    methylPREDNISolone  60 mg IntraVENous Q6H    sodium chloride flush  5-40 mL IntraVENous 2 times per day    ipratropium  2 puff Inhalation Q4H RT    And    albuterol sulfate HFA  2 puff Inhalation Q4H RT     Continuous Infusions:   dextrose      sodium chloride       PRN Meds:hydrALAZINE, glucose, dextrose bolus **OR** dextrose bolus, glucagon (rDNA), dextrose, LORazepam, nitroGLYCERIN, potassium chloride **OR** potassium chloride, magnesium sulfate, polyethylene glycol,

## 2024-02-21 LAB
ALBUMIN SERPL-MCNC: 3.6 G/DL (ref 3.5–5.2)
ALP SERPL-CCNC: 72 U/L (ref 35–104)
ALT SERPL-CCNC: 37 U/L (ref 5–33)
ANION GAP SERPL CALCULATED.3IONS-SCNC: 8 MMOL/L (ref 7–19)
AST SERPL-CCNC: 19 U/L (ref 5–32)
BASOPHILS # BLD: 0 K/UL (ref 0–0.2)
BASOPHILS NFR BLD: 0.1 % (ref 0–1)
BILIRUB SERPL-MCNC: 0.6 MG/DL (ref 0.2–1.2)
BUN SERPL-MCNC: 32 MG/DL (ref 8–23)
CALCIUM SERPL-MCNC: 8.4 MG/DL (ref 8.8–10.2)
CHLORIDE SERPL-SCNC: 93 MMOL/L (ref 98–111)
CO2 SERPL-SCNC: 31 MMOL/L (ref 22–29)
CREAT SERPL-MCNC: 0.8 MG/DL (ref 0.5–0.9)
EOSINOPHIL # BLD: 0 K/UL (ref 0–0.6)
EOSINOPHIL NFR BLD: 0 % (ref 0–5)
ERYTHROCYTE [DISTWIDTH] IN BLOOD BY AUTOMATED COUNT: 17 % (ref 11.5–14.5)
GLUCOSE BLD-MCNC: 242 MG/DL (ref 70–99)
GLUCOSE BLD-MCNC: 257 MG/DL (ref 70–99)
GLUCOSE BLD-MCNC: 285 MG/DL (ref 70–99)
GLUCOSE BLD-MCNC: 300 MG/DL (ref 70–99)
GLUCOSE SERPL-MCNC: 246 MG/DL (ref 74–109)
HCT VFR BLD AUTO: 33 % (ref 37–47)
HGB BLD-MCNC: 9.7 G/DL (ref 12–16)
IMM GRANULOCYTES # BLD: 0.1 K/UL
LYMPHOCYTES # BLD: 0.2 K/UL (ref 1.1–4.5)
LYMPHOCYTES NFR BLD: 1.9 % (ref 20–40)
MCH RBC QN AUTO: 25.7 PG (ref 27–31)
MCHC RBC AUTO-ENTMCNC: 29.4 G/DL (ref 33–37)
MCV RBC AUTO: 87.3 FL (ref 81–99)
MONOCYTES # BLD: 0.4 K/UL (ref 0–0.9)
MONOCYTES NFR BLD: 4.6 % (ref 0–10)
NEUTROPHILS # BLD: 9 K/UL (ref 1.5–7.5)
NEUTS SEG NFR BLD: 92.7 % (ref 50–65)
PERFORMED ON: ABNORMAL
PLATELET # BLD AUTO: 214 K/UL (ref 130–400)
PMV BLD AUTO: 10.1 FL (ref 9.4–12.3)
POTASSIUM SERPL-SCNC: 4 MMOL/L (ref 3.5–5)
PROT SERPL-MCNC: 6.5 G/DL (ref 6.6–8.7)
RBC # BLD AUTO: 3.78 M/UL (ref 4.2–5.4)
SODIUM SERPL-SCNC: 132 MMOL/L (ref 136–145)
WBC # BLD AUTO: 9.7 K/UL (ref 4.8–10.8)

## 2024-02-21 PROCEDURE — 6370000000 HC RX 637 (ALT 250 FOR IP): Performed by: FAMILY MEDICINE

## 2024-02-21 PROCEDURE — 6360000002 HC RX W HCPCS: Performed by: NURSE PRACTITIONER

## 2024-02-21 PROCEDURE — 94669 MECHANICAL CHEST WALL OSCILL: CPT

## 2024-02-21 PROCEDURE — 94760 N-INVAS EAR/PLS OXIMETRY 1: CPT

## 2024-02-21 PROCEDURE — 94640 AIRWAY INHALATION TREATMENT: CPT

## 2024-02-21 PROCEDURE — 80053 COMPREHEN METABOLIC PANEL: CPT

## 2024-02-21 PROCEDURE — 36415 COLL VENOUS BLD VENIPUNCTURE: CPT

## 2024-02-21 PROCEDURE — 85025 COMPLETE CBC W/AUTO DIFF WBC: CPT

## 2024-02-21 PROCEDURE — 1210000000 HC MED SURG R&B

## 2024-02-21 PROCEDURE — 2580000003 HC RX 258: Performed by: INTERNAL MEDICINE

## 2024-02-21 PROCEDURE — 82962 GLUCOSE BLOOD TEST: CPT

## 2024-02-21 PROCEDURE — 6370000000 HC RX 637 (ALT 250 FOR IP): Performed by: STUDENT IN AN ORGANIZED HEALTH CARE EDUCATION/TRAINING PROGRAM

## 2024-02-21 PROCEDURE — 2580000003 HC RX 258: Performed by: STUDENT IN AN ORGANIZED HEALTH CARE EDUCATION/TRAINING PROGRAM

## 2024-02-21 PROCEDURE — 6360000002 HC RX W HCPCS: Performed by: STUDENT IN AN ORGANIZED HEALTH CARE EDUCATION/TRAINING PROGRAM

## 2024-02-21 PROCEDURE — 2700000000 HC OXYGEN THERAPY PER DAY

## 2024-02-21 PROCEDURE — 6360000002 HC RX W HCPCS: Performed by: INTERNAL MEDICINE

## 2024-02-21 PROCEDURE — 97530 THERAPEUTIC ACTIVITIES: CPT

## 2024-02-21 RX ORDER — MAGNESIUM HYDROXIDE/ALUMINUM HYDROXICE/SIMETHICONE 120; 1200; 1200 MG/30ML; MG/30ML; MG/30ML
30 SUSPENSION ORAL EVERY 6 HOURS PRN
Status: DISCONTINUED | OUTPATIENT
Start: 2024-02-21 | End: 2024-02-22 | Stop reason: HOSPADM

## 2024-02-21 RX ADMIN — WATER 60 MG: 1 INJECTION INTRAMUSCULAR; INTRAVENOUS; SUBCUTANEOUS at 17:33

## 2024-02-21 RX ADMIN — WATER 60 MG: 1 INJECTION INTRAMUSCULAR; INTRAVENOUS; SUBCUTANEOUS at 06:19

## 2024-02-21 RX ADMIN — WATER 60 MG: 1 INJECTION INTRAMUSCULAR; INTRAVENOUS; SUBCUTANEOUS at 12:14

## 2024-02-21 RX ADMIN — ENOXAPARIN SODIUM 70 MG: 100 INJECTION SUBCUTANEOUS at 21:46

## 2024-02-21 RX ADMIN — ENOXAPARIN SODIUM 70 MG: 100 INJECTION SUBCUTANEOUS at 08:02

## 2024-02-21 RX ADMIN — ALBUTEROL SULFATE 2 PUFF: 90 AEROSOL, METERED RESPIRATORY (INHALATION) at 18:38

## 2024-02-21 RX ADMIN — IPRATROPIUM BROMIDE 2 PUFF: 17 AEROSOL, METERED RESPIRATORY (INHALATION) at 10:30

## 2024-02-21 RX ADMIN — IPRATROPIUM BROMIDE 2 PUFF: 17 AEROSOL, METERED RESPIRATORY (INHALATION) at 18:38

## 2024-02-21 RX ADMIN — ALBUTEROL SULFATE 2 PUFF: 90 AEROSOL, METERED RESPIRATORY (INHALATION) at 06:40

## 2024-02-21 RX ADMIN — MONTELUKAST 10 MG: 10 TABLET, FILM COATED ORAL at 21:46

## 2024-02-21 RX ADMIN — ALBUTEROL SULFATE 2 PUFF: 90 AEROSOL, METERED RESPIRATORY (INHALATION) at 10:30

## 2024-02-21 RX ADMIN — INSULIN GLARGINE 12 UNITS: 100 INJECTION, SOLUTION SUBCUTANEOUS at 21:46

## 2024-02-21 RX ADMIN — IPRATROPIUM BROMIDE 2 PUFF: 17 AEROSOL, METERED RESPIRATORY (INHALATION) at 14:12

## 2024-02-21 RX ADMIN — CEFEPIME 2000 MG: 2 INJECTION, POWDER, FOR SOLUTION INTRAVENOUS at 06:22

## 2024-02-21 RX ADMIN — CLOPIDOGREL BISULFATE 75 MG: 75 TABLET ORAL at 08:02

## 2024-02-21 RX ADMIN — IPRATROPIUM BROMIDE 2 PUFF: 17 AEROSOL, METERED RESPIRATORY (INHALATION) at 03:00

## 2024-02-21 RX ADMIN — INSULIN LISPRO 3 UNITS: 100 INJECTION, SOLUTION INTRAVENOUS; SUBCUTANEOUS at 17:33

## 2024-02-21 RX ADMIN — CEFEPIME 2000 MG: 2 INJECTION, POWDER, FOR SOLUTION INTRAVENOUS at 18:10

## 2024-02-21 RX ADMIN — ALBUTEROL SULFATE 2 PUFF: 90 AEROSOL, METERED RESPIRATORY (INHALATION) at 02:59

## 2024-02-21 RX ADMIN — ROSUVASTATIN 10 MG: 10 TABLET, FILM COATED ORAL at 21:47

## 2024-02-21 RX ADMIN — INSULIN LISPRO 2 UNITS: 100 INJECTION, SOLUTION INTRAVENOUS; SUBCUTANEOUS at 12:13

## 2024-02-21 RX ADMIN — Medication 1 CAPSULE: at 08:02

## 2024-02-21 RX ADMIN — ALBUTEROL SULFATE 2 PUFF: 90 AEROSOL, METERED RESPIRATORY (INHALATION) at 14:12

## 2024-02-21 RX ADMIN — ALUMINUM HYDROXIDE, MAGNESIUM HYDROXIDE, AND SIMETHICONE 30 ML: 200; 200; 20 SUSPENSION ORAL at 21:46

## 2024-02-21 RX ADMIN — IPRATROPIUM BROMIDE 2 PUFF: 17 AEROSOL, METERED RESPIRATORY (INHALATION) at 06:40

## 2024-02-21 RX ADMIN — INSULIN LISPRO 1 UNITS: 100 INJECTION, SOLUTION INTRAVENOUS; SUBCUTANEOUS at 08:52

## 2024-02-21 NOTE — PROGRESS NOTES
Follow up:    Supportive visit.  Pt is sitting up in the chair.  She is alert and talkative, On 3L/m nasal cannula.  She is emotional when visitors arrive, one is her caregiver.  She is reassured by them that they will assist her at home.    Discussed goal of returning home soon.  Support and encouragement provided.    Electronically signed by Abi Falk RN on 2/21/2024 at 2:07 PM

## 2024-02-21 NOTE — PROGRESS NOTES
Family Medicine Progress Note    Patient:  Renata Mendez  YOB: 1950    MRN: [id number]     Acct: [id number]     Admit date: 2/14/2024    Patient Seen, Chart, Consults notes, Labs, Radiology studies reviewed.    Subjective: Day 7 of stay with acute on chronic respiratory failure with hypoxia and hypercapnia and most recent (in last 24 hours) has had no new issues maintaining oxygen saturations with nasal cannula and/or her home noninvasive ventilation device.  Much more talkative today.    Past, Family, Social History unchanged from admission.    Diet:  ADULT DIET; Regular; 4 carb choices (60 gm/meal)    Medications:  Scheduled Meds:   cefepime  2,000 mg IntraVENous Q8H    enoxaparin  1 mg/kg SubCUTAneous BID    [Held by provider] baricitinib  2 mg Oral Daily    insulin glargine  0.15 Units/kg SubCUTAneous Nightly    insulin lispro  0-4 Units SubCUTAneous TID WC    insulin lispro  0-4 Units SubCUTAneous Nightly    clopidogrel  75 mg Oral Daily    lactobacillus  1 capsule Oral Daily with breakfast    montelukast  10 mg Oral Nightly    rosuvastatin  10 mg Oral Nightly    [Held by provider] sotalol  120 mg Oral 2 times per day    methylPREDNISolone  60 mg IntraVENous Q6H    sodium chloride flush  5-40 mL IntraVENous 2 times per day    ipratropium  2 puff Inhalation Q4H RT    And    albuterol sulfate HFA  2 puff Inhalation Q4H RT     Continuous Infusions:   dextrose      sodium chloride       PRN Meds:hydrALAZINE, glucose, dextrose bolus **OR** dextrose bolus, glucagon (rDNA), dextrose, LORazepam, nitroGLYCERIN, potassium chloride **OR** potassium chloride, magnesium sulfate, polyethylene glycol, acetaminophen **OR** acetaminophen, sodium chloride flush, sodium chloride, ondansetron **OR** ondansetron    Objective:    Vitals: BP (!) 144/94   Pulse 78   Temp 97.9 °F (36.6 °C)   Resp 18   Ht 1.676 m (5' 5.98\")   Wt 69.4 kg (153 lb 1 oz)   SpO2 98%   BMI 24.72 kg/m²   24 hour

## 2024-02-21 NOTE — PROGRESS NOTES
Occupational Therapy     02/21/24 1300   Subjective   Subjective Pt in bed upon arrival for therapy. Pt agreeable to participate and wants to sit up for lunch.   Pain Assessment   Pain Assessment None - Denies Pain   Cognition   Overall Cognitive Status WFL   Orientation   Overall Orientation Status WFL   Bed Mobility Training   Bed Mobility Training Yes   Overall Level of Assistance Modified independent   Transfer Training   Transfer Training Yes   Overall Level of Assistance Contact-guard assistance   Interventions Verbal cues;Tactile cues   Sit to Stand Contact-guard assistance   Stand to Sit Contact-guard assistance   Bed to Chair Contact-guard assistance   Balance   Sitting Intact   Standing With support   ADL   Feeding Independent   Grooming Supervision   UE Bathing Supervision   LE Bathing Minimal assistance   UE Dressing Supervision   LE Dressing Minimal assistance   Toileting Minimal assistance;Contact guard assistance   Functional Mobility Minimal assistance;Contact guard assistance   Assessment   Assessment Tx focused on brief change sitting EOB and pure wick adjustment. Pt instructed on functional t/f from EOB to recliner with CGA. Pt demo some unsteadiness but no loss of balance. pt set up for lunch this date with chair alarm donned.   Activity Tolerance Patient tolerated treatment well;Patient limited by endurance   Discharge Recommendations Patient would benefit from continued therapy after discharge;24 hour supervision or assist   Occupational Therapy Plan   Times Per Week 3-5x/week   Times Per Day Once a day   OT Plan of Care   Wednesday X     Electronically signed by EMMA Everett on 2/21/2024 at 1:16 PM

## 2024-02-21 NOTE — PROGRESS NOTES
Pharmacy Adjustment per [facility] protocol    Renata Mendez is a 74 y.o. female. Pharmacy has adjusted medications per [facility] protocol.    Recent Labs     02/20/24  0411 02/21/24  0301   BUN 29* 32*       Recent Labs     02/20/24  0411 02/21/24  0301   CREATININE 0.7 0.8       Estimated Creatinine Clearance: 57 mL/min (based on SCr of 0.8 mg/dL).    Height:   Ht Readings from Last 1 Encounters:   02/20/24 1.676 m (5' 5.98\")     Weight:  Wt Readings from Last 1 Encounters:   02/21/24 69.4 kg (153 lb 1 oz)         Plan: Adjust the following medications based on [facility] protocol:           Cefepime to 2000 mg IV every 12 hours extended infusion over 240 minutes. Order completes today.      Electronically signed by Isaiah Guevara RPH on 2/21/2024 at 8:54 AM

## 2024-02-22 VITALS
WEIGHT: 153.06 LBS | SYSTOLIC BLOOD PRESSURE: 148 MMHG | BODY MASS INDEX: 24.6 KG/M2 | RESPIRATION RATE: 20 BRPM | OXYGEN SATURATION: 97 % | HEART RATE: 97 BPM | TEMPERATURE: 97.4 F | DIASTOLIC BLOOD PRESSURE: 85 MMHG | HEIGHT: 66 IN

## 2024-02-22 LAB
ALBUMIN SERPL-MCNC: 3.7 G/DL (ref 3.5–5.2)
ALP SERPL-CCNC: 72 U/L (ref 35–104)
ALT SERPL-CCNC: 34 U/L (ref 5–33)
ANION GAP SERPL CALCULATED.3IONS-SCNC: 10 MMOL/L (ref 7–19)
AST SERPL-CCNC: 17 U/L (ref 5–32)
BASOPHILS # BLD: 0 K/UL (ref 0–0.2)
BASOPHILS NFR BLD: 0.1 % (ref 0–1)
BILIRUB SERPL-MCNC: 0.6 MG/DL (ref 0.2–1.2)
BUN SERPL-MCNC: 32 MG/DL (ref 8–23)
CALCIUM SERPL-MCNC: 8.3 MG/DL (ref 8.8–10.2)
CHLORIDE SERPL-SCNC: 92 MMOL/L (ref 98–111)
CO2 SERPL-SCNC: 31 MMOL/L (ref 22–29)
CREAT SERPL-MCNC: 0.8 MG/DL (ref 0.5–0.9)
EOSINOPHIL # BLD: 0 K/UL (ref 0–0.6)
EOSINOPHIL NFR BLD: 0 % (ref 0–5)
ERYTHROCYTE [DISTWIDTH] IN BLOOD BY AUTOMATED COUNT: 17.2 % (ref 11.5–14.5)
GLUCOSE BLD-MCNC: 162 MG/DL (ref 70–99)
GLUCOSE SERPL-MCNC: 214 MG/DL (ref 74–109)
HCT VFR BLD AUTO: 30.8 % (ref 37–47)
HGB BLD-MCNC: 9.2 G/DL (ref 12–16)
IMM GRANULOCYTES # BLD: 0.1 K/UL
LYMPHOCYTES # BLD: 0.3 K/UL (ref 1.1–4.5)
LYMPHOCYTES NFR BLD: 3.5 % (ref 20–40)
MCH RBC QN AUTO: 25.7 PG (ref 27–31)
MCHC RBC AUTO-ENTMCNC: 29.9 G/DL (ref 33–37)
MCV RBC AUTO: 86 FL (ref 81–99)
MONOCYTES # BLD: 0.6 K/UL (ref 0–0.9)
MONOCYTES NFR BLD: 6.8 % (ref 0–10)
NEUTROPHILS # BLD: 8 K/UL (ref 1.5–7.5)
NEUTS SEG NFR BLD: 89 % (ref 50–65)
PERFORMED ON: ABNORMAL
PLATELET # BLD AUTO: 195 K/UL (ref 130–400)
PMV BLD AUTO: 9.8 FL (ref 9.4–12.3)
POTASSIUM SERPL-SCNC: 4.5 MMOL/L (ref 3.5–5)
PROT SERPL-MCNC: 6.4 G/DL (ref 6.6–8.7)
RBC # BLD AUTO: 3.58 M/UL (ref 4.2–5.4)
SODIUM SERPL-SCNC: 133 MMOL/L (ref 136–145)
WBC # BLD AUTO: 9 K/UL (ref 4.8–10.8)

## 2024-02-22 PROCEDURE — 94640 AIRWAY INHALATION TREATMENT: CPT

## 2024-02-22 PROCEDURE — 82962 GLUCOSE BLOOD TEST: CPT

## 2024-02-22 PROCEDURE — 85025 COMPLETE CBC W/AUTO DIFF WBC: CPT

## 2024-02-22 PROCEDURE — 6370000000 HC RX 637 (ALT 250 FOR IP): Performed by: STUDENT IN AN ORGANIZED HEALTH CARE EDUCATION/TRAINING PROGRAM

## 2024-02-22 PROCEDURE — 6370000000 HC RX 637 (ALT 250 FOR IP): Performed by: FAMILY MEDICINE

## 2024-02-22 PROCEDURE — 6360000002 HC RX W HCPCS: Performed by: NURSE PRACTITIONER

## 2024-02-22 PROCEDURE — 94669 MECHANICAL CHEST WALL OSCILL: CPT

## 2024-02-22 PROCEDURE — 6360000002 HC RX W HCPCS: Performed by: STUDENT IN AN ORGANIZED HEALTH CARE EDUCATION/TRAINING PROGRAM

## 2024-02-22 PROCEDURE — 2580000003 HC RX 258: Performed by: STUDENT IN AN ORGANIZED HEALTH CARE EDUCATION/TRAINING PROGRAM

## 2024-02-22 PROCEDURE — 36415 COLL VENOUS BLD VENIPUNCTURE: CPT

## 2024-02-22 PROCEDURE — 2700000000 HC OXYGEN THERAPY PER DAY

## 2024-02-22 PROCEDURE — 80053 COMPREHEN METABOLIC PANEL: CPT

## 2024-02-22 RX ORDER — PREDNISONE 20 MG/1
20 TABLET ORAL DAILY
Qty: 7 TABLET | Refills: 0 | Status: SHIPPED | OUTPATIENT
Start: 2024-02-22 | End: 2024-02-29

## 2024-02-22 RX ADMIN — WATER 60 MG: 1 INJECTION INTRAMUSCULAR; INTRAVENOUS; SUBCUTANEOUS at 06:18

## 2024-02-22 RX ADMIN — ALUMINUM HYDROXIDE, MAGNESIUM HYDROXIDE, AND SIMETHICONE 30 ML: 200; 200; 20 SUSPENSION ORAL at 06:43

## 2024-02-22 RX ADMIN — CLOPIDOGREL BISULFATE 75 MG: 75 TABLET ORAL at 09:47

## 2024-02-22 RX ADMIN — Medication 1 CAPSULE: at 09:47

## 2024-02-22 RX ADMIN — WATER 60 MG: 1 INJECTION INTRAMUSCULAR; INTRAVENOUS; SUBCUTANEOUS at 06:40

## 2024-02-22 RX ADMIN — ALBUTEROL SULFATE 2 PUFF: 90 AEROSOL, METERED RESPIRATORY (INHALATION) at 06:54

## 2024-02-22 RX ADMIN — IPRATROPIUM BROMIDE 2 PUFF: 17 AEROSOL, METERED RESPIRATORY (INHALATION) at 06:54

## 2024-02-22 RX ADMIN — ENOXAPARIN SODIUM 70 MG: 100 INJECTION SUBCUTANEOUS at 09:47

## 2024-02-22 NOTE — DISCHARGE SUMMARY
Discharge Summary     Date:2/22/2024        Patient Name:Renata Mendez     YOB: 1950     Age:74 y.o.    Admit Date:2/14/2024   Admission Condition:critical   Discharged Condition:fair  Discharge Date: 02/22/24     Discharge Diagnoses   Principal Problem:    Acute on chronic respiratory failure with hypoxia and hypercapnia (HCC)  Active Problems:    NICM (nonischemic cardiomyopathy) (HCC)    COPD exacerbation (HCC)    Deep vein thrombosis (HCC)    Essential hypertension    Diabetes mellitus (HCC)    S/P CABG x 4    History of coronary artery stent placement    Coronary artery disease involving native coronary artery of native heart without angina pectoris    Abdominal aortic aneurysm (AAA) without rupture (HCC)    Hyponatremia    Stroke due to occlusion of left anterior cerebral artery (HCC)    Altered mental status    Pneumonia due to COVID-19 virus    Severe malnutrition (HCC)  Resolved Problems:    * No resolved hospital problems. *      Hospital Stay   Narrative of Hospital Course: 74-year-old with advanced COPD brought in by EMS in respiratory distress with oxygen saturation in the 70s despite supplemental oxygen.  She was treated with noninvasive pulmonary support.  She was also found to have significant hyponatremia.  Admitted to the intensive care unit and consultation obtained from pulmonary and nephrology.  Slowly and gradually corrected her hyponatremia.  Likely medication induced which were adjusted and she was removed from possible offending agents.  Eventually she stabilized and fortunately did not require any intubation.  She did test positive for COVID-19 and proper isolation protocols were put into place.  Attempted to treat her with antiviral regimen as per the COVID-19 protocol at this hospital, however her transaminases did increase it is felt that might be related to medications given so those were promptly discontinued.  She was placed on empiric antibiotics to cover for  pneumonia.  She stabilized and eventually was able to be moved to the floor.  While on the floor no significant events were noted.  She became much more interactive and alert.  Her breathing status returned to near baseline where she is on 2-1/2 to 3 L by nasal cannula continuously as well as use of noninvasive pulmonary pressure support at night.  On the day of discharge she was for the most part back to baseline and plan is for discharge home with assistance of home health.  She already has caretakers at home and will be with her the bulk of every 24 hours and her son is also around and involved in her care.    Consultants:   IP CONSULT TO PULMONOLOGY  IP CONSULT TO NEPHROLOGY  PALLIATIVE CARE EVAL  IP CONSULT TO PHARMACY  PALLIATIVE CARE EVAL    Time Spent on Discharge:  37 minutes were spent in patient examination, evaluation, counseling as well as medication reconciliation, prescriptions for required medications, discharge plan and follow up.      Surgeries/Procedures Performed:        Treatments:   antibiotics: ceftriaxone and steroids: solu-medrol    Significant Diagnostic Studies:   Recent Labs:  CBC:   Lab Results   Component Value Date/Time    WBC 9.0 02/22/2024 04:10 AM    RBC 3.58 02/22/2024 04:10 AM    HGB 9.2 02/22/2024 04:10 AM    HCT 30.8 02/22/2024 04:10 AM    HCT 32.1 09/02/2011 02:11 AM    MCV 86.0 02/22/2024 04:10 AM    MCH 25.7 02/22/2024 04:10 AM    MCHC 29.9 02/22/2024 04:10 AM    RDW 17.2 02/22/2024 04:10 AM     02/22/2024 04:10 AM     09/02/2011 02:11 AM     CMP:    Lab Results   Component Value Date/Time    GLUCOSE 214 02/22/2024 04:10 AM     02/22/2024 04:10 AM     09/02/2011 02:11 AM    K 4.5 02/22/2024 04:10 AM    K 4.1 09/02/2011 02:11 AM    CL 92 02/22/2024 04:10 AM     09/02/2011 02:11 AM    CO2 31 02/22/2024 04:10 AM    BUN 32 02/22/2024 04:10 AM    CREATININE 0.8 02/22/2024 04:10 AM    CREATININE 0.6 09/02/2011 02:11 AM    ANIONGAP 10 02/22/2024 04:10

## 2024-02-22 NOTE — PLAN OF CARE
Problem: Discharge Planning  Goal: Discharge to home or other facility with appropriate resources  2/22/2024 1103 by Nai Medina RN  Outcome: Adequate for Discharge  2/22/2024 0408 by Kathleen Perez RN  Outcome: Progressing     Problem: Skin/Tissue Integrity  Goal: Absence of new skin breakdown  Description: 1.  Monitor for areas of redness and/or skin breakdown  2.  Assess vascular access sites hourly  3.  Every 4-6 hours minimum:  Change oxygen saturation probe site  4.  Every 4-6 hours:  If on nasal continuous positive airway pressure, respiratory therapy assess nares and determine need for appliance change or resting period.  2/22/2024 1103 by Nai Medina RN  Outcome: Adequate for Discharge  2/22/2024 0408 by Kathleen Perez RN  Outcome: Progressing     Problem: Chronic Conditions and Co-morbidities  Goal: Patient's chronic conditions and co-morbidity symptoms are monitored and maintained or improved  2/22/2024 1103 by Nai Medina RN  Outcome: Adequate for Discharge  2/22/2024 0408 by Kathleen Perez RN  Outcome: Progressing     Problem: Safety - Adult  Goal: Free from fall injury  2/22/2024 1103 by Nai Medina RN  Outcome: Adequate for Discharge  2/22/2024 0408 by Kathleen Perez RN  Outcome: Progressing     Problem: Pain  Goal: Verbalizes/displays adequate comfort level or baseline comfort level  2/22/2024 1103 by Nai Medina RN  Outcome: Adequate for Discharge  2/22/2024 0408 by Kathleen Perez RN  Outcome: Progressing     Problem: ABCDS Injury Assessment  Goal: Absence of physical injury  2/22/2024 1103 by Nai Medina RN  Outcome: Adequate for Discharge  2/22/2024 0408 by Kathleen Perez RN  Outcome: Progressing     Problem: Nutrition Deficit:  Goal: Optimize nutritional status  2/22/2024 1103 by Nai Medina RN  Outcome: Adequate for Discharge  2/22/2024 0408 by Kathleen Perez RN  Outcome: Progressing

## 2024-02-22 NOTE — CARE COORDINATION
Set up with [facility]  [phone].   Electronically signed by Teresa Paige on 2/22/24 at 3:18 PM CST

## 2024-02-22 NOTE — CARE COORDINATION
02/22/24 0935   IMM Letter   IMM Letter given to Patient/Family/Significant other/Guardian/POA/by: ALENA Oropeza   IMM Letter date given: 02/22/24   IMM Letter time given: 0930     Second IMM given to patient and explained with patient verbalizing understanding.  All questions and concerns addressed   Patient declined waiting 4 hr period prior to discharge.   Signed letter placed in pt soft chart   Electronically signed by ALENA Oropeza on 2/22/2024 at 9:36 AM

## 2024-02-22 NOTE — PLAN OF CARE
Problem: Discharge Planning  Goal: Discharge to home or other facility with appropriate resources  Outcome: Progressing     Problem: Skin/Tissue Integrity  Goal: Absence of new skin breakdown  Outcome: Progressing     Problem: Chronic Conditions and Co-morbidities  Goal: Patient's chronic conditions and co-morbidity symptoms are monitored and maintained or improved  Outcome: Progressing     Problem: Safety - Adult  Goal: Free from fall injury  Outcome: Progressing     Problem: Pain  Goal: Verbalizes/displays adequate comfort level or baseline comfort level  Outcome: Progressing     Problem: ABCDS Injury Assessment  Goal: Absence of physical injury  Outcome: Progressing     Problem: Nutrition Deficit:  Goal: Optimize nutritional status  Outcome: Progressing

## 2024-02-22 NOTE — PROGRESS NOTES
CLINICAL PHARMACY NOTE: MEDS TO BEDS    Total # of Prescriptions Filled: 1   The following medications were delivered to the patient:  Current Discharge Medication List        START taking these medications    Details   predniSONE (DELTASONE) 20 MG tablet Take 1 tablet by mouth daily for 7 days  Qty: 7 tablet, Refills: 0               Additional Documentation:  Medications delivered to patients room and handed to patient. Paid with cash

## 2024-02-23 ENCOUNTER — CARE COORDINATION (OUTPATIENT)
Dept: CASE MANAGEMENT | Age: 74
End: 2024-02-23

## 2024-02-23 DIAGNOSIS — J12.82 PNEUMONIA DUE TO COVID-19 VIRUS: Primary | ICD-10-CM

## 2024-02-23 DIAGNOSIS — U07.1 PNEUMONIA DUE TO COVID-19 VIRUS: Primary | ICD-10-CM

## 2024-02-23 PROCEDURE — 1111F DSCHRG MED/CURRENT MED MERGE: CPT | Performed by: FAMILY MEDICINE

## 2024-02-23 NOTE — CARE COORDINATION
Care Transitions Follow Up Call    Patient Current Location:    Home: [address] [place] [address]    Care Transition Nurse contacted the patient by telephone to follow up.    Patient: Renata Mendez  Patient : 1950   MRN: [id number]   Discharge Date: 24 RARS: Readmission Risk Score: 27.7    Needs to be reviewed by the provider   Additional needs identified to be addressed with provider: No       Method of communication with provider: none.    Follow Up: PCP, Dr. Greg Sandoval, 2024, 2:30 pm  Future Appointments   Date Time Provider Department Center   5/15/2024  1:15 PM Kip Rivas MD [facility] [place]     The patient agrees to contact the PCP office for questions related to their healthcare.     Patients top risk factors for readmission: medical condition-COPD, pneumonia, COVID-19, et al  Interventions to address risk factors: Education of patient/family/caregiver/guardian to support self-management-respiratory care, hydration, other needs    Offered patient enrollment in the Remote Patient Monitoring (RPM) program for in-home monitoring: Patient is not eligible for RPM program.     Care Transitions Subsequent and Final Call    Subsequent and Final Calls  Care Transitions Interventions  Other Interventions:           Placed a call back to patient to let her know that I had called Dr. Sandoval's office and left a message regarding her request for an antibiotic.  Told her that I had left my contact information and if I heard from them, I would let her know.  Told her they may call her also, but if she does not her from either of us, to check with her pharmacy before the end of the day.  Told her also, that if her symptoms worsen and she feels like she needs to, to come to the ED for evaluation.  She voiced understanding.  Reminded her again of her appointment on Tuesday and that I would call on Monday to check on her.  She was appreciative, but again had to hang up due to shortness of breath

## 2024-02-23 NOTE — CARE COORDINATION
Placed a call to PCP, Dr. Sandoval, and left a message for office nurse reporting that I had made contact with patient this morning for a CTC post hospitalization call and patient is requesting an antibiotic.  I reported that I was not able to talk with patient long enough to ask her about fever or pulse ox readings due to her shortness of breath from the cough, but that she was having a harsh, rattly cough with brown sputum and that she is very concerned about it.  Advised that she uses [facility].  Left my contact information and that for patient.

## 2024-02-23 NOTE — CARE COORDINATION
Care Transitions Initial Follow Up Call    Call within 2 business days of discharge: Yes    Patient Current Location:    Home: [address] [place] [address]  [place] [address]    Care Transition Nurse contacted the patient by telephone to perform post hospital discharge assessment. Verified name and  with patient as identifiers. Provided introduction to self, and explanation of the Care Transition Nurse role.     Patient: Renata Mendez Patient : 1950   MRN: [id number]  Reason for Admission: Acute on Chronic Respiratory Failure, COPD, Pneumonia secondary to COVID-19, et al   Discharge Date: 24 RARS: Readmission Risk Score: 27.7      Last Discharge Facility       Date Complaint Diagnosis Description Type Department Provider    24 Shortness of Breath COPD exacerbation (HCC) ... ED to Hosp-Admission (Discharged) (ADMITTED) [facility] Greg Sandoval MD; Tamiko Plunkett...            Was this an external facility discharge? No     Challenges to be reviewed by the provider   Additional needs identified to be addressed with provider: Yes    Patient is wanting to know if she needs a round of antibiotics.  She is concerned that the steroids are not enough.        Method of communication with provider: phone.    Care Transition Nurse reviewed medical action plan and red flags with patient who verbalized understanding. The patient was given an opportunity to ask questions and does not have any further questions or concerns at this time. Were discharge instructions available to patient? Yes. Reviewed appropriate site of care based on symptoms and resources available to patient including:   PCP - appropriate post hospitalization follow up  Urgent Care Clinic  Emergency Room - when to seek treatment  When to Call 911 - when to call and go to ER  Home Health - nurse will be calling to schedule an appointment for a visit  My Chart Messaging  My Chart Help Desk [phone]  Care Transitions Coordination - purpose and

## 2024-02-26 ENCOUNTER — CARE COORDINATION (OUTPATIENT)
Dept: CASE MANAGEMENT | Age: 74
End: 2024-02-26

## 2024-02-26 NOTE — CARE COORDINATION
Care Transitions Follow Up Call    Patient Current Location:    Home: [address] [place] [address]    Care Transition Nurse contacted the patient by telephone to follow up.    Patient: Renata Mendez  Patient : 1950   MRN: [id number]    Discharge Date: 24 RARS: Readmission Risk Score: 27.7      Needs to be reviewed by the provider   Additional needs identified to be addressed with provider: No       Method of communication with provider: none.    Follow Up  Future Appointments   Date Time Provider Department Center   5/15/2024  1:15 PM Kip Rivas MD [facility] [place]     The patient agrees to contact the PCP office for questions related to their healthcare.     Patients top risk factors for readmission: medical condition-COPD at other respiratory issues  Interventions to address risk factors: Education of patient/family/caregiver/guardian to support self-management-respiratory care    Offered patient enrollment in the Remote Patient Monitoring (RPM) program for in-home monitoring: Patient is not eligible for RPM program.     Care Transitions Subsequent and Final Call    Subsequent and Final Calls  Do you have any questions related to your medications?: No  Do you have any needs or concerns that I can assist you with?: No  Identified Barriers: None  Care Transitions Interventions  Other Interventions:           Placed a call to the patient for follow.  Could tell immediately upon her answering the phone that she is feeling better.  Her voice sounds stronger.  She was very talkative.  She said she is still having a frequent productive cough, but it is not at all like it was when I talked with her the other day.  She said one night over the weekend, she had an bad episode where she coughed up a lot of phlegm.  She said she was wearing her trilogy and had the mask on and started getting choked on it, it was so bad.  She said she had to jerk the mask off and grab paper towels.  She said it was very

## 2024-02-26 NOTE — PROGRESS NOTES
Physician Progress Note      PATIENT:               MIKE IRVING  CSN #:                  [id number]  :                       1950  ADMIT DATE:       2024 5:07 AM  DISCH DATE:        2024 12:28 PM  RESPONDING  PROVIDER #:        Greg Sandoval MD          QUERY TEXT:    Pt admitted with acute on chronic respiratory failure with hypercapnia. Pt   noted to have COVID 19, PNA, and COPD exacerbation. If possible, please   document in progress notes and discharge summary the relationship, if any,   between acute respiratory failure and COVID 19, PNA, or COPD exacerbation.    The medical record reflects the following:  Risk Factors: Female age 74 with COPD, Chronic respiratory failure with   hypoxia and hypercapnia, COVID currently in exacerbation, pneumonia  Clinical Indicators: Oxygen dependent on 2.5 L NC. Presents to ER with reports   of fevers and chills, cough, congestion, shortness of breath and wheezing.   Started with mild sore throat. At presentation SPO2 was 71% on baseline   oxygen. Exam in ED reports, Pulmonary: Effort: Tachypnea, accessory muscle   usage and respiratory distress present. Breath sounds: Decreased breath sounds   and wheezing present. WBC 11.5 HR 97 101  Treatment: BiPAP 15/6, Zithromax 500 mg IV, Rocephin 1g IV, Maxipime 2g IV,   Solumedrol 125 mg IV, Duonebs, baricintinib 2 mg,  Options provided:  -- Acute respiratory failure with hypercapnia due to COVID 19  -- Acute respiratory failure with hypercapnia due to pneumonia  -- Acute respiratory failure with hypercapnia due to COPD exacerbation  -- Other - I will add my own diagnosis  -- Disagree - Not applicable / Not valid  -- Disagree - Clinically unable to determine / Unknown  -- Refer to Clinical Documentation Reviewer    PROVIDER RESPONSE TEXT:    This patient has acute respiratory failure with hypercapnia due to COVID-19.    Query created by: Kitty Rodriguez on 2024 11:17 AM      Electronically signed by:  Greg JAMIL

## 2024-03-01 ENCOUNTER — CARE COORDINATION (OUTPATIENT)
Dept: CASE MANAGEMENT | Age: 74
End: 2024-03-01

## 2024-03-01 NOTE — CARE COORDINATION
Care Transitions Follow Up Call    Patient Current Location:    Home: [address] [place] [address]    Care Transition Nurse contacted the patient by telephone to follow up.    Patient: Renata Mendez  Patient : 1950   MRN: [id number]    Discharge Date: 24 RARS: Readmission Risk Score: 27.7      Needs to be reviewed by the provider   Additional needs identified to be addressed with provider: No       Method of communication with provider: none.    Follow Up  Future Appointments   Date Time Provider Department Center   5/15/2024  1:15 PM Kip Rivas MD [facility]     The patient agrees to contact the PCP office for questions related to their healthcare.     Care Transitions Subsequent and Final Call    Schedule Follow Up Appointment with PCP: Completed  Subsequent and Final Calls  Have your medications changed?: No  Do you have any questions related to your medications?: No  Do you currently have any active services?: No  Do you have any needs or concerns that I can assist you with?: No  Identified Barriers: None  Care Transitions Interventions  Other Interventions:           Placed a call to the patient for follow up.  She reported that she is doing well today.  She said she has been having issues with constipation.  She said she sat up most of the night last night trying to have a bm.  She said it had gotten so bad that she was having nausea and vomiting.  She said she took something for it and finally after 3 bms last night, she has gotten it resolved.  She said she is voiding well.  No signs of UTI or other issues.  She said she is not having any shortness of breath today.  Said she is breathing very well for her.  She still has some cough, productive at times.  She said there is not really any color to it.  She said she is wearing her oxygen almost all of the time, but she did take it off some last night because her pulse ox was staying at 100% nearly all of the time.  She said it is

## 2024-03-06 DIAGNOSIS — Z95.0 PACEMAKER: Primary | ICD-10-CM

## 2024-03-06 DIAGNOSIS — I49.5 SA NODE DYSFUNCTION (HCC): ICD-10-CM

## 2024-03-06 PROCEDURE — 93296 REM INTERROG EVL PM/IDS: CPT | Performed by: NURSE PRACTITIONER

## 2024-03-06 PROCEDURE — 93294 REM INTERROG EVL PM/LDLS PM: CPT | Performed by: NURSE PRACTITIONER

## 2024-03-07 ENCOUNTER — CARE COORDINATION (OUTPATIENT)
Dept: CARE COORDINATION | Age: 74
End: 2024-03-07

## 2024-03-07 NOTE — CARE COORDINATION
Care Transitions Follow Up Call    Patient Current Location:  [place]    Today I   contacted the patient by telephone to follow up  Verified name and  with patient as identifiers.    Patient: Renata Mendez  Patient : 1950   MRN: [id number]      Discharge Date: 24 RARS: Readmission Risk Score: 27.7      Needs to be reviewed by the provider   Additional needs identified to be addressed with provider: No  none             Method of communication with provider: none.    Today I spoke with patient for care transitions follow-up. She is doing well. States she is checking her vital signs at home. Those have all been good. States she is not having any cardiac or respiratory symptoms outside of her baseline. States Dr. Rivas's office called and told her she was still in and out of rhythm some but they decided to follow up with that at next appointment and she is to call with any symptoms. She is agreeable to doing that. She is working with homecare therapy it sounds like- states she is getting a little stronger. Uses wheelchair for mobility assistance and is getting up to bedside commode pretty well right now. Denies any falls. She said her blood sugars have been stable- denies any high or low readings. She has her medications and is taking them as prescribed. We reviewed her upcoming appointments. She denies the need for further asssit or support at this time.       Addressed changes since last contact:  none  Discussed follow-up appointments. If no appointment was previously scheduled, appointment scheduling offered: Yes.   Is follow up appointment scheduled within 7 days of discharge? Yes.    Follow Up  Future Appointments   Date Time Provider Department Center   5/15/2024  1:15 PM Kip Rivas MD N [facility]     External follow up appointment(s): n/a    Post Acute Care Manager reviewed medical action plan and red flags with patient and discussed any barriers to care and/or understanding of plan of

## 2024-03-12 ENCOUNTER — CARE COORDINATION (OUTPATIENT)
Dept: CASE MANAGEMENT | Age: 74
End: 2024-03-12

## 2024-03-12 NOTE — CARE COORDINATION
Care Transitions Follow Up Call    Patient Current Location:   Home: [address] [place] [id number]    Care Transition Nurse contacted the patient by telephone to follow up.    Patient: Renata Mendez  Patient : 1950   MRN: [id number]    Discharge Date: 24 RARS: Readmission Risk Score: 27.7      Needs to be reviewed by the provider   Additional needs identified to be addressed with provider: No       Method of communication with provider: none.    Follow Up  Future Appointments   Date Time Provider Department Center   5/15/2024  1:15 PM Kip Rivas MD N [facility]     The patient agrees to contact the PCP office for questions related to their healthcare.      Care Transitions Subsequent and Final Call    Schedule Follow Up Appointment with PCP: Completed  Subsequent and Final Calls  Have your medications changed?: No  Do you have any questions related to your medications?: No  Do you currently have any active services?: Yes  Are you currently active with any services?: Home Health  Do you have any needs or concerns that I can assist you with?: No  Identified Barriers: None  Care Transitions Interventions  Other Interventions:           Placed a call to the patient for her ongoing follow up.  She sounded short of breath and wheezy when she answered the phone.  I questioned her about this.  She said she had just finished up her PT exercises with the therapist from home health and had not recovered yet.  She had a harsh rattly cough a few times while talking with me also.  She even had to spit up some while we were talking.  She said it is thick and stringy.  It is light brown she said.  It is not as dark as it was before.  She said this is an improvement for her.  She denied any fever or other issues.  She was able to slow her breathing down and sounded better after a little bit.  She said her pulse ox has been running % most of the time.  She said she even took her oxygen off for 4 hours

## 2024-03-14 DIAGNOSIS — I49.5 SA NODE DYSFUNCTION (HCC): ICD-10-CM

## 2024-03-14 DIAGNOSIS — Z95.0 PACEMAKER: Primary | ICD-10-CM
